# Patient Record
Sex: FEMALE | Race: WHITE | Employment: OTHER | URBAN - NONMETROPOLITAN AREA
[De-identification: names, ages, dates, MRNs, and addresses within clinical notes are randomized per-mention and may not be internally consistent; named-entity substitution may affect disease eponyms.]

---

## 2017-08-01 ENCOUNTER — APPOINTMENT (OUTPATIENT)
Dept: CT IMAGING | Age: 67
DRG: 293 | End: 2017-08-01

## 2017-08-01 ENCOUNTER — HOSPITAL ENCOUNTER (INPATIENT)
Age: 67
LOS: 2 days | Discharge: HOME | DRG: 293 | End: 2017-08-03
Attending: EMERGENCY MEDICINE | Admitting: INTERNAL MEDICINE

## 2017-08-01 ENCOUNTER — APPOINTMENT (OUTPATIENT)
Dept: GENERAL RADIOLOGY | Age: 67
DRG: 293 | End: 2017-08-01

## 2017-08-01 ENCOUNTER — HOSPITAL ENCOUNTER (INPATIENT)
Dept: NON INVASIVE DIAGNOSTICS | Age: 67
Discharge: HOME | DRG: 293 | End: 2017-08-01

## 2017-08-01 DIAGNOSIS — I10 ESSENTIAL HYPERTENSION: ICD-10-CM

## 2017-08-01 DIAGNOSIS — E78.5 HYPERLIPIDEMIA, UNSPECIFIED HYPERLIPIDEMIA TYPE: ICD-10-CM

## 2017-08-01 DIAGNOSIS — I16.1 HYPERTENSIVE EMERGENCY: Primary | ICD-10-CM

## 2017-08-01 DIAGNOSIS — I50.9 ACUTE ON CHRONIC CONGESTIVE HEART FAILURE, UNSPECIFIED CONGESTIVE HEART FAILURE TYPE: ICD-10-CM

## 2017-08-01 PROBLEM — I50.23 ACUTE ON CHRONIC SYSTOLIC CHF (CONGESTIVE HEART FAILURE) (HCC): Status: ACTIVE | Noted: 2017-08-01

## 2017-08-01 PROBLEM — I16.0 HYPERTENSIVE URGENCY: Status: ACTIVE | Noted: 2017-08-01

## 2017-08-01 LAB
ABSOLUTE EOS #: 0 K/UL (ref 0–0.4)
ABSOLUTE LYMPH #: 0.9 K/UL (ref 1–4.8)
ABSOLUTE MONO #: 0.6 K/UL (ref 0–1)
ALBUMIN SERPL-MCNC: 4.1 G/DL (ref 3.5–5.2)
ALBUMIN/GLOBULIN RATIO: 1.3 (ref 1–2.5)
ALP BLD-CCNC: 76 U/L (ref 35–104)
ALT SERPL-CCNC: 17 U/L (ref 5–33)
ANION GAP SERPL CALCULATED.3IONS-SCNC: 14 MMOL/L (ref 9–17)
AST SERPL-CCNC: 20 U/L
BASOPHILS # BLD: 0 %
BASOPHILS ABSOLUTE: 0 K/UL (ref 0–0.2)
BILIRUB SERPL-MCNC: 0.4 MG/DL (ref 0.3–1.2)
BILIRUBIN DIRECT: <0.08 MG/DL
BILIRUBIN, INDIRECT: NORMAL MG/DL (ref 0–1)
BNP INTERPRETATION: ABNORMAL
BNP INTERPRETATION: ABNORMAL
BUN BLDV-MCNC: 17 MG/DL (ref 8–23)
BUN/CREAT BLD: 25 (ref 9–20)
CALCIUM SERPL-MCNC: 9.9 MG/DL (ref 8.6–10.4)
CHLORIDE BLD-SCNC: 102 MMOL/L (ref 98–107)
CO2: 25 MMOL/L (ref 20–31)
CREAT SERPL-MCNC: 0.67 MG/DL (ref 0.5–0.9)
DIFFERENTIAL TYPE: ABNORMAL
EKG ATRIAL RATE: 105 BPM
EKG P AXIS: 56 DEGREES
EKG P-R INTERVAL: 190 MS
EKG Q-T INTERVAL: 366 MS
EKG QRS DURATION: 108 MS
EKG QTC CALCULATION (BAZETT): 483 MS
EKG R AXIS: 2 DEGREES
EKG T AXIS: 94 DEGREES
EKG VENTRICULAR RATE: 105 BPM
EOSINOPHILS RELATIVE PERCENT: 1 %
GFR AFRICAN AMERICAN: >60 ML/MIN
GFR NON-AFRICAN AMERICAN: >60 ML/MIN
GFR SERPL CREATININE-BSD FRML MDRD: ABNORMAL ML/MIN/{1.73_M2}
GFR SERPL CREATININE-BSD FRML MDRD: ABNORMAL ML/MIN/{1.73_M2}
GLOBULIN: NORMAL G/DL (ref 1.5–3.8)
GLUCOSE BLD-MCNC: 140 MG/DL (ref 70–99)
HCT VFR BLD CALC: 41.7 % (ref 36–46)
HEMOGLOBIN: 14 G/DL (ref 12–16)
LV EF: 43 %
LVEF MODALITY: NORMAL
LYMPHOCYTES # BLD: 12 %
MCH RBC QN AUTO: 30 PG (ref 26–34)
MCHC RBC AUTO-ENTMCNC: 33.6 G/DL (ref 31–37)
MCV RBC AUTO: 89.3 FL (ref 80–100)
MONOCYTES # BLD: 7 %
PDW BLD-RTO: 14.1 % (ref 12.1–15.2)
PLATELET # BLD: 144 K/UL (ref 140–450)
PLATELET ESTIMATE: ABNORMAL
PMV BLD AUTO: 10.1 FL (ref 6–12)
POTASSIUM SERPL-SCNC: 4.3 MMOL/L (ref 3.7–5.3)
PRO-BNP: 2478 PG/ML
PRO-BNP: 3556 PG/ML
RBC # BLD: 4.67 M/UL (ref 4–5.2)
RBC # BLD: ABNORMAL 10*6/UL
SEG NEUTROPHILS: 80 %
SEGMENTED NEUTROPHILS ABSOLUTE COUNT: 6.1 K/UL (ref 1.8–7.7)
SODIUM BLD-SCNC: 141 MMOL/L (ref 135–144)
THYROXINE, FREE: 1.15 NG/DL (ref 0.93–1.7)
TOTAL PROTEIN: 7.2 G/DL (ref 6.4–8.3)
TROPONIN INTERP: NORMAL
TROPONIN T: <0.03 NG/ML
TSH SERPL DL<=0.05 MIU/L-ACNC: 1.14 MIU/L (ref 0.3–5)
WBC # BLD: 7.7 K/UL (ref 3.5–11)
WBC # BLD: ABNORMAL 10*3/UL

## 2017-08-01 PROCEDURE — 94761 N-INVAS EAR/PLS OXIMETRY MLT: CPT

## 2017-08-01 PROCEDURE — 93306 TTE W/DOPPLER COMPLETE: CPT

## 2017-08-01 PROCEDURE — 80048 BASIC METABOLIC PNL TOTAL CA: CPT

## 2017-08-01 PROCEDURE — 85025 COMPLETE CBC W/AUTO DIFF WBC: CPT

## 2017-08-01 PROCEDURE — 2000000000 HC ICU R&B

## 2017-08-01 PROCEDURE — 6360000002 HC RX W HCPCS: Performed by: INTERNAL MEDICINE

## 2017-08-01 PROCEDURE — 71020 XR CHEST STANDARD TWO VW: CPT

## 2017-08-01 PROCEDURE — 84484 ASSAY OF TROPONIN QUANT: CPT

## 2017-08-01 PROCEDURE — 80076 HEPATIC FUNCTION PANEL: CPT

## 2017-08-01 PROCEDURE — 2580000003 HC RX 258: Performed by: INTERNAL MEDICINE

## 2017-08-01 PROCEDURE — 84439 ASSAY OF FREE THYROXINE: CPT

## 2017-08-01 PROCEDURE — 83880 ASSAY OF NATRIURETIC PEPTIDE: CPT

## 2017-08-01 PROCEDURE — 99253 IP/OBS CNSLTJ NEW/EST LOW 45: CPT | Performed by: FAMILY MEDICINE

## 2017-08-01 PROCEDURE — 6370000000 HC RX 637 (ALT 250 FOR IP): Performed by: EMERGENCY MEDICINE

## 2017-08-01 PROCEDURE — 6370000000 HC RX 637 (ALT 250 FOR IP): Performed by: INTERNAL MEDICINE

## 2017-08-01 PROCEDURE — 99285 EMERGENCY DEPT VISIT HI MDM: CPT

## 2017-08-01 PROCEDURE — 71250 CT THORAX DX C-: CPT

## 2017-08-01 PROCEDURE — 96374 THER/PROPH/DIAG INJ IV PUSH: CPT

## 2017-08-01 PROCEDURE — 96375 TX/PRO/DX INJ NEW DRUG ADDON: CPT

## 2017-08-01 PROCEDURE — 6360000002 HC RX W HCPCS: Performed by: EMERGENCY MEDICINE

## 2017-08-01 PROCEDURE — 84443 ASSAY THYROID STIM HORMONE: CPT

## 2017-08-01 PROCEDURE — 93005 ELECTROCARDIOGRAM TRACING: CPT

## 2017-08-01 PROCEDURE — 36415 COLL VENOUS BLD VENIPUNCTURE: CPT

## 2017-08-01 RX ORDER — SODIUM CHLORIDE 0.9 % (FLUSH) 0.9 %
10 SYRINGE (ML) INJECTION PRN
Status: DISCONTINUED | OUTPATIENT
Start: 2017-08-01 | End: 2017-08-03 | Stop reason: HOSPADM

## 2017-08-01 RX ORDER — CARVEDILOL 12.5 MG/1
12.5 TABLET ORAL 2 TIMES DAILY WITH MEALS
Status: DISCONTINUED | OUTPATIENT
Start: 2017-08-01 | End: 2017-08-03 | Stop reason: HOSPADM

## 2017-08-01 RX ORDER — POTASSIUM CHLORIDE 7.45 MG/ML
10 INJECTION INTRAVENOUS PRN
Status: DISCONTINUED | OUTPATIENT
Start: 2017-08-01 | End: 2017-08-03 | Stop reason: HOSPADM

## 2017-08-01 RX ORDER — POTASSIUM CHLORIDE 20 MEQ/1
40 TABLET, EXTENDED RELEASE ORAL PRN
Status: DISCONTINUED | OUTPATIENT
Start: 2017-08-01 | End: 2017-08-03 | Stop reason: HOSPADM

## 2017-08-01 RX ORDER — POTASSIUM CHLORIDE 20MEQ/15ML
40 LIQUID (ML) ORAL PRN
Status: DISCONTINUED | OUTPATIENT
Start: 2017-08-01 | End: 2017-08-03 | Stop reason: HOSPADM

## 2017-08-01 RX ORDER — FUROSEMIDE 10 MG/ML
40 INJECTION INTRAMUSCULAR; INTRAVENOUS 2 TIMES DAILY
Status: DISCONTINUED | OUTPATIENT
Start: 2017-08-01 | End: 2017-08-02

## 2017-08-01 RX ORDER — ACETAMINOPHEN 325 MG/1
650 TABLET ORAL EVERY 4 HOURS PRN
Status: DISCONTINUED | OUTPATIENT
Start: 2017-08-01 | End: 2017-08-02

## 2017-08-01 RX ORDER — FAMOTIDINE 20 MG/1
20 TABLET, FILM COATED ORAL 2 TIMES DAILY
Status: DISCONTINUED | OUTPATIENT
Start: 2017-08-01 | End: 2017-08-03 | Stop reason: HOSPADM

## 2017-08-01 RX ORDER — SODIUM CHLORIDE 0.9 % (FLUSH) 0.9 %
10 SYRINGE (ML) INJECTION EVERY 12 HOURS SCHEDULED
Status: DISCONTINUED | OUTPATIENT
Start: 2017-08-01 | End: 2017-08-03 | Stop reason: HOSPADM

## 2017-08-01 RX ORDER — HYDRALAZINE HYDROCHLORIDE 20 MG/ML
10 INJECTION INTRAMUSCULAR; INTRAVENOUS EVERY 6 HOURS PRN
Status: DISCONTINUED | OUTPATIENT
Start: 2017-08-01 | End: 2017-08-02

## 2017-08-01 RX ORDER — MAGNESIUM SULFATE 1 G/100ML
1 INJECTION INTRAVENOUS PRN
Status: DISCONTINUED | OUTPATIENT
Start: 2017-08-01 | End: 2017-08-03 | Stop reason: HOSPADM

## 2017-08-01 RX ORDER — LISINOPRIL 20 MG/1
40 TABLET ORAL DAILY
Status: DISCONTINUED | OUTPATIENT
Start: 2017-08-01 | End: 2017-08-03 | Stop reason: HOSPADM

## 2017-08-01 RX ORDER — ONDANSETRON 2 MG/ML
4 INJECTION INTRAMUSCULAR; INTRAVENOUS EVERY 6 HOURS PRN
Status: DISCONTINUED | OUTPATIENT
Start: 2017-08-01 | End: 2017-08-03 | Stop reason: HOSPADM

## 2017-08-01 RX ORDER — NITROGLYCERIN 0.4 MG/1
0.4 TABLET SUBLINGUAL EVERY 5 MIN PRN
Status: DISCONTINUED | OUTPATIENT
Start: 2017-08-01 | End: 2017-08-03 | Stop reason: HOSPADM

## 2017-08-01 RX ORDER — FUROSEMIDE 10 MG/ML
40 INJECTION INTRAMUSCULAR; INTRAVENOUS ONCE
Status: COMPLETED | OUTPATIENT
Start: 2017-08-01 | End: 2017-08-01

## 2017-08-01 RX ADMIN — FAMOTIDINE 20 MG: 20 TABLET, FILM COATED ORAL at 20:50

## 2017-08-01 RX ADMIN — FUROSEMIDE 40 MG: 10 INJECTION, SOLUTION INTRAMUSCULAR; INTRAVENOUS at 18:04

## 2017-08-01 RX ADMIN — CARVEDILOL 12.5 MG: 12.5 TABLET, FILM COATED ORAL at 17:15

## 2017-08-01 RX ADMIN — Medication 10 ML: at 18:04

## 2017-08-01 RX ADMIN — Medication 10 ML: at 20:50

## 2017-08-01 RX ADMIN — FUROSEMIDE 40 MG: 10 INJECTION, SOLUTION INTRAMUSCULAR; INTRAVENOUS at 09:41

## 2017-08-01 RX ADMIN — NITROGLYCERIN 0.4 MG: 0.4 TABLET SUBLINGUAL at 09:45

## 2017-08-01 RX ADMIN — LISINOPRIL 40 MG: 20 TABLET ORAL at 12:50

## 2017-08-01 RX ADMIN — HYDRALAZINE HYDROCHLORIDE 10 MG: 20 INJECTION INTRAMUSCULAR; INTRAVENOUS at 09:59

## 2017-08-01 RX ADMIN — NITROGLYCERIN 0.4 MG: 0.4 TABLET SUBLINGUAL at 09:58

## 2017-08-01 RX ADMIN — ENOXAPARIN SODIUM 40 MG: 40 INJECTION SUBCUTANEOUS at 12:51

## 2017-08-01 RX ADMIN — FAMOTIDINE 20 MG: 20 TABLET, FILM COATED ORAL at 12:51

## 2017-08-01 ASSESSMENT — ENCOUNTER SYMPTOMS
FACIAL SWELLING: 0
BACK PAIN: 0
VOICE CHANGE: 0
NAUSEA: 0
WHEEZING: 0
TROUBLE SWALLOWING: 0
SORE THROAT: 0
DIARRHEA: 0
VOMITING: 0
PHOTOPHOBIA: 0
COUGH: 0
CHEST TIGHTNESS: 1
SHORTNESS OF BREATH: 1
BLOOD IN STOOL: 0
ABDOMINAL PAIN: 0

## 2017-08-01 ASSESSMENT — PAIN DESCRIPTION - DESCRIPTORS: DESCRIPTORS: ACHING

## 2017-08-01 ASSESSMENT — PAIN SCALES - GENERAL
PAINLEVEL_OUTOF10: 1
PAINLEVEL_OUTOF10: 1

## 2017-08-01 ASSESSMENT — PAIN DESCRIPTION - LOCATION: LOCATION: ABDOMEN

## 2017-08-01 ASSESSMENT — PAIN DESCRIPTION - PAIN TYPE: TYPE: ACUTE PAIN

## 2017-08-02 LAB
ABSOLUTE EOS #: 0.1 K/UL (ref 0–0.4)
ABSOLUTE LYMPH #: 1.3 K/UL (ref 1–4.8)
ABSOLUTE MONO #: 0.6 K/UL (ref 0–1)
ANION GAP SERPL CALCULATED.3IONS-SCNC: 9 MMOL/L (ref 9–17)
BASOPHILS # BLD: 1 %
BASOPHILS ABSOLUTE: 0 K/UL (ref 0–0.2)
BUN BLDV-MCNC: 23 MG/DL (ref 8–23)
BUN/CREAT BLD: 26 (ref 9–20)
CALCIUM SERPL-MCNC: 9.8 MG/DL (ref 8.6–10.4)
CHLORIDE BLD-SCNC: 99 MMOL/L (ref 98–107)
CHOLESTEROL/HDL RATIO: 3.5
CHOLESTEROL: 202 MG/DL
CO2: 31 MMOL/L (ref 20–31)
CREAT SERPL-MCNC: 0.88 MG/DL (ref 0.5–0.9)
DIFFERENTIAL TYPE: NORMAL
EOSINOPHILS RELATIVE PERCENT: 1 %
GFR AFRICAN AMERICAN: >60 ML/MIN
GFR NON-AFRICAN AMERICAN: >60 ML/MIN
GFR SERPL CREATININE-BSD FRML MDRD: ABNORMAL ML/MIN/{1.73_M2}
GFR SERPL CREATININE-BSD FRML MDRD: ABNORMAL ML/MIN/{1.73_M2}
GLUCOSE BLD-MCNC: 118 MG/DL (ref 70–99)
HCT VFR BLD CALC: 39.7 % (ref 36–46)
HDLC SERPL-MCNC: 58 MG/DL
HEMOGLOBIN: 13.2 G/DL (ref 12–16)
LDL CHOLESTEROL: 127 MG/DL (ref 0–130)
LYMPHOCYTES # BLD: 24 %
MAGNESIUM: 1.9 MG/DL (ref 1.6–2.6)
MCH RBC QN AUTO: 30.1 PG (ref 26–34)
MCHC RBC AUTO-ENTMCNC: 33.3 G/DL (ref 31–37)
MCV RBC AUTO: 90.3 FL (ref 80–100)
MONOCYTES # BLD: 11 %
PDW BLD-RTO: 14.1 % (ref 12.1–15.2)
PLATELET # BLD: 176 K/UL (ref 140–450)
PLATELET ESTIMATE: NORMAL
PMV BLD AUTO: 9.7 FL (ref 6–12)
POTASSIUM SERPL-SCNC: 4.2 MMOL/L (ref 3.7–5.3)
RBC # BLD: 4.4 M/UL (ref 4–5.2)
RBC # BLD: NORMAL 10*6/UL
SEG NEUTROPHILS: 63 %
SEGMENTED NEUTROPHILS ABSOLUTE COUNT: 3.4 K/UL (ref 1.8–7.7)
SODIUM BLD-SCNC: 139 MMOL/L (ref 135–144)
TRIGL SERPL-MCNC: 85 MG/DL
VLDLC SERPL CALC-MCNC: ABNORMAL MG/DL (ref 1–30)
WBC # BLD: 5.4 K/UL (ref 3.5–11)
WBC # BLD: NORMAL 10*3/UL

## 2017-08-02 PROCEDURE — 2580000003 HC RX 258: Performed by: INTERNAL MEDICINE

## 2017-08-02 PROCEDURE — 6370000000 HC RX 637 (ALT 250 FOR IP): Performed by: PHYSICIAN ASSISTANT

## 2017-08-02 PROCEDURE — 6370000000 HC RX 637 (ALT 250 FOR IP): Performed by: INTERNAL MEDICINE

## 2017-08-02 PROCEDURE — 85025 COMPLETE CBC W/AUTO DIFF WBC: CPT

## 2017-08-02 PROCEDURE — 83735 ASSAY OF MAGNESIUM: CPT

## 2017-08-02 PROCEDURE — 80061 LIPID PANEL: CPT

## 2017-08-02 PROCEDURE — 36415 COLL VENOUS BLD VENIPUNCTURE: CPT

## 2017-08-02 PROCEDURE — 1200000000 HC SEMI PRIVATE

## 2017-08-02 PROCEDURE — 6360000002 HC RX W HCPCS: Performed by: INTERNAL MEDICINE

## 2017-08-02 PROCEDURE — 80048 BASIC METABOLIC PNL TOTAL CA: CPT

## 2017-08-02 RX ORDER — FUROSEMIDE 40 MG/1
40 TABLET ORAL DAILY
Status: DISCONTINUED | OUTPATIENT
Start: 2017-08-03 | End: 2017-08-03 | Stop reason: HOSPADM

## 2017-08-02 RX ORDER — ACETAMINOPHEN 325 MG/1
650 TABLET ORAL EVERY 4 HOURS PRN
Status: DISCONTINUED | OUTPATIENT
Start: 2017-08-02 | End: 2017-08-03 | Stop reason: HOSPADM

## 2017-08-02 RX ADMIN — FAMOTIDINE 20 MG: 20 TABLET, FILM COATED ORAL at 08:37

## 2017-08-02 RX ADMIN — Medication 10 ML: at 20:36

## 2017-08-02 RX ADMIN — LISINOPRIL 40 MG: 20 TABLET ORAL at 08:37

## 2017-08-02 RX ADMIN — CARVEDILOL 12.5 MG: 12.5 TABLET, FILM COATED ORAL at 16:32

## 2017-08-02 RX ADMIN — CARVEDILOL 12.5 MG: 12.5 TABLET, FILM COATED ORAL at 08:37

## 2017-08-02 RX ADMIN — FAMOTIDINE 20 MG: 20 TABLET, FILM COATED ORAL at 20:36

## 2017-08-02 RX ADMIN — FUROSEMIDE 40 MG: 10 INJECTION, SOLUTION INTRAMUSCULAR; INTRAVENOUS at 08:37

## 2017-08-02 RX ADMIN — Medication 10 ML: at 08:40

## 2017-08-02 RX ADMIN — ENOXAPARIN SODIUM 40 MG: 40 INJECTION SUBCUTANEOUS at 08:37

## 2017-08-02 ASSESSMENT — PAIN DESCRIPTION - PAIN TYPE: TYPE: ACUTE PAIN

## 2017-08-02 ASSESSMENT — PAIN SCALES - GENERAL
PAINLEVEL_OUTOF10: 0
PAINLEVEL_OUTOF10: 3
PAINLEVEL_OUTOF10: 0

## 2017-08-02 ASSESSMENT — PAIN DESCRIPTION - DESCRIPTORS: DESCRIPTORS: ACHING

## 2017-08-02 ASSESSMENT — PAIN DESCRIPTION - LOCATION: LOCATION: HEAD

## 2017-08-03 VITALS
HEART RATE: 60 BPM | DIASTOLIC BLOOD PRESSURE: 85 MMHG | BODY MASS INDEX: 31.67 KG/M2 | HEIGHT: 64 IN | OXYGEN SATURATION: 94 % | TEMPERATURE: 98.2 F | WEIGHT: 185.5 LBS | SYSTOLIC BLOOD PRESSURE: 138 MMHG | RESPIRATION RATE: 15 BRPM

## 2017-08-03 LAB
ANION GAP SERPL CALCULATED.3IONS-SCNC: 10 MMOL/L (ref 9–17)
BUN BLDV-MCNC: 24 MG/DL (ref 8–23)
BUN/CREAT BLD: 30 (ref 9–20)
CALCIUM SERPL-MCNC: 9.7 MG/DL (ref 8.6–10.4)
CHLORIDE BLD-SCNC: 103 MMOL/L (ref 98–107)
CO2: 27 MMOL/L (ref 20–31)
CREAT SERPL-MCNC: 0.79 MG/DL (ref 0.5–0.9)
GFR AFRICAN AMERICAN: >60 ML/MIN
GFR NON-AFRICAN AMERICAN: >60 ML/MIN
GFR SERPL CREATININE-BSD FRML MDRD: ABNORMAL ML/MIN/{1.73_M2}
GFR SERPL CREATININE-BSD FRML MDRD: ABNORMAL ML/MIN/{1.73_M2}
GLUCOSE BLD-MCNC: 108 MG/DL (ref 70–99)
POTASSIUM SERPL-SCNC: 4.1 MMOL/L (ref 3.7–5.3)
SODIUM BLD-SCNC: 140 MMOL/L (ref 135–144)

## 2017-08-03 PROCEDURE — 99233 SBSQ HOSP IP/OBS HIGH 50: CPT | Performed by: FAMILY MEDICINE

## 2017-08-03 PROCEDURE — 6360000002 HC RX W HCPCS: Performed by: INTERNAL MEDICINE

## 2017-08-03 PROCEDURE — 6370000000 HC RX 637 (ALT 250 FOR IP): Performed by: INTERNAL MEDICINE

## 2017-08-03 PROCEDURE — 36415 COLL VENOUS BLD VENIPUNCTURE: CPT

## 2017-08-03 PROCEDURE — 80048 BASIC METABOLIC PNL TOTAL CA: CPT

## 2017-08-03 PROCEDURE — 2580000003 HC RX 258: Performed by: INTERNAL MEDICINE

## 2017-08-03 RX ORDER — LISINOPRIL 40 MG/1
40 TABLET ORAL DAILY
Qty: 30 TABLET | Refills: 0 | Status: SHIPPED | OUTPATIENT
Start: 2017-08-03 | End: 2017-08-28

## 2017-08-03 RX ORDER — CARVEDILOL 12.5 MG/1
12.5 TABLET ORAL 2 TIMES DAILY WITH MEALS
Qty: 60 TABLET | Refills: 0 | Status: SHIPPED | OUTPATIENT
Start: 2017-08-03 | End: 2018-12-17

## 2017-08-03 RX ORDER — FUROSEMIDE 40 MG/1
40 TABLET ORAL DAILY
Qty: 30 TABLET | Refills: 0 | Status: SHIPPED | OUTPATIENT
Start: 2017-08-03 | End: 2017-08-28

## 2017-08-03 RX ADMIN — FUROSEMIDE 40 MG: 40 TABLET ORAL at 08:26

## 2017-08-03 RX ADMIN — LISINOPRIL 40 MG: 20 TABLET ORAL at 08:25

## 2017-08-03 RX ADMIN — FAMOTIDINE 20 MG: 20 TABLET, FILM COATED ORAL at 08:25

## 2017-08-03 RX ADMIN — ENOXAPARIN SODIUM 40 MG: 40 INJECTION SUBCUTANEOUS at 08:25

## 2017-08-03 RX ADMIN — Medication 10 ML: at 08:25

## 2017-08-03 RX ADMIN — CARVEDILOL 12.5 MG: 12.5 TABLET, FILM COATED ORAL at 08:25

## 2017-08-03 ASSESSMENT — PAIN SCALES - GENERAL: PAINLEVEL_OUTOF10: 0

## 2017-08-04 ENCOUNTER — HOSPITAL ENCOUNTER (OUTPATIENT)
Dept: CARDIAC CATH/INVASIVE PROCEDURES | Age: 67
Discharge: HOME | End: 2017-08-04

## 2017-08-04 VITALS
RESPIRATION RATE: 18 BRPM | WEIGHT: 185 LBS | TEMPERATURE: 97.4 F | HEART RATE: 62 BPM | OXYGEN SATURATION: 97 % | HEIGHT: 64 IN | DIASTOLIC BLOOD PRESSURE: 63 MMHG | SYSTOLIC BLOOD PRESSURE: 125 MMHG | BODY MASS INDEX: 31.58 KG/M2

## 2017-08-04 PROCEDURE — 2500000003 HC RX 250 WO HCPCS

## 2017-08-04 PROCEDURE — C1894 INTRO/SHEATH, NON-LASER: HCPCS

## 2017-08-04 PROCEDURE — 93458 L HRT ARTERY/VENTRICLE ANGIO: CPT | Performed by: FAMILY MEDICINE

## 2017-08-04 PROCEDURE — C1887 CATHETER, GUIDING: HCPCS

## 2017-08-04 PROCEDURE — C1725 CATH, TRANSLUMIN NON-LASER: HCPCS

## 2017-08-04 PROCEDURE — 2580000003 HC RX 258: Performed by: FAMILY MEDICINE

## 2017-08-04 PROCEDURE — 6360000002 HC RX W HCPCS

## 2017-08-04 PROCEDURE — C1769 GUIDE WIRE: HCPCS

## 2017-08-04 RX ORDER — ACETAMINOPHEN 325 MG/1
650 TABLET ORAL EVERY 4 HOURS PRN
Status: DISCONTINUED | OUTPATIENT
Start: 2017-08-04 | End: 2017-08-05 | Stop reason: HOSPADM

## 2017-08-04 RX ORDER — DIPHENHYDRAMINE HCL 25 MG
50 TABLET ORAL ONCE
Status: DISCONTINUED | OUTPATIENT
Start: 2017-08-04 | End: 2017-08-05 | Stop reason: HOSPADM

## 2017-08-04 RX ORDER — SODIUM CHLORIDE 0.9 % (FLUSH) 0.9 %
10 SYRINGE (ML) INJECTION EVERY 12 HOURS SCHEDULED
Status: DISCONTINUED | OUTPATIENT
Start: 2017-08-04 | End: 2017-08-05 | Stop reason: HOSPADM

## 2017-08-04 RX ORDER — SODIUM CHLORIDE 9 MG/ML
INJECTION, SOLUTION INTRAVENOUS CONTINUOUS
Status: DISCONTINUED | OUTPATIENT
Start: 2017-08-04 | End: 2017-08-05 | Stop reason: HOSPADM

## 2017-08-04 RX ORDER — SODIUM CHLORIDE 0.9 % (FLUSH) 0.9 %
10 SYRINGE (ML) INJECTION PRN
Status: DISCONTINUED | OUTPATIENT
Start: 2017-08-04 | End: 2017-08-05 | Stop reason: HOSPADM

## 2017-08-04 RX ORDER — NITROGLYCERIN 0.4 MG/1
0.4 TABLET SUBLINGUAL EVERY 5 MIN PRN
Status: DISCONTINUED | OUTPATIENT
Start: 2017-08-04 | End: 2017-08-05 | Stop reason: HOSPADM

## 2017-08-04 RX ADMIN — SODIUM CHLORIDE: 9 INJECTION, SOLUTION INTRAVENOUS at 10:33

## 2017-08-04 ASSESSMENT — PAIN SCALES - GENERAL
PAINLEVEL_OUTOF10: 0
PAINLEVEL_OUTOF10: 0

## 2017-08-28 ENCOUNTER — OFFICE VISIT (OUTPATIENT)
Dept: CARDIOLOGY | Age: 67
End: 2017-08-28

## 2017-08-28 VITALS
WEIGHT: 191 LBS | HEIGHT: 64 IN | SYSTOLIC BLOOD PRESSURE: 161 MMHG | DIASTOLIC BLOOD PRESSURE: 92 MMHG | BODY MASS INDEX: 32.61 KG/M2 | RESPIRATION RATE: 16 BRPM | OXYGEN SATURATION: 97 % | HEART RATE: 60 BPM

## 2017-08-28 DIAGNOSIS — I50.42 CHRONIC COMBINED SYSTOLIC AND DIASTOLIC HF (HEART FAILURE), NYHA CLASS 2 (HCC): ICD-10-CM

## 2017-08-28 DIAGNOSIS — I10 HYPERTENSION, UNCONTROLLED: ICD-10-CM

## 2017-08-28 DIAGNOSIS — I42.8 NON-ISCHEMIC CARDIOMYOPATHY (HCC): Primary | ICD-10-CM

## 2017-08-28 PROCEDURE — 99214 OFFICE O/P EST MOD 30 MIN: CPT | Performed by: FAMILY MEDICINE

## 2017-08-28 RX ORDER — CARVEDILOL 12.5 MG/1
12.5 TABLET ORAL 2 TIMES DAILY WITH MEALS
Qty: 180 TABLET | Refills: 3 | Status: SHIPPED | OUTPATIENT
Start: 2017-08-28 | End: 2019-02-13 | Stop reason: SDUPTHER

## 2017-08-28 RX ORDER — LOSARTAN POTASSIUM AND HYDROCHLOROTHIAZIDE 25; 100 MG/1; MG/1
1 TABLET ORAL DAILY
Qty: 90 TABLET | Refills: 3 | Status: ON HOLD | OUTPATIENT
Start: 2017-08-28 | End: 2018-12-20

## 2018-01-03 ENCOUNTER — HOSPITAL ENCOUNTER (OUTPATIENT)
Dept: NON INVASIVE DIAGNOSTICS | Age: 68
Discharge: HOME | End: 2018-01-03
Payer: MEDICARE

## 2018-01-03 DIAGNOSIS — I42.8 NON-ISCHEMIC CARDIOMYOPATHY (HCC): ICD-10-CM

## 2018-01-03 DIAGNOSIS — I10 HYPERTENSION, UNCONTROLLED: ICD-10-CM

## 2018-01-03 LAB
LV EF: 48 %
LVEF MODALITY: NORMAL

## 2018-01-03 PROCEDURE — 93306 TTE W/DOPPLER COMPLETE: CPT

## 2018-12-17 ENCOUNTER — HOSPITAL ENCOUNTER (INPATIENT)
Age: 68
LOS: 3 days | Discharge: HOME | DRG: 292 | End: 2018-12-20
Attending: EMERGENCY MEDICINE | Admitting: INTERNAL MEDICINE
Payer: MEDICARE

## 2018-12-17 ENCOUNTER — APPOINTMENT (OUTPATIENT)
Dept: GENERAL RADIOLOGY | Age: 68
DRG: 292 | End: 2018-12-17
Payer: MEDICARE

## 2018-12-17 DIAGNOSIS — I50.42 CHRONIC COMBINED SYSTOLIC AND DIASTOLIC HF (HEART FAILURE), NYHA CLASS 2 (HCC): ICD-10-CM

## 2018-12-17 DIAGNOSIS — I50.43 ACUTE ON CHRONIC COMBINED SYSTOLIC AND DIASTOLIC CHF, NYHA CLASS 4 (HCC): ICD-10-CM

## 2018-12-17 DIAGNOSIS — I50.9 ACUTE ON CHRONIC CONGESTIVE HEART FAILURE, UNSPECIFIED HEART FAILURE TYPE (HCC): Primary | ICD-10-CM

## 2018-12-17 DIAGNOSIS — R09.02 HYPOXIA: ICD-10-CM

## 2018-12-17 LAB
ALBUMIN SERPL-MCNC: 3.7 G/DL (ref 3.5–5.2)
ALBUMIN/GLOBULIN RATIO: 1.2 (ref 1–2.5)
ALLEN TEST: ABNORMAL
ALP BLD-CCNC: 105 U/L (ref 35–104)
ALT SERPL-CCNC: 29 U/L (ref 5–33)
ANION GAP SERPL CALCULATED.3IONS-SCNC: 10 MMOL/L (ref 9–17)
ANION GAP SERPL CALCULATED.3IONS-SCNC: 11 MMOL/L (ref 9–17)
AST SERPL-CCNC: 21 U/L
BILIRUB SERPL-MCNC: 0.57 MG/DL (ref 0.3–1.2)
BNP INTERPRETATION: ABNORMAL
BUN BLDV-MCNC: 19 MG/DL (ref 8–23)
BUN BLDV-MCNC: 20 MG/DL (ref 8–23)
BUN/CREAT BLD: 21 (ref 9–20)
BUN/CREAT BLD: 22 (ref 9–20)
CALCIUM SERPL-MCNC: 9.2 MG/DL (ref 8.6–10.4)
CALCIUM SERPL-MCNC: 9.4 MG/DL (ref 8.6–10.4)
CARBOXYHEMOGLOBIN: ABNORMAL % (ref 0–5)
CHLORIDE BLD-SCNC: 101 MMOL/L (ref 98–107)
CHLORIDE BLD-SCNC: 103 MMOL/L (ref 98–107)
CO2: 27 MMOL/L (ref 20–31)
CO2: 28 MMOL/L (ref 20–31)
CREAT SERPL-MCNC: 0.88 MG/DL (ref 0.5–0.9)
CREAT SERPL-MCNC: 0.96 MG/DL (ref 0.5–0.9)
EKG ATRIAL RATE: 102 BPM
EKG P AXIS: 63 DEGREES
EKG P-R INTERVAL: 172 MS
EKG Q-T INTERVAL: 396 MS
EKG QRS DURATION: 96 MS
EKG QTC CALCULATION (BAZETT): 516 MS
EKG R AXIS: 27 DEGREES
EKG T AXIS: 116 DEGREES
EKG VENTRICULAR RATE: 102 BPM
FIO2: ABNORMAL
GFR AFRICAN AMERICAN: >60 ML/MIN
GFR AFRICAN AMERICAN: >60 ML/MIN
GFR NON-AFRICAN AMERICAN: 58 ML/MIN
GFR NON-AFRICAN AMERICAN: >60 ML/MIN
GFR SERPL CREATININE-BSD FRML MDRD: ABNORMAL ML/MIN/{1.73_M2}
GLUCOSE BLD-MCNC: 147 MG/DL (ref 70–99)
GLUCOSE BLD-MCNC: 172 MG/DL (ref 70–99)
HCO3 VENOUS: 29.9 MMOL/L (ref 24–30)
HCT VFR BLD CALC: 37 % (ref 36.3–47.1)
HEMOGLOBIN: 11.8 G/DL (ref 11.9–15.1)
LACTIC ACID, WHOLE BLOOD: NORMAL MMOL/L (ref 0.7–2.1)
LACTIC ACID: 1.5 MMOL/L (ref 0.5–2.2)
MAGNESIUM: 1.6 MG/DL (ref 1.6–2.6)
MCH RBC QN AUTO: 29 PG (ref 25.2–33.5)
MCHC RBC AUTO-ENTMCNC: 31.9 G/DL (ref 28.4–34.8)
MCV RBC AUTO: 90.9 FL (ref 82.6–102.9)
METHEMOGLOBIN: ABNORMAL % (ref 0–1.9)
MODE: ABNORMAL
NEGATIVE BASE EXCESS, VEN: ABNORMAL MMOL/L (ref 0–2)
NOTIFICATION TIME: ABNORMAL
NOTIFICATION: ABNORMAL
NRBC AUTOMATED: 0 PER 100 WBC
O2 DEVICE/FLOW/%: ABNORMAL
O2 SAT, VEN: 35.7 % (ref 60–85)
OXYHEMOGLOBIN: ABNORMAL % (ref 95–98)
PATIENT TEMP: 37
PCO2, VEN, TEMP ADJ: ABNORMAL MMHG (ref 39–55)
PCO2, VEN: 53.2 (ref 39–55)
PDW BLD-RTO: 13.2 % (ref 11.8–14.4)
PEEP/CPAP: ABNORMAL
PH VENOUS: 7.37 (ref 7.32–7.42)
PH, VEN, TEMP ADJ: ABNORMAL (ref 7.32–7.42)
PLATELET # BLD: 155 K/UL (ref 138–453)
PMV BLD AUTO: 12.3 FL (ref 8.1–13.5)
PO2, VEN, TEMP ADJ: ABNORMAL MMHG (ref 30–50)
PO2, VEN: 22.3 (ref 30–50)
POSITIVE BASE EXCESS, VEN: 3.2 MMOL/L (ref 0–2)
POTASSIUM SERPL-SCNC: 3.7 MMOL/L (ref 3.7–5.3)
POTASSIUM SERPL-SCNC: 4 MMOL/L (ref 3.7–5.3)
PRO-BNP: 5373 PG/ML
PSV: ABNORMAL
PT. POSITION: ABNORMAL
RBC # BLD: 4.07 M/UL (ref 3.95–5.11)
RESPIRATORY RATE: ABNORMAL
SAMPLE SITE: ABNORMAL
SET RATE: ABNORMAL
SODIUM BLD-SCNC: 139 MMOL/L (ref 135–144)
SODIUM BLD-SCNC: 141 MMOL/L (ref 135–144)
TEXT FOR RESPIRATORY: ABNORMAL
TOTAL HB: ABNORMAL G/DL (ref 12–16)
TOTAL PROTEIN: 6.7 G/DL (ref 6.4–8.3)
TOTAL RATE: ABNORMAL
TROPONIN INTERP: NORMAL
TROPONIN T: <0.03 NG/ML
VT: ABNORMAL
WBC # BLD: 8.6 K/UL (ref 3.5–11.3)

## 2018-12-17 PROCEDURE — 6360000002 HC RX W HCPCS: Performed by: INTERNAL MEDICINE

## 2018-12-17 PROCEDURE — 93005 ELECTROCARDIOGRAM TRACING: CPT

## 2018-12-17 PROCEDURE — 80053 COMPREHEN METABOLIC PANEL: CPT

## 2018-12-17 PROCEDURE — 6370000000 HC RX 637 (ALT 250 FOR IP): Performed by: INTERNAL MEDICINE

## 2018-12-17 PROCEDURE — 80048 BASIC METABOLIC PNL TOTAL CA: CPT

## 2018-12-17 PROCEDURE — 1200000000 HC SEMI PRIVATE

## 2018-12-17 PROCEDURE — 36415 COLL VENOUS BLD VENIPUNCTURE: CPT

## 2018-12-17 PROCEDURE — 84484 ASSAY OF TROPONIN QUANT: CPT

## 2018-12-17 PROCEDURE — 96375 TX/PRO/DX INJ NEW DRUG ADDON: CPT

## 2018-12-17 PROCEDURE — 2580000003 HC RX 258: Performed by: PHYSICIAN ASSISTANT

## 2018-12-17 PROCEDURE — 82805 BLOOD GASES W/O2 SATURATION: CPT

## 2018-12-17 PROCEDURE — 83605 ASSAY OF LACTIC ACID: CPT

## 2018-12-17 PROCEDURE — 83880 ASSAY OF NATRIURETIC PEPTIDE: CPT

## 2018-12-17 PROCEDURE — 87040 BLOOD CULTURE FOR BACTERIA: CPT

## 2018-12-17 PROCEDURE — 6360000002 HC RX W HCPCS: Performed by: PHYSICIAN ASSISTANT

## 2018-12-17 PROCEDURE — 71045 X-RAY EXAM CHEST 1 VIEW: CPT

## 2018-12-17 PROCEDURE — 6370000000 HC RX 637 (ALT 250 FOR IP): Performed by: PHYSICIAN ASSISTANT

## 2018-12-17 PROCEDURE — 99285 EMERGENCY DEPT VISIT HI MDM: CPT

## 2018-12-17 PROCEDURE — 96365 THER/PROPH/DIAG IV INF INIT: CPT

## 2018-12-17 PROCEDURE — 85027 COMPLETE CBC AUTOMATED: CPT

## 2018-12-17 PROCEDURE — 83735 ASSAY OF MAGNESIUM: CPT

## 2018-12-17 RX ORDER — ACETAMINOPHEN 325 MG/1
650 TABLET ORAL EVERY 4 HOURS PRN
Status: DISCONTINUED | OUTPATIENT
Start: 2018-12-17 | End: 2018-12-20 | Stop reason: HOSPADM

## 2018-12-17 RX ORDER — LOSARTAN POTASSIUM AND HYDROCHLOROTHIAZIDE 12.5; 1 MG/1; MG/1
1 TABLET ORAL DAILY
Status: DISCONTINUED | OUTPATIENT
Start: 2018-12-17 | End: 2018-12-18

## 2018-12-17 RX ORDER — FUROSEMIDE 10 MG/ML
40 INJECTION INTRAMUSCULAR; INTRAVENOUS 2 TIMES DAILY
Status: DISCONTINUED | OUTPATIENT
Start: 2018-12-17 | End: 2018-12-20

## 2018-12-17 RX ORDER — HYDROCHLOROTHIAZIDE 25 MG/1
25 TABLET ORAL DAILY
Status: DISCONTINUED | OUTPATIENT
Start: 2018-12-17 | End: 2018-12-20 | Stop reason: HOSPADM

## 2018-12-17 RX ORDER — LOSARTAN POTASSIUM AND HYDROCHLOROTHIAZIDE 25; 100 MG/1; MG/1
1 TABLET ORAL DAILY
Status: DISCONTINUED | OUTPATIENT
Start: 2018-12-17 | End: 2018-12-17

## 2018-12-17 RX ORDER — LABETALOL HYDROCHLORIDE 5 MG/ML
10 INJECTION, SOLUTION INTRAVENOUS ONCE
Status: DISCONTINUED | OUTPATIENT
Start: 2018-12-17 | End: 2018-12-17

## 2018-12-17 RX ORDER — ONDANSETRON 2 MG/ML
4 INJECTION INTRAMUSCULAR; INTRAVENOUS EVERY 6 HOURS PRN
Status: DISCONTINUED | OUTPATIENT
Start: 2018-12-17 | End: 2018-12-20 | Stop reason: HOSPADM

## 2018-12-17 RX ORDER — LOSARTAN POTASSIUM 50 MG/1
100 TABLET ORAL DAILY
Status: DISCONTINUED | OUTPATIENT
Start: 2018-12-17 | End: 2018-12-20 | Stop reason: HOSPADM

## 2018-12-17 RX ORDER — ASPIRIN 81 MG/1
81 TABLET, CHEWABLE ORAL ONCE
Status: COMPLETED | OUTPATIENT
Start: 2018-12-17 | End: 2018-12-17

## 2018-12-17 RX ORDER — CARVEDILOL 12.5 MG/1
12.5 TABLET ORAL 2 TIMES DAILY WITH MEALS
Status: DISCONTINUED | OUTPATIENT
Start: 2018-12-17 | End: 2018-12-20 | Stop reason: HOSPADM

## 2018-12-17 RX ORDER — CARVEDILOL 12.5 MG/1
12.5 TABLET ORAL ONCE
Status: COMPLETED | OUTPATIENT
Start: 2018-12-17 | End: 2018-12-17

## 2018-12-17 RX ADMIN — AZITHROMYCIN MONOHYDRATE 500 MG: 500 INJECTION, POWDER, LYOPHILIZED, FOR SOLUTION INTRAVENOUS at 17:50

## 2018-12-17 RX ADMIN — CARVEDILOL 12.5 MG: 12.5 TABLET, FILM COATED ORAL at 20:39

## 2018-12-17 RX ADMIN — FUROSEMIDE 40 MG: 10 INJECTION, SOLUTION INTRAMUSCULAR; INTRAVENOUS at 20:41

## 2018-12-17 RX ADMIN — ENOXAPARIN SODIUM 40 MG: 40 INJECTION SUBCUTANEOUS at 20:41

## 2018-12-17 RX ADMIN — CARVEDILOL 12.5 MG: 12.5 TABLET, FILM COATED ORAL at 18:19

## 2018-12-17 RX ADMIN — LOSARTAN POTASSIUM AND HYDROCHLOROTHIAZIDE 1 TABLET: 12.5; 1 TABLET ORAL at 18:19

## 2018-12-17 RX ADMIN — CEFTRIAXONE 1 G: 1 INJECTION, POWDER, FOR SOLUTION INTRAMUSCULAR; INTRAVENOUS at 17:50

## 2018-12-17 RX ADMIN — HYDROCHLOROTHIAZIDE 25 MG: 25 TABLET ORAL at 20:41

## 2018-12-17 RX ADMIN — LOSARTAN POTASSIUM 100 MG: 50 TABLET ORAL at 20:41

## 2018-12-17 RX ADMIN — ASPIRIN 81 MG CHEWABLE TABLET 81 MG: 81 TABLET CHEWABLE at 20:41

## 2018-12-17 ASSESSMENT — ENCOUNTER SYMPTOMS
NAUSEA: 0
RHINORRHEA: 0
SORE THROAT: 0
ABDOMINAL PAIN: 0
DIARRHEA: 0
COUGH: 0
EYE DISCHARGE: 0
VOMITING: 0
BLOOD IN STOOL: 0
WHEEZING: 0
CHEST TIGHTNESS: 0
CONSTIPATION: 0
EYE REDNESS: 0
BACK PAIN: 0
SHORTNESS OF BREATH: 1

## 2018-12-17 NOTE — ED PROVIDER NOTES
[facility] ED  eMERGENCY dEPARTMENT eNCOUnter      Pt Name: Brooklyn Hubbard  MRN: [id number]  [place] 1950  Date of evaluation: 12/17/2018  Provider: [facility], [address] [place] [address]     Chief Complaint   Patient presents with    Shortness of Breath     started yesterday. has history of chf.  states she has had to prop herself up on pillows. HISTORY OF PRESENT ILLNESS   (Location/Symptom, Timing/Onset, Context/Setting,Quality, Duration, Modifying Factors, Severity)  Note limiting factors. Brooklyn Hubbard is a81 y.o. female who presents to the emergency department With complaints of shortness of breath which is been ongoing but worsening over the past 48 hours. Patient reports that they got to the point were she felt like she did use her friend's oxygen at home. She states she does have a history of CHF but does not have any home O2 ordered for her. She denies chest pain. She denies fever or chills. She is not taking anything for pain. Reports symptoms worsen with ambulation or lying flat. She otherwise denies any headache or dizziness denies any palpitations. She has no other complaints at this time. HPI    Nursing Notes werereviewed. REVIEW OF SYSTEMS    (2-9 systems for level 4, 10 or more for level 5)     Review of Systems   Constitutional: Negative for chills, diaphoresis and fever. HENT: Negative for congestion, ear pain, rhinorrhea and sore throat. Eyes: Negative for discharge, redness and visual disturbance. Respiratory: Positive for shortness of breath. Negative for cough, chest tightness and wheezing. Cardiovascular: Negative for chest pain and palpitations. Gastrointestinal: Negative for abdominal pain, blood in stool, constipation, diarrhea, nausea and vomiting. Endocrine: Negative for polydipsia, polyphagia and polyuria. Genitourinary: Negative for decreased urine volume, difficulty urinating, dysuria, frequency and hematuria. Follow-up:  No follow-up provider specified. Final Impression:   1. Acute on chronic congestive heart failure, unspecified heart failure type ([address]    2.  Hypoxia               (Please note that portions of this note were completed with a voice recognition program.  Efforts were made to edit the dictations but occasionally words are mis-transcribed.)            [facility], [facility]  12/17/18 1259

## 2018-12-17 NOTE — ED PROVIDER NOTES
Tympanic 105 26 (!) 86 % -- --     Vitals:    12/17/18 1750   BP: (!) 219/130   Pulse: 108   Resp: 27   Temp:    SpO2:      Physical Exam  General: Awake alert    Pulmonary: Decreased breath sounds    Psychiatric: Pleasant, exam    Diagnostics   Labs:  Results for orders placed or performed during the hospital encounter of 12/17/18   CBC   Result Value Ref Range    WBC 8.6 3.5 - 11.3 k/uL    RBC 4.07 3.95 - 5.11 m/uL    Hemoglobin 11.8 (L) 11.9 - 15.1 g/dL    Hematocrit 37.0 36.3 - 47.1 %    MCV 90.9 82.6 - 102.9 fL    MCH 29.0 25.2 - 33.5 pg    MCHC 31.9 28.4 - 34.8 g/dL    RDW 13.2 11.8 - 14.4 %    Platelets 243 774 - 154 k/uL    MPV 12.3 8.1 - 13.5 fL    NRBC Automated 0.0 0.0 per 100 WBC   Basic Metabolic Panel   Result Value Ref Range    Glucose 172 (H) 70 - 99 mg/dL    BUN 20 8 - 23 mg/dL    CREATININE 0.96 (H) 0.50 - 0.90 mg/dL    Bun/Cre Ratio 21 (H) 9 - 20    Calcium 9.4 8.6 - 10.4 mg/dL    Sodium 139 135 - 144 mmol/L    Potassium 3.7 3.7 - 5.3 mmol/L    Chloride 101 98 - 107 mmol/L    CO2 28 20 - 31 mmol/L    Anion Gap 10 9 - 17 mmol/L    GFR Non-African American 58 (L) >60 mL/min    GFR African American >60 >60 mL/min    GFR Comment          GFR Staging         Troponin   Result Value Ref Range    Troponin T <0.03 <0.03 ng/mL    Troponin Interp         Brain Natriuretic Peptide   Result Value Ref Range    Pro-BNP 5,373 (H) <300 pg/mL    BNP Interpretation         Magnesium   Result Value Ref Range    Magnesium 1.6 1.6 - 2.6 mg/dL   Blood gas, venous   Result Value Ref Range    pH, Julian 7.367 7.32 - 7.42    pCO2, Julian 53.2 39 - 55    pO2, Julian 22.3 (L) 30.0 - 50.0    HCO3, Venous 29.9 24.0 - 30.0 mmol/L    Positive Base Excess, Julian 3.2 (H) 0.0 - 2.0 mmol/L    Negative Base Excess, Julian NOT REPORTED 0.0 - 2.0 mmol/L    O2 Sat, Julian 35.7 (L) 60.0 - 85.0 %    Total Hb NOT REPORTED 12.0 - 16.0 g/dl    Oxyhemoglobin NOT REPORTED 95.0 - 98.0 %    Carboxyhemoglobin NOT REPORTED 0.0 - 5.0 %    Methemoglobin NOT

## 2018-12-17 NOTE — ED NOTES
[facility] made aware of elevated BP's.   Manual BP done to verify BP's     Snow Hendrickson, MELVIN  12/17/18 1760

## 2018-12-18 ENCOUNTER — APPOINTMENT (OUTPATIENT)
Dept: NON INVASIVE DIAGNOSTICS | Age: 68
DRG: 292 | End: 2018-12-18
Payer: MEDICARE

## 2018-12-18 PROBLEM — R09.02 HYPOXIA: Status: ACTIVE | Noted: 2018-12-18

## 2018-12-18 LAB
CHOLESTEROL, FASTING: 149 MG/DL
CHOLESTEROL/HDL RATIO: 3.9
EKG ATRIAL RATE: 73 BPM
EKG P AXIS: 58 DEGREES
EKG P-R INTERVAL: 172 MS
EKG Q-T INTERVAL: 494 MS
EKG QRS DURATION: 98 MS
EKG QTC CALCULATION (BAZETT): 544 MS
EKG R AXIS: 20 DEGREES
EKG T AXIS: 107 DEGREES
EKG VENTRICULAR RATE: 73 BPM
HDLC SERPL-MCNC: 38 MG/DL
LDL CHOLESTEROL: 94 MG/DL (ref 0–130)
LV EF: 55 %
LVEF MODALITY: NORMAL
TRIGLYCERIDE, FASTING: 86 MG/DL
VLDLC SERPL CALC-MCNC: ABNORMAL MG/DL (ref 1–30)

## 2018-12-18 PROCEDURE — 93306 TTE W/DOPPLER COMPLETE: CPT

## 2018-12-18 PROCEDURE — 6370000000 HC RX 637 (ALT 250 FOR IP): Performed by: INTERNAL MEDICINE

## 2018-12-18 PROCEDURE — G8978 MOBILITY CURRENT STATUS: HCPCS

## 2018-12-18 PROCEDURE — 36415 COLL VENOUS BLD VENIPUNCTURE: CPT

## 2018-12-18 PROCEDURE — 97161 PT EVAL LOW COMPLEX 20 MIN: CPT

## 2018-12-18 PROCEDURE — 99223 1ST HOSP IP/OBS HIGH 75: CPT | Performed by: FAMILY MEDICINE

## 2018-12-18 PROCEDURE — 97165 OT EVAL LOW COMPLEX 30 MIN: CPT

## 2018-12-18 PROCEDURE — 97116 GAIT TRAINING THERAPY: CPT

## 2018-12-18 PROCEDURE — 80061 LIPID PANEL: CPT

## 2018-12-18 PROCEDURE — 1200000000 HC SEMI PRIVATE

## 2018-12-18 PROCEDURE — G8979 MOBILITY GOAL STATUS: HCPCS

## 2018-12-18 PROCEDURE — 94760 N-INVAS EAR/PLS OXIMETRY 1: CPT

## 2018-12-18 PROCEDURE — 93005 ELECTROCARDIOGRAM TRACING: CPT

## 2018-12-18 PROCEDURE — 6360000002 HC RX W HCPCS: Performed by: INTERNAL MEDICINE

## 2018-12-18 RX ADMIN — CARVEDILOL 12.5 MG: 12.5 TABLET, FILM COATED ORAL at 17:42

## 2018-12-18 RX ADMIN — HYDROCHLOROTHIAZIDE 25 MG: 25 TABLET ORAL at 09:15

## 2018-12-18 RX ADMIN — CARVEDILOL 12.5 MG: 12.5 TABLET, FILM COATED ORAL at 08:45

## 2018-12-18 RX ADMIN — FUROSEMIDE 40 MG: 10 INJECTION, SOLUTION INTRAMUSCULAR; INTRAVENOUS at 17:42

## 2018-12-18 RX ADMIN — FUROSEMIDE 40 MG: 10 INJECTION, SOLUTION INTRAMUSCULAR; INTRAVENOUS at 09:15

## 2018-12-18 RX ADMIN — ENOXAPARIN SODIUM 40 MG: 40 INJECTION SUBCUTANEOUS at 09:15

## 2018-12-18 RX ADMIN — LOSARTAN POTASSIUM 100 MG: 50 TABLET ORAL at 09:15

## 2018-12-18 ASSESSMENT — PAIN DESCRIPTION - ORIENTATION: ORIENTATION: RIGHT;LEFT

## 2018-12-18 ASSESSMENT — PAIN SCALES - GENERAL
PAINLEVEL_OUTOF10: 0
PAINLEVEL_OUTOF10: 8
PAINLEVEL_OUTOF10: 0

## 2018-12-18 ASSESSMENT — PAIN DESCRIPTION - PAIN TYPE: TYPE: ACUTE PAIN

## 2018-12-18 ASSESSMENT — PAIN DESCRIPTION - FREQUENCY: FREQUENCY: CONTINUOUS

## 2018-12-18 ASSESSMENT — PAIN DESCRIPTION - DESCRIPTORS: DESCRIPTORS: ACHING;CRAMPING

## 2018-12-18 ASSESSMENT — PAIN DESCRIPTION - PROGRESSION: CLINICAL_PROGRESSION: GRADUALLY WORSENING

## 2018-12-18 ASSESSMENT — PAIN DESCRIPTION - ONSET: ONSET: GRADUAL

## 2018-12-18 ASSESSMENT — PAIN DESCRIPTION - LOCATION: LOCATION: LEG

## 2018-12-18 NOTE — PROGRESS NOTES
Occupational Therapy   Occupational Therapy Initial Assessment  Date: 2018   Patient Name: Kellee Padilla  MRN: [id number]     : 1950    Date of Service: 2018    Discharge Recommendations:  Continue to assess pending progress, Home with assist PRN, Home with Home health OT  OT Equipment Recommendations  Equipment Needed: No      Patient Diagnosis(es): The primary encounter diagnosis was Acute on chronic congestive heart failure, unspecified heart failure type ([address] A diagnosis of Hypoxia was also pertinent to this visit. has a past medical history of CHF (congestive heart failure) ([facility] H/O cardiac catheterization; Hyperlipidemia; and Hypertension. has a past surgical history that includes Cholecystectomy and Cardiac catheterization (Left, 2017).            Restrictions  Restrictions/Precautions  Restrictions/Precautions: General Precautions, Fall Risk    Subjective   General  Chart Reviewed: Yes  Patient assessed for rehabilitation services?: Yes  Pain Assessment  Patient Currently in Pain: Denies  Pain Assessment: 0-10  Pain Level: 8  Pain Type: Acute pain  Pain Location: Leg  Pain Orientation: Right, Left  Pain Descriptors: Aching, Cramping  Pain Frequency: Continuous  Clinical Progression: Gradually worsening  Pain Intervention(s): RN notified, Elevation  Response to Pain Intervention: Patient Satisfied     Social/Functional History  Social/Functional History  Lives With: Family  Type of Home: House  Home Layout: One level  Home Access: Stairs to enter without rails  Entrance Stairs - Number of Steps: 5  Bathroom Shower/Tub: Tub/Shower unit  Bathroom Equipment: Shower chair  Home Equipment: Rolling walker  Receives Help From: Family  ADL Assistance: Independent  Homemaking Assistance: Needs assistance  Homemaking Responsibilities: No  Ambulation Assistance: Independent  Transfer Assistance: Independent  Active : No  Additional Comments: Pt. reports she lives with her sister

## 2018-12-18 NOTE — PROGRESS NOTES
with her sister who does the cooking and cleaning and drives her places. Pt. is independent with ADL's and household ambulation with use of FWW. Cognition        Objective          AROM RLE (degrees)  RLE AROM: WFL  AROM LLE (degrees)  LLE AROM : WFL  Strength RLE  Comment: 3+/5 grossly at hip, knee and ankle  Strength LLE  Comment: 3+/5 grossly at hip, knee and ankle        Bed mobility  Supine to Sit: Stand by assistance  Sit to Supine: Stand by assistance  Transfers  Sit to Stand: Stand by assistance  Stand to sit: Stand by assistance  Comment: Pt. completes sit-stand transfer with no v/c's required for safe technique with use of FWW  Ambulation  Ambulation?: Yes  Ambulation 1  Surface: level tile  Device: Rolling Walker  Other Apparatus: O2  Assistance: Stand by assistance  Distance: Pt. amb 15ftx2 in room with FWW, room O2 and SBA     Balance  Sitting - Static: Good  Sitting - Dynamic: Good  Standing - Static: Fair;+  Standing - Dynamic: Fair        Assessment   Body structures, Functions, Activity limitations: Decreased functional mobility ; Decreased strength;Decreased endurance;Decreased balance  Assessment: Pt. is 79year old female with dx of CHF class I who presents with decreased functional mobility, decreased B LE strength, and decreased standing balance. Pt. to benefit from physical therapy to address these concerns and return to [facility] for safe return home.    Treatment Diagnosis: Difficulty walking  Prognosis: Good  Decision Making: Low Complexity  REQUIRES PT FOLLOW UP: Yes  Activity Tolerance  Activity Tolerance: Patient Tolerated treatment well         Plan   Plan  Times per week: 7  Times per day: Twice a day (daily on weekends)  Current Treatment Recommendations: Strengthening, Balance Training, Functional Mobility Training, Transfer Training, Gait Training, Stair training, Endurance Training, Neuromuscular Re-education, Safety Education & Training, Patient/Caregiver Education & Training  Safety

## 2018-12-18 NOTE — CONSULTS
occurs. ACE Inibitor/ARB: Continue losartan/HCTZ (Hyzaar)100 mg/25 mg. Diuretics: Continue hydrochlorothiazide 25 mg    Calcium Channel Blocker: Start amlodipine (Norvasc) 5 mg daily. I discussed the potential side effects of this medication including lightheadedness and dizziness and told her to call the office if this occurs. Nonpharmacologic management of Heart Failure: I told her to continue wearing lower extremity compression stockings and I advised her to try and keep her legs up whenever possible and to limit salt in her diet. Severe Mitral Regurgitation: Symptomatic  · Beta Blocker: Continue carvedilol (Coreg) 12.5 mg twice daily. · ACE Inibitor/ARB: Continue losartan/HCTZ (Hyzaar)     · Diuretics: Continue furosemide (lasix) 40 mg twice daily. I also discussed the potential side effects of this medication including lightheadedness and dizziness and told her to call the office if this occurs. Went over her echo results with her and explained in detail what her heart valve issues mean for her heart health. We did discuss possible needing a heart value placement however at this time we are just going get her better and get the extra fluid off her and then will likely pan to recheck his mitral regurgitation. Once again, thank you for allowing me to participate in this patients care. Please do not hesitate to contact me if I could be of any further assistance. Sincerely,  Reuben Joaquin MD, MS, F.A.C.C. [facility]  [address] [place] [address]  Phone: [phone], Fax: [phone]    I believe that the risk of significant morbidity and mortality related to the patient's current medical conditions are: intermediate-high. The documentation recorded by the scribe, accurately and completely reflects the services I personally performed and the decisions made by me. Fei Joaquin MD, MS, F.A.C.C.  December 18, 2018

## 2018-12-18 NOTE — PLAN OF CARE
Problem: Nutrition  Goal: Optimal nutrition therapy  Outcome: Ongoing  Nutrition Problem: Food and nutrition-related knowledge deficit  Intervention: Food and/or Nutrient Delivery: Continue current diet  Nutritional Goals: PO > 75% of meals

## 2018-12-18 NOTE — PROGRESS NOTES
Discussed discharge plans with the patient. Patient is a 79year old female here with Acute on chronic congestive heart failure, unspecified heart failure type . She is alert and oriented. Patient is single and lives with her sister. She uses a walker. Her sister does the cooking and the cleaning. She manages her medications and her sister does the driving. Her PCP is Beckie Douglass CNP. She has medical insurance that helps with medication costs. The discharge plan is home. She has a scale for discharge. Patient will be doing the med's to beds on discharge.     PAWAN Ludwig

## 2018-12-19 LAB
ALBUMIN SERPL-MCNC: 3.7 G/DL (ref 3.5–5.2)
ALBUMIN/GLOBULIN RATIO: 1.1 (ref 1–2.5)
ALP BLD-CCNC: 100 U/L (ref 35–104)
ALT SERPL-CCNC: 25 U/L (ref 5–33)
ANION GAP SERPL CALCULATED.3IONS-SCNC: 8 MMOL/L (ref 9–17)
AST SERPL-CCNC: 19 U/L
BILIRUB SERPL-MCNC: 0.38 MG/DL (ref 0.3–1.2)
BUN BLDV-MCNC: 20 MG/DL (ref 8–23)
BUN/CREAT BLD: 21 (ref 9–20)
CALCIUM SERPL-MCNC: 10.1 MG/DL (ref 8.6–10.4)
CHLORIDE BLD-SCNC: 96 MMOL/L (ref 98–107)
CO2: 34 MMOL/L (ref 20–31)
CREAT SERPL-MCNC: 0.97 MG/DL (ref 0.5–0.9)
GFR AFRICAN AMERICAN: >60 ML/MIN
GFR NON-AFRICAN AMERICAN: 57 ML/MIN
GFR SERPL CREATININE-BSD FRML MDRD: ABNORMAL ML/MIN/{1.73_M2}
GFR SERPL CREATININE-BSD FRML MDRD: ABNORMAL ML/MIN/{1.73_M2}
GLUCOSE BLD-MCNC: 110 MG/DL (ref 70–99)
POTASSIUM SERPL-SCNC: 3.9 MMOL/L (ref 3.7–5.3)
SODIUM BLD-SCNC: 138 MMOL/L (ref 135–144)
TOTAL PROTEIN: 7 G/DL (ref 6.4–8.3)

## 2018-12-19 PROCEDURE — 97110 THERAPEUTIC EXERCISES: CPT

## 2018-12-19 PROCEDURE — 1200000000 HC SEMI PRIVATE

## 2018-12-19 PROCEDURE — 97116 GAIT TRAINING THERAPY: CPT

## 2018-12-19 PROCEDURE — 6360000002 HC RX W HCPCS: Performed by: INTERNAL MEDICINE

## 2018-12-19 PROCEDURE — 6370000000 HC RX 637 (ALT 250 FOR IP): Performed by: INTERNAL MEDICINE

## 2018-12-19 PROCEDURE — 80053 COMPREHEN METABOLIC PANEL: CPT

## 2018-12-19 PROCEDURE — 36415 COLL VENOUS BLD VENIPUNCTURE: CPT

## 2018-12-19 PROCEDURE — 97535 SELF CARE MNGMENT TRAINING: CPT

## 2018-12-19 RX ADMIN — LOSARTAN POTASSIUM 100 MG: 50 TABLET ORAL at 09:06

## 2018-12-19 RX ADMIN — CARVEDILOL 12.5 MG: 12.5 TABLET, FILM COATED ORAL at 08:00

## 2018-12-19 RX ADMIN — FUROSEMIDE 40 MG: 10 INJECTION, SOLUTION INTRAMUSCULAR; INTRAVENOUS at 17:36

## 2018-12-19 RX ADMIN — ENOXAPARIN SODIUM 40 MG: 40 INJECTION SUBCUTANEOUS at 09:06

## 2018-12-19 RX ADMIN — FUROSEMIDE 40 MG: 10 INJECTION, SOLUTION INTRAMUSCULAR; INTRAVENOUS at 09:06

## 2018-12-19 RX ADMIN — HYDROCHLOROTHIAZIDE 25 MG: 25 TABLET ORAL at 09:06

## 2018-12-19 RX ADMIN — CARVEDILOL 12.5 MG: 12.5 TABLET, FILM COATED ORAL at 17:36

## 2018-12-19 ASSESSMENT — PAIN SCALES - GENERAL
PAINLEVEL_OUTOF10: 0
PAINLEVEL_OUTOF10: 0

## 2018-12-19 NOTE — PROGRESS NOTES
Hospitalist Progress Note    SUBJECTIVE/INTERVAL HISTORY:    Patient seen in follow up for hypoxia, CHF. Breathing easier. Remains on O2. Down 4# since admission. Improving. Denies chest pain. OBJECTIVE:    Vitals:   Temp: 98.3 °F (36.8 °C)  BP: (!) 157/74  Resp: 16  Pulse: 79  SpO2: 94 %  24HR INTAKE/OUTPUT:      Intake/Output Summary (Last 24 hours) at 12/19/18 1153  Last data filed at 12/19/18 1041   Gross per 24 hour   Intake              320 ml   Output             1950 ml   Net            -1630 ml       -----------------------------------------------------------------  Review of Systems:  Constitutional:negative  for fevers, and negative for chills. Eyes: negative for visual disturbance   ENT: negative for sore throat, negative nasal congestion, and negative for earache  Respiratory: positive for shortness of breath, negative for cough, and negative for wheezing  Cardiovascular: negative for chest pain, negative for palpitations, and negative for syncope  Gastrointestinal: negative for abdominal pain, negative for nausea,negative for vomiting, negative for diarrhea, negative for constipation, and negative for hematochezia or melena  Genitourinary: negative for dysuria, negative for urinary urgency, negative for urinary frequency, and negative for hematuria  Skin: negative for skin rash, and negative for skin lesions  Neurological: negative for unilateral weakness, numbness or tingling.     Exam:  GEN:  alert and oriented to person, place and time, well-developed and well-nourished, in no acute distress  EYES:  PERRL  NECK: normal, supple  PULM: diminished bilaterally- no wheezes, no rales, no rhonchi, no respiratory distress, on O2  COR:   regular rate & rhythm; systolic murmur  ABD:    Obese, soft, non-tender, non-distended, normal bowel sounds, no masses or organomegaly  EXT:    edema: 1+ affecting bilateral foot, bilateral ankle, bilateral calf, TEDs  NEURO: follows commands, ALVARES, no deficits  SKIN:   no rashes or significant lesions      -----------------------------------------------------------------  Diagnostic Data:  Lab Results   Component Value Date    WBC 8.6 12/17/2018    HGB 11.8 (L) 12/17/2018    HCT 37.0 12/17/2018     12/17/2018    CHOL 202 (H) 08/02/2017    TRIG 85 08/02/2017    HDL 38 (L) 12/18/2018    ALT 29 12/17/2018    AST 21 12/17/2018     12/17/2018    K 4.0 12/17/2018     12/17/2018    CREATININE 0.88 12/17/2018    BUN 19 12/17/2018    CO2 27 12/17/2018    TSH 1.14 08/01/2017       ASSESSMENT:    Principal Problem:    Acute on chronic combined systolic and diastolic CHF, NYHA class 4 (HCC)  Active Problems:    Severe mitral regurgitation by prior echocardiogram    Hypertension    Idiopathic cardiomyopathy ([address]    Hypoxia  Resolved Problems:    * No resolved hospital problems. *      Patient Active Problem List    Diagnosis Date Noted    Severe mitral regurgitation by prior echocardiogram      Priority: High    Hypoxia 12/18/2018    Acute on chronic combined systolic and diastolic CHF, NYHA class 4 ([address] 12/17/2018    Chronic combined systolic and diastolic HF (heart failure), NYHA class 2 ([address] 08/28/2017    Acute on chronic systolic CHF (congestive heart failure) ([address] 08/01/2017    Hypertensive urgency 08/01/2017    Hypertensive emergency 08/01/2017    Hypertension     Hyperlipidemia     Idiopathic cardiomyopathy (HCC)        PLAN:    Acute on chronic combined systolic and diastolic CHF, NYHA class 4/Hypoxia   Continue diuresis   PT/OT   TEDs   Appreciate cardiology   Wean O2    Idiopathic cardiomyopathy/Severe mitral regurgitation by prior echocardiogram     Hypertension     Resolved Problems:    * No resolved hospital problems.  *      · High risk medication: none    ELY MALDONADO PA-C  12/19/2018, 11:53 AM

## 2018-12-19 NOTE — PROGRESS NOTES
CHOLECYSTECTOMY      Social History:  Social History   Substance Use Topics    Smoking status: Never Smoker    Smokeless tobacco: Never Used    Alcohol use No        CURRENT MEDICATIONS:  Outpatient Prescriptions Marked as Taking for the 12/17/18 encounter [facility] Encounter)   Medication Sig Dispense Refill    losartan-hydrochlorothiazide (HYZAAR) 100-25 MG per tablet Take 1 tablet by mouth daily 90 tablet 3    carvedilol (COREG) 12.5 MG tablet Take 1 tablet by mouth 2 times daily (with meals) 180 tablet 3         carvedilol (COREG) tablet 12.5 mg BID WC   magnesium hydroxide (MILK OF MAGNESIA) 400 MG/5ML suspension 30 mL Daily PRN   ondansetron (ZOFRAN) injection 4 mg Q6H PRN   enoxaparin (LOVENOX) injection 40 mg Daily   acetaminophen (TYLENOL) tablet 650 mg Q4H PRN   furosemide (LASIX) injection 40 mg BID   losartan (COZAAR) tablet 100 mg Daily   And    hydrochlorothiazide (HYDRODIURIL) tablet 25 mg Daily        FAMILY HISTORY: family history includes COPD in her sister; Coronary Art Dis in her brother and father. PHYSICAL EXAM:   /72   Pulse 65   Temp 97.7 °F (36.5 °C) (Temporal)   Resp 16   Ht 5' 3\" (1.6 m)   Wt 235 lb 6.4 oz (106.8 kg)   SpO2 (!) 89%   BMI 41.70 kg/m²  Body mass index is 41.7 kg/m². Constitutional: She is oriented to person, place, and time. She appears well-developed and well-nourished. In no acute distress. HEENT: Normocephalic and atraumatic. No JVD present. Carotid bruit is not present. No mass and no thyromegaly present. No lymphadenopathy present. Cardiovascular: Normal rate, regular rhythm, normal heart sounds. Exam reveals no gallop and no friction rubs. A II/VI systolic murmur was heard maximally at the apex. Pulmonary/Chest: Effort normal and breath sounds normal. No respiratory distress. She has no wheezes, rhonchi or rales. Decreased lung sounds. Abdominal: Soft, non-tender.  Bowel sounds and aorta are normal. She exhibits no organomegaly, mass or this occurs. ACE Inibitor/ARB: Continue losartan/HCTZ (Hyzaar)100 mg/25 mg. Diuretics: Continue hydrochlorothiazide 25 mg    Calcium Channel Blocker: Not indicated at this time. Nonpharmacologic management of Heart Failure: I told her to continue wearing lower extremity compression stockings and I advised her to try and keep her legs up whenever possible and to limit salt in her diet. Severe Mitral Regurgitation: Symptomatic  · Beta Blocker: Continue carvedilol (Coreg) 12.5 mg twice daily. · ACE Inibitor/ARB: Continue losartan/HCTZ (Hyzaar)     · Diuretics: Continue furosemide (lasix) 40 mg twice daily. Counseling: I would like re-assess her mitral valve in a couple of weeks from now, therefore I would like her follow up with me in the office in 2-3 weeks. Once again, thank you for allowing me to participate in this patients care. Please do not hesitate to contact me if I could be of any further assistance. Sincerely,  Jimmy Asher. Jemal VILLALOBOS, MS, F.A.C.C. [facility]  [address] [place] [address]  Phone: [phone], Fax: [phone]    I believe that the risk of significant morbidity and mortality related to the patient's current medical conditions are: intermediate-high. The documentation recorded by the scribe, accurately and completely reflects the services I personally performed and the decisions made by me. Fei Joaquin MD, MS, F.A.C.C.  December 19, 2018

## 2018-12-20 VITALS
HEART RATE: 62 BPM | HEIGHT: 63 IN | TEMPERATURE: 97.9 F | OXYGEN SATURATION: 92 % | RESPIRATION RATE: 18 BRPM | WEIGHT: 233.7 LBS | BODY MASS INDEX: 41.41 KG/M2 | SYSTOLIC BLOOD PRESSURE: 115 MMHG | DIASTOLIC BLOOD PRESSURE: 69 MMHG

## 2018-12-20 PROBLEM — E66.01 MORBID OBESITY (HCC): Status: ACTIVE | Noted: 2018-12-20

## 2018-12-20 PROBLEM — N18.30 CKD (CHRONIC KIDNEY DISEASE) STAGE 3, GFR 30-59 ML/MIN (HCC): Status: ACTIVE | Noted: 2018-12-20

## 2018-12-20 LAB
ALBUMIN SERPL-MCNC: 3.6 G/DL (ref 3.5–5.2)
ALBUMIN/GLOBULIN RATIO: 1.2 (ref 1–2.5)
ALP BLD-CCNC: 92 U/L (ref 35–104)
ALT SERPL-CCNC: 19 U/L (ref 5–33)
ANION GAP SERPL CALCULATED.3IONS-SCNC: 7 MMOL/L (ref 9–17)
AST SERPL-CCNC: 16 U/L
BILIRUB SERPL-MCNC: 0.4 MG/DL (ref 0.3–1.2)
BUN BLDV-MCNC: 23 MG/DL (ref 8–23)
BUN/CREAT BLD: 21 (ref 9–20)
CALCIUM SERPL-MCNC: 9.5 MG/DL (ref 8.6–10.4)
CHLORIDE BLD-SCNC: 94 MMOL/L (ref 98–107)
CO2: 38 MMOL/L (ref 20–31)
CREAT SERPL-MCNC: 1.09 MG/DL (ref 0.5–0.9)
GFR AFRICAN AMERICAN: >60 ML/MIN
GFR NON-AFRICAN AMERICAN: 50 ML/MIN
GFR SERPL CREATININE-BSD FRML MDRD: ABNORMAL ML/MIN/{1.73_M2}
GFR SERPL CREATININE-BSD FRML MDRD: ABNORMAL ML/MIN/{1.73_M2}
GLUCOSE BLD-MCNC: 119 MG/DL (ref 70–99)
HCT VFR BLD CALC: 36.9 % (ref 36.3–47.1)
HEMOGLOBIN: 11.6 G/DL (ref 11.9–15.1)
MCH RBC QN AUTO: 28.6 PG (ref 25.2–33.5)
MCHC RBC AUTO-ENTMCNC: 31.4 G/DL (ref 28.4–34.8)
MCV RBC AUTO: 91.1 FL (ref 82.6–102.9)
NRBC AUTOMATED: 0 PER 100 WBC
PDW BLD-RTO: 13.2 % (ref 11.8–14.4)
PLATELET # BLD: 189 K/UL (ref 138–453)
PMV BLD AUTO: 11.8 FL (ref 8.1–13.5)
POTASSIUM SERPL-SCNC: 4.2 MMOL/L (ref 3.7–5.3)
RBC # BLD: 4.05 M/UL (ref 3.95–5.11)
SODIUM BLD-SCNC: 139 MMOL/L (ref 135–144)
TOTAL PROTEIN: 6.6 G/DL (ref 6.4–8.3)
WBC # BLD: 6.2 K/UL (ref 3.5–11.3)

## 2018-12-20 PROCEDURE — G0008 ADMIN INFLUENZA VIRUS VAC: HCPCS | Performed by: INTERNAL MEDICINE

## 2018-12-20 PROCEDURE — 6370000000 HC RX 637 (ALT 250 FOR IP): Performed by: INTERNAL MEDICINE

## 2018-12-20 PROCEDURE — 85027 COMPLETE CBC AUTOMATED: CPT

## 2018-12-20 PROCEDURE — 36415 COLL VENOUS BLD VENIPUNCTURE: CPT

## 2018-12-20 PROCEDURE — 6360000002 HC RX W HCPCS: Performed by: INTERNAL MEDICINE

## 2018-12-20 PROCEDURE — 99232 SBSQ HOSP IP/OBS MODERATE 35: CPT | Performed by: FAMILY MEDICINE

## 2018-12-20 PROCEDURE — 97530 THERAPEUTIC ACTIVITIES: CPT

## 2018-12-20 PROCEDURE — 6370000000 HC RX 637 (ALT 250 FOR IP): Performed by: PHYSICIAN ASSISTANT

## 2018-12-20 PROCEDURE — 97110 THERAPEUTIC EXERCISES: CPT

## 2018-12-20 PROCEDURE — 90686 IIV4 VACC NO PRSV 0.5 ML IM: CPT | Performed by: INTERNAL MEDICINE

## 2018-12-20 PROCEDURE — 80053 COMPREHEN METABOLIC PANEL: CPT

## 2018-12-20 RX ORDER — LOSARTAN POTASSIUM AND HYDROCHLOROTHIAZIDE 25; 100 MG/1; MG/1
1 TABLET ORAL DAILY
Qty: 30 TABLET | Refills: 0 | Status: SHIPPED | OUTPATIENT
Start: 2018-12-20 | End: 2019-01-02 | Stop reason: ALTCHOICE

## 2018-12-20 RX ORDER — FUROSEMIDE 40 MG/1
40 TABLET ORAL 2 TIMES DAILY
Status: DISCONTINUED | OUTPATIENT
Start: 2018-12-20 | End: 2018-12-20 | Stop reason: HOSPADM

## 2018-12-20 RX ORDER — FUROSEMIDE 40 MG/1
40 TABLET ORAL DAILY
Qty: 30 TABLET | Refills: 0 | Status: SHIPPED | OUTPATIENT
Start: 2018-12-20 | End: 2019-02-13 | Stop reason: SDUPTHER

## 2018-12-20 RX ADMIN — HYDROCHLOROTHIAZIDE 25 MG: 25 TABLET ORAL at 08:14

## 2018-12-20 RX ADMIN — INFLUENZA A VIRUS A/MICHIGAN/45/2015 X-275 (H1N1) ANTIGEN (FORMALDEHYDE INACTIVATED), INFLUENZA A VIRUS A/SINGAPORE/INFIMH-16-0019/2016 IVR-186 (H3N2) ANTIGEN (FORMALDEHYDE INACTIVATED), INFLUENZA B VIRUS B/PHUKET/3073/2013 ANTIGEN (FORMALDEHYDE INACTIVATED), AND INFLUENZA B VIRUS B/MARYLAND/15/2016 BX-69A ANTIGEN (FORMALDEHYDE INACTIVATED) 0.5 ML: 15; 15; 15; 15 INJECTION, SUSPENSION INTRAMUSCULAR at 08:20

## 2018-12-20 RX ADMIN — ENOXAPARIN SODIUM 40 MG: 40 INJECTION SUBCUTANEOUS at 08:14

## 2018-12-20 RX ADMIN — CARVEDILOL 12.5 MG: 12.5 TABLET, FILM COATED ORAL at 07:24

## 2018-12-20 RX ADMIN — FUROSEMIDE 40 MG: 40 TABLET ORAL at 08:20

## 2018-12-20 RX ADMIN — LOSARTAN POTASSIUM 100 MG: 50 TABLET ORAL at 08:14

## 2018-12-20 NOTE — DISCHARGE SUMMARY
Discharge Summary    Mahsa Kim  :  1950  MRN:  [id number]    Admit date:  2018      Discharge date: 2018     Admitting Physician:  Jessica Steele MD    Consultants:  Dr. Sloane Pineda, cardiology    Procedures: none    Complications: none    Discharge Condition: stable    Hospital Course:   Mahsa Kim is a 79 y.o. female admitted with 1 week of progressive SOB. States she has been unable to sleep flat for a year. States she was taken off of diuretics about a year ago 17 by Dr. Sloane Pineda. She failed to follow up with Dr. Sloane Pineda after this. Stated she does NOT watch her fluid or salt intake. She stopped weighing herself several months ago. She does not wear O2, but when she gets SOB she \"borrows\" her sister's O2. She was very concerned her breathing was getting worse so she came to the ER. Afebrile and WBC normal. Elevated BNP and had edema. Admitted for acute on chronic CHF. She was started on diuretics. She was seen by Dr. Sloane Pineda. Breathing much easier. She was weaned off O2. Down 6# since admission. Denied chest pain. Wants to go home. Discussed case with Dr. Sloane Pineda. Lasix 40mg daily. BMP 1 week. F/u with Dr. Sloane Pineda 10 days. [id number] Winnie Howell for discharge. Discharge to home with 2L fluid restrict and is to record daily weights to report to Dr. Sloane Pineda.      Exam:  GEN: Cinderella Ridgel and oriented to person, place and time, well-developed and well-nourished, in no acute distress  EYES:  PERRL  NECK: normal, supple  PULM: diminished bilaterally- no wheezes, no rales, no rhonchi, no respiratory distress, on RA  COR:   regular rate & rhythm; systolic murmur  ABD:    Obese, soft, non-tender, non-distended, normal bowel sounds, no masses or organomegaly  EXT:    edema: 1+ affecting bilateral foot, bilateral ankle  NEURO: follows commands, ALVARES, no deficits  SKIN:   no rashes or significant lesions    Significant Diagnostic Studies:   Lab Results   Component Value Date    WBC 6.2 2018    HGB 11.6 (L) 2018    PLT 189 12/20/2018       Lab Results   Component Value Date    BUN 23 12/20/2018    CREATININE 1.09 (H) 12/20/2018     12/20/2018    K 4.2 12/20/2018    CALCIUM 9.5 12/20/2018    CL 94 (L) 12/20/2018    CO2 38 (H) 12/20/2018    LABGLOM 50 (L) 12/20/2018       No results found for: Anita Dach, EPITHUA, LEUKOCYTESUR, SPECGRAV, GLUCOSEU, KETUA, PROTEINU, HGBUR, CASTUA, CRYSTUA, BACTERIA, YEAST    Xr Chest Portable    Result Date: 12/17/2018  EXAMINATION: SINGLE XRAY VIEW OF THE CHEST 12/17/2018 4:22 pm COMPARISON: Chest CT performed 08/01/2017. Chest radiograph performed 08/01/2017. HISTORY: ORDERING SYSTEM PROVIDED HISTORY: sob TECHNOLOGIST PROVIDED HISTORY: sob FINDINGS: Scattered airspace opacities are demonstrated throughout the lungs bilaterally that is worse on the right compared to left. There may be trace effusion. There is no pneumothorax. The heart is enlarged. The upper abdomen unremarkable. The extrathoracic soft tissues are unremarkable. Cardiomegaly with scattered airspace opacities bilaterally concerning for an infectious etiology. Trace effusions. Discharge Diagnoses:    Principal Problem:    Acute on chronic combined systolic and diastolic CHF, NYHA class 4 ([facility])  Active Problems:    Severe mitral regurgitation by prior echocardiogram    Hypertension    Idiopathic cardiomyopathy ([address]    Hypoxia    Morbid obesity ([address]  Resolved Problems:    * No resolved hospital problems.  *      Active Hospital Problems    Diagnosis Date Noted    Severe mitral regurgitation by prior echocardiogram [I34.0]      Priority: High    Morbid obesity ([address] [E66.01] 12/20/2018    Hypoxia [R09.02] 12/18/2018    Acute on chronic combined systolic and diastolic CHF, NYHA class 4 ([address] [I50.43] 12/17/2018    Hypertension [I10]     Idiopathic cardiomyopathy ([address] [I42.8]        Discharge Medications:       Ernestina Bumps   Home Medication Instructions [id number]    Printed on:12/20/18 0225 Medication Information                      carvedilol (COREG) 12.5 MG tablet  Take 1 tablet by mouth 2 times daily (with meals)             furosemide (LASIX) 40 MG tablet  Take 1 tablet by mouth daily             losartan-hydrochlorothiazide (HYZAAR) 100-25 MG per tablet  Take 1 tablet by mouth daily                 Patient Instructions:    Activity: activity as tolerated  Diet: cardiac diet 2L restrict, 2g restrict  Wound Care: none needed  Other: [facility] 12/24/18    Disposition:   Discharge to Home    Follow up:  Patient will be followed by SUE Vanessa CNP in 1 week  Dr. Ailin Genao 10 days    CORE MEASURES on Discharge (if applicable)  ACE/ARB in CHF: Yes  Statin in MI: NA  ASA in MI: NA  Statin in CVA: NA  Antiplatelet in CVA: NA    Total time spent on discharge services: 35 minutes    Including the following activities:  Evaluation and Management of patient  Discussion with patient and/or surrogate about current care plan  Coordination with Case Management and/or   Coordination of care with Consultants (if applicable)   Coordination of care with Receiving Facility Physician (if applicable)  Completion of DME forms (if applicable)  Preparation of Discharge Summary  Preparation of Medication Reconciliation  Preparation of Discharge Prescriptions    Signed:  Lakisha Smith PA-C  12/20/2018, 1:32 PM

## 2018-12-22 LAB
CULTURE: NORMAL
CULTURE: NORMAL
Lab: NORMAL
Lab: NORMAL
SPECIMEN DESCRIPTION: NORMAL
SPECIMEN DESCRIPTION: NORMAL
STATUS: NORMAL
STATUS: NORMAL

## 2018-12-24 ENCOUNTER — HOSPITAL ENCOUNTER (OUTPATIENT)
Age: 68
Discharge: HOME | End: 2018-12-24
Payer: MEDICARE

## 2018-12-24 DIAGNOSIS — I50.43 ACUTE ON CHRONIC COMBINED SYSTOLIC AND DIASTOLIC CHF, NYHA CLASS 4 (HCC): ICD-10-CM

## 2018-12-24 LAB
ANION GAP SERPL CALCULATED.3IONS-SCNC: 12 MMOL/L (ref 9–17)
BUN BLDV-MCNC: 28 MG/DL (ref 8–23)
BUN/CREAT BLD: 24 (ref 9–20)
CALCIUM SERPL-MCNC: 10.2 MG/DL (ref 8.6–10.4)
CHLORIDE BLD-SCNC: 99 MMOL/L (ref 98–107)
CO2: 31 MMOL/L (ref 20–31)
CREAT SERPL-MCNC: 1.17 MG/DL (ref 0.5–0.9)
GFR AFRICAN AMERICAN: 56 ML/MIN
GFR NON-AFRICAN AMERICAN: 46 ML/MIN
GFR SERPL CREATININE-BSD FRML MDRD: ABNORMAL ML/MIN/{1.73_M2}
GFR SERPL CREATININE-BSD FRML MDRD: ABNORMAL ML/MIN/{1.73_M2}
GLUCOSE BLD-MCNC: 115 MG/DL (ref 70–99)
POTASSIUM SERPL-SCNC: 4.1 MMOL/L (ref 3.7–5.3)
SODIUM BLD-SCNC: 142 MMOL/L (ref 135–144)

## 2018-12-24 PROCEDURE — 80048 BASIC METABOLIC PNL TOTAL CA: CPT

## 2018-12-24 PROCEDURE — 36415 COLL VENOUS BLD VENIPUNCTURE: CPT

## 2019-01-02 ENCOUNTER — OFFICE VISIT (OUTPATIENT)
Dept: CARDIOLOGY | Age: 69
End: 2019-01-02
Payer: MEDICARE

## 2019-01-02 VITALS
SYSTOLIC BLOOD PRESSURE: 102 MMHG | BODY MASS INDEX: 38.24 KG/M2 | OXYGEN SATURATION: 96 % | DIASTOLIC BLOOD PRESSURE: 69 MMHG | WEIGHT: 224 LBS | HEIGHT: 64 IN | HEART RATE: 74 BPM | RESPIRATION RATE: 18 BRPM

## 2019-01-02 DIAGNOSIS — I50.42 CHRONIC COMBINED SYSTOLIC AND DIASTOLIC HF (HEART FAILURE), NYHA CLASS 2 (HCC): ICD-10-CM

## 2019-01-02 DIAGNOSIS — I34.0 SEVERE MITRAL REGURGITATION BY PRIOR ECHOCARDIOGRAM: Primary | ICD-10-CM

## 2019-01-02 PROCEDURE — G8427 DOCREV CUR MEDS BY ELIG CLIN: HCPCS | Performed by: FAMILY MEDICINE

## 2019-01-02 PROCEDURE — G8417 CALC BMI ABV UP PARAM F/U: HCPCS | Performed by: FAMILY MEDICINE

## 2019-01-02 PROCEDURE — 4040F PNEUMOC VAC/ADMIN/RCVD: CPT | Performed by: FAMILY MEDICINE

## 2019-01-02 PROCEDURE — 1111F DSCHRG MED/CURRENT MED MERGE: CPT | Performed by: FAMILY MEDICINE

## 2019-01-02 PROCEDURE — 99213 OFFICE O/P EST LOW 20 MIN: CPT | Performed by: FAMILY MEDICINE

## 2019-01-02 PROCEDURE — G8400 PT W/DXA NO RESULTS DOC: HCPCS | Performed by: FAMILY MEDICINE

## 2019-01-02 PROCEDURE — 1036F TOBACCO NON-USER: CPT | Performed by: FAMILY MEDICINE

## 2019-01-02 PROCEDURE — 3017F COLORECTAL CA SCREEN DOC REV: CPT | Performed by: FAMILY MEDICINE

## 2019-01-02 PROCEDURE — G8482 FLU IMMUNIZE ORDER/ADMIN: HCPCS | Performed by: FAMILY MEDICINE

## 2019-01-02 PROCEDURE — 1123F ACP DISCUSS/DSCN MKR DOCD: CPT | Performed by: FAMILY MEDICINE

## 2019-01-02 PROCEDURE — 1101F PT FALLS ASSESS-DOCD LE1/YR: CPT | Performed by: FAMILY MEDICINE

## 2019-01-02 PROCEDURE — 1090F PRES/ABSN URINE INCON ASSESS: CPT | Performed by: FAMILY MEDICINE

## 2019-01-02 RX ORDER — LOSARTAN POTASSIUM 100 MG/1
100 TABLET ORAL DAILY
Qty: 90 TABLET | Refills: 1 | Status: SHIPPED | OUTPATIENT
Start: 2019-01-02 | End: 2019-08-28 | Stop reason: SDUPTHER

## 2019-01-17 ENCOUNTER — HOSPITAL ENCOUNTER (OUTPATIENT)
Age: 69
Discharge: HOME | End: 2019-01-17
Payer: MEDICARE

## 2019-01-17 ENCOUNTER — HOSPITAL ENCOUNTER (OUTPATIENT)
Dept: NON INVASIVE DIAGNOSTICS | Age: 69
Discharge: HOME | End: 2019-01-17
Payer: MEDICARE

## 2019-01-17 DIAGNOSIS — I34.0 SEVERE MITRAL REGURGITATION BY PRIOR ECHOCARDIOGRAM: ICD-10-CM

## 2019-01-17 DIAGNOSIS — I50.42 CHRONIC COMBINED SYSTOLIC AND DIASTOLIC HF (HEART FAILURE), NYHA CLASS 2 (HCC): ICD-10-CM

## 2019-01-17 LAB
ANION GAP SERPL CALCULATED.3IONS-SCNC: 11 MMOL/L (ref 9–17)
BUN BLDV-MCNC: 21 MG/DL (ref 8–23)
BUN/CREAT BLD: 19 (ref 9–20)
CALCIUM SERPL-MCNC: 10.1 MG/DL (ref 8.6–10.4)
CHLORIDE BLD-SCNC: 102 MMOL/L (ref 98–107)
CO2: 28 MMOL/L (ref 20–31)
CREAT SERPL-MCNC: 1.12 MG/DL (ref 0.5–0.9)
GFR AFRICAN AMERICAN: 59 ML/MIN
GFR NON-AFRICAN AMERICAN: 49 ML/MIN
GFR SERPL CREATININE-BSD FRML MDRD: ABNORMAL ML/MIN/{1.73_M2}
GFR SERPL CREATININE-BSD FRML MDRD: ABNORMAL ML/MIN/{1.73_M2}
GLUCOSE BLD-MCNC: 143 MG/DL (ref 70–99)
LV EF: 55 %
LVEF MODALITY: NORMAL
POTASSIUM SERPL-SCNC: 4.4 MMOL/L (ref 3.7–5.3)
SODIUM BLD-SCNC: 141 MMOL/L (ref 135–144)

## 2019-01-17 PROCEDURE — 93306 TTE W/DOPPLER COMPLETE: CPT

## 2019-01-17 PROCEDURE — 36415 COLL VENOUS BLD VENIPUNCTURE: CPT

## 2019-01-17 PROCEDURE — 80048 BASIC METABOLIC PNL TOTAL CA: CPT

## 2019-01-18 ENCOUNTER — TELEPHONE (OUTPATIENT)
Dept: CARDIOLOGY | Age: 69
End: 2019-01-18

## 2019-01-21 ENCOUNTER — TELEPHONE (OUTPATIENT)
Dept: CARDIOLOGY | Age: 69
End: 2019-01-21

## 2019-02-13 ENCOUNTER — OFFICE VISIT (OUTPATIENT)
Dept: CARDIOLOGY | Age: 69
End: 2019-02-13
Payer: MEDICARE

## 2019-02-13 VITALS
SYSTOLIC BLOOD PRESSURE: 127 MMHG | OXYGEN SATURATION: 95 % | HEIGHT: 64 IN | HEART RATE: 72 BPM | DIASTOLIC BLOOD PRESSURE: 81 MMHG | BODY MASS INDEX: 39.09 KG/M2 | RESPIRATION RATE: 14 BRPM | WEIGHT: 229 LBS

## 2019-02-13 DIAGNOSIS — I10 ESSENTIAL HYPERTENSION: ICD-10-CM

## 2019-02-13 DIAGNOSIS — G47.33 OSA (OBSTRUCTIVE SLEEP APNEA): ICD-10-CM

## 2019-02-13 DIAGNOSIS — I50.42 CHRONIC COMBINED SYSTOLIC AND DIASTOLIC HF (HEART FAILURE), NYHA CLASS 2 (HCC): Primary | ICD-10-CM

## 2019-02-13 PROCEDURE — 1123F ACP DISCUSS/DSCN MKR DOCD: CPT | Performed by: FAMILY MEDICINE

## 2019-02-13 PROCEDURE — G8427 DOCREV CUR MEDS BY ELIG CLIN: HCPCS | Performed by: FAMILY MEDICINE

## 2019-02-13 PROCEDURE — G8417 CALC BMI ABV UP PARAM F/U: HCPCS | Performed by: FAMILY MEDICINE

## 2019-02-13 PROCEDURE — 3017F COLORECTAL CA SCREEN DOC REV: CPT | Performed by: FAMILY MEDICINE

## 2019-02-13 PROCEDURE — 4040F PNEUMOC VAC/ADMIN/RCVD: CPT | Performed by: FAMILY MEDICINE

## 2019-02-13 PROCEDURE — 1036F TOBACCO NON-USER: CPT | Performed by: FAMILY MEDICINE

## 2019-02-13 PROCEDURE — 1090F PRES/ABSN URINE INCON ASSESS: CPT | Performed by: FAMILY MEDICINE

## 2019-02-13 PROCEDURE — G8482 FLU IMMUNIZE ORDER/ADMIN: HCPCS | Performed by: FAMILY MEDICINE

## 2019-02-13 PROCEDURE — G8400 PT W/DXA NO RESULTS DOC: HCPCS | Performed by: FAMILY MEDICINE

## 2019-02-13 PROCEDURE — 99214 OFFICE O/P EST MOD 30 MIN: CPT | Performed by: FAMILY MEDICINE

## 2019-02-13 PROCEDURE — 1101F PT FALLS ASSESS-DOCD LE1/YR: CPT | Performed by: FAMILY MEDICINE

## 2019-02-13 RX ORDER — FUROSEMIDE 40 MG/1
TABLET ORAL
Qty: 145 TABLET | Refills: 3 | Status: SHIPPED | OUTPATIENT
Start: 2019-02-13 | End: 2019-06-12

## 2019-02-13 RX ORDER — CARVEDILOL 12.5 MG/1
12.5 TABLET ORAL 2 TIMES DAILY WITH MEALS
Qty: 180 TABLET | Refills: 3 | Status: SHIPPED | OUTPATIENT
Start: 2019-02-13 | End: 2020-07-20 | Stop reason: SDUPTHER

## 2019-05-13 ENCOUNTER — OFFICE VISIT (OUTPATIENT)
Dept: CARDIOLOGY | Age: 69
End: 2019-05-13
Payer: MEDICARE

## 2019-05-13 VITALS
DIASTOLIC BLOOD PRESSURE: 100 MMHG | HEART RATE: 70 BPM | SYSTOLIC BLOOD PRESSURE: 194 MMHG | BODY MASS INDEX: 40.39 KG/M2 | HEIGHT: 64 IN | OXYGEN SATURATION: 95 % | WEIGHT: 236.6 LBS

## 2019-05-13 DIAGNOSIS — R06.02 SOB (SHORTNESS OF BREATH): Primary | ICD-10-CM

## 2019-05-13 DIAGNOSIS — I50.42 CHRONIC COMBINED SYSTOLIC AND DIASTOLIC CONGESTIVE HEART FAILURE (HCC): ICD-10-CM

## 2019-05-13 DIAGNOSIS — I10 ESSENTIAL HYPERTENSION: ICD-10-CM

## 2019-05-13 PROCEDURE — 3017F COLORECTAL CA SCREEN DOC REV: CPT | Performed by: FAMILY MEDICINE

## 2019-05-13 PROCEDURE — G8400 PT W/DXA NO RESULTS DOC: HCPCS | Performed by: FAMILY MEDICINE

## 2019-05-13 PROCEDURE — 4040F PNEUMOC VAC/ADMIN/RCVD: CPT | Performed by: FAMILY MEDICINE

## 2019-05-13 PROCEDURE — G8427 DOCREV CUR MEDS BY ELIG CLIN: HCPCS | Performed by: FAMILY MEDICINE

## 2019-05-13 PROCEDURE — 1123F ACP DISCUSS/DSCN MKR DOCD: CPT | Performed by: FAMILY MEDICINE

## 2019-05-13 PROCEDURE — 1090F PRES/ABSN URINE INCON ASSESS: CPT | Performed by: FAMILY MEDICINE

## 2019-05-13 PROCEDURE — 1036F TOBACCO NON-USER: CPT | Performed by: FAMILY MEDICINE

## 2019-05-13 PROCEDURE — G8417 CALC BMI ABV UP PARAM F/U: HCPCS | Performed by: FAMILY MEDICINE

## 2019-05-13 PROCEDURE — 99214 OFFICE O/P EST MOD 30 MIN: CPT | Performed by: FAMILY MEDICINE

## 2019-05-13 RX ORDER — AMLODIPINE BESYLATE 5 MG/1
5 TABLET ORAL DAILY
Qty: 90 TABLET | Refills: 3 | Status: SHIPPED | OUTPATIENT
Start: 2019-05-13 | End: 2019-07-17

## 2019-05-13 NOTE — PATIENT INSTRUCTIONS
Take your blood pressure twice daily and call in one week with the blood pressure log. SURVEY:    You may be receiving a survey from [employer] regarding your visit today. Please complete the survey to enable us to provide the highest quality of care to you and your family. If you cannot score us a very good on any question, please call the office to discuss how we could have made your experience a very good one. Thank you.

## 2019-05-13 NOTE — PROGRESS NOTES
I, Halle Morataya am scribing for and in the presence of Harlon Gitelman, MD.    Patient: Nicole Flowers  : 1950  Date of Visit: May 13, 2019    REASON FOR VISIT / CONSULTATION: 3 Month Follow-Up (HX: Severe mitral regurg, CHF, NICM. Pt states she has been doing well, other than having only left sided ear ache, sore throat, eye watering, and jaw pain lasting a week and half and she said that she thought maybe this was Bell's Palsy at the end of April. She is doing fine now. SOB on mild exertion, unchanged. Denies: CP, palpitations, lightheadedness/dizziness.)      Dear Natasha Guaman, APRN - CNP,    HPI: Ms. Rafael Ponce is a 79 y.o. female who was admitted to the hospital with worsening shortness of breath. This has gotten worse over the past three months. She has a cardiac history of abnormal echocardiogram which showed that her ejection fraction was reduced to about 40-45%. And a heart catheterization that was relatively unremarkable. Fortunately her repeat echocardiogram in 2018 and in 2019 showed her ejection fraction increased from 45-50% to 55% as well as showing only trivial mitral regurgitation      Exercise Tolerance: She reports having a poor exercise tolerance. Ms. Rafael Ponce says that she can walk less than 1 block without developing chest discomfort and/or significant shortness of breath. When she gets tired, she gets short of breath. Since the last time I saw Ms. Ragland, she has been doing well. She has shortness of breath on mild exertion. She would say this is the same, but can sometimes can be worse. She is up 12 pounds since her last visit 3 months ago. At the end of April for about a week and half, she has left sided symptoms to include ear ache, throat pain, jaw pain, and watering eye. She thought this was Bell's Palsy but was only told this was a minor infection.  She denied any chest pain, abdominal pain, bleeding problems, lightheaded/dizziness, or problems with her medications or any other concerns at this time. Past Medical History:   Diagnosis Date    [facility]estive [facility]) [facility]     H/O cardiac catheterization 08/04/2017    LMCA: Mild irregularites 10-20%. LAD: Mild irregularites 10-20%. LCx: Mild irregularites 10-20%. RCA: Mild irregularites 10-20%. LV function assessed as : Abnormal. EF of 40%.  H/O echocardiogram 12/18/2018    EF 55%. Mildly increased LV wall thickness. The left atrium appears moderately to severely dilated. Moderate to severe mitral regurg. Moderate pulmonary HTN with an estimated RV systolic pressure of 94LCGP. Moderate tricuspid regurg. Evidnece fo moderate diastolic dysfunction is seen.  History of echocardiogram 01/17/2019    EF 55%. LV wall thickness moderately increased. LA is mildly dilated w/ LA volume index of 30. trivial mitral regurg. Evidence of moderate (grade II) diastolic dysfunction seen.  Hyperlipidemia     Hypertension        CURRENT ALLERGIES: Patient has no known allergies. REVIEW OF SYSTEMS: 14 systems were reviewed. Pertinent positives and negatives as above, all else negative.      Past Surgical History:   Procedure Laterality Date    CARDIAC CATHETERIZATION Left 08/04/2017    right radial, [facility] Dr. Mary Lou Aguero      Social History:  Social History     Tobacco Use    Smoking status: Never Smoker    Smokeless tobacco: Never Used   Substance Use Topics    Alcohol use: No    Drug use: No        CURRENT MEDICATIONS:  Outpatient Medications Marked as Taking for the 5/13/19 encounter (Office Visit) with Winnie Holbrook MD   Medication Sig Dispense Refill    carvedilol (COREG) 12.5 MG tablet Take 1 tablet by mouth 2 times daily (with meals) 180 tablet 3    furosemide (LASIX) 40 MG tablet Take 40 mg once daily with an additional 40 mg on Monday Wednesday and Friday 145 tablet 3    losartan (COZAAR) 100 MG tablet Take 1 tablet by mouth daily 90 tablet 1       FAMILY HISTORY: family history includes COPD in her sister; Coronary Art Dis in her brother and father. PHYSICAL EXAM:   BP (!) 194/100 (Site: Left Upper Arm, Position: Sitting, Cuff Size: Large Adult)   Pulse 70   Ht 5' 4\" (1.626 m)   Wt 236 lb 9.6 oz (107.3 kg)   SpO2 95%   BMI 40.61 kg/m²  Body mass index is 40.61 kg/m². Constitutional: She is oriented to person, place, and time. She appears well-developed and well-nourished. In no acute distress. HEENT: Normocephalic and atraumatic. No JVD present. Carotid bruit is not present. No mass and no thyromegaly present. No lymphadenopathy present. Cardiovascular: Normal rate, regular rhythm, normal heart sounds and intact distal pulses. Exam reveals no gallop and no friction rub. A III/VI systolic murmur was heard at apex without significant radiation. Pulmonary/Chest: Effort normal and breath sounds normal. No respiratory distress. She has no wheezes, rhonchi or rales. Abdominal: Soft, non-tender. Bowel sounds and aorta are normal. She exhibits no organomegaly, mass or bruit. Extremities:Trace-1+ lower extremity pitting pedal edema. 1/2 way up to the knees bilaterally   no cyanosis, or clubbing. Pulses are 2+ radial/carotid/dorsalis pedis and posterior tibial bilaterally. Compression stockings in place. Neurological: She is alert and oriented to person, place, and time. No evidence of gross cranial nerve deficit. Coordination appeared normal.   Skin: Skin is warm and dry. There is no rash or diaphoresis. Psychiatric: She has a normal mood and affect.  Her speech is normal and behavior is normal.      MOST RECENT LABS ON RECORD:   Lab Results   Component Value Date    WBC 6.2 12/20/2018    HGB 11.6 (L) 12/20/2018    HCT 36.9 12/20/2018     12/20/2018    CHOL 202 (H) 08/02/2017    TRIG 85 08/02/2017    HDL 38 (L) 12/18/2018    ALT 19 12/20/2018    AST 16 12/20/2018     01/17/2019    K 4.4 01/17/2019     01/17/2019    CREATININE 1.12 (H) 01/17/2019    BUN 21 01/17/2019    CO2 28 01/17/2019 TSH 1.14 08/01/2017       ASSESSMENT:  1. SOB (shortness of breath)    2. Chronic combined systolic and diastolic congestive heart failure ([address]    3. Essential hypertension       PLAN:  · Shortness of breath with mild exertion: This has worsened and I worry may be due to worsening heart failure and/or mitral regurgitation   Additional Testing List: I ordered a echocardiogram to better assess for the etiology of this problem and to help guide future management    Chronic Systolic and Diastolic Heart Failure: EF improved from 45-50% to 55% on 1/2019 for echocardiogram   Beta Blocker: Continue carvedilol (Coreg)      ACE Inibitor/ARB: Continue losartan (Cozaar) 100 mg once daily. Diuretics: Continue furosemide (lasix)  40 mg once daily with an additional 40 mg on Monday, Wednesday, and Friday. I discussed the potential side effects of this medication including lightheadedness and dizziness and told her to call the office if this occurs. Nonpharmacologic management of Heart Failure: I told her to continue wearing lower extremity compression stockings and I advised her to try and keep her legs up whenever possible and to limit salt in her diet. Essential Hypertension: Uncontrolled  · ACE Inibitor/ARB: Continue losartan (Cozaar) 100 mg once daily. · Diuretics: Continue furosemide (lasix) 40 mg once daily with an additional 40 mg on Monday, Wednesday, and Friday. · Beta Blocker: Continue carvedilol (Coreg)       · Calcium Channel Blocker: START amlodipine (Norvasc) 5 mg once daily. I also discussed the potential side effects of this medication including lightheadedness and dizziness and instructed them to stop the medication of this occurs and call our office if this occurs. I will likely double this in a week if her blood pressure remains elevated above 532 systolic. · Counseling: I want her to take her blood pressure a couple times a day for 1 week and call the office with this blood pressure log. I will increase amlodipine if needed. · History of Severe Mitral Regurgitation: possibly symptomatic   · Beta Blocker: Continue Carvedilol (Coreg) 12.5 mg twice daily. · ACE Inibitor/ARB: Continue losartan (Cozaar) 100 mg daily. · Diuretics: Continue furosemide (Lasix) 40 mg once daily and additional 40 mg on Monday, Wednesday, and Friday. mg daily. · Additional testing: Additional Testing List: I ordered a echocardiogram to better assess for the etiology of this problem and to help guide future management    Finally, I recommended that she continue her other medications and follow up with you as previously scheduled. FOLLOW UP:   I told Ms. Ragland to call my office if she had any problems, but otherwise told her to Return for 3-4 weeks. However, I would be happy to see her sooner should the need arise. However, I would be happy to see her sooner should the need arise. Once again, thank you for allowing me to participate in this patients care. Please do not hesitate to contact me could I be of further assistance. Sincerely,  Kenya Joaquin MD, MS, F.A.C.C. [facility] Specialist  [address]  Phone: [phone], Fax: [phone]      I believe that the risk of significant morbidity and mortality related to the patient's current medical conditions are: Intermediate. The documentation recorded by the scribe, accurately and completely reflects the services I personally performed and the decisions made by me. Fei Joaquin MD, MS, F.A.C.C.  May 13, 2019

## 2019-06-03 ENCOUNTER — TELEPHONE (OUTPATIENT)
Dept: CARDIOLOGY | Age: 69
End: 2019-06-03

## 2019-06-03 ENCOUNTER — HOSPITAL ENCOUNTER (OUTPATIENT)
Dept: NON INVASIVE DIAGNOSTICS | Age: 69
Discharge: HOME | End: 2019-06-03
Payer: MEDICARE

## 2019-06-03 DIAGNOSIS — I10 ESSENTIAL HYPERTENSION: ICD-10-CM

## 2019-06-03 DIAGNOSIS — R06.02 SOB (SHORTNESS OF BREATH): ICD-10-CM

## 2019-06-03 DIAGNOSIS — I50.42 CHRONIC COMBINED SYSTOLIC AND DIASTOLIC CONGESTIVE HEART FAILURE (HCC): ICD-10-CM

## 2019-06-03 LAB
LV EF: 53 %
LVEF MODALITY: NORMAL

## 2019-06-03 PROCEDURE — 93306 TTE W/DOPPLER COMPLETE: CPT

## 2019-06-03 NOTE — TELEPHONE ENCOUNTER
Josesito Sweet brought in home bp record    5/14 153/81  152/83    5/15 190/104 135/85    5/16 183/98  199/78    5/17 145/76  147/82    5/18 172/76      5/19 134/71  152/76    5/20 173/76      5/21 159/68    5/23 179/87    5/30 169/85    Please advise if changes needed. Thank you!

## 2019-06-04 ENCOUNTER — TELEPHONE (OUTPATIENT)
Dept: CARDIOLOGY | Age: 69
End: 2019-06-04

## 2019-06-12 ENCOUNTER — OFFICE VISIT (OUTPATIENT)
Dept: CARDIOLOGY | Age: 69
End: 2019-06-12
Payer: MEDICARE

## 2019-06-12 VITALS
BODY MASS INDEX: 40.67 KG/M2 | WEIGHT: 238.2 LBS | DIASTOLIC BLOOD PRESSURE: 90 MMHG | HEIGHT: 64 IN | SYSTOLIC BLOOD PRESSURE: 168 MMHG | RESPIRATION RATE: 16 BRPM | HEART RATE: 64 BPM

## 2019-06-12 DIAGNOSIS — R06.02 SHORTNESS OF BREATH: Primary | ICD-10-CM

## 2019-06-12 DIAGNOSIS — R53.81 PHYSICAL DECONDITIONING: ICD-10-CM

## 2019-06-12 DIAGNOSIS — E66.01 MORBID OBESITY (HCC): ICD-10-CM

## 2019-06-12 DIAGNOSIS — I50.42 CHRONIC COMBINED SYSTOLIC AND DIASTOLIC CONGESTIVE HEART FAILURE (HCC): ICD-10-CM

## 2019-06-12 DIAGNOSIS — I10 ESSENTIAL HYPERTENSION: ICD-10-CM

## 2019-06-12 PROCEDURE — 1036F TOBACCO NON-USER: CPT | Performed by: FAMILY MEDICINE

## 2019-06-12 PROCEDURE — 4040F PNEUMOC VAC/ADMIN/RCVD: CPT | Performed by: FAMILY MEDICINE

## 2019-06-12 PROCEDURE — G8400 PT W/DXA NO RESULTS DOC: HCPCS | Performed by: FAMILY MEDICINE

## 2019-06-12 PROCEDURE — G8427 DOCREV CUR MEDS BY ELIG CLIN: HCPCS | Performed by: FAMILY MEDICINE

## 2019-06-12 PROCEDURE — 1123F ACP DISCUSS/DSCN MKR DOCD: CPT | Performed by: FAMILY MEDICINE

## 2019-06-12 PROCEDURE — 3017F COLORECTAL CA SCREEN DOC REV: CPT | Performed by: FAMILY MEDICINE

## 2019-06-12 PROCEDURE — G8417 CALC BMI ABV UP PARAM F/U: HCPCS | Performed by: FAMILY MEDICINE

## 2019-06-12 PROCEDURE — 1090F PRES/ABSN URINE INCON ASSESS: CPT | Performed by: FAMILY MEDICINE

## 2019-06-12 PROCEDURE — 99214 OFFICE O/P EST MOD 30 MIN: CPT | Performed by: FAMILY MEDICINE

## 2019-06-12 RX ORDER — FUROSEMIDE 80 MG
80 TABLET ORAL DAILY
Qty: 90 TABLET | Refills: 3 | Status: SHIPPED | OUTPATIENT
Start: 2019-06-12 | End: 2020-01-22 | Stop reason: ALTCHOICE

## 2019-06-12 NOTE — PROGRESS NOTES
Edilia Lott am scribing for and in the presence of Peri Greenfield MD.    Patient: Tad Heller  : 1950  Date of Visit: 2019    REASON FOR VISIT / CONSULTATION: Follow-up (3-4 wk. ECHO 6/3. HX: severe mitral regurg, CHF, NICM. Patient states shes not feeling any different, does not have energy. Back pain. Denies: CP, SOB, lightheaded/dizziness, palpitations)      Dear Liborio Andino, APRN - CNP,    HPI: Ms. Grecia Barton is a 79 y.o. female who was admitted to the hospital with worsening shortness of breath. This has gotten worse over the past three months. She has a cardiac history of abnormal echocardiogram which showed that her ejection fraction was reduced to about 40-45%. And a heart catheterization that was relatively unremarkable. Fortunately her repeat echocardiogram in 2018 and in 2019 showed her ejection fraction increased from 45-50% to 55% as well as showing only trivial mitral regurgitation. She had an ECHO on 6/3/2019 that showed EF of 50-55%. LV wall thickness is mildly increased. LA is mildly dilated (29-33) with a left atrial volume index of 31 m1/m2. mild diastolic dysfunction. Since the last time I saw Ms. Ragland she continues to have no energy and feels tired all the time. She continues to have shortness of breath on exertion. She is up another 2 lbs since her last visit on 2019 and up a total of 14 lbs since her hospital discharge. She denied any chest pain, abdominal pain, bleeding problems, lightheaded/dizziness, or problems with her medications or any other concerns at this time. Exercise Tolerance: Ms. Grecia Barton reports that she has a fairly poor exercise tolerance. Her says that she could walk >half a block but is limited due to shortness of breath and back pain. Past Medical History:   Diagnosis Date    CHF (congestive heart failure) ([facility] [address]     H/O cardiac catheterization 2017    LMCA: Mild irregularites 10-20%. LAD: Mild irregularites 10-20%. LCx: Mild irregularites 10-20%. RCA: Mild irregularites 10-20%. LV function assessed as : Abnormal. EF of 40%.  H/O echocardiogram 12/18/2018    EF 55%. Mildly increased LV wall thickness. The left atrium appears moderately to severely dilated. Moderate to severe mitral regurg. Moderate pulmonary HTN with an estimated RV systolic pressure of 32WUQA. Moderate tricuspid regurg. Evidnece fo moderate diastolic dysfunction is seen.  History of echocardiogram 01/17/2019    EF 55%. LV wall thickness moderately increased. LA is mildly dilated w/ LA volume index of 30. trivial mitral regurg. Evidence of moderate (grade II) diastolic dysfunction seen.  History of echocardiogram 06/03/2019    EF of 50-55%. LV wall thickness is mildly increased. LA is mildly dilated (29-33) with a left atrial volume index of 31 m1/m2. mild diastolic dysfunction.  Hyperlipidemia     Hypertension        CURRENT ALLERGIES: Patient has no known allergies. REVIEW OF SYSTEMS: 14 systems were reviewed. Pertinent positives and negatives as above, all else negative.      Past Surgical History:   Procedure Laterality Date    CARDIAC CATHETERIZATION Left 08/04/2017    right radial, [facility] Dr. Jolaine Meigs      Social History:  Social History     Tobacco Use    Smoking status: Never Smoker    Smokeless tobacco: Never Used   Substance Use Topics    Alcohol use: No    Drug use: No        CURRENT MEDICATIONS:  Outpatient Medications Marked as Taking for the 6/12/19 encounter (Office Visit) with Addis Bloom MD   Medication Sig Dispense Refill    amLODIPine (NORVASC) 5 MG tablet Take 1 tablet by mouth daily 90 tablet 3    carvedilol (COREG) 12.5 MG tablet Take 1 tablet by mouth 2 times daily (with meals) 180 tablet 3    furosemide (LASIX) 40 MG tablet Take 40 mg once daily with an additional 40 mg on Monday Wednesday and Friday 145 tablet 3    losartan (COZAAR) 100 MG tablet Take 1 tablet by mouth daily 90 tablet 1       FAMILY HISTORY: family history includes COPD in her sister; Coronary Art Dis in her brother and father. PHYSICAL EXAM:   BP (!) 168/90 (Site: Right Upper Arm, Position: Sitting, Cuff Size: Medium Adult)   Pulse 64   Resp 16   Ht 5' 4\" (1.626 m)   Wt 238 lb 3.2 oz (108 kg)   BMI 40.89 kg/m²  Body mass index is 40.89 kg/m². Constitutional: She is oriented to person, place, and time. She appears well-developed and well-nourished. In no acute distress. HEENT: Normocephalic and atraumatic. No JVD present. Carotid bruit is not present. No mass and no thyromegaly present. No lymphadenopathy present. Cardiovascular: Normal rate, regular rhythm, normal heart sounds and intact distal pulses. Exam reveals no gallop and no friction rub. A II/VI systolic murmur was heard maximally at the apex. Pulmonary/Chest: Effort normal and breath sounds normal. No respiratory distress. She has no wheezes, rhonchi or rales. Abdominal: Soft, non-tender. Bowel sounds and aorta are normal. She exhibits no organomegaly, mass or bruit. Extremities:Trace-1+ lower extremity pitting pedal edema. 1/2 way up to the knees bilaterally   Compression stockings in place. no cyanosis, or clubbing. Pulses are 2+ radial/carotid/dorsalis pedis and posterior tibial bilaterally. Neurological: She is alert and oriented to person, place, and time. No evidence of gross cranial nerve deficit. Coordination appeared normal.   Skin: Skin is warm and dry. There is no rash or diaphoresis. Psychiatric: She has a normal mood and affect.  Her speech is normal and behavior is normal.      MOST RECENT LABS ON RECORD:   Lab Results   Component Value Date    WBC 6.2 12/20/2018    HGB 11.6 (L) 12/20/2018    HCT 36.9 12/20/2018     12/20/2018    CHOL 202 (H) 08/02/2017    TRIG 85 08/02/2017    HDL 38 (L) 12/18/2018    ALT 19 12/20/2018    AST 16 12/20/2018     01/17/2019    K 4.4 01/17/2019     01/17/2019    CREATININE 1.12 (H) 01/17/2019    BUN 21 01/17/2019    CO2 28 01/17/2019    TSH 1.14 08/01/2017       ASSESSMENT:  1. Shortness of breath    2. Chronic combined systolic and diastolic congestive heart failure ([facility] [address]    3. Essential hypertension    4. Morbid obesity ([facility] [address]    5. Physical deconditioning       PLAN:  · Shortness of breath with mild exertion: Likely multi-factoral including obesity, deconditioning as well as perhaps some degree of heart failure and/or mitral regurgitation  · I recommended aggressive diet and exercise to help lose weight and increase conditioning  · We discussed the potential benefits of cardiac rehab to improve both her cardiac condition as well as improve her exercise tolerance and overall quality of life. I suggested that she consider starting Phase III cardiac rehab. However, she said that she has problems getting transportation and therefore would like to think about this which I understand.  Additional Testing List: None    Chronic Systolic and Diastolic Heart Failure: EF of 50-55% on 6/3  Beta Blocker: Continue carvedilol (Coreg)      ACE Inibitor/ARB: Continue losartan (Cozaar) 100 mg once daily. Diuretics: Increase furosemide (lasix) 80 mg daily. I discussed the potential side effects of this medication including lightheadedness and dizziness and told her to call the office if this occurs. Nonpharmacologic management of Heart Failure: I told her to continue wearing lower extremity compression stockings and I advised her to try and keep her legs up whenever possible and to limit salt in her diet. Additional Testing List: I took the liberty of ordering a BMP in 5-7 days to assess their potassium and renal function    Essential Hypertension: Uncontrolled  · ACE Inibitor/ARB: Continue losartan (Cozaar) 100 mg once daily. · Diuretics: INCREASE to furosemide (Lasix) 80 mg every morning.    · Beta Blocker: Continue carvedilol (Coreg)       · Calcium Channel Blocker: Continue amlodipine (Norvasc) 5 mg once daily.  · Counseling: I want her to take her blood pressure a couple times a day for 1 week and call the office with this blood pressure log. I will increase amlodipine if needed. · History of Severe Mitral Regurgitation: probably just functional as her echo on 6/19 showed only trivial MR. · Beta Blocker: Continue Carvedilol (Coreg) 12.5 mg twice daily. · ACE Inibitor/ARB: Continue losartan (Cozaar) 100 mg daily. · Diuretics: INCREASE to furosemide (Lasix) 80 mg every morning. · Additional testing: Additional Testing List: None    Finally, I recommended that she continue her other medications and follow up with you as previously scheduled. FOLLOW UP:   I told Ms. Ragland to call my office if she had any problems, but otherwise told her to Return in about 1 month (around 7/10/2019). However, I would be happy to see her sooner should the need arise. However, I would be happy to see her sooner should the need arise. Once again, thank you for allowing me to participate in this patients care. Please do not hesitate to contact me could I be of further assistance. Sincerely,  Zana Joaquin MD, MS, F.A.C.C. [facility] Cardiology Specialist  [address] [facility], [address] [place] [address]  Phone: [phone], Fax: [phone]      I believe that the risk of significant morbidity and mortality related to the patient's current medical conditions are: Intermediate. The documentation recorded by the scribe, accurately and completely reflects the services I personally performed and the decisions made by me. Bernard Garcia MD, MS, F.A.C.C.  June 12, 2019

## 2019-06-12 NOTE — PATIENT INSTRUCTIONS
SURVEY:    You may be receiving a survey from "[employer] regarding your visit today. Please complete the survey to enable us to provide the highest quality of care to you and your family. If you cannot score us a very good on any question, please call the office to discuss how we could have made your experience a very good one. Thank you.

## 2019-07-17 ENCOUNTER — HOSPITAL ENCOUNTER (OUTPATIENT)
Age: 69
Discharge: HOME | End: 2019-07-17
Payer: MEDICARE

## 2019-07-17 ENCOUNTER — OFFICE VISIT (OUTPATIENT)
Dept: CARDIOLOGY | Age: 69
End: 2019-07-17
Payer: MEDICARE

## 2019-07-17 VITALS
BODY MASS INDEX: 40.97 KG/M2 | DIASTOLIC BLOOD PRESSURE: 82 MMHG | WEIGHT: 240 LBS | HEART RATE: 69 BPM | SYSTOLIC BLOOD PRESSURE: 140 MMHG | HEIGHT: 64 IN | OXYGEN SATURATION: 97 % | RESPIRATION RATE: 20 BRPM

## 2019-07-17 DIAGNOSIS — I50.42 CHRONIC COMBINED SYSTOLIC AND DIASTOLIC HF (HEART FAILURE), NYHA CLASS 2 (HCC): ICD-10-CM

## 2019-07-17 DIAGNOSIS — R06.02 SHORTNESS OF BREATH: ICD-10-CM

## 2019-07-17 DIAGNOSIS — R06.02 SOB (SHORTNESS OF BREATH): Primary | ICD-10-CM

## 2019-07-17 DIAGNOSIS — I10 ESSENTIAL HYPERTENSION: ICD-10-CM

## 2019-07-17 DIAGNOSIS — I38 VALVULAR HEART DISEASE: ICD-10-CM

## 2019-07-17 LAB
ANION GAP SERPL CALCULATED.3IONS-SCNC: 10 MMOL/L (ref 9–17)
BUN BLDV-MCNC: 22 MG/DL (ref 8–23)
BUN/CREAT BLD: 18 (ref 9–20)
CALCIUM SERPL-MCNC: 9.4 MG/DL (ref 8.6–10.4)
CHLORIDE BLD-SCNC: 101 MMOL/L (ref 98–107)
CO2: 29 MMOL/L (ref 20–31)
CREAT SERPL-MCNC: 1.21 MG/DL (ref 0.5–0.9)
GFR AFRICAN AMERICAN: 54 ML/MIN
GFR NON-AFRICAN AMERICAN: 45 ML/MIN
GFR SERPL CREATININE-BSD FRML MDRD: ABNORMAL ML/MIN/{1.73_M2}
GFR SERPL CREATININE-BSD FRML MDRD: ABNORMAL ML/MIN/{1.73_M2}
GLUCOSE BLD-MCNC: 283 MG/DL (ref 70–99)
POTASSIUM SERPL-SCNC: 4.1 MMOL/L (ref 3.7–5.3)
SODIUM BLD-SCNC: 140 MMOL/L (ref 135–144)

## 2019-07-17 PROCEDURE — G8417 CALC BMI ABV UP PARAM F/U: HCPCS | Performed by: FAMILY MEDICINE

## 2019-07-17 PROCEDURE — 1036F TOBACCO NON-USER: CPT | Performed by: FAMILY MEDICINE

## 2019-07-17 PROCEDURE — 36415 COLL VENOUS BLD VENIPUNCTURE: CPT

## 2019-07-17 PROCEDURE — 1123F ACP DISCUSS/DSCN MKR DOCD: CPT | Performed by: FAMILY MEDICINE

## 2019-07-17 PROCEDURE — G8400 PT W/DXA NO RESULTS DOC: HCPCS | Performed by: FAMILY MEDICINE

## 2019-07-17 PROCEDURE — G8427 DOCREV CUR MEDS BY ELIG CLIN: HCPCS | Performed by: FAMILY MEDICINE

## 2019-07-17 PROCEDURE — 4040F PNEUMOC VAC/ADMIN/RCVD: CPT | Performed by: FAMILY MEDICINE

## 2019-07-17 PROCEDURE — 3017F COLORECTAL CA SCREEN DOC REV: CPT | Performed by: FAMILY MEDICINE

## 2019-07-17 PROCEDURE — 99214 OFFICE O/P EST MOD 30 MIN: CPT | Performed by: FAMILY MEDICINE

## 2019-07-17 PROCEDURE — 1090F PRES/ABSN URINE INCON ASSESS: CPT | Performed by: FAMILY MEDICINE

## 2019-07-17 PROCEDURE — 80048 BASIC METABOLIC PNL TOTAL CA: CPT

## 2019-07-17 RX ORDER — AMLODIPINE BESYLATE 10 MG/1
10 TABLET ORAL DAILY
Qty: 90 TABLET | Refills: 3 | Status: SHIPPED | OUTPATIENT
Start: 2019-07-17 | End: 2020-07-20 | Stop reason: SDUPTHER

## 2019-07-17 NOTE — PROGRESS NOTES
Coronary Art Dis in her brother and father. PHYSICAL EXAM:   BP (!) 147/84 (Site: Right Upper Arm, Position: Sitting, Cuff Size: Medium Adult)   Pulse 69   Resp 20   Ht 5' 4\" (1.626 m)   Wt 240 lb (108.9 kg)   SpO2 97%   BMI 41.20 kg/m²  Body mass index is 41.2 kg/m². Constitutional: She is oriented to person, place, and time. She appears well-developed and well-nourished. In no acute distress. HEENT: Normocephalic and atraumatic. No JVD present. Carotid bruit is not present. No mass and no thyromegaly present. No lymphadenopathy present. Cardiovascular: Normal rate, regular rhythm, normal heart sounds and intact distal pulses. Exam reveals no gallop and no friction rub. A III/VI systolic murmur was heard maximally at the apex. Pulmonary/Chest: Effort normal and breath sounds normal. No respiratory distress. She has no wheezes, rhonchi or rales. Abdominal: Soft, non-tender. Bowel sounds and aorta are normal. She exhibits no organomegaly, mass or bruit. Extremities:Trace-1+ lower extremity pitting pedal edema. 1/2 way up to the knees bilaterally   Compression stockings in place. no cyanosis, or clubbing. Pulses are 2+ radial/carotid/dorsalis pedis and posterior tibial bilaterally. Neurological: She is alert and oriented to person, place, and time. No evidence of gross cranial nerve deficit. Coordination appeared normal.   Skin: Skin is warm and dry. There is no rash or diaphoresis. Psychiatric: She has a normal mood and affect.  Her speech is normal and behavior is normal.      MOST RECENT LABS ON RECORD:   Lab Results   Component Value Date    WBC 6.2 12/20/2018    HGB 11.6 (L) 12/20/2018    HCT 36.9 12/20/2018     12/20/2018    CHOL 202 (H) 08/02/2017    TRIG 85 08/02/2017    HDL 38 (L) 12/18/2018    ALT 19 12/20/2018    AST 16 12/20/2018     01/17/2019    K 4.4 01/17/2019     01/17/2019    CREATININE 1.12 (H) 01/17/2019    BUN 21 01/17/2019    CO2 28 01/17/2019    TSH 1.14 08/01/2017       ASSESSMENT:  1. SOB (shortness of breath)    2. Chronic combined systolic and diastolic HF (heart failure), NYHA class 2 ([address] [address]    3. Essential hypertension    4. Valvular heart disease       PLAN:  · Shortness of breath with mild exertion: Likely multi-factoral including obesity, deconditioning as well as perhaps some degree of heart failure and/or mitral regurgitation   · I spent about 10 minutes talking with Ms. Kalpesh eDy  about what I expect is at least mild to moderate deconditioning. I explained that if she is only doing the minimum necessary to accomplish his daily activities of living, it will be normal to expect shortness of breath whenever she does more activity. I think that if she would just exercise 3 days a week for 30-40 minutes this would not only help maintain her current quality of life but would give her some reserve if and more likely when she develops some unexpected illness. Chronic Systolic and Diastolic Heart Failure: EF of 50-55% on 6/3  Beta Blocker: Continue carvedilol (Coreg) 12.5 mg twice daily. ACE Inibitor/ARB: Continue losartan (Cozaar) 100 mg once daily. Diuretics: Continue furosemide (lasix) 80 mg daily. Nonpharmacologic management of Heart Failure: I told her to continue wearing lower extremity compression stockings and I advised her to try and keep her legs up whenever possible and to limit salt in her diet. Laboratory testing: She reported that she will get her blood work from last visit done today. Essential Hypertension: Uncontrolled  · ACE Inibitor/ARB: Continue losartan (Cozaar) 100 mg once daily. · Diuretics: Continue furosemide (Lasix) 80 mg every morning. · Beta Blocker: Continue carvedilol (Coreg)       · Calcium Channel Blocker: INCREASE to amlodipine (Norvasc) 10 mg once daily.  I also discussed the potential side effects of this medication including lightheadedness and dizziness and instructed them to stop the medication of this

## 2019-07-18 ENCOUNTER — TELEPHONE (OUTPATIENT)
Dept: CARDIOLOGY | Age: 69
End: 2019-07-18

## 2019-08-28 ENCOUNTER — OFFICE VISIT (OUTPATIENT)
Dept: CARDIOLOGY | Age: 69
End: 2019-08-28
Payer: MEDICARE

## 2019-08-28 VITALS
SYSTOLIC BLOOD PRESSURE: 144 MMHG | DIASTOLIC BLOOD PRESSURE: 88 MMHG | HEIGHT: 64 IN | WEIGHT: 237.2 LBS | OXYGEN SATURATION: 96 % | BODY MASS INDEX: 40.49 KG/M2 | HEART RATE: 70 BPM | RESPIRATION RATE: 16 BRPM

## 2019-08-28 DIAGNOSIS — I50.42 CHRONIC COMBINED SYSTOLIC AND DIASTOLIC HF (HEART FAILURE), NYHA CLASS 2 (HCC): Primary | ICD-10-CM

## 2019-08-28 DIAGNOSIS — I10 ESSENTIAL HYPERTENSION: ICD-10-CM

## 2019-08-28 DIAGNOSIS — I34.0 SEVERE MITRAL REGURGITATION BY PRIOR ECHOCARDIOGRAM: ICD-10-CM

## 2019-08-28 DIAGNOSIS — R53.81 PHYSICAL DECONDITIONING: ICD-10-CM

## 2019-08-28 DIAGNOSIS — R06.02 SHORTNESS OF BREATH: ICD-10-CM

## 2019-08-28 PROCEDURE — G8417 CALC BMI ABV UP PARAM F/U: HCPCS | Performed by: FAMILY MEDICINE

## 2019-08-28 PROCEDURE — 1036F TOBACCO NON-USER: CPT | Performed by: FAMILY MEDICINE

## 2019-08-28 PROCEDURE — 3017F COLORECTAL CA SCREEN DOC REV: CPT | Performed by: FAMILY MEDICINE

## 2019-08-28 PROCEDURE — 4040F PNEUMOC VAC/ADMIN/RCVD: CPT | Performed by: FAMILY MEDICINE

## 2019-08-28 PROCEDURE — 1090F PRES/ABSN URINE INCON ASSESS: CPT | Performed by: FAMILY MEDICINE

## 2019-08-28 PROCEDURE — G8427 DOCREV CUR MEDS BY ELIG CLIN: HCPCS | Performed by: FAMILY MEDICINE

## 2019-08-28 PROCEDURE — 1123F ACP DISCUSS/DSCN MKR DOCD: CPT | Performed by: FAMILY MEDICINE

## 2019-08-28 PROCEDURE — G8400 PT W/DXA NO RESULTS DOC: HCPCS | Performed by: FAMILY MEDICINE

## 2019-08-28 PROCEDURE — 99214 OFFICE O/P EST MOD 30 MIN: CPT | Performed by: FAMILY MEDICINE

## 2019-08-28 RX ORDER — LOSARTAN POTASSIUM 100 MG/1
100 TABLET ORAL DAILY
Qty: 90 TABLET | Refills: 1 | Status: SHIPPED | OUTPATIENT
Start: 2019-08-28 | End: 2020-01-22

## 2019-08-28 NOTE — PROGRESS NOTES
Giacomo Hooker am scribing for and in the presence of Oxana Chappell. Jemal VILLALOBOS, MS, F.A.C.C..    Patient: Messi Amaya  : 1950  Date of Visit: 2019    REASON FOR VISIT / CONSULTATION: Follow-up (6 wk f/u. Increased Norvasc. Hx:SOB,CHF,HTN,Valve heart Disease. She states she has been having some leg swelling & crampy. lightheaded/dizziness. Denies:CP, SOB, palpitations. Has not taken losartan in a week, script ran out. )      Dear SUE Rice CNP,    HPI: Ms. Sofia Delgado is a 79 y.o. female who was admitted to the hospital with worsening shortness of breath. This has gotten worse over the past three months. She has a cardiac history of abnormal echocardiogram which showed that her ejection fraction was reduced to about 40-45%. And a heart catheterization that was relatively unremarkable. Fortunately her repeat echocardiogram in 2018 and in 2019 showed her ejection fraction increased from 45-50% to 55% as well as showing only trivial mitral regurgitation. She had an ECHO on 6/3/2019 that showed EF of 50-55%. LV wall thickness is mildly increased. LA is mildly dilated (29-33) with a left atrial volume index of 31 m1/m2. mild diastolic dysfunction. Since the last time I saw Ms. Ragland she continues to have no energy and feels tired all the time. She also complains that she has leg cramps and leg swelling too. She get pain in her arms and ribs that has not followed up with SUE Rice CNP for this. She states she cut out pop from her diet 2 years ago and didn't know if not drinking pop was causing this. She denied any chest pain, abdominal pain, bleeding problems, lightheaded/dizziness, or problems with her medications or any other concerns at this time. Exercise Tolerance: Ms. Sofia Delgado reports that she has a fairly poor exercise tolerance. Her says that she could not walk 100 yards without stopping to catch her breath.  She admits that she really does not do anything for

## 2019-11-13 ENCOUNTER — OFFICE VISIT (OUTPATIENT)
Dept: PRIMARY CARE CLINIC | Age: 69
End: 2019-11-13
Payer: MEDICARE

## 2019-11-13 VITALS
BODY MASS INDEX: 36.94 KG/M2 | OXYGEN SATURATION: 99 % | RESPIRATION RATE: 22 BRPM | TEMPERATURE: 97.8 F | WEIGHT: 215.2 LBS | SYSTOLIC BLOOD PRESSURE: 123 MMHG | HEART RATE: 77 BPM | DIASTOLIC BLOOD PRESSURE: 81 MMHG

## 2019-11-13 DIAGNOSIS — L03.90 CELLULITIS, UNSPECIFIED CELLULITIS SITE: ICD-10-CM

## 2019-11-13 DIAGNOSIS — L30.9 DERMATITIS: Primary | ICD-10-CM
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
## 2019-11-13 PROCEDURE — 1123F ACP DISCUSS/DSCN MKR DOCD: CPT | Performed by: NURSE PRACTITIONER

## 2019-11-13 PROCEDURE — G8400 PT W/DXA NO RESULTS DOC: HCPCS | Performed by: NURSE PRACTITIONER

## 2019-11-13 PROCEDURE — 4040F PNEUMOC VAC/ADMIN/RCVD: CPT | Performed by: NURSE PRACTITIONER

## 2019-11-13 PROCEDURE — G8484 FLU IMMUNIZE NO ADMIN: HCPCS | Performed by: NURSE PRACTITIONER

## 2019-11-13 PROCEDURE — 1090F PRES/ABSN URINE INCON ASSESS: CPT | Performed by: NURSE PRACTITIONER

## 2019-11-13 PROCEDURE — G8417 CALC BMI ABV UP PARAM F/U: HCPCS | Performed by: NURSE PRACTITIONER

## 2019-11-13 PROCEDURE — 1036F TOBACCO NON-USER: CPT | Performed by: NURSE PRACTITIONER

## 2019-11-13 PROCEDURE — 3017F COLORECTAL CA SCREEN DOC REV: CPT | Performed by: NURSE PRACTITIONER

## 2019-11-13 PROCEDURE — 99213 OFFICE O/P EST LOW 20 MIN: CPT | Performed by: NURSE PRACTITIONER

## 2019-11-13 PROCEDURE — G8427 DOCREV CUR MEDS BY ELIG CLIN: HCPCS | Performed by: NURSE PRACTITIONER

## 2019-11-13 RX ORDER — CEPHALEXIN 500 MG/1
500 CAPSULE ORAL 2 TIMES DAILY
Qty: 14 CAPSULE | Refills: 0 | Status: SHIPPED | OUTPATIENT
Start: 2019-11-13 | End: 2019-12-22 | Stop reason: ALTCHOICE

## 2019-11-13 RX ORDER — HYDROXYZINE HYDROCHLORIDE 25 MG/1
25 TABLET, FILM COATED ORAL NIGHTLY
Qty: 5 TABLET | Refills: 0 | Status: SHIPPED | OUTPATIENT
Start: 2019-11-13 | End: 2019-11-18

## 2019-11-13 ASSESSMENT — ENCOUNTER SYMPTOMS
SHORTNESS OF BREATH: 0
COUGH: 0

## 2019-12-22 ENCOUNTER — HOSPITAL ENCOUNTER (INPATIENT)
Age: 69
LOS: 1 days | Discharge: HOME | DRG: 069 | End: 2019-12-23
Attending: EMERGENCY MEDICINE | Admitting: PSYCHIATRY & NEUROLOGY
Payer: MEDICARE

## 2019-12-22 ENCOUNTER — HOSPITAL ENCOUNTER (EMERGENCY)
Age: 69
Discharge: HOME | End: 2019-12-22
Attending: EMERGENCY MEDICINE
Payer: MEDICARE

## 2019-12-22 ENCOUNTER — APPOINTMENT (OUTPATIENT)
Dept: CT IMAGING | Age: 69
DRG: 069 | End: 2019-12-22
Payer: MEDICARE

## 2019-12-22 ENCOUNTER — APPOINTMENT (OUTPATIENT)
Dept: CT IMAGING | Age: 69
End: 2019-12-22
Payer: MEDICARE

## 2019-12-22 VITALS
HEART RATE: 66 BPM | SYSTOLIC BLOOD PRESSURE: 128 MMHG | OXYGEN SATURATION: 96 % | TEMPERATURE: 97.9 F | RESPIRATION RATE: 18 BRPM | DIASTOLIC BLOOD PRESSURE: 77 MMHG

## 2019-12-22 DIAGNOSIS — G45.9 TIA (TRANSIENT ISCHEMIC ATTACK): Primary | ICD-10-CM

## 2019-12-22 LAB
% CKMB: 3.1 % (ref 0–3)
-: ABNORMAL
-: ABNORMAL
ABSOLUTE EOS #: 0.08 K/UL (ref 0–0.44)
ABSOLUTE EOS #: 0.11 K/UL (ref 0–0.44)
ABSOLUTE IMMATURE GRANULOCYTE: 0.03 K/UL (ref 0–0.3)
ABSOLUTE IMMATURE GRANULOCYTE: <0.03 K/UL (ref 0–0.3)
ABSOLUTE LYMPH #: 1.64 K/UL (ref 1.1–3.7)
ABSOLUTE LYMPH #: 1.67 K/UL (ref 1.1–3.7)
ABSOLUTE MONO #: 0.65 K/UL (ref 0.1–1.2)
ABSOLUTE MONO #: 0.85 K/UL (ref 0.1–1.2)
ALBUMIN SERPL-MCNC: 4 G/DL (ref 3.5–5.2)
ALBUMIN/GLOBULIN RATIO: 1.1 (ref 1–2.5)
ALP BLD-CCNC: 94 U/L (ref 35–104)
ALT SERPL-CCNC: 28 U/L (ref 5–33)
AMORPHOUS: ABNORMAL
AMORPHOUS: ABNORMAL
AMPHETAMINE SCREEN URINE: NEGATIVE
ANION GAP SERPL CALCULATED.3IONS-SCNC: 10 MMOL/L (ref 9–17)
ANION GAP SERPL CALCULATED.3IONS-SCNC: 11 MMOL/L (ref 9–17)
AST SERPL-CCNC: 25 U/L
BACTERIA: ABNORMAL
BACTERIA: ABNORMAL
BARBITURATE SCREEN URINE: NEGATIVE
BASOPHILS # BLD: 1 % (ref 0–2)
BASOPHILS # BLD: 1 % (ref 0–2)
BASOPHILS ABSOLUTE: 0.04 K/UL (ref 0–0.2)
BASOPHILS ABSOLUTE: 0.04 K/UL (ref 0–0.2)
BENZODIAZEPINE SCREEN, URINE: NEGATIVE
BILIRUB SERPL-MCNC: 0.32 MG/DL (ref 0.3–1.2)
BILIRUBIN URINE: ABNORMAL
BILIRUBIN URINE: NEGATIVE
BUN BLDV-MCNC: 18 MG/DL (ref 8–23)
BUN BLDV-MCNC: 19 MG/DL (ref 8–23)
BUN/CREAT BLD: 20 (ref 9–20)
BUN/CREAT BLD: ABNORMAL (ref 9–20)
BUPRENORPHINE URINE: NEGATIVE
CALCIUM SERPL-MCNC: 10.1 MG/DL (ref 8.6–10.4)
CALCIUM SERPL-MCNC: 9.3 MG/DL (ref 8.6–10.4)
CANNABINOID SCREEN URINE: NEGATIVE
CASTS UA: ABNORMAL /LPF
CASTS UA: ABNORMAL /LPF (ref 0–8)
CHLORIDE BLD-SCNC: 104 MMOL/L (ref 98–107)
CHLORIDE BLD-SCNC: 104 MMOL/L (ref 98–107)
CK MB: 1.6 NG/ML
CKMB INTERPRETATION: ABNORMAL
CO2: 22 MMOL/L (ref 20–31)
CO2: 22 MMOL/L (ref 20–31)
COCAINE METABOLITE, URINE: NEGATIVE
COLOR: YELLOW
COLOR: YELLOW
COMMENT UA: ABNORMAL
COMMENT UA: ABNORMAL
CREAT SERPL-MCNC: 0.79 MG/DL (ref 0.5–0.9)
CREAT SERPL-MCNC: 0.96 MG/DL (ref 0.5–0.9)
CRYSTALS, UA: ABNORMAL /HPF
CRYSTALS, UA: ABNORMAL /HPF
DIFFERENTIAL TYPE: ABNORMAL
DIFFERENTIAL TYPE: ABNORMAL
EOSINOPHILS RELATIVE PERCENT: 1 % (ref 1–4)
EOSINOPHILS RELATIVE PERCENT: 2 % (ref 1–4)
EPITHELIAL CELLS UA: ABNORMAL /HPF (ref 0–25)
EPITHELIAL CELLS UA: ABNORMAL /HPF (ref 0–5)
GFR AFRICAN AMERICAN: >60 ML/MIN
GFR AFRICAN AMERICAN: >60 ML/MIN
GFR NON-AFRICAN AMERICAN: 58 ML/MIN
GFR NON-AFRICAN AMERICAN: >60 ML/MIN
GFR SERPL CREATININE-BSD FRML MDRD: ABNORMAL ML/MIN/{1.73_M2}
GLUCOSE BLD-MCNC: 113 MG/DL (ref 65–105)
GLUCOSE BLD-MCNC: 127 MG/DL (ref 70–99)
GLUCOSE BLD-MCNC: 137 MG/DL
GLUCOSE BLD-MCNC: 137 MG/DL (ref 74–100)
GLUCOSE BLD-MCNC: 167 MG/DL (ref 70–99)
GLUCOSE URINE: NEGATIVE
GLUCOSE URINE: NEGATIVE
HCT VFR BLD CALC: 38.4 % (ref 36.3–47.1)
HCT VFR BLD CALC: 42 % (ref 36.3–47.1)
HEMOGLOBIN: 12.3 G/DL (ref 11.9–15.1)
HEMOGLOBIN: 13.5 G/DL (ref 11.9–15.1)
IMMATURE GRANULOCYTES: 0 %
IMMATURE GRANULOCYTES: 0 %
INR BLD: 0.9
INR BLD: 1 (ref 0.9–1.2)
KETONES, URINE: ABNORMAL
KETONES, URINE: NEGATIVE
LEUKOCYTE ESTERASE, URINE: ABNORMAL
LEUKOCYTE ESTERASE, URINE: ABNORMAL
LYMPHOCYTES # BLD: 22 % (ref 24–43)
LYMPHOCYTES # BLD: 26 % (ref 24–43)
MCH RBC QN AUTO: 30.2 PG (ref 25.2–33.5)
MCH RBC QN AUTO: 30.3 PG (ref 25.2–33.5)
MCHC RBC AUTO-ENTMCNC: 32 G/DL (ref 28.4–34.8)
MCHC RBC AUTO-ENTMCNC: 32.1 G/DL (ref 28.4–34.8)
MCV RBC AUTO: 94.2 FL (ref 82.6–102.9)
MCV RBC AUTO: 94.3 FL (ref 82.6–102.9)
MDMA URINE: NORMAL
METHADONE SCREEN, URINE: NEGATIVE
METHAMPHETAMINE, URINE: NEGATIVE
MONOCYTES # BLD: 10 % (ref 3–12)
MONOCYTES # BLD: 11 % (ref 3–12)
MUCUS: ABNORMAL
MUCUS: ABNORMAL
MYOGLOBIN: <21 NG/ML (ref 25–58)
NITRITE, URINE: NEGATIVE
NITRITE, URINE: NEGATIVE
NRBC AUTOMATED: 0 PER 100 WBC
NRBC AUTOMATED: 0 PER 100 WBC
OPIATES, URINE: NEGATIVE
OTHER OBSERVATIONS UA: ABNORMAL
OTHER OBSERVATIONS UA: ABNORMAL
OXYCODONE SCREEN URINE: NEGATIVE
PARTIAL THROMBOPLASTIN TIME: 21.6 SEC (ref 20.5–30.5)
PARTIAL THROMBOPLASTIN TIME: 23.7 SEC (ref 23.2–34.4)
PDW BLD-RTO: 12.8 % (ref 11.8–14.4)
PDW BLD-RTO: 12.9 % (ref 11.8–14.4)
PH UA: 5.5 (ref 5–8)
PH UA: 5.5 (ref 5–9)
PHENCYCLIDINE, URINE: NEGATIVE
PLATELET # BLD: 190 K/UL (ref 138–453)
PLATELET # BLD: 218 K/UL (ref 138–453)
PLATELET ESTIMATE: ABNORMAL
PLATELET ESTIMATE: ABNORMAL
PMV BLD AUTO: 11.4 FL (ref 8.1–13.5)
PMV BLD AUTO: 11.8 FL (ref 8.1–13.5)
POTASSIUM SERPL-SCNC: 4.2 MMOL/L (ref 3.7–5.3)
POTASSIUM SERPL-SCNC: 4.3 MMOL/L (ref 3.7–5.3)
PROPOXYPHENE, URINE: NEGATIVE
PROTEIN UA: ABNORMAL
PROTEIN UA: NEGATIVE
PROTHROMBIN TIME: 9.7 SEC (ref 9–12)
PROTHROMBIN TIME: 9.9 SEC (ref 9.7–12.2)
RBC # BLD: 4.07 M/UL (ref 3.95–5.11)
RBC # BLD: 4.46 M/UL (ref 3.95–5.11)
RBC # BLD: ABNORMAL 10*6/UL
RBC # BLD: ABNORMAL 10*6/UL
RBC UA: ABNORMAL /HPF (ref 0–2)
RBC UA: ABNORMAL /HPF (ref 0–4)
RENAL EPITHELIAL, UA: ABNORMAL /HPF
RENAL EPITHELIAL, UA: ABNORMAL /HPF
SEG NEUTROPHILS: 61 % (ref 36–65)
SEG NEUTROPHILS: 65 % (ref 36–65)
SEGMENTED NEUTROPHILS ABSOLUTE COUNT: 3.97 K/UL (ref 1.5–8.1)
SEGMENTED NEUTROPHILS ABSOLUTE COUNT: 4.99 K/UL (ref 1.5–8.1)
SODIUM BLD-SCNC: 136 MMOL/L (ref 135–144)
SODIUM BLD-SCNC: 137 MMOL/L (ref 135–144)
SPECIFIC GRAVITY UA: 1.05 (ref 1–1.03)
SPECIFIC GRAVITY UA: >1.03 (ref 1.01–1.02)
TEST INFORMATION: NORMAL
TOTAL CK: 51 U/L (ref 26–192)
TOTAL PROTEIN: 7.7 G/DL (ref 6.4–8.3)
TRICHOMONAS: ABNORMAL
TRICHOMONAS: ABNORMAL
TRICYCLIC ANTIDEPRESSANTS, UR: NEGATIVE
TROPONIN INTERP: ABNORMAL
TROPONIN T: ABNORMAL NG/ML
TROPONIN, HIGH SENSITIVITY: 22 NG/L (ref 0–14)
TURBIDITY: ABNORMAL
TURBIDITY: CLEAR
URINE HGB: NEGATIVE
URINE HGB: NEGATIVE
UROBILINOGEN, URINE: NORMAL
UROBILINOGEN, URINE: NORMAL
WBC # BLD: 6.4 K/UL (ref 3.5–11.3)
WBC # BLD: 7.7 K/UL (ref 3.5–11.3)
WBC # BLD: ABNORMAL 10*3/UL
WBC # BLD: ABNORMAL 10*3/UL
WBC UA: ABNORMAL /HPF (ref 0–5)
WBC UA: ABNORMAL /HPF (ref 0–5)
YEAST: ABNORMAL
YEAST: ABNORMAL

## 2019-12-22 PROCEDURE — 82947 ASSAY GLUCOSE BLOOD QUANT: CPT

## 2019-12-22 PROCEDURE — 99223 1ST HOSP IP/OBS HIGH 75: CPT | Performed by: PSYCHIATRY & NEUROLOGY

## 2019-12-22 PROCEDURE — 80306 DRUG TEST PRSMV INSTRMNT: CPT

## 2019-12-22 PROCEDURE — 81001 URINALYSIS AUTO W/SCOPE: CPT

## 2019-12-22 PROCEDURE — 83874 ASSAY OF MYOGLOBIN: CPT

## 2019-12-22 PROCEDURE — 99285 EMERGENCY DEPT VISIT HI MDM: CPT

## 2019-12-22 PROCEDURE — 85610 PROTHROMBIN TIME: CPT

## 2019-12-22 PROCEDURE — 36415 COLL VENOUS BLD VENIPUNCTURE: CPT

## 2019-12-22 PROCEDURE — 2060000000 HC ICU INTERMEDIATE R&B

## 2019-12-22 PROCEDURE — 85025 COMPLETE CBC W/AUTO DIFF WBC: CPT

## 2019-12-22 PROCEDURE — 2580000003 HC RX 258: Performed by: EMERGENCY MEDICINE

## 2019-12-22 PROCEDURE — 80053 COMPREHEN METABOLIC PANEL: CPT

## 2019-12-22 PROCEDURE — 80048 BASIC METABOLIC PNL TOTAL CA: CPT

## 2019-12-22 PROCEDURE — 82550 ASSAY OF CK (CPK): CPT

## 2019-12-22 PROCEDURE — 70498 CT ANGIOGRAPHY NECK: CPT

## 2019-12-22 PROCEDURE — 6360000004 HC RX CONTRAST MEDICATION: Performed by: STUDENT IN AN ORGANIZED HEALTH CARE EDUCATION/TRAINING PROGRAM

## 2019-12-22 PROCEDURE — 87086 URINE CULTURE/COLONY COUNT: CPT

## 2019-12-22 PROCEDURE — 84484 ASSAY OF TROPONIN QUANT: CPT

## 2019-12-22 PROCEDURE — 2580000003 HC RX 258: Performed by: STUDENT IN AN ORGANIZED HEALTH CARE EDUCATION/TRAINING PROGRAM

## 2019-12-22 PROCEDURE — 93005 ELECTROCARDIOGRAM TRACING: CPT | Performed by: PSYCHIATRY & NEUROLOGY

## 2019-12-22 PROCEDURE — 85730 THROMBOPLASTIN TIME PARTIAL: CPT

## 2019-12-22 PROCEDURE — 6370000000 HC RX 637 (ALT 250 FOR IP): Performed by: STUDENT IN AN ORGANIZED HEALTH CARE EDUCATION/TRAINING PROGRAM

## 2019-12-22 PROCEDURE — 82553 CREATINE MB FRACTION: CPT

## 2019-12-22 PROCEDURE — 6370000000 HC RX 637 (ALT 250 FOR IP): Performed by: EMERGENCY MEDICINE

## 2019-12-22 PROCEDURE — 93005 ELECTROCARDIOGRAM TRACING: CPT | Performed by: EMERGENCY MEDICINE

## 2019-12-22 PROCEDURE — 70450 CT HEAD/BRAIN W/O DYE: CPT

## 2019-12-22 RX ORDER — SODIUM CHLORIDE 0.9 % (FLUSH) 0.9 %
10 SYRINGE (ML) INJECTION PRN
Status: DISCONTINUED | OUTPATIENT
Start: 2019-12-22 | End: 2019-12-23 | Stop reason: HOSPADM

## 2019-12-22 RX ORDER — ASPIRIN 81 MG/1
324 TABLET, CHEWABLE ORAL ONCE
Status: COMPLETED | OUTPATIENT
Start: 2019-12-22 | End: 2019-12-22

## 2019-12-22 RX ORDER — DEXTROSE MONOHYDRATE 50 MG/ML
100 INJECTION, SOLUTION INTRAVENOUS PRN
Status: DISCONTINUED | OUTPATIENT
Start: 2019-12-22 | End: 2019-12-23 | Stop reason: HOSPADM

## 2019-12-22 RX ORDER — NICOTINE POLACRILEX 4 MG
15 LOZENGE BUCCAL PRN
Status: DISCONTINUED | OUTPATIENT
Start: 2019-12-22 | End: 2019-12-23 | Stop reason: HOSPADM

## 2019-12-22 RX ORDER — LOSARTAN POTASSIUM 50 MG/1
50 TABLET ORAL DAILY
Status: DISCONTINUED | OUTPATIENT
Start: 2019-12-23 | End: 2019-12-23 | Stop reason: HOSPADM

## 2019-12-22 RX ORDER — ONDANSETRON 2 MG/ML
4 INJECTION INTRAMUSCULAR; INTRAVENOUS EVERY 6 HOURS PRN
Status: DISCONTINUED | OUTPATIENT
Start: 2019-12-22 | End: 2019-12-23 | Stop reason: HOSPADM

## 2019-12-22 RX ORDER — DEXTROSE MONOHYDRATE 25 G/50ML
12.5 INJECTION, SOLUTION INTRAVENOUS PRN
Status: DISCONTINUED | OUTPATIENT
Start: 2019-12-22 | End: 2019-12-23 | Stop reason: HOSPADM

## 2019-12-22 RX ORDER — SODIUM CHLORIDE 0.9 % (FLUSH) 0.9 %
10 SYRINGE (ML) INJECTION EVERY 12 HOURS SCHEDULED
Status: DISCONTINUED | OUTPATIENT
Start: 2019-12-22 | End: 2019-12-23 | Stop reason: HOSPADM

## 2019-12-22 RX ORDER — SODIUM CHLORIDE 9 MG/ML
1000 INJECTION, SOLUTION INTRAVENOUS CONTINUOUS
Status: DISCONTINUED | OUTPATIENT
Start: 2019-12-22 | End: 2019-12-22 | Stop reason: HOSPADM

## 2019-12-22 RX ORDER — CLOPIDOGREL BISULFATE 75 MG/1
300 TABLET ORAL ONCE
Status: COMPLETED | OUTPATIENT
Start: 2019-12-22 | End: 2019-12-22

## 2019-12-22 RX ORDER — ASPIRIN 81 MG/1
81 TABLET ORAL DAILY
Status: DISCONTINUED | OUTPATIENT
Start: 2019-12-23 | End: 2019-12-23 | Stop reason: HOSPADM

## 2019-12-22 RX ORDER — CLOPIDOGREL BISULFATE 75 MG/1
75 TABLET ORAL DAILY
Status: DISCONTINUED | OUTPATIENT
Start: 2019-12-23 | End: 2019-12-23 | Stop reason: HOSPADM

## 2019-12-22 RX ORDER — ASPIRIN 300 MG/1
300 SUPPOSITORY RECTAL DAILY
Status: DISCONTINUED | OUTPATIENT
Start: 2019-12-23 | End: 2019-12-23 | Stop reason: HOSPADM

## 2019-12-22 RX ORDER — ATORVASTATIN CALCIUM 80 MG/1
80 TABLET, FILM COATED ORAL NIGHTLY
Status: DISCONTINUED | OUTPATIENT
Start: 2019-12-22 | End: 2019-12-23 | Stop reason: HOSPADM

## 2019-12-22 RX ORDER — CARVEDILOL 12.5 MG/1
12.5 TABLET ORAL 2 TIMES DAILY WITH MEALS
Status: DISCONTINUED | OUTPATIENT
Start: 2019-12-23 | End: 2019-12-23 | Stop reason: HOSPADM

## 2019-12-22 RX ADMIN — ATORVASTATIN CALCIUM 80 MG: 80 TABLET, FILM COATED ORAL at 21:44

## 2019-12-22 RX ADMIN — CLOPIDOGREL BISULFATE 300 MG: 75 TABLET ORAL at 17:34

## 2019-12-22 RX ADMIN — IOHEXOL 90 ML: 350 INJECTION, SOLUTION INTRAVENOUS at 19:32

## 2019-12-22 RX ADMIN — ASPIRIN 81 MG 324 MG: 81 TABLET ORAL at 17:34

## 2019-12-22 RX ADMIN — SODIUM CHLORIDE 1000 ML: 9 INJECTION, SOLUTION INTRAVENOUS at 17:34

## 2019-12-22 RX ADMIN — SODIUM CHLORIDE, PRESERVATIVE FREE 10 ML: 5 INJECTION INTRAVENOUS at 21:45

## 2019-12-22 ASSESSMENT — PAIN SCALES - GENERAL: PAINLEVEL_OUTOF10: 0

## 2019-12-23 ENCOUNTER — APPOINTMENT (OUTPATIENT)
Dept: MRI IMAGING | Age: 69
DRG: 069 | End: 2019-12-23
Payer: MEDICARE

## 2019-12-23 VITALS
SYSTOLIC BLOOD PRESSURE: 121 MMHG | TEMPERATURE: 97.4 F | HEIGHT: 64 IN | WEIGHT: 215 LBS | DIASTOLIC BLOOD PRESSURE: 61 MMHG | OXYGEN SATURATION: 96 % | BODY MASS INDEX: 36.7 KG/M2 | RESPIRATION RATE: 16 BRPM | HEART RATE: 67 BPM

## 2019-12-23 LAB
CHOLESTEROL/HDL RATIO: 5.2
CHOLESTEROL: 172 MG/DL
CULTURE: NORMAL
EKG ATRIAL RATE: 66 BPM
EKG ATRIAL RATE: 72 BPM
EKG P AXIS: 48 DEGREES
EKG P AXIS: 50 DEGREES
EKG P-R INTERVAL: 176 MS
EKG P-R INTERVAL: 184 MS
EKG Q-T INTERVAL: 434 MS
EKG Q-T INTERVAL: 466 MS
EKG QRS DURATION: 102 MS
EKG QRS DURATION: 94 MS
EKG QTC CALCULATION (BAZETT): 475 MS
EKG QTC CALCULATION (BAZETT): 488 MS
EKG R AXIS: -2 DEGREES
EKG R AXIS: 9 DEGREES
EKG T AXIS: 101 DEGREES
EKG T AXIS: 90 DEGREES
EKG VENTRICULAR RATE: 66 BPM
EKG VENTRICULAR RATE: 72 BPM
ESTIMATED AVERAGE GLUCOSE: 329 MG/DL
GLUCOSE BLD-MCNC: 156 MG/DL (ref 65–105)
GLUCOSE BLD-MCNC: 185 MG/DL (ref 65–105)
HBA1C MFR BLD: 13.1 % (ref 4–6)
HCT VFR BLD CALC: 36.1 % (ref 36.3–47.1)
HDLC SERPL-MCNC: 33 MG/DL
HEMOGLOBIN: 11.5 G/DL (ref 11.9–15.1)
LDL CHOLESTEROL: 104 MG/DL (ref 0–130)
Lab: NORMAL
MCH RBC QN AUTO: 30 PG (ref 25.2–33.5)
MCHC RBC AUTO-ENTMCNC: 31.9 G/DL (ref 28.4–34.8)
MCV RBC AUTO: 94.3 FL (ref 82.6–102.9)
NRBC AUTOMATED: 0 PER 100 WBC
PDW BLD-RTO: 13.1 % (ref 11.8–14.4)
PLATELET # BLD: 171 K/UL (ref 138–453)
PMV BLD AUTO: 12 FL (ref 8.1–13.5)
RBC # BLD: 3.83 M/UL (ref 3.95–5.11)
SPECIMEN DESCRIPTION: NORMAL
TRIGL SERPL-MCNC: 175 MG/DL
VLDLC SERPL CALC-MCNC: ABNORMAL MG/DL (ref 1–30)
WBC # BLD: 5.7 K/UL (ref 3.5–11.3)

## 2019-12-23 PROCEDURE — 93010 ELECTROCARDIOGRAM REPORT: CPT | Performed by: INTERNAL MEDICINE

## 2019-12-23 PROCEDURE — 82947 ASSAY GLUCOSE BLOOD QUANT: CPT

## 2019-12-23 PROCEDURE — 36415 COLL VENOUS BLD VENIPUNCTURE: CPT

## 2019-12-23 PROCEDURE — 6370000000 HC RX 637 (ALT 250 FOR IP): Performed by: STUDENT IN AN ORGANIZED HEALTH CARE EDUCATION/TRAINING PROGRAM

## 2019-12-23 PROCEDURE — 92523 SPEECH SOUND LANG COMPREHEN: CPT

## 2019-12-23 PROCEDURE — 85027 COMPLETE CBC AUTOMATED: CPT

## 2019-12-23 PROCEDURE — 97530 THERAPEUTIC ACTIVITIES: CPT

## 2019-12-23 PROCEDURE — 70551 MRI BRAIN STEM W/O DYE: CPT

## 2019-12-23 PROCEDURE — 83036 HEMOGLOBIN GLYCOSYLATED A1C: CPT

## 2019-12-23 PROCEDURE — 80061 LIPID PANEL: CPT

## 2019-12-23 PROCEDURE — 99238 HOSP IP/OBS DSCHRG MGMT 30/<: CPT | Performed by: PSYCHIATRY & NEUROLOGY

## 2019-12-23 PROCEDURE — 97161 PT EVAL LOW COMPLEX 20 MIN: CPT

## 2019-12-23 PROCEDURE — 2580000003 HC RX 258: Performed by: STUDENT IN AN ORGANIZED HEALTH CARE EDUCATION/TRAINING PROGRAM

## 2019-12-23 PROCEDURE — 6360000002 HC RX W HCPCS: Performed by: STUDENT IN AN ORGANIZED HEALTH CARE EDUCATION/TRAINING PROGRAM

## 2019-12-23 RX ORDER — CLOPIDOGREL BISULFATE 75 MG/1
75 TABLET ORAL DAILY
Qty: 20 TABLET | Refills: 0 | Status: SHIPPED | OUTPATIENT
Start: 2019-12-24 | End: 2020-07-20 | Stop reason: ALTCHOICE

## 2019-12-23 RX ORDER — ASPIRIN 81 MG/1
81 TABLET ORAL DAILY
Qty: 30 TABLET | Refills: 3 | Status: SHIPPED | OUTPATIENT
Start: 2019-12-24 | End: 2020-07-20 | Stop reason: SDUPTHER

## 2019-12-23 RX ORDER — ATORVASTATIN CALCIUM 80 MG/1
80 TABLET, FILM COATED ORAL NIGHTLY
Qty: 30 TABLET | Refills: 3 | Status: SHIPPED | OUTPATIENT
Start: 2019-12-23 | End: 2020-07-20 | Stop reason: SDUPTHER

## 2019-12-23 RX ORDER — CEPHALEXIN 500 MG/1
500 CAPSULE ORAL 2 TIMES DAILY
Qty: 10 CAPSULE | Refills: 0 | Status: CANCELLED | OUTPATIENT
Start: 2019-12-23

## 2019-12-23 RX ORDER — CEPHALEXIN 500 MG/1
500 CAPSULE ORAL EVERY 12 HOURS SCHEDULED
Status: DISCONTINUED | OUTPATIENT
Start: 2019-12-23 | End: 2019-12-23 | Stop reason: HOSPADM

## 2019-12-23 RX ADMIN — INSULIN LISPRO 2 UNITS: 100 INJECTION, SOLUTION INTRAVENOUS; SUBCUTANEOUS at 08:55

## 2019-12-23 RX ADMIN — Medication 81 MG: at 08:39

## 2019-12-23 RX ADMIN — CEPHALEXIN 500 MG: 500 CAPSULE ORAL at 12:30

## 2019-12-23 RX ADMIN — CARVEDILOL 12.5 MG: 12.5 TABLET, FILM COATED ORAL at 08:39

## 2019-12-23 RX ADMIN — CLOPIDOGREL 75 MG: 75 TABLET, FILM COATED ORAL at 08:39

## 2019-12-23 RX ADMIN — ENOXAPARIN SODIUM 40 MG: 40 INJECTION SUBCUTANEOUS at 08:39

## 2019-12-23 RX ADMIN — SODIUM CHLORIDE, PRESERVATIVE FREE 10 ML: 5 INJECTION INTRAVENOUS at 08:53

## 2019-12-23 RX ADMIN — INSULIN LISPRO 2 UNITS: 100 INJECTION, SOLUTION INTRAVENOUS; SUBCUTANEOUS at 12:29

## 2019-12-23 RX ADMIN — LOSARTAN POTASSIUM 50 MG: 50 TABLET, FILM COATED ORAL at 08:39

## 2019-12-23 ASSESSMENT — PAIN DESCRIPTION - PAIN TYPE: TYPE: CHRONIC PAIN

## 2019-12-23 ASSESSMENT — PAIN SCALES - GENERAL
PAINLEVEL_OUTOF10: 0
PAINLEVEL_OUTOF10: 4

## 2019-12-23 ASSESSMENT — PAIN DESCRIPTION - LOCATION: LOCATION: BACK

## 2019-12-23 NOTE — ED NOTES
MRI checklist filled out and faxed to MRI. Will continue to monitor.       Areli Newton RN  12/22/19 2350

## 2019-12-23 NOTE — PROGRESS NOTES
Physical Therapy    Facility/Department: [facility] NEURO  Initial Assessment    NAME: Garrett Harrell  : 1950  MRN: [id number]    Date of Service: 2019  CHIEF COMPLAINT:        Slurred speech  Facial droop     HISTORY OF PRESENT ILLNESS:        The patient is a 81 y.o. female who presents with a transient episode of slurred speech and left-sided facial droop that happened while the patient was at home working on her computer and lasted for 10 minutes. She described not being able to pronounce her sentences appropriately. He was then presented to [facility] from where she was transferred here. Patient is known to have type 2 diabetes, hypertension, congestive heart failure. She is not on antiplatelets or anticoagulants. When she presented to [facility] blood pressure reported 126/76, heart rate 73. In our ED, blood pressure 166/89, pulse 57.       Discharge Recommendations:    No further therapy required at discharge. PT Equipment Recommendations  Equipment Needed: Yes  Mobility Devices: Delphia Jimmy: Rolling    Assessment   Prognosis: Good  Decision Making: Low Complexity  PT Education: PT Role;Plan of Care;Home Exercise Program  Patient Education: Pt demonstrated good understanding of Home exercise program  No Skilled PT: Safe to return home  REQUIRES PT FOLLOW UP: No  Activity Tolerance  Activity Tolerance: Patient Tolerated treatment well       Patient Diagnosis(es): The encounter diagnosis was TIA (transient ischemic attack). has a past medical history of CHF (congestive heart failure) ([facility] [address] H/O cardiac catheterization, H/O echocardiogram, History of echocardiogram, History of echocardiogram, Hyperlipidemia, and Hypertension. has a past surgical history that includes Cholecystectomy and Cardiac catheterization (Left, 2017).     Restrictions  Restrictions/Precautions  Required Braces or Orthoses?: No  Vision/Hearing  Vision: Within Functional Limits  Hearing: Within functional limits     Subjective  General  Patient assessed for rehabilitation services?: Yes  Family / Caregiver Present: No  Follows Commands: Within Functional Limits  Pain Screening  Patient Currently in Pain: Yes  Pain Assessment  Pain Assessment: 0-10  Pain Level: 4  Pain Type: Chronic pain  Pain Location: Back  Vital Signs  Patient Currently in Pain: Yes  Pre Treatment Pain Screening  Intervention List: Patient able to continue with treatment    Orientation  Orientation  Overall Orientation Status: Within Functional Limits  Social/Functional History  Social/Functional History  Lives With: Other (comment)(Sister)  Type of Home: House  Home Layout: One level  Home Access: Stairs to enter with rails  Entrance Stairs - Number of Steps: 2  Home Equipment: Standard walker(But was her mother's)  ADL Assistance: Independent  Homemaking Assistance: Independent  Ambulation Assistance: Independent  Transfer Assistance: Independent  Active : No  Occupation: Retired  Type of occupation: Nurse aide  Cognition   Cognition  Overall Cognitive Status: WFL    Objective          AROM RLE (degrees)  RLE AROM: WFL  AROM LLE (degrees)  LLE AROM : WFL  AROM RUE (degrees)  RUE AROM : WFL  AROM LUE (degrees)  LUE AROM : WFL  Strength RLE  Strength RLE: WFL  Strength LLE  Strength LLE: WFL  Strength RUE  Strength RUE: WFL  Strength LUE  Strength LUE: WFL  Motor Control  Gross Motor?: WFL  Coordination  Rapid Alternating Movements: Normal  Finger to Nose: Normal  Sensation  Overall Sensation Status: WFL  Bed mobility  Supine to Sit: Modified independent  Sit to Supine: Modified independent  Scooting: Independent  Transfers  Sit to Stand: Supervision  Stand to sit: Supervision  Ambulation  Ambulation?: Yes  Ambulation 1  Surface: level tile  Device: Rolling Walker  Assistance: Modified Independent  Quality of Gait: Steady with rolling walker  Gait Deviations: None  Distance: 300 ft  Stairs/Curb  Stairs?: No  Pt with C/O of legs

## 2019-12-23 NOTE — FLOWSHEET NOTE
[facility]     Emergency/Trauma Note    PATIENT NAME: Etter Runner    Shift date: 12/22/19  Shift day: Sunday   Shift # 2    Room # 21/21   Name: Etter Runner            Age: 81 y.o. Gender: female          Islam: [address] [place] [address] of Mosque:     Trauma/Incident type: Stroke Alert  Admit Date & Time: 12/22/2019  7:00 PM  TRAUMA NAME:         PATIENT/EVENT DESCRIPTION:  Etter Runner is a 81 y.o. female who arrived via EMS as a Stroke Alert. Patient presents with Transient Ischemic Attack, Dysarthria for 10 minutes. Patient to be admitted to 21/21. SPIRITUAL ASSESSMENT/INTERVENTION:   responded to Stroke Alert.  went to ED #21. Pt. was calm and approachable. Patient stated she became concerned when her speech became slurred.  was a calming presence for patient. Patient stated her daughter Gilberto Mayes [phone] was on her way to the ED.  provided emotional and spiritual support. [employer] will continue to follow up as needed during hospital stay. PATIENT BELONGINGS:  With patient    ANY BELONGINGS OF SIGNIFICANT VALUE NOTED:  None Noted    REGISTRATION STAFF NOTIFIED? No      WHAT IS YOUR SPIRITUAL CARE PLAN FOR THIS PATIENT?:  [employer] will remain available for spiritual and emotional support as needed. Electronically signed by Edelmira Ford Resident      12/22/19 8904   Encounter Summary   Services provided to: Patient   Referral/Consult From: Multi-disciplinary team   Support System Children;Family members   Continue Visiting   (12/22/19)   Complexity of Encounter Low   Length of Encounter 15 minutes   Spiritual Assessment Completed Yes   Crisis   Type Stroke Alert   Assessment Calm; Approachable   Intervention Active listening;Sustaining presence/ Ministry of presence   Outcome Acceptance;Expressed gratitude   , on 12/22/2019 at 7:37 PM.  [address] [place]  [phone]

## 2019-12-23 NOTE — ED PROVIDER NOTES
[facility]  Emergency Department Encounter  EmergencyMedicine Resident     Pt Name:Michelle Rodriguez  MRN: [id number]  Birthdate 1950  Date of evaluation: 12/23/19  PCP:  SUE Erickson CNP    CHIEF COMPLAINT       Chief Complaint   Patient presents with    Transient Ischemic Attack     Pt is a transfer from [place] [address], aphasia, slurrled speech, and dizziness for about 10 min. HISTORY OF PRESENT ILLNESS  (Location/Symptom, Timing/Onset, Context/Setting, Quality, Duration, Modifying Factors, Severity.)      Minoo Brown is a 81 y.o. female who presents with TIA with last known well at 18. Patient not given TPA at outlying facility. CT of the head was unremarkable. Patient not complaining of any symptoms currently. Patient with no prior CVAs. Patient not taking any blood thinners. No falls or head trauma. No recent illnesses. Symptoms at outlying facility were described as a aphasia with slurred speech/dizziness. PAST MEDICAL / SURGICAL / SOCIAL / FAMILY HISTORY      has a past medical history of CHF (congestive heart failure) ([facility] [address] H/O cardiac catheterization, H/O echocardiogram, History of echocardiogram, History of echocardiogram, Hyperlipidemia, and Hypertension. has a past surgical history that includes Cholecystectomy and Cardiac catheterization (Left, 08/04/2017). Social History     Socioeconomic History    Marital status:       Spouse name: Not on file    Number of children: Not on file    Years of education: Not on file    Highest education level: Not on file   Occupational History    Not on file   Social Needs    Financial resource strain: Not on file    Food insecurity:     Worry: Not on file     Inability: Not on file    Transportation needs:     Medical: Not on file     Non-medical: Not on file   Tobacco Use    Smoking status: Never Smoker    Smokeless tobacco: Never Used   Substance and Sexual Activity    Alcohol use: No    Drug use: No    Sexual activity: Not Currently   Lifestyle    Physical activity:     Days per week: Not on file     Minutes per session: Not on file    Stress: Not on file   Relationships    Social connections:     Talks on phone: Not on file     Gets together: Not on file     Attends [employer] service: Not on file     Active member of club or organization: Not on file     Attends meetings of clubs or organizations: Not on file     Relationship status: Not on file    Intimate partner violence:     Fear of current or ex partner: Not on file     Emotionally abused: Not on file     Physically abused: Not on file     Forced sexual activity: Not on file   Other Topics Concern    Not on file   Social History Narrative    Not on file       Family History   Problem Relation Age of Onset    Coronary Art Dis Father          from [place]   [employer] COPD Sister         O2 dep    Coronary Art Dis Brother         2 brothers  from [place]       Allergies:  Patient has no known allergies. Home Medications:  Prior to Admission medications    Medication Sig Start Date End Date Taking?  Authorizing Provider   metFORMIN (GLUCOPHAGE) 500 MG tablet Take 500 mg by mouth 2 times daily (with meals)   Yes Historical Provider, MD   losartan (COZAAR) 100 MG tablet Take 1 tablet by mouth daily 19  Yes Shannan Taylor MD   amLODIPine (NORVASC) 10 MG tablet Take 1 tablet by mouth daily 19  Yes Shannan Taylor MD   furosemide (LASIX) 80 MG tablet Take 1 tablet by mouth daily 19  Yes Shannan Taylor MD   carvedilol (COREG) 12.5 MG tablet Take 1 tablet by mouth 2 times daily (with meals) 19  Yes Shannan Taylor MD       REVIEW OF SYSTEMS    (2-9 systems for level 4, 10 or more for level 5)      Review of Systems -   General ROS: negative  ENT ROS: negative  Hematological and Lymphatic ROS: negative   Respiratory ROS: no cough, shortness of breath, or wheezing  Cardiovascular ROS: no chest pain or dyspnea on REPORTED    Microscopic Urinalysis   Result Value Ref Range    -          WBC, UA 50  0 - 5 /HPF    RBC, UA None 0 - 4 /HPF    Casts UA  0 - 8 /LPF     5 TO 10 HYALINE Reference range defined for non-centrifuged specimen. Crystals UA NOT REPORTED None /HPF    Epithelial Cells UA 10 TO 20 0 - 5 /HPF    Renal Epithelial, Urine NOT REPORTED 0 /HPF    Bacteria, UA FEW (A) None    Mucus, UA NOT REPORTED None    Trichomonas, UA NOT REPORTED None    Amorphous, UA NOT REPORTED None    Other Observations UA NOT REPORTED NOT REQ. Yeast, UA NOT REPORTED None   POC Glucose Fingerstick   Result Value Ref Range    POC Glucose 113 (H) 65 - 105 mg/dL         RADIOLOGY:  CTA HEAD NECK W CONTRAST   Final Result   No acute arterial abnormality or hemodynamically significant arterial   stenosis in the head or neck. MRI BRAIN WO CONTRAST    (Results Pending)           EMERGENCY DEPARTMENT COURSE:   Patient no acute distress, alert and oriented x3. CTA of the head and neck as well as MRI ordered on arrival.  Stroke alert also placed. Patient admitted under neurology service and is awaiting MRI. Otherwise, patient stable from hemodynamic and neurological standpoint. No indications for TPA at this time. Patient awaiting bed. PROCEDURES:  None    CONSULTS:  IP CONSULT TO STROKE TEAM    CRITICAL CARE:  None    FINAL IMPRESSION      1.  TIA (transient ischemic attack)          DISPOSITION / PLAN     DISPOSITION Admitted 12/22/2019 07:32:21 PM      PATIENT REFERRED TO:  SUE Sahu - CNP  [facility] [phone]            DISCHARGE MEDICATIONS:  Current Discharge Medication List          Sepideh Fuchs MD  Emergency Medicine Resident    (Please note that portions of thisnote were completed with a voice recognition program.  Efforts were made to edit the dictations but occasionally words are mis-transcribed.)       Sepideh Fuchs MD  12/23/19 2179

## 2019-12-23 NOTE — CARE COORDINATION
Discharge [address] Case Management Department  Written by: Aleena Gomez RN    Patient Name: Moisés Bryant  Attending Provider: Anant Cardozo,*  Admit Date: 2019  7:00 PM  MRN: [id number]  Account: [de-identified]                     : 1950  Discharge Date:  19      Disposition: home    Aleena Gomez RN

## 2019-12-23 NOTE — PROGRESS NOTES
Speech Language Pathology  Facility/Department: [facility] NEURO  Initial Speech/Language/Cognitive Assessment    NAME: Etter Runner  : 1950   MRN: [id number]  ADMISSION DATE: 2019  ADMITTING DIAGNOSIS: has Hypertension; Hyperlipidemia; Acute on chronic systolic CHF (congestive heart failure) ([address] Hypertensive urgency; Idiopathic cardiomyopathy ([address] Hypertensive emergency; Chronic combined systolic and diastolic HF (heart failure), NYHA class 2 ([address] Acute on chronic combined systolic and diastolic CHF, NYHA class 4 ([address] Hypoxia; Severe mitral regurgitation by prior echocardiogram; Morbid obesity (HCC); CKD (chronic kidney disease) stage 3, GFR 30-59 ml/min ([address] Physical deconditioning; and Transient ischemic attack on their problem list.    Date of Eval: 2019   Evaluating Therapist: SHAILA Mcdonnell    Primary Complaint: The patient is a 81 y.o. female who presents with a transient episode of slurred speech and left-sided facial droop that happened while the patient was at home working on her computer and lasted for 10 minutes. She described not being able to pronounce her sentences appropriately. He was then presented to [facility] from where she was transferred here. Patient is known to have type 2 diabetes, hypertension, congestive heart failure. She is not on antiplatelets or anticoagulants. When she presented to [facility] blood pressure reported 126/76, heart rate 73. In our ED, blood pressure 166/89, pulse 57. Pain:  Pain Assessment  Pain Assessment: 0-10  Pain Level: 0    Assessment:  Pt. Presents with mild-moderate cognitive deficits characterized by difficulty with immediate recall of 5 units, abstract reasoning and deductive reasoning. No oral motor deficits, no dysarthria noted. ST to follow up to address noted deficits. Education provided.             Recommendations:  Requires SLP Intervention: Yes  Duration/Frequency of Treatment: 3-5 x

## 2019-12-23 NOTE — CARE COORDINATION
Case Management Initial Discharge Plan  Gloria Miramontes,             Met with:patient to discuss discharge plans. Information verified: address, contacts, phone number, , insurance Yes  PCP: Glynis Severin, APRN - CNP  Date of last visit:     Insurance Provider: medicare    Discharge Planning    Living Arrangements:  Family Members   Support Systems:  Family Members    Home has 1 stories  3 stairs to climb to get into front door, 0stairs to climb to reach second floor  Location of bedroom/bathroom in home main floor    Patient able to perform ADL's:Independent    Current Services (outpatient & in home) none  DME equipment: none  DME provider:       Potential Assistance Needed:  N/A    Patient agreeable to home care: No  [place] of choice provided:  n/a    Prior SNF/Rehab Placement and Facility: none  Agreeable to SNF/Rehab: No  [place] of choice provided: n/a   Evaluation: n/a    Expected Discharge date:  19  Patient expects to be discharged to:  home  Follow Up Appointment: Best Day/ Time:      Transportation provider: family  Transportation arrangements needed for discharge: No    Readmission Risk              Risk of Unplanned Readmission:        11             Does patient have a readmission risk score greater than 14?: No  If yes, follow-up appointment must be made within 7 days of discharge. Goals of Care: Get strength back to be able to walk more    Discharge Plan: return home independently. Requested rolling walker.  Will have delivered for d/c            Electronically signed by Tonia Walsh RN on 19 at 12:11 PM

## 2019-12-23 NOTE — DISCHARGE SUMMARY
[facility]    Discharge Summary     Patient ID: Gloria Miramontes  :  1950   MRN: [id number]     ACCOUNT:  [de-identified]   Patient's PCP: Glynis Severin, APRN - CNP  Admit Date: 2019   Discharge Date: 2019     Length of Stay: 1  Code Status:  Prior  Admitting Physician: Jimmie Sherman MD  Discharge Physician: Mireya Ward MD     Active Discharge Diagnoses:       Primary Problem  TIA    [place] Problems    Diagnosis Date Noted    Old lacunar stroke without late effect [Z86.73]     Dysarthria [R47.1]     Transient ischemic attack [G45.9] 2019       Admission Condition:  fair     Discharged Condition: good    Hospital Stay:       Hospital Course:  Gloria Miramontes is a 81 y.o. female who was admitted for the management of   <principal problem not specified> , presented to ER with Transient Ischemic Attack (Pt is a transfer from [place], [facility], aphasia, slurrled speech, and dizziness for about 10 min.)       The patient was transferred from [facility] due to TIA symptoms that lasted for 10 minutes of left facial droop and slurred speech. The patient has past medical history of diabetes mellitus, hypertension, congestive heart failure. She is not on antiplatelet or anticoagulant. Subsequent pressure was around 130/70. Admitted to the hospital for TIA/stroke work-up. LDL is 104, on Lipitor 80 mg,MRI/CTA is negative,UA showed moderate leukocyte esterase with white blood cell from , started on Keflex. hemoglobin A1c 13.1, to follow-up PCP for diabetes mellitus management. Follow-up with PCP in 1 week for diabetes mellitus management and hypertension, follow-up with neurology clinic in 2 weeks. Start on aspirin and Plavix for 21 days, then continue aspirin. Patient is stable from neurological standpoint to be discharged.   Significant therapeutic interventions: ASA  Plavix  Lipitor     Significant Diagnostic Studies:   Labs / Micro:  CBC:   Lab Results   Component Value Date    WBC 5.7 12/23/2019    RBC 3.83 12/23/2019    HGB 11.5 12/23/2019    HCT 36.1 12/23/2019    MCV 94.3 12/23/2019    MCH 30.0 12/23/2019    MCHC 31.9 12/23/2019    RDW 13.1 12/23/2019     12/23/2019     BMP:    Lab Results   Component Value Date    GLUCOSE 127 12/22/2019     12/22/2019    K 4.3 12/22/2019     12/22/2019    CO2 22 12/22/2019    ANIONGAP 10 12/22/2019    BUN 18 12/22/2019    CREATININE 0.79 12/22/2019    BUNCRER NOT REPORTED 12/22/2019    CALCIUM 9.3 12/22/2019    LABGLOM >60 12/22/2019    GFRAA >60 12/22/2019    GFR      12/22/2019    GFR NOT REPORTED 12/22/2019     HFP:    Lab Results   Component Value Date    PROT 7.7 12/22/2019     CMP:    Lab Results   Component Value Date    GLUCOSE 127 12/22/2019     12/22/2019    K 4.3 12/22/2019     12/22/2019    CO2 22 12/22/2019    BUN 18 12/22/2019    CREATININE 0.79 12/22/2019    ANIONGAP 10 12/22/2019    ALKPHOS 94 12/22/2019    ALT 28 12/22/2019    AST 25 12/22/2019    BILITOT 0.32 12/22/2019    LABALBU 4.0 12/22/2019    ALBUMIN 1.1 12/22/2019    LABGLOM >60 12/22/2019    GFRAA >60 12/22/2019    GFR      12/22/2019    GFR NOT REPORTED 12/22/2019    PROT 7.7 12/22/2019    CALCIUM 9.3 12/22/2019     PT/INR:    Lab Results   Component Value Date    PROTIME 9.7 12/22/2019    INR 0.9 12/22/2019     PTT:   Lab Results   Component Value Date    APTT 21.6 12/22/2019     FLP:    Lab Results   Component Value Date    CHOL 172 12/23/2019    TRIG 175 12/23/2019    HDL 33 12/23/2019     U/A:    Lab Results   Component Value Date    COLORU YELLOW 12/22/2019    TURBIDITY CLOUDY 12/22/2019    SPECGRAV 1.049 12/22/2019    HGBUR NEGATIVE 12/22/2019    PHUR 5.5 12/22/2019    PROTEINU NEGATIVE 12/22/2019    GLUCOSEU NEGATIVE 12/22/2019    KETUA NEGATIVE 12/22/2019    BILIRUBINUR NEGATIVE 12/22/2019    UROBILINOGEN Normal 12/22/2019    NITRU NEGATIVE 12/22/2019    LEUKOCYTESUR MODERATE 12/22/2019     TSH:    Lab Results   Component Value Date    TSH 1.14 08/01/2017       Radiology:    Ct Head Wo Contrast    Result Date: 12/22/2019  EXAMINATION: CT OF THE HEAD WITHOUT CONTRAST  12/22/2019 4:14 pm TECHNIQUE: CT of the head was performed without the administration of intravenous contrast. Dose modulation, iterative reconstruction, and/or weight based adjustment of the mA/kV was utilized to reduce the radiation dose to as low as reasonably achievable. COMPARISON: None. HISTORY: ORDERING SYSTEM PROVIDED HISTORY: slurred speech TECHNOLOGIST PROVIDED HISTORY: slurred speech FINDINGS: BRAIN/VENTRICLES: There is no acute intracranial hemorrhage, mass effect or midline shift. No abnormal extra-axial fluid collection. The gray-white differentiation is maintained without evidence of an acute infarct. There is no evidence of hydrocephalus. Mild chronic white matter microvascular ischemic changes are noted in the cerebral hemispheres. Foci of encephalomalacia are noted in the high posterior right frontal lobe and left and right cerebellum. . ORBITS: The visualized portion of the orbits demonstrate no acute abnormality. SINUSES: The visualized paranasal sinuses and mastoid air cells demonstrate no acute abnormality. SOFT TISSUES/SKULL:  No acute abnormality of the visualized skull or soft tissues. 1. No acute intracranial abnormality. 2. Bilateral cerebellar and high posterior right frontal encephalomalacia in keeping with sequela of prior infarcts. 3. Mild chronic white matter microvascular ischemic changes. Findings were discussed with LAZARO LUCIA at 4:22 pm on 12/22/2019.      Cta Head Neck W Contrast    Result Date: 12/22/2019  EXAMINATION: CTA OF THE HEAD AND NECK WITH CONTRAST 12/22/2019 7:30 pm: TECHNIQUE: CTA of the head and neck was performed with the administration of intravenous contrast. Multiplanar reformatted images are Get Your Medications      [facility] [place] [address] [place] [phone]  [place] [address]    Phone:  [phone]   · aspirin 81 MG EC tablet  · atorvastatin 80 MG tablet  · clopidogrel 75 MG tablet         Time Spent on discharge is  34 mins in patient examination, evaluation, counseling as well as medication reconciliation, prescriptions for required medications, discharge plan and follow up. Electronically signed by   Terra Arboleda MD  12/23/2019  3:34 PM      Thank you Dr. Joseph Jorgensen, APRN - CNP for the opportunity to be involved in this patient's care.

## 2019-12-23 NOTE — ED NOTES
Bed: 21  Expected date: 12/22/19  Expected time: 6:41 PM  Means of arrival: [facility]  Comments:  [place]   TIA now resolved   Plavix, MARIS Gonzalez RN  12/22/19 1900

## 2019-12-23 NOTE — ED NOTES
Pt reports to the ED via EMS with c/o TIA. Pt was with her sister at home when she noticed her having slurred speech, aphasia, and dizziness. Pts LKW was 3:30pm, pts symptoms only lasted 10min, pt received 4 Plavix and 4 of Asprin, pts only symptoms upon arrival was Lt sided facial droop, CT showed old infarcts but still wanted her admitted. Pt is A&Ox4, RR even and non labored.       Raegan Solis RN  12/22/19 [id number]

## 2019-12-23 NOTE — H&P
obesity (HCC)    CKD (chronic kidney disease) stage 3, GFR 30-59 ml/min ([facility])    Physical deconditioning    Transient ischemic attack       Assessment                 81 y.o. female who presents with transient dysarthria and left sided facial droop most likely due to a transient ischemic attack      ABCD2 Score  (Estimate Risk of Stroke after TIA)   POINTS   Age    < 60   ? 60     [] 0  [x] 1   BP:     SBP <140 or DBP < 90   SBP ? 140 or DBP ? 90       [] 0  [x] 1   Clinical Features of TIA     Other Symptoms                 Speech Disturbances W/O Weakness   Unilateral Weakness     [] 0  [x] 1  [] 2   Duration of symptoms     < 10 Minutes                                     10-59 Minutes   ?  61 Minutes     [] 0  [x] 1  [] 2   Diabetes     No                    Yes     [] 0  [x] 1   TOTAL 5   0-3 Points: Low Risk -> Work up could be done OPD   2-Day Stroke Risk: 1%   7- Day Stroke Risk: 1.2%   90 Days Stroke Risk: 3.1%    4-5 Points: Moderate Risk    2-Day Stroke Risk: 4.1%   7- Day Stroke Risk: 5.9%    90 Days Stroke Risk: 9.8%    6-7 Points: High Risk -> Warrant Admission for Workup   2-Day Stroke Risk: 8.1%   7- Day Stroke Risk: 11.7%   90 Days Stroke Risk: 17.8%      Candidate for IV tPA therapy     Yes []     No  [x] due to the following exclusion criteria: symptoms resolved    Candidate for Thrombectomy    Yes []      No [x] due to the following exclusion criteria: symptoms resolved                  -We will admit to the hospital under neurology service for close monitoring giving high risk of recurrence of TIA/stroke and for complete stroke work-up    -MRI brain without contrasy              - CTA H/N               - Continue aspirin 81 mg daily               - Continue Plavix 75 mg daily               - Neurochecks               - SBP goal 140-180              - Keep serum glucose < 180              - Atorvastatin 80 mg HS              - Lipid panel               - Hb A1C              - Echocardiogram - Hold metformin and start Insulin MDSS              - Resume Coreg, Lisinopril in AM              - DVT PPx: Lovenox 40 mg sc daily              -GI PPX: Not indicated              - Disposition: Discharge home tomorrow                       Jaswinder García MD  Neurology Resident PGY-2  2019 at 9:01 PM     I have discussed the case of Jerome Apgar, including pertinent history and exam findings with the resident. I have seen and examined the patient and the key elements of the encounter have been performed by me. I agree with the assessment, plan and orders as documented by the resident with changes made to the note as needed. The patient was at her baseline until yesterday when she developed sudden onset of speech changes. As per the patient, she had difficulty in articulation of the words and at the same time the family noticed patient having left facial droop. There was a concern of a stroke and she was [facility] to [facility]. No previous similar symptoms.     General examination:    Head: Normocephalic, atraumatic  Eyes: Extraocular movements intact  Lungs: Respirations unlabored, chest wall no deformity  ENT: Normal external ear canals, no sinus tenderness  Heart: Regular rate rhythm  Abdomen: No masses, tenderness  Extremities: No cyanosis or edema, 2+ pulses  Skin: Intact, normal skin color    Neurological examination:    Mental status   Alert and oriented; intact memory with no confusion, speech or language problems; no hallucinations or delusions     Cranial nerves   II - visual fields intact to confrontation                                                III, IV, VI - extra-ocular muscles full: no pupillary defect; no MARIO, no nystagmus, no ptosis   V - normal facial sensation                                                               VII - normal facial symmetry                                                             VIII - intact hearing

## 2019-12-23 NOTE — PLAN OF CARE
Siderails up x 2  Hourly rounding  Call light in reach  Instructed to call for assist before attempting out of bed. Remains free from falls and accidental injury at this time   Floor free from obstacles  Bed is locked and in lowest position  Adequate lighting provided  Bed alarm on, [place] star and Stay with Me signs posted    Neuro assessment completed, fall precautions in place, aspirations precautions in place, assess for barriers in communication and mobility, interventions to assist in communication and mobility in place, encouraged to call for assistance, adaptive devices used as needed, assess emotional state and support offered, encouraged patient to communicate by available means, and support systems included in patient care.

## 2019-12-23 NOTE — PROGRESS NOTES
[facility] Neurology   [address] [place] [address]    Progress note             Date:   12/23/2019  Patient name:  Moisés Bryant  Date of admission:  12/22/2019  7:00 PM  MRN:   [id number]  Account:  [de-identified]  YOB: 1950  PCP:    SUE Raymundo CNP  Room:   [address] [place] [address]  Code Status:    Prior    Chief Complaint:     Chief Complaint   Patient presents with    Transient Ischemic Attack     Pt is a transfer from [place] [id number], aphasia, slurrled speech, and dizziness for about 10 min. Interval hx: The Patient was seen and examined at bedside  Is vitally stable alert oriented x 3  No acute events overnight  The patient stated that she is improving  LDL is 104, on Lipitor 80 mg  MRI/CTA is negative  UA showed moderate leukocyte esterase with white blood cell from   Pending hemoglobin A1c    Brief History of Present Illness: The patient is a 81 y.o. Non-/non  female who presents with Transient Ischemic Attack (Pt is a transfer from [place] [id number], aphasia, slurrled speech, and dizziness for about 10 min.)   and she is admitted to the hospital for the management of TIA symptoms of slurred speech and left-sided facial droop. The patient was transferred from [facility] due to TIA symptoms that lasted for 10 minutes of left facial droop and slurred speech. The patient has past medical history of diabetes mellitus, hypertension, congestive heart failure. She is not on antiplatelet or anticoagulant. Subsequent pressure was around 130/70. Admitted to the hospital for TIA/stroke work-up      Past Medical History:     Past Medical History:   Diagnosis Date    CHF (congestive heart failure) ([facility] [address]     H/O cardiac catheterization 08/04/2017    LMCA: Mild irregularites 10-20%. LAD: Mild irregularites 10-20%. LCx: Mild irregularites 10-20%. RCA: Mild irregularites 10-20%. LV function assessed as : Abnormal. EF of 40%.     H/O echocardiogram 12/18/2018    EF 55%. Mildly increased LV wall thickness. The left atrium appears moderately to severely dilated. Moderate to severe mitral regurg. Moderate pulmonary HTN with an estimated RV systolic pressure of 67GNPC. Moderate tricuspid regurg. Evidnece fo moderate diastolic dysfunction is seen.  History of echocardiogram 01/17/2019    EF 55%. LV wall thickness moderately increased. LA is mildly dilated w/ LA volume index of 30. trivial mitral regurg. Evidence of moderate (grade II) diastolic dysfunction seen.  History of echocardiogram 06/03/2019    EF of 50-55%. LV wall thickness is mildly increased. LA is mildly dilated (29-33) with a left atrial volume index of 31 m1/m2. mild diastolic dysfunction.  Hyperlipidemia     Hypertension         Past Surgical History:     Past Surgical History:   Procedure Laterality Date    CARDIAC CATHETERIZATION Left 08/04/2017    right radial, MHT Dr. Anastacio Phoenix          Medications Prior to Admission:     Prior to Admission medications    Medication Sig Start Date End Date Taking? Authorizing Provider   metFORMIN (GLUCOPHAGE) 500 MG tablet Take 500 mg by mouth 2 times daily (with meals)   Yes Historical Provider, MD   losartan (COZAAR) 100 MG tablet Take 1 tablet by mouth daily 8/28/19  Yes Sho Pinto MD   amLODIPine (NORVASC) 10 MG tablet Take 1 tablet by mouth daily 7/17/19  Yes Sho Pinto MD   furosemide (LASIX) 80 MG tablet Take 1 tablet by mouth daily 6/12/19  Yes Sho Pinto MD   carvedilol (COREG) 12.5 MG tablet Take 1 tablet by mouth 2 times daily (with meals) 2/13/19  Yes Sho Pinto MD        Allergies:     Patient has no known allergies. Social History:     Tobacco:    reports that she has never smoked. She has never used smokeless tobacco.  Alcohol:      reports no history of alcohol use. Drug Use:  reports no history of drug use.     Family History:     Family History   Problem Relation Age of Onset    Glucose 167 (H) 70 - 99 mg/dL    BUN 19 8 - 23 mg/dL    CREATININE 0.96 (H) 0.50 - 0.90 mg/dL    Bun/Cre Ratio 20 9 - 20    Calcium 10.1 8.6 - 10.4 mg/dL    Sodium 137 135 - 144 mmol/L    Potassium 4.2 3.7 - 5.3 mmol/L    Chloride 104 98 - 107 mmol/L    CO2 22 20 - 31 mmol/L    Anion Gap 11 9 - 17 mmol/L    Alkaline Phosphatase 94 35 - 104 U/L    ALT 28 5 - 33 U/L    AST 25 <32 U/L    Total Bilirubin 0.32 0.3 - 1.2 mg/dL    Total Protein 7.7 6.4 - 8.3 g/dL    Alb 4.0 3.5 - 5.2 g/dL    Albumin/Globulin Ratio 1.1 1.0 - 2.5    GFR Non- 58 (L) >60 mL/min    GFR African American >60 >60 mL/min    GFR Comment          GFR Staging         Protime-INR    Collection Time: 12/22/19  3:55 PM   Result Value Ref Range    Protime 9.9 9.7 - 12.2 sec    INR 1.0 0.9 - 1.2   EKG 12 Lead    Collection Time: 12/22/19  4:02 PM   Result Value Ref Range    Ventricular Rate 72 BPM    Atrial Rate 72 BPM    P-R Interval 184 ms    QRS Duration 102 ms    Q-T Interval 434 ms    QTc Calculation (Bazett) 475 ms    P Axis 50 degrees    R Axis -2 degrees    T Axis 101 degrees   Urinalysis with Microscopic    Collection Time: 12/22/19  5:16 PM   Result Value Ref Range    Color, UA YELLOW YELLOW    Turbidity UA CLEAR CLEAR    Glucose, Ur NEGATIVE NEGATIVE    Bilirubin Urine SMALL (A) NEGATIVE    Ketones, Urine TRACE (A) NEGATIVE    Specific Gravity, UA >1.030 (H) 1.010 - 1.020    Urine Hgb NEGATIVE NEGATIVE    pH, UA 5.5 5.0 - 9.0    Protein, UA 1+ (A) NEGATIVE    Urobilinogen, Urine Normal Normal    Nitrite, Urine NEGATIVE NEGATIVE    Leukocyte Esterase, Urine SMALL (A) NEGATIVE    Urinalysis Comments NOT REPORTED     -          WBC, UA 5 TO 10 0 - 5 /HPF    RBC, UA 0 TO 2 0 - 2 /HPF    Casts UA NOT REPORTED /LPF    Crystals UA NOT REPORTED None /HPF    Epithelial Cells UA 5 TO 10 0 - 25 /HPF    Renal Epithelial, Urine NOT REPORTED 0 /HPF    Bacteria, UA NOT REPORTED None    Mucus, UA NOT REPORTED None    Trichomonas, UA NOT REPORTED None    Amorphous, UA NOT REPORTED None    Other Observations UA NOT REPORTED NOT REQ.     Yeast, UA NOT REPORTED None   Urine Drug Screen    Collection Time: 12/22/19  5:16 PM   Result Value Ref Range    Amphetamine Screen, Ur NEGATIVE NEGATIVE    Barbiturate Screen, Ur NEGATIVE NEGATIVE    Benzodiazepine Screen, Urine NEGATIVE NEGATIVE    Cocaine Metabolite, Urine NEGATIVE NEGATIVE    Methadone Screen, Urine NEGATIVE NEGATIVE    Opiates, Urine NEGATIVE NEGATIVE    Phencyclidine, Urine NEGATIVE NEGATIVE    Propoxyphene, Urine NEGATIVE NEGATIVE    Cannabinoid Scrn, Ur NEGATIVE NEGATIVE    Oxycodone Screen, Ur NEGATIVE NEGATIVE    Methamphetamine, Urine NEGATIVE NEGATIVE    Tricyclic Antidepressants, Urine NEGATIVE NEGATIVE    MDMA, Urine NOT REPORTED NEGATIVE    Buprenorphine Urine NEGATIVE NEGATIVE    Test Information NOT REPORTED    STROKE PANEL    Collection Time: 12/22/19  7:17 PM   Result Value Ref Range    WBC 6.4 3.5 - 11.3 k/uL    RBC 4.07 3.95 - 5.11 m/uL    Hemoglobin 12.3 11.9 - 15.1 g/dL    Hematocrit 38.4 36.3 - 47.1 %    MCV 94.3 82.6 - 102.9 fL    MCH 30.2 25.2 - 33.5 pg    MCHC 32.0 28.4 - 34.8 g/dL    RDW 12.8 11.8 - 14.4 %    Platelets 363 706 - 925 k/uL    MPV 11.8 8.1 - 13.5 fL    NRBC Automated 0.0 0.0 per 100 WBC    Differential Type NOT REPORTED     Seg Neutrophils 61 36 - 65 %    Lymphocytes 26 24 - 43 %    Monocytes 10 3 - 12 %    Eosinophils % 2 1 - 4 %    Basophils 1 0 - 2 %    Immature Granulocytes 0 0 %    Segs Absolute 3.97 1.50 - 8.10 k/uL    Absolute Lymph # 1.64 1.10 - 3.70 k/uL    Absolute Mono # 0.65 0.10 - 1.20 k/uL    Absolute Eos # 0.11 0.00 - 0.44 k/uL    Basophils Absolute 0.04 0.00 - 0.20 k/uL    Absolute Immature Granulocyte <0.03 0.00 - 0.30 k/uL    WBC Morphology NOT REPORTED     RBC Morphology NOT REPORTED     Platelet Estimate NOT REPORTED     Protime 9.7 9.0 - 12.0 sec    INR 0.9     PTT 21.6 20.5 - 30.5 sec    Myoglobin <21 (L) 25 - 58 ng/mL    Troponin, High Sensitivity 22 (H) 0 - 14 ng/L    Troponin T NOT REPORTED <0.03 ng/mL    Troponin Interp NOT REPORTED     Glucose 127 (H) 70 - 99 mg/dL    BUN 18 8 - 23 mg/dL    CREATININE 0.79 0.50 - 0.90 mg/dL    Bun/Cre Ratio NOT REPORTED 9 - 20    Calcium 9.3 8.6 - 10.4 mg/dL    Sodium 136 135 - 144 mmol/L    Potassium 4.3 3.7 - 5.3 mmol/L    Chloride 104 98 - 107 mmol/L    CO2 22 20 - 31 mmol/L    Anion Gap 10 9 - 17 mmol/L    GFR Non-African American >60 >60 mL/min    GFR African American >60 >60 mL/min    GFR Comment          GFR Staging NOT REPORTED     Total CK 51 26 - 192 U/L    CK-MB 1.6 <5.4 ng/mL    % CKMB 3.1 (H) 0.0 - 3.0 %    CKMB Interpretation NORMAL ISOENZYME PATTERN    Urinalysis Reflex to Culture    Collection Time: 12/22/19  9:28 PM   Result Value Ref Range    Color, UA YELLOW YELLOW    Turbidity UA CLOUDY (A) CLEAR    Glucose, Ur NEGATIVE NEGATIVE    Bilirubin Urine NEGATIVE NEGATIVE    Ketones, Urine NEGATIVE NEGATIVE    Specific Gravity, UA 1.049 (H) 1.005 - 1.030    Urine Hgb NEGATIVE NEGATIVE    pH, UA 5.5 5.0 - 8.0    Protein, UA NEGATIVE NEGATIVE    Urobilinogen, Urine Normal Normal    Nitrite, Urine NEGATIVE NEGATIVE    Leukocyte Esterase, Urine MODERATE (A) NEGATIVE    Urinalysis Comments NOT REPORTED    Microscopic Urinalysis    Collection Time: 12/22/19  9:28 PM   Result Value Ref Range    -          WBC, UA 50  0 - 5 /HPF    RBC, UA None 0 - 4 /HPF    Casts UA  0 - 8 /LPF     5 TO 10 HYALINE Reference range defined for non-centrifuged specimen. Crystals UA NOT REPORTED None /HPF    Epithelial Cells UA 10 TO 20 0 - 5 /HPF    Renal Epithelial, Urine NOT REPORTED 0 /HPF    Bacteria, UA FEW (A) None    Mucus, UA NOT REPORTED None    Trichomonas, UA NOT REPORTED None    Amorphous, UA NOT REPORTED None    Other Observations UA NOT REPORTED NOT REQ.     Yeast, UA NOT REPORTED None   POC Glucose Fingerstick    Collection Time: 12/22/19  9:38 PM   Result Value Ref Range    POC Glucose 113 (H) 65 - 105 mg/dL   Lipid panel - fasting    Collection Time: 12/23/19  4:26 AM   Result Value Ref Range    Cholesterol 172 <200 mg/dL    HDL 33 (L) >40 mg/dL    LDL Cholesterol 104 0 - 130 mg/dL    Chol/HDL Ratio 5.2 (H) <5    Triglycerides 175 (H) <150 mg/dL    VLDL NOT REPORTED (H) 1 - 30 mg/dL   CBC    Collection Time: 12/23/19  4:26 AM   Result Value Ref Range    WBC 5.7 3.5 - 11.3 k/uL    RBC 3.83 (L) 3.95 - 5.11 m/uL    Hemoglobin 11.5 (L) 11.9 - 15.1 g/dL    Hematocrit 36.1 (L) 36.3 - 47.1 %    MCV 94.3 82.6 - 102.9 fL    MCH 30.0 25.2 - 33.5 pg    MCHC 31.9 28.4 - 34.8 g/dL    RDW 13.1 11.8 - 14.4 %    Platelets 449 335 - 745 k/uL    MPV 12.0 8.1 - 13.5 fL    NRBC Automated 0.0 0.0 per 100 WBC         Assessment :      Primary Problem  TIA work-up    Active Hospital Problems    Diagnosis Date Noted    Transient ischemic attack [G45.9] 12/22/2019       Plan:       70-year-old female patient with past medical history of diabetes mellitus, hypertension, hyperlipidemia admitted to the hospital for TIA work-up    -CT head was negative  -CTA is unremarkable  -MRI of the brain was negative for acute changes  -On aspirin and Plavix  -LDL is 104, Lipitor 80 mg  -Pending hemoglobin A1c  -Echo was done in June this year, showed ejection fraction 50% with RVSP 31, mild left atrial dilatation  -PT/OT  -Lovenox for DVT prophylaxis    Consultations:   IP CONSULT TO STROKE TEAM    Patient is admitted as inpatient status because of co-morbidities listed above, severity of signs and symptoms as outlined, requirement for current medical therapies and most importantly because of direct risk to patient if care not provided in a hospital setting.     [address] MD  12/23/2019  8:32 AM    Copy sent to Dr. Kesha Villegas, APRN - CNP

## 2019-12-23 NOTE — CONSULTS
Department of Endovascular Neurosurgery                                         Resident Consult Note  Stroke Alert       Reason for Consult:  stroke  Endovascular Neurosurgeon:   [x]Dr. Marti Dose      History Obtained From:  patient, electronic medical record    CHIEF COMPLAINT:       Slurred speech  Facial droop    HISTORY OF PRESENT ILLNESS:       The patient is a 81 y.o. female who presents with a transient episode of slurred speech and left-sided facial droop that happened while the patient was at home working on her computer and lasted for 10 minutes. She described not being able to pronounce her sentences appropriately. He was then presented to [facility] from where she was transferred here. Patient is known to have type 2 diabetes, hypertension, congestive heart failure. She is not on antiplatelets or anticoagulants. When she presented to [facility] blood pressure reported 126/76, heart rate 73. In our ED, blood pressure 166/89, pulse 57. PAST MEDICAL HISTORY :       Past Medical History:        Diagnosis Date    CHF [facility]congestive heart failure) ([facility] [address]     H/O cardiac catheterization 08/04/2017    LMCA: Mild irregularites 10-20%. LAD: Mild irregularites 10-20%. LCx: Mild irregularites 10-20%. RCA: Mild irregularites 10-20%. LV function assessed as : Abnormal. EF of 40%.  H/O echocardiogram 12/18/2018    EF 55%. Mildly increased LV wall thickness. The left atrium appears moderately to severely dilated. Moderate to severe mitral regurg. Moderate pulmonary HTN with an estimated RV systolic pressure of 83WRWH. Moderate tricuspid regurg. Evidnece fo moderate diastolic dysfunction is seen.  History of echocardiogram 01/17/2019    EF 55%. LV wall thickness moderately increased. LA is mildly dilated w/ LA volume index of 30. trivial mitral regurg. Evidence of moderate (grade II) diastolic dysfunction seen.  History of echocardiogram 06/03/2019    EF of 50-55%.  LV wall thickness is mildly increased. LA is mildly dilated (29-33) with a left atrial volume index of 31 m1/m2. mild diastolic dysfunction.  Hyperlipidemia     Hypertension        Past Surgical History:        Procedure Laterality Date    CARDIAC CATHETERIZATION Left 2017    right radial, MHT Dr. Keely Acosta         Social History:   Social History     Socioeconomic History    Marital status:       Spouse name: Not on file    Number of children: Not on file    Years of education: Not on file    Highest education level: Not on file   Occupational History    Not on file   Social Needs    Financial resource strain: Not on file    Food insecurity:     Worry: Not on file     Inability: Not on file    Transportation needs:     Medical: Not on file     Non-medical: Not on file   Tobacco Use    Smoking status: Never Smoker    Smokeless tobacco: Never Used   Substance and Sexual Activity    Alcohol use: No    Drug use: No    Sexual activity: Not Currently   Lifestyle    Physical activity:     Days per week: Not on file     Minutes per session: Not on file    Stress: Not on file   Relationships    Social connections:     Talks on phone: Not on file     Gets together: Not on file     Attends Jainism service: Not on file     Active member of club or organization: Not on file     Attends meetings of clubs or organizations: Not on file     Relationship status: Not on file    Intimate partner violence:     Fear of current or ex partner: Not on file     Emotionally abused: Not on file     Physically abused: Not on file     Forced sexual activity: Not on file   Other Topics Concern    Not on file   Social History Narrative    Not on file       Family History:       Problem Relation Age of Onset    Coronary Art Dis Father          from [facility] COPD Sister         O2 dep    Coronary Art Dis Brother         2 brothers  from [place]       Allergies:  Patient has no known allergies. Home Medications:  Prior to Admission medications    Medication Sig Start Date End Date Taking? Authorizing Provider   metFORMIN (GLUCOPHAGE) 500 MG tablet Take 500 mg by mouth 2 times daily (with meals)   Yes Historical Provider, MD   losartan (COZAAR) 100 MG tablet Take 1 tablet by mouth daily 8/28/19  Yes Grazyna Zhang MD   amLODIPine (NORVASC) 10 MG tablet Take 1 tablet by mouth daily 7/17/19  Yes Grazyna Zhang MD   furosemide (LASIX) 80 MG tablet Take 1 tablet by mouth daily 6/12/19  Yes Grazyna Zhang MD   carvedilol (COREG) 12.5 MG tablet Take 1 tablet by mouth 2 times daily (with meals) 2/13/19  Yes Grazyna Zhang MD       Current Medications:   No current facility-administered medications for this encounter. REVIEW OF SYSTEMS:       CONSTITUTIONAL: negative for fatigue and malaise   EYES: negative for double vision and photophobia    HEENT: negative for tinnitus and sore throat   RESPIRATORY: negative for cough, shortness of breath   CARDIOVASCULAR: negative for chest pain, palpitations   GASTROINTESTINAL: negative for nausea, vomiting   GENITOURINARY: negative for incontinence   MUSCULOSKELETAL: negative for neck or back pain   NEUROLOGICAL: negative for seizures   PSYCHIATRIC: negative for fatigue     Review of systems otherwise negative. PHYSICAL EXAM:       BP (!) 166/89   Pulse 57   Temp 97.8 °F (36.6 °C) (Oral)   Resp 16   Ht 5' 4\" (1.626 m)   Wt 215 lb (97.5 kg)   SpO2 98%   BMI 36.90 kg/m²     CONSTITUTIONAL:  Well developed, well nourished, alert and oriented x 3, in no acute distress. GCS 15, nontoxic.  No dysarthria, no aphasia   HEAD:  normocephalic, atraumatic    EYES:  PERRLA, EOMI.   ENT:  moist mucous membranes   NECK:  supple, symmetric, no midline tenderness to palpation    BACK:  without midline tenderness, step-offs or deformities    LUNGS:  Equal air entry bilaterally   CARDIOVASCULAR:  normal s1 / s2   ABDOMEN:  Soft, no rigidity   NEUROLOGIC:  Mental Status: A & O x3,awake             Cranial Nerves:    cranial nerves II-XII are grossly intact    Motor Exam:    Drift:  absent  Tone:  normal    Motor exam is symmetrical 5 out of 5, except LUE 4+/5    Sensory:    Touch:    Right Upper Extremity:  normal  Left Upper Extremity:  normal  Right Lower Extremity:  normal  Left Lower Extremity:  normal    Deep Tendon Reflexes:    Right Bicep:  2+  Left Bicep:  2+  Right Knee:  2+  Left Knee:  2+      Clonus:  absent      Coordination/Dysmetria:  Finger to Nose:   Right:  normal  Left:  normal   Dysdiadochokinesia:  absent        INITIAL NIH STROKE SCALE:    Zero         SKIN:  no rash      Modified Falls Score Scale:     [] Zero: No symptoms at all   [x] 1: No significant disability despite symptoms; able to carry out all usual duties and activities   [] 2: Slight disability; unable to carry out all previous activities, but able to look after own affairs without assistance   [] 3:Moderate disability; requiring some help, but able to walk without assistance   [] 4: Moderately severe disability; unable to walk and attend to bodily needs without assistance   [] 5:Severe disability; bedridden, incontinent and requiring constant nursing care and attention    LABS AND IMAGING:     CBC with Differential:    Lab Results   Component Value Date    WBC 6.4 12/22/2019    RBC 4.07 12/22/2019    HGB 12.3 12/22/2019    HCT 38.4 12/22/2019     12/22/2019    MCV 94.3 12/22/2019    MCH 30.2 12/22/2019    MCHC 32.0 12/22/2019    RDW 12.8 12/22/2019    LYMPHOPCT 26 12/22/2019    MONOPCT 10 12/22/2019    BASOPCT 1 12/22/2019    MONOSABS 0.65 12/22/2019    LYMPHSABS 1.64 12/22/2019    EOSABS 0.11 12/22/2019    BASOSABS 0.04 12/22/2019    DIFFTYPE NOT REPORTED 12/22/2019     BMP:    Lab Results   Component Value Date    NA PENDING 12/22/2019    K PENDING 12/22/2019    CL PENDING 12/22/2019    CO2 PENDING 12/22/2019    BUN PENDING 12/22/2019    LABALBU 4.0 12/22/2019    CREATININE PENDING 12/22/2019    CALCIUM PENDING 12/22/2019    GFRAA PENDING 12/22/2019    LABGLOM PENDING 12/22/2019    GLUCOSE PENDING 12/22/2019       Radiology Review:    1.) CT brain without contrast: Encephalomalaciae involving cerebellum bilaterally, right frontal lobe. 2.) CTA Head/Neck: No acute arterial abnormality or hemodynamically significant arterial  stenosis in the head or neck. 3.) Brain MRI W/O: Ordered     ASSESSMENT AND PLAN:       Patient Active Problem List   Diagnosis    Hypertension    Hyperlipidemia    Acute on chronic systolic [facility]congestive [facility] failure) ([facility])    Hypertensive urgency    [facility] [address]    Hypertensive emergency    Chronic combined systolic and diastolic HF (heart failure), NYHA class 2 ([facility])    Acute on chronic combined systolic and diastolic CHF, NYHA class 4 ([facility])    Hypoxia    Severe mitral regurgitation by prior echocardiogram    Morbid obesity ([facility])    CKD (chronic kidney disease) stage 3, GFR 30-59 ml/min ([facility])    Physical deconditioning    Transient ischemic attack       Assessment                 81 y.o. female who presents with transient dysarthria and left sided facial droop most likely due to a transient ischemic attack      ABCD2 Score  (Estimate Risk of Stroke after TIA)   POINTS   Age    < 60   ? 60     [] 0  [x] 1   BP:     SBP <140 or DBP < 90   SBP ? 140 or DBP ? 90       [] 0  [x] 1   Clinical Features of TIA     Other Symptoms                 Speech Disturbances W/O Weakness   Unilateral Weakness     [] 0  [x] 1  [] 2   Duration of symptoms     < 10 Minutes                                     10-59 Minutes   ?  61 Minutes     [] 0  [x] 1  [] 2   Diabetes     No                    Yes     [] 0  [x] 1   TOTAL 5   0-3 Points: Low Risk -> Work up could be done OPD   2-Day Stroke Risk: 1%   7- Day Stroke Risk: 1.2%   90 Days Stroke Risk: 3.1%    4-5 Points: Moderate Risk    2-Day Stroke Risk: 4.1%   7- Day Stroke Risk: 5.9%    90 Days Stroke Risk: 9.8%    6-7 Points: High Risk -> Warrant Admission for Workup   2-Day Stroke Risk: 8.1%   7- Day Stroke Risk: 11.7%   90 Days Stroke Risk: 17.8%      Candidate for IV tPA therapy     Yes []     No  [x] due to the following exclusion criteria: symptoms resolved    Candidate for Thrombectomy    Yes []      No [x] due to the following exclusion criteria: symptoms resolved                  -We will admit to the hospital under neurology service for close monitoring giving high risk of recurrence of TIA/stroke and for complete stroke work-up    -MRI brain without contrasy              - CTA H/N               - Continue aspirin 81 mg daily               - Continue Plavix 75 mg daily               - Neurochecks               - SBP goal 140-180              - Keep serum glucose < 180              - Atorvastatin 80 mg HS              - Lipid panel               - Hb A1C              - Echocardiogram                Additional recommendations may follow    Please contact [facility] with any changes in patients neurologic status. Discussed with Dr. Colette Sutherland    Thank you for your consult.        John Velazquez MD  Neurology Resident PGY-2  12/22/2019 at 7:33 PM

## 2020-01-16 NOTE — ED PROVIDER NOTES
[address] [facility]  eMERGENCY dEPARTMENT eNCOUnter   Attending Attestation     Pt Name: Faiza Gaona  MRN: [id number]  Donnagfmarian 1950  Date of evaluation: 1/16/20       Faiza Gaona is a 81 y.o. female who presents with Transient Ischemic Attack (Pt is a transfer from [place] [address], aphasia, slurrled speech, and dizziness for about 10 min.)    Pt transferred here from outside facility for stroke workup. Pt currently has no symptoms. Stroke team made aware. Pt had speech symptoms for about ten minutes with dizziness. I performed a history and physical examination of the patient and discussed management with the resident. I reviewed the residents note and agree with the documented findings and plan of care. Any areas of disagreement are noted on the chart. I was personally present for the key portions of any procedures. I have documented in the chart those procedures where I was not present during the key portions. I have personally reviewed all images and agree with the resident's interpretation. I have reviewed the emergency nurses triage note. I agree with the chief complaint, past medical history, past surgical history, allergies, medications, social and family history as documented unless otherwise noted below. Documentation of the HPI, Physical Exam and Medical Decision Making performed by medical students or scribes is based on my personal performance of the HPI, PE and MDM. For Phys Assistant/ Nurse Practitioner cases/documentation I have had a face to face evaluation of this patient and have completed at least one if not all key elements of the E/M (history, physical exam, and MDM). Additional findings are as noted. For APC cases I have personally evaluated and examined the patient in conjunction with the APC and agree with the treatment plan and disposition of the patient as recorded by the APC.     Jocelyne Burrell MD  Attending Emergency  Physician        Aidee Miranda MD  01/16/20 0407

## 2020-01-22 ENCOUNTER — OFFICE VISIT (OUTPATIENT)
Dept: CARDIOLOGY | Age: 70
End: 2020-01-22
Payer: MEDICARE

## 2020-01-22 VITALS
OXYGEN SATURATION: 97 % | BODY MASS INDEX: 35.75 KG/M2 | RESPIRATION RATE: 18 BRPM | HEIGHT: 64 IN | WEIGHT: 209.4 LBS | SYSTOLIC BLOOD PRESSURE: 180 MMHG | DIASTOLIC BLOOD PRESSURE: 104 MMHG | HEART RATE: 68 BPM

## 2020-01-22 PROCEDURE — 1036F TOBACCO NON-USER: CPT | Performed by: FAMILY MEDICINE

## 2020-01-22 PROCEDURE — G8427 DOCREV CUR MEDS BY ELIG CLIN: HCPCS | Performed by: FAMILY MEDICINE

## 2020-01-22 PROCEDURE — G8400 PT W/DXA NO RESULTS DOC: HCPCS | Performed by: FAMILY MEDICINE

## 2020-01-22 PROCEDURE — 99214 OFFICE O/P EST MOD 30 MIN: CPT | Performed by: FAMILY MEDICINE

## 2020-01-22 PROCEDURE — G8484 FLU IMMUNIZE NO ADMIN: HCPCS | Performed by: FAMILY MEDICINE

## 2020-01-22 PROCEDURE — 1111F DSCHRG MED/CURRENT MED MERGE: CPT | Performed by: FAMILY MEDICINE

## 2020-01-22 PROCEDURE — 4040F PNEUMOC VAC/ADMIN/RCVD: CPT | Performed by: FAMILY MEDICINE

## 2020-01-22 PROCEDURE — 3017F COLORECTAL CA SCREEN DOC REV: CPT | Performed by: FAMILY MEDICINE

## 2020-01-22 PROCEDURE — G8417 CALC BMI ABV UP PARAM F/U: HCPCS | Performed by: FAMILY MEDICINE

## 2020-01-22 PROCEDURE — 1123F ACP DISCUSS/DSCN MKR DOCD: CPT | Performed by: FAMILY MEDICINE

## 2020-01-22 PROCEDURE — 1090F PRES/ABSN URINE INCON ASSESS: CPT | Performed by: FAMILY MEDICINE

## 2020-01-22 RX ORDER — LOSARTAN POTASSIUM AND HYDROCHLOROTHIAZIDE 25; 100 MG/1; MG/1
1 TABLET ORAL DAILY
Qty: 90 TABLET | Refills: 3 | Status: SHIPPED | OUTPATIENT
Start: 2020-01-22 | End: 2020-07-20 | Stop reason: SDUPTHER

## 2020-01-22 NOTE — PROGRESS NOTES
Jay Khan am scribing for and in the presence of Terell Benz. Jemal VILLALOBOS, MS, F.A.C.C. Patient: Gloria Miramontes  : 1950  Date of Visit: 2020    REASON FOR VISIT / CONSULTATION: Follow-up (HX:CHF,HTN,valve heart disease. Pt is here for follow up she states she is feeling well. She was in ER on  then transferred to [facility] for TIA she was diagnosed Diabetes. She lost some weight. Denies: CP,SOB,lightheaded/dizziness,palpitations)      Dear Glynis Severin, APRN - CNP,    HPI: Ms. Nick Garcia is a 79 y.o. female who was admitted to the hospital with worsening shortness of breath. This has gotten worse over the past three months. She has a cardiac history of abnormal echocardiogram which showed that her ejection fraction was reduced to about 40-45%. And a heart catheterization that was relatively unremarkable. Fortunately her repeat echocardiogram in 2018 and in 2019 showed her ejection fraction increased from 45-50% to 55% as well as showing only trivial mitral regurgitation. She had an ECHO on 6/3/2019 that showed EF of 50-55%. LV wall thickness is mildly increased. LA is mildly dilated (29-33) with a left atrial volume index of 31 m1/m2. mild diastolic dysfunction. Since the last time I saw Ms. Nick Garcia she reports coming to the ER on 2019 for slurred speech. She was than taken to [facility] due to a transient ischemic attack. She was recently diagnosed with diabetes. She states since than she has been feeling good. She denied any chest pain, abdominal pain, bleeding problems, lightheaded/dizziness, or problems with her medications or any other concerns at this time. Bleeding Risks: Ms. Nick Garcia denies any current or recent bleeding problems including a history of a GI bleed, ulcers, recent or upcoming surgeries, blood in her stool or black tarry stools or blood in her urine.      Past Medical History:   Diagnosis Date    CHF (congestive heart failure) ([address]     H/O cardiac catheterization 08/04/2017    LMCA: Mild irregularites 10-20%. LAD: Mild irregularites 10-20%. LCx: Mild irregularites 10-20%. RCA: Mild irregularites 10-20%. LV function assessed as : Abnormal. EF of 40%.  H/O echocardiogram 12/18/2018    EF 55%. Mildly increased LV wall thickness. The left atrium appears moderately to severely dilated. Moderate to severe mitral regurg. Moderate pulmonary HTN with an estimated RV systolic pressure of 91OBBM. Moderate tricuspid regurg. Evidnece fo moderate diastolic dysfunction is seen.  History of echocardiogram 01/17/2019    EF 55%. LV wall thickness moderately increased. LA is mildly dilated w/ LA volume index of 30. trivial mitral regurg. Evidence of moderate (grade II) diastolic dysfunction seen.  History of echocardiogram 06/03/2019    EF of 50-55%. LV wall thickness is mildly increased. LA is mildly dilated (29-33) with a left atrial volume index of 31 m1/m2. mild diastolic dysfunction.  Hyperlipidemia     Hypertension        CURRENT ALLERGIES: Patient has no known allergies. REVIEW OF SYSTEMS: 14 systems were reviewed. Pertinent positives and negatives as above, all else negative.      Past Surgical History:   Procedure Laterality Date    CARDIAC CATHETERIZATION Left 08/04/2017    right radial, MHT Dr. Wheat Section      Social History:  Social History     Tobacco Use    Smoking status: Never Smoker    Smokeless tobacco: Never Used   Substance Use Topics    Alcohol use: No    Drug use: No        CURRENT MEDICATIONS:  Outpatient Medications Marked as Taking for the 1/22/20 encounter (Office Visit) with Chloe Garcia MD   Medication Sig Dispense Refill    aspirin 81 MG EC tablet Take 1 tablet by mouth daily 30 tablet 3    atorvastatin (LIPITOR) 80 MG tablet Take 1 tablet by mouth nightly 30 tablet 3    clopidogrel (PLAVIX) 75 MG tablet Take 1 tablet by mouth daily 20 tablet 0    metFORMIN (GLUCOPHAGE) 500 MG tablet Take 500 mg by mouth 2 times daily (with meals)      losartan (COZAAR) 100 MG tablet Take 1 tablet by mouth daily 90 tablet 1    amLODIPine (NORVASC) 10 MG tablet Take 1 tablet by mouth daily 90 tablet 3    carvedilol (COREG) 12.5 MG tablet Take 1 tablet by mouth 2 times daily (with meals) 180 tablet 3       FAMILY HISTORY: family history includes COPD in her sister; Coronary Art Dis in her brother and father. PHYSICAL EXAM:   BP (!) 179/103 (Site: Right Upper Arm, Position: Sitting, Cuff Size: Medium Adult)   Pulse 69   Resp 18   Ht 5' 4\" (1.626 m)   Wt 209 lb 6.4 oz (95 kg)   SpO2 97%   BMI 35.94 kg/m²  Body mass index is 35.94 kg/m². Constitutional: She is oriented to person, place, and time. She appears well-developed and well-nourished. In no acute distress. HEENT: Normocephalic and atraumatic. No JVD present. Carotid bruit is not present. No mass and no thyromegaly present. No lymphadenopathy present. Cardiovascular: Normal rate, regular rhythm, normal heart sounds. Exam reveals no gallop and no friction rubs. 1/6 systolic murmur, 5th intercostal space on the LEFT in the mid-clavicular line (cardiac apex). Pulmonary/Chest: Effort normal and breath sounds normal. No respiratory distress. She has no wheezes, rhonchi or rales. Abdominal: Soft, non-tender. Bowel sounds and aorta are normal. She exhibits no organomegaly, mass or bruit. Extremities: Trace-1+ 1/2 up to the knees left worse than right. No cyanosis or clubbing. 2+ radial and carotid pulses. Distal extremity pulses: 2+ bilaterally. Neurological: She is alert and oriented to person, place, and time. No evidence of gross cranial nerve deficit. Coordination appeared normal.   Skin: Skin is warm and dry. There is no rash or diaphoresis. Psychiatric: She has a normal mood and affect.  Her speech is normal and behavior is normal.      MOST RECENT LABS ON RECORD:   Lab Results   Component Value Date    WBC 5.7 12/23/2019    HGB 11.5 (L) 12/23/2019    HCT 36.1 (L)

## 2020-02-13 ENCOUNTER — CARE COORDINATION (OUTPATIENT)
Dept: CARE COORDINATION | Age: 70
End: 2020-02-13

## 2020-07-20 ENCOUNTER — OFFICE VISIT (OUTPATIENT)
Dept: CARDIOLOGY | Age: 70
End: 2020-07-20
Payer: MEDICARE

## 2020-07-20 VITALS
SYSTOLIC BLOOD PRESSURE: 121 MMHG | WEIGHT: 192 LBS | DIASTOLIC BLOOD PRESSURE: 79 MMHG | RESPIRATION RATE: 18 BRPM | BODY MASS INDEX: 32.78 KG/M2 | HEIGHT: 64 IN | HEART RATE: 55 BPM | OXYGEN SATURATION: 96 %

## 2020-07-20 PROBLEM — I63.9 CRYPTOGENIC STROKE (HCC): Status: ACTIVE | Noted: 2020-07-20

## 2020-07-20 PROCEDURE — G8427 DOCREV CUR MEDS BY ELIG CLIN: HCPCS | Performed by: FAMILY MEDICINE

## 2020-07-20 PROCEDURE — 1123F ACP DISCUSS/DSCN MKR DOCD: CPT | Performed by: FAMILY MEDICINE

## 2020-07-20 PROCEDURE — 3017F COLORECTAL CA SCREEN DOC REV: CPT | Performed by: FAMILY MEDICINE

## 2020-07-20 PROCEDURE — 4040F PNEUMOC VAC/ADMIN/RCVD: CPT | Performed by: FAMILY MEDICINE

## 2020-07-20 PROCEDURE — 1090F PRES/ABSN URINE INCON ASSESS: CPT | Performed by: FAMILY MEDICINE

## 2020-07-20 PROCEDURE — G8417 CALC BMI ABV UP PARAM F/U: HCPCS | Performed by: FAMILY MEDICINE

## 2020-07-20 PROCEDURE — 99211 OFF/OP EST MAY X REQ PHY/QHP: CPT | Performed by: FAMILY MEDICINE

## 2020-07-20 PROCEDURE — 99214 OFFICE O/P EST MOD 30 MIN: CPT | Performed by: FAMILY MEDICINE

## 2020-07-20 PROCEDURE — 1036F TOBACCO NON-USER: CPT | Performed by: FAMILY MEDICINE

## 2020-07-20 PROCEDURE — G8400 PT W/DXA NO RESULTS DOC: HCPCS | Performed by: FAMILY MEDICINE

## 2020-07-20 RX ORDER — CARVEDILOL 12.5 MG/1
12.5 TABLET ORAL 2 TIMES DAILY WITH MEALS
Qty: 180 TABLET | Refills: 3 | Status: ON HOLD
Start: 2020-07-20 | End: 2020-09-18 | Stop reason: HOSPADM

## 2020-07-20 RX ORDER — LOSARTAN POTASSIUM AND HYDROCHLOROTHIAZIDE 25; 100 MG/1; MG/1
1 TABLET ORAL DAILY
Qty: 90 TABLET | Refills: 3 | Status: ON HOLD
Start: 2020-07-20 | End: 2020-09-18 | Stop reason: HOSPADM

## 2020-07-20 RX ORDER — ASPIRIN 81 MG/1
81 TABLET ORAL DAILY
Qty: 90 TABLET | Refills: 3 | Status: ON HOLD
Start: 2020-07-20 | End: 2020-09-18 | Stop reason: HOSPADM

## 2020-07-20 RX ORDER — ATORVASTATIN CALCIUM 80 MG/1
80 TABLET, FILM COATED ORAL NIGHTLY
Qty: 90 TABLET | Refills: 3 | Status: SHIPPED | OUTPATIENT
Start: 2020-07-20 | End: 2021-07-21

## 2020-07-20 RX ORDER — AMLODIPINE BESYLATE 10 MG/1
10 TABLET ORAL DAILY
Qty: 90 TABLET | Refills: 3 | Status: ON HOLD
Start: 2020-07-20 | End: 2020-09-18 | Stop reason: HOSPADM

## 2020-07-20 NOTE — PROGRESS NOTES
Cait Pulido am scribing for and in the presence of Seema Myrick. Jemal VILLALOBOS, MS, F.A.C.C. Patient: Theta Mcardle  : 1950  Date of Visit: 2020    REASON FOR VISIT / CONSULTATION: Follow-up (HX: CHF, TIA, Severe Mitral Reg, HTN. Pt states she is doing well. Denies: Lightaeded/dizziness, CP, Palpitaiotns, SOB. )      Dear Irene Ward, APRN - CNP,    HPI: Ms. Annelise Clark is a 79 y.o. female who was admitted to the hospital with worsening shortness of breath. This has gotten worse over the past three months. She has a cardiac history of abnormal echocardiogram which showed that her ejection fraction was reduced to about 40-45%. And a heart catheterization that was relatively unremarkable. Fortunately her repeat echocardiogram in 2018 and in 2019 showed her ejection fraction increased from 45-50% to 55% as well as showing only trivial mitral regurgitation. She had an ECHO on 6/3/2019 that showed EF of 50-55%. LV wall thickness is mildly increased. LA is mildly dilated (29-33) with a left atrial volume index of 31 m1/m2. mild diastolic dysfunction. She came to the ER on 2019 for slurred speech. She was than taken to [facility].  due to a transient ischemic attack. Since the last time I saw Ms. Ragland she reports doing well. She states she has been feeling good and has been able to do everything she wants to do with no limitations. She denied any chest pain, abdominal pain, bleeding problems, lightheaded/dizziness, or problems with her medications or any other concerns at this time. Bleeding Risks: Ms. Annelise Clark denies any current or recent bleeding problems including a history of a GI bleed, ulcers, recent or upcoming surgeries, blood in her stool or black tarry stools or blood in her urine. Past Medical History:   Diagnosis Date    CHF (congestive heart failure) [address] [address]     H/O cardiac catheterization 2017    LMCA: Mild irregularites 10-20%. LAD: Mild irregularites 10-20%.  LCx: Mild irregularites 10-20%. RCA: Mild irregularites 10-20%. LV function assessed as : Abnormal. EF of 40%.  H/O echocardiogram 12/18/2018    EF 55%. Mildly increased LV wall thickness. The left atrium appears moderately to severely dilated. Moderate to severe mitral regurg. Moderate pulmonary HTN with an estimated RV systolic pressure of 75EKBK. Moderate tricuspid regurg. Evidnece fo moderate diastolic dysfunction is seen.  History of echocardiogram 01/17/2019    EF 55%. LV wall thickness moderately increased. LA is mildly dilated w/ LA volume index of 30. trivial mitral regurg. Evidence of moderate (grade II) diastolic dysfunction seen.  History of echocardiogram 06/03/2019    EF of 50-55%. LV wall thickness is mildly increased. LA is mildly dilated (29-33) with a left atrial volume index of 31 m1/m2. mild diastolic dysfunction.  Hyperlipidemia     Hypertension        CURRENT ALLERGIES: Patient has no known allergies. REVIEW OF SYSTEMS: 14 systems were reviewed. Pertinent positives and negatives as above, all else negative.      Past Surgical History:   Procedure Laterality Date    CARDIAC CATHETERIZATION Left 08/04/2017    right radial, MHT Dr. Hilda Freed      Social History:  Social History     Tobacco Use    Smoking status: Never Smoker    Smokeless tobacco: Never Used   Substance Use Topics    Alcohol use: No    Drug use: No        CURRENT MEDICATIONS:  Outpatient Medications Marked as Taking for the 7/20/20 encounter (Office Visit) with James Campbell MD   Medication Sig Dispense Refill    losartan-hydrochlorothiazide (HYZAAR) 100-25 MG per tablet Take 1 tablet by mouth daily 90 tablet 3    aspirin 81 MG EC tablet Take 1 tablet by mouth daily 30 tablet 3    atorvastatin (LIPITOR) 80 MG tablet Take 1 tablet by mouth nightly 30 tablet 3    metFORMIN (GLUCOPHAGE) 500 MG tablet Take 500 mg by mouth 2 times daily (with meals)      amLODIPine (NORVASC) 10 MG tablet Take 1 tablet by mouth daily 90 tablet 3    carvedilol (COREG) 12.5 MG tablet Take 1 tablet by mouth 2 times daily (with meals) 180 tablet 3       FAMILY HISTORY: family history includes COPD in her sister; Coronary Art Dis in her brother and father. PHYSICAL EXAM:   /79 (Site: Left Upper Arm, Position: Sitting, Cuff Size: Large Adult)   Pulse 55   Resp 18   Ht 5' 4\" (1.626 m)   Wt 192 lb (87.1 kg)   SpO2 96%   BMI 32.96 kg/m²  Body mass index is 32.96 kg/m². Constitutional: She is oriented to person, place, and time. She appears well-developed and well-nourished. In no acute distress. HEENT: Normocephalic and atraumatic. No JVD present. Carotid bruit is not present. No mass and no thyromegaly present. No lymphadenopathy present. Cardiovascular: Normal rate, regular rhythm, normal heart sounds. Exam reveals no gallop and no friction rubs. 3/6 systolic murmur, 5th intercostal space on the LEFT in the mid-clavicular line (cardiac apex). Pulmonary/Chest: Effort normal and breath sounds normal. No respiratory distress. She has no wheezes, rhonchi or rales. Abdominal: Soft, non-tender. Bowel sounds and aorta are normal. She exhibits no organomegaly, mass or bruit. Extremities: Trace. No cyanosis or clubbing. 2+ radial and carotid pulses. Distal extremity pulses: 2+ bilaterally. Compression stockings in place. Neurological: She is alert and oriented to person, place, and time. No evidence of gross cranial nerve deficit. Coordination appeared normal.   Skin: Skin is warm and dry. There is no rash or diaphoresis. Psychiatric: She has a normal mood and affect.  Her speech is normal and behavior is normal.      MOST RECENT LABS ON RECORD:   Lab Results   Component Value Date    WBC 5.7 12/23/2019    HGB 11.5 (L) 12/23/2019    HCT 36.1 (L) 12/23/2019     12/23/2019    CHOL 172 12/23/2019    TRIG 175 (H) 12/23/2019    HDL 33 (L) 12/23/2019    ALT 28 12/22/2019    AST 25 12/22/2019     12/22/2019    K 4.3 12/22/2019     12/22/2019    CREATININE 0.79 12/22/2019    BUN 18 12/22/2019    CO2 22 12/22/2019    TSH 1.14 08/01/2017    INR 0.9 12/22/2019    LABA1C 13.1 (H) 12/23/2019       ASSESSMENT:  1. TIA (transient ischemic attack)    2. Chronic combined systolic and diastolic HF (heart failure), NYHA class 2 ([address]    3. Essential hypertension    4. Severe mitral regurgitation by prior echocardiogram    5. Mixed hyperlipidemia       PLAN:   History of TIA/Cryptogenic stroke: Largely Resolved   Antiplatelet Agent: Continue Aspirin 81 mg daily.  Beta Blocker: Continue Carvedilol (Coreg) 12.5 mg twice daily.  Anti-anginal medications: Continue amlodipine (Norvasc) 10 mg once daily.  Cholesterol Reduction Therapy: Continue Atorvastatin (Lipitor) 80 mg daily.  Additional Testing List: We discussed multiple testing and treatment options including a watch and wait approach, starting medication such as a beta blocker or calcium channel blocker in order to potentially decrease frequency and/or severity of symptoms, doing a 30 day event monitor, or the possibility of placing a iSSimple LINQ loop recorder. After discussing the risks and benefits of the different options, The patient said that they would prefer to think about this which I think is fine. She promised to call if she decided to proceed forward. Chronic Systolic and Diastolic Heart Failure: EF of 50-55% on 6/3/19  Beta Blocker: Continue carvedilol (Coreg) 12.5 mg twice daily. ACE Inibitor/ARB: Continue losartan/HCTZ (Hyzaar) 100/ 25 mg once daily. Nonpharmacologic management of Heart Failure: I told her to continue wearing lower extremity compression stockings and I advised her to try and keep her legs up whenever possible and to limit salt in her diet. Essential Hypertension: Controlled  · ACE Inibitor/ARB: Continue losartan/HCTZ (Hyzaar) 100/ 25 mg once daily.     · Beta Blocker: Continue carvedilol (Coreg)       · Calcium Channel Blocker: Continue amlodipine (Norvasc) 10 mg once daily. · History of Severe Mitral Regurgitation: probably just functional as her echo on 6/19 showed only trivial MR. · Beta Blocker: Continue Carvedilol (Coreg) 12.5 mg twice daily. · ACE Inibitor/ARB: Continue losartan/HCTZ (Hyzaar) 100/25 mg every morning. · Hyperlipidemia: Mixed, LDL done on 12/2019 was 104 mg/dL   · Cholesterol Reduction Therapy: Continue Atorvastatin (Lipitor) 80 mg daily. Finally, I recommended that she continue her other medications and follow up with you as previously scheduled. FOLLOW UP:   I told Ms. Ragland to call my office if she had any problems, but otherwise told her to Return in about 1 year (around 7/20/2021). However, I would be happy to see her sooner should the need arise. However, I would be happy to see her sooner should the need arise. Once again, thank you for allowing me to participate in this patients care. Please do not hesitate to contact me could I be of further assistance. Sincerely,  Sandy Jessica. Jemal VILLALOBOS, MS, F.A.C.C. [facility]  [address]  Phone: [phone], Fax: [phone]      I believe that the risk of significant morbidity and mortality related to the patient's current medical conditions are: Intermediate. The documentation recorded by the scribe, accurately and completely reflects the services I personally performed and the decisions made by me. Ramón Freed MD, MS, F.A.C.C.  July 20, 2020

## 2020-07-20 NOTE — PATIENT INSTRUCTIONS
SURVEY:    You may be receiving a survey from [employer] regarding your visit today. Please complete the survey to enable us to provide the highest quality of care to you and your family. If you cannot score us a very good on any question, please call the office to discuss how we could have made your experience a very good one. Thank you.

## 2020-08-23 ENCOUNTER — HOSPITAL ENCOUNTER (INPATIENT)
Age: 70
LOS: 3 days | Discharge: NURSING, REHAB OR LONG-TERM CARE | DRG: 041 | End: 2020-08-26
Attending: EMERGENCY MEDICINE | Admitting: PSYCHIATRY & NEUROLOGY
Payer: MEDICARE

## 2020-08-23 ENCOUNTER — APPOINTMENT (OUTPATIENT)
Dept: CT IMAGING | Age: 70
End: 2020-08-23
Payer: MEDICARE

## 2020-08-23 ENCOUNTER — HOSPITAL ENCOUNTER (EMERGENCY)
Age: 70
Discharge: ANOTHER HOSPITAL | End: 2020-08-23
Attending: EMERGENCY MEDICINE
Payer: MEDICARE

## 2020-08-23 VITALS
TEMPERATURE: 97.6 F | SYSTOLIC BLOOD PRESSURE: 187 MMHG | HEART RATE: 66 BPM | OXYGEN SATURATION: 98 % | BODY MASS INDEX: 32.78 KG/M2 | WEIGHT: 192 LBS | DIASTOLIC BLOOD PRESSURE: 82 MMHG | RESPIRATION RATE: 12 BRPM | HEIGHT: 64 IN

## 2020-08-23 LAB
-: ABNORMAL
ABSOLUTE EOS #: 0.09 K/UL (ref 0–0.44)
ABSOLUTE IMMATURE GRANULOCYTE: <0.03 K/UL (ref 0–0.3)
ABSOLUTE LYMPH #: 1.15 K/UL (ref 1.1–3.7)
ABSOLUTE MONO #: 0.49 K/UL (ref 0.1–1.2)
ALBUMIN SERPL-MCNC: 4.1 G/DL (ref 3.5–5.2)
ALBUMIN/GLOBULIN RATIO: 1.2 (ref 1–2.5)
ALP BLD-CCNC: 154 U/L (ref 35–104)
ALT SERPL-CCNC: 15 U/L (ref 5–33)
AMORPHOUS: ABNORMAL
AMPHETAMINE SCREEN URINE: NEGATIVE
ANION GAP SERPL CALCULATED.3IONS-SCNC: 10 MMOL/L (ref 9–17)
AST SERPL-CCNC: 17 U/L
BACTERIA: ABNORMAL
BARBITURATE SCREEN URINE: NEGATIVE
BASOPHILS # BLD: 1 % (ref 0–2)
BASOPHILS ABSOLUTE: 0.03 K/UL (ref 0–0.2)
BENZODIAZEPINE SCREEN, URINE: NEGATIVE
BILIRUB SERPL-MCNC: 0.45 MG/DL (ref 0.3–1.2)
BILIRUBIN URINE: NEGATIVE
BUN BLDV-MCNC: 19 MG/DL (ref 8–23)
BUN/CREAT BLD: 21 (ref 9–20)
BUPRENORPHINE URINE: NEGATIVE
CALCIUM SERPL-MCNC: 10.2 MG/DL (ref 8.6–10.4)
CANNABINOID SCREEN URINE: NEGATIVE
CASTS UA: ABNORMAL /LPF
CHLORIDE BLD-SCNC: 99 MMOL/L (ref 98–107)
CHP ED QC CHECK: YES
CO2: 25 MMOL/L (ref 20–31)
COCAINE METABOLITE, URINE: NEGATIVE
COLOR: YELLOW
COMMENT UA: ABNORMAL
CREAT SERPL-MCNC: 0.9 MG/DL (ref 0.5–0.9)
CRYSTALS, UA: ABNORMAL /HPF
DIFFERENTIAL TYPE: ABNORMAL
EKG ATRIAL RATE: 64 BPM
EKG P AXIS: 52 DEGREES
EKG P-R INTERVAL: 196 MS
EKG Q-T INTERVAL: 426 MS
EKG QRS DURATION: 102 MS
EKG QTC CALCULATION (BAZETT): 439 MS
EKG R AXIS: 9 DEGREES
EKG T AXIS: 98 DEGREES
EKG VENTRICULAR RATE: 64 BPM
EOSINOPHILS RELATIVE PERCENT: 1 % (ref 1–4)
EPITHELIAL CELLS UA: ABNORMAL /HPF (ref 0–25)
GFR AFRICAN AMERICAN: >60 ML/MIN
GFR NON-AFRICAN AMERICAN: >60 ML/MIN
GFR SERPL CREATININE-BSD FRML MDRD: ABNORMAL ML/MIN/{1.73_M2}
GFR SERPL CREATININE-BSD FRML MDRD: ABNORMAL ML/MIN/{1.73_M2}
GLUCOSE BLD-MCNC: 140 MG/DL
GLUCOSE BLD-MCNC: 140 MG/DL (ref 74–100)
GLUCOSE BLD-MCNC: 175 MG/DL (ref 70–99)
GLUCOSE URINE: NEGATIVE
HCT VFR BLD CALC: 44.5 % (ref 36.3–47.1)
HEMOGLOBIN: 14.6 G/DL (ref 11.9–15.1)
IMMATURE GRANULOCYTES: 0 %
INR BLD: 1
KETONES, URINE: NEGATIVE
LEUKOCYTE ESTERASE, URINE: ABNORMAL
LYMPHOCYTES # BLD: 18 % (ref 24–43)
MCH RBC QN AUTO: 29.4 PG (ref 25.2–33.5)
MCHC RBC AUTO-ENTMCNC: 32.8 G/DL (ref 28.4–34.8)
MCV RBC AUTO: 89.7 FL (ref 82.6–102.9)
MDMA URINE: NORMAL
METHADONE SCREEN, URINE: NEGATIVE
METHAMPHETAMINE, URINE: NEGATIVE
MONOCYTES # BLD: 8 % (ref 3–12)
MUCUS: ABNORMAL
NITRITE, URINE: NEGATIVE
NRBC AUTOMATED: 0 PER 100 WBC
OPIATES, URINE: NEGATIVE
OTHER OBSERVATIONS UA: ABNORMAL
OXYCODONE SCREEN URINE: NEGATIVE
PARTIAL THROMBOPLASTIN TIME: 25.2 SEC (ref 23.9–33.8)
PDW BLD-RTO: 12.1 % (ref 11.8–14.4)
PH UA: 7 (ref 5–9)
PHENCYCLIDINE, URINE: NEGATIVE
PLATELET # BLD: 200 K/UL (ref 138–453)
PLATELET ESTIMATE: ABNORMAL
PMV BLD AUTO: 10.6 FL (ref 8.1–13.5)
POTASSIUM SERPL-SCNC: 4.1 MMOL/L (ref 3.7–5.3)
PROPOXYPHENE, URINE: NEGATIVE
PROTEIN UA: NEGATIVE
PROTHROMBIN TIME: 13.4 SEC (ref 11.5–14.2)
RBC # BLD: 4.96 M/UL (ref 3.95–5.11)
RBC # BLD: ABNORMAL 10*6/UL
RBC UA: ABNORMAL /HPF (ref 0–2)
RENAL EPITHELIAL, UA: ABNORMAL /HPF
SEG NEUTROPHILS: 72 % (ref 36–65)
SEGMENTED NEUTROPHILS ABSOLUTE COUNT: 4.46 K/UL (ref 1.5–8.1)
SODIUM BLD-SCNC: 134 MMOL/L (ref 135–144)
SPECIFIC GRAVITY UA: 1.01 (ref 1.01–1.02)
TEST INFORMATION: NORMAL
TOTAL PROTEIN: 7.5 G/DL (ref 6.4–8.3)
TRICHOMONAS: ABNORMAL
TRICYCLIC ANTIDEPRESSANTS, UR: NEGATIVE
TURBIDITY: CLEAR
URINE HGB: NEGATIVE
UROBILINOGEN, URINE: NORMAL
WBC # BLD: 6.2 K/UL (ref 3.5–11.3)
WBC # BLD: ABNORMAL 10*3/UL
WBC UA: ABNORMAL /HPF (ref 0–5)
YEAST: ABNORMAL

## 2020-08-23 PROCEDURE — 93005 ELECTROCARDIOGRAM TRACING: CPT | Performed by: EMERGENCY MEDICINE

## 2020-08-23 PROCEDURE — 99285 EMERGENCY DEPT VISIT HI MDM: CPT

## 2020-08-23 PROCEDURE — 80053 COMPREHEN METABOLIC PANEL: CPT

## 2020-08-23 PROCEDURE — 82947 ASSAY GLUCOSE BLOOD QUANT: CPT

## 2020-08-23 PROCEDURE — 85730 THROMBOPLASTIN TIME PARTIAL: CPT

## 2020-08-23 PROCEDURE — 99223 1ST HOSP IP/OBS HIGH 75: CPT | Performed by: PSYCHIATRY & NEUROLOGY

## 2020-08-23 PROCEDURE — 36415 COLL VENOUS BLD VENIPUNCTURE: CPT

## 2020-08-23 PROCEDURE — 81001 URINALYSIS AUTO W/SCOPE: CPT

## 2020-08-23 PROCEDURE — 70450 CT HEAD/BRAIN W/O DYE: CPT

## 2020-08-23 PROCEDURE — 2060000000 HC ICU INTERMEDIATE R&B

## 2020-08-23 PROCEDURE — 6360000004 HC RX CONTRAST MEDICATION: Performed by: EMERGENCY MEDICINE

## 2020-08-23 PROCEDURE — 93010 ELECTROCARDIOGRAM REPORT: CPT | Performed by: FAMILY MEDICINE

## 2020-08-23 PROCEDURE — 85025 COMPLETE CBC W/AUTO DIFF WBC: CPT

## 2020-08-23 PROCEDURE — 80306 DRUG TEST PRSMV INSTRMNT: CPT

## 2020-08-23 PROCEDURE — 85610 PROTHROMBIN TIME: CPT

## 2020-08-23 PROCEDURE — 70498 CT ANGIOGRAPHY NECK: CPT

## 2020-08-23 RX ADMIN — IOPAMIDOL 75 ML: 755 INJECTION, SOLUTION INTRAVENOUS at 16:45

## 2020-08-23 NOTE — ED NOTES
[address] [place] [address] JONES for stroke alert     Giovanni Racer  08/23/20 0552

## 2020-08-23 NOTE — ED PROVIDER NOTES
a left atrial volume index of 31 m1/m2. mild diastolic dysfunction.  Hyperlipidemia     Hypertension      Past Surgical History:   Procedure Laterality Date    CARDIAC CATHETERIZATION Left 2017    right radial, MHT Dr. Allison Arellano   Current Outpatient Rx   Medication Sig Dispense Refill    carvedilol (COREG) 12.5 MG tablet Take 1 tablet by mouth 2 times daily (with meals) 180 tablet 3    losartan-hydrochlorothiazide (HYZAAR) 100-25 MG per tablet Take 1 tablet by mouth daily 90 tablet 3    amLODIPine (NORVASC) 10 MG tablet Take 1 tablet by mouth daily 90 tablet 3    atorvastatin (LIPITOR) 80 MG tablet Take 1 tablet by mouth nightly 90 tablet 3    aspirin 81 MG EC tablet Take 1 tablet by mouth daily 90 tablet 3    metFORMIN (GLUCOPHAGE) 500 MG tablet Take 500 mg by mouth 2 times daily (with meals)       ALLERGIES   No Known Allergies  FAMILY OR SOCIAL HISTORY   Family History   Problem Relation Age of Onset    Coronary Art Dis Father          from [place]   [employer] COPD Sister         O2 dep    Coronary Art Dis Brother         2 brothers  from [place]     Social History     Socioeconomic History    Marital status:       Spouse name: Not on file    Number of children: Not on file    Years of education: Not on file    Highest education level: Not on file   Occupational History    Not on file   Social Needs    Financial resource strain: Not on file    Food insecurity     Worry: Not on file     Inability: Not on file    Transportation needs     Medical: Not on file     Non-medical: Not on file   Tobacco Use    Smoking status: Never Smoker    Smokeless tobacco: Never Used   Substance and Sexual Activity    Alcohol use: No    Drug use: No    Sexual activity: Not Currently   Lifestyle    Physical activity     Days per week: Not on file     Minutes per session: Not on file    Stress: Not on file   Relationships    Social connections     Talks on phone: Not on file     Gets together: Not on file     Attends Confucianism service: Not on file     Active member of club or organization: Not on file     Attends meetings of clubs or organizations: Not on file     Relationship status: Not on file    Intimate partner violence     Fear of current or ex partner: Not on file     Emotionally abused: Not on file     Physically abused: Not on file     Forced sexual activity: Not on file   Other Topics Concern    Not on file   Social History Narrative    Not on file       PHYSICAL EXAM   VITAL SIGNS: BP (!) 163/84   Pulse 72   Temp 97.6 °F (36.4 °C) (Tympanic)   Ht 5' 4\" (1.626 m)   Wt 192 lb (87.1 kg)   SpO2 95%   BMI 32.96 kg/m²   Constitutional: Well developed, well nourished, no acute distress  Eyes: Pupils equally round and reactive to light, sclera nonicteric  HENT: Atraumatic, patent airway  NECK: Normal range of motion, no JVD  Respiratory: No respiratory distress, normal breath sounds, no wheezing   Cardiovascular: regular rate, normal rhythm, no murmurs   GI: Soft, nondistended, normal bowel sounds, nontender  Musculoskeletal: No edema, no acute deformities   Integument: Skin is warm and dry, no obvious rash   Vascular: Radial and DP pulses 2+ equal bilaterally  Neurologic: Expressive and receptive aphasia. She is getting some ideas across. She seems to have some decrease sensation on the right side of her body compared to the left side of her body as well as some ataxia of both sides of her body. EKG   nsr Rhythm at 64 bpm, no stemi or ectopy    RADIOLOGY/PROCEDURES   CT HEAD WO CONTRAST   Preliminary Result   No acute intracranial abnormality. Senescent changes including chronic microvascular change and encephalomalacia   compatible with remote infarcts in both cerebellar hemispheres and in the   high posterior right frontal lobe. Critical results were called by Ilsa Rothman MD to Alex Tomlin on 8/23/2020   at 15:56.          CTA HEAD NECK W CONTRAST    (Results Pending)       ED COURSE & MEDICAL DECISION MAKING   Pertinent Labs & Imaging studies reviewed and interpreted. (See chart for details)  See chart for details of medications given during the ED stay. NIH Stroke Scale  Time: 5:22 PM  Person Administering Scale: Alessandro Barragan  1a - Level of consciousness =   0  1b - LOC questions =   1  1c - LOC commands =   1  2 - Best gaze =   0  3 - Visual fields =   0  4 - Facial palsy =   1  5a - Motor R arm =   1  5b - Motor L arm =   0  6a - Motor R leg =   1  6b - Motor L leg =   0  7 - Limb Ataxia =   2  8 - Sensory =   1  9 - Best Language =   2  10 - Dysarthria =   0  11 - Extinction & inattention =   0      Vitals:    08/23/20 1543 08/23/20 1555   BP: (!) 163/84    Pulse: 72    Temp:  97.6 °F (36.4 °C)   TempSrc:  Tympanic   SpO2: 95%    Weight:  192 lb (87.1 kg)   Height:  5' 4\" (1.626 m)     Differential diagnosis: Thrombotic Stroke, Embolic Stroke, Hemorrhagic Stroke, TIA, Hypoglycemia, Mass Lesions, Metabolic cause, Head Injury, Encephalopathy, Multiple Sclerosis, Seizure, other    MDM: Patient presentation seems consistent with ischemic stroke. Will transfer to [facility] for further work up and rehab as needed    FINAL IMPRESSION   1.  Stroke-like symptoms        PLAN  xfer to st miranda\[facility]\""            Keila Hoskins MD  08/23/20 5122

## 2020-08-24 ENCOUNTER — APPOINTMENT (OUTPATIENT)
Dept: MRI IMAGING | Age: 70
DRG: 041 | End: 2020-08-24
Payer: MEDICARE

## 2020-08-24 LAB
CHOLESTEROL/HDL RATIO: 3.6
CHOLESTEROL: 160 MG/DL
ESTIMATED AVERAGE GLUCOSE: 171 MG/DL
GLUCOSE BLD-MCNC: 142 MG/DL (ref 65–105)
HBA1C MFR BLD: 7.6 % (ref 4–6)
HCT VFR BLD CALC: 44 % (ref 36.3–47.1)
HDLC SERPL-MCNC: 44 MG/DL
HEMOGLOBIN: 14.2 G/DL (ref 11.9–15.1)
LDL CHOLESTEROL: 100 MG/DL (ref 0–130)
MCH RBC QN AUTO: 29.6 PG (ref 25.2–33.5)
MCHC RBC AUTO-ENTMCNC: 32.3 G/DL (ref 28.4–34.8)
MCV RBC AUTO: 91.9 FL (ref 82.6–102.9)
NRBC AUTOMATED: 0 PER 100 WBC
PDW BLD-RTO: 12.1 % (ref 11.8–14.4)
PLATELET # BLD: 204 K/UL (ref 138–453)
PMV BLD AUTO: 10.4 FL (ref 8.1–13.5)
RBC # BLD: 4.79 M/UL (ref 3.95–5.11)
SARS-COV-2, PCR: NORMAL
SARS-COV-2, RAPID: NORMAL
SARS-COV-2: NOT DETECTED
SOURCE: NORMAL
TRIGL SERPL-MCNC: 79 MG/DL
TSH SERPL DL<=0.05 MIU/L-ACNC: 0.5 MIU/L (ref 0.3–5)
VLDLC SERPL CALC-MCNC: NORMAL MG/DL (ref 1–30)
WBC # BLD: 6.9 K/UL (ref 3.5–11.3)

## 2020-08-24 PROCEDURE — 97535 SELF CARE MNGMENT TRAINING: CPT

## 2020-08-24 PROCEDURE — 85027 COMPLETE CBC AUTOMATED: CPT

## 2020-08-24 PROCEDURE — U0003 INFECTIOUS AGENT DETECTION BY NUCLEIC ACID (DNA OR RNA); SEVERE ACUTE RESPIRATORY SYNDROME CORONAVIRUS 2 (SARS-COV-2) (CORONAVIRUS DISEASE [COVID-19]), AMPLIFIED PROBE TECHNIQUE, MAKING USE OF HIGH THROUGHPUT TECHNOLOGIES AS DESCRIBED BY CMS-2020-01-R: HCPCS

## 2020-08-24 PROCEDURE — 97166 OT EVAL MOD COMPLEX 45 MIN: CPT

## 2020-08-24 PROCEDURE — 6370000000 HC RX 637 (ALT 250 FOR IP): Performed by: STUDENT IN AN ORGANIZED HEALTH CARE EDUCATION/TRAINING PROGRAM

## 2020-08-24 PROCEDURE — 82947 ASSAY GLUCOSE BLOOD QUANT: CPT

## 2020-08-24 PROCEDURE — 6360000002 HC RX W HCPCS: Performed by: STUDENT IN AN ORGANIZED HEALTH CARE EDUCATION/TRAINING PROGRAM

## 2020-08-24 PROCEDURE — 97162 PT EVAL MOD COMPLEX 30 MIN: CPT

## 2020-08-24 PROCEDURE — 83036 HEMOGLOBIN GLYCOSYLATED A1C: CPT

## 2020-08-24 PROCEDURE — 84443 ASSAY THYROID STIM HORMONE: CPT

## 2020-08-24 PROCEDURE — 2060000000 HC ICU INTERMEDIATE R&B

## 2020-08-24 PROCEDURE — 99223 1ST HOSP IP/OBS HIGH 75: CPT | Performed by: PSYCHIATRY & NEUROLOGY

## 2020-08-24 PROCEDURE — 97530 THERAPEUTIC ACTIVITIES: CPT

## 2020-08-24 PROCEDURE — 80061 LIPID PANEL: CPT

## 2020-08-24 PROCEDURE — 70551 MRI BRAIN STEM W/O DYE: CPT

## 2020-08-24 PROCEDURE — 36415 COLL VENOUS BLD VENIPUNCTURE: CPT

## 2020-08-24 PROCEDURE — 2580000003 HC RX 258: Performed by: STUDENT IN AN ORGANIZED HEALTH CARE EDUCATION/TRAINING PROGRAM

## 2020-08-24 RX ORDER — POLYETHYLENE GLYCOL 3350 17 G/17G
17 POWDER, FOR SOLUTION ORAL DAILY PRN
Status: DISCONTINUED | OUTPATIENT
Start: 2020-08-24 | End: 2020-08-26 | Stop reason: HOSPADM

## 2020-08-24 RX ORDER — LABETALOL 20 MG/4 ML (5 MG/ML) INTRAVENOUS SYRINGE
10 EVERY 4 HOURS PRN
Status: DISCONTINUED | OUTPATIENT
Start: 2020-08-24 | End: 2020-08-26 | Stop reason: HOSPADM

## 2020-08-24 RX ORDER — SODIUM CHLORIDE 0.9 % (FLUSH) 0.9 %
10 SYRINGE (ML) INJECTION EVERY 12 HOURS SCHEDULED
Status: DISCONTINUED | OUTPATIENT
Start: 2020-08-24 | End: 2020-08-26 | Stop reason: HOSPADM

## 2020-08-24 RX ORDER — ATORVASTATIN CALCIUM 40 MG/1
40 TABLET, FILM COATED ORAL NIGHTLY
Status: DISCONTINUED | OUTPATIENT
Start: 2020-08-24 | End: 2020-08-25

## 2020-08-24 RX ORDER — SODIUM CHLORIDE 0.9 % (FLUSH) 0.9 %
10 SYRINGE (ML) INJECTION PRN
Status: DISCONTINUED | OUTPATIENT
Start: 2020-08-24 | End: 2020-08-26 | Stop reason: HOSPADM

## 2020-08-24 RX ORDER — CLOPIDOGREL BISULFATE 75 MG/1
75 TABLET ORAL DAILY
Status: DISCONTINUED | OUTPATIENT
Start: 2020-08-24 | End: 2020-08-26 | Stop reason: HOSPADM

## 2020-08-24 RX ORDER — HYDRALAZINE HYDROCHLORIDE 20 MG/ML
10 INJECTION INTRAMUSCULAR; INTRAVENOUS EVERY 6 HOURS PRN
Status: DISCONTINUED | OUTPATIENT
Start: 2020-08-24 | End: 2020-08-26 | Stop reason: HOSPADM

## 2020-08-24 RX ORDER — PROMETHAZINE HYDROCHLORIDE 25 MG/1
12.5 TABLET ORAL EVERY 6 HOURS PRN
Status: DISCONTINUED | OUTPATIENT
Start: 2020-08-24 | End: 2020-08-26 | Stop reason: HOSPADM

## 2020-08-24 RX ORDER — ONDANSETRON 2 MG/ML
4 INJECTION INTRAMUSCULAR; INTRAVENOUS EVERY 6 HOURS PRN
Status: DISCONTINUED | OUTPATIENT
Start: 2020-08-24 | End: 2020-08-26 | Stop reason: HOSPADM

## 2020-08-24 RX ORDER — ATORVASTATIN CALCIUM 80 MG/1
80 TABLET, FILM COATED ORAL NIGHTLY
Status: DISCONTINUED | OUTPATIENT
Start: 2020-08-24 | End: 2020-08-24

## 2020-08-24 RX ORDER — ASPIRIN 300 MG/1
300 SUPPOSITORY RECTAL ONCE
Status: COMPLETED | OUTPATIENT
Start: 2020-08-24 | End: 2020-08-24

## 2020-08-24 RX ADMIN — Medication 10 ML: at 08:49

## 2020-08-24 RX ADMIN — ENOXAPARIN SODIUM 40 MG: 40 INJECTION SUBCUTANEOUS at 08:42

## 2020-08-24 RX ADMIN — ASPIRIN 300 MG: 300 SUPPOSITORY RECTAL at 08:42

## 2020-08-24 RX ADMIN — Medication 10 ML: at 20:23

## 2020-08-24 ASSESSMENT — PAIN SCALES - GENERAL: PAINLEVEL_OUTOF10: 0

## 2020-08-24 NOTE — ED PROVIDER NOTES
[facility] ED  Emergency Department  Emergency Medicine Resident Sign-out     Care of India Marin was assumed from Dr. Sissy Song and is being seen for Altered Mental Status  . The patient's initial evaluation and plan have been discussed with the prior provider who initially evaluated the patient. EMERGENCY DEPARTMENT COURSE / MEDICAL DECISION MAKING:       MEDICATIONS GIVEN:  No orders of the defined types were placed in this encounter. LABS / RADIOLOGY:     Labs Reviewed - No data to display    Ct Head Wo Contrast    Result Date: 8/23/2020  EXAMINATION: CT OF THE HEAD WITHOUT CONTRAST  8/23/2020 3:40 pm TECHNIQUE: CT of the head was performed without the administration of intravenous contrast. Dose modulation, iterative reconstruction, and/or weight based adjustment of the mA/kV was utilized to reduce the radiation dose to as low as reasonably achievable. COMPARISON: 22 December 2019 HISTORY: ORDERING SYSTEM PROVIDED HISTORY: aphasia TECHNOLOGIST PROVIDED HISTORY: Aphasia FINDINGS: BRAIN/VENTRICLES: There is no acute intracranial hemorrhage, mass effect or midline shift. No abnormal extra-axial fluid collection. The gray-white differentiation is maintained without evidence of an acute infarct. There is no evidence of hydrocephalus. Remote cerebellar infarcts are noted as well as in high right frontal area. Atherosclerotic disease of the cavernous carotid and vertebral arteries are noted. Scattered hypodensity is present in the white matter consistent with chronic microvascular change. ORBITS: The visualized portion of the orbits demonstrate no acute abnormality. SINUSES: The visualized paranasal sinuses and mastoid air cells demonstrate no acute abnormality. SOFT TISSUES/SKULL:  No acute abnormality of the visualized skull or soft tissues. No acute intracranial abnormality.  Senescent changes including chronic microvascular change and encephalomalacia compatible with remote infarcts in both cerebellar hemispheres and in the high posterior right frontal lobe. Critical results were called by Coleman Mistry MD to Jammie Boland on 8/23/2020 at 15:56. Cta Head Neck W Contrast    Result Date: 8/23/2020  EXAMINATION: CTA OF THE HEAD AND NECK WITH CONTRAST 8/23/2020 4:43 pm: TECHNIQUE: CTA of the head and neck was performed with the administration of intravenous contrast. Multiplanar reformatted images are provided for review. MIP images are provided for review. Stenosis of the internal carotid arteries measured using NASCET criteria. Dose modulation, iterative reconstruction, and/or weight based adjustment of the mA/kV was utilized to reduce the radiation dose to as low as reasonably achievable. COMPARISON: CT angiogram 12/22/2019 HISTORY: ORDERING SYSTEM PROVIDED HISTORY: check for critical stenosis TECHNOLOGIST PROVIDED HISTORY: check for critical stenosis FINDINGS: CTA NECK: AORTIC ARCH/ARCH VESSELS: No dissection or arterial injury. No significant stenosis of the brachiocephalic or subclavian arteries. CAROTID ARTERIES: Tortuous appearance of right common carotid arteries identified with likely areas motion involving the midportion right common carotid artery which gives the appearance of moderate soft plaque or less likely dissection. Moderate plaque identified both proximal ICAs. This results in 30% narrowing per NASCET criteria. No dissection, arterial injury, or hemodynamically significant stenosis by NASCET criteria. VERTEBRAL ARTERIES: No dissection, arterial injury, or significant stenosis. SOFT TISSUES: The lung apices demonstrate mild scattered areas of air trapping similar prior exam.. No cervical or superior mediastinal lymphadenopathy. The larynx and pharynx are unremarkable. No acute abnormality of the salivary and thyroid glands. BONES: Moderate degenerate changes C5-6.  CTA HEAD: ANTERIOR CIRCULATION: No significant stenosis of the intracranial internal carotid, anterior cerebral, or middle cerebral arteries. No aneurysm. POSTERIOR CIRCULATION: No significant stenosis of the vertebral, basilar, or posterior cerebral arteries. No aneurysm. OTHER: No dural venous sinus thrombosis on this non-dedicated study. BRAIN: Generalized involutional change. Chronic small vessel ischemic disease     Motion through the right midportion common artery challenge evaluation may obscure underlying stenosis or intimal injury. Moderate plaque identified both proximal ICAs resulting in 30% narrowing per NASCET criteria. No large vessel occlusion or hemodynamic stenosis       RECENT VITALS:     Temp: 98.3 °F (36.8 °C),  Pulse: 68, Resp: 16, BP: (!) 176/95, SpO2: 94 %      This patient is a 81 y.o. Female with new onset aphasia concerning for stroke. Transferred from [facility] emergency department. CT is negative. Lab work is unremarkable. Admit to neurology. Awaiting transfer to floor. OUTSTANDING TASKS / RECOMMENDATIONS:    1. AWaiting tx to floor. FINAL IMPRESSION:     1.  Aphasia        DISPOSITION:         DISPOSITION:  []  Discharge   []  Transfer -    [x]  Admission -  Neurology   []  Against Medical Advice   []  Eloped   FOLLOW-UP: SUE Lowry - CNP  [address]  [phone]          SUE Alvarez - CNP  [address]  [phone]           DISCHARGE MEDICATIONS: New Prescriptions    No medications on file          Marcelle Watt DO  Emergency Medicine Resident  [address] [facility]       Marcelle Watt [place]  Resident  08/24/20 [id number]

## 2020-08-24 NOTE — ED PROVIDER NOTES
[facility]  ED  Emergency Department Encounter  EmergencyMedicine Resident     Pt Name:Michelle Ardon  MRN: [id number]  Birthdate 1950  Date of evaluation: 8/23/20  PCP:  SUE Christensen CNP    CHIEF COMPLAINT       Chief Complaint   Patient presents with    Altered Mental Status       HISTORY OF PRESENT ILLNESS  (Location/Symptom, Timing/Onset, Context/Setting, Quality, Duration, Modifying Factors, Severity.)      Shari Walker is a 81 y.o. female who presents with transfer from [facility] for concern for strokelike symptoms. On arrival patient has some degree of expressive aphasia and is not able to give dynamic history. Review of chart shows concern for    CT and CTA at outlying facility did not show large vessel obstruction did not show bleed, MRI in 2018 that showed scattered cerebellar strokes, history of TIA. History of last known well 1 AM from sister who lives with her. Patient presented outside window for TPA,    Blood sugar has been within normal limits as well as blood pressure outpatient. Hemodynamically stable with some hypertension no hypotension no tachycardia afebrile    Given Plavix and aspirin at outside facility no history of blood thinners no history of fall no history of atrial fibrillation    PAST MEDICAL / SURGICAL / SOCIAL / FAMILY HISTORY      has a past medical history of CHF (congestive heart failure) ([facility] [address] Diabetes mellitus ([facility] [address] H/O cardiac catheterization, H/O echocardiogram, History of echocardiogram, History of echocardiogram, Hyperlipidemia, and Hypertension. has a past surgical history that includes Cholecystectomy and Cardiac catheterization (Left, 08/04/2017). Social History     Socioeconomic History    Marital status:       Spouse name: Not on file    Number of children: Not on file    Years of education: Not on file    Highest education level: Not on file   Occupational History    Not on file   Social Needs  Financial resource strain: Not on file   Wilfredo-Eleazar insecurity     Worry: Not on file     Inability: Not on file   [employer] needs     Medical: Not on file     Non-medical: Not on file   Tobacco Use    Smoking status: Never Smoker    Smokeless tobacco: Never Used   Substance and Sexual Activity    Alcohol use: No    Drug use: No    Sexual activity: Not Currently   Lifestyle    Physical activity     Days per week: Not on file     Minutes per session: Not on file    Stress: Not on file   Relationships    Social connections     Talks on phone: Not on file     Gets together: Not on file     Attends Spiritism service: Not on file     Active member of club or organization: Not on file     Attends meetings of clubs or organizations: Not on file     Relationship status: Not on file    Intimate partner violence     Fear of current or ex partner: Not on file     Emotionally abused: Not on file     Physically abused: Not on file     Forced sexual activity: Not on file   Other Topics Concern    Not on file   Social History Narrative    Not on file       Family History   Problem Relation Age of Onset    Coronary Art Dis Father          from [place]   Alexander Ceja COPD Sister         O2 dep    Coronary Art Dis Brother         2 brothers  from [place]       Allergies:  Patient has no known allergies. Home Medications:  Prior to Admission medications    Medication Sig Start Date End Date Taking?  Authorizing Provider   carvedilol (COREG) 12.5 MG tablet Take 1 tablet by mouth 2 times daily (with meals) 20   Nathan Branch MD   losartan-hydrochlorothiazide [facility]) 100-25 MG per tablet Take 1 tablet by mouth daily 20   Nathan Branch MD   amLODIPine (NORVASC) 10 MG tablet Take 1 tablet by mouth daily 20   Nathan Branch MD   atorvastatin (LIPITOR) 80 MG tablet Take 1 tablet by mouth nightly 20   Nathan Branch MD   aspirin 81 MG EC tablet Take 1 tablet by mouth daily 20   Nathan Branch MD metFORMIN (GLUCOPHAGE) 500 MG tablet Take 500 mg by mouth 2 times daily (with meals)    Historical Provider, MD       REVIEW OF SYSTEMS    (2-9 systems for level 4, 10 or more for level 5)      expressive aphasia unable to give full ROS      PHYSICAL EXAM   (up to 7 for level 4, 8 or more for level 5)      INITIAL VITALS:   BP (!) 178/88   Pulse 57   Temp 98.3 °F (36.8 °C) (Oral)   Resp 15   Wt 190 lb (86.2 kg)   SpO2 94%   BMI 32.61 kg/m²     General Appearance: Well-appearing, in no acute distress  HEENT: Head: normocephalic/atraumatic eyes: PERRLA, EOMT, conjunctiva not injected, sclerae nonicteric ears: External canals patent nose: Nares patent, no rhinorrhea, throat:mucous membranes moist, oropharynx clear     Neck: Trachea midline, no JVD. Lungs: No evidence of increased work of breathing. CTA B/L, no wheezes/rhonchi     Cardiovascular: RRR, no murmur, 2+ peripheral pulses bilaterally. Cap refill less than 2 seconds. No lower extremity edema noted    Abdomen: Soft, nontender. No guarding or rebound tenderness. Neurologic: Does answer some questions but some inappropriate responses, possible expressive aphasia does follow commands,  No drift upper extremities lower extremities, does not appear able to squeeze right hand, baseline unknown,  No obvious facial droop  NIH 3 per neurology resident, see documentation    Extremities: Skin warm, dry and intact. DIFFERENTIAL  DIAGNOSIS     PLAN (LABS / IMAGING / EKG):  Orders Placed This Encounter   Procedures    Inpatient consult to Neurology    PATIENT STATUS (FROM ED OR OR/PROCEDURAL) Inpatient    PATIENT STATUS (FROM ED OR OR/PROCEDURAL) Inpatient       MEDICATIONS ORDERED:  No orders of the defined types were placed in this encounter. DIAGNOSTIC RESULTS / EMERGENCY DEPARTMENT COURSE / MDM     LABS:  No results found for this visit on 08/23/20.         RADIOLOGY:  Ct Head Wo Contrast    Result Date: 8/23/2020  EXAMINATION: CT OF THE HEAD WITHOUT CONTRAST  8/23/2020 3:40 pm TECHNIQUE: CT of the head was performed without the administration of intravenous contrast. Dose modulation, iterative reconstruction, and/or weight based adjustment of the mA/kV was utilized to reduce the radiation dose to as low as reasonably achievable. COMPARISON: 22 December 2019 HISTORY: ORDERING SYSTEM PROVIDED HISTORY: aphasia TECHNOLOGIST PROVIDED HISTORY: Aphasia FINDINGS: BRAIN/VENTRICLES: There is no acute intracranial hemorrhage, mass effect or midline shift. No abnormal extra-axial fluid collection. The gray-white differentiation is maintained without evidence of an acute infarct. There is no evidence of hydrocephalus. Remote cerebellar infarcts are noted as well as in high right frontal area. Atherosclerotic disease of the cavernous carotid and vertebral arteries are noted. Scattered hypodensity is present in the white matter consistent with chronic microvascular change. ORBITS: The visualized portion of the orbits demonstrate no acute abnormality. SINUSES: The visualized paranasal sinuses and mastoid air cells demonstrate no acute abnormality. SOFT TISSUES/SKULL:  No acute abnormality of the visualized skull or soft tissues. No acute intracranial abnormality. Senescent changes including chronic microvascular change and encephalomalacia compatible with remote infarcts in both cerebellar hemispheres and in the high posterior right frontal lobe. Critical results were called by Marty Ewing MD to Miriam Gonzales on 8/23/2020 at 15:56. Cta Head Neck W Contrast    Result Date: 8/23/2020  EXAMINATION: CTA OF THE HEAD AND NECK WITH CONTRAST 8/23/2020 4:43 pm: TECHNIQUE: CTA of the head and neck was performed with the administration of intravenous contrast. Multiplanar reformatted images are provided for review. MIP images are provided for review. Stenosis of the internal carotid arteries measured using NASCET criteria.  Dose modulation, iterative reconstruction, and/or weight based adjustment of the mA/kV was utilized to reduce the radiation dose to as low as reasonably achievable. COMPARISON: CT angiogram 12/22/2019 HISTORY: ORDERING SYSTEM PROVIDED HISTORY: check for critical stenosis TECHNOLOGIST PROVIDED HISTORY: check for critical stenosis FINDINGS: CTA NECK: AORTIC ARCH/ARCH VESSELS: No dissection or arterial injury. No significant stenosis of the brachiocephalic or subclavian arteries. CAROTID ARTERIES: Tortuous appearance of right common carotid arteries identified with likely areas motion involving the midportion right common carotid artery which gives the appearance of moderate soft plaque or less likely dissection. Moderate plaque identified both proximal ICAs. This results in 30% narrowing per NASCET criteria. No dissection, arterial injury, or hemodynamically significant stenosis by NASCET criteria. VERTEBRAL ARTERIES: No dissection, arterial injury, or significant stenosis. SOFT TISSUES: The lung apices demonstrate mild scattered areas of air trapping similar prior exam.. No cervical or superior mediastinal lymphadenopathy. The larynx and pharynx are unremarkable. No acute abnormality of the salivary and thyroid glands. BONES: Moderate degenerate changes C5-6. CTA HEAD: ANTERIOR CIRCULATION: No significant stenosis of the intracranial internal carotid, anterior cerebral, or middle cerebral arteries. No aneurysm. POSTERIOR CIRCULATION: No significant stenosis of the vertebral, basilar, or posterior cerebral arteries. No aneurysm. OTHER: No dural venous sinus thrombosis on this non-dedicated study. BRAIN: Generalized involutional change. Chronic small vessel ischemic disease     Motion through the right midportion common artery challenge evaluation may obscure underlying stenosis or intimal injury. Moderate plaque identified both proximal ICAs resulting in 30% narrowing per NASCET criteria.    No large vessel occlusion or hemodynamic stenosis       EMERGENCY DEPARTMENT COURSE/IMPRESSION:   Concern for stroke due to expressive aphasia,  history of remote cerebellar frontal infarcts. Hemodynamically stable, no complaint of pain, follows commands, does not appear to be bending right hand or wrist, seen by neurology and internal medicine, admitted for further work-up MRI, outside of TPA with no, no LVO on CTA. OCEDURES:  None    CONSULTS:  IP CONSULT TO NEUROLOGY    CRITICAL CARE:  None    FINAL IMPRESSION      1.  Aphasia          DISPOSITION / PLAN     DISPOSITION Admitted 08/23/2020 10:14:16 PM      PATIENT REFERRED TO:  SUE Cantor CNP  [address]  [id number]  SUE Hammer CNP  [address]  [phone]            DISCHARGE MEDICATIONS:  New Prescriptions    No medications on file       Asher Combs MD  Emergency Medicine Resident    (Please note that portions of this note were completed with a voice recognition program.  Efforts were made to edit the dictations but occasionally words aremis-transcribed.)       Asher Combs MD  Resident  08/24/20 [id number]

## 2020-08-24 NOTE — H&P
[facility] Neurology   [address] [place] [address]    HISTORY AND PHYSICAL EXAMINATION            Date:   8/23/2020  Patient name:  Bunny Jackson  Date of admission:  8/23/2020  8:35 PM  MRN:   [id number]  Account:  [de-identified]  YOB: 1950  PCP:    SUE Borja CNP  Room:   22/22  Code Status:    Prior    Chief Complaint:     Chief Complaint   Patient presents with    Altered Mental Status       History Obtained From:     patient, electronic medical record    History of Present Illness: The patient is a 81 y.o. female with past medical history significant for essential hypertension, hyperlipidemia, acute on chronic combined CHF, CKD stage III, morbid obesity, history of cryptogenic stroke(bilateral cerebellar hemispheres and bilateral frontal and anterior right parietal lobes, 6 lacunar infarct in bilateral thalami) [facility] concern for expressive and receptive aphasia. She also was apparently complaining of some weakness/altered sensorium. As per notes patient was last known 1 AM by her sister who she lives with her check on her. This morning it was noted that she was not normal and was acting sort of weird and appeared to be slightly weak.      On presentation lab work done showed sodium 134, potassium 4.1, creatinine 0.91, glucose 140, calcium 10.2, WBC 6.2, UA negative for UTI. On presentation systolic blood pressure 008.     CTA head and neck done which showed moderate plaque identified both proximal ICA resulting in 30% narrowing, no LVO, CTA head negative as well.     On my evaluation patient was mildly dysarthric with some ataxic head movements, patient oriented to place and person but not to time.       Past Medical History:     Past Medical History:   Diagnosis Date    [facility]estive [facility] failure) [address] [address]     Diabetes mellitus ([facility] [address]     H/O cardiac catheterization 08/04/2017    LMCA: Mild irregularites 10-20%. LAD: Mild irregularites 10-20%. LCx: Mild irregularites 10-20%. RCA: Mild irregularites 10-20%. LV function assessed as : Abnormal. EF of 40%.  H/O echocardiogram 12/18/2018    EF 55%. Mildly increased LV wall thickness. The left atrium appears moderately to severely dilated. Moderate to severe mitral regurg. Moderate pulmonary HTN with an estimated RV systolic pressure of [id number]. Moderate tricuspid regurg. Evidnece fo moderate diastolic dysfunction is seen.  History of echocardiogram 01/17/2019    EF 55%. LV wall thickness moderately increased. LA is mildly dilated w/ LA volume index of 30. trivial mitral regurg. Evidence of moderate (grade II) diastolic dysfunction seen.  History of echocardiogram 06/03/2019    EF of 50-55%. LV wall thickness is mildly increased. LA is mildly dilated (29-33) with a left atrial volume index of 31 m1/m2. mild diastolic dysfunction.  Hyperlipidemia     Hypertension         Past Surgical History:     Past Surgical History:   Procedure Laterality Date    CARDIAC CATHETERIZATION Left 08/04/2017    right radial, T Dr. Paula Kaiser          Medications Prior to Admission:     Prior to Admission medications    Medication Sig Start Date End Date Taking? Authorizing Provider   carvedilol (COREG) 12.5 MG tablet Take 1 tablet by mouth 2 times daily (with meals) 7/20/20   Jorge Sandoval MD   losartan-hydrochlorothiazide [facility]) 100-25 MG per tablet Take 1 tablet by mouth daily 7/20/20   Jorge Sandoval MD   amLODIPine (NORVASC) 10 MG tablet Take 1 tablet by mouth daily 7/20/20   Jorge Sandoval MD   atorvastatin (LIPITOR) 80 MG tablet Take 1 tablet by mouth nightly 7/20/20   Jorge Sandoval MD   aspirin 81 MG EC tablet Take 1 tablet by mouth daily 7/20/20   Jorge Sandoval MD   metFORMIN (GLUCOPHAGE) 500 MG tablet Take 500 mg by mouth 2 times daily (with meals)    Historical Provider, MD        Allergies:     Patient has no known allergies.     Social History:     Tobacco:    reports that she has never smoked. She has never used smokeless tobacco.  Alcohol:      reports no history of alcohol use. Drug Use:  reports no history of drug use. Family History:     Family History   Problem Relation Age of Onset    Coronary Art Dis Father          from [facility] COPD Sister         O2 dep    Coronary Art Dis Brother         2 brothers  from [place]       Review of Systems:     ROS:  Constitutional  Negative for fever and chills    HEENT  Negative for ear discharge, ear pain, nosebleed    Eyes  Negative for photophobia, pain and discharge    Respiratory  Negative for hemoptysis and sputum    Cardiovascular  Negative for orthopnea, claudication and PND    Gastrointestinal  Negative for abdominal pain, diarrhea, blood in stool    Musculoskeletal  Negative for joint pain, negative for myalgia    Skin  Negative for rash or itching    Neurological Negative for seizure   Endo/heme/allergies  Negative for polydipsia, environmental allergy    Psychiatric/behavioral  Negative for suicidal ideation. Patient is not anxious        Physical Exam:   Pulse 65   Temp 98.3 °F (36.8 °C) (Oral)   Resp 20   Wt 190 lb (86.2 kg)   SpO2 95%   BMI 32.61 kg/m²   Temp (24hrs), Av °F (36.7 °C), Min:97.6 °F (36.4 °C), Max:98.3 °F (36.8 °C)    Recent Labs     20  1556   POCGLU 140*     No intake or output data in the 24 hours ending 20 2151       General Examination    General Resting comfortably in bed   Head Normocephalic, without obvious abnormality   Neck Supple, symmetrical. Good ROM. No midline or paraspinal tenderness. Lungs Respirations unlabored, no wheezing   Chest Wall No deformity   Heart RRR, no murmur   Abdomen Soft. Non-tender, non-distended   Extremities No cyanosis or edema or warmth.    Pulses 2+ and symmetric   Skin: Skin  turgor normal, no rashes or lesions        Mental status    Alert and oriented to place and person but not to time  speech is mildly dysarthric with some aphasia.       Cranial nerves    II - visual fields intact to confrontation; pupils reactive  III, IV, VI - extraocular muscles intact; no MARIO; no nystagmus; no ptosis   V - normal facial sensation                                                               VII - normal facial symmetry                                                             VIII - intact hearing                                                                             IX, X - symmetrical palate elevation                                               XI - symmetrical shoulder shrug                                                       XII - midline tongue without atrophy or fasciculation      Motor function  Strength:   4/5 RUE, 45/5 RLE  4/5 LUE, 4/5  LLE  Normal bulk and tone.       Sensory function Intact to touch, pin, vibration, proprioception throughout      Cerebellar  abnormal finger-nose-finger testing. Intact heel-shin testing.                            Reflex function 2/4 symmetric throughout . Downgoing plantar response bilaterally.  (-)Nelson's sign bilaterally       Gait                  Normal station and gait            Investigations:      Laboratory Testing:  Recent Results (from the past 24 hour(s))   APTT    Collection Time: 08/23/20  3:38 PM   Result Value Ref Range    PTT 25.2 23.9 - 33.8 sec   Comprehensive Metabolic Panel    Collection Time: 08/23/20  3:38 PM   Result Value Ref Range    Glucose 175 (H) 70 - 99 mg/dL    BUN 19 8 - 23 mg/dL    CREATININE 0.90 0.50 - 0.90 mg/dL    Bun/Cre Ratio 21 (H) 9 - 20    Calcium 10.2 8.6 - 10.4 mg/dL    Sodium 134 (L) 135 - 144 mmol/L    Potassium 4.1 3.7 - 5.3 mmol/L    Chloride 99 98 - 107 mmol/L    CO2 25 20 - 31 mmol/L    Anion Gap 10 9 - 17 mmol/L    Alkaline Phosphatase 154 (H) 35 - 104 U/L    ALT 15 5 - 33 U/L    AST 17 <32 U/L    Total Bilirubin 0.45 0.3 - 1.2 mg/dL    Total Protein 7.5 6.4 - 8.3 g/dL    Alb 4.1 3.5 - 5.2 g/dL Albumin/Globulin Ratio 1.2 1.0 - 2.5    GFR Non-African American >60 >60 mL/min    GFR African American >60 >60 mL/min    GFR Comment          GFR Staging         CBC Auto Differential    Collection Time: 08/23/20  3:38 PM   Result Value Ref Range    WBC 6.2 3.5 - 11.3 k/uL    RBC 4.96 3.95 - 5.11 m/uL    Hemoglobin 14.6 11.9 - 15.1 g/dL    Hematocrit 44.5 36.3 - 47.1 %    MCV 89.7 82.6 - 102.9 fL    MCH 29.4 25.2 - 33.5 pg    MCHC 32.8 28.4 - 34.8 g/dL    RDW 12.1 11.8 - 14.4 %    Platelets 420 134 - 543 k/uL    MPV 10.6 8.1 - 13.5 fL    NRBC Automated 0.0 0.0 per 100 WBC    Differential Type NOT REPORTED     Seg Neutrophils 72 (H) 36 - 65 %    Lymphocytes 18 (L) 24 - 43 %    Monocytes 8 3 - 12 %    Eosinophils % 1 1 - 4 %    Basophils 1 0 - 2 %    Immature Granulocytes 0 0 %    Segs Absolute 4.46 1.50 - 8.10 k/uL    Absolute Lymph # 1.15 1.10 - 3.70 k/uL    Absolute Mono # 0.49 0.10 - 1.20 k/uL    Absolute Eos # 0.09 0.00 - 0.44 k/uL    Basophils Absolute 0.03 0.00 - 0.20 k/uL    Absolute Immature Granulocyte <0.03 0.00 - 0.30 k/uL    WBC Morphology NOT REPORTED     RBC Morphology NOT REPORTED     Platelet Estimate NOT REPORTED    Protime-INR    Collection Time: 08/23/20  3:38 PM   Result Value Ref Range    Protime 13.4 11.5 - 14.2 sec    INR 1.0    EKG 12 Lead    Collection Time: 08/23/20  3:41 PM   Result Value Ref Range    Ventricular Rate 64 BPM    Atrial Rate 64 BPM    P-R Interval 196 ms    QRS Duration 102 ms    Q-T Interval 426 ms    QTc Calculation (Bazett) 439 ms    P Axis 52 degrees    R Axis 9 degrees    T Axis 98 degrees   POCT Glucose    Collection Time: 08/23/20  3:56 PM   Result Value Ref Range    Glucose 140 mg/dL    QC OK?  yes    Glucose, Whole Blood    Collection Time: 08/23/20  3:56 PM   Result Value Ref Range    POC Glucose 140 (H) 74 - 100 mg/dL   Urinalysis with Microscopic    Collection Time: 08/23/20  5:34 PM   Result Value Ref Range    Color, UA YELLOW YELLOW    Turbidity UA CLEAR CLEAR Glucose, Ur NEGATIVE NEGATIVE    Bilirubin Urine NEGATIVE NEGATIVE    Ketones, Urine NEGATIVE NEGATIVE    Specific [place], UA 1.010 1.010 - 1.020    Urine Hgb NEGATIVE NEGATIVE    pH, UA 7.0 5.0 - 9.0    Protein, UA NEGATIVE NEGATIVE    Urobilinogen, Urine Normal Normal    Nitrite, Urine NEGATIVE NEGATIVE    Leukocyte Esterase, Urine TRACE (A) NEGATIVE    Urinalysis Comments NOT REPORTED     -          WBC, UA 2 TO 5 0 - 5 /HPF    RBC, UA None 0 - 2 /HPF    Casts UA NOT REPORTED /LPF    Crystals, UA NOT REPORTED None /HPF    Epithelial Cells UA 2 TO 5 0 - 25 /HPF    Renal Epithelial, UA NOT REPORTED 0 /HPF    Bacteria, UA TRACE (A) None    Mucus, UA TRACE (A) None    Trichomonas, UA NOT REPORTED None    Amorphous, UA NOT REPORTED None    Other Observations UA NOT REPORTED NOT REQ. Yeast, UA NOT REPORTED None   Urine Drug Screen    Collection Time: 08/23/20  5:34 PM   Result Value Ref Range    Amphetamine Screen, Ur NEGATIVE NEGATIVE    Barbiturate Screen, Ur NEGATIVE NEGATIVE    Benzodiazepine Screen, Urine NEGATIVE NEGATIVE    Cocaine Metabolite, Urine NEGATIVE NEGATIVE    Methadone Screen, Urine NEGATIVE NEGATIVE    Opiates, Urine NEGATIVE NEGATIVE    Phencyclidine, Urine NEGATIVE NEGATIVE    Propoxyphene, Urine NEGATIVE NEGATIVE    Cannabinoid Scrn, Ur NEGATIVE NEGATIVE    Oxycodone Screen, Ur NEGATIVE NEGATIVE    Methamphetamine, Urine NEGATIVE NEGATIVE    Tricyclic Antidepressants, Urine NEGATIVE NEGATIVE    MDMA, Urine NOT REPORTED NEGATIVE    Buprenorphine Urine NEGATIVE NEGATIVE    Test Information NOT REPORTED        Imaging/Diagnostics:  Ct Head Wo Contrast    Result Date: 8/23/2020  EXAMINATION: CT OF THE HEAD WITHOUT CONTRAST  8/23/2020 3:40 pm TECHNIQUE: CT of the head was performed without the administration of intravenous contrast. Dose modulation, iterative reconstruction, and/or weight based adjustment of the mA/kV was utilized to reduce the radiation dose to as low as reasonably achievable. COMPARISON: 22 December 2019 HISTORY: ORDERING SYSTEM PROVIDED HISTORY: aphasia TECHNOLOGIST PROVIDED HISTORY: Aphasia FINDINGS: BRAIN/VENTRICLES: There is no acute intracranial hemorrhage, mass effect or midline shift. No abnormal extra-axial fluid collection. The gray-white differentiation is maintained without evidence of an acute infarct. There is no evidence of hydrocephalus. Remote cerebellar infarcts are noted as well as in high right frontal area. Atherosclerotic disease of the cavernous carotid and vertebral arteries are noted. Scattered hypodensity is present in the white matter consistent with chronic microvascular change. ORBITS: The visualized portion of the orbits demonstrate no acute abnormality. SINUSES: The visualized paranasal sinuses and mastoid air cells demonstrate no acute abnormality. SOFT TISSUES/SKULL:  No acute abnormality of the visualized skull or soft tissues. No acute intracranial abnormality. Senescent changes including chronic microvascular change and encephalomalacia compatible with remote infarcts in both cerebellar hemispheres and in the high posterior right frontal lobe. Critical results were called by Nadeem Flannery MD to Mario Berrios on 8/23/2020 at 15:56. Cta Head Neck W Contrast    Result Date: 8/23/2020  EXAMINATION: CTA OF THE HEAD AND NECK WITH CONTRAST 8/23/2020 4:43 pm: TECHNIQUE: CTA of the head and neck was performed with the administration of intravenous contrast. Multiplanar reformatted images are provided for review. MIP images are provided for review. Stenosis of the internal carotid arteries measured using NASCET criteria. Dose modulation, iterative reconstruction, and/or weight based adjustment of the mA/kV was utilized to reduce the radiation dose to as low as reasonably achievable.  COMPARISON: CT angiogram 12/22/2019 HISTORY: ORDERING SYSTEM PROVIDED HISTORY: check for critical stenosis TECHNOLOGIST PROVIDED HISTORY: check for critical stenosis FINDINGS: CTA NECK: AORTIC ARCH/ARCH VESSELS: No dissection or arterial injury. No significant stenosis of the brachiocephalic or subclavian arteries. CAROTID ARTERIES: Tortuous appearance of right common carotid arteries identified with likely areas motion involving the midportion right common carotid artery which gives the appearance of moderate soft plaque or less likely dissection. Moderate plaque identified both proximal ICAs. This results in 30% narrowing per NASCET criteria. No dissection, arterial injury, or hemodynamically significant stenosis by NASCET criteria. VERTEBRAL ARTERIES: No dissection, arterial injury, or significant stenosis. SOFT TISSUES: The lung apices demonstrate mild scattered areas of air trapping similar prior exam.. No cervical or superior mediastinal lymphadenopathy. The larynx and pharynx are unremarkable. No acute abnormality of the salivary and thyroid glands. BONES: Moderate degenerate changes C5-6. CTA HEAD: ANTERIOR CIRCULATION: No significant stenosis of the intracranial internal carotid, anterior cerebral, or middle cerebral arteries. No aneurysm. POSTERIOR CIRCULATION: No significant stenosis of the vertebral, basilar, or posterior cerebral arteries. No aneurysm. OTHER: No dural venous sinus thrombosis on this non-dedicated study. BRAIN: Generalized involutional change. Chronic small vessel ischemic disease     Motion through the right midportion common artery challenge evaluation may obscure underlying stenosis or intimal injury. Moderate plaque identified both proximal ICAs resulting in 30% narrowing per NASCET criteria.    No large vessel occlusion or hemodynamic stenosis       Assessment & Differential Dx:      Primary Problem  <principal problem not specified>    Active Hospital Problems    Diagnosis Date Noted    TIA (transient ischemic attack) [G45.9] 12/22/2019       73-year-old female with past medical history of stroke and TIA admitted to hospital as a transfer from outlying facility for work-up of TIA/stroke as patient presented with aphasia and altered mental status. No known history of any seizure.     Likely patient had an episode of TIA secondary to hypertensive emergency. Plan:     CT and CTA head and neck negative except 30% bilateral proximal ICA stenosis without any LVO  Permissive hypertension  MRI brain with and without contrast  We will load with aspirin full dose rectally if fails swallow study. Continue Lipitor 80 mg  Will start the patient on 50 cc normal saline as patient will be kept n.p.o. until she passes swallow study. PT/OT/speech evaluation  Risk factor modification  Follow-up on HbA1c and lipid panel  Follow-up on TSH  Last echocardiogram done 6/2019 shows EF 50 to 55%.       Drew Cheng MD  PGY-2, Internal medicine resident/neurology service   [facility] [facility], [facility], [facility]

## 2020-08-24 NOTE — PROGRESS NOTES
Occupational Therapy   Occupational Therapy Initial Assessment  Date: 2020   Patient Name: Mai Islas  MRN: [id number]     : 1950    Date of Service: 2020    Discharge Recommendations:  Further therapy recommended at discharge. The patient should be able to tolerate at least three hours of therapy per day over 5 days or 15 hours over 7 days. OT Equipment Recommendations  Equipment Needed: Yes  Mobility Devices: ADL Assistive Devices; Deleta Fried: Rolling  ADL Assistive Devices: Shower Chair with back    Assessment   Performance deficits / Impairments: Decreased functional mobility ; Decreased ADL status; Decreased cognition;Decreased high-level IADLs;Decreased fine motor control;Decreased safe awareness;Decreased strength;Decreased ROM; Decreased balance  Assessment: pt is unsafe to return home to prior living enviornment due to increased assistance required to complete functional mobility and ADLs. Pt would benefit from further therapy and assistance to increase independence and quality of life. Treatment Diagnosis: TIA  Prognosis: Good  Decision Making: Medium Complexity  Patient Education: pt edu on purpose of eval, POC, and purpose of gait belt. fair return  REQUIRES OT FOLLOW UP: Yes  Activity Tolerance  Activity Tolerance: Patient Tolerated treatment well;Treatment limited secondary to decreased cognition  Safety Devices  Safety Devices in place: Yes  Type of devices: Call light within reach;Gait belt;Patient at risk for falls; Left in chair;Nurse notified         Patient Diagnosis(es): The encounter diagnosis was Aphasia. has a past medical history of CHF (congestive heart failure) ([facility] [address] Diabetes mellitus ([facility] [address] H/O cardiac catheterization, H/O echocardiogram, History of echocardiogram, History of echocardiogram, Hyperlipidemia, and Hypertension. has a past surgical history that includes Cholecystectomy and Cardiac catheterization (Left, 2017).     Treatment Diagnosis: TIA      Restrictions  Restrictions/Precautions  Restrictions/Precautions: General Precautions, Fall Risk, Up as Tolerated  Required Braces or Orthoses?: No  Position Activity Restriction  Other position/activity restrictions: up as tolerated    Subjective   General  Patient assessed for rehabilitation services?: Yes  Family / Caregiver Present: No  Diagnosis: TIA  General Comment  Comments: RN ok'd for therapy this afternoon. Pt was agreeable and cooperative throughout session. pt demonstrated global aphasia  Patient Currently in Pain: Denies  Vital Signs  Patient Currently in Pain: Denies  Oxygen Therapy  O2 Device: None (Room air)     Social/Functional History  Social/Functional History  Lives With: Family(sister)  Type of Home: House  Home Layout: One level, Able to Live on Main level with bedroom/bathroom  Home Access: Level entry  Bathroom Shower/Tub: Tub/Shower unit  Bathroom Toilet: Standard  Bathroom Equipment: (none)  Home Equipment: Cane  ADL Assistance: Needs assistance(socks, pants, shirt. sister assists)  Homemaking Assistance: Needs assistance  Homemaking Responsibilities: No(sister does cooking, cleaning, laundry. Pt tries to assist as able.)  Ambulation Assistance: Needs assistance  Transfer Assistance: Needs assistance  Active : No  Additional Comments: Pt initially stated amb with cane. then that pt stays in bed and uses brief, unable to get to bathroom to toilet. uncertain reliability d/t aphasia. Objective   Vision: Within Functional Limits  Hearing: Within functional limits    Orientation  Overall Orientation Status: (NEGAR)     Balance  Sitting Balance: Stand by assistance(~15 min while EOB and in chair.)  Standing Balance: Minimal assistance(with RW)  Standing Balance  Time: ~ 3 min  Activity: pt complete functional mobility from bed>chair  Comment: pt had no LOB, but required VC and [place] assistance to place RUE on walker.  Pt required assistance for sequencing, problem solving and moving walker with RUE. ADL  Feeding: Modified independent   Grooming: Stand by assistance(pt washed face while sitting in chair. pt favored LUE during task only using RUE 15% of the time.)  UE Bathing: Moderate assistance  LE Bathing: Moderate assistance  UE Dressing: Moderate assistance  LE Dressing: Moderate assistance(pt donned B socks while sitting EOB. pt required assistance to thread B socks and then was able to complete task.)  Toileting: Moderate assistance  Additional Comments: pt favored LUE during ADL task only using RUE for about 25% of time. Tone RUE  RUE Tone: Normotonic  Tone LUE  LUE Tone: Normotonic  Coordination  Movements Are Fluid And Coordinated: No  Coordination and Movement description: Right UE;Fine motor impairments;Decreased accuracy; Decreased speed     Bed mobility  Supine to Sit: Stand by assistance  Sit to Supine: (pt retired to chair)  Scooting: Contact guard assistance  Transfers  Sit to stand: Contact guard assistance  Stand to sit: Contact guard assistance  Transfer Comments: with RW. pt required assistance with RUE hand placement on RW     Cognition  Overall Cognitive Status: Exceptions  Following Commands: Follows one step commands with increased time; Follows one step commands with repetition  Safety Judgement: Decreased awareness of need for assistance;Decreased awareness of need for safety  Problem Solving: Assistance required to generate solutions;Assistance required to implement solutions;Decreased awareness of errors;Assistance required to correct errors made;Assistance required to identify errors made  Insights: Decreased awareness of deficits  Initiation: Requires cues for some  Sequencing: Requires cues for some  Cognition Comment: pt demonstrated global aphasia     Sensation  Overall Sensation Status: WFL      LUE AROM (degrees)  LUE AROM : WFL  Left Hand AROM (degrees)  Left Hand AROM: WFL  RUE PROM (degrees)  RUE PROM: WFL  RUE AROM (degrees)  RUE AROM :

## 2020-08-24 NOTE — ED PROVIDER NOTES
differentiation is maintained without evidence of an acute infarct. There is no evidence of hydrocephalus. Remote cerebellar infarcts are noted as well as in high right frontal area. Atherosclerotic disease of the cavernous carotid and vertebral arteries are noted. Scattered hypodensity is present in the white matter consistent with chronic microvascular change. ORBITS: The visualized portion of the orbits demonstrate no acute abnormality. SINUSES: The visualized paranasal sinuses and mastoid air cells demonstrate no acute abnormality. SOFT TISSUES/SKULL:  No acute abnormality of the visualized skull or soft tissues. No acute intracranial abnormality. Senescent changes including chronic microvascular change and encephalomalacia compatible with remote infarcts in both cerebellar hemispheres and in the high posterior right frontal lobe. Critical results were called by Adina Berry MD to Darrold Mohs on 8/23/2020 at 15:56. Cta Head Neck W Contrast    Result Date: 8/23/2020  EXAMINATION: CTA OF THE HEAD AND NECK WITH CONTRAST 8/23/2020 4:43 pm: TECHNIQUE: CTA of the head and neck was performed with the administration of intravenous contrast. Multiplanar reformatted images are provided for review. MIP images are provided for review. Stenosis of the internal carotid arteries measured using NASCET criteria. Dose modulation, iterative reconstruction, and/or weight based adjustment of the mA/kV was utilized to reduce the radiation dose to as low as reasonably achievable. COMPARISON: CT angiogram 12/22/2019 HISTORY: ORDERING SYSTEM PROVIDED HISTORY: check for critical stenosis TECHNOLOGIST PROVIDED HISTORY: check for critical stenosis FINDINGS: CTA NECK: AORTIC ARCH/ARCH VESSELS: No dissection or arterial injury. No significant stenosis of the brachiocephalic or subclavian arteries.  CAROTID ARTERIES: Tortuous appearance of right common carotid arteries identified with likely areas motion involving the midportion right common carotid artery which gives the appearance of moderate soft plaque or less likely dissection. Moderate plaque identified both proximal ICAs. This results in 30% narrowing per NASCET criteria. No dissection, arterial injury, or hemodynamically significant stenosis by NASCET criteria. VERTEBRAL ARTERIES: No dissection, arterial injury, or significant stenosis. SOFT TISSUES: The lung apices demonstrate mild scattered areas of air trapping similar prior exam.. No cervical or superior mediastinal lymphadenopathy. The larynx and pharynx are unremarkable. No acute abnormality of the salivary and thyroid glands. BONES: Moderate degenerate changes C5-6. CTA HEAD: ANTERIOR CIRCULATION: No significant stenosis of the intracranial internal carotid, anterior cerebral, or middle cerebral arteries. No aneurysm. POSTERIOR CIRCULATION: No significant stenosis of the vertebral, basilar, or posterior cerebral arteries. No aneurysm. OTHER: No dural venous sinus thrombosis on this non-dedicated study. BRAIN: Generalized involutional change. Chronic small vessel ischemic disease     Motion through the right midportion common artery challenge evaluation may obscure underlying stenosis or intimal injury. Moderate plaque identified both proximal ICAs resulting in 30% narrowing per NASCET criteria.    No large vessel occlusion or hemodynamic stenosis       OUTSTANDING TASKS / ADDITIONAL COMMENTS   1. Bed placement     Saintclair Foreman, MD  Emergency Medicine Attending  Sushma Soares MD  08/24/20 6639

## 2020-08-24 NOTE — FLOWSHEET NOTE
[facility]     Emergency/Trauma Note    PATIENT NAME: Leobardo Talbot    Shift date: 08-23-20  Shift day: Sunday   Shift # 3    Room # 22/22   Name: Leobardo Talbot            Age: 81 y.o. Gender: female          Jehovah's witness: [address] [place] [address] of [employer]: Uknown    Trauma/Incident type: Adult Trauma Consult  Admit Date & Time: 8/23/2020  8:35 PM          PATIENT/EVENT DESCRIPTION:  Leobardo Talbot is a 81 y.o. female  Pt to be admitted to 22/22. SPIRITUAL ASSESSMENT/INTERVENTION:   check in with the patient. Daughter was at bedside. The patient and daughter did not need any spiritual care services at this time. The  did get the daughter some water. PATIENT BELONGINGS:  With patient    ANY BELONGINGS OF SIGNIFICANT VALUE NOTED:  NONE     REGISTRATION STAFF NOTIFIED? Yes      WHAT IS YOUR SPIRITUAL CARE PLAN FOR THIS PATIENT?:   Spiritual care will remain available for spiritual or emotional needs. 08/23/20 2231   Encounter Summary   Services provided to: Patient and family together   Referral/Consult From: Multi-disciplinary team   Support System Children   Continue Visiting   (08/23/20)   Complexity of Encounter Moderate   Length of Encounter 15 minutes   Crisis   Type ED Alert   Assessment Calm; Approachable   Intervention Active listening;Explored feelings, thoughts, concerns;Explored coping resources;Nurtured hope   Outcome Comfort;Expressed gratitude       Electronically signed by Savita Anguiano on 8/23/2020 at 10:32 PM.  [facility]  [phone]

## 2020-08-24 NOTE — PLAN OF CARE
Problem: HEMODYNAMIC STATUS  Goal: Patient has stable vital signs and fluid balance  Outcome: Ongoing     Problem: COMMUNICATION IMPAIRMENT  Goal: Ability to express needs and understand communication  Outcome: Ongoing     Problem: Falls - Risk of:  Goal: Will remain free from falls  Description: Will remain free from falls  Outcome: Ongoing  Goal: Absence of physical injury  Description: Absence of physical injury  Outcome: Ongoing     Problem: Skin Integrity:  Goal: Will show no infection signs and symptoms  Description: Will show no infection signs and symptoms  8/24/2020 1802 by Lieutenant Power RN  Outcome: Ongoing  8/24/2020 0942 by Renzo lLanos RN  Outcome: Ongoing  Goal: Absence of new skin breakdown  Description: Absence of new skin breakdown  8/24/2020 1802 by Lieutenant Power RN  Outcome: Ongoing  8/24/2020 0942 by Renzo Llanos RN  Outcome: Ongoing

## 2020-08-24 NOTE — PROGRESS NOTES
Physical Therapy    Facility/Department: [facility]  Initial Assessment    NAME: Tyler Sanchez  : 1950  MRN: [id number]    Chief Complaint   Patient presents with    Altered Mental Status       Date of Service: 2020    Discharge Recommendations:    Further therapy recommended at discharge and the patient should be able to tolerate at least 3 hours per day over 5 days or 15 hours over 7 days. PT Equipment Recommendations  Other: CTA, requries RW at this time to safetly attempt amb with skilled assistance    Assessment   Body structures, Functions, Activity limitations: Decreased functional mobility ; Decreased safe awareness;Decreased strength;Decreased balance;Decreased endurance  Assessment: Pt grossly CGA to Malcolm for mobility, amb 4' RW Malcolm. Pt would benefit from continued acute PT to address deficits, R deficits  Prognosis: Good  Decision Making: Medium Complexity  PT Education: Plan of Care;General Safety;PT Role;Gait Training  REQUIRES PT FOLLOW UP: Yes  Activity Tolerance  Activity Tolerance: Patient Tolerated treatment well       Patient Diagnosis(es): The encounter diagnosis was Aphasia. has a past medical history of CHF (congestive heart failure) ([address] Diabetes mellitus ([address] H/O cardiac catheterization, H/O echocardiogram, History of echocardiogram, History of echocardiogram, Hyperlipidemia, and Hypertension. has a past surgical history that includes Cholecystectomy and Cardiac catheterization (Left, 2017).     Restrictions  Restrictions/Precautions  Restrictions/Precautions: General Precautions, Fall Risk, Up as Tolerated  Required Braces or Orthoses?: No  Position Activity Restriction  Other position/activity restrictions: up as tolerated  Vision/Hearing  Vision: Within Functional Limits  Hearing: Within functional limits     Subjective  General  Chart Reviewed: Yes  Patient assessed for rehabilitation services?: Yes  Response To Previous Treatment: Not applicable  Family / R neglect requiring assist for RUE to \"find\" RW handle. RW running into chair on R. assist to navigate, sat on far R of chair.   Distance: 4' to chair  Stairs/Curb  Stairs?: No     Balance  Posture: Fair  Sitting - Static: Good  Sitting - Dynamic: Good;-  Standing - Static: Fair;+  Standing - Dynamic: Fair  Comments: RW used while assessing standing balance        Plan   Plan  Times per week: 5-6x/wk  Current Treatment Recommendations: Strengthening, Balance Training, Functional Mobility Training, Transfer Training, Gait Training, Endurance Training, Stair training, Home Exercise Program, Safety Education & Training, Patient/Caregiver Education & Training, Equipment Evaluation, Education, & procurement  Safety Devices  Type of devices: Call light within reach, Nurse notified, Gait belt, Patient at risk for falls, Left in chair, All fall risk precautions in place, Chair alarm in place  Restraints  Initially in place: No    AM-PAC Score  AM-PAC Inpatient Mobility Raw Score : 17 (08/24/20 1443)  AM-PAC Inpatient T-Scale Score : 42.13 (08/24/20 1443)  Mobility Inpatient CMS 0-100% Score: 50.57 (08/24/20 1443)  Mobility Inpatient CMS G-Code Modifier : CK (08/24/20 1443)          Goals  Short term goals  Time Frame for Short term goals: 14 visits  Short term goal 1: Pt will be Sarah bed mobility  Short term goal 2: Pt will be Sarah transfers  Short term goal 3: Pt will be Sarah amb 150' RW  Short term goal 4: Pt will navigate 2 steps Sarah for safe curb navigation       Therapy Time   Individual Concurrent Group Co-treatment   Time In 1319         Time Out 1347         Minutes 28         Timed Code Treatment Minutes: 8 Minutes       Estefania Miramontes, PT

## 2020-08-24 NOTE — ED PROVIDER NOTES
[facility] [address] [facility]     Emergency Department     Faculty Attestation        I performed a history and physical examination of the patient and discussed management with the resident. I reviewed the residents note and agree with the documented findings and plan of care. Any areas of disagreement are noted on the chart. I was personally present for the key portions of any procedures. I have documented in the chart those procedures where I was not present during the key portions. I have reviewed the emergency nurses triage note. I agree with the chief complaint, past medical history, past surgical history, allergies, medications, social and family history as documented unless otherwise noted below. For mid-level providers such as nurse practitioners as well as physicians assistants:    I have personally seen and evaluated the patient. I find the patient's history and physical exam are consistent with NP/PA documentation. I agree with the care provided, treatment rendered, disposition, & follow-up plan. Additional findings are as noted. Vital Signs: Pulse 65   Temp 98.3 °F (36.8 °C) (Oral)   Resp 20   Wt 190 lb (86.2 kg)   SpO2 95%   BMI 32.61 kg/m²   PCP:  SUE Manuel - CNP    Pertinent Comments:     Patient with stroke symptoms seen at outlying facility had negative CT brain and no large vessel occlusion on CTA.   Will consult neuro, admission    Critical Care  None          Army Raul MD  Attending Emergency Medicine Physician              Odin Farr MD  08/23/20 5721

## 2020-08-24 NOTE — ED NOTES
Bed: 22  Expected date: 8/23/20  Expected time: 8:10 PM  Means of arrival: [facility]  Comments:  [facility] transfer  69F    AMS    Expressive aphasia, right sided numbness      CT head negative    CTA pending  Neuro aware  Dr. Nate Ngo accepted     Tam Patton, MELVIN  08/23/20 2035

## 2020-08-24 NOTE — CONSULTS
use.    Family History:     Family History   Problem Relation Age of Onset    Coronary Art Dis Father          from [place]   [employer] COPD Sister         O2 dep    Coronary Art Dis Brother         2 brothers  from [place]       Review of Systems:       Constitutional Negative for fever and chills   HEENT Negative for ear discharge, ear pain, nosebleed   Eyes Negative for photophobia, pain and discharge   Respiratory Negative for hemoptysis and sputum   Cardiovascular Negative for orthopnea, claudication and PND   Gastrointestinal Negative for abdominal pain, diarrhea, blood in stool   Musculoskeletal Negative for joint pain, negative for myalgia   Skin Negative for rash or itching   hematology Negative for ecchymosis, anemia   Psychiatric Negative for suicidal ideation, anxiety, depression, hallucinations       Physical Exam:   Pulse 65   Temp 98.3 °F (36.8 °C) (Oral)   Resp 20   Wt 190 lb (86.2 kg)   SpO2 95%   BMI 32.61 kg/m²   Temp (24hrs), Av °F (36.7 °C), Min:97.6 °F (36.4 °C), Max:98.3 °F (36.8 °C)        General examination:      General Appearance:  alert, well appearing, and in no acute distress  HEENT: Normocephalic, atraumatic, moist mucus membranes  Neck: supple, no carotid bruits, (-) nuchal rigidity  Lungs:  Respirations unlabored, chest wall no deformity, BS normal  Cardiovascular: normal rate, regular rhythm  Abdomen: Soft, nontender, nondistended, normal bowel sounds  Skin: No gross lesions, rashes, bruising or bleeding on exposed skin area  Extremities:  peripheral pulses palpable, no cyanosis, clubbing or edema  Psych: normal affect      Neurological examination:      Mental status   Alert and oriented to place and person but not to time  speech is mildly dysarthric with some aphasia.       Cranial nerves   II - visual fields intact to confrontation; pupils reactive  III, IV, VI - extraocular muscles intact; no MARIO; no nystagmus; no ptosis   V - normal facial sensation VII - normal facial symmetry                                                             VIII - intact hearing                                                                             IX, X - symmetrical palate elevation                                               XI - symmetrical shoulder shrug                                                       XII - midline tongue without atrophy or fasciculation     Motor function  Strength:   4/5 RUE, 45/5 RLE  4/5 LUE, 4/5  LLE  Normal bulk and tone. Sensory function Intact to touch, pin, vibration, proprioception throughout     Cerebellar  abnormal finger-nose-finger testing. Intact heel-shin testing. Reflex function 2/4 symmetric throughout . Downgoing plantar response bilaterally. (-)Nelson's sign bilaterally      Gait                  Normal station and gait           Diagnostics:      Laboratory Testing:  CBC:   Recent Labs     08/23/20  1538   WBC 6.2   HGB 14.6        BMP:    Recent Labs     08/23/20  1538 08/23/20  1556   *  --    K 4.1  --    CL 99  --    CO2 25  --    BUN 19  --    CREATININE 0.90  --    GLUCOSE 175* 140         Lab Results   Component Value Date    CHOL 172 12/23/2019    LDLCHOLESTEROL 104 12/23/2019    HDL 33 (L) 12/23/2019    TRIG 175 (H) 12/23/2019    ALT 15 08/23/2020    AST 17 08/23/2020    TSH 1.14 08/01/2017    INR 1.0 08/23/2020    LABA1C 13.1 (H) 12/23/2019       No results found for: PHENYTOIN, PHENYTOIN, VALPROATE, CBMZ      Imaging/Diagnostics:  Ct Head Wo Contrast    Result Date: 8/23/2020  EXAMINATION: CT OF THE HEAD WITHOUT CONTRAST  8/23/2020 3:40 pm TECHNIQUE: CT of the head was performed without the administration of intravenous contrast. Dose modulation, iterative reconstruction, and/or weight based adjustment of the mA/kV was utilized to reduce the radiation dose to as low as reasonably achievable.  COMPARISON: 22 December 2019 HISTORY: ORDERING SYSTEM PROVIDED HISTORY: aphasia TECHNOLOGIST PROVIDED HISTORY: Aphasia FINDINGS: BRAIN/VENTRICLES: There is no acute intracranial hemorrhage, mass effect or midline shift. No abnormal extra-axial fluid collection. The gray-white differentiation is maintained without evidence of an acute infarct. There is no evidence of hydrocephalus. Remote cerebellar infarcts are noted as well as in high right frontal area. Atherosclerotic disease of the cavernous carotid and vertebral arteries are noted. Scattered hypodensity is present in the white matter consistent with chronic microvascular change. ORBITS: The visualized portion of the orbits demonstrate no acute abnormality. SINUSES: The visualized paranasal sinuses and mastoid air cells demonstrate no acute abnormality. SOFT TISSUES/SKULL:  No acute abnormality of the visualized skull or soft tissues. No acute intracranial abnormality. Senescent changes including chronic microvascular change and encephalomalacia compatible with remote infarcts in both cerebellar hemispheres and in the high posterior right frontal lobe. Critical results were called by Leonela Marsh MD to Mirian Gamez on 8/23/2020 at 15:56. Cta Head Neck W Contrast    Result Date: 8/23/2020  EXAMINATION: CTA OF THE HEAD AND NECK WITH CONTRAST 8/23/2020 4:43 pm: TECHNIQUE: CTA of the head and neck was performed with the administration of intravenous contrast. Multiplanar reformatted images are provided for review. MIP images are provided for review. Stenosis of the internal carotid arteries measured using NASCET criteria. Dose modulation, iterative reconstruction, and/or weight based adjustment of the mA/kV was utilized to reduce the radiation dose to as low as reasonably achievable.  COMPARISON: CT angiogram 12/22/2019 HISTORY: ORDERING SYSTEM PROVIDED HISTORY: check for critical stenosis TECHNOLOGIST PROVIDED HISTORY: check for critical stenosis FINDINGS: CTA NECK: AORTIC ARCH/ARCH VESSELS: No dissection or arterial injury. No significant stenosis of the brachiocephalic or subclavian arteries. CAROTID ARTERIES: Tortuous appearance of right common carotid arteries identified with likely areas motion involving the midportion right common carotid artery which gives the appearance of moderate soft plaque or less likely dissection. Moderate plaque identified both proximal ICAs. This results in 30% narrowing per NASCET criteria. No dissection, arterial injury, or hemodynamically significant stenosis by NASCET criteria. VERTEBRAL ARTERIES: No dissection, arterial injury, or significant stenosis. SOFT TISSUES: The lung apices demonstrate mild scattered areas of air trapping similar prior exam.. No cervical or superior mediastinal lymphadenopathy. The larynx and pharynx are unremarkable. No acute abnormality of the salivary and thyroid glands. BONES: Moderate degenerate changes C5-6. CTA HEAD: ANTERIOR CIRCULATION: No significant stenosis of the intracranial internal carotid, anterior cerebral, or middle cerebral arteries. No aneurysm. POSTERIOR CIRCULATION: No significant stenosis of the vertebral, basilar, or posterior cerebral arteries. No aneurysm. OTHER: No dural venous sinus thrombosis on this non-dedicated study. BRAIN: Generalized involutional change. Chronic small vessel ischemic disease     Motion through the right midportion common artery challenge evaluation may obscure underlying stenosis or intimal injury. Moderate plaque identified both proximal ICAs resulting in 30% narrowing per NASCET criteria. No large vessel occlusion or hemodynamic stenosis           Impression:      27-year-old female with past medical history of stroke and TIA admitted to hospital as a transfer from outlying facility for work-up of TIA/stroke as patient presented with aphasia and altered mental status. No known history of any seizure.     Likely patient had an episode of TIA secondary to

## 2020-08-24 NOTE — CARE COORDINATION
Case Management Initial Discharge Plan  Gregoria Maya,             Met with:patients daughter Kim Johnson to discuss discharge plans. Information verified: address, contacts, phone number, , insurance Yes    Emergency Contact/Next of Kin name & number: Kim Johnson Daughter [phone], Sister Jules Men [phone]    PCP: Juma Sanders, APRN - CNP  Date of last visit: one month    Insurance Provider: medicare    Discharge Planning    Living Arrangements:    lives with sister Lucille Hutchison in [address]:       Home has 1 stories  3 stairs to climb to get into front door, no stairs to climb to reach second floor  Location of bedroom/bathroom in home main    Patient able to perform ADL's:Independent    Current Services (outpatient & in home) none  DME equipment: has a walker in her home but it is not prescribed to her, daughter could not find glucometer in the home  DME provider: none    Receiving oral anticoagulation therapy? No, except asa    If indicated:   Physician managing anticoagulation treatment: none  Where does patient obtain lab work for ATC treatment? none      Potential Assistance Needed:       Patient agreeable to home care: No  [place] of choice provided:  n/a    Prior SNF/Rehab Placement and Facility: none  Agreeable to SNF/Rehab: No  [place] of choice provided: agreeable to Rehab, choice is [facility]     Evaluation: n/a    Expected Discharge date:       Patient expects to be discharged to: Follow Up Appointment: Best Day/ Time:      Transportation provider: family   Transportation arrangements needed for discharge: No    Readmission Risk              Risk of Unplanned Readmission:        10             Does patient have a readmission risk score greater than 14?: No  If yes, follow-up appointment must be made within 7 days of discharge.      Goals of Care: self care, has some right deficits      Discharge Plan: agree able to Acute Rehab if needed, [facility]

## 2020-08-24 NOTE — PROGRESS NOTES
Department of Endovascular Neurosurgery                                         Resident Consult Note        Reason for Consult:   Aphasia  Endovascular Neurosurgeon: Dr Mini Loya       History Obtained From:  patient, electronic medical record    CHIEF COMPLAINT:       Altered Mental Status    HISTORY OF PRESENT ILLNESS:       The patient is a 81 y.o. female with past medical history significant for essential hypertension, hyperlipidemia, acute on chronic combined CHF, CKD stage III, morbid obesity, history of cryptogenic stroke(bilateral cerebellar hemispheres and bilateral frontal and anterior right parietal lobes, 6 lacunar infarct in bilateral thalami) came to ED as a transfer from outlying facility where there was a concern for expressive and receptive aphasia. She also was apparently complaining of some weakness/altered sensorium. As per notes patient was last known 1 AM by her sister who she lives with her check on her. This morning it was noted that she was not normal and was acting sort of weird and appeared to be slightly weak.      On presentation lab work done showed sodium 134, potassium 4.1, creatinine 0.91, glucose 140, calcium 10.2, WBC 6.2, UA negative for UTI. On presentation systolic blood pressure 285.     CTA head and neck done which showed moderate plaque identified both proximal ICA resulting in 30% narrowing, no LVO, CTA head negative as well.     On my evaluation patient was mildly dysarthric with some ataxic head movements, patient oriented to place and person but not to time.        LWK:  NIHSS:  PAST MEDICAL HISTORY :       Past Medical History:        Diagnosis Date    [facility]     Diabetes mellitus ([facility] [address]     H/O cardiac catheterization 08/04/2017    LMCA: Mild irregularites 10-20%. LAD: Mild irregularites 10-20%. LCx: Mild irregularites 10-20%. RCA: Mild irregularites 10-20%. LV function assessed as : Abnormal. EF of 40%.     H/O echocardiogram 12/18/2018    EF 55%. Mildly increased LV wall thickness. The left atrium appears moderately to severely dilated. Moderate to severe mitral regurg. Moderate pulmonary HTN with an estimated RV systolic pressure of 24RMWN. Moderate tricuspid regurg. Evidnece fo moderate diastolic dysfunction is seen.  History of echocardiogram 01/17/2019    EF 55%. LV wall thickness moderately increased. LA is mildly dilated w/ LA volume index of 30. trivial mitral regurg. Evidence of moderate (grade II) diastolic dysfunction seen.  History of echocardiogram 06/03/2019    EF of 50-55%. LV wall thickness is mildly increased. LA is mildly dilated (29-33) with a left atrial volume index of 31 m1/m2. mild diastolic dysfunction.  Hyperlipidemia     Hypertension        Past Surgical History:        Procedure Laterality Date    CARDIAC CATHETERIZATION Left 08/04/2017    right radial, [facility] Dr. Ruel Rocha         Social History:   Social History     Socioeconomic History    Marital status:       Spouse name: Not on file    Number of children: Not on file    Years of education: Not on file    Highest education level: Not on file   Occupational History    Not on file   Social Needs    Financial resource strain: Not on file    Food insecurity     Worry: Not on file     Inability: Not on file    Transportation needs     Medical: Not on file     Non-medical: Not on file   Tobacco Use    Smoking status: Never Smoker    Smokeless tobacco: Never Used   Substance and Sexual Activity    Alcohol use: No    Drug use: No    Sexual activity: Not Currently   Lifestyle    Physical activity     Days per week: Not on file     Minutes per session: Not on file    Stress: Not on file   Relationships    Social connections     Talks on phone: Not on file     Gets together: Not on file     Attends [employer] service: Not on file     Active member of club or organization: Not on file     Attends meetings of Resp 20   Wt 190 lb (86.2 kg)   SpO2 95%   BMI 32.61 kg/m²     CONSTITUTIONAL:  Well developed, well nourished,Alert and oriented to place and person but not to time  speech is mildly dysarthric with some aphasia. HEAD:  normocephalic, atraumatic    EYES:  PERRLA, EOMI.   ENT:  moist mucous membranes   NECK:  supple, symmetric, no midline tenderness to palpation    BACK:  without midline tenderness, step-offs or deformities    LUNGS:  Equal air entry bilaterally   CARDIOVASCULAR:  normal s1 / s2   ABDOMEN:  Soft, no rigidity   NEUROLOGIC:  Mental Status:  Not oriented to date,awake             Cranial Nerves:    cranial nerves II-XII are grossly intact    Motor Exam:    Drift:  absent  Tone:  normal    3/5    Sensory:    Touch:    Right Upper Extremity:  normal  Left Upper Extremity:  normal  Right Lower Extremity:  normal  Left Lower Extremity:  normal    Deep Tendon Reflexes:    Right Bicep:  2+  Left Bicep:  2+  Right Knee:  2+  Left Knee:  2+    Plantar Response:  Right:  downgoing  Left:  downgoing    Clonus:  absent  Nelson's:  absent    Coordination/Dysmetria:  Heel to Shin:  Right:  abnormal -   Left:  abnormal -   Finger to Nose:   Right:  abnormal -    Dysdiadochokinesia:  absent    Gait:  normal    INITIAL NIH STROKE SCALE:    Time Performed:  9.20 PM     1a. Level of consciousness:  0 - alert; keenly responsive  1b. Level of consciousness questions:  0 - answers both questions correctly  1c. Level of consciousness questions:  0 - performs both tasks correctly  2. Best Gaze:  0 - normal  3. Visual:  0 - no visual loss  4. Facial Palsy:  0 - normal symmetric movement  5a. Motor left arm:  0 - no drift, limb holds 90 (or 45) degrees for full 10 seconds  5b. Motor right arm:  0 - no drift, limb holds 90 (or 45) degrees for full 10 seconds  6a. Motor left le - no drift; leg holds 30 degree position for full 5 seconds  6b.   Motor right le - no drift; leg holds 30 degree position for full 5 seconds  7. Limb Ataxia:  1 - present in one limb  8. Sensory:  0 - normal; no sensory loss  9. Best Language:  1 - mild to moderate aphasia; some obvious loss of fluency or facility of comprehension without significant limitation on ideas expressed or form of expression. Reduction of speech and/or comprehension, however, makes conversation about provided materials difficult or impossible. For example, in conversation about provided materials, examiner can identify picture or naming card content from patient's response. 10.  Dysarthria:  1 - mild to moderate, patient slurs at least some words and at worst, can be understood with some difficulty  11.   Extinction and Inattention:  0 - no abnormality    TOTAL:  3     SKIN:  no rash      Modified [place] Score Scale:     [] Zero: No symptoms at all   [] 1: No significant disability despite symptoms; able to carry out all usual duties and activities   [] 2: Slight disability; unable to carry out all previous activities, but able to look after own affairs without assistance   [] 3:Moderate disability; requiring some help, but able to walk without assistance   [] 4: Moderately severe disability; unable to walk and attend to bodily needs without assistance   [] 5:Severe disability; bedridden, incontinent and requiring constant nursing care and attention    LABS AND IMAGING:     CBC with Differential:    Lab Results   Component Value Date    WBC 6.2 08/23/2020    RBC 4.96 08/23/2020    HGB 14.6 08/23/2020    HCT 44.5 08/23/2020     08/23/2020    MCV 89.7 08/23/2020    MCH 29.4 08/23/2020    MCHC 32.8 08/23/2020    RDW 12.1 08/23/2020    LYMPHOPCT 18 08/23/2020    MONOPCT 8 08/23/2020    BASOPCT 1 08/23/2020    MONOSABS 0.49 08/23/2020    LYMPHSABS 1.15 08/23/2020    EOSABS 0.09 08/23/2020    BASOSABS 0.03 08/23/2020    DIFFTYPE NOT REPORTED 08/23/2020     BMP:    Lab Results   Component Value Date     08/23/2020    K 4.1 08/23/2020    CL 99 08/23/2020    CO2 25 08/23/2020    BUN 19 08/23/2020    LABALBU 4.1 08/23/2020    CREATININE 0.90 08/23/2020    CALCIUM 10.2 08/23/2020    GFRAA >60 08/23/2020    LABGLOM >60 08/23/2020    GLUCOSE 140 08/23/2020         ASSESSMENT AND PLAN:       Patient Active Problem List   Diagnosis    Hypertension    Hyperlipidemia    [facility]    Hypertensive urgency    Idiopathic cardiomyopathy ([facility] [address]    Hypertensive emergency    Chronic combined systolic and diastolic HF (heart failure), NYHA class 2 (HCC)    Acute on chronic combined systolic and diastolic CHF, NYHA class 4 ([facility]    Hypoxia    Severe mitral regurgitation by prior echocardiogram    Morbid obesity ([facility]    CKD (chronic kidney disease) stage 3, GFR 30-59 ml/min ([facility])    Physical deconditioning    TIA (transient ischemic attack)    Old lacunar stroke without late effect    Dysarthria    Cryptogenic stroke ([facility] [address]       Assessment              60-year-old female with past medical history of stroke and TIA [facility] as patient presented with aphasia and altered mental status. No known history of any seizure.     Likely patient had an episode of TIA secondary to hypertensive emergency. 1. Last Known Well (date and time): 1 am     2. Candidate for IV tPA therapy     Yes []     No  [x]      Contraindications for IV tPA:   ? Symptom onset is unknown, > 4.5 hours, or if patient awoke with stroke   ? Acute or previous intracranial hemorrhage   ? Imaging showing extensive regions of irreversible injury (hypoattenuation)   ? Prior ischemic stroke, severe head trauma, or intracranial/intraspinal surgery within 3 months   ? Symptoms of subarachnoid hemorrhage   ? GI malignancy or GI bleed within 21 days   ? Coagulopathy: (Platelets < 326, 561/BF³, INR > 1.7, aPTT > 40 s, PT > 15 s)   ?  Treatment dose of low

## 2020-08-25 ENCOUNTER — APPOINTMENT (OUTPATIENT)
Dept: CARDIAC CATH/INVASIVE PROCEDURES | Age: 70
DRG: 041 | End: 2020-08-25
Payer: MEDICARE

## 2020-08-25 ENCOUNTER — APPOINTMENT (OUTPATIENT)
Dept: GENERAL RADIOLOGY | Age: 70
DRG: 041 | End: 2020-08-25
Payer: MEDICARE

## 2020-08-25 PROBLEM — E11.9 TYPE 2 DIABETES MELLITUS, WITHOUT LONG-TERM CURRENT USE OF INSULIN (HCC): Status: ACTIVE | Noted: 2020-08-25

## 2020-08-25 LAB
GLUCOSE BLD-MCNC: 127 MG/DL (ref 65–105)
GLUCOSE BLD-MCNC: 170 MG/DL (ref 65–105)
LV EF: 38 %
LVEF MODALITY: NORMAL

## 2020-08-25 PROCEDURE — 6370000000 HC RX 637 (ALT 250 FOR IP): Performed by: INTERNAL MEDICINE

## 2020-08-25 PROCEDURE — 2580000003 HC RX 258: Performed by: INTERNAL MEDICINE

## 2020-08-25 PROCEDURE — 2709999900 HC NON-CHARGEABLE SUPPLY

## 2020-08-25 PROCEDURE — 74230 X-RAY XM SWLNG FUNCJ C+: CPT

## 2020-08-25 PROCEDURE — 93312 ECHO TRANSESOPHAGEAL: CPT

## 2020-08-25 PROCEDURE — 2500000003 HC RX 250 WO HCPCS

## 2020-08-25 PROCEDURE — APPSS45 APP SPLIT SHARED TIME 31-45 MINUTES: Performed by: NURSE PRACTITIONER

## 2020-08-25 PROCEDURE — 0JH632Z INSERTION OF MONITORING DEVICE INTO CHEST SUBCUTANEOUS TISSUE AND FASCIA, PERCUTANEOUS APPROACH: ICD-10-PCS | Performed by: INTERNAL MEDICINE

## 2020-08-25 PROCEDURE — 2060000000 HC ICU INTERMEDIATE R&B

## 2020-08-25 PROCEDURE — 6360000002 HC RX W HCPCS

## 2020-08-25 PROCEDURE — C1764 EVENT RECORDER, CARDIAC: HCPCS

## 2020-08-25 PROCEDURE — 93325 DOPPLER ECHO COLOR FLOW MAPG: CPT

## 2020-08-25 PROCEDURE — 92611 MOTION FLUOROSCOPY/SWALLOW: CPT

## 2020-08-25 PROCEDURE — B24BZZ4 ULTRASONOGRAPHY OF HEART WITH AORTA, TRANSESOPHAGEAL: ICD-10-PCS | Performed by: INTERNAL MEDICINE

## 2020-08-25 PROCEDURE — 92523 SPEECH SOUND LANG COMPREHEN: CPT

## 2020-08-25 PROCEDURE — 82947 ASSAY GLUCOSE BLOOD QUANT: CPT

## 2020-08-25 PROCEDURE — 99232 SBSQ HOSP IP/OBS MODERATE 35: CPT | Performed by: PSYCHIATRY & NEUROLOGY

## 2020-08-25 PROCEDURE — 33285 INSJ SUBQ CAR RHYTHM MNTR: CPT | Performed by: INTERNAL MEDICINE

## 2020-08-25 RX ORDER — ASPIRIN 81 MG/1
81 TABLET, CHEWABLE ORAL DAILY
Status: DISCONTINUED | OUTPATIENT
Start: 2020-08-25 | End: 2020-08-26 | Stop reason: HOSPADM

## 2020-08-25 RX ORDER — ATORVASTATIN CALCIUM 80 MG/1
80 TABLET, FILM COATED ORAL NIGHTLY
Status: DISCONTINUED | OUTPATIENT
Start: 2020-08-25 | End: 2020-08-26 | Stop reason: HOSPADM

## 2020-08-25 RX ORDER — ASPIRIN 300 MG/1
600 SUPPOSITORY RECTAL DAILY
Status: DISCONTINUED | OUTPATIENT
Start: 2020-08-25 | End: 2020-08-26 | Stop reason: HOSPADM

## 2020-08-25 RX ORDER — SODIUM CHLORIDE 0.9 % (FLUSH) 0.9 %
10 SYRINGE (ML) INJECTION PRN
Status: DISCONTINUED | OUTPATIENT
Start: 2020-08-25 | End: 2020-08-26 | Stop reason: HOSPADM

## 2020-08-25 RX ORDER — SODIUM CHLORIDE 0.9 % (FLUSH) 0.9 %
10 SYRINGE (ML) INJECTION EVERY 12 HOURS SCHEDULED
Status: DISCONTINUED | OUTPATIENT
Start: 2020-08-25 | End: 2020-08-26 | Stop reason: HOSPADM

## 2020-08-25 RX ADMIN — ATORVASTATIN CALCIUM 80 MG: 80 TABLET, FILM COATED ORAL at 21:24

## 2020-08-25 RX ADMIN — Medication 10 ML: at 21:24

## 2020-08-25 RX ADMIN — CLOPIDOGREL 75 MG: 75 TABLET, FILM COATED ORAL at 16:20

## 2020-08-25 RX ADMIN — ASPIRIN 81 MG: 81 TABLET, CHEWABLE ORAL at 16:21

## 2020-08-25 RX ADMIN — Medication 10 ML: at 16:22

## 2020-08-25 NOTE — PROGRESS NOTES
Paged on call neuro resident to inform them that patient had 5 beat run of v-tach and is throwing occasional pvc's. Will await new orders/reccomendations from provider and continue to monitor at this time.

## 2020-08-25 NOTE — PROGRESS NOTES
Speech Language Pathology  Facility/Department: [facility]  Initial Speech/Language/Cognitive Assessment    NAME: Eduardo Casper  : 1950   MRN: [id number]  ADMISSION DATE: 2020  ADMITTING DIAGNOSIS: has Hypertension; Hyperlipidemia; Acute on chronic systolic CHF (congestive heart failure) ([address] Hypertensive urgency; Idiopathic cardiomyopathy ([address] Hypertensive emergency; Chronic combined systolic and diastolic HF (heart failure), NYHA class 2 ([address] Acute on chronic combined systolic and diastolic CHF, NYHA class 4 ([address] Hypoxia; Severe mitral regurgitation by prior echocardiogram; Morbid obesity (HCC); CKD (chronic kidney disease) stage 3, GFR 30-59 ml/min ([address] Physical deconditioning; TIA (transient ischemic attack); Old lacunar stroke without late effect; Dysarthria; Stroke determined by clinical assessment ([address] and Aphasia on their problem list.      Date of Eval: 2020   Evaluating Therapist: SHIALA Gamboa    RECENT RESULTS  CT OF HEAD/MRI:   Impression    Acute infarct in the left middle cerebral artery territory.           Primary Complaint: The patient is M 82 y.o. female with past medical history significant for essential hypertension, hyperlipidemia, acute on chronic combined CHF, CKD stage III, morbid obesity, history of cryptogenic stroke(bilateral cerebellar hemispheres and bilateral frontal and anterior right parietal lobes, 6 lacunar infarct in bilateral thalami) came to ED as a transfer from outlying facility where there was a concern for expressive and receptive aphasia. Carlita Brewer also was apparently complaining of some weakness/altered sensorium.  As per notes patient was last known 1 AM by her sister who she lives with her check on her.  This morning it was noted that she was not normal and was acting sort of weird and appeared to be slightly weak.      On presentation lab work done showed sodium 134, potassium 4.1, creatinine 0.91, glucose 140, calcium 10.2, WBC

## 2020-08-25 NOTE — CARE COORDINATION
Dr Rj sofia served for PMR eval order    Transitional planning[de-identified]    ANKUR done today, loop recorder inplant, swallow study today  Goal - PRM eval and if approp Syl DOMINIQUE

## 2020-08-25 NOTE — PROCEDURES
[facility]  Transesophageal Echocardiogram       Today's Date:  8/25/2020  Indication:   CVA    Operators:  Primary:   Ladan Mendoza MD (Attending Physician)  Assistant:   Prabha Mcclure MD (Cardiovascular Fellow)      Pre Procedure Conscious Sedation Data:    ASA Class:    [] I [x] II [] III [] IV    Mallampati Class:  [] I [x] II [] III [] IV    Procedure:    Patient seen and examined. History and Physical reviewed. Labs reviewed. After informed consent was obtained with explanation of the risks and benefits, the patient was brought to cardiac cath lab. All sedation was administered by the cardiologist. The oropharynx was pre-anesthesized with viscous lidocaine and cetacaine spray. The ultrasound probe was passed without any difficulty. ANKUR findings:    LA:  Dilated  HEMA:  No thrombus  RA:  Dilated  RV:   Normal  LV:  Normal  Estimated LVEF:  Mild to moderately reduced EF    Aorta:   Mild atheromatous disease arch  Pericardium: No pericardial effusion  Septum:  No intracardiac shunt via color Doppler. No intracardiac shunt via injection of agitated saline. Valves:    Mitral Valve: Structurally normal. Mild regurgitation is identified. Aortic Valve: The aortic valve is trileaflet and opens adequately. No regurgitiation is identified. Tricuspid valve: Structurally normal. MIld regurgitation is identified. Pulmonary valve: Normal. No significant regurgitation    No valvular vegetations or thrombus identified. Summary:     1. A ANKUR was performed without complications. 2. Mild to moderately reduced LVEF; 35-40%  3. No thrombus or valvular vegetation identified  4. No intracardiac shunt via color Doppler. No intracardiac shunt via injection of agitated saline. 5. There were no complications encountered.     Electronically signed by Prabha Mcclure MD on 8/25/2020 at 12:45 PM  Cardiovascular Diseases Fellow  [facility]    I was present during entire procedure and performed all critical elements of the procedure.     Khari Barraza MD

## 2020-08-25 NOTE — PLAN OF CARE
Problem: HEMODYNAMIC STATUS  Goal: Patient has stable vital signs and fluid balance  8/25/2020 1604 by Javad Murillo RN  Outcome: Ongoing  8/25/2020 0306 by Monica Diaz RN  Outcome: Met This Shift     Problem: COMMUNICATION IMPAIRMENT  Goal: Ability to express needs and understand communication  8/25/2020 1604 by Javad Murillo RN  Outcome: Ongoing  8/25/2020 0306 by Monica Diaz RN  Outcome: Met This Shift     Problem: Falls - Risk of:  Goal: Will remain free from falls  Description: Will remain free from falls  8/25/2020 1604 by Javad Murillo RN  Outcome: Ongoing  8/25/2020 0306 by Monica Diaz RN  Outcome: Met This Shift  Goal: Absence of physical injury  Description: Absence of physical injury  8/25/2020 1604 by Javad Murillo RN  Outcome: Ongoing  8/25/2020 0306 by Monica Diaz RN  Outcome: Met This Shift     Problem: Skin Integrity:  Goal: Will show no infection signs and symptoms  Description: Will show no infection signs and symptoms  8/25/2020 1604 by Javad Murillo RN  Outcome: Ongoing  8/25/2020 0306 by Monica Diaz RN  Outcome: Met This Shift  Goal: Absence of new skin breakdown  Description: Absence of new skin breakdown  8/25/2020 1604 by Javad Murillo RN  Outcome: Ongoing  8/25/2020 0306 by Monica Diaz RN  Outcome: Met This Shift

## 2020-08-25 NOTE — CARE COORDINATION
[place] Review summary and nH Predict tool have been uploaded into the patient's chart and can be viewed under the media tab. Accompanying e-mail is as follows:    Please see the attached completed nH Predict Outcome Report for patient Lizbeth HELM 1950 which recommends SNF with ELOS of 16 days on average, over home with Kristian [address] of 12 home visits on average. Patient will require 3 hours/day for basic mobility, 2.5 hours/day for daily activities, and frequent supervision. Per EMR review patient lives with her sister, and required some assistance with her ADLs/IADLs. Per EMR review, [facility] Per prior email, patient does not currently meet criteria for ARU (IRF) level of care at this time. Patient had a run of Tiffany Kapoor, and cardiology was consulted. Please see full IQ report in prior email for other reasons that patient meet ARU level of care. Sonam Colorado RN, BSN  Centralized Care Coordinator  C: [phone]  O: [phone] ext.  Lauren 39:  [phone]

## 2020-08-25 NOTE — CONSULTS
[address] / HISTORY AND PHYSICAL EXAMINATION            Date:   8/25/2020  Patient name:  Bunny Jackson  Date of admission:  8/23/2020  8:35 PM  MRN:   [id number]  Account:  [de-identified]  YOB: 1950  PCP:    SUE Borja CNP  Room:   2024/2024-01  Code Status:    Full Code    Physician Requesting Consult: Tanvi Salcedo MD    Reason for Consult:  DM and B/P management      Chief Complaint:     Chief Complaint   Patient presents with    Altered Mental Status       History Obtained From:     patient, electronic medical record    History of Present Illness:       Per primary provider note:    Briefly, this patient is a 81-year-old female patient with past medical history of hypertension, hyperlipidemia diabetes, CHF, CKD Stage 3, history of cryptogenic strokes who came transferred from [facility] for stroke evaluation due to wake-up symptoms of expressive more than receptive aphasia, right visual field cut. CT head was unremarkable for any new acute pathology and CTA head and neck showed moderate plaque in both proximal ICA resulting in 3% narrowing but no LVO. Admitted for stroke evaluation. 8/25/2020: ANKUR and loop recorder placed      Past Medical History:     Past Medical History:   Diagnosis Date    [facility]est[facility]     Diabetes mellitus ([facility]     H/O cardiac catheterization 08/04/2017    LMCA: Mild irregularites 10-20%. LAD: Mild irregularites 10-20%. LCx: Mild irregularites 10-20%. RCA: Mild irregularites 10-20%. LV function assessed as : Abnormal. EF of 40%.  H/O echocardiogram 12/18/2018    EF 55%. Mildly increased LV wall thickness. The left atrium appears moderately to severely dilated. Moderate to severe mitral regurg. Moderate pulmonary HTN with an estimated RV systolic pressure of 58GXDY. Moderate tricuspid regurg.  Evidnece fo moderate diastolic dysfunction is seen.     History of echocardiogram 2019    EF 55%. LV wall thickness moderately increased. LA is mildly dilated w/ LA volume index of 30. trivial mitral regurg. Evidence of moderate (grade II) diastolic dysfunction seen.  History of echocardiogram 2019    EF of 50-55%. LV wall thickness is mildly increased. LA is mildly dilated (29-33) with a left atrial volume index of 31 m1/m2. mild diastolic dysfunction.  Hyperlipidemia     Hypertension         Past Surgical History:     Past Surgical History:   Procedure Laterality Date    CARDIAC CATHETERIZATION Left 2017    right radial, MHT Dr. Zoie Rosado          Medications Prior to Admission:     Prior to Admission medications    Medication Sig Start Date End Date Taking? Authorizing Provider   carvedilol (COREG) 12.5 MG tablet Take 1 tablet by mouth 2 times daily (with meals) 20   Mike Izquierdo MD   losartan-hydrochlorothiazide [facility]) 100-25 MG per tablet Take 1 tablet by mouth daily 20   Mike Izquierdo MD   amLODIPine (NORVASC) 10 MG tablet Take 1 tablet by mouth daily 20   Mike Izquierdo MD   atorvastatin (LIPITOR) 80 MG tablet Take 1 tablet by mouth nightly 20   Mike Izquierdo MD   aspirin 81 MG EC tablet Take 1 tablet by mouth daily 20   Mike Izquierdo MD   metFORMIN (GLUCOPHAGE) 500 MG tablet Take 500 mg by mouth 2 times daily (with meals)    Historical Provider, MD        Allergies:     Patient has no known allergies. Social History:     Tobacco:    reports that she has never smoked. She has never used smokeless tobacco.  Alcohol:      reports no history of alcohol use. Drug Use:  reports no history of drug use.     Family History:     Family History   Problem Relation Age of Onset    Coronary Art Dis Father          from [place]    COPD Sister         O2 dep    Coronary Art Dis Brother         2 brothers  from [place]       Review of Systems:     Positive and Negative as described in HPI.    Review of Systems    Physical Exam:     /66   Pulse 87   Temp 98.6 °F (37 °C) (Oral)   Resp 17   Wt 191 lb 8 oz (86.9 kg)   SpO2 93%   BMI 32.87 kg/m²   Temp (24hrs), Av.2 °F (36.8 °C), Min:97.2 °F (36.2 °C), Max:98.6 °F (37 °C)    Recent Labs     20  1556 20  0325   POCGLU 140* 142*     No intake or output data in the 24 hours ending 20 1632    Physical Exam  Vitals signs and nursing note reviewed. Constitutional:       General: She is not in acute distress. Appearance: She is not ill-appearing. HENT:      Head: Normocephalic. Mouth/Throat:      Mouth: Mucous membranes are dry. Eyes:      General: Lids are normal.      Pupils: Pupils are equal, round, and reactive to light. Comments: Did not follow commands for visual field exam   Neck:      Musculoskeletal: Full passive range of motion without pain. Trachea: Trachea normal.   Cardiovascular:      Rate and Rhythm: Normal rate and regular rhythm. Frequent extrasystoles are present. Pulses: Normal pulses. Heart sounds: Normal heart sounds, S1 normal and S2 normal.   Pulmonary:      Breath sounds: Normal breath sounds. Abdominal:      General: Abdomen is protuberant. Bowel sounds are normal.      Palpations: Abdomen is soft. Comments: No grimace to mild palpation   Musculoskeletal:      Right lower leg: No edema. Left lower leg: No edema. Comments: Essentially flaccid right. Strong on left   Lymphadenopathy:      Cervical: No cervical adenopathy. Skin:     General: Skin is warm and dry. Capillary Refill: Capillary refill takes less than 2 seconds. Coloration: Skin is pale. Neurological:      Mental Status: She is alert. Motor: Weakness present. Comments: Right arm off bed with assist. Able to hold <10 seconds. Foot pushes with right foot strong. Unable to lift right leg off bed. Attempts to life right leg up with left foot.     Psychiatric: Attention and Perception: Attention normal.      Comments: Aphasic         Investigations:      Laboratory Testing:  No results found for this or any previous visit (from the past 24 hour(s)). Imaging/Diagonstics:  Ct Head Wo Contrast    Result Date: 8/23/2020  No acute intracranial abnormality. Senescent changes including chronic microvascular change and encephalomalacia compatible with remote infarcts in both cerebellar hemispheres and in the high posterior right frontal lobe. Critical results were called by Brennen Blanco MD to Aubrie Dang on 8/23/2020 at 15:56. Cta Head Neck W Contrast    Result Date: 8/23/2020  Motion through the right midportion common artery challenge evaluation may obscure underlying stenosis or intimal injury. Moderate plaque identified both proximal ICAs resulting in 30% narrowing per NASCET criteria. No large vessel occlusion or hemodynamic stenosis     Mri Brain Wo Contrast    Result Date: 8/24/2020  Acute infarct in the left middle cerebral artery territory. Assessment :      Hospital Problems           Last Modified POA    Stroke determined by clinical assessment ([address] 8/24/2020 Yes    Hypertension 8/25/2020 Yes    TIA (transient ischemic attack) 8/23/2020 Yes    Aphasia 8/24/2020 Yes    Type 2 diabetes mellitus, without long-term current use of insulin ([address] 8/25/2020 Yes          Plan:     1. Manage and treat hypertension. We will continue home medications with cleared swallow study. 2. Manage and treat type 2 diabetes. A1c is 7.6. Is on metformin 500 mg twice daily. Will do Accu-Cheks AC at bedtime x48 hours and evaluate need for increased medication. 3. PT/OT  4. Home medications per primary team    Thank you for consult will participate in care.     Consultations:   IP CONSULT TO NEUROLOGY  IP CONSULT TO CARDIOLOGY  IP CONSULT TO INTERNAL MEDICINE  IP CONSULT TO PHYSICAL MEDICINE REHAB  IP CONSULT TO PHYSICAL MEDICINE [address] Zi Paredes, APRN - NP  8/25/2020  4:32 PM    Copy sent to Dr. Karolynn Siemens, APRN - CNP

## 2020-08-25 NOTE — PLAN OF CARE
Problem: HEMODYNAMIC STATUS  Goal: Patient has stable vital signs and fluid balance  8/25/2020 0306 by Jewel Reid RN  Outcome: Met This Shift  8/24/2020 1802 by Adán Baer RN  Outcome: Ongoing     Problem: COMMUNICATION IMPAIRMENT  Goal: Ability to express needs and understand communication  8/25/2020 0306 by Jewel Reid RN  Outcome: Met This Shift  8/24/2020 1802 by Adán Baer RN  Outcome: Ongoing     Problem: Falls - Risk of:  Goal: Will remain free from falls  Description: Will remain free from falls  8/25/2020 0306 by Jewel Reid RN  Outcome: Met This Shift  8/24/2020 1802 by Adán Baer RN  Outcome: Ongoing  Goal: Absence of physical injury  Description: Absence of physical injury  8/25/2020 0306 by Jewel Reid RN  Outcome: Met This Shift  8/24/2020 1802 by Adán Baer RN  Outcome: Ongoing     Problem: Skin Integrity:  Goal: Will show no infection signs and symptoms  Description: Will show no infection signs and symptoms  8/25/2020 0306 by Jewel Reid RN  Outcome: Met This Shift  8/24/2020 1802 by Adán Baer RN  Outcome: Ongoing  Goal: Absence of new skin breakdown  Description: Absence of new skin breakdown  8/25/2020 0306 by Jewel Reid RN  Outcome: Met This Shift  8/24/2020 1802 by Adán Baer RN  Outcome: Ongoing

## 2020-08-25 NOTE — PROGRESS NOTES
Take 1 tablet by mouth daily 90 tablet 3    metFORMIN (GLUCOPHAGE) 500 MG tablet Take 500 mg by mouth 2 times daily (with meals)         Allergies: Britany Del Castillo has No Known Allergies. Past Medical History:   Diagnosis Date    [facility]     Diabetes mellitus ([facility]     H/O cardiac catheterization 08/04/2017    LMCA: Mild irregularites 10-20%. LAD: Mild irregularites 10-20%. LCx: Mild irregularites 10-20%. RCA: Mild irregularites 10-20%. LV function assessed as : Abnormal. EF of 40%.  H/O echocardiogram 12/18/2018    EF 55%. Mildly increased LV wall thickness. The left atrium appears moderately to severely dilated. Moderate to severe mitral regurg. Moderate pulmonary HTN with an estimated RV systolic pressure of 02DJFC. Moderate tricuspid regurg. Evidnece fo moderate diastolic dysfunction is seen.  History of echocardiogram 01/17/2019    EF 55%. LV wall thickness moderately increased. LA is mildly dilated w/ LA volume index of 30. trivial mitral regurg. Evidence of moderate (grade II) diastolic dysfunction seen.  History of echocardiogram 06/03/2019    EF of 50-55%. LV wall thickness is mildly increased. LA is mildly dilated (29-33) with a left atrial volume index of 31 m1/m2. mild diastolic dysfunction.     Hyperlipidemia     Hypertension        Past Surgical History:   Procedure Laterality Date    CARDIAC CATHETERIZATION Left 08/04/2017    right radial, MHT Dr. Dow Burkitt         Medications:     sodium chloride flush  10 mL Intravenous 2 times per day    enoxaparin  40 mg Subcutaneous Daily    clopidogrel  75 mg Oral Daily    atorvastatin  40 mg Oral Nightly     PRN Meds include: sodium chloride flush, polyethylene glycol, promethazine **OR** ondansetron, labetalol, hydrALAZINE    Objective:   BP (!) 167/50   Pulse 89   Temp 98.1 °F (36.7 °C) (Oral)   Resp 16   Wt 191 lb 8 oz (86.9 kg)   SpO2 95%   BMI 32.87 kg/m²     Blood pressure range: Systolic (42GES), FDE:746 , Min:128 , UDH:673   ; Diastolic (33XGY), EZI:97, Min:50, Max:85      ROS:   Unable to assess due to mental status    NEUROLOGIC EXAMINATION    GENERAL  Appears comfortable and in no distress   HEENT  NC/ AT   HEART  S1 and S2 heard; palpation of pulses: radial pulse    NECK  Supple and no bruits heard   MENTAL STATUS:  Alert, oriented to person. Able to imitate inconsistently. Not fluent, not able to comprehend, no able to repeat   CRANIAL NERVES: II     -      Visual fields intact to confrontation (all 4 quadrants blink to treat)  III,IV,VI -  PERR, EOMs full, no ptosis  V     -     Normal facial sensation to pain  VII    -     Mild right facial droop   MOTOR FUNCTION: RUE: Significant for good strength of grade 5/5 in proximal and distal muscle groups   LUE: Significant for good strength of grade 3/5 in proximal and distal muscle groups   RLE: Significant for good strength of grade 5/5 in proximal and distal muscle groups   LLE: Significant for good strength of grade 4/5 in proximal and distal muscle groups      Normal bulk, normal tone and no involuntary movements, no tremor   SENSORY FUNCTION:  withdraw to all 4 extremitties   CEREBELLAR FUNCTION:  Unable to assess    REFLEX FUNCTION:  Symmetric in upper and lower extremities, no Babinski sign   STATION and GAIT  Not done due to mental status         Fluent? Comprehends? Repetition?  Type of Aphasia   [] Yes  [x] No [] Yes  [x] No [] Yes  [x] No [] Global Aphasia  [] Broca's Aphasia  [] Wernicke's  [] Conduction  [] Mixed Transcortical Aphasia  [] Transcortical Motor Aphasia  [] Transcortical Sensory Aphasia  [] Anomic Aphasia     Data:    Lab Results:   CBC:   Recent Labs     08/23/20  1538 08/24/20  0840   WBC 6.2 6.9   HGB 14.6 14.2   HCT 44.5 44.0    204     BMP:    Recent Labs     08/23/20  1538 08/23/20  1556   *  --    K 4.1  --    CL 99  --    CO2 25  --    BUN 19  --    CREATININE 0.90  --    GLUCOSE 175* 140 Atorvastatin 80mg PO nightly  7. Keep NPO for ANKUR and dysphagia eval  8. IM evaluation for DM, BP  9.  Discharge [address] Reggie Davis MD [facility]  Adult General Neurology Resident PGY-4  8/25/2020 at 8:05 AM

## 2020-08-25 NOTE — PROCEDURES
INSTRUMENTAL SWALLOW REPORT  MODIFIED BARIUM SWALLOW    NAME: Garth Soto   : 1950  MRN: [id number]       Date of Eval: 2020        Radiologist: Charley Powers     Referring Diagnosis(es):      Past Medical History:  has a past medical history of CHF (congestive heart failure) ([facility] Diabetes mellitus ([facility] [address] H/O cardiac catheterization, H/O echocardiogram, History of echocardiogram, History of echocardiogram, Hyperlipidemia, and Hypertension. Past Surgical History:  has a past surgical history that includes Cholecystectomy and Cardiac catheterization (Left, 2017). Current Diet Solid Consistency: NPO  Current Diet Liquid Consistency: NPO       Type of Study: Initial MBS         Patient Complaints/Reason for Referral:  Garth Soto was referred for a MBS to assess the efficiency of his/her swallow function, assess for aspiration, and to make recommendations regarding safe dietary consistencies, effective compensatory strategies, and safe eating environment.        Onset of problem:     The patient is O 53 y.o. female with past medical history significant for essential hypertension, hyperlipidemia, acute on chronic combined CHF, CKD stage III, morbid obesity, history of cryptogenic stroke(bilateral cerebellar hemispheres and bilateral frontal and anterior right parietal lobes, 6 lacunar infarct in bilateral thalami) came to ED as a transfer from outlying facility where there was a concern for expressive and receptive aphasia. Sarthak Tapia also was apparently complaining of some weakness/altered sensorium.  As per notes patient was last known 1 AM by her sister who she lives with her check on her.  This morning it was noted that she was not normal and was acting sort of weird and appeared to be slightly weak.      On presentation lab work done showed sodium 134, potassium 4.1, creatinine 0.91, glucose 140, calcium 10.2, WBC 6.2, UA negative for UTI.  On presentation systolic blood pressure yes  Patient Education Response: Verbalizes understanding    Prognosis  Prognosis for safe diet advancement: good  Duration/Frequency of Treatment  Duration/Frequency of Treatment: 2-3X      Goals:    Long Term:        To Maximize safety with intake, optimize nutrition/hydration and minimize risk for aspiration. Short Term:    Dysphagia Goals:  The patient will tolerate recommended diet without observed clinical signs of aspiration      Oral Preparation / Oral Phase  Oral Phase: WFL    Oral Phase: Adequate A-P transit and oral manipulation for consistencies tested      Pharyngeal Phase  Pharyngeal Phase: WFL    Pharyngeal: Thin, Thick cup, Puree, Fruit and Cracker: No penetration and no aspiration with min stasis    Dysphagia Outcome Severity Scale: Level 7: Normal in all situations  Penetration-Aspiration Scale (PAS): 1 - Material does not enter the airway      Esophageal Phase  Esophageal Screen: WFL        Pain   Patient Currently in Pain: No  Pain Level: 0      Therapy Time:   Individual Concurrent Group Co-treatment   Time In 1345         Time Out 1355         Minutes 10                   Halle Tan, 8/25/2020, 3:07 PM

## 2020-08-25 NOTE — PROGRESS NOTES
Physical Therapy  DATE: 2020    NAME: Jasmina Savage  MRN: [id number]   : 1950    Patient not seen this date for Physical Therapy due to:  [] Blood transfusion in progress  [] Hemodialysis  []  Patient Declined  [] Spine Precautions   [] Strict Bedrest  [] Surgery/ Procedure  [x] Testing  -  Swallow Study. Will check on pt in the PM if time allows. [] Other        [] PT being discontinued at this time. Patient independent. No further needs. [] PT being discontinued at this time as the patient has been transferred to palliative care. No further needs.     Rafael Lopez, PTA

## 2020-08-25 NOTE — PROGRESS NOTES
Consent taken from patients daughter David Members for ANKUR/Loop as requested by primary team. Consent placed in chart. Keep npo.      Morenita Etienne  Cardiology fellow

## 2020-08-25 NOTE — OP NOTE
[facility]              Procedure Note  LOOP RECORDER IMPLANT      Today's Date:  8/25/2020  Patient name:  Theta Mcardle  MRN:   [id number]  YOB: 1950  PCP:    SUE Cantor CNP      Procedure: Loop Recorder Implant    Operators:  Primary: Dr. Desire Gomez   Assistant:  Maritza Khalil and Nancy Jerez (CV Fellow)         Model # Serial #   Recorder [id number]       · Sedation monitoring  · Loop recorder Implant      Indication: Stroke    Procedure  After the usual preparation of the left neck and chest, the patient was draped in the usual sterile manner. Local anesthesia was infused below the left 4th intercostal space from the midline laterally. An incision was made below the left breast, lateral to midline. The incision was dilated. The Loop recorder (Reveal) System was advanced using the standard techniques, and positioned successfully with no bleeding or complication. R wave amplitude 0.4. Estimated blood loss: 2 ml      Impression / Device:  Successful Implantation of: Reveal / Loop Recorder      Plan:  1. Telemetry monitoring  2. Interrogate Recorder before discharge  3. Wound check at [facility] in 7days  4. Discharge if patient remains stable      Maritza Khalil MD  Fellow, [id number] was present during entire procedure and performed all critical elements of the procedure.     Desire Gomez MD

## 2020-08-26 ENCOUNTER — HOSPITAL ENCOUNTER (INPATIENT)
Age: 70
LOS: 23 days | Discharge: HOME | DRG: 057 | End: 2020-09-18
Attending: PHYSICAL MEDICINE & REHABILITATION | Admitting: PHYSICAL MEDICINE & REHABILITATION
Payer: MEDICARE

## 2020-08-26 VITALS
SYSTOLIC BLOOD PRESSURE: 165 MMHG | TEMPERATURE: 98.2 F | OXYGEN SATURATION: 93 % | DIASTOLIC BLOOD PRESSURE: 58 MMHG | BODY MASS INDEX: 32.85 KG/M2 | WEIGHT: 191.36 LBS | HEART RATE: 90 BPM | RESPIRATION RATE: 16 BRPM

## 2020-08-26 PROBLEM — I63.512 ACUTE ISCHEMIC LEFT MIDDLE CEREBRAL ARTERY (MCA) STROKE (HCC): Status: ACTIVE | Noted: 2020-08-26

## 2020-08-26 PROBLEM — I63.9 CEREBRAL INFARCTION (HCC): Status: ACTIVE | Noted: 2020-08-26

## 2020-08-26 LAB
GLUCOSE BLD-MCNC: 134 MG/DL (ref 65–105)
GLUCOSE BLD-MCNC: 162 MG/DL (ref 65–105)
GLUCOSE BLD-MCNC: 200 MG/DL (ref 65–105)
GLUCOSE BLD-MCNC: 212 MG/DL (ref 65–105)
GLUCOSE BLD-MCNC: 96 MG/DL (ref 65–105)

## 2020-08-26 PROCEDURE — 6370000000 HC RX 637 (ALT 250 FOR IP): Performed by: INTERNAL MEDICINE

## 2020-08-26 PROCEDURE — 99238 HOSP IP/OBS DSCHRG MGMT 30/<: CPT | Performed by: PSYCHIATRY & NEUROLOGY

## 2020-08-26 PROCEDURE — 2580000003 HC RX 258: Performed by: INTERNAL MEDICINE

## 2020-08-26 PROCEDURE — 6360000002 HC RX W HCPCS: Performed by: INTERNAL MEDICINE

## 2020-08-26 PROCEDURE — 97535 SELF CARE MNGMENT TRAINING: CPT

## 2020-08-26 PROCEDURE — 97530 THERAPEUTIC ACTIVITIES: CPT

## 2020-08-26 PROCEDURE — 1180000000 HC REHAB R&B

## 2020-08-26 PROCEDURE — 82947 ASSAY GLUCOSE BLOOD QUANT: CPT

## 2020-08-26 PROCEDURE — 97110 THERAPEUTIC EXERCISES: CPT

## 2020-08-26 PROCEDURE — 99232 SBSQ HOSP IP/OBS MODERATE 35: CPT | Performed by: INTERNAL MEDICINE

## 2020-08-26 RX ORDER — HYDROCHLOROTHIAZIDE 25 MG/1
25 TABLET ORAL DAILY
Status: DISCONTINUED | OUTPATIENT
Start: 2020-08-27 | End: 2020-09-18 | Stop reason: HOSPADM

## 2020-08-26 RX ORDER — LOSARTAN POTASSIUM 50 MG/1
100 TABLET ORAL DAILY
Status: DISCONTINUED | OUTPATIENT
Start: 2020-08-26 | End: 2020-08-26 | Stop reason: HOSPADM

## 2020-08-26 RX ORDER — LOSARTAN POTASSIUM 50 MG/1
100 TABLET ORAL DAILY
Status: CANCELLED | OUTPATIENT
Start: 2020-08-27

## 2020-08-26 RX ORDER — HYDROCHLOROTHIAZIDE 25 MG/1
25 TABLET ORAL DAILY
Status: CANCELLED | OUTPATIENT
Start: 2020-08-27

## 2020-08-26 RX ORDER — CLOPIDOGREL BISULFATE 75 MG/1
75 TABLET ORAL DAILY
Status: CANCELLED | OUTPATIENT
Start: 2020-08-27

## 2020-08-26 RX ORDER — CLOPIDOGREL BISULFATE 75 MG/1
75 TABLET ORAL DAILY
Qty: 30 TABLET | Refills: 3 | Status: ON HOLD | OUTPATIENT
Start: 2020-08-27 | End: 2020-09-18 | Stop reason: HOSPADM

## 2020-08-26 RX ORDER — ATORVASTATIN CALCIUM 80 MG/1
80 TABLET, FILM COATED ORAL NIGHTLY
Status: DISCONTINUED | OUTPATIENT
Start: 2020-08-26 | End: 2020-09-18 | Stop reason: HOSPADM

## 2020-08-26 RX ORDER — CARVEDILOL 12.5 MG/1
12.5 TABLET ORAL 2 TIMES DAILY WITH MEALS
Status: DISCONTINUED | OUTPATIENT
Start: 2020-08-27 | End: 2020-09-01

## 2020-08-26 RX ORDER — POLYETHYLENE GLYCOL 3350 17 G/17G
17 POWDER, FOR SOLUTION ORAL DAILY
Status: DISCONTINUED | OUTPATIENT
Start: 2020-08-26 | End: 2020-09-18 | Stop reason: HOSPADM

## 2020-08-26 RX ORDER — ATORVASTATIN CALCIUM 80 MG/1
80 TABLET, FILM COATED ORAL NIGHTLY
Status: DISCONTINUED | OUTPATIENT
Start: 2020-08-26 | End: 2020-08-26

## 2020-08-26 RX ORDER — LOSARTAN POTASSIUM AND HYDROCHLOROTHIAZIDE 25; 100 MG/1; MG/1
1 TABLET ORAL DAILY
Status: DISCONTINUED | OUTPATIENT
Start: 2020-08-26 | End: 2020-08-26

## 2020-08-26 RX ORDER — DEXTROSE MONOHYDRATE 25 G/50ML
12.5 INJECTION, SOLUTION INTRAVENOUS PRN
Status: DISCONTINUED | OUTPATIENT
Start: 2020-08-26 | End: 2020-08-26 | Stop reason: HOSPADM

## 2020-08-26 RX ORDER — DEXTROSE MONOHYDRATE 50 MG/ML
100 INJECTION, SOLUTION INTRAVENOUS PRN
Status: DISCONTINUED | OUTPATIENT
Start: 2020-08-26 | End: 2020-08-26 | Stop reason: HOSPADM

## 2020-08-26 RX ORDER — AMLODIPINE BESYLATE 10 MG/1
10 TABLET ORAL EVERY EVENING
Status: CANCELLED | OUTPATIENT
Start: 2020-08-27

## 2020-08-26 RX ORDER — ASPIRIN 81 MG/1
81 TABLET, CHEWABLE ORAL DAILY
Status: CANCELLED | OUTPATIENT
Start: 2020-08-27

## 2020-08-26 RX ORDER — SENNA PLUS 8.6 MG/1
2 TABLET ORAL DAILY PRN
Status: DISCONTINUED | OUTPATIENT
Start: 2020-08-26 | End: 2020-09-18 | Stop reason: HOSPADM

## 2020-08-26 RX ORDER — BISACODYL 10 MG
10 SUPPOSITORY, RECTAL RECTAL DAILY PRN
Status: DISCONTINUED | OUTPATIENT
Start: 2020-08-26 | End: 2020-09-18 | Stop reason: HOSPADM

## 2020-08-26 RX ORDER — HYDROCHLOROTHIAZIDE 25 MG/1
25 TABLET ORAL DAILY
Status: DISCONTINUED | OUTPATIENT
Start: 2020-08-26 | End: 2020-08-26 | Stop reason: HOSPADM

## 2020-08-26 RX ORDER — ACETAMINOPHEN 325 MG/1
650 TABLET ORAL EVERY 4 HOURS PRN
Status: DISCONTINUED | OUTPATIENT
Start: 2020-08-26 | End: 2020-08-30

## 2020-08-26 RX ORDER — ASPIRIN 81 MG/1
81 TABLET, CHEWABLE ORAL DAILY
Status: DISCONTINUED | OUTPATIENT
Start: 2020-08-26 | End: 2020-09-18 | Stop reason: HOSPADM

## 2020-08-26 RX ORDER — CLOPIDOGREL BISULFATE 75 MG/1
75 TABLET ORAL DAILY
Status: DISCONTINUED | OUTPATIENT
Start: 2020-08-27 | End: 2020-09-18 | Stop reason: HOSPADM

## 2020-08-26 RX ORDER — CARVEDILOL 12.5 MG/1
12.5 TABLET ORAL 2 TIMES DAILY WITH MEALS
Status: CANCELLED | OUTPATIENT
Start: 2020-08-26

## 2020-08-26 RX ORDER — NICOTINE POLACRILEX 4 MG
15 LOZENGE BUCCAL PRN
Status: DISCONTINUED | OUTPATIENT
Start: 2020-08-26 | End: 2020-08-26 | Stop reason: HOSPADM

## 2020-08-26 RX ORDER — AMLODIPINE BESYLATE 10 MG/1
10 TABLET ORAL EVERY EVENING
Status: DISCONTINUED | OUTPATIENT
Start: 2020-08-27 | End: 2020-09-18 | Stop reason: HOSPADM

## 2020-08-26 RX ORDER — ASPIRIN 300 MG/1
600 SUPPOSITORY RECTAL DAILY
Status: CANCELLED | OUTPATIENT
Start: 2020-08-27

## 2020-08-26 RX ORDER — ATORVASTATIN CALCIUM 80 MG/1
80 TABLET, FILM COATED ORAL NIGHTLY
Status: CANCELLED | OUTPATIENT
Start: 2020-08-26

## 2020-08-26 RX ORDER — LOSARTAN POTASSIUM 50 MG/1
100 TABLET ORAL DAILY
Status: DISCONTINUED | OUTPATIENT
Start: 2020-08-27 | End: 2020-09-18 | Stop reason: HOSPADM

## 2020-08-26 RX ORDER — POLYETHYLENE GLYCOL 3350 17 G/17G
17 POWDER, FOR SOLUTION ORAL DAILY PRN
Status: DISCONTINUED | OUTPATIENT
Start: 2020-08-26 | End: 2020-09-04

## 2020-08-26 RX ORDER — CARVEDILOL 12.5 MG/1
12.5 TABLET ORAL 2 TIMES DAILY WITH MEALS
Status: DISCONTINUED | OUTPATIENT
Start: 2020-08-26 | End: 2020-08-26 | Stop reason: HOSPADM

## 2020-08-26 RX ORDER — POLYETHYLENE GLYCOL 3350 17 G/17G
17 POWDER, FOR SOLUTION ORAL DAILY PRN
Status: CANCELLED | OUTPATIENT
Start: 2020-08-26

## 2020-08-26 RX ORDER — AMLODIPINE BESYLATE 10 MG/1
10 TABLET ORAL DAILY
Status: DISCONTINUED | OUTPATIENT
Start: 2020-08-26 | End: 2020-08-26 | Stop reason: HOSPADM

## 2020-08-26 RX ADMIN — HYDROCHLOROTHIAZIDE 25 MG: 25 TABLET ORAL at 18:06

## 2020-08-26 RX ADMIN — Medication 10 ML: at 10:03

## 2020-08-26 RX ADMIN — LOSARTAN POTASSIUM 100 MG: 50 TABLET, FILM COATED ORAL at 18:06

## 2020-08-26 RX ADMIN — ATORVASTATIN CALCIUM 80 MG: 80 TABLET, FILM COATED ORAL at 22:26

## 2020-08-26 RX ADMIN — CLOPIDOGREL 75 MG: 75 TABLET, FILM COATED ORAL at 10:02

## 2020-08-26 RX ADMIN — ENOXAPARIN SODIUM 40 MG: 40 INJECTION SUBCUTANEOUS at 10:02

## 2020-08-26 RX ADMIN — AMLODIPINE BESYLATE 10 MG: 10 TABLET ORAL at 18:07

## 2020-08-26 RX ADMIN — ASPIRIN 81 MG: 81 TABLET, CHEWABLE ORAL at 10:02

## 2020-08-26 RX ADMIN — CARVEDILOL 12.5 MG: 12.5 TABLET, FILM COATED ORAL at 18:07

## 2020-08-26 ASSESSMENT — ENCOUNTER SYMPTOMS
SHORTNESS OF BREATH: 0
ABDOMINAL PAIN: 0
VOMITING: 0
COUGH: 0
NAUSEA: 0

## 2020-08-26 ASSESSMENT — PAIN SCALES - GENERAL
PAINLEVEL_OUTOF10: 0
PAINLEVEL_OUTOF10: 0

## 2020-08-26 NOTE — CONSULTS
[facility]    Consult / H&P               Today's Date: 8/26/2020  Patient Name: Jose Oviedo  Date of admission: 8/23/2020  8:35 PM  Patient's age: 81 y. o., 1950  Admission Dx: TIA (transient ischemic attack) [G45.9]  TIA (transient ischemic attack) [G45.9]    Reason for Consult:  Cardiac evaluation    Requesting Physician: Tanesha Valencia MD    CHIEF COMPLAINT:  Aphasia. History Obtained From:  patient, electronic medical record    HISTORY OF PRESENT ILLNESS:      The patient is a 81 y.o.  female who is admitted to the hospital for aphasia and was found to have an infarct in the territory of the left MCA. Cardiology was originally consulted for ANKUR which demonstrated EF 35 - 40%. Reconsulted for reduced ejection fraction. Difficult to obtain history from patient as she now has an expressive aphasia following her CVA. However, per chart review in 2017 she had a TTE which demonstrated EF 40-45% followed by Cardiac catheterization with mild CAD. Following echocardiogram in 2019 demonstrated EF 50-55%. EKG NSR with PVC's. Past Medical History:   has a past medical history of CHF (congestive heart failure) ([facility] Diabetes mellitus ([facility] H/O cardiac catheterization, H/O echocardiogram, History of echocardiogram, History of echocardiogram, Hyperlipidemia, and Hypertension. Past Surgical History:   has a past surgical history that includes Cholecystectomy and Cardiac catheterization (Left, 08/04/2017). Home Medications:    Prior to Admission medications    Medication Sig Start Date End Date Taking?  Authorizing Provider   carvedilol (COREG) 12.5 MG tablet Take 1 tablet by mouth 2 times daily (with meals) 7/20/20   Jessi Tellez MD   losartan-hydrochlorothiazide [facility]) 100-25 MG per tablet Take 1 tablet by mouth daily 7/20/20   Jessi Tellez MD   amLODIPine (NORVASC) 10 MG tablet Take 1 tablet by mouth daily 7/20/20   Jessi Tellez MD   atorvastatin (LIPITOR) 80 MG tablet Take 1 tablet by mouth nightly 7/20/20   Jorge Sandoval MD   aspirin 81 MG EC tablet Take 1 tablet by mouth daily 7/20/20   Jorge Sandoval MD   metFORMIN (GLUCOPHAGE) 500 MG tablet Take 500 mg by mouth 2 times daily (with meals)    Historical Provider, MD      Current Facility-Administered Medications: atorvastatin (LIPITOR) tablet 80 mg, 80 mg, Oral, Nightly  aspirin chewable tablet 81 mg, 81 mg, Oral, Daily **OR** aspirin suppository 600 mg, 600 mg, Rectal, Daily  enoxaparin (LOVENOX) injection 40 mg, 40 mg, Subcutaneous, Daily  sodium chloride flush 0.9 % injection 10 mL, 10 mL, Intravenous, 2 times per day  sodium chloride flush 0.9 % injection 10 mL, 10 mL, Intravenous, PRN  sodium chloride flush 0.9 % injection 10 mL, 10 mL, Intravenous, 2 times per day  sodium chloride flush 0.9 % injection 10 mL, 10 mL, Intravenous, PRN  polyethylene glycol (GLYCOLAX) packet 17 g, 17 g, Oral, Daily PRN  promethazine (PHENERGAN) tablet 12.5 mg, 12.5 mg, Oral, Q6H PRN **OR** ondansetron (ZOFRAN) injection 4 mg, 4 mg, Intravenous, Q6H PRN  clopidogrel (PLAVIX) tablet 75 mg, 75 mg, Oral, Daily  labetalol (NORMODYNE;TRANDATE) injection syringe 10 mg, 10 mg, Intravenous, Q4H PRN  hydrALAZINE (APRESOLINE) injection 10 mg, 10 mg, Intravenous, Q6H PRN    Allergies:  Patient has no known allergies. Social History:   reports that she has never smoked. She has never used smokeless tobacco. She reports that she does not drink alcohol or use drugs. Family History: family history includes COPD in her sister; Coronary Art Dis in her brother and father. No h/o sudden cardiac death. No for premature CAD    REVIEW OF SYSTEMS: UNABLE to assess as patient has expressive aphasia. · Constitutional: there has been no unanticipated weight loss. There's been No change in energy level, No change in activity level. · Eyes: No visual changes or diplopia. No scleral icterus.   · ENT: No Headaches  · Cardiovascular: HTN, CHF  · Respiratory: No previous pulmonary problems, No cough  · Gastrointestinal: No abdominal pain. No change in bowel or bladder habits. · Genitourinary: No dysuria, trouble voiding, or hematuria. · Musculoskeletal:  No gait disturbance, No weakness or joint complaints. · Integumentary: No rash or pruritis. · Neurological: No headache, diplopia, change in muscle strength, numbness or tingling. No change in gait, balance, coordination, mood, affect, memory, mentation, behavior. · Psychiatric: No anxiety, or depression. · Endocrine: No temperature intolerance. No excessive thirst, fluid intake, or urination. No tremor. · Hematologic/Lymphatic: No abnormal bruising or bleeding, blood clots or swollen lymph nodes. · Allergic/Immunologic: No nasal congestion or hives. PHYSICAL EXAM:      BP (!) 141/61   Pulse 96   Temp 98.4 °F (36.9 °C) (Oral)   Resp 15   Wt 191 lb 5.8 oz (86.8 kg)   SpO2 95%   BMI 32.85 kg/m²    Constitutional and General Appearance: alert, cooperative, no distress and appears stated age  HEENT: PERRL, no cervical lymphadenopathy. No masses palpable. Normal oral mucosa  Respiratory:  · Normal excursion and expansion without use of accessory muscles  · Resp Auscultation: Good respiratory effort. No for increased work of breathing. On auscultation: clear to auscultation bilaterally  Cardiovascular:  · The apical impulse is not displaced  · Heart tones are crisp and normal. regular S1 and S2.  · Peripheral pulses are symmetrical and full   Abdomen:   · No masses or tenderness  · Bowel sounds present  Extremities:  ·  No Cyanosis or Clubbing  ·  Lower extremity edema: No  ·  Skin: Warm and dry  Neurological:  · Alert and oriented. · Moves all extremities well  · Unable to assess due to expressive aphasia. DATA:    Diagnostics:      EKG: normal sinus rhythm, occasional PVC noted, unifocal, unchanged from previous tracings. ECHO: obtained and reviewed. EF 35-40%.      Cardiac Angiography: reviewed. 2019 with Mild CAD. Labs:     CBC:   Recent Labs     08/23/20  1538 08/24/20  0840   WBC 6.2 6.9   HGB 14.6 14.2   HCT 44.5 44.0    204     BMP:   Recent Labs     08/23/20  1538 08/23/20  1556   *  --    K 4.1  --    CO2 25  --    BUN 19  --    CREATININE 0.90  --    LABGLOM >60  --    GLUCOSE 175* 140     PT/INR:   Recent Labs     08/23/20  1538   PROTIME 13.4   INR 1.0     APTT:  Recent Labs     08/23/20  1538   APTT 25.2     FASTING LIPID PANEL:  Lab Results   Component Value Date    HDL 44 08/24/2020    TRIG 79 08/24/2020     LIVER PROFILE:  Recent Labs     08/23/20  1538   AST 17   ALT 15   LABALBU 4.1       IMPRESSION:    Patient Active Problem List   Diagnosis    Hypertension    Hyperlipidemia    Acute on chronic systolic CHF (congestive heart failure) ([address]    Hypertensive urgency    Idiopathic cardiomyopathy ([address]    Hypertensive emergency    Chronic combined systolic and diastolic HF (heart failure), NYHA class 2 ([address]    Acute on chronic combined systolic and diastolic CHF, NYHA class 4 ([address]    Hypoxia    Severe mitral regurgitation by prior echocardiogram    Morbid obesity ([facility])    CKD (chronic kidney disease) stage 3, GFR 30-59 ml/min ([facility])    Physical deconditioning    TIA (transient ischemic attack)    Old lacunar stroke without late effect    Dysarthria    Stroke determined by clinical assessment ([address]    Aphasia    Type 2 diabetes mellitus, without long-term current use of insulin ([address]       RECOMMENDATIONS:  1. CHFrEF. Echocardiogram 35-40% EF. EKG NSE with PVCs no sign of ischemia. 2. Secondary Non-Ischemic Cardiomyopathy   3. Continue Coreg  4. Continue Hyzaar   5. Continue Norvasc  6. No further cardiac work up. Discussed with patient and Nurse.     Electronically signed by Tyrel Marrero DO on 8/26/2020 at 9:17 AM    Attending Physician Statement  I have discussed the case of Narayan Caceres including pertinent history and exam findings with the resident. I have seen and examined the patient and the key elements of the encounter have been performed by me. I agree with the assessment, plan and orders as documented by the resident With changes made to the note. Known non-ischemic cardiomyopathy with minimal CAD on cardiac cath 2019 and EF of around 40%, currently on medical treatment with BB, ARB and diuretics. Currently compensated with no signs of volume overload. No need for further cardiac workup or intervention.     Electronically signed by Eli Peters MD on 8/26/2020 at 1:22 PM.    [facility]      [phone]

## 2020-08-26 NOTE — CARE COORDINATION
Admitted to room 2613 from [facility] per cart, VS taken, oriented to room, instructed on call light use, and orders reviewed.

## 2020-08-26 NOTE — PROGRESS NOTES
[facility]   Acute Inpatient Rehab Preadmission Assessment    Patient Name: Jose J Andrews        MRN: [id number]    : 1950  (81 y.o.)  Gender: female     Admitted from:   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 [x]No  []Yes:      Physicians: Deanna Rutledge, [address] [place] [address] for clinical complications:  High    Co-morbidities:  HTN, CHF, CKD, DM, HLD, morbid obesity,hx of cryptogenic stroke (bilateral cerebellar hemispheres and bilateral frontal and anterior right parietal lobes, 6 lacunar infarct in bilateral thalami), non-ischemic cardiomyopathy    Financial Information  Primary insurance:  [x]Medicare [] Medicare HMO  []Commercial insurance    []Medicaid   [] Medicaid HMO []Workers Compensation   []Personal Pay    Secondary Insurance:  []Medicare [] Medicare HMO  []Commercial insurance    []Medicaid  []Medicaid HMO  []Workers Compensation [x]None    Precautions:   []Cardiac Precautions    []Total hip precautions    []Weight Bearing status:  [x]Safety Precautions/Concerns  []Visually impaired:     []Hard of Hearing:     Isolation Precautions:         []Yes  [x]No  If Yes:  [] Droplet  []Contact []Airborne     []VRE     []MRSA     []C-diff         [] TB       [] Other:        Physiatrist:  []        [x]   Dr. Randall Vitale  []   Dr. Shera Aschoff    Patients Occupation: Unknown    Reviewed Lab and Diagnostic reports from Current Admission: Yes    Patients Prior Functional  Level: Prior Function  ADL Assistance: Needs assistance(socks, pants, shirt. sister assists)  Homemaking Assistance: Needs assistance  Ambulation Assistance: Needs assistance  Transfer Assistance: Needs assistance  Additional Comments: Pt initially stated amb with cane. then that she stays in bed and uses brief, unable to get to bathroom to toilet. uncertain reliability d/t aphasia. History of current illness:  Jose Oviedo is a 81 y.o. female admitted to [facility] on 8/23/2020.       She was originally evaluated at [facility] for aphasia. She was transferred to [address] [place] [address] for further care after diagnostic studies confirmed new L hemisphere CVA. She has known history of previous CVAs. MRI confirmed L MCA infarct on 8/24/2020.      Cardiology consulted and performed TTE/ANKUR. She is being managed for known compensated CHF with reduced EF of 35-40%, secondary non-ischemic cardiomyopathy. NSR on EKG. Treating with carvedilol, hyzaar, amlodipine. No further workup planned. Current functional status for upper extremity ADLs:  UE Bathing: Moderate assistance  UE Dressing: Moderate assistance    Current functional status for lower extremity ADLs:  LE Bathing: Moderate assistance  LE Dressing: Moderate assistance(pt donned B socks while sitting EOB. pt required assistance to thread B socks and then was able to complete task.)    Current functional status for bed, chair, wheelchair transfers:  Transfers  Sit to Stand: Minimal Assistance  Stand to sit: Minimal Assistance  Bed to Chair: Moderate assistance  Stand Pivot Transfers:  Moderate Assistance    Current functional status for toilet transfers:  Minimal Assistance       Current functional status for locomotion:  Ambulation  Ambulation?: Yes  Ambulation 1  Surface: level tile  Device: Rolling Walker  Assistance: Maximum assistance  Quality of Gait: RLE \"buckling\", assist to advance; max A to keep her R hand on the walker  Gait Deviations: Slow Jacinda, Increased COLT, Decreased step length, Staggers  Distance: ~8'x1    Current functional status for comprehension: Maximal Assistance    Current functional status for expression: Maximal Assistance    Current functional status for social interaction:  TBD    Current functional status for problem solving:  TBD    Current functional status for memory: TBD    Current Deficits R/T Impairment: Impaired Functional Mobility and Decreased ADLs    Required Therapy:   [x] Physical Therapy  [x] Occupational Therapy   [x] Speech Therapy    Additional Services:  [x]   [x] Recreational Therapy, as appropriate  [x] Nutrition  [] Dialysis  [] Other:     Rehab Justification:  Needs 3 hrs therapy per day or 15 hours per week:  Yes  Identified Rehab Nursing needs: Yes  Intense Interdisciplinary need:  Yes  Need for 24 hr physician supervision:  Yes  Measurable improved quality of life:  Yes  Willingness to participate:  Yes  Medical Necessity:  Yes  Patient able to tolerate care proposed:  Yes    Expected Discharge Destination/Functional Level:  Home with assist  Expected length of time to achieve that level of improvement: 1-2 weeks  Expected Post Discharge Treatments: Home with possible Home Care    Other information relevant to the care needs:  n/a    Acute Inpatient Rehabilitation Disclosure Statement provided to patient. Patient verbalized understanding. Copy placed on patient's light chart. I have reviewed and concur with the findings and results of the pre-admission screening assessment completed by the Inpatient Rehabilitation Admissions Coordinator.

## 2020-08-26 NOTE — PROGRESS NOTES
Physical Therapy  Facility/Department: [facility] 2  Daily Treatment Note  NAME: Delia Genao  : 1950  MRN: [id number]    Date of Service: 2020    Discharge Recommendations: In my professional opinion, this patient could tolerate a total of 3 hours of combined therapies post-acute care. PT Equipment Recommendations  Equipment Needed: (TBD)    Assessment    Pt alert, cooperative, motivated; she has difficulty understanding commands, even with tactile and visual cues on occasion. Her RLE tends to buckle with fatigue and she has trouble advancing her RLE. Body structures, Functions, Activity limitations: Decreased functional mobility ; Decreased safe awareness;Decreased strength;Decreased balance;Decreased endurance;Decreased cognition;Decreased fine motor control;Decreased coordination;Decreased posture  Prognosis: Good  Decision Making: Medium Complexity  PT Education: Plan of Care;General Safety;PT Role;Gait Training;Precautions;Transfer Training;Orientation; Family Education; Adaptive Device Training;Disease Specific Education; Functional Mobility Training  Barriers to Learning: aphasia; pt has difficulty both expressing and receiving info--does better with visual and tactile cues, but still unable to understand at times  REQUIRES PT FOLLOW UP: Yes  Activity Tolerance  Activity Tolerance: Patient Tolerated treatment well     Patient Diagnosis(es): The encounter diagnosis was Aphasia. has a past medical history of CHF (congestive heart failure) ([address] Diabetes mellitus ([address] H/O cardiac catheterization, H/O echocardiogram, History of echocardiogram, History of echocardiogram, Hyperlipidemia, and Hypertension. has a past surgical history that includes Cholecystectomy and Cardiac catheterization (Left, 2017).     Restrictions  Restrictions/Precautions  Restrictions/Precautions: General Precautions, Fall Risk, Up as Tolerated  Required Braces or Orthoses?: No  Position Activity Restriction  Other position/activity restrictions: up as tolerated  Subjective   General  Response To Previous Treatment: Patient with no complaints from previous session. Family / Caregiver Present: Yes(dtr)  Pain Screening  Patient Currently in Pain: Denies  Vital Signs  Patient Currently in Pain: Denies       Orientation  Orientation  Overall Orientation Status: Impaired  Orientation Level: (pt is aphasic, has great difficulty following any commands)     Objective   Bed mobility  Rolling to Right: Modified independent  Supine to Sit: Modified independent  Scooting: Stand by assistance  Transfers  Sit to Stand: Minimal Assistance  Stand to sit: Minimal Assistance  Bed to Chair: Moderate assistance  Stand Pivot Transfers: Moderate Assistance  Ambulation  Ambulation?: Yes  Ambulation 1  Surface: level tile  Device: Rolling Walker  Assistance: Maximum assistance  Quality of Gait: RLE \"buckling\", assist to advance; max A to keep her R hand on the walker  Gait Deviations: Slow Jacinda; Increased COLT; Decreased step length;Staggers  Distance: ~8'x1  Stairs/Curb  Stairs?: No     Balance  Posture: Poor  Sitting - Static: Good  Sitting - Dynamic: Good  Standing - Static: Fair  Standing - Dynamic: Poor  Other exercises  Other exercises 1: supine AAROM RLE; AROM LLE, but occasional tactile cues  Other exercises 2: AAROM RUE supine x 10 reps  Other exercises 3: B hands clasped shoulder flexion pt seated x 10 reps, mod A  Other exercises 4: LAQs, KTC, 2x10 reps, AROM LLE, AAROM RLE  Other exercises 5: wt shift L/R in standing with mod A+1 x 10 reps     Goals  Short term goals  Time Frame for Short term goals: 14 visits  Short term goal 1: Pt will be Sarah bed mobility  Short term goal 2: Pt will be Sarah transfers  Short term goal 3: Pt will be Sarah amb 150' RW  Short term goal 4: Pt will navigate 2 steps Sarah for safe curb navigation    Plan    Plan  Times per week: 6-12 visits weekly  Times per day: (1-2 visits daily)  Current Treatment Recommendations: Strengthening, Balance Training, Functional Mobility Training, Transfer Training, Gait Training, Endurance Training, Stair training, Home Exercise Program, Safety Education & Training, Patient/Caregiver Education & Training, Equipment Evaluation, Education, & procurement, Wheelchair Mobility Training, Neuromuscular Re-education, Cognitive Reorientation, Positioning  Safety Devices  Type of devices: Call light within reach, Nurse notified, Gait belt, Patient at risk for falls, Left in chair, All fall risk precautions in place(pt doesn't have a chair alarm in the room; RN aware and OK'd pt up to chair w/o alarm)  Restraints  Initially in place: No     Therapy Time   Individual Concurrent Group Co-treatment   Time In 1005         Time Out 1045         Minutes 40                 Marly Rodriguez, PT

## 2020-08-26 NOTE — PROGRESS NOTES
[facility] Neurology   [address] [facility]    NEUROLOGY INPATIENT PROGRESS NOTE    8/26/2020         Subjective: Abdi Rao is a  81 y.o. female admitted on 8/23/2020 with TIA (transient ischemic attack) [G45.9]  TIA (transient ischemic attack) [G45.9]    Briefly, this patient is a 77-year-old female patient with past medical history of hypertension, hyperlipidemia diabetes, CHF, CKD Stage 3, history of cryptogenic strokes who came transferred from [facility] for stroke evaluation due to wake-up symptoms of expressive more than receptive aphasia, right visual field cut. CT head was unremarkable for any new acute pathology and CTA head and neck showed moderate plaque in both proximal ICA resulting in 3% narrowing but no LVO. Admitted for stroke evaluation. Patient was seen on our service on December 2019 for TIA and was discharged with dual antiplatelet for 21 days and then aspirin monotherapy. Medications: Aspirin 81 mg p.o. daily, atorvastatin 80 mg p.o. nightly, metformin, amlodipine, losartan, hydrochlorothiazide, carvedilol    Smoking: None (as per EMR)  Alcohol: None (as per EMR)  Illicit Drugs: None (as per EMR)    Patient had acute infarct in the left middle cerebral artery seen on brain MRI on 8/24/2020. On 8/25/2020 patient had ANKUR done and loop recorder placed. No intracardiac pathology seen. PMNR evaluating patient for possible placement     Today patient was evaluated at bedside and found Alert, awake, following certain command, still aphasic. Daughter in room. Patient had TTE and ANKUR yesterday showing EF 35-40% with negative bubble compared to 1 year ago with EF 50-55%. Cardiolgoy will see patient. Will work with PT, OT, Speech for evaluation of placement. No current facility-administered medications on file prior to encounter.       Current Outpatient Medications on File Prior to Encounter   Medication Sig Dispense Refill    carvedilol (COREG) 12.5 MG tablet Take 1 tablet by mouth 2 times daily (with meals) 180 tablet 3    losartan-hydrochlorothiazide (HYZAAR) 100-25 MG per tablet Take 1 tablet by mouth daily 90 tablet 3    amLODIPine (NORVASC) 10 MG tablet Take 1 tablet by mouth daily 90 tablet 3    atorvastatin (LIPITOR) 80 MG tablet Take 1 tablet by mouth nightly 90 tablet 3    aspirin 81 MG EC tablet Take 1 tablet by mouth daily 90 tablet 3    metFORMIN (GLUCOPHAGE) 500 MG tablet Take 500 mg by mouth 2 times daily (with meals)         Allergies: Gurwinder Weaver has No Known Allergies. Past Medical History:   Diagnosis Date    [facility]est[facility]     Diabetes mellitus ([facility]     H/O cardiac catheterization 08/04/2017    LMCA: Mild irregularites 10-20%. LAD: Mild irregularites 10-20%. LCx: Mild irregularites 10-20%. RCA: Mild irregularites 10-20%. LV function assessed as : Abnormal. EF of 40%.  H/O echocardiogram 12/18/2018    EF 55%. Mildly increased LV wall thickness. The left atrium appears moderately to severely dilated. Moderate to severe mitral regurg. Moderate pulmonary HTN with an estimated RV systolic pressure of 24IMJT. Moderate tricuspid regurg. Evidnece fo moderate diastolic dysfunction is seen.  History of echocardiogram 01/17/2019    EF 55%. LV wall thickness moderately increased. LA is mildly dilated w/ LA volume index of 30. trivial mitral regurg. Evidence of moderate (grade II) diastolic dysfunction seen.  History of echocardiogram 06/03/2019    EF of 50-55%. LV wall thickness is mildly increased. LA is mildly dilated (29-33) with a left atrial volume index of 31 m1/m2. mild diastolic dysfunction.     Hyperlipidemia     Hypertension        Past Surgical History:   Procedure Laterality Date    CARDIAC CATHETERIZATION Left 08/04/2017    right radial, [facility] Dr. Wendy Herrera         Medications:     atorvastatin  80 mg Oral Nightly    aspirin  81 mg Oral Daily    Or    aspirin  600 mg Rectal Daily    enoxaparin  40 mg Subcutaneous Daily    sodium chloride flush  10 mL Intravenous 2 times per day    sodium chloride flush  10 mL Intravenous 2 times per day    clopidogrel  75 mg Oral Daily     PRN Meds include: sodium chloride flush, sodium chloride flush, polyethylene glycol, promethazine **OR** ondansetron, labetalol, hydrALAZINE    Objective:   BP (!) 154/81   Pulse 76   Temp 97.4 °F (36.3 °C) (Oral)   Resp 19   Wt 191 lb 5.8 oz (86.8 kg)   SpO2 93%   BMI 32.85 kg/m²     Blood pressure range: Systolic (19JEO), GHN:414 , Min:118 , DPT:940   ; Diastolic (85FAU), WJK:87, Min:66, Max:81      ROS:   Unable to assess due to mental status    NEUROLOGIC EXAMINATION    GENERAL  Appears comfortable and in no distress   HEENT  NC/ AT   HEART  S1 and S2 heard; palpation of pulses: radial pulse    NECK  Supple and no bruits heard   MENTAL STATUS:  Alert, oriented to person. Able to imitate inconsistently. Not fluent, not able to comprehend, no able to repeat   CRANIAL NERVES: II     -      Visual fields intact to confrontation (all 4 quadrants blink to treat)  III,IV,VI -  PERR, EOMs full, no ptosis  V     -     Normal facial sensation to pain  VII    -     Mild right facial droop   MOTOR FUNCTION: RUE: Significant for good strength of grade 3/5 in proximal and distal muscle groups   LUE: Significant for good strength of grade 5/5 in proximal and distal muscle groups   RLE: Significant for good strength of grade 4/5 in proximal and distal muscle groups   LLE: Significant for good strength of grade 5/5 in proximal and distal muscle groups      Normal bulk, normal tone and no involuntary movements, no tremor   SENSORY FUNCTION:  withdraw to all 4 extremitties   CEREBELLAR FUNCTION:  Unable to assess    REFLEX FUNCTION:  Symmetric in upper and lower extremities, no Babinski sign   STATION and GAIT  Not done due to mental status         Fluent? Comprehends? Repetition?  Type of Aphasia   [] Yes  [x] No [] Yes  [x] No [] Yes  [x] No [] Global Aphasia  [] Broca's Aphasia  [] Wernicke's  [] Conduction  [] Mixed Transcortical Aphasia  [] Transcortical Motor Aphasia  [] Transcortical Sensory Aphasia  [] Anomic Aphasia     Data:    Lab Results:   CBC:   Recent Labs     08/23/20  1538 08/24/20  0840   WBC 6.2 6.9   HGB 14.6 14.2   HCT 44.5 44.0    204     BMP:    Recent Labs     08/23/20  1538 08/23/20  1556   *  --    K 4.1  --    CL 99  --    CO2 25  --    BUN 19  --    CREATININE 0.90  --    GLUCOSE 175* 140         Lab Results   Component Value Date    CHOL 160 08/24/2020    LDLCHOLESTEROL 100 08/24/2020    HDL 44 08/24/2020    TRIG 79 08/24/2020    ALT 15 08/23/2020    AST 17 08/23/2020    TSH 0.50 08/24/2020    INR 1.0 08/23/2020    LABA1C 7.6 (H) 08/24/2020     IMAGING     Head CT WO (8/23/2020)  Impression    No acute intracranial abnormality.         Senescent changes including chronic microvascular change and encephalomalacia    compatible with remote infarcts in both cerebellar hemispheres and in the    high posterior right frontal lobe.         Critical results were called by Brennen Blanco MD to Aubrie Dang on 8/23/2020    at 15:56.            CTA H/N (8/23/2020)  Impression    Motion through the right midportion common artery challenge evaluation may    obscure underlying stenosis or intimal injury.         Moderate plaque identified both proximal ICAs resulting in 30% narrowing per    NASCET criteria.   No large vessel occlusion or hemodynamic stenosis            Brain MRI WO  Impression    Acute infarct in the left middle cerebral artery territory.             · ANKUR and LOOP placement (8/25/2020)  Summary:      1. A ANKUR was performed without complications. 2. Mild to moderately reduced LVEF  3. No thrombus or valvular vegetation identified  4. No intracardiac shunt via color Doppler. No intracardiac shunt via injection of agitated saline.   5. There were no complications encountered. · TTE (8/25/2020)  Summary  The study was performed by the Cardiologist, the Cardiology fellow, and the  Sonographer, in the Cath Lab. The study was performed under conscious sedation. Normal left ventricular chamber dimension. Left ventricle systolic function appears moderately reduced, estimated EF  35-40%. Left atrial dilatation. Left atrial appendage showed no evidence of clot. Inter-atrial septum is intact with no evidence for an atrial septal defect. Negative bubble study, no shunt noted. Mild mitral regurgitation. Mild tricuspid regurgitation    ASSESSMENT     Case of a 81 y.o. female patient admitted due to AMS, aphasia, right sided weakness.    //Left MCA acute infarction//   Patient with acute ischemic stroke on left MCA. Global aphasia. Patient with past strokes on aspirin and atorvastatin. LDL was 100, HgbA1c is 7.6. IM consulted for management of BP and DM for stroke prevention. BP fgoal < 140/90 and A1c goal < 7. Due to patient's right-sided weakness will need PT and OT evaluation to see where patient will most likely be placed. // Dysphagia //   Patient presenting with dysphagia. Patient had barium swallow evaluation yesterday and patient safe to swallow regular diet with thin liquids. // Right sided weakness //   Patient with right-sided weakness. PT and OT working and patient. PMR consulted for placement. // Low EF //   TTE and ANKUR were done that showed moderately reduced EF 35-40% with no evidence of clot and negative bubble study too. Loop recorder was placed. Will evaluate with cardiology if there is any need to pursue why patient had his EF reduced 35-40%. Last echo in 6/3/2019 showed EF 50-55%. PLAN / RECOMMENDATIONS  1. Needs to continue PT, OT, Speech  2. Continue aspirin 81mg PO daily  3. Continue Clopidogrel 75mg PO daily  4. Continue Atorvastatin 80mg PO nightly  5. IM evaluation for DM, BP  6. PMR consulted for evaluation  7.  Discharge [address] ZAYRA Morrison MD [facility]  Adult General Neurology Resident PGY-4  8/26/2020 at 8:10 AM

## 2020-08-26 NOTE — CARE COORDINATION
Transitional planning-call from Fabiana at [address] [place] [address] accepted and can come this evening. Set up transportation with [facility] for 6PM. left message with Fabiana at [facility]. Notified patient. Asked if I needed to call anybody-she said no.

## 2020-08-26 NOTE — PROGRESS NOTES
Notified Shelbie Bautista CM, that per Dr Nazario Cloud pt is approp for ARU. Writer requested d/c readmit be completed, DVT prophylaxis be continued, and [place] be called to [phone]. Admission Assessment completed and Dr Nazario Cloud notified.

## 2020-08-26 NOTE — PROGRESS NOTES
Pt medication reviewed with Dr Nazario Cloud. PMR order set released. BIMS completed. Will continue to observe for safety and changes.

## 2020-08-26 NOTE — DISCHARGE SUMMARY
Diagnostic Studies:     · Head CT WO (8/23/2020)  Impression    No acute intracranial abnormality.         Senescent changes including chronic microvascular change and encephalomalacia    compatible with remote infarcts in both cerebellar hemispheres and in the    high posterior right frontal lobe.         Critical results were called by Beckie Avila MD to Francisantonio Luba on 8/23/2020    at 15:56.            · CTA H/N (8/23/2020)  Impression    Motion through the right midportion common artery challenge evaluation may    obscure underlying stenosis or intimal injury.         Moderate plaque identified both proximal ICAs resulting in 30% narrowing per    NASCET criteria.   No large vessel occlusion or hemodynamic stenosis            · Brain MRI WO  Impression    Acute infarct in the left middle cerebral artery territory.              · ANKUR and LOOP placement (8/25/2020)  Summary:      1. A ANKUR was performed without complications. 2. Mild to moderately reduced LVEF  3. No thrombus or valvular vegetation identified  4. No intracardiac shunt via color Doppler.  No intracardiac shunt via injection of agitated saline. 5. There were no complications encountered.     · TTE (8/25/2020)  Summary  The study was performed by the Cardiologist, the Cardiology fellow, and the  Sonographer, in the Cath Lab. The study was performed under conscious sedation. Normal left ventricular chamber dimension. Left ventricle systolic function appears moderately reduced, estimated EF  35-40%. Left atrial dilatation. Left atrial appendage showed no evidence of clot. Inter-atrial septum is intact with no evidence for an atrial septal defect. Negative bubble study, no shunt noted. Mild mitral regurgitation.   Mild tricuspid regurgitation    Treatments: TTE, ANKUR, loop recorder, Aspirin 81mg PO D, Clopidogrel 75mg PO Daily, Atorvastatin 80mh PO daily    Discharge Exam:  GENERAL  Appears comfortable and in no distress   HEENT  NC/ AT   HEART S1 and S2 heard; palpation of pulses: radial pulse    NECK  Supple and no bruits heard   MENTAL STATUS:  Alert, oriented to person. Able to imitate inconsistently. Not fluent, not able to comprehend, no able to repeat   CRANIAL NERVES: II     -      Visual fields intact to confrontation (all 4 quadrants blink to treat)  III,IV,VI -  PERR, EOMs full, no ptosis  V     -     Normal facial sensation to pain  VII    -     Mild right facial droop   MOTOR FUNCTION: RUE: Significant for good strength of grade 3/5 in proximal and distal muscle groups   LUE: Significant for good strength of grade 5/5 in proximal and distal muscle groups   RLE: Significant for good strength of grade 4/5 in proximal and distal muscle groups   LLE: Significant for good strength of grade 5/5 in proximal and distal muscle groups       Normal bulk, normal tone and no involuntary movements, no tremor   SENSORY FUNCTION:  withdraw to all 4 extremitties   CEREBELLAR FUNCTION:  Unable to assess    REFLEX FUNCTION:  Symmetric in upper and lower extremities, no Babinski sign   STATION and GAIT  Not done due to mental status         Fluent? Comprehends? Repetition?  Type of Aphasia   [] Yes  [x] No [] Yes  [x] No [] Yes  [x] No [x] Global Aphasia  [] Broca's Aphasia  [] Wernicke's  [] Conduction  [] Mixed Transcortical Aphasia  [] Transcortical Motor Aphasia  [] Transcortical Sensory Aphasia  [] Anomic Aphasia     Disposition: Acute inpatient rehab    In process/preliminary results:  Outstanding Order Results     Date and Time Order Name Status Description    8/24/2020 3558 MRI BRAIN WO CONTRAST Preliminary           Patient Instructions:   Current Discharge Medication List      START taking these medications    Details   clopidogrel (PLAVIX) 75 MG tablet Take 1 tablet by mouth daily  Qty: 30 tablet, Refills: 3         CONTINUE these medications which have NOT CHANGED    Details   carvedilol (COREG) 12.5 MG tablet Take 1 tablet by mouth 2 times daily (with meals)  Qty: 180 tablet, Refills: 3    Associated Diagnoses: TIA (transient ischemic attack); Chronic combined systolic and diastolic HF (heart failure), NYHA class 2 ([address] Essential hypertension; Severe mitral regurgitation by prior echocardiogram; Mixed hyperlipidemia      losartan-hydrochlorothiazide (HYZAAR) 100-25 MG per tablet Take 1 tablet by mouth daily  Qty: 90 tablet, Refills: 3    Associated Diagnoses: TIA (transient ischemic attack); Chronic combined systolic and diastolic HF (heart failure), NYHA class 2 ([address] Essential hypertension; Severe mitral regurgitation by prior echocardiogram; Mixed hyperlipidemia      amLODIPine (NORVASC) 10 MG tablet Take 1 tablet by mouth daily  Qty: 90 tablet, Refills: 3    Associated Diagnoses: Chronic combined systolic and diastolic HF (heart failure), NYHA class 2 ([address] Essential hypertension; TIA (transient ischemic attack); Severe mitral regurgitation by prior echocardiogram; Mixed hyperlipidemia      atorvastatin (LIPITOR) 80 MG tablet Take 1 tablet by mouth nightly  Qty: 90 tablet, Refills: 3    Associated Diagnoses: TIA (transient ischemic attack); Chronic combined systolic and diastolic HF (heart failure), NYHA class 2 ([address] Essential hypertension; Severe mitral regurgitation by prior echocardiogram; Mixed hyperlipidemia      aspirin 81 MG EC tablet Take 1 tablet by mouth daily  Qty: 90 tablet, Refills: 3    Associated Diagnoses: TIA (transient ischemic attack); Chronic combined systolic and diastolic HF (heart failure), NYHA class 2 ([address]  Essential hypertension; Severe mitral regurgitation by prior echocardiogram; Mixed hyperlipidemia      metFORMIN (GLUCOPHAGE) 500 MG tablet Take 500 mg by mouth 2 times daily (with meals)           Activity: activity as tolerated  Diet: diabetic diet  Wound Care: none needed    Follow-up with PCP in 2-3 weeks, General neurology in 4-6 weeks, Follow up with cardiology in 4-6 weeks    Signed:  Elodie Landau, MD [facility]  Adult General Neurology Resident PGY-4  Administrative Chief Resident  8/26/2020  4:26 PM

## 2020-08-26 NOTE — CONSULTS
Physical Medicine & Rehabilitation  Consult Note - RECORD REVIEW ONLY      Admitting Physician:   Haleigh Olea MD    Primary Care Provider:   SUE Christensen CNP     Reason for Consult:  Acute Inpatient Rehabilitation    Chief Complaint: Aphasia    History of Present Illness:  Referring Provider is requesting an evaluation for appropriate placement upon discharge from acute care. History from chart review. Shari Walker is a 81 y.o. female admitted to [facility] on 8/23/2020. She was originally evaluated at [facility] for aphasia. She was transferred to [facility] for further care after diagnostic studies confirmed new L hemisphere CVA. She has known history of previous CVAs. MRI confirmed L MCA infarct on 8/24/2020. Cardiology consulted and performed TTE/ANKUR. She is being managed for known compensated CHF with reduced EF of 35-40%, secondary non-ischemic cardiomyopathy. NSR on EKG. Treating with carvedilol, hyzaar, amlodipine. No further workup planned. Review of Systems:  Record Review Only     Premorbid function:  Requiring assistance with ADLs, mobility, and homemaking from sister    Current function:    PT:  Restrictions/Precautions: General Precautions, Fall Risk, Up as Tolerated  Other position/activity restrictions: up as tolerated   Transfers  Sit to Stand: Minimal Assistance  Stand to sit: Minimal Assistance  Bed to Chair: Moderate assistance  Stand Pivot Transfers: Moderate Assistance  Ambulation 1  Surface: level tile  Device: Rolling Walker  Assistance: Maximum assistance  Quality of Gait: RLE \"buckling\", assist to advance; max A to keep her R hand on the walker  Gait Deviations: Slow Jacinda, Increased COLT, Decreased step length, Staggers  Distance: ~8'x1    Transfers  Sit to Stand: Minimal Assistance  Stand to sit: Minimal Assistance  Bed to Chair: Moderate assistance  Stand Pivot Transfers:  Moderate Assistance  Ambulation  Ambulation?: Yes  Ambulation 1  Surface: level tile  Device: Rolling Walker  Assistance: Maximum assistance  Quality of Gait: RLE \"buckling\", assist to advance; max A to keep her R hand on the walker  Gait Deviations: Slow Jacinda, Increased COLT, Decreased step length, Staggers  Distance: ~8'x1    Surface: level tile  Ambulation 1  Surface: level tile  Device: Rolling Walker  Assistance: Maximum assistance  Quality of Gait: RLE \"buckling\", assist to advance; max A to keep her R hand on the walker  Gait Deviations: Slow Jacinda, Increased COLT, Decreased step length, Staggers  Distance: ~8'x1      OT:   ADL  Feeding: Modified independent   Grooming: Stand by assistance(pt washed face while sitting in chair. pt favored LUE during task only using RUE 15% of the time.)  UE Bathing: Moderate assistance  LE Bathing: Moderate assistance  UE Dressing: Moderate assistance  LE Dressing: Moderate assistance(pt donned B socks while sitting EOB. pt required assistance to thread B socks and then was able to complete task.)  Toileting: Moderate assistance  Additional Comments: pt favored LUE during ADL task only using RUE for about 25% of time. Balance  Sitting Balance: Stand by assistance(~15 min while EOB and in chair.)  Standing Balance: Minimal assistance(with RW)   Standing Balance  Time: ~ 3 min  Activity: pt complete functional mobility from bed>chair  Comment: pt had no LOB, but required VC and [place] assistance to place RUE on walker. Pt required assistance for sequencing, problem solving and moving walker with RUE.         Bed mobility  Rolling to Right: Modified independent  Supine to Sit: Modified independent  Sit to Supine: (pt retired to chair)  Scooting: Stand by assistance  Transfers  Sit to stand: Contact guard assistance  Stand to sit: Contact guard assistance  Transfer Comments: with RW. pt required assistance with RUE hand placement on RW                 SLP:  Pt presents with severe expressive and receptive aphasia characterized by impaired ability to accurately follow single step commands and respond to yes no questions. Impaired ability to verbalize bio info and automatics. + perseverations and paraphasic errors noted throughout. Pt with no dysarthria. ST to follow up and provide treatment to address noted deficits. Education provided. Past Medical History:        Diagnosis Date    [facility]     [facility]     H/O cardiac catheterization 08/04/2017    LMCA: Mild irregularites 10-20%. LAD: Mild irregularites 10-20%. LCx: Mild irregularites 10-20%. RCA: Mild irregularites 10-20%. LV function assessed as : Abnormal. EF of 40%.  H/O echocardiogram 12/18/2018    EF 55%. Mildly increased LV wall thickness. The left atrium appears moderately to severely dilated. Moderate to severe mitral regurg. Moderate pulmonary HTN with an estimated RV systolic pressure of 97GFAI. Moderate tricuspid regurg. Evidnece fo moderate diastolic dysfunction is seen.  History of echocardiogram 01/17/2019    EF 55%. LV wall thickness moderately increased. LA is mildly dilated w/ LA volume index of 30. trivial mitral regurg. Evidence of moderate (grade II) diastolic dysfunction seen.  History of echocardiogram 06/03/2019    EF of 50-55%. LV wall thickness is mildly increased. LA is mildly dilated (29-33) with a left atrial volume index of 31 m1/m2. mild diastolic dysfunction.     Hyperlipidemia     Hypertension        Past Surgical History:        Procedure Laterality Date    CARDIAC CATHETERIZATION Left 08/04/2017    right radial, [facility] Dr. Sylvia Welsh         Allergies:    No Known Allergies     Current Medications:   Current Facility-Administered Medications: amLODIPine (NORVASC) tablet 10 mg, 10 mg, Oral, Daily  carvedilol (COREG) tablet 12.5 mg, 12.5 mg, Oral, BID WC  losartan (COZAAR) tablet 100 mg, 100 mg, Oral, Daily **AND** hydroCHLOROthiazide (HYDRODIURIL) tablet 25 mg, 25 mg, Oral, Daily  insulin lispro (HUMALOG) injection vial 0-6 Units, 0-6 Units, Subcutaneous, TID WC  insulin lispro (HUMALOG) injection vial 0-3 Units, 0-3 Units, Subcutaneous, Nightly  glucose (GLUTOSE) 40 % oral gel 15 g, 15 g, Oral, PRN  dextrose 50 % IV solution, 12.5 g, Intravenous, PRN  glucagon (rDNA) injection 1 mg, 1 mg, Intramuscular, PRN  dextrose 5 % solution, 100 mL/hr, Intravenous, PRN  atorvastatin (LIPITOR) tablet 80 mg, 80 mg, Oral, Nightly  aspirin chewable tablet 81 mg, 81 mg, Oral, Daily **OR** aspirin suppository 600 mg, 600 mg, Rectal, Daily  enoxaparin (LOVENOX) injection 40 mg, 40 mg, Subcutaneous, Daily  sodium chloride flush 0.9 % injection 10 mL, 10 mL, Intravenous, 2 times per day  sodium chloride flush 0.9 % injection 10 mL, 10 mL, Intravenous, PRN  sodium chloride flush 0.9 % injection 10 mL, 10 mL, Intravenous, 2 times per day  sodium chloride flush 0.9 % injection 10 mL, 10 mL, Intravenous, PRN  polyethylene glycol (GLYCOLAX) packet 17 g, 17 g, Oral, Daily PRN  promethazine (PHENERGAN) tablet 12.5 mg, 12.5 mg, Oral, Q6H PRN **OR** ondansetron (ZOFRAN) injection 4 mg, 4 mg, Intravenous, Q6H PRN  clopidogrel (PLAVIX) tablet 75 mg, 75 mg, Oral, Daily  labetalol (NORMODYNE;TRANDATE) injection syringe 10 mg, 10 mg, Intravenous, Q4H PRN  hydrALAZINE (APRESOLINE) injection 10 mg, 10 mg, Intravenous, Q6H PRN    Social History:  Lives With: Family(sister)  Type of Home: House  Home Layout: One level, Able to Live on Main level with bedroom/bathroom  Home Access: Level entry  Bathroom Shower/Tub: Tub/Shower unit  Bathroom Toilet: Standard  Bathroom Equipment: (none)  Home Equipment: Cane  ADL Assistance: Needs assistance(socks, pants, shirt. sister assists)  Homemaking Assistance: Needs assistance  Homemaking Responsibilities: No(sister does cooking, cleaning, laundry.  Pt tries to assist as able.)  Ambulation Assistance: Needs assistance  Transfer Assistance: Needs assistance  Active : No  Additional Comments: Pt (8/23/2020)  Impression    Motion through the right midportion common artery challenge evaluation may    obscure underlying stenosis or intimal injury.         Moderate plaque identified both proximal ICAs resulting in 30% narrowing per    NASCET criteria.   No large vessel occlusion or hemodynamic stenosis            · Brain MRI WO  Impression    Acute infarct in the left middle cerebral artery territory. Diagnostics:    Summary  The study was performed by the Cardiologist, the Cardiology fellow, and the  Sonographer, in the Cath Lab. The study was performed under conscious sedation. Normal left ventricular chamber dimension. Left ventricle systolic function appears moderately reduced, estimated EF  35-40%. Left atrial dilatation. Left atrial appendage showed no evidence of clot. Inter-atrial septum is intact with no evidence for an atrial septal defect. Negative bubble study, no shunt noted. Mild mitral regurgitation. Mild tricuspid regurgitation. CBC:   Recent Labs     08/23/20  1538 08/24/20  0840   WBC 6.2 6.9   RBC 4.96 4.79   HGB 14.6 14.2   HCT 44.5 44.0   MCV 89.7 91.9   RDW 12.1 12.1    204     BMP:   Recent Labs     08/23/20  1538 08/23/20  1556   *  --    K 4.1  --    CL 99  --    CO2 25  --    BUN 19  --    CREATININE 0.90  --    GLUCOSE 175* 140      HbA1c:   Lab Results   Component Value Date    LABA1C 7.6 (H) 08/24/2020     BNP: No results for input(s): BNP in the last 72 hours. PT/INR:   Recent Labs     08/23/20  1538   PROTIME 13.4   INR 1.0     APTT:   Recent Labs     08/23/20  1538   APTT 25.2     CARDIAC ENZYMES: No results for input(s): CKMB, CKMBINDEX, TROPONINT in the last 72 hours.     Invalid input(s): CKTOTAL;3  FASTING LIPID PANEL:  Lab Results   Component Value Date    CHOL 160 08/24/2020    HDL 44 08/24/2020    TRIG 79 08/24/2020     LIVER PROFILE:   Recent Labs     08/23/20  1538   AST 17   ALT 15   BILITOT 0.45   ALKPHOS 154*          Physical Exam:  BP (!) 165/58   Pulse 90   Temp 98.2 °F (36.8 °C) (Oral)   Resp 16   Wt 191 lb 5.8 oz (86.8 kg)   SpO2 93%   BMI 32.85 kg/m²     Deferred - Record review only    Impression:  1. Ischemic L MCA CVA with R hemiparesis and global aphasia  2. HTN/CHF/non-ischemic cardiomyopathy  3. DM     Recommendations:    1. Diagnosis:  L MCA ischemic CVA with R hemiparesis and global aphasia  2. Therapy: Has PT/OT/SLP needs  3. Medical Necessity: As above  4. Support: Lives with supportive family  5. Rehab Recommendation: The patient will benefit from acute inpatient rehabilitation once medically stable per primary service. Anticipate he/she will be able to tolerate 3 hours of therapy per day in rehabilitation. The patient requires multidisciplinary rehabilitation treatment including medical management by a PM&R physician, 24 hour rehabilitation nursing, Physical/Occupational therapy, + speech therapy,  rehabilitation social work, and nutrition services. Patient and family also require education in post-hospital precautions and home exercise routine, adaptive techniques and deficit compensation strategies, strengthening and conditioning, equipment prescription and instructions in use. 6. DVT Prophylaxis: on Lovenox    It was my pleasure to evaluate the chart of Delia Genao today. Please call with questions. Fabiano Rutherford MD        This note is created with the assistance of a speech recognition program.  While intending to generate a document that actually reflects the content of the visit, the document can still have some errors including those of syntax and sound a like substitutions which may escape proof reading. In such instances, actual meaning can be extrapolated by contextual diversion.

## 2020-08-26 NOTE — PROGRESS NOTES
Occupational Therapy  Facility/Department: [facility] 2  Daily Treatment Note  NAME: Chaitanya Kessler  : 1950  MRN: [id number]    Date of Service: 2020    Discharge Recommendations: Further therapy recommended at discharge. The patient should be able to tolerate at least three hours of therapy per day over 5 days or 15 hours over 7 days. Pt would strongly benefit from aggressive facility rehab upon d/c from hospital.     Assessment   Performance deficits / Impairments: Decreased functional mobility ; Decreased ADL status; Decreased cognition;Decreased high-level IADLs;Decreased fine motor control;Decreased safe awareness;Decreased strength;Decreased ROM; Decreased balance  Prognosis: Good  REQUIRES OT FOLLOW UP: Yes  Activity Tolerance  Activity Tolerance: Patient limited by fatigue  Activity Tolerance: RUE weakness, aphasia  Safety Devices  Safety Devices in place: Yes  Type of devices: Call light within reach;Gait belt;Patient at risk for falls; Left in chair;Nurse notified; All fall risk precautions in place  Restraints  Initially in place: No       Patient Diagnosis(es): The encounter diagnosis was Aphasia. has a past medical history of CHF (congestive heart failure) ([facility] Diabetes mellitus ([facility] [facility] H/O cardiac catheterization, H/O echocardiogram, History of echocardiogram, History of echocardiogram, Hyperlipidemia, and Hypertension. has a past surgical history that includes Cholecystectomy and Cardiac catheterization (Left, 2017). Restrictions  Restrictions/Precautions  Restrictions/Precautions: General Precautions, Fall Risk, Up as Tolerated  Required Braces or Orthoses?: No  Position Activity Restriction  Other position/activity restrictions: up as tolerated  Subjective   General  Patient assessed for rehabilitation services?: Yes  Family / Caregiver Present: No  Diagnosis: L MCA infarct  General Comment  Comments: RN ok'd for therapy this afternoon.  Pt was agreeable and cooperative throughout session. pt demonstrated global aphasia  Pain Assessment  Pain Assessment: 0-10  Pain Level: 0  Vital Signs  Patient Currently in Pain: No   Orientation  Orientation  Overall Orientation Status: Impaired  Orientation Level: Oriented to person;Disoriented to situation;Oriented to place;Oriented to time  Objective    ADL  Feeding: Setup; Increased time to complete;Minimal assistance; Adaptive utensils (comment)(Using LUE, built-up handle provided, pt will have difficulty cutting food)  Grooming: Moderate assistance; Increased time to complete;Setup  Toileting: Moderate assistance; Increased time to complete;Setup  Additional Comments: Pt sat on toilet using Evetta Hymen, pt successfully urinated and performed own luis-care using LUE while sitting, pt sat in recliner and used built-up handle for RUE oral care, writer and pt's LUE assisted RUE for hand-over-hand, pt leaned in SS sinkside to wash hands and had great difficulty reaching to R to use soap dispenser properly, hand-over-hand required for RUE to push soap dispenser and not let pt \"cheat\" with LUE     Balance  Sitting Balance: Stand by assistance  Standing Balance: Minimal assistance  Standing Balance  Time: 8 min  Activity: Pt stood x4 in SS this date  Functional Mobility  Functional Mobility Comments: NEGAR, pt had to use toilet quickly so SS was utilized by writer/RN  Bed mobility  Supine to Sit: Unable to assess  Sit to Supine: Unable to assess  Scooting: Stand by assistance;Contact guard assistance  Comment: Pt sitting in recliner upon arrival/exit this date, while sitting in recliner pt performed 12 min+ [facility] activity using RUE to promote awareness/strength  Transfers  Sit to stand: Minimal assistance  Stand to sit: Minimal assistance  Transfer Comments: Pt able to bring RUE all the way up to SS bar at times, then it would fall off. Pt relying on LUE often d/t new RUE weakness-pt is likely R-handed.             Cognition  Overall Cognitive Status:

## 2020-08-26 NOTE — PROGRESS NOTES
[address]    Progress Note    8/26/2020    2:56 PM    Name:   Edelmira Mcguire  MRN:     [id number]     Acct:      [de-identified]   Room:   2024/2024-01  IP Day:  3  Admit Date:  8/23/2020  8:35 PM    PCP:   SUE Manuel CNP  Code Status:  Full Code    Subjective:     C/C:   Chief Complaint   Patient presents with    Altered Mental Status     Interval History Status:   Comfortable  No distress  Expressive aphasia persists  Right hemiparesis present    Data Base Updates:  Glucose 212High     Blood [id number] / 60-80s    Brief History:     As documented in the medical record:  \"Briefly, this patient is a 53-year-old female patient with past medical history of hypertension, hyperlipidemia diabetes, CHF, CKD Stage 3, history of cryptogenic strokes who came transferred from [facility] ED for stroke evaluation due to wake-up symptoms of expressive more than receptive aphasia, right visual field cut.  CT head was unremarkable for any new acute pathology and CTA head and neck showed moderate plaque in both proximal ICA resulting in 3% narrowing but no LVO.  Admitted for stroke evaluation. \"     MRI revealed:  Impression:          Acute infarct in the left middle cerebral artery territory     Echo:  Normal left ventricular chamber dimension. Left ventricle systolic function appears moderately reduced, estimated EF   35-40%. Left atrial dilatation. Left atrial appendage showed no evidence of clot. Inter-atrial septum is intact with no evidence for an atrial septal defect. Negative bubble study, no shunt noted. Mild mitral regurgitation. Mild tricuspid regurgitation. Swallow study:  Impression:  Swallowing mechanism grossly within normal limits without evidence of   aspiration. Hemoglobin A1c 7.6      Medications:      Allergies:  No Known Allergies    Current Meds:   Scheduled Meds:    amLODIPine  10 mg Oral Daily    carvedilol  12.5 mg Oral BID WC    losartan  100 mg Oral Daily    And    hydroCHLOROthiazide  25 mg Oral Daily    atorvastatin  80 mg Oral Nightly    aspirin  81 mg Oral Daily    Or    aspirin  600 mg Rectal Daily    enoxaparin  40 mg Subcutaneous Daily    sodium chloride flush  10 mL Intravenous 2 times per day    sodium chloride flush  10 mL Intravenous 2 times per day    clopidogrel  75 mg Oral Daily     Continuous Infusions:   PRN Meds: sodium chloride flush, sodium chloride flush, polyethylene glycol, promethazine **OR** ondansetron, labetalol, hydrALAZINE    Data:     Past Medical History:   has a past medical history of CHF (congestive heart failure) ([facility] [address] Diabetes mellitus ([facility] [address] H/O cardiac catheterization, H/O echocardiogram, History of echocardiogram, History of echocardiogram, Hyperlipidemia, and Hypertension. Social History:   reports that she has never smoked. She has never used smokeless tobacco. She reports that she does not drink alcohol or use drugs. Family History:   Family History   Problem Relation Age of Onset    Coronary Art Dis Father          from [place]    COPD Sister         O2 dep    Coronary Art Dis Brother         2 brothers  from [place]       Vitals:  BP (!) 165/58   Pulse 90   Temp 98.2 °F (36.8 °C) (Oral)   Resp 16   Wt 191 lb 5.8 oz (86.8 kg)   SpO2 93%   BMI 32.85 kg/m²   Temp (24hrs), Av °F (36.7 °C), Min:97.4 °F (36.3 °C), Max:98.4 °F (36.9 °C)    Recent Labs     20  1642 20  1954 20  0902 20  1256   POCGLU 127* 170* 200* 212*       I/O (24Hr): Intake/Output Summary (Last 24 hours) at 2020 1456  Last data filed at 2020 0524  Gross per 24 hour   Intake --   Output 350 ml   Net -350 ml         Review of Systems:     Limited due to a aphasia  Review of Systems   Constitutional: Positive for activity change (Diminished). Respiratory: Negative for cough and shortness of breath.     Cardiovascular: Negative for Priority: High    Cerebral infarction [facility]) - left MCA distribution  [I63.9] 08/26/2020    Uncontrolled type 2 diabetes mellitus with hyperglycemia (HCC) [E11.65]     Elevated blood pressure reading [R03.0]     Type 2 diabetes mellitus, without long-term current [facility] [E11.9] 08/25/2020    Aphasia [R47.01]     TIA (transient ischemic attack) [G45.9] 12/22/2019    Hypertension [I10]     Idiopathic cardiomyopathy ([facility] [I42.8]        Plan:        Neurology evaluation in progress  Aspirin  Lipitor  Plavix  Urology evaluation  Physical therapy and rehabilitation   Speech therapy evaluation and treatment  Will monitor and control blood pressure, avoid hypotension  Glycemic contol - monitor and control blood sugars  Risk factor management / weight loss  See orders /  Will follow     IP CONSULT TO NEUROLOGY  IP CONSULT TO INTERNAL MEDICINE  IP CONSULT TO PHYSICAL MEDICINE REHAB  IP CONSULT TO DO Asaf  8/26/2020  2:56 PM

## 2020-08-26 NOTE — DISCHARGE INSTR - COC
emergency I16.1    Chronic combined systolic and diastolic HF (heart failure), NYHA class 2 ([facility]) I50.42    Acute on chronic combined systolic and diastolic CHF, NYHA class 4 ([facility]) I50.43    Hypoxia R09.02    Severe mitral regurgitation by prior echocardiogram I34.0    Morbid obesity ([facility]) E66.01    CKD (chronic kidney disease) stage 3, GFR 30-59 ml/min ([facility]) N18.3    Physical deconditioning R53.81    TIA (transient ischemic attack) G45.9    Old lacunar stroke without late effect Z86.73    Dysarthria R47.1    Stroke determined by clinical assessment ([facility] I63.9    Aphasia R47.01    Type 2 diabetes mellitus, without long-term current use of insulin ([facility]) E11.9    Cerebral infarction ([facility] [address] - left MCA distribution  I63.9    Uncontrolled type 2 diabetes mellitus with hyperglycemia ([facility]) E11.65    Elevated blood pressure reading R03.0       Isolation/Infection:   Isolation          No Isolation        Patient Infection Status     Infection Onset Added Last Indicated Last Indicated By Review Planned Expiration Resolved Resolved By    None active    Resolved    COVID-19 Rule Out 08/24/20 08/24/20 08/24/20 COVID-19 (Ordered)   08/24/20 Rule-Out Test Resulted          Nurse Assessment:  Last Vital Signs: BP (!) 165/58   Pulse 90   Temp 98.2 °F (36.8 °C) (Oral)   Resp 16   Wt 191 lb 5.8 oz (86.8 kg)   SpO2 93%   BMI 32.85 kg/m²     Last documented pain score (0-10 scale): Pain Level: 0  Last Weight:   Wt Readings from Last 1 Encounters:   08/26/20 191 lb 5.8 oz (86.8 kg)     Mental Status:  {IP PT MENTAL STATUS:20030}    IV Access:  - None    Nursing Mobility/ADLs:  Walking   Assisted  Transfer  Assisted  Bathing  Assisted  Dressing  Assisted  Toileting  Assisted  Feeding  Assisted  Med Admin  Assisted  Med Delivery   whole    Wound Care Documentation and Therapy:  Incision 08/04/17 Wrist Right (Active)   Number of days: 1118        Elimination:  Continence:   · Bowel:  Yes  · Bladder: Yes  Urinary Catheter: None   Colostomy/Ileostomy/Ileal Conduit: No       Date of Last BM: ***    Intake/Output Summary (Last 24 hours) at 8/26/2020 1730  Last data filed at 8/26/2020 1300  Gross per 24 hour   Intake 450 ml   Output 350 ml   Net 100 ml     I/O last 3 completed shifts: In: 12 [P.O.:450]  Out: 350 [Urine:350]    Safety Concerns: At Risk for Falls    Impairments/Disabilities:      None    Nutrition Therapy:  Current Nutrition Therapy:   - Oral Diet:  General    Routes of Feeding: Oral  Liquids: No Restrictions  Daily Fluid Restriction: no  Last Modified Barium Swallow with Video (Video Swallowing Test): done on 8/25/20/***    Treatments at the Time of Hospital Discharge:   Respiratory Treatments:   Oxygen Therapy:  is not on home oxygen therapy.   Ventilator:    - No ventilator support    Rehab Therapies: Physical Therapy, Occupational Therapy and Speech/Language Therapy  Weight Bearing Status/Restrictions: No weight bearing restirctions  Other Medical Equipment (for information only, NOT a DME order):  walker, bath bench, bedside commode and hospital bed  Other Treatments: ***    Patient's personal belongings (please select all that are sent with patient):  None    RN SIGNATURE:  Electronically signed by Gail Tovar RN on 8/26/20 at 5:38 PM EDT    CASE MANAGEMENT/SOCIAL WORK SECTION    Inpatient Status Date: ***    Readmission Risk Assessment Score:  Readmission Risk              Risk of Unplanned Readmission:        13           Discharging to Facility/ Agency   · Name:   · Address:  · Phone:  · Fax:    Dialysis Facility (if applicable)   · Name:  · Address:  · Dialysis Schedule:  · Phone:  · Fax:    / signature: {Esignature:[id number]}    PHYSICIAN SECTION    Prognosis: {Prognosis:5291251248}    Condition at Discharge: [facility] Patient Condition:[id number]}    Rehab Potential (if transferring to Rehab): {Prognosis:6702113481}    Recommended Labs or Other Treatments After Discharge: ***    Physician Certification: I certify the above information and transfer of Fredy Crouch  is necessary for the continuing treatment of the diagnosis listed and that she requires {Admit to Appropriate Level of Care:16883} for {GREATER/LESS:696321856} 30 days.      Update Admission H&P: {CHP DME Changes in VXJET:482901296}    PHYSICIAN SIGNATURE:  {Esignature:[id number]}

## 2020-08-27 LAB
ANION GAP SERPL CALCULATED.3IONS-SCNC: 9 MMOL/L (ref 9–17)
BUN BLDV-MCNC: 39 MG/DL (ref 8–23)
BUN/CREAT BLD: ABNORMAL (ref 9–20)
CALCIUM SERPL-MCNC: 10.1 MG/DL (ref 8.6–10.4)
CHLORIDE BLD-SCNC: 102 MMOL/L (ref 98–107)
CO2: 26 MMOL/L (ref 20–31)
CREAT SERPL-MCNC: 1.08 MG/DL (ref 0.5–0.9)
GFR AFRICAN AMERICAN: >60 ML/MIN
GFR NON-AFRICAN AMERICAN: 51 ML/MIN
GFR SERPL CREATININE-BSD FRML MDRD: ABNORMAL ML/MIN/{1.73_M2}
GFR SERPL CREATININE-BSD FRML MDRD: ABNORMAL ML/MIN/{1.73_M2}
GLUCOSE BLD-MCNC: 123 MG/DL (ref 65–105)
GLUCOSE BLD-MCNC: 138 MG/DL (ref 65–105)
GLUCOSE BLD-MCNC: 141 MG/DL (ref 65–105)
GLUCOSE BLD-MCNC: 156 MG/DL (ref 70–99)
GLUCOSE BLD-MCNC: 162 MG/DL (ref 65–105)
HCT VFR BLD CALC: 38.9 % (ref 36–46)
HEMOGLOBIN: 13.1 G/DL (ref 12–16)
MCH RBC QN AUTO: 30.1 PG (ref 26–34)
MCHC RBC AUTO-ENTMCNC: 33.7 G/DL (ref 31–37)
MCV RBC AUTO: 89.5 FL (ref 80–100)
NRBC AUTOMATED: NORMAL PER 100 WBC
PDW BLD-RTO: 13.3 % (ref 11.5–14.9)
PLATELET # BLD: 193 K/UL (ref 150–450)
PMV BLD AUTO: 8.9 FL (ref 6–12)
POTASSIUM SERPL-SCNC: 4.1 MMOL/L (ref 3.7–5.3)
RBC # BLD: 4.35 M/UL (ref 4–5.2)
SODIUM BLD-SCNC: 137 MMOL/L (ref 135–144)
WBC # BLD: 7.2 K/UL (ref 3.5–11)

## 2020-08-27 PROCEDURE — 97116 GAIT TRAINING THERAPY: CPT

## 2020-08-27 PROCEDURE — 92523 SPEECH SOUND LANG COMPREHEN: CPT

## 2020-08-27 PROCEDURE — 6370000000 HC RX 637 (ALT 250 FOR IP): Performed by: PHYSICAL MEDICINE & REHABILITATION

## 2020-08-27 PROCEDURE — 6370000000 HC RX 637 (ALT 250 FOR IP): Performed by: INTERNAL MEDICINE

## 2020-08-27 PROCEDURE — 97167 OT EVAL HIGH COMPLEX 60 MIN: CPT

## 2020-08-27 PROCEDURE — 97535 SELF CARE MNGMENT TRAINING: CPT

## 2020-08-27 PROCEDURE — 99222 1ST HOSP IP/OBS MODERATE 55: CPT | Performed by: PHYSICAL MEDICINE & REHABILITATION

## 2020-08-27 PROCEDURE — 6360000002 HC RX W HCPCS: Performed by: INTERNAL MEDICINE

## 2020-08-27 PROCEDURE — 97530 THERAPEUTIC ACTIVITIES: CPT

## 2020-08-27 PROCEDURE — 36415 COLL VENOUS BLD VENIPUNCTURE: CPT

## 2020-08-27 PROCEDURE — 1180000000 HC REHAB R&B

## 2020-08-27 PROCEDURE — 80048 BASIC METABOLIC PNL TOTAL CA: CPT

## 2020-08-27 PROCEDURE — 82947 ASSAY GLUCOSE BLOOD QUANT: CPT

## 2020-08-27 PROCEDURE — 92507 TX SP LANG VOICE COMM INDIV: CPT

## 2020-08-27 PROCEDURE — 85027 COMPLETE CBC AUTOMATED: CPT

## 2020-08-27 PROCEDURE — 97110 THERAPEUTIC EXERCISES: CPT

## 2020-08-27 RX ADMIN — ASPIRIN 81 MG CHEWABLE TABLET 81 MG: 81 TABLET CHEWABLE at 08:56

## 2020-08-27 RX ADMIN — HYDROCHLOROTHIAZIDE 25 MG: 25 TABLET ORAL at 08:56

## 2020-08-27 RX ADMIN — ENOXAPARIN SODIUM 40 MG: 40 INJECTION SUBCUTANEOUS at 08:56

## 2020-08-27 RX ADMIN — ATORVASTATIN CALCIUM 80 MG: 80 TABLET, FILM COATED ORAL at 22:28

## 2020-08-27 RX ADMIN — INSULIN LISPRO 2 UNITS: 100 INJECTION, SOLUTION INTRAVENOUS; SUBCUTANEOUS at 12:12

## 2020-08-27 RX ADMIN — AMLODIPINE BESYLATE 10 MG: 10 TABLET ORAL at 17:40

## 2020-08-27 RX ADMIN — CARVEDILOL 12.5 MG: 12.5 TABLET, FILM COATED ORAL at 17:40

## 2020-08-27 RX ADMIN — CARVEDILOL 12.5 MG: 12.5 TABLET, FILM COATED ORAL at 08:56

## 2020-08-27 RX ADMIN — CLOPIDOGREL BISULFATE 75 MG: 75 TABLET ORAL at 08:56

## 2020-08-27 RX ADMIN — INSULIN LISPRO 2 UNITS: 100 INJECTION, SOLUTION INTRAVENOUS; SUBCUTANEOUS at 08:59

## 2020-08-27 RX ADMIN — LOSARTAN POTASSIUM 100 MG: 50 TABLET, FILM COATED ORAL at 08:56

## 2020-08-27 ASSESSMENT — PAIN SCALES - GENERAL: PAINLEVEL_OUTOF10: 0

## 2020-08-27 NOTE — PROGRESS NOTES
Physical Therapy    Facility/Department: [facility]  Initial Assessment    NAME: Andrea Burton  : 1950  MRN: [id number]    Date of Service: 2020    Discharge Recommendations:  Patient would benefit from continued therapy after discharge, 24 hour supervision or assist   PT Equipment Recommendations  Other: to be determined at pt progreses    Assessment   Body structures, Functions, Activity limitations: Decreased functional mobility ; Decreased ADL status; Decreased safe awareness;Decreased strength;Decreased balance;Decreased endurance;Decreased cognition;Decreased fine motor control;Decreased coordination  Assessment: Pt required modAx1 on strong side for safety during transfers, pt able to ambulate 10ft at maxA with LUE support on hand rail, therapist supported RLE to prevent buckling. Treatment Diagnosis: Impaired function, weakness 2\" L MCA CVA. Specific instructions for Next Treatment: progress trasnfer training and ambulation as able, try balance activities. Prognosis: Good  Decision Making: Medium Complexity  History: PT with hx of HTN, DM, CHF, CVAs, pt with recent L MCA CVA. Exam: MMT, ROM, ambulation, transfer training, functional mobility. Clinical Presentation: Pt alert, able to answer \"yes\" and \"no\" when asked questions. PT Education: Goals; General Safety; Functional Mobility Training;Gait Training;PT Role  Patient Education: pt educated on transfers, gait training  Barriers to Learning: pt is aphasic, shows difficulty expressing and understanding information at times. REQUIRES PT FOLLOW UP: Yes  Activity Tolerance  Activity Tolerance: Patient Tolerated treatment well       Patient Diagnosis(es): There were no encounter diagnoses. has a past medical history of CHF (congestive heart failure) ([facility] [address] [address] Diabetes mellitus ([facility] [address] H/O cardiac catheterization, H/O echocardiogram, History of echocardiogram, History of echocardiogram, Hyperlipidemia, and Hypertension.    has a past surgical history that includes Cholecystectomy and Cardiac catheterization (Left, 08/04/2017). Restrictions  Restrictions/Precautions  Restrictions/Precautions: Fall Risk, Up as Tolerated, General Precautions  Required Braces or Orthoses?: No  Implants present? : (none)  Position Activity Restriction  Other position/activity restrictions: up as tolerated  Vision/Hearing  Vision: Impaired  Vision Exceptions: (pt states she has glasses but they are not here)  Hearing: Within functional limits     Subjective  General  Chart Reviewed: Yes  Patient assessed for rehabilitation services?: Yes  Additional Pertinent Hx: Pt has a history of HTN, HLD, DM, CHF. Pt recently evaluated at [facility] for  new onset of aphasia. Pt was transferred to [facility] on 8/23/20 after diagnostic studies confirmed new L hemisphere CVA. Pt with known history of previous CVAs. MRI confirmed L MCA infarct on 8/24/2020. Pt admitted to [address] [place] [address] 8/26/20. Response To Previous Treatment: Not applicable  Family / Caregiver Present: No  Referring Practitioner: Dr. Anil Charles  Referral Date : 08/26/20  Diagnosis: L MCA CVA  Follows Commands: Within Functional Limits  General Comment  Comments: Pt assisted off toilet at begining of  afternoon session. Karenann Karst steady used for safety for one person assist with transfer. Pt exhausted and asking to go back to bed  Subjective  Subjective: Pt alert, finishing using restroom with aide upon therapist arrival. Pt is pleasant, able to answer some questions, shows some frustration with pronunciation of words. Pain Screening  Patient Currently in Pain: Denies  Pain Assessment  Pain Assessment: 0-10  Pain Level: 0  Vital Signs  Patient Currently in Pain: Denies  Oxygen Therapy  O2 Device: None (Room air)  Patient Observation  Observations: Pt has bruise on R hand near first digit knuckle.         Orientation  Orientation  Overall Orientation Status: Impaired  Orientation Level: (pt aphasic,able to confirm name and birthday. )  Social/Functional History  Social/Functional History  Lives With: Family(Sister Raquel)  Type of Home: House  Home Layout: One level, Able to Live on Main level with bedroom/bathroom  Home Access: Stairs to enter without rails  Entrance Stairs - Number of Steps: 2 steps to porch + 1 step into house  Bathroom Shower/Tub: Tub/Shower unit, Curtain(tub transfer bench in basement)  Bathroom Toilet: Standard  Bathroom Equipment: Tub transfer bench  Bathroom Accessibility: (walker will fit if used sideways)  Home Equipment: Rolling walker(walker is Pt's sister's)  Receives Help From: Family  ADL Assistance: Independent  Homemaking Assistance: Needs assistance(Sister primary for cooking, niece primary laundry (pt and pt's sister were using laundromat prior to pandemic); pt primary for cleaning)  Homemaking Responsibilities: No  Ambulation Assistance: Independent  Transfer Assistance: Independent  Active : No  Patient's  Info: Pt's sister  IADL Comments: pt states she has a flat bed at home, needs one that raises [facility]  Additional Comments: Pt's sister Elizabeth Macedo (listed as Pt's emergency contact) contacted during OT session with Pt's OK. Pt's sister provided insight regarding Pt's prior level of function due to Pt's global aphasia which impairs Pt's ability to provide information at this time. Per Pt's sister, Pt was independent with all self-care and mobility at home prior to most recent stroke. Pt's sister is currently on medical leave from her position at a local university due to her COPD with oxygen dependence as well as her own septic infection which occurred in June 2020 and lead to Pt's sister requiring dialysis. Per Elizabeth Macedo, she is likely retiring permanently this year and will be able to provide 24/7 Supervision upon Pt's discharge. However, due to Pt's sister's personal medical journey, she is unable to provide significant assist to Pt upon Pt's discharge.  Elizabeth Macedo stated she could provide Minimal assist PRN upon Pt's discharge. Pt's sister's daughter (a nurse practitioner) has been doing Pt and Pt's sister's laundry due to the pandemic (they utilized a laundromat prior to pandemic). Pt's niece able to provide A PRN. Cognition        Objective          PROM RLE (degrees)  RLE PROM: WFL  AROM RLE (degrees)  RLE AROM: WFL  RLE General AROM: Pt lacks terminal knee extension, full hip flexion. AROM LLE (degrees)  LLE AROM : WFL  AROM RUE (degrees)  RUE General AROM: See OT evaluation. AROM LUE (degrees)  LUE General AROM: See OT evaluation. Strength RLE  Strength RLE: Exception  Comment: hip 2+/5 knee 3+/5, ankle 4-/5  Strength LLE  Strength LLE: WFL  Comment: hip, knee, ankle 4/5. Strength RUE  Comment: See OT evaluation. Strength LUE  Comment: See OT evaluation. Tone RLE  RLE Tone: Normotonic  Sensation  Overall Sensation Status: Impaired(pt denies, but cannot feel therapist touch RUE/RLE)  Bed mobility  Bridging: Stand by assistance  Rolling to Left: Stand by assistance  Rolling to Right: Stand by assistance  Supine to Sit: Contact guard assistance  Sit to Supine: Contact guard assistance  Scooting: Contact guard assistance  Comment: pt with slight LOB x1 during supine>sit. Transfers  Sit to Stand: Moderate Assistance  Stand to sit: Minimal Assistance  Bed to Chair: Moderate assistance  Stand Pivot Transfers: Moderate Assistance  Comment: modA on strong side, pt needs 2 person for safety for transfers to weaker side, pt ankle tends to invert upon standing. Ambulation  Ambulation?: Yes  Ambulation 1  Surface: level tile  Device: Hand-Held Assist(railing on L side, therapist supporting RUE)  Other Apparatus: Wheelchair follow  Assistance: Maximum assistance(2nd person for w/c follow)  Quality of Gait: pt RLE wero, ankle inverts with standing and taking steps, pt asissting RLE for positioning during ambulation.    Gait Deviations: Slow Jacinda;Staggers;Decreased step length  Distance: 10 ft (1 minute)  Comments: pt required therapist assist for RLE to advance leg, proper placement of leg, and block knee to prevent buckling, pt able to use LUE for support with HR, advance LLE. Stairs/Curb  Stairs?: No  Wheelchair Activities  Wheelchair Size: 18 in  Wheelchair Type: Standard  Wheelchair Cushion: Standard  Wheelchair Parts Management: No  Propulsion: No     Balance  Posture: Fair  Sitting - Static: Good;-  Sitting - Dynamic: Good;-  Standing - Static: Fair  Standing - Dynamic: Poor;+  Comments: standing balance with RW, and with no device, therapist assist needed to block RLE. Exercises  Knee Long Arc Quad: 1x10 mame  Ankle Pumps: 1x10 mame     Plan   Plan  Times per week: 1.5 hrs/day 5-7 days/week  Specific instructions for Next Treatment: progress trasnfer training and ambulation as able, try balance activities. Current Treatment Recommendations: Strengthening, ROM, Balance Training, Functional Mobility Training, Transfer Training, Endurance Training, Gait Training, Stair training, Neuromuscular Re-education, Safety Education & Training, Patient/Caregiver Education & Training, Equipment Evaluation, Education, & procurement  Safety Devices  Type of devices: All fall risk precautions in place, Call light within reach, Left in chair, Nurse notified, Gait belt  Restraints  Initially in place: No    G-Code       OutComes Score    Postural Assessment Scale for Stroke Patients   (PASS) Scoring Form     Give the subject instructions for each item as written below. When scoring the item, record the lowest response category that applies for each item. Maintaining a Posture  1. Sitting Without Support  Examiner: Have the subject sit on a bench/mat without support and with feet flat on the floor. 2 Can sit for more than 10 seconds without support  2. Standing with Support Examiner: Have the subject stand, providing support as needed.  Evaluate only the ability to stand with or without support. Do not consider the quality of the stance. 2     Can stand with moderate support of 1 person  3. Standing Without Support  Examiner:  Have the subject stand without support. Evaluate only the ability to stand with or without support. Do not consider the quality of the stance. 0  Cannot stand without support  4. Standing on Non-paretic Leg  Examiner: Have the subject stand on the non-paretic leg. Evaluate only the ability to bear weight entirely on the non-paretic leg. Do not consider how the subject accomplishes the task. 0 Cannot stand on non-paretic leg  5. Standing on Paretic Leg  Examiner: Have the subject stand on the paretic leg. Evaluate only the ability to bear weight entirely on the paretic leg. Do not consider how the subject accomplishes the task. 0  Cannot stand of paretic leg     Maintaining Posture SUBTOTAL     4/15    Changing a Posture  6. Supine to Paretic Side Lateral  Examiner:  Begin with the subject in supine on a treatment mat. Instruct the subject to roll to the paretic side (lateral movement). Assist as necessary. Evaluated the subject's performance on the amount of help required. Do not consider the quality of performance. 2  Can perform with little help  7. Supine to Non-paretic Side Lateral  Examiner:  Begin with the subject in supine on a treatment mat. Instruct the subject to roll to the non-paretic side (lateral movement). Assist as necessary. Evaluate the subject's Performance on the amount of help required. Do not consider the quality of performance. 2  Can perform with little help  8. Supine to Sitting up on the Edge of the Mat   Examiner:  Begin with the subject in supine on a treatment mat. Instruct the subject to come to sitting on the edge of the mat. Assist as necessary. Evaluate the subject's performance on the amount of help required. Do not considered the quality performance. 2  Can perform with little help   9.   Sitting on the Edge of the Mat to Supine  Examiner: Begin with the subject sitting on the edge of a treatment mat. Instruct the subject to return to supine. Assist as necessary. Evaluate the subjects performance on the amount of help required. Co not consider the quality of performance. 2  Can perform with little help  10. Sitting to Standing Up  Examiner:  Begin with the subject sitting on the edge of a treatment mat. Instruct the subject to stand up without support. Assist if necessary. Evaluated the subject's performance on the amount ot help required. Do not consider the quality of the performance. 1  Can perform with much help   11. Standing Up to Sitting Down  Examiner:  Begin with the subject standing by the edge of a treatment mat. Instruct the subject to sit on the edge of mat without support. Assist if necessary. Evaluated the subject's performance on the amount of help required. Do not consider the quality of the performance. 2  Can perform with little help  12 . Standing, Picking up a Pencil from the Floor  Examiner:  Begin with the subject standing. Instruct the subject to  a pencil from the floor without support. Assist if necessary. Evaluate the subject's performance on the amount of help required. Do Not consider the quality of the performance. 0  Cannot perform    Changing Posture SUBTOTAL   11/21    PASS TOTAL   15/36                                                AM-PAC Score             Goals  Short term goals  Time Frame for Short term goals: 10 days   Short term goal 1: Pt to perform bed mobility independently. Short term goal 2: Pt to perform transfers with minAx1. Short term goal 3: Pt to ambulate 150 ft, least restrictive device, minAx1. Short term goal 4: Pt to tolerate 30-60 mins physical therapy to increase endurance and strength. Short term goal 5: Pt to complete 5 steps, with LUE support, minAx1. Long term goals  Time Frame for Long term goals :  By LOS.  Long term goal 2: Pt to complete transfers with SBA to promote independence. Long term goal 3: Pt to ambulate 200 ft, least restrictive device, SBA. Long term goal 4: Pt to complete 5-12 steps, CGA to promote function. Long term goal 5: Pt to improve PASS score from 15/36 to 22/36. Patient Goals   Patient goals : To return back home.         Therapy Time   Individual Concurrent Group Co-treatment   Time In 1693         Time Out 1359         Minutes 44         Timed Code Treatment Minutes: 30 Minutes     PT evaluation/treatment is completed by JORGE Flores under the direct supervision of co-signing therapist, who agrees with all documentation and evaluation/treatment  Berna Burns, Student Physical Therapist

## 2020-08-27 NOTE — H&P
Physical Medicine & Rehabilitation History and Physical  [facility] Acute Rehabilitation Unit     Primary care provider: SUE Tavares CNP     Chief Complaint and Reason for Rehabilitation Admission:   ADL and Mobility deficits secondary to R hemiparesis and global aphasia secondary to ischemic CVA    History of Present Illness:  Eduardo Casper  is a 81 y.o. right-handed single    female admitted to the [address] [place] [address] unit on 8/26/2020. She was originally admitted to [facility] on 8/23/2020.       She was originally evaluated [facility] diagnostic studies confirmed new L hemisphere CVA. She has known history of previous CVAs. MRI confirmed L MCA infarct on 8/24/2020.      Cardiology consulted and performed TTE/ANKUR. She is being managed for known compensated CHF with reduced EF of 35-40%, secondary non-ischemic cardiomyopathy. NSR on EKG. Treating with carvedilol, hyzaar, amlodipine. No further workup planned. She is currently requiring assistance for self-care activities and mobility prompting this admission. Most of information obtained from records. Patient's expressive aphasia limits her communication. She is following commands and has some automatic responses which are intact. Premorbid function:  Was requiring assistance    Current Function:  PT:  Restrictions/Precautions: Fall Risk, Up as Tolerated, General Precautions  Implants present? : (none)  Other position/activity restrictions: up as tolerated   Transfers  Sit to Stand: Moderate Assistance  Stand to sit: Minimal Assistance  Bed to Chair: Moderate assistance  Stand Pivot Transfers: Dependent/Total(using gerald steady for safety 1 person assist)  Comment: modA on strong side, pt needs 2 person for safety for transfers to weaker side, pt ankle tends to invert upon standing.    Ambulation 1  Surface: level tile  Device: (walker lift)  Other Apparatus: Wheelchair follow  Assistance: Moderate assistance, Minimal assistance, 2 Person assistance  Quality of Gait: Pt able to initiate advancment of RLE but requires mod A to completely take step and support R knee with weightbearing. Pt at times would take 3 steps with the LLE before she would step with her R dispite verbal/tactile cues. Gait Deviations: Slow Jacinda, Staggers, Decreased step length, Decreased step height, Increased COLT  Distance: 12ft  Comments: attempt slider on R foot next session. Amb stopped due to pt fatigue    Transfers  Sit to Stand: Moderate Assistance  Stand to sit: Minimal Assistance  Bed to Chair: Moderate assistance  Stand Pivot Transfers: Dependent/Total(using gerald steady for safety 1 person assist)  Comment: modA on strong side, pt needs 2 person for safety for transfers to weaker side, pt ankle tends to invert upon standing. Ambulation  Ambulation?: Yes  Ambulation 1  Surface: level tile  Device: (walker lift)  Other Apparatus: Wheelchair follow  Assistance: Moderate assistance, Minimal assistance, 2 Person assistance  Quality of Gait: Pt able to initiate advancment of RLE but requires mod A to completely take step and support R knee with weightbearing. Pt at times would take 3 steps with the LLE before she would step with her R dispite verbal/tactile cues. Gait Deviations: Slow Jacinda, Staggers, Decreased step length, Decreased step height, Increased COLT  Distance: 12ft  Comments: attempt slider on R foot next session. Amb stopped due to pt fatigue    Surface: level tile  Ambulation 1  Surface: level tile  Device: (walker lift)  Other Apparatus: Wheelchair follow  Assistance: Moderate assistance, Minimal assistance, 2 Person assistance  Quality of Gait: Pt able to initiate advancment of RLE but requires mod A to completely take step and support R knee with weightbearing.  Pt at times would take 3 steps with the LLE before she would step with her R dispite verbal/tactile cues. Gait Deviations: Slow Jacinda, Staggers, Decreased step length, Decreased step height, Increased COLT  Distance: 12ft  Comments: attempt slider on R foot next session. Amb stopped due to pt fatigue      OT:   ADL  Feeding: Setup, Increased time to complete, Minimal assistance, Adaptive utensils (comment)(using LUE, assist to open packages & cut up food PRN)  Grooming: Moderate assistance, Increased time to complete(assist to brush hair; Stand by assist oral care & face washing)  UE Bathing: Maximum assistance, Setup, Increased time to complete(Assist to bathe R armpit (positioning of RUE), LUE, and under breasts)  LE Bathing: Dependent/Total(Assist to bathe B lower legs/feet, perineal area, buttocks; 2 Assist to stand)  UE Dressing: Maximum assistance, Setup, Increased time to complete(Assist to clasp bra, position breasts in bra; assist to thread RUE into overhead shirt, pull down in back)  LE Dressing: Dependent/Total(total assist with all tasks; Pt attempted to pull underwear/pants from calves to above knees while seated)  Toileting: Dependent/Total(per nursing report)  Additional Comments: Pt engaged in showering routine including bathing, dressing, and grooming tasks. Please see above for details. Pt seated at tray table with lunch at end of OT session. OT assessed Pt's ability to perform self-feeding. Please see above for details. Mild clearing of throat and coughing 1-2 times noted after Pt ate broccoli. RN Tunde Newby and SLP Tunde Newby notified.          Balance  Sitting Balance: Stand by assistance  Standing Balance: Minimal assistance   Standing Balance  Time: AM: 1-2 minutes x 4  Activity: AM: self-care & transfers  Comment: BUE support        Bed mobility  Bridging: Stand by assistance  Rolling to Left: Stand by assistance  Rolling to Right: Stand by assistance  Supine to Sit: Contact guard assistance  Sit to Supine: Contact guard assistance  Scooting: Contact guard assistance  Comment: Pt seated in chair at start and end of AM session. Pt supine in PM.  Transfers  Stand Pivot Transfers: 2 Person assistance, Moderate assistance(Mod A x 1 to Left; Mod A x 2 to Right)  Sit to stand: Moderate assistance  Stand to sit: Moderate assistance   Toilet Transfers  Toilet Transfers Comments: use of Gattis Lefort with nursing this AM     Shower Transfers  Shower - Transfer From: Wheelchair  Shower - Transfer Type: To and From  Shower - Transfer To: Transfer tub bench  Shower - Technique: Stand pivot, To right, To left  Shower Transfers: 2 Person assistance, Moderate assistance(Mod A x 1 to Left; Mod A x 2 to Right)  Shower Transfers Comments: BUE support via grab bar       SPEECH:  Subjective: []? Alert     [x]? Cooperative     []? Confused     []? Agitated    []? Lethargic        Objective/Assessment:  Attention: Distractions minimized.     Orientation: Unable to respond to orientation questions.       Recall: n/a     Organization:    Following simple 1-step commands: 0% humberto, increasing to 100% c hand-over-hand assistance. Naming from picture cards: 0% humberto, no increase with cues. Naming objects in room: 0% humberto, no increase with cues. Automatic naming, count 1-10: 0% humberto, increasing to 30% c max cues provided. Automatic naming, days of week: 0% humberto, increasing to 28% c max cues provided. Repetition: Pt. Able to repeat numbers 1-10 with 70% accuracy and days of the week with 42% accuracy (perseverating on \"Wednesday\"). Yes/no questions: 20% humberto, increasing to 40% c cues (Pt. Required prompt, \"Tell me yes or no\" with task).       Other: No family present. Pt. Abdi Narvaez frequently during session, however unintelligible. Phonemic and neologistic paraphasia noted. Pt. Able to state, \"That's good, thank you\" after writer reclined Pt's bed down.      Past Medical History:      Diagnosis Date    [facility]     Diabetes mellitus ([facility]     H/O cardiac catheterization 08/04/2017 LMCA: Mild irregularites 10-20%. LAD: Mild irregularites 10-20%. LCx: Mild irregularites 10-20%. RCA: Mild irregularites 10-20%. LV function assessed as : Abnormal. EF of 40%.  H/O echocardiogram 12/18/2018    EF 55%. Mildly increased LV wall thickness. The left atrium appears moderately to severely dilated. Moderate to severe mitral regurg. Moderate pulmonary HTN with an estimated RV systolic pressure of 00FRJM. Moderate tricuspid regurg. Evidnece fo moderate diastolic dysfunction is seen.  History of echocardiogram 01/17/2019    EF 55%. LV wall thickness moderately increased. LA is mildly dilated w/ LA volume index of 30. trivial mitral regurg. Evidence of moderate (grade II) diastolic dysfunction seen.  History of echocardiogram 06/03/2019    EF of 50-55%. LV wall thickness is mildly increased. LA is mildly dilated (29-33) with a left atrial volume index of 31 m1/m2. mild diastolic dysfunction.  Hyperlipidemia     Hypertension        Past Surgical History:      Procedure Laterality Date    CARDIAC CATHETERIZATION Left 08/04/2017    right radial, [facility] Dr. Jorden Frazier         Allergies:    Patient has no known allergies.     Medications   Scheduled Meds:   insulin lispro  0-12 Units Subcutaneous TID WC    insulin lispro  0-6 Units Subcutaneous Nightly    clopidogrel  75 mg Oral Daily    enoxaparin  40 mg Subcutaneous Daily    amLODIPine  10 mg Oral QPM    atorvastatin  80 mg Oral Nightly    carvedilol  12.5 mg Oral BID WC    losartan  100 mg Oral Daily    And    hydroCHLOROthiazide  25 mg Oral Daily    aspirin  81 mg Oral Daily    polyethylene glycol  17 g Oral Daily     Continuous Infusions:  PRN Meds:.polyethylene glycol, acetaminophen, senna, bisacodyl     Diagnostics:     · Head CT WO (8/23/2020)  Impression    No acute intracranial abnormality.         Senescent changes including chronic microvascular change and encephalomalacia    compatible with remote infarcts in both cerebellar hemispheres and in the    high posterior right frontal lobe.         Critical results were called by Ilsa Rothman MD to Sukumar Holman on 8/23/2020    at 15:56.            · CTA H/N (8/23/2020)  Impression    Motion through the right midportion common artery challenge evaluation may    obscure underlying stenosis or intimal injury.         Moderate plaque identified both proximal ICAs resulting in 30% narrowing per    NASCET criteria.   No large vessel occlusion or hemodynamic stenosis            · Brain MRI WO  Impression    Acute infarct in the left middle cerebral artery territory.           CBC:   Recent Labs     08/27/20  0600   WBC 7.2   RBC 4.35   HGB 13.1   HCT 38.9   MCV 89.5   RDW 13.3        BMP:   Recent Labs     08/27/20  0600      K 4.1      CO2 26   BUN 39*   CREATININE 1.08*   GLUCOSE 156*     HbA1c:   Lab Results   Component Value Date    LABA1C 7.6 (H) 08/24/2020     BNP: No results for input(s): BNP in the last 72 hours. PT/INR: No results for input(s): PROTIME, INR in the last 72 hours. APTT: No results for input(s): APTT in the last 72 hours. CARDIAC ENZYMES: No results for input(s): CKMB, CKMBINDEX, TROPONINT in the last 72 hours. Invalid input(s): CKTOTAL;3  FASTING LIPID PANEL:  Lab Results   Component Value Date    CHOL 160 08/24/2020    HDL 44 08/24/2020    TRIG 79 08/24/2020     LIVER PROFILE: No results for input(s): AST, ALT, ALB, BILIDIR, BILITOT, ALKPHOS in the last 72 hours. Social History:  Lives With: Family(sister)  Type of Home: House  Home Layout: One level, Able to Live on Main level with bedroom/bathroom  Home Access: Level entry  Bathroom Shower/Tub: Tub/Shower unit  Bathroom Toilet: Standard  Bathroom Equipment: (none)  Home Equipment: Cane  ADL Assistance: Needs assistance(socks, pants, shirt. sister assists)  Homemaking Assistance: Needs assistance  Homemaking Responsibilities: No(sister does cooking, cleaning, laundry.  Pt tries to assist as able.)  Ambulation Assistance: Needs assistance  Transfer Assistance: Needs assistance  Active : No  Additional Comments: Pt initially stated amb with cane. then that pt stays in bed and uses brief, unable to get to bathroom to toilet. uncertain reliability d/t aphasia. Social History     Socioeconomic History    Marital status:       Spouse name: Not on file    Number of children: Not on file    Years of education: Not on file    Highest education level: Not on file   Occupational History    Not on file   Social Needs    Financial resource strain: Not on file    Food insecurity     Worry: Not on file     Inability: Not on file    Transportation needs     Medical: Not on file     Non-medical: Not on file   Tobacco Use    Smoking status: Never Smoker    Smokeless tobacco: Never Used   Substance and Sexual Activity    Alcohol use: No    Drug use: No    Sexual activity: Not Currently   Lifestyle    Physical activity     Days per week: Not on file     Minutes per session: Not on file    Stress: Not on file   Relationships    Social connections     Talks on phone: Not on file     Gets together: Not on file     Attends [employer] service: Not on file     Active member of club or organization: Not on file     Attends meetings of clubs or organizations: Not on file     Relationship status: Not on file    Intimate partner violence     Fear of current or ex partner: Not on file     Emotionally abused: Not on file     Physically abused: Not on file     Forced sexual activity: Not on file   Other Topics Concern    Not on file   Social History Narrative    Not on file       Family History:       Problem Relation Age of Onset    Coronary Art Dis Father          from [place]   Aram Delacruz COPD Sister         O2 dep    Coronary Art Dis Brother         2 brothers  from [place]       Review of Systems:  ROS Limited secondary to aphasia      Physical Exam:  /60   Pulse 71   Temp 97.7 °F (36.5 °C) (Oral)   Resp 17   Ht 5' 4\" (1.626 m)   Wt 191 lb 12.8 oz (87 kg)   SpO2 98%   BMI 32.92 kg/m²     GEN: Well developed, well nourished, in NAD  HEENT:  NCAT. PERRL. EOMI. Mucous membranes pink and moist.   PULM:  Clear to ausculation. No rales or rhonchi. Respirations WNL and unlabored. CV:  Regular rate rhythm. No murmurs or gallops. GI:  Abdomen soft. Nontender. Non-distended. BS + and equal.    NEUROLOGICAL: A&O x3. Sensation intact to light touch. DTRs 2+. Follows commands. Occasional intact automatic verbal responses. Expressive aphasia   MSK:  Functional ROM LUE and LLE. Impaired ROM RUE and RLE. Motor testing 4+/5 key muscles LUE and LLE. R  3/5, R elbow flexion  . SKIN: Warm dry and intact. Good turgor. EXTREMITIES:  No calf tenderness to palpation. No edema BLEs. Dino Nugent PSYCH: Mood WNL. Appropriately interactive. Affect WNL. Principal Diagnosis/plan:  The patient is a 81y.o. year old with ADL and Mobility deficits secondary to R hemiparesis and global aphasia secondary to ischemic CVA    She will require close medical monitoring for the comorbidities listed below. She will benefit from intensive interdisciplinary therapies and rehab nursing care and is appropriate for inpatient rehabilitation. The post admission physician evaluation (NATALIE) is consistent with the pre-admission assessment. See above findings to reflect the elements required in the NATALIE. Patient's admitting condition is consistent with the findings of the preadmission assessment by the rehabilitation admissions coordinator. Diagnoses/plan:    1. R hemiparesis secondary to ischemic L MCA CVA:  PT/OT for gait, mobility, strengthening, endurance, ADLs, and self care. On ASA, atorvastatin, plavix  2. Global aphasia: speech therapy to eval and treat  3. HTN/CHF/non-ischemic cardiomyopathy: on amlodipine, carvedilol, HCTZ, losartan  4. DM: on insulin sliding scale  5.  Bowel Management: Miralax daily, senokot prn, dulcolax prn.  6. DVT Prophylaxis:  low molecular weight heparin, SCD's while in bed and MIRTA's   7. Internal medicine for medical management      Estimated Length of Stay:  2 weeks. Prognosis  good    Goals    Home at Supervision  Supervision at Discharge: Intermittent      Erlin Ortiz MD     This note is created with the assistance of a speech recognition program.  While intending to generate a document that actually reflects the content of the visit, the document can still have some errors including those of syntax and sound a like substitutions which may escape proof reading. In such instances, actual meaning can be extrapolated by contextual diversion.

## 2020-08-27 NOTE — CARE COORDINATION
Mikala Monsivais RN    Registered Nurse    Case Management    Progress Notes    Signed    Date of Service:  2020  4:07 PM          Related encounter: ED to Hosp-Admission (Discharged) from 2020 in [facility]  Acute Inpatient Rehab Preadmission Assessment     Patient Name: Edilia Scheuermann        MRN:   [id number]    : 1950  (81 y.o.)  Gender: female      Ad[place] from:   []?[facility]  [x]? [facility] [address] [address]   []? [facility] [address]   []? Outside Admission - Location:                                 [x]? Initial         []? Updated     Date of Onset / Admission to the acute hospital:  20     Admitting Diagnosis:  Ischemic L MCA CVA with R hemiparesis and global aphasia     Did patient have surgery? [x]? No  []? Yes:       Physicians: Tiffany Grijalva     Risk for clinical complications:  High     Co-morbidities:  HTN, CHF, CKD, DM, HLD, morbid obesity,hx of cryptogenic stroke (bilateral cerebellar hemispheres and bilateral frontal and anterior right parietal lobes, 6 lacunar infarct in bilateral thalami), non-ischemic cardiomyopathy     Financial Information  Primary insurance:  [x]? Medicare     []? Medicare HMO      []? [employer]    []? Medicaid      []? Medicaid HMO       []? Workers Compensation        []? Personal Pay     Secondary Insurance:  []? Medicare     []? Medicare HMO      []? [employer]    []? Medicaid      []? Medicaid HMO        []? Workers Compensation      [x]? None     Precautions:   []? Cardiac Precautions             []?Total hip precautions            []? Weight Bearing status:  [x]? Safety Precautions/Concerns  []? Visually impaired:                 []?Hard of Hearing:      Isolation Precautions:         []? Yes              [x]? No  If Yes:   []? Droplet  []? Contact           []? Airborne     []? VRE     []? MRSA        []? C-diff         []? TB                  []? Other:                     Physiatrist:  []?      [x]?     Rafael Pope  []? Dr. Joshi Began     Patients Occupation: Unknown     Reviewed Lab and Diagnostic reports from Current Admission: Yes     Patients Prior Functional  Level: Prior Function  ADL Assistance: Needs assistance(socks, pants, shirt. sister assists)  Homemaking Assistance: Needs assistance  Ambulation Assistance: Needs assistance  Transfer Assistance: Needs assistance  Additional Comments: Pt initially stated amb with cane. then that she stays in bed and uses brief, unable to get to bathroom to toilet. uncertain reliability d/t aphasia.     History of current illness:  Micki Amado a 81 y.o. female admitted to [facility] 8/23/2020.       She was originally evaluated at [facility] for aphasia. She was transferred to [facility] for further care after diagnostic studies confirmed new L hemisphere CVA. She has known history of previous CVAs. MRI confirmed L MCA infarct on 8/24/2020.      Cardiology consulted and performed TTE/ANKUR. She is being managed for known compensated CHF with reduced EF of 35-40%, secondary non-ischemic cardiomyopathy. NSR on EKG. Treating with carvedilol, hyzaar, amlodipine. No further workup planned.     Current functional status for upper extremity ADLs:  UE Bathing: Moderate assistance  UE Dressing: Moderate assistance     Current functional status for lower extremity ADLs:  LE Bathing: Moderate assistance  LE Dressing: Moderate assistance(pt donned B socks while sitting EOB. pt required assistance to thread B socks and then was able to complete task.)     Current functional status for bed, chair, wheelchair transfers:  Transfers  Sit to Stand: Minimal Assistance  Stand to sit: Minimal Assistance  Bed to Chair: Moderate assistance  Stand Pivot Transfers:  Moderate Assistance     Current functional status for toilet transfers:  Minimal Assistance        Current functional status for locomotion:  Ambulation  Ambulation?: Yes  Ambulation 1  Surface: level tile  Device: [address]: Maximum assistance  Quality of Gait: RLE \"buckling\", assist to advance; max A to keep her R hand on the walker  Gait Deviations: Slow Jacinda, Increased COLT, Decreased step length, Staggers  Distance: ~8'x1     Current functional status for comprehension: Maximal Assistance     Current functional status for expression: Maximal Assistance     Current functional status for social interaction:  TBD     Current functional status for problem solving:  TBD     Current functional status for memory: TBD     Current Deficits R/T Impairment: Impaired Functional Mobility and Decreased ADLs     Required Therapy:   [x]? Physical Therapy  [x]? Occupational Therapy   [x]? Speech Therapy     Additional Services:  [x]?   [x]? Recreational Therapy, as appropriate  [x]? Nutrition  []? Dialysis  []? Other:      Rehab Justification:  Needs 3 hrs therapy per day or 15 hours per week:  Yes  Identified Rehab Nursing needs: Yes  Intense Interdisciplinary need:  Yes  Need for 24 hr physician supervision:  Yes  Measurable improved quality of life:  Yes  Willingness to participate:  Yes  Medical Necessity:  Yes  Patient able to tolerate care proposed: Yes     Expected Discharge Destination/Functional Level:  Home with assist  Expected length of time to achieve that level of improvement: 1-2 weeks  Expected Post Discharge Treatments: Home with possible Home Care     Other information relevant to the care needs:  n/a     Acute Inpatient Rehabilitation Disclosure Statement provided to patient. Patient verbalized understanding.   Copy placed on patient's light chart.     I have reviewed and concur with the findings and results of the pre-admission screening assessment completed by the Inpatient Rehabilitation Admissions Coordinator.                  Cosigned by:  Natalie Weiss MD at 8/26/2020  4:37 PM

## 2020-08-27 NOTE — PROGRESS NOTES
Speech Language Pathology  Speech Language Pathology  [facility]    Speech/Cognitive Treatment Note    Date: 8/27/2020  Patients Name: Bunny Jackson  MRN: [id number]  Diagnosis: Global aphasia   Patient Active Problem List   Diagnosis Code    Hypertension I10    Hyperlipidemia E78.5    Acute on chronic systolic CHF (congestive heart failure) ([facility] I50.23    Hypertensive urgency I16.0    Idiopathic cardiomyopathy ([facility] I42.8    Hypertensive emergency I16.1    Chronic combined systolic and diastolic HF (heart failure), NYHA class 2 ([facility]) I50.42    Acute on chronic combined systolic and diastolic CHF, NYHA class 4 ([facility]) I50.43    Hypoxia R09.02    Severe mitral regurgitation by prior echocardiogram I34.0    Morbid obesity ([facility]) E66.01    CKD (chronic kidney disease) stage 3, GFR 30-59 ml/min ([facility]) N18.3    Physical deconditioning R53.81    TIA (transient ischemic attack) G45.9    Old lacunar stroke without late effect Z86.73    Dysarthria R47.1    Stroke determined by clinical assessment ([email or web address] I63.9    Aphasia R47.01    Type 2 diabetes mellitus, without long-term current use of insulin ([facility] E11.9    Cerebral infarction ([email or web address] - left MCA distribution  I63.9    Uncontrolled type 2 diabetes mellitus with hyperglycemia ([facility]) E11.65    Elevated blood pressure reading R03.0    Acute ischemic left middle cerebral artery (MCA) stroke ([facility]) I63.512       Pain: denies    Cognitive Treatment    Treatment time: 7106-2920      Subjective: [] Alert [x] Cooperative     [] Confused     [] Agitated    [] Lethargic      Objective/Assessment:  Attention: Distractions minimized. Orientation: Unable to respond to orientation questions. Recall: n/a    Organization:    Following simple 1-step commands: 0% humberto, increasing to 100% c hand-over-hand assistance. Naming from picture cards: 0% humberto, no increase with cues. Naming objects in room: 0% humberto, no increase with cues.   Automatic naming, count 1-10: 0% humberto, increasing to 30% c max cues provided. Automatic naming, days of week: 0% humberto, increasing to 28% c max cues provided. Repetition: Pt. Able to repeat numbers 1-10 with 70% accuracy and days of the week with 42% accuracy (perseverating on \"Wednesday\"). Yes/no questions: 20% humberto, increasing to 40% c cues (Pt. Required prompt, \"Tell me yes or no\" with task). Other: No family present. Pt. Elise Delgadillo frequently during session, however unintelligible. Phonemic and neologistic paraphasia noted. Pt. Able to state, \"That's good, thank you\" after writer reclined Pt's bed down. Plan:  [x] Continue ST services    [] Discharge from ST:      Discharge recommendations: [] Inpatient Rehab   [] [place]   [] Outpatient Therapy  [] Follow up at trauma clinic   [] Other:       Treatment completed by:  Audrey SPIVEY-SLP

## 2020-08-27 NOTE — CARE COORDINATION
[facility]  DVT Prophylaxis and Vaccine Status  Work List  Mandatory for all patients      Patient must be on both Chemical prophylaxis and Mechanical prophylaxis.  If chemical/mechanical prophylaxis is not ordered, the physician must document a reason for not using prophylaxis     Chemical Prophylaxis  Is patient on chemical prophylaxis: Yes  If no chemical prophylaxis Is a order in for No Chemical VTE prophylaxis NA  If no was the physician notified not applicable      Mechanical Prophylaxis  Is patient on mechanical prophylaxis, intermittent pneumatic compression device: Yes  If no was the physician notified not applicable        Pneumonia Vaccine  Vaccine indicated:  Not indicated  If indicated was the vaccine given: not applicable    Influenza Vaccine (applicable from October through March):  Vaccine indicated: Not indicated  If indicated was the vaccine given: not applicable    Patient Education  Education completed on DVT prophylaxis: yes

## 2020-08-27 NOTE — FLOWSHEET NOTE
Patient said it has been a busy day. 08/27/20 1948   Encounter Summary   Services provided to: Patient   Referral/Consult From: [address] Children;Family members   Continue Visiting   (8-27-20)   Complexity of Encounter Low   Length of Encounter 15 minutes   Routine   Type Initial   Assessment Calm; Approachable   Intervention Active listening;Explored feelings, thoughts, concerns;Sustaining presence/ Ministry of presence; Discussed illness/injury and it's impact   Outcome Expressed gratitude;Engaged in conversation;Expressed feelings/needs/concerns;Coping;Receptive

## 2020-08-27 NOTE — PROGRESS NOTES
[facility]: HELEN BUSH   Acute Rehabilitation OT Evaluation  Date: 20  Patient Name: Theta Mcardle       Room: 4582/5511-49  MRN: [id number]  Account: [de-identified]   : 1950  (81 y.o.) Gender: female     Referring Practitioner: Dr. Richardson Vitale  Diagnosis: Left ischemic middle cerebral artery stroke       Treatment Diagnosis: Impaired self-care status. Past Medical History:  has a past medical history of CHF (congestive heart failure) ([facility] Diabetes mellitus ([facility] H/O cardiac catheterization, H/O echocardiogram, History of echocardiogram, History of echocardiogram, Hyperlipidemia, and Hypertension. Past Surgical History:   has a past surgical history that includes Cholecystectomy and Cardiac catheterization (Left, 2017). Restrictions  Restrictions/Precautions: Fall Risk, Up as Tolerated, General Precautions  Implants present? : (none)  Other position/activity restrictions: up as tolerated  Required Braces or Orthoses?: No    Vitals  Temp: 98.2 °F (36.8 °C)  Pulse: 62  Resp: 16  BP: 121/69  Height: 5' 4\" (162.6 cm)  Weight: 191 lb 12.8 oz (87 kg)  BMI (Calculated): 33  Oxygen Therapy  SpO2: 96 %  O2 Device: None (Room air)  Level of Consciousness: Alert    Subjective  Subjective: \"My daughter, she has no, she can't help me in any. .. UGH\" Pt trying to express information regarding her daughter, however unable to fully articulate herself, stating \"I don't know how to explain it. \"     Vision  Vision: Impaired  Vision Exceptions: Wears glasses at all times(pt states she has glasses but they are not here)  Hearing  Hearing: Within functional limits  Vision - Basic Assessment  Prior Vision: Wears glasses all the time(reports glasses are at home)  Social/Functional History  Lives With: Family(Sister Raquel)  Type of Home: House  Home Layout: One level, Able to Live on Main level with bedroom/bathroom  Home Access: Stairs to enter without rails  Entrance Stairs - Number of Steps: 2 steps to porch + 1 step into house  Bathroom Shower/Tub: Tub/Shower unit, Curtain(tub transfer bench in basement)  Bathroom Toilet: Standard  Bathroom Equipment: Tub transfer bench  Bathroom Accessibility: (walker will fit if used sideways)  Home Equipment: Rolling walker(walker is Pt's sister's)  Receives Help From: Family  ADL Assistance: Independent  Homemaking Assistance: Needs assistance(Sister primary for cooking, niece primary laundry (pt and pt's sister were using laundromat prior to pandemic); pt primary for cleaning)  Homemaking Responsibilities: No  Ambulation Assistance: Independent  Transfer Assistance: Independent  Active : No  Patient's  Info: Pt's sister  IADL Comments: pt states she has a flat bed at home, needs one that raises [facility]  Additional Comments: Pt's sister Alcon Loco (listed as Pt's emergency contact) contacted during OT session with Pt's OK. Pt's sister provided insight regarding Pt's prior level of function due to Pt's global aphasia which impairs Pt's ability to provide information at this time. Per Pt's sister, Pt was independent with all self-care and mobility at home prior to most recent stroke. Pt's sister is currently on medical leave from her position at a local university due to her COPD with oxygen dependence as well as her own septic infection which occurred in June 2020 and lead to Pt's sister requiring dialysis. Per Alcon Loco, she is likely retiring permanently this year and will be able to provide 24/7 Supervision upon Pt's discharge. However, due to Pt's sister's personal medical journey, she is unable to provide significant assist to Pt upon Pt's discharge. Alcon Loco stated she could provide Minimal assist PRN upon Pt's discharge. Pt's sister's daughter (a nurse practitioner) has been doing Pt and Pt's sister's laundry due to the pandemic (they utilized a laundromat prior to pandemic). Pt's niece able to provide A PRN.        Objective  Vision - Basic Assessment  Prior grasp/release      Fine Motor Skills  Coordination  Movements Are Fluid And Coordinated: No  Coordination and Movement description: Right UE, Fine motor impairments, Gross motor impairments, Decreased speed, Decreased accuracy     Mobility  Balance  Sitting Balance: Stand by assistance  Standing Balance: Minimal assistance  Standing Balance  Time: AM: 1-2 minutes x 4  Activity: AM: self-care & transfers  Comment: BUE support     Bed mobility  Comment: Pt seated in chair at start and end of AM session. Pt supine in PM.     Transfers  Stand Pivot Transfers: 2 Person assistance, Moderate assistance(Mod A x 1 to Left; Mod A x 2 to Right)  Sit to stand: Moderate assistance  Stand to sit: Moderate assistance  Toilet Transfers  Toilet Transfers Comments: use of [place] with nursing this AM  Shower Transfers  Shower - Transfer From: Wheelchair  Shower - Transfer Type: To and From  Shower - Transfer To: Transfer tub bench  Shower - Technique: Stand pivot, To right, To left  Shower Transfers: 2 Person assistance, Moderate assistance(Mod A x 1 to Left; Mod A x 2 to Right)  Shower Transfers Comments: BUE support via grab bar  Functional Activity Tolerance  Functional Activity Tolerance: Tolerates 30 min exercise with multiple rests   Activity Tolerance: Patient Tolerated treatment well    ADL  ADL  Feeding: Setup; Increased time to complete;Minimal assistance; Adaptive utensils (comment)(using LUE, assist to open packages & cut up food PRN)  Grooming: Moderate assistance; Increased time to complete(assist to brush hair; Stand by assist oral care & face washing)  UE Bathing: Maximum assistance;Setup; Increased time to complete(Assist to bathe R armpit (positioning of RUE), LUE, and under breasts)  LE Bathing: Dependent/Total(Assist to bathe B lower legs/feet, perineal area, buttocks; 2 Assist to stand)  UE Dressing: Maximum assistance;Setup; Increased time to complete(Assist to clasp bra, position breasts in bra; assist to thread RUE into overhead shirt, pull down in back)  LE Dressing: Dependent/Total(total assist with all tasks; Pt attempted to pull underwear/pants from calves to above knees while seated)  Toileting: Dependent/Total(per nursing report)  Additional Comments: Pt engaged in showering routine including bathing, dressing, and grooming tasks. Please see above for details. Pt seated at tray table with lunch at end of OT session. OT assessed Pt's ability to perform self-feeding. Please see above for details. Mild clearing of throat and coughing 1-2 times noted after Pt ate broccoli. RN Jesús Kennedy and SLP Jesús Kennedy notified. OT scores   Eating  Assistance Needed: Setup or clean-up assistance  CARE Score: 5  Discharge Goal: Independent  Oral Hygiene  Assistance Needed: Supervision or touching assistance  CARE Score: 4  Discharge Goal: Independent  Toileting Hygiene  Assistance Needed: Dependent  CARE Score: 1  Discharge Goal: Supervision or touching assistance  Shower/Bathe Self  Assistance Needed: Dependent  CARE Score: 1  Discharge Goal: Supervision or touching assistance  Upper Body Dressing  Assistance Needed: Substantial/maximal assistance  CARE Score: 2  Discharge Goal: Supervision or touching assistance  Lower Body Dressing  Assistance Needed: Dependent  CARE Score: 1  Discharge Goal: Supervision or touching assistance  Putting On/Taking Off Footwear  Assistance Needed: Dependent  CARE Score: 1  Discharge Goal: Partial/moderate assistance  Toilet Transfer  Assistance Needed: Dependent  CARE Score: 1  Discharge Goal: Supervision or touching assistance      Goals  Patient Goals   Patient goals : To return home with sister  Short term goals  Time Frame for Short term goals: 10 days  Short term goal 1: Pt will perform grooming with stand by assist and use of assistive equipment/modified techniques PRN.   Short term goal 2: Pt will perform upper body bathing/dressing with Minimal assist and use of assistive equipment/modified techniques PRN.  Short term goal 3: Pt will perform toileting tasks and lower body bathing/dressing with Moderate assist and use of assitive equipment/durable medical equipment/modified techniques PRN. Short term goal 4: Pt will perform functional mobility and transfers during self-care with Minimal assist, least restrictive mobility device, and Fair safety. Short term goal 5: Pt will stand for 4+ minutes with 1-2 UE support, contact guard assist, and no loss of balance while engaging in functional activity of choice. Short term goal 6: Pt will actively participate in 30+ minutes of therapeutic exercise/functional activities to promote increased independence with self-care and mobility. Short term goal 7: Pt will verbalize/demonstrate Good understanding of assistive equipment/durable medical equipment/modified techniques for increased independence with self-care and mobility. Long term goals  Time Frame for Long term goals : By discharge  Long term goal 1: Pt will perform self-feeding and grooming with modified independence. Long term goal 2: Pt will perform bathing, dressing, and toileting tasks with stand by assist, Fair safety, and assist PRN for MIRTA hose. Long term goal 3: Pt will perform functional mobility and transfers during self-care with stand by assist, least restrictive mobility device, and Fair safety. Long term goal 4: Pt will stand for 8+ minutes with 1-2 UE support, stand by assist, and no loss of balance while engaging in functional activity of choice.   Long term goal 5: 9 hole peg, dynamometer, box and block to be assessed for RUE as appropriate    Assessment  Performance deficits / Impairments: Decreased functional mobility , Decreased ADL status, Decreased ROM, Decreased strength, Decreased safe awareness, Decreased cognition, Decreased endurance, Decreased sensation, Decreased balance, Decreased high-level IADLs, Decreased fine motor control, Decreased coordination, Decreased posture  Treatment Diagnosis: Impaired self-care status.   Prognosis: Good  Decision Making: High Complexity  REQUIRES OT FOLLOW UP: Yes  Discharge Recommendations: 24 hour supervision or assist, Patient would benefit from continued therapy after discharge  Plan  Times per week: 5-7x/week, 1.5 hours/day  Times per day: Twice a day  Current Treatment Recommendations: Self-Care / ADL, Home Management Training, Cognitive/Perceptual Training, Strengthening, ROM, Balance Training, Functional Mobility Training, Endurance Training, Neuromuscular Re-education, Cognitive Reorientation, Pain Management, Safety Education & Training, Patient/Caregiver Education & Training, Equipment Evaluation, Education, & procurement, Positioning          08/27/20 1008 08/27/20 1426   OT Individual Minutes   Time In [id number]   ADTX JXY [id number]   WIGNGRI 14 4   Time Code Minutes    Timed Code Treatment Minutes 69 Minutes 4 Minutes     Electronically signed by Jannie Moody OT on 8/27/20 at 4:45 PM EDT

## 2020-08-27 NOTE — PROGRESS NOTES
Writer was unable to obtain who chin's PCP is for consult this shift. Nothing was found in Notes. Day shift updated.

## 2020-08-27 NOTE — PROGRESS NOTES
Comprehensive Nutrition Assessment    Type and Reason for Visit:  Consult(Eval and treat)    Nutrition Recommendations/Plan: Continue Carb Control diet. Start Ensure High Protein 2x/day. Nutrition Assessment:  Patient admission is related to Acute Ischemic left middle cerebral artery stroke with aphasia and right visual field cut. Noted patient able to feed self but needs assistance cutting up food. Continue Carb control diet, start Ensure High Protein, monitor PO intakes and weight. Malnutrition Assessment:  Malnutrition Status:  Insufficient data    Context:  Acute Illness     Findings of the 6 clinical characteristics of malnutrition:  Energy Intake:  Unable to assess  Weight Loss:  No significant weight loss(8.2% loss in 7 months)     Body Fat Loss:  Unable to assess     Muscle Mass Loss:  Unable to assess    Fluid Accumulation:  No significant fluid accumulation     Strength:  Not Performed    Estimated Daily Nutrient Needs:  Energy (kcal):  1668 kcal absed on Rosalie- St. Jeor and 1.2 factor; Weight Used for Energy Requirements:  Current     Protein (g):  83-94 gm based on 1.5-1.7 gm/kg of ideal body weight; Weight Used for Protein Requirements:  Ideal          Nutrition Related Findings:  No edema. Bowel sounds active      Wounds:  None       Current Nutrition Therapies:    DIET CARB CONTROL; Anthropometric Measures:  · Height: 5' 4\" (162.6 cm)  · Current Body Weight: 191 lb 12.8 oz (87 kg)   · Admission Body Weight: 191 lb 12.8 oz (87 kg)    · Usual Body Weight: 209 lb (94.8 kg)(Per past record)     · Ideal Body Weight: 120 lbs; % Ideal Body Weight 159.8 %   · BMI: 32.9  · BMI Categories: Obese Class 1 (BMI 30.0-34. 9)       Nutrition Diagnosis:   · Inadequate oral intake related to cognitive or neurological impairment as evidenced by intake 51-75%, weight loss    Nutrition Interventions:   Food and/or Nutrient Delivery:  Continue Current Diet, Start Oral Nutrition Supplement  Nutrition Education/Counseling:  Education not indicated   Coordination of Nutrition Care:  Continued Inpatient Monitoring    Goals:  PO intakes to meet % of estiamted nutrition needs       Nutrition Monitoring and Evaluation:   Food/Nutrient Intake Outcomes:  Food and Nutrient Intake, Supplement Intake  Physical Signs/Symptoms Outcomes:  Biochemical Data, Weight     Discharge Planning:     Too soon to determine       Some areas of assessment may be incomplete due to COVID-19 precautions    Mahi Werner RD, LD  Office phone [phone]

## 2020-08-27 NOTE — PROGRESS NOTES
Physical Therapy  [address] [facility]   Acute Rehabilitation Physical Therapy Progress Note    Date: 20  Patient Name: Mai Islas       Room: 0590/2878-57  MRN: [id number]   Account: [de-identified]   : 1950  (80 y.o.) Gender: female     Referring Practitioner: Dr. Deniz Alexandra  Diagnosis: Left ischemic middle cerebral artery stroke  Past Medical History:  has a past medical history of CHF (congestive heart failure) ([facility] Diabetes mellitus ([facility] H/O cardiac catheterization, H/O echocardiogram, History of echocardiogram, History of echocardiogram, Hyperlipidemia, and Hypertension. Past Surgical History:   has a past surgical history that includes Cholecystectomy and Cardiac catheterization (Left, 2017). Additional Pertinent Hx: Pt has a history of HTN, HLD, DM, CHF. Pt recently evaluated at [facility] for  new onset of aphasia. Pt was transferred to [facility] on 20 after diagnostic studies confirmed new L hemisphere CVA. Pt with known history of previous CVAs. MRI confirmed L MCA infarct on 2020. Pt admitted to [address] [place] [address] 20. Restrictions/Precautions  Restrictions/Precautions: Fall Risk;Up as Tolerated;General Precautions  Required Braces or Orthoses?: No  Implants present? : (none)  Position Activity Restriction  Other position/activity restrictions: up as tolerated       Comments: Pt assisted off toilet at begining of  afternoon session. Shree Needle steady used for safety for one person assist with transfer.  Pt exhausted and asking to go back to bed    Vital Signs  Patient Currently in Pain: Denies    Bed Mobility:   Bed Mobility  Bridging: Stand by assistance  Rolling: Stand by assistance(using bed rails)  Sit to Supine: Minimal assistance(assist with RLE only)  Scooting: Contact guard assistance  Comment: completed in flat hospital bed in room  Bed mobility  Bridging: Stand by assistance  Scooting: Contact guard assistance    Transfers:  Sit to Stand: Moderate Assistance  Stand to sit: Minimal Assistance                 Ambulation 1  Surface: level tile  Device: (walker lift)  Assistance: Moderate assistance;Minimal assistance;2 Person assistance  Quality of Gait: Pt able to initiate advancment of RLE but requires mod A to completely take step and support R knee with weightbearing. Pt at times would take 3 steps with the LLE before she would step with her R dispite verbal/tactile cues. Gait Deviations: Slow Jacinda;Staggers;Decreased step length;Decreased step height; Increased COLT  Distance: 12ft  Comments: attempt slider on R foot next session. Amb stopped due to pt fatigue        Stairs/Curb  Stairs?: No          BALANCE Posture: Fair  Sitting - Static: Good;-  Sitting - Dynamic: Good;-  Standing - Static: Fair  Standing - Dynamic: Poor;+  Comments: standing balance with RW, and with no device, therapist assist needed to block RLE. EXERCISES    Other exercises?: Yes  Other exercises 1: supine AAROM RLE; AROM LLE, but occasional tactile cues  Other exercises 2: standing chioma in gerald steady while pt received luis care x 3 min CGA  Other exercises 3: sit<> gerald steady x 4           Activity Tolerance: Patient Tolerated treatment well, Patient limited by fatigue          Patient Education  New Education Provided:  transfer training, gait training, general strengthening ex  Learner:patient  Method: demonstration and explanation       Outcome: needs reinforcement     Current Treatment Recommendations: Strengthening, ROM, Balance Training, Functional Mobility Training, Transfer Training, Endurance Training, Gait Training, Stair training, Neuromuscular Re-education, Safety Education & Training, Patient/Caregiver Education & Training, Equipment Evaluation, Education, & procurement    Conditions Requiring Skilled Therapeutic Intervention  Body structures, Functions, Activity limitations: Decreased functional mobility ; Decreased ADL status; Decreased safe

## 2020-08-27 NOTE — PROGRESS NOTES
Speech Language Pathology  Facility/Department: [facility]  Initial Speech/Language/Cognitive Assessment    NAME: Garth Soto  : 1950   MRN: [id number]  ADMISSION DATE: 2020  ADMITTING DIAGNOSIS: has Hypertension; Hyperlipidemia; Acute on chronic systolic CHF (congestive heart failure) ([address] Hypertensive urgency; Idiopathic cardiomyopathy ([address] Hypertensive emergency; Chronic combined systolic and diastolic HF (heart failure), NYHA class 2 ([address] Acute on chronic combined systolic and diastolic CHF, NYHA class 4 ([address] Hypoxia; Severe mitral regurgitation by prior echocardiogram; Morbid obesity (HCC); CKD (chronic kidney disease) stage 3, GFR 30-59 ml/min ([address] Physical deconditioning; TIA (transient ischemic attack); Old lacunar stroke without late effect; Dysarthria; Stroke determined by clinical assessment ([address] Aphasia; Type 2 diabetes mellitus, without long-term current use of insulin ([address] Cerebral infarction ([address] - left MCA distribution ; Uncontrolled type 2 diabetes mellitus with hyperglycemia ([address] Elevated blood pressure reading; and Acute ischemic left middle cerebral artery (MCA) stroke ([address] on their problem list.    Date of Eval: 2020   Evaluating Therapist: SHAILA Richardson    RECENT RESULTS  CT OF HEAD/MRI:  MRI 20:  Impression    Acute infarct in the left middle cerebral artery territory.           Primary Complaint:  Per chart:  Hospital Course: 44-year-old female patient with past medical history of hypertension, hyperlipidemia diabetes, CHF, CKD Stage 3, history of cryptogenic strokes who came transferred from [facility] for stroke evaluation due to wake-up symptoms of expressive more than receptive aphasia, right visual field cut.  CT head was unremarkable for any new acute pathology and CTA head and neck showed moderate plaque in both proximal ICA resulting in 3% narrowing but no LVO.  Admitted for stroke evaluation.                 Patient was seen on our service on December 2019 for TIA and was discharged with dual antiplatelet for 21 days and then aspirin monotherapy. Pain:  Pain Assessment  Pain Assessment: 0-10  Pain Level: 0    Assessment:  Cognitive Diagnosis: Unable to assess due to severity of language impairment  Aphasia Diagnosis: Severe global aphasia  Speech Diagnosis: ?apraxia  Communication Diagnosis: Unable to communicate wants and needs verbally at this time, unable to state biographical information   Diagnosis: Global aphasia    Recommendations:  Requires SLP Intervention: Yes  Duration/Frequency of Treatment: 1-2x per day while on ARU  D/C Recommendations: To be determined       Plan:   Goals:  Short-term Goals  Goal 1: Follow single unit commands with 90% accuracy  Goal 2: Establish a reliable yes/no response with 90% accuracy  Goal 3: Pt will Verbalize automatics with 90% accuracy  Goal 4: Pt Verbalize bio info with 90% accuracy  Goal 5: Pt will complete functional naming tasks 90% accuracy   Patient/family involved in developing goals and treatment plan: yes, pt    Subjective:   Previous level of function and limitations: lives with sister  General  Chart Reviewed: No  Family / Caregiver Present: No  Social/Functional History  Lives With: Family  Vision  Vision: Impaired  Hearing  Hearing: Within functional limits           Objective:     Oral/Motor  Oral Motor: Exceptions to WFL(difficulty to assess due to receptive language impairment)  Labial Symmetry: Abnormal symmetry right  Labial Strength: Reduced  Lingual Symmetry: Abnormal symmetry right  Lingual Sensation: Reduced    Auditory Comprehension  Comprehension: Exceptions  Yes/No Questions: Moderate  Basic Questions: Severe  One Step Basic Commands:  Moderate  Two Step Basic Commands: Severe  Conversation: Severe         Expression  Primary Mode of Expression: Verbal    Verbal Expression  Verbal Expression: Exceptions to functional limits  Initiation: Severe  Repetition: Moderate(repeating single syllable words 75%, unable to repeat multisyllabic words)  Automatic Speech: Severe(unable to count to 10, Pt saying KD mond-weds, then perseverating on tues)  Confrontation: Severe  Conversation: Severe  Interfering Components: Perseverations;Paraphasia;Jargon         Motor Speech  Motor Speech: Exceptions to WFL  Apraxia: Severe         Cognition:      Orientation  Overall Orientation Status: Impaired(Pt answered orientation questions appropriately in yes/no form)    Additional Assessments:   See MBSS result in chart, pt appears to be tolerating current diet well at this time.            Prognosis:  Speech Therapy Prognosis  Prognosis: Guarded  Prognosis Considerations: Severity of Impairments  Individuals consulted  Consulted and agree with results and recommendations: Patient    Education:  Patient Education: yes  Patient Education Response: No evidence of learning          Therapy Time:   Individual Concurrent Group Co-treatment   Time In 0900         Time Out 0930         Minutes [facility].A, [address] [place] [address]  8/27/2020 9:21 AM

## 2020-08-28 LAB
GLUCOSE BLD-MCNC: 127 MG/DL (ref 65–105)
GLUCOSE BLD-MCNC: 132 MG/DL (ref 65–105)
GLUCOSE BLD-MCNC: 149 MG/DL (ref 65–105)
GLUCOSE BLD-MCNC: 261 MG/DL (ref 65–105)

## 2020-08-28 PROCEDURE — 97116 GAIT TRAINING THERAPY: CPT

## 2020-08-28 PROCEDURE — 82947 ASSAY GLUCOSE BLOOD QUANT: CPT

## 2020-08-28 PROCEDURE — 6370000000 HC RX 637 (ALT 250 FOR IP): Performed by: INTERNAL MEDICINE

## 2020-08-28 PROCEDURE — 97110 THERAPEUTIC EXERCISES: CPT

## 2020-08-28 PROCEDURE — 92507 TX SP LANG VOICE COMM INDIV: CPT

## 2020-08-28 PROCEDURE — 97535 SELF CARE MNGMENT TRAINING: CPT

## 2020-08-28 PROCEDURE — 6370000000 HC RX 637 (ALT 250 FOR IP): Performed by: PHYSICAL MEDICINE & REHABILITATION

## 2020-08-28 PROCEDURE — 97542 WHEELCHAIR MNGMENT TRAINING: CPT

## 2020-08-28 PROCEDURE — 97530 THERAPEUTIC ACTIVITIES: CPT

## 2020-08-28 PROCEDURE — 6360000002 HC RX W HCPCS: Performed by: INTERNAL MEDICINE

## 2020-08-28 PROCEDURE — 99223 1ST HOSP IP/OBS HIGH 75: CPT | Performed by: INTERNAL MEDICINE

## 2020-08-28 PROCEDURE — 1180000000 HC REHAB R&B

## 2020-08-28 PROCEDURE — 99232 SBSQ HOSP IP/OBS MODERATE 35: CPT | Performed by: PHYSICAL MEDICINE & REHABILITATION

## 2020-08-28 RX ORDER — HYDROCHLOROTHIAZIDE 25 MG/1
TABLET ORAL
Status: DISPENSED
Start: 2020-08-28 | End: 2020-08-28

## 2020-08-28 RX ADMIN — INSULIN LISPRO 6 UNITS: 100 INJECTION, SOLUTION INTRAVENOUS; SUBCUTANEOUS at 11:37

## 2020-08-28 RX ADMIN — CLOPIDOGREL BISULFATE 75 MG: 75 TABLET ORAL at 09:14

## 2020-08-28 RX ADMIN — CARVEDILOL 12.5 MG: 12.5 TABLET, FILM COATED ORAL at 09:13

## 2020-08-28 RX ADMIN — AMLODIPINE BESYLATE 10 MG: 10 TABLET ORAL at 17:02

## 2020-08-28 RX ADMIN — ENOXAPARIN SODIUM 40 MG: 40 INJECTION SUBCUTANEOUS at 09:14

## 2020-08-28 RX ADMIN — HYDROCHLOROTHIAZIDE 25 MG: 25 TABLET ORAL at 09:14

## 2020-08-28 RX ADMIN — CARVEDILOL 12.5 MG: 12.5 TABLET, FILM COATED ORAL at 17:02

## 2020-08-28 RX ADMIN — POLYETHYLENE GLYCOL 3350 17 G: 17 POWDER, FOR SOLUTION ORAL at 09:14

## 2020-08-28 RX ADMIN — METFORMIN HYDROCHLORIDE 500 MG: 500 TABLET ORAL at 17:02

## 2020-08-28 RX ADMIN — ACETAMINOPHEN 650 MG: 325 TABLET, FILM COATED ORAL at 00:23

## 2020-08-28 RX ADMIN — LOSARTAN POTASSIUM 100 MG: 50 TABLET, FILM COATED ORAL at 09:13

## 2020-08-28 RX ADMIN — ASPIRIN 81 MG CHEWABLE TABLET 81 MG: 81 TABLET CHEWABLE at 09:14

## 2020-08-28 RX ADMIN — INSULIN LISPRO 1 UNITS: 100 INJECTION, SOLUTION INTRAVENOUS; SUBCUTANEOUS at 21:03

## 2020-08-28 RX ADMIN — ATORVASTATIN CALCIUM 80 MG: 80 TABLET, FILM COATED ORAL at 21:03

## 2020-08-28 RX ADMIN — ACETAMINOPHEN 650 MG: 325 TABLET, FILM COATED ORAL at 21:03

## 2020-08-28 ASSESSMENT — PAIN DESCRIPTION - ORIENTATION
ORIENTATION: RIGHT

## 2020-08-28 ASSESSMENT — PAIN DESCRIPTION - ONSET: ONSET: ON-GOING

## 2020-08-28 ASSESSMENT — PAIN SCALES - WONG BAKER: WONGBAKER_NUMERICALRESPONSE: 4

## 2020-08-28 ASSESSMENT — PAIN DESCRIPTION - DESCRIPTORS
DESCRIPTORS: BURNING;ACHING
DESCRIPTORS: ACHING;DISCOMFORT

## 2020-08-28 ASSESSMENT — PAIN DESCRIPTION - LOCATION
LOCATION: ARM
LOCATION: FOOT;LEG
LOCATION: CHEST;ABDOMEN

## 2020-08-28 ASSESSMENT — PAIN DESCRIPTION - PAIN TYPE
TYPE: ACUTE PAIN

## 2020-08-28 ASSESSMENT — PAIN DESCRIPTION - FREQUENCY: FREQUENCY: INTERMITTENT

## 2020-08-28 ASSESSMENT — PAIN SCALES - GENERAL
PAINLEVEL_OUTOF10: 0
PAINLEVEL_OUTOF10: 3
PAINLEVEL_OUTOF10: 3

## 2020-08-28 ASSESSMENT — PAIN DESCRIPTION - PROGRESSION: CLINICAL_PROGRESSION: NOT CHANGED

## 2020-08-28 NOTE — PROGRESS NOTES
[address]   ACUTE REHABILITATION OCCUPATIONAL THERAPY  DAILY NOTE    Date: 20  Patient Name: Aguila Mata      Room: 6766/8982-81    MRN: [id number]   : 1950  (80 y.o.)  Gender: female   Referring Practitioner: Dr. Charles Yeung  Diagnosis: Left ischemic middle cerebral artery stroke       Restrictions  Restrictions/Precautions: Fall Risk, Up as Tolerated, General Precautions  Implants present? : (none)  Other position/activity restrictions: up as tolerated  Required Braces or Orthoses?: No    Subjective  Subjective: Pt becomes easily frustrated with expressive aphasia. Comments: Pt pleasant. Seated on toilet upon writers entry. While toileting pt began to place hand on her chest and demo labored breathing requiring Ax2 to return to bed. Vitals assessed, HR 40s-50s, O2  91-95%, /65. Pt notes anxiety as well as pain in middle of chest after returned to bed, MELVIN Acharya and Dr David Mitchell and completing daily rounding upon writers departure. Patient Currently in Pain: Denies  Restrictions/Precautions: Fall Risk;Up as Tolerated;General Precautions  Overall Orientation Status: Impaired  Orientation Level: Oriented to person;Disoriented to situation;Oriented to place;Oriented to time     Pain Assessment  Response to Pain Intervention: Patient Satisfied    Objective  Cognition  Overall Cognitive Status: Impaired(Unable to fully assess due to global aphasia)  Arousal/Alertness: Appropriate responses to stimuli  Following Directions: Follows one step commands  Memory: Unable to assess  Following Commands:  Follows one step commands with repetition  Safety Judgement: Decreased awareness of need for assistance  Awareness of Errors: Assistance required to identify errors made  Insights: Decreased awareness of deficits  Sequencing and Organization: Assistance required to identify errors made;Assistance required to generate solutions;Assistance required to implement solutions  Perception  Overall Perceptual Status: Impaired  Unilateral Attention: Cues to attend to right side of body  Initiation: Cues to initiate tasks  Motor Planning: Cues to use objects appropriately  Balance  Sitting Balance: Stand by assistance  Standing Balance: Dependent/Total([facility])  Bed mobility  Supine to Sit: 2 Person assistance;Maximum assistance  Comment: medical complications  Transfers  Sit to stand: Moderate assistance  Stand to sit: Moderate assistance  Transfer Comments: Pt able to bring RUE all the way up to SS bar at times, then it would fall off. Pt relying on LUE often d/t new RUE weakness-pt is likely R-handed.; Ax2 required this date for safety  Standing Balance  Time: ~20sec x4  Activity: Toileting  Comment: BUE support; RUE requiring assist for stabilization. while seated on toilet and in [facility]  Functional Mobility  Functional Mobility Comments: NEGAR, pt had to use toilet quickly so SS was utilized by writer/RN  Toilet Transfers  Toilet Transfers Comments: use of [place] with nursing this AM  Shower Transfers  Shower - Transfer From: Wheelchair  Shower - Transfer Type: To and From  Shower - Transfer To: Transfer tub bench  Shower - Technique: Stand pivot; To right; To left  Shower Transfers: 2 Person assistance; Moderate assistance(Mod A x 1 to Left; Mod A x 2 to Right)  Shower Transfers Comments: BUE support via grab bar     Type of ROM/Therapeutic Exercise  Type of ROM/Therapeutic Exercise: PROM  Comment: X10. VC for guided imagery, trace muscle movements noted with gravity eliminated for horizontal add/abd  shoulder  Exercises  Shoulder Flexion: x  Shoulder Extension: x  Shoulder ABduction: x  Shoulder ADduction: x  Horizontal ABduction: x  Horizontal ADduction: x  Elbow Flexion: x  Elbow Extension: x  Wrist Flexion: x  Wrist Extension: x     Additional Activities: trialled communcation board, pt able to point to toilet and pain 1/5trials, max difficulty ID words yes and no.  Pt speech appears more clear then communcation with board at times. ADL  Feeding: Setup; Increased time to complete;Minimal assistance; Adaptive utensils (comment)(using LUE, assist to open packages & cut up food PRN)  Grooming: Moderate assistance; Increased time to complete(assist to brush hair; Stand by assist oral care & face washing)  UE Bathing: Maximum assistance;Setup; Increased time to complete(Assist to bathe R armpit (positioning of RUE), LUE, and under breasts)  LE Bathing: Dependent/Total(Assist to bathe B lower legs/feet, perineal area, buttocks; 2 Assist to stand)  UE Dressing: Maximum assistance;Setup; Increased time to complete(Assist to clasp bra, position breasts in bra; assist to thread RUE into overhead shirt, pull down in back)  LE Dressing: Dependent/Total(total assist with all tasks; Pt attempted to pull underwear/pants from calves to above knees while seated)  Toileting: Dependent/Total(per nursing report)  Additional Comments: Limited ADL. Toileting with anxiety and onset of labored breathing; returned to bed. Assist with all tasks. [place] for mobility. Assessment  Performance deficits / Impairments: Decreased functional mobility ; Decreased ADL status; Decreased ROM; Decreased strength;Decreased safe awareness;Decreased cognition;Decreased endurance;Decreased sensation;Decreased balance;Decreased high-level IADLs;Decreased fine motor control;Decreased coordination;Decreased posture  Assessment: pt is unsafe to return home to prior living enviornment due to increased assistance required to complete functional mobility and ADLs. Pt would benefit from further therapy and assistance to increase independence and quality of life.   Prognosis: Good  Discharge Recommendations: 24 hour supervision or assist;Patient would benefit from continued therapy after discharge  Activity Tolerance: Patient Tolerated treatment well  Activity Tolerance: RUE weakness, aphasia  Safety Devices in increased independence with self-care and mobility. Short term goal 7: Pt will verbalize/demonstrate Good understanding of assistive equipment/durable medical equipment/modified techniques for increased independence with self-care and mobility. Long term goals  Time Frame for Long term goals : By discharge  Long term goal 1: Pt will perform self-feeding and grooming with modified independence. Long term goal 2: Pt will perform bathing, dressing, and toileting tasks with stand by assist, Fair safety, and assist PRN for MIRTA hose. Long term goal 3: Pt will perform functional mobility and transfers during self-care with stand by assist, least restrictive mobility device, and Fair safety. Long term goal 4: Pt will stand for 8+ minutes with 1-2 UE support, stand by assist, and no loss of balance while engaging in functional activity of choice.   Long term goal 5: 9 hole peg, dynamometer, box and block to be assessed for RUE as appropriate  Timed Code Treatment Minutes: 4 Minutes     08/28/20 1440 08/28/20 1556   OT Individual Minutes   Time In 1040 1435   Time Out 1130 1509   Minutes 50 34     Electronically signed by HERBERT Hernandez on 8/28/20 at 4:16 PM EDT

## 2020-08-28 NOTE — CONSULTS
[facility]    CONSULTATION / HISTORY AND PHYSICAL EXAMINATION            Date:   8/28/2020  Patient name:  Fredy Crouch  Date of admission:  8/26/2020  6:56 PM  MRN:   [id number]  Account:  [de-identified]  YOB: 1950  PCP:    SUE Presley CNP  Room:   5702/2925-76  Code Status:    Full Code    Physician Requesting Consult: Isa Moya MD    Reason for Consult: Medical management    Chief Complaint:     No chief complaint on file. Stroke    History Obtained From:     Patient medical record nursing staff    History of Present Illness:     72-year-old pleasant  lady was admitted to [facility] on August 23 with a aphasia diagnosed with the left middle cerebral artery ischemic infarct  Known history of systolic congestive heart failure ejection fraction 35 to 40% secondary to nonischemic cardiomyopathy  Diabetes   Duration more than 7 years  Modifying factors on Glucophage and other med  Severity uncontrolled sever  Associated signs and symtoms neuropathy/ckd/ CAD. aggravated with sugar diet and better with low sugar diet             Past Medical History:     Past Medical History:   Diagnosis Date    [facility] ([facility]estive [facility] failure) [facility]     Diabetes mellitus ([facility]     H/O cardiac catheterization 08/04/2017    LMCA: Mild irregularites 10-20%. LAD: Mild irregularites 10-20%. LCx: Mild irregularites 10-20%. RCA: Mild irregularites 10-20%. LV function assessed as : Abnormal. EF of 40%.  H/O echocardiogram 12/18/2018    EF 55%. Mildly increased LV wall thickness. The left atrium appears moderately to severely dilated. Moderate to severe mitral regurg. Moderate pulmonary HTN with an estimated RV systolic pressure of 81KSWJ. Moderate tricuspid regurg. Evidnece fo moderate diastolic dysfunction is seen.  History of echocardiogram 01/17/2019    EF 55%. LV wall thickness moderately increased.  LA is mildly dilated w/ LA volume index of 30. trivial mitral regurg. Evidence of moderate (grade II) diastolic dysfunction seen.  History of echocardiogram 2019    EF of 50-55%. LV wall thickness is mildly increased. LA is mildly dilated (29-33) with a left atrial volume index of 31 m1/m2. mild diastolic dysfunction.  Hyperlipidemia     Hypertension         Past Surgical History:     Past Surgical History:   Procedure Laterality Date    CARDIAC CATHETERIZATION Left 2017    right radial, T Dr. Jackeline Esparza          Medications Prior to Admission:     Prior to Admission medications    Medication Sig Start Date End Date Taking? Authorizing Provider   clopidogrel (PLAVIX) 75 MG tablet Take 1 tablet by mouth daily 20   Juanita Ling MD   carvedilol (COREG) 12.5 MG tablet Take 1 tablet by mouth 2 times daily (with meals) 20   Ramón Freed MD   losartan-hydrochlorothiazide [facility]) 100-25 MG per tablet Take 1 tablet by mouth daily 20   Ramón Freed MD   amLODIPine (NORVASC) 10 MG tablet Take 1 tablet by mouth daily 20   Ramón Freed MD   atorvastatin (LIPITOR) 80 MG tablet Take 1 tablet by mouth nightly 20   Ramón Fered MD   aspirin 81 MG EC tablet Take 1 tablet by mouth daily 20   aRmón Freed MD   metFORMIN (GLUCOPHAGE) 500 MG tablet Take 500 mg by mouth 2 times daily (with meals)    Historical Provider, MD        Allergies:     Patient has no known allergies. Social History:     Tobacco:    reports that she has never smoked. She has never used smokeless tobacco.  Alcohol:      reports no history of alcohol use. Drug Use:  reports no history of drug use. Family History:     Family History   Problem Relation Age of Onset    Coronary Art Dis Father          from [place]    COPD Sister         O2 dep    Coronary Art Dis Brother         2 brothers  from [place]       Review of Systems:     Positive and Negative as described in HPI.     CONSTITUTIONAL:  negative for fevers, chills, sweats, fatigue, weight loss  HEENT:  negative for vision, hearing changes, runny nose, throat pain  RESPIRATORY:  negative for shortness of breath, cough, congestion, wheezing. CARDIOVASCULAR:  negative for chest pain, palpitations. GASTROINTESTINAL:  negative for nausea, vomiting, diarrhea, constipation, change in bowel habits, abdominal pain   GENITOURINARY:  negative for difficulty of urination, burning with urination, frequency   INTEGUMENT:  negative for rash, skin lesions, easy bruising   HEMATOLOGIC/LYMPHATIC:  negative for swelling/edema   ALLERGIC/IMMUNOLOGIC:  negative for urticaria , itching  ENDOCRINE:  negative increase in drinking, increase in urination, hot or cold intolerance  MUSCULOSKELETAL:  negative joint pains, muscle aches, swelling of joints  NEUROLOGICAL: Right-sided weakness and speech deficit  BEHAVIOR/PSYCH:  negative for depression, anxiety    Physical Exam:     /65   Pulse 57   Temp 97.9 °F (36.6 °C) (Oral)   Resp 19   Ht 5' 4\" (1.626 m)   Wt 191 lb 12.8 oz (87 kg)   SpO2 95%   BMI 32.92 kg/m²   Temp (24hrs), Av.8 °F (36.6 °C), Min:97.7 °F (36.5 °C), Max:97.9 °F (36.6 °C)    Recent Labs     20  1613 20  2118 20  0628 20  1047   POCGLU 138* 123* 132* 261*     No intake or output data in the 24 hours ending 20 1632  Speech deficit  General Appearance:  alert, well appearing, and in no acute distress  Mental status: oriented to person, place, and time with normal affect  Head:  normocephalic, atraumatic.   Eye: no icterus, redness, pupils equal and reactive, extraocular eye movements intact, conjunctiva clear  Ear: normal external ear, no discharge, hearing intact  Nose:  no drainage noted  Mouth: mucous membranes moist  Neck: supple, no carotid bruits, thyroid not palpable  Lungs: Bilateral equal air entry, clear to ausculation, no wheezing, rales or rhonchi, normal effort  Cardiovascular: normal rate, regular rhythm, no murmur, gallop, rub. Abdomen: Soft, nontender, nondistended, normal bowel sounds, no hepatomegaly or splenomegaly  Neurologic: Right hemiparesis with Broca's aphasia skin: No gross lesions, rashes, bruising or bleeding on exposed skin area  Extremities:  peripheral pulses palpable, no pedal edema or calf pain with palpation  P    Investigations:      Laboratory Testing:  Recent Results (from the past 24 hour(s))   POC Glucose Fingerstick    Collection Time: 08/27/20  9:18 PM   Result Value Ref Range    POC Glucose 123 (H) 65 - 105 mg/dL   POC Glucose Fingerstick    Collection Time: 08/28/20  6:28 AM   Result Value Ref Range    POC Glucose 132 (H) 65 - 105 mg/dL   POC Glucose Fingerstick    Collection Time: 08/28/20 10:47 AM   Result Value Ref Range    POC Glucose 261 (H) 65 - 105 mg/dL       Imaging/Diagonstics:      Assessment :      Primary Problem  <principal problem not specified>    Active Hospital Problems    Diagnosis Date Noted    Acute ischemic left middle cerebral artery (MCA) stroke (HCC) [I63.512] 08/26/2020    Cerebral infarction ([address] - left MCA distribution  [I63.9] 08/26/2020    Type 2 diabetes mellitus, without long-term current use of insulin ([address] [E11.9] 08/25/2020    Aphasia [R47.01]     CKD (chronic kidney disease) stage 3, GFR 30-59 ml/min ([facility]) [N18.3] 12/20/2018    Morbid obesity ([address] [E66.01] 12/20/2018    Acute on chronic combined systolic and diastolic CHF, NYHA class 4 ([facility]) [I50.43] 12/17/2018    Chronic combined systolic and diastolic HF (heart failure), NYHA class 2 ([address] [I50.42] 08/28/2017    Hyperlipidemia [E78.5]     Hypertension [I10]     Idiopathic cardiomyopathy ([address] [I42.8]        Plan:     1. Uncontrolled diabetes mellitus with the peripheral organ damage with ischemic CVA  2. Ischemic CVA with right hemiparesis and Broca's aphasia  3. Metformin recent A1c is 7.3 reviewed and reconciled  4.  Ischemic CVA hyperlipidemia aspirin and Lipitor Plavix 75 all

## 2020-08-28 NOTE — PROGRESS NOTES
Physical Medicine & Rehabilitation  Progress Note      Subjective:      81year-old female with ischemic CVA with R dominant hemiparesis and aphasia. Patient is well, and has had no acute complaints or problems    ROS:  Denies fevers, chills, sweats. No chest pain, palpitations, lightheadedness. Denies coughing, wheezing or shortness of breath. Denies abdominal pain, nausea, diarrhea or constipation. No new areas of joint pain. Denies new areas of numbness or weakness. Denies new anxiety or depression issues. No new skin problems. Rehabilitation:   Progressing in therapies. PT:  Restrictions/Precautions: Fall Risk, Up as Tolerated, General Precautions  Implants present? : (none)  Other position/activity restrictions: up as tolerated   Transfers  Sit to Stand: Minimal Assistance  Stand to sit: Minimal Assistance  Bed to Chair: Moderate assistance  Stand Pivot Transfers: Moderate Assistance(with Rolling Walker with right hand grib size small)  Comment: Standing with Rolling Walker with Right Hand  small  Ambulation 1  Surface: level tile  Device: Rolling Walker  Other Apparatus: Right(Hand  on walker)  Assistance: Moderate assistance, Minimal assistance, 2 Person assistance  Quality of Gait: Pt able to initiate advancment of RLE but requires mod A to completely take step and support R knee with weightbearing. Pt at times would take 3 steps with the LLE before she would step with her R dispite verbal/tactile cues. Gait Deviations: Slow Jacinda, Staggers, Decreased step length, Decreased step height, Increased COLT  Distance: 12ft  Comments: attempt slider on R foot next session. Amb stopped due to pt fatigue    Transfers  Sit to Stand: Minimal Assistance  Stand to sit: Minimal Assistance  Bed to Chair: Moderate assistance  Stand Pivot Transfers:  Moderate Assistance(with Rolling Walker with right hand grib size small)  Comment: Standing with Rolling Walker with Right Hand  to thread RUE into overhead shirt, pull down in back)  LE Dressing: Dependent/Total(total assist with all tasks; Pt attempted to pull underwear/pants from calves to above knees while seated)  Toileting: Dependent/Total(per nursing report)  Additional Comments: Pt engaged in showering routine including bathing, dressing, and grooming tasks. Please see above for details. Pt seated at tray table with lunch at end of OT session. OT assessed Pt's ability to perform self-feeding. Please see above for details. Mild clearing of throat and coughing 1-2 times noted after Pt ate broccoli. RN Gavino De Jesus and SLP Gavino De Jesus notified. Balance  Sitting Balance: Stand by assistance  Standing Balance: Minimal assistance   Standing Balance  Time: AM: 1-2 minutes x 4  Activity: AM: self-care & transfers  Comment: BUE support        Bed mobility  Bridging: Stand by assistance  Rolling to Left: Stand by assistance  Rolling to Right: Stand by assistance  Supine to Sit: Contact guard assistance  Sit to Supine: Contact guard assistance  Scooting: Contact guard assistance  Comment: Pt seated in chair at start and end of AM session. Pt supine in PM.  Transfers  Stand Pivot Transfers: 2 Person assistance, Moderate assistance(Mod A x 1 to Left; Mod A x 2 to Right)  Sit to stand: Moderate assistance  Stand to sit: Moderate assistance   Toilet Transfers  Toilet Transfers Comments: use of Westley Grief with nursing this AM     Shower Transfers  Shower - Transfer From: Wheelchair  Shower - Transfer Type: To and From  Shower - Transfer To: Transfer tub bench  Shower - Technique: Stand pivot, To right, To left  Shower Transfers: 2 Person assistance, Moderate assistance(Mod A x 1 to Left; Mod A x 2 to Right)  Shower Transfers Comments: BUE support via grab bar       SPEECH:  Subjective: [x]? Alert     [x]? Cooperative     []? Confused     []? Agitated    []?  Lethargic     Objective/Assessment:  Auditory Comprehension: one step directions- 40%, 80% c cues (visual). ID object out of field of 2 via name c 80% accuracy. Pt. Appropriately responding to simple y/n questions re: self and immediate environment.      Verbal Expression: Pt. Able to initiate simple automatic phrases, \"not really\", \"a little better now. \"  Pt. Attempting to sing along to \"Happy birthday\" and \"Row Your Boat\" songs with ST, pt. Attempt resulting in  impaired kylah and frequent paraphasias. Neologisms, phonemic paraphasias frequently present with spontaneous speech and during pt. Attempts to complete sentence completion task/repetition of word after ST.    Pt. Unable to name pictures of objects with sentence completion cue, able to state name of object on picture with 20% when written words provided.      Reading Comprehension:  n/a     Other:         Objective:  /62   Pulse 70   Temp 97.9 °F (36.6 °C) (Oral)   Resp 19   Ht 5' 4\" (1.626 m)   Wt 191 lb 12.8 oz (87 kg)   SpO2 98%   BMI 32.92 kg/m²       GEN: Well developed, well nourished, in NAD  HEENT:  NCAT. PERRL. EOMI. Mucous membranes pink and moist.   PULM:  Clear to ausculation. No rales or rhonchi. Respirations WNL and unlabored. CV:  Regular rate rhythm. No murmurs or gallops. GI:  Abdomen soft. Nontender. Non-distended. BS + and equal.    NEUROLOGICAL: A&O x3. Sensation intact to light touch. Follows commands. Occasional intact automatic verbal responses. Expressive aphasia. Unable to repeat. MSK:  Functional ROM LUE and LLE. Impaired ROM RUE and RLE. Motor testing 4+/5 key muscles LUE and LLE. R  3/5, R elbow flexion  . SKIN: Warm dry and intact. Good turgor. EXTREMITIES:  No calf tenderness to palpation. No edema BLEs. Shante Olmitz PSYCH: Mood WNL. Appropriately interactive. Affect WNL.      Diagnostics:     CBC:   Recent Labs     08/27/20  0600   WBC 7.2   RBC 4.35   HGB 13.1   HCT 38.9   MCV 89.5   RDW 13.3        BMP:   Recent Labs     08/27/20  0600      K 4.1      CO2 26   BUN 39* CREATININE 1.08*   GLUCOSE 156*     BNP: No results for input(s): BNP in the last 72 hours. PT/INR: No results for input(s): PROTIME, INR in the last 72 hours. APTT: No results for input(s): APTT in the last 72 hours. CARDIAC ENZYMES: No results for input(s): CKMB, CKMBINDEX, TROPONINT in the last 72 hours. Invalid input(s): CKTOTAL;3  FASTING LIPID PANEL:  Lab Results   Component Value Date    CHOL 160 08/24/2020    HDL 44 08/24/2020    TRIG 79 08/24/2020     LIVER PROFILE: No results for input(s): AST, ALT, ALB, BILIDIR, BILITOT, ALKPHOS in the last 72 hours. Current Medications:   Current Facility-Administered Medications: hydroCHLOROthiazide (HYDRODIURIL) 25 MG tablet, , ,   insulin lispro (HUMALOG) injection vial 0-12 Units, 0-12 Units, Subcutaneous, TID WC  insulin lispro (HUMALOG) injection vial 0-6 Units, 0-6 Units, Subcutaneous, Nightly  clopidogrel (PLAVIX) tablet 75 mg, 75 mg, Oral, Daily  enoxaparin (LOVENOX) injection 40 mg, 40 mg, Subcutaneous, Daily  polyethylene glycol (GLYCOLAX) packet 17 g, 17 g, Oral, Daily PRN  amLODIPine (NORVASC) tablet 10 mg, 10 mg, Oral, QPM  atorvastatin (LIPITOR) tablet 80 mg, 80 mg, Oral, Nightly  carvedilol (COREG) tablet 12.5 mg, 12.5 mg, Oral, BID WC  losartan (COZAAR) tablet 100 mg, 100 mg, Oral, Daily **AND** hydroCHLOROthiazide (HYDRODIURIL) tablet 25 mg, 25 mg, Oral, Daily  aspirin chewable tablet 81 mg, 81 mg, Oral, Daily  acetaminophen (TYLENOL) tablet 650 mg, 650 mg, Oral, Q4H PRN  polyethylene glycol (GLYCOLAX) packet 17 g, 17 g, Oral, Daily  senna (SENOKOT) tablet 17.2 mg, 2 tablet, Oral, Daily PRN  bisacodyl (DULCOLAX) suppository 10 mg, 10 mg, Rectal, Daily PRN      Impression/Plan:   Impaired ADLs, gait, and mobility due to:      1. R hemiparesis secondary to ischemic L MCA CVA:  PT/OT for gait, mobility, strengthening, endurance, ADLs, and self care. On ASA, atorvastatin, plavix  2.  Broca's aphasia: speech therapy treating  3. HTN/CHF/non-ischemic cardiomyopathy: on amlodipine, carvedilol, HCTZ, losartan  4. DM: on insulin sliding scale  5. Bowel Management: Miralax daily, senokot prn, dulcolax prn.  6. DVT Prophylaxis:  low molecular weight heparin, SCD's while in bed and MIRTA's   7. Internal medicine for medical management    Electronically signed by Harshad Simmons MD on 8/28/2020 at 10:29 AM      This note is created with the assistance of a speech recognition program.  While intending to generate a document that actually reflects the content of the visit, the document can still have some errors including those of syntax and sound a like substitutions which may escape proof reading. In such instances, actual meaning can be extrapolated by contextual diversion.

## 2020-08-28 NOTE — PROGRESS NOTES
and \"Row Your Boat\" songs with ST, pt. Attempt resulting in  impaired kylah and frequent paraphasias. Neologisms, phonemic paraphasias frequently present with spontaneous speech and during pt. Attempts to complete sentence completion task/repetition of word after ST.    Pt. Unable to name pictures of objects with sentence completion cue, able to state name of object on picture with 20% when written words provided. Reading Comprehension:  n/a    Other:      Plan:  [x] Continue ST services    [] Discharge from ST:      Discharge recommendations: [] Inpatient Rehab   [] [place]   [] Outpatient Therapy  [] Follow up at trauma clinic   [] Other:       Treatment completed by: Lilly Rodrigues A.CCC/SLP

## 2020-08-28 NOTE — PLAN OF CARE
Problem: Skin Integrity:  Goal: Will show no infection signs and symptoms  Description: Will show no infection signs and symptoms  8/28/2020 1645 by Harry Butler RN  Outcome: Ongoing  8/28/2020 1641 by Harry Butler RN  Outcome: Ongoing  8/28/2020 0308 by Roberto Grey RN  Outcome: Ongoing  Goal: Absence of new skin breakdown  Description: Absence of new skin breakdown  8/28/2020 1645 by Harry Butler RN  Outcome: Ongoing  8/28/2020 1641 by Harry Butler RN  Outcome: Ongoing  8/28/2020 0308 by Roberto Gery RN  Outcome: Ongoing     Problem: Falls - Risk of:  Goal: Will remain free from falls  Description: Will remain free from falls  8/28/2020 1645 by Harry Butler RN  Outcome: Ongoing  8/28/2020 1641 by Harry Butler RN  Outcome: Ongoing  8/28/2020 0308 by Roberto Grey RN  Outcome: Met This Shift  Goal: Absence of physical injury  Description: Absence of physical injury  8/28/2020 1645 by Harry Butler RN  Outcome: Ongoing  8/28/2020 1641 by Harry Butler RN  Outcome: Ongoing  8/28/2020 0308 by Roberto Grey RN  Outcome: Met This Shift     Problem: Neurological  Goal: Maximum potential motor/sensory/cognitive function  8/28/2020 1645 by Harry Butler RN  Outcome: Ongoing  8/28/2020 1641 by Harry Butler RN  Outcome: Ongoing  8/28/2020 0308 by Roberto Grey RN  Outcome: Ongoing     Problem: Musculor/Skeletal Functional Status  Goal: Highest potential functional level  8/28/2020 1645 by Harry Butler RN  Outcome: Ongoing  8/28/2020 1641 by Harry Butler RN  Outcome: Ongoing  Goal: Absence of falls  8/28/2020 1645 by Harry Butler RN  Outcome: Ongoing  8/28/2020 1641 by Harry Butler RN  Outcome: Ongoing     Problem: Neurological  Goal: Maximum potential motor/sensory/cognitive function  8/28/2020 1645 by Harry Butler RN  Outcome: Ongoing  8/28/2020 1641 by Harry Butler RN  Outcome: Ongoing     Problem: Nutrition  Goal: Optimal nutrition therapy  8/28/2020 1645 by Harry Butler RN  Outcome: Ongoing  8/28/2020 1641 by Mike Todd Annabel Fraser RN  Outcome: Ongoing     Problem: Pain:  Goal: Pain level will decrease  Description: Pain level will decrease  8/28/2020 1645 by Judie Hunt RN  Outcome: Ongoing  8/28/2020 1641 by Judie Hunt RN  Outcome: Ongoing  Goal: Control of acute pain  Description: Control of acute pain  8/28/2020 1645 by Judie Hunt RN  Outcome: Ongoing  8/28/2020 1641 by Judie Hunt RN  Outcome: Ongoing  Goal: Control of chronic pain  Description: Control of chronic pain  8/28/2020 1645 by Judie Hunt RN  Outcome: Ongoing  8/28/2020 1641 by Judie Hunt RN  Outcome: Ongoing

## 2020-08-28 NOTE — PLAN OF CARE
Individualized Plan of [address] [facility] Inpatient Rehabilitation Unit    Rehabilitation physician: Dr Anayeli Jasso Date: 8/26/2020     Rehabilitation Diagnosis: Acute ischemic left middle cerebral artery (MCA) stroke [facility]) [I63.512]     Rehabilitation impairments: self care, mobility, motor dysfunction, safety and communication    Factors facilitating achievement of predicted outcomes: Family support, Motivated, Cooperative and Pleasant  Barriers to the achievement of predicted outcomes: Depression, Limited insight into deficits, Communication deficit, Decreased endurance, Upper extremity weakness, Lower extremity weakness and Medication managment    Patient Goals: Improve independence with mobility, Increase overall strength and endurance, Increase balance, Increase independence with activities of daily living, Improve cognition, Increase self-awareness, Increase safety awareness, Increase community integration, Increase socialization, Functional communication with caregivers, Assure adequate nutritional option for discharge and Provide appropriate patient and family education      NURSING:  Nursing goals for Yosvany Fuller while on the rehabilitation unit will include:  Adequate number of bowel movements, Urinate with no urinary retention >300ml in bladder, Maintain O2 SATs at an acceptable level during stay, Effective pain management while on the rehabilitation unit, Absence of skin breakdown while on the rehabilitation unit, Avoidance of any hospital acquired infections, Freedom from injury during hospitalization and Complete education with patient/family with understanding demonstrated regarding disease process and resultant impairment     In order to achieve these goals, nursing interventions may include bowel/bladder training, education for medical assistive devices, medication education, O2 saturation management, energy conservation, stress management techniques, fall prevention, alarms protocol, seating and positioning, skin/wound care, pressure relief instruction, dressing changes, infection protection, DVT prophylaxis, assistance with safe transfers , and/or assistance with bathroom activities and hygiene. PHYSICAL THERAPY:  Goals:        Short term goals  Time Frame for Short term goals: 10 days   Short term goal 1: Pt to perform bed mobility independently. Short term goal 2: Pt to perform transfers with minAx1. Short term goal 3: Pt to ambulate 150 ft, least restrictive device, minAx1. Short term goal 4: Pt to tolerate 30-60 mins physical therapy to increase endurance and strength. Short term goal 5: Pt to complete 5 steps, with LUE support, minAx1. Long term goals  Time Frame for Long term goals : By LOS. Long term goal 2: Pt to complete transfers with SBA to promote independence. Long term goal 3: Pt to ambulate 200 ft, least restrictive device, SBA. Long term goal 4: Pt to complete 5-12 steps, CGA to promote function. Long term goal 5: Pt to improve PASS score from 15/36 to 22/36. Plan of Care: Pt to be seen by physical therapy services 1 Hour 30 Minutes per day at least 5 out of 7 days per week     Anticipated interventions may include therapeutic exercises, gait training, neuromuscular re-ed, transfer training, community reintegration, bed mobility, w/c mobility and training. OCCUPATIONAL THERAPY:  Goals:             Short term goals  Time Frame for Short term goals: 10 days  Short term goal 1: Pt will perform grooming with stand by assist and use of assistive equipment/modified techniques PRN. Short term goal 2: Pt will perform upper body bathing/dressing with Minimal assist and use of assistive equipment/modified techniques PRN. Short term goal 3: Pt will perform toileting tasks and lower body bathing/dressing with Moderate assist and use of assitive equipment/durable medical equipment/modified techniques PRN.   Short term goal 4: Pt will perform functional mobility and transfers during self-care with Minimal assist, least restrictive mobility device, and Fair safety. Short term goal 5: Pt will stand for 4+ minutes with 1-2 UE support, contact guard assist, and no loss of balance while engaging in functional activity of choice. Short term goal 6: Pt will actively participate in 30+ minutes of therapeutic exercise/functional activities to promote increased independence with self-care and mobility. Short term goal 7: Pt will verbalize/demonstrate Good understanding of assistive equipment/durable medical equipment/modified techniques for increased independence with self-care and mobility. Long term goals  Time Frame for Long term goals : By discharge  Long term goal 1: Pt will perform self-feeding and grooming with modified independence. Long term goal 2: Pt will perform bathing, dressing, and toileting tasks with stand by assist, Fair safety, and assist PRN for MIRTA hose. Long term goal 3: Pt will perform functional mobility and transfers during self-care with stand by assist, least restrictive mobility device, and Fair safety. Long term goal 4: Pt will stand for 8+ minutes with 1-2 UE support, stand by assist, and no loss of balance while engaging in functional activity of choice. Long term goal 5: 9 hole peg, dynamometer, box and block to be assessed for RUE as appropriate    Plan of Care: Patient to be seen by occupational therapy services 1 Hour 30 Minutes per day at least 5 out of 7 days per week     Anticipated interventions may include ADL and IADL retraining, strengthening, safety education and training, patient/caregiver education and training, equipment evaluation/ training/procurement, neuromuscular reeducation, wheelchair mobility training.       SPEECH THERAPY:   Goals:             Short-term Goals  Goal 1: Follow single unit commands with 90% accuracy  Goal 2: Establish a reliable yes/no response with 90% accuracy  Goal 3: Pt will Verbalize automatics with 90% accuracy  Goal 4: Pt Verbalize bio info with 90% accuracy  Goal 5: Pt will complete functional naming tasks 90% accuracy        Plan of Care: Pt to be seen by speech therapy services 1 Hour per day at least 5 out of 7 days per week    Anticipated interventions may include speech/language/communication therapy, cognitive training, group therapy, education, and/or dysphagia therapy based on the above goals. CASE MANAGEMENT:  Goals:   Assist patient/family with discharge planning, patient/family counseling,  and coordination with insurance during the inpatient rehabilitation stay. Other members of the multidisciplinary rehabilitation team that will be involved in the patient's plan of care include recreational therapy, dietary, respiratory therapy, and neuropsychology. Medical issues being managed closely and that require 24 hour availability of a physician:  Swallowing precautions, Infection protection, DVT prophylaxis, Fall precautions, Fluid/Electrolyte balance, Nutritional status, Respiratory needs, Anemia and History of heart disease                                           Physician anticipated functional outcomes: Improved independence with functional measures   Estimated length of stay for this admission 2 weeks  Medical Prognosis: Fair  Anticipated disposition: Home. The potential to achieve the above medical and rehabilitative goals is fair. This plan of care has been developed with the assistance and input of the multidisciplinary rehabilitation team.  The plan was reviewed with the patient on 8/28/2020. The patient has had the opportunity to provide input to the therapy team.    I have reviewed this Individualized Plan of Care and agree with its contents. Above documentation has been expanded, modified, adjusted to reflect the findings of my evaluations and goals for the patient.     Physician:  Electronically signed by Puja Mccloud MD on 8/28/20 at 10:32 AM EDT

## 2020-08-28 NOTE — PROGRESS NOTES
Speech Language Pathology  Speech Language Pathology  [facility]    Speech Language Treatment Note    Date: 8/28/2020  Patients Name: Abdi Rao  MRN: [id number]  Diagnosis: CVA  Patient Active Problem List   Diagnosis Code    Hypertension I10    Hyperlipidemia E78.5    Acute on chronic systolic CHF (congestive heart failure) ([facility] I50.23    Hypertensive urgency I16.0    Idiopathic cardiomyopathy ([facility] [facility] I42.8    Hypertensive emergency I16.1    Chronic combined systolic and diastolic HF (heart failure), NYHA class 2 ([facility]) I50.42    Acute on chronic combined systolic and diastolic CHF, NYHA class 4 ([facility]) I50.43    Hypoxia R09.02    Severe mitral regurgitation by prior echocardiogram I34.0    Morbid obesity ([facility]) E66.01    CKD (chronic kidney disease) stage 3, GFR 30-59 ml/min ([facility]) N18.3    Physical deconditioning R53.81    TIA (transient ischemic attack) G45.9    Old lacunar stroke without late effect Z86.73    Dysarthria R47.1    Stroke determined by clinical assessment ([facility] [facility] I63.9    Aphasia R47.01    Type 2 diabetes mellitus, without long-term current use of insulin ([facility] [facility] E11.9    Cerebral infarction ([facility] [facility] - left MCA distribution  I63.9    Uncontrolled type 2 diabetes mellitus with hyperglycemia ([facility]) E11.65    Elevated blood pressure reading R03.0    Acute ischemic left middle cerebral artery (MCA) stroke ([facility]) I63.512       Pain: 0/10    Speech and Language Treatment  Treatment time:  1403-2288  (short session d/t toileting)    Subjective: [x] Alert [x] Cooperative     [] Confused     [] Agitated    [] Lethargic    Objective/Assessment:  Auditory Comprehension: one step directions- 20%, 60% c cues (when phrased \"where's your. .. Ricka Distad Ricka Distad \" rather than \"point to the. .... \"). Pt. Appropriately responding to simple y/n questions re: self and immediate environment. Verbal Expression: Pt. Able to initiate simple automatic phrases, \"thank you\", \"yes, that is fine\", \"see you later. \"

## 2020-08-28 NOTE — PLAN OF CARE
Problem: Skin Integrity:  Goal: Will show no infection signs and symptoms  Description: Will show no infection signs and symptoms  8/28/2020 1641 by Vandana Yanez RN  Outcome: Ongoing  8/28/2020 0308 by Prince Tapia RN  Outcome: Ongoing  Goal: Absence of new skin breakdown  Description: Absence of new skin breakdown  8/28/2020 1641 by Vandana Yanez RN  Outcome: Ongoing  8/28/2020 0308 by Prince Tapia RN  Outcome: Ongoing     Problem: Falls - Risk of:  Goal: Will remain free from falls  Description: Will remain free from falls  8/28/2020 1641 by Vandana Yanez RN  Outcome: Ongoing  8/28/2020 0308 by Prince Tapia RN  Outcome: Met This Shift  Goal: Absence of physical injury  Description: Absence of physical injury  8/28/2020 1641 by Vandana Yanez RN  Outcome: Ongoing  8/28/2020 0308 by Prince Tapia RN  Outcome: Met This Shift     Problem: Neurological  Goal: Maximum potential motor/sensory/cognitive function  8/28/2020 1641 by Vandana Yanez RN  Outcome: Ongoing  8/28/2020 0308 by Prince Tapia RN  Outcome: Ongoing     Problem: Musculor/Skeletal Functional Status  Goal: Highest potential functional level  Outcome: Ongoing  Goal: Absence of falls  Outcome: Ongoing     Problem: Neurological  Goal: Maximum potential motor/sensory/cognitive function  Outcome: Ongoing     Problem: Nutrition  Goal: Optimal nutrition therapy  Outcome: Ongoing     Problem: Pain:  Goal: Pain level will decrease  Description: Pain level will decrease  Outcome: Ongoing  Goal: Control of acute pain  Description: Control of acute pain  Outcome: Ongoing  Goal: Control of chronic pain  Description: Control of chronic pain  Outcome: Ongoing

## 2020-08-28 NOTE — PROGRESS NOTES
[address] [facility]   Acute Rehabilitation Physical Therapy Progress Note    Date: 20  Patient Name: Fouzia Ho       Room: 6538/6723-32  MRN: [id number]   Account: [de-identified]   : 1950  (80 y.o.) Gender: female     Referring Practitioner: Dr. Nazario Cloud  Diagnosis: Left ischemic middle cerebral artery stroke  Past Medical History:  has a past medical history of CHF (congestive heart failure) ([facility] [address] Diabetes mellitus ([facility] [address] H/O cardiac catheterization, H/O echocardiogram, History of echocardiogram, History of echocardiogram, Hyperlipidemia, and Hypertension. Past Surgical History:   has a past surgical history that includes Cholecystectomy and Cardiac catheterization (Left, 2017). Additional Pertinent Hx: Pt has a history of HTN, HLD, DM, CHF. Pt recently evaluated at [facility] for  new onset of aphasia. Pt was transferred to [facility] on 20 after diagnostic studies confirmed new L hemisphere CVA. Pt with known history of previous CVAs. MRI confirmed L MCA infarct on 2020. Pt admitted to [address] [place] [address] 20. Orientation Level: Unable to assess(pt is aphasic unable to assess at this time)  Restrictions/Precautions  Restrictions/Precautions: Fall Risk;Up as Tolerated;General Precautions  Required Braces or Orthoses?: No  Implants present? : (none)  Position Activity Restriction  Other position/activity restrictions: up as tolerated    Subjective: Pt struggling to describe pain/discomfort and where the pain/discomfort is located. ; pt is aphasic  Comments: Pt is pleasant however very flat affect during AM tx. Vital Signs  Patient Currently in Pain: Yes  Pain Assessment: Faces  Garza-Baker Pain Rating: Hurts little more  Pain Type: Acute pain  Pain Location: Chest;Abdomen  Pain Orientation: Right  Non-Pharmaceutical Pain Intervention(s): Ambulation/Increased Activity; Distraction;Repositioned  Response to Pain Intervention: Patient Satisfied                Bed Mobility:   Bed Mobility  Bridging: Stand by assistance  Rolling: Stand by assistance(using bed rails)  Supine to Sit: Minimal assistance; Moderate assistance(assisting with Right UE and Right LE)  Sit to Supine: Minimal assistance(assist with RLE only)  Scooting: Contact guard assistance  Bed mobility  Bridging: Stand by assistance  Scooting: Contact guard assistance    Transfers:  Sit to Stand: Minimal Assistance  Stand to sit: Minimal Assistance  Bed to Chair: Maximum assistance(assisting with Rolling Walker)              Ambulation 1  Surface: level tile  Device: Rolling Walker  Other Apparatus: Right(Hand  on walker)  Assistance: Moderate assistance;Minimal assistance;2 Person assistance  Quality of Gait: ataxic movement, crossing midline at times, improved movement and reciprocating steps however pt quickly gets out of sequencing  Gait Deviations: Slow Jacinda;Staggers;Decreased step length;Decreased step height; Increased COLT  Distance: 12ft, 25ft  Comments: pt able to  right LE with tactile and verbal cues  Ambulation 2  Surface - 2: level tile  Device 2: Rolling Walker  Other Apparatus 2: Right; Wheelchair follow(right small hand  on Rolling Walker)  Assistance 2: 2 Person assistance;Maximum assistance  Quality of Gait 2: pt more fatigued with difficulty advancing right LE, pt attempting to keep advancing Left LE and leaving right LE behind, w/c following close, increased difficulty following directions of right vs left  Gait Deviations: Slow Jacinda;Decreased step length;Decreased step height;Shuffles  Distance: 22ft  Comments: tactile and verbal cues for safety     Stairs/Curb  Stairs?: No              Wheelchair Activities  Propulsion: Yes  Propulsion 1  Propulsion: Manual  Level: Level Tile  Method: LUE;LLE  Level of Assistance: Minimal assistance  Description/ Details: 75ft  Distance: assist for avoiding objects in hallway               BALANCE Posture: Fair  Sitting - Static: Good;-  Sitting - Dynamic: Good;-  Standing - Static: Fair  Standing - Dynamic: Poor;+  Comments: standing balance with Rolling Walker    EXERCISES    Other exercises?: Yes  Other exercises 1: Seated AAROM RLE; AROM LLE, but occasional tactile cues x15 reps(AM & PM)  Other exercises 2: Transfer toilet >< w/c x2 with [place], brief change and pericare  Other exercises 3: Sit to Stand with Rolling Walker x5  Other exercises 4: Used Communication boards, pt unable to express needs with words but correctly point to toilet on the communication board, pt quickly assisted to toilet           Activity Tolerance: Patient Tolerated treatment well, Patient limited by fatigue          Patient Education  New Education Provided:  Plan of Care  Learner:patient  Method: explanation       Outcome: needs reinforcement     Current Treatment Recommendations: Strengthening, ROM, Balance Training, Functional Mobility Training, Transfer Training, Endurance Training, Gait Training, Stair training, Neuromuscular Re-education, Safety Education & Training, Patient/Caregiver Education & Training, Equipment Evaluation, Education, & procurement    Conditions Requiring Skilled Therapeutic Intervention  Body structures, Functions, Activity limitations: Decreased functional mobility ; Decreased ADL status; Decreased safe awareness;Decreased strength;Decreased balance;Decreased endurance;Decreased cognition;Decreased fine motor control;Decreased coordination  Treatment Diagnosis: Impaired function, weakness 2\" L MCA CVA. Prognosis: Good  Barriers to Learning: pt is aphasic, shows difficulty expressing and understanding information at times. REQUIRES PT FOLLOW UP: Yes  Discharge Recommendations: Patient would benefit from continued therapy after discharge;24 hour supervision or assist    Goals  Short term goals  Time Frame for Short term goals: 10 days   Short term goal 1: Pt to perform bed mobility independently.    Short term goal 2: Pt to perform transfers with minAx1. Short term goal 3: Pt to ambulate 150 ft, least restrictive device, minAx1. Short term goal 4: Pt to tolerate 30-60 mins physical therapy to increase endurance and strength. Short term goal 5: Pt to complete 5 steps, with LUE support, minAx1. Long term goals  Time Frame for Long term goals : By LOS. Long term goal 2: Pt to complete transfers with SBA to promote independence. Long term goal 3: Pt to ambulate 200 ft, least restrictive device, SBA. Long term goal 4: Pt to complete 5-12 steps, CGA to promote function.    Long term goal 5: Pt to improve PASS score from 15/36 to 22/36.       08/28/20 0740 08/28/20 1300   PT Individual Minutes   Time In 0740 1300   Time Out [id number]   Minutes 70 34       Electronically signed by Laurian Gottron, PTA on 8/28/20 at 4:28 PM EDT

## 2020-08-28 NOTE — PLAN OF CARE
Problem: Skin Integrity:  Goal: Will show no infection signs and symptoms  Description: Will show no infection signs and symptoms  8/28/2020 0308 by Piter Chou RN  Outcome: Ongoing     Problem: Skin Integrity:  Goal: Absence of new skin breakdown  Description: Absence of new skin breakdown  Outcome: Ongoing     Problem: Falls - Risk of:  Goal: Will remain free from falls  Description: Will remain free from falls  8/28/2020 0308 by Pietr Chou RN  Outcome: Met This Shift   Pt. Free of falls and injuries this shift. Problem: Falls - Risk of:  Goal: Absence of physical injury  Description: Absence of physical injury  Outcome: Met This Shift   No injuries this shift. Patient's safety maintained.     Problem: Neurological  Goal: Maximum potential motor/sensory/cognitive function  Outcome: Ongoing

## 2020-08-29 PROBLEM — I63.9 ACUTE ISCHEMIC STROKE (HCC): Status: ACTIVE | Noted: 2020-08-26

## 2020-08-29 LAB
GLUCOSE BLD-MCNC: 121 MG/DL (ref 65–105)
GLUCOSE BLD-MCNC: 127 MG/DL (ref 65–105)
GLUCOSE BLD-MCNC: 139 MG/DL (ref 65–105)
GLUCOSE BLD-MCNC: 141 MG/DL (ref 65–105)

## 2020-08-29 PROCEDURE — 6370000000 HC RX 637 (ALT 250 FOR IP): Performed by: INTERNAL MEDICINE

## 2020-08-29 PROCEDURE — 1180000000 HC REHAB R&B

## 2020-08-29 PROCEDURE — 97116 GAIT TRAINING THERAPY: CPT

## 2020-08-29 PROCEDURE — 6370000000 HC RX 637 (ALT 250 FOR IP): Performed by: PHYSICAL MEDICINE & REHABILITATION

## 2020-08-29 PROCEDURE — 99232 SBSQ HOSP IP/OBS MODERATE 35: CPT | Performed by: INTERNAL MEDICINE

## 2020-08-29 PROCEDURE — 6360000002 HC RX W HCPCS: Performed by: INTERNAL MEDICINE

## 2020-08-29 PROCEDURE — 97530 THERAPEUTIC ACTIVITIES: CPT

## 2020-08-29 PROCEDURE — 92507 TX SP LANG VOICE COMM INDIV: CPT

## 2020-08-29 PROCEDURE — 97112 NEUROMUSCULAR REEDUCATION: CPT

## 2020-08-29 PROCEDURE — 82947 ASSAY GLUCOSE BLOOD QUANT: CPT

## 2020-08-29 PROCEDURE — 99231 SBSQ HOSP IP/OBS SF/LOW 25: CPT | Performed by: PHYSICAL MEDICINE & REHABILITATION

## 2020-08-29 PROCEDURE — 97110 THERAPEUTIC EXERCISES: CPT

## 2020-08-29 RX ORDER — HYDROCODONE BITARTRATE AND ACETAMINOPHEN 5; 325 MG/1; MG/1
1 TABLET ORAL EVERY 6 HOURS PRN
Status: DISCONTINUED | OUTPATIENT
Start: 2020-08-29 | End: 2020-08-30

## 2020-08-29 RX ADMIN — INSULIN LISPRO 2 UNITS: 100 INJECTION, SOLUTION INTRAVENOUS; SUBCUTANEOUS at 07:42

## 2020-08-29 RX ADMIN — ENOXAPARIN SODIUM 40 MG: 40 INJECTION SUBCUTANEOUS at 07:41

## 2020-08-29 RX ADMIN — HYDROCHLOROTHIAZIDE 25 MG: 25 TABLET ORAL at 07:41

## 2020-08-29 RX ADMIN — CARVEDILOL 12.5 MG: 12.5 TABLET, FILM COATED ORAL at 07:41

## 2020-08-29 RX ADMIN — HYDROCODONE BITARTRATE AND ACETAMINOPHEN 1 TABLET: 5; 325 TABLET ORAL at 12:17

## 2020-08-29 RX ADMIN — ATORVASTATIN CALCIUM 80 MG: 80 TABLET, FILM COATED ORAL at 20:41

## 2020-08-29 RX ADMIN — CLOPIDOGREL BISULFATE 75 MG: 75 TABLET ORAL at 07:41

## 2020-08-29 RX ADMIN — ACETAMINOPHEN 650 MG: 325 TABLET, FILM COATED ORAL at 07:48

## 2020-08-29 RX ADMIN — HYDROCODONE BITARTRATE AND ACETAMINOPHEN 1 TABLET: 5; 325 TABLET ORAL at 20:41

## 2020-08-29 RX ADMIN — POLYETHYLENE GLYCOL 3350 17 G: 17 POWDER, FOR SOLUTION ORAL at 07:42

## 2020-08-29 RX ADMIN — METFORMIN HYDROCHLORIDE 500 MG: 500 TABLET ORAL at 17:16

## 2020-08-29 RX ADMIN — ASPIRIN 81 MG CHEWABLE TABLET 81 MG: 81 TABLET CHEWABLE at 07:41

## 2020-08-29 RX ADMIN — LOSARTAN POTASSIUM 100 MG: 50 TABLET, FILM COATED ORAL at 07:41

## 2020-08-29 RX ADMIN — METFORMIN HYDROCHLORIDE 500 MG: 500 TABLET ORAL at 07:41

## 2020-08-29 RX ADMIN — ACETAMINOPHEN 650 MG: 325 TABLET, FILM COATED ORAL at 22:39

## 2020-08-29 RX ADMIN — AMLODIPINE BESYLATE 10 MG: 10 TABLET ORAL at 17:16

## 2020-08-29 RX ADMIN — CARVEDILOL 12.5 MG: 12.5 TABLET, FILM COATED ORAL at 17:15

## 2020-08-29 ASSESSMENT — PAIN SCALES - GENERAL
PAINLEVEL_OUTOF10: 5
PAINLEVEL_OUTOF10: 5
PAINLEVEL_OUTOF10: 3
PAINLEVEL_OUTOF10: 4
PAINLEVEL_OUTOF10: 7
PAINLEVEL_OUTOF10: 3
PAINLEVEL_OUTOF10: 6
PAINLEVEL_OUTOF10: 4

## 2020-08-29 NOTE — PROGRESS NOTES
assistance(to left no AD)      Ambulation 1  Surface: level tile  Device: Rolling Walker  Other Apparatus: Right; Wheelchair follow(Hand  on walker)  Assistance: Moderate assistance;Minimal assistance;2 Person assistance  Quality of Gait: ataxic movement, crossing midline at times, improved movement and reciprocating steps however pt quickly gets out of sequencing  Gait Deviations: Slow Jacinda;Staggers;Decreased step length;Decreased step height  Distance: 10 and 20 ft  Comments: pt able to flex right hip LE with tactile and verbal cues, increased hip abduction heel strike with assist leg         Stairs/Curb  Stairs?: No      Wheelchair Activities  Propulsion: Yes  Propulsion 1  Propulsion: Manual  Level: Level Tile  Method: LUE;LLE;RLE  Level of Assistance: Moderate assistance  Description/ Details: straight path, constant tactile cue reciprical pattern LE follow thru 50% requiring right LE assist 50%  Distance: 40    FIMS:      BALANCE Posture: Fair  Sitting - Static: Good;-  Sitting - Dynamic: Good;-  Standing - Static: Fair  Standing - Dynamic: Poor;+  Comments: standing balance with RW, and with no device, therapist assist needed to block RLE.      EXERCISES    Other exercises?: Yes  Other exercises 1: compression hose donned   Other exercises 2: seated bilateral LE in reciprical pattern right AAROM left 2# red band x 15  Other exercises 3: UBE x 1.5 min fwd/retro assist right   Other exercises 4: static stand parallel bars tactile , visual, and assist to decrease left lateral lean(during stand pt becomes diaphoretic , upon sitting BP 95/63 )           Activity Tolerance: Patient Tolerated treatment well, Patient limited by fatigue     Other Comments  Comments: post static  parallel bars , pt diaphoretic BP 95/63 HR 68 SpO2 95% nursing notified    Patient Education  New Education Provided:    Learner:patient  Method: explanation       Outcome: needs reinforcement     Current Treatment Recommendations: Strengthening, ROM, Balance Training, Functional Mobility Training, Transfer Training, Endurance Training, Gait Training, Stair training, Neuromuscular Re-education, Safety Education & Training, Patient/Caregiver Education & Training, Equipment Evaluation, Education, & procurement    Conditions Requiring Skilled Therapeutic Intervention  Body structures, Functions, Activity limitations: Decreased functional mobility ; Decreased ADL status; Decreased safe awareness;Decreased strength;Decreased balance;Decreased endurance;Decreased cognition;Decreased fine motor control;Decreased coordination  Treatment Diagnosis: Impaired function, weakness 2\" L MCA CVA. Prognosis: Good  Barriers to Learning: pt is aphasic, shows difficulty expressing and understanding information at times. REQUIRES PT FOLLOW UP: Yes  Discharge Recommendations: Patient would benefit from continued therapy after discharge;24 hour supervision or assist    Goals  Short term goals  Time Frame for Short term goals: 10 days   Short term goal 1: Pt to perform bed mobility independently. Short term goal 2: Pt to perform transfers with minAx1. Short term goal 3: Pt to ambulate 150 ft, least restrictive device, minAx1. Short term goal 4: Pt to tolerate 30-60 mins physical therapy to increase endurance and strength. Short term goal 5: Pt to complete 5 steps, with LUE support, minAx1. Long term goals  Time Frame for Long term goals : By LOS. Long term goal 2: Pt to complete transfers with SBA to promote independence. Long term goal 3: Pt to ambulate 200 ft, least restrictive device, SBA. Long term goal 4: Pt to complete 5-12 steps, CGA to promote function. Long term goal 5: Pt to improve PASS score from 15/36 to 22/36.     Electronically signed by Angel Lopez PTA on 8/29/20 at 3:14 PM EDT     08/29/20 1145 08/29/20 1149 08/29/20 1513   PT Individual Minutes   Time In 3412 [id number]   Time Out 0679 [id number]   [facility] 06 1 39

## 2020-08-29 NOTE — PLAN OF CARE
Problem: Skin Integrity:  Goal: Will show no infection signs and symptoms  Description: Will show no infection signs and symptoms  Outcome: Ongoing  Goal: Absence of new skin breakdown  Description: Absence of new skin breakdown  Outcome: Ongoing     Problem: Falls - Risk of:  Goal: Will remain free from falls  Description: Will remain free from falls  Outcome: Ongoing  Goal: Absence of physical injury  Description: Absence of physical injury  Outcome: Ongoing     Problem: Neurological  Goal: Maximum potential motor/sensory/cognitive function  Outcome: Ongoing     Problem: Musculor/Skeletal Functional Status  Goal: Highest potential functional level  Outcome: Ongoing  Goal: Absence of falls  Outcome: Ongoing

## 2020-08-29 NOTE — CONSULTS
index of 30. trivial mitral regurg. Evidence of moderate (grade II) diastolic dysfunction seen.  History of echocardiogram 2019    EF of 50-55%. LV wall thickness is mildly increased. LA is mildly dilated (29-33) with a left atrial volume index of 31 m1/m2. mild diastolic dysfunction.  Hyperlipidemia     Hypertension         Past Surgical History:     Past Surgical History:   Procedure Laterality Date    CARDIAC CATHETERIZATION Left 2017    right radial, [facility] Dr. Markos Amaya          Medications Prior to Admission:     Prior to Admission medications    Medication Sig Start Date End Date Taking? Authorizing Provider   clopidogrel (PLAVIX) 75 MG tablet Take 1 tablet by mouth daily 20   Kali Ventura MD   carvedilol (COREG) 12.5 MG tablet Take 1 tablet by mouth 2 times daily (with meals) 20   Oleg Pablo MD   losartan-hydrochlorothiazide [facility]) 100-25 MG per tablet Take 1 tablet by mouth daily 20   Oleg Pablo MD   amLODIPine (NORVASC) 10 MG tablet Take 1 tablet by mouth daily 20   Oleg Pablo MD   atorvastatin (LIPITOR) 80 MG tablet Take 1 tablet by mouth nightly 20   Oleg Pablo MD   aspirin 81 MG EC tablet Take 1 tablet by mouth daily 20   Oleg Pablo MD   metFORMIN (GLUCOPHAGE) 500 MG tablet Take 500 mg by mouth 2 times daily (with meals)    Historical Provider, MD        Allergies:     Patient has no known allergies. Social History:     Tobacco:    reports that she has never smoked. She has never used smokeless tobacco.  Alcohol:      reports no history of alcohol use. Drug Use:  reports no history of drug use. Family History:     Family History   Problem Relation Age of Onset    Coronary Art Dis Father          from [place]    COPD Sister         O2 dep    Coronary Art Dis Brother         2 brothers  from [place]       Review of Systems:     Positive and Negative as described in HPI.     CONSTITUTIONAL:  negative for fevers, chills, sweats, fatigue, weight loss  HEENT:  negative for vision, hearing changes, runny nose, throat pain  RESPIRATORY:  negative for shortness of breath, cough, congestion, wheezing. CARDIOVASCULAR:  negative for chest pain, palpitations. GASTROINTESTINAL:  negative for nausea, vomiting, diarrhea, constipation, change in bowel habits, abdominal pain   GENITOURINARY:  negative for difficulty of urination, burning with urination, frequency   INTEGUMENT:  negative for rash, skin lesions, easy bruising   HEMATOLOGIC/LYMPHATIC:  negative for swelling/edema   ALLERGIC/IMMUNOLOGIC:  negative for urticaria , itching  ENDOCRINE:  negative increase in drinking, increase in urination, hot or cold intolerance  MUSCULOSKELETAL:  negative joint pains, muscle aches, swelling of joints  NEUROLOGICAL: Right-sided weakness and speech deficit  BEHAVIOR/PSYCH:  negative for depression, anxiety    Physical Exam:     /74   Pulse 72   Temp 97.7 °F (36.5 °C) (Oral)   Resp 16   Ht 5' 4\" (1.626 m)   Wt 191 lb 12.8 oz (87 kg)   SpO2 97%   BMI 32.92 kg/m²   Temp (24hrs), Av °F (36.7 °C), Min:97.7 °F (36.5 °C), Max:98.2 °F (36.8 °C)    Recent Labs     20  1629 20  0625 20  1105   POCGLU 127* 149* 141* 121*     No intake or output data in the 24 hours ending 20 1209  Speech deficit  General Appearance:  alert, well appearing, and in no acute distress  Mental status: oriented to person, place, and time with normal affect  Head:  normocephalic, atraumatic.   Eye: no icterus, redness, pupils equal and reactive, extraocular eye movements intact, conjunctiva clear  Ear: normal external ear, no discharge, hearing intact  Nose:  no drainage noted  Mouth: mucous membranes moist  Neck: supple, no carotid bruits, thyroid not palpable  Lungs: Bilateral equal air entry, clear to ausculation, no wheezing, rales or rhonchi, normal effort  Cardiovascular: normal rate, regular rhythm, no murmur, gallop, rub. Abdomen: Soft, nontender, nondistended, normal bowel sounds, no hepatomegaly or splenomegaly  Neurologic: Right hemiparesis with Broca's aphasia skin: No gross lesions, rashes, bruising or bleeding on exposed skin area  Extremities:  peripheral pulses palpable, no pedal edema or calf pain with palpation  P    Investigations:      Laboratory Testing:  Recent Results (from the past 24 hour(s))   POC Glucose Fingerstick    Collection Time: 08/28/20  4:29 PM   Result Value Ref Range    POC Glucose 127 (H) 65 - 105 mg/dL   POC Glucose Fingerstick    Collection Time: 08/28/20  8:17 PM   Result Value Ref Range    POC Glucose 149 (H) 65 - 105 mg/dL   POC Glucose Fingerstick    Collection Time: 08/29/20  6:25 AM   Result Value Ref Range    POC Glucose 141 (H) 65 - 105 mg/dL   POC Glucose Fingerstick    Collection Time: 08/29/20 11:05 AM   Result Value Ref Range    POC Glucose 121 (H) 65 - 105 mg/dL       Imaging/Diagonstics:      Assessment :      Primary Problem  <principal problem not specified>    Active Hospital Problems    Diagnosis Date Noted    Acute ischemic stroke ([facility] [address] [I63.9] 08/26/2020    Cerebral infarction ([facility] [address] - left MCA distribution  [I63.9] 08/26/2020    Type 2 diabetes mellitus, without long-term current use of insulin ([address] [E11.9] 08/25/2020    Aphasia [R47.01]     CKD (chronic kidney disease) stage 3, GFR 30-59 ml/min ([address] [N18.3] 12/20/2018    Morbid obesity ([address] [E66.01] 12/20/2018    Acute on chronic combined systolic and diastolic CHF, NYHA class 4 (HCC) [I50.43] 12/17/2018    Chronic combined systolic and diastolic HF (heart failure), NYHA class 2 ([address] [I50.42] 08/28/2017    Hyperlipidemia [E78.5]     Hypertension [I10]     Idiopathic cardiomyopathy ([address] [I42.8]        Plan:     1. Uncontrolled diabetes mellitus with the peripheral organ damage with ischemic CVA  2. Ischemic CVA with right hemiparesis and Broca's aphasia  3.  Metformin recent A1c is 7.3 reviewed and reconciled  4. Ischemic CVA hyperlipidemia aspirin and Lipitor Plavix 75 all started  5. Losartan 100 mg for hypertension and diabetic renal protection  6. norco for pain  7. q6  Hr prn  8.   9.     Consultations:   IP CONSULT TO INTERNAL MEDICINE  IP CONSULT TO DIETITIAN  IP CONSULT TO SOCIAL WORK  IP CONSULT TO INTERNAL MEDICINE      Veronica Monzon MD  8/29/2020  12:09 PM    Copy sent to Dr. Shelli Macias, APRN - CNP    Please note that this chart was generated using voice recognition Dragon dictation software. Although every effort was made to ensure the accuracy of this automated transcription, some errors in transcription may have occurred.

## 2020-08-29 NOTE — PROGRESS NOTES
Physical Medicine & Rehabilitation  Progress Note      Subjective:      81year-old female with ischemic CVA with R dominant hemiparesis and aphasia. She reports poor sleep last night. Automatic speech is intact. ROS:  Denies fevers, chills, sweats. No chest pain, palpitations, lightheadedness. Denies coughing, wheezing or shortness of breath. Denies abdominal pain, nausea, diarrhea or constipation. No new areas of joint pain. Denies new areas of numbness or weakness. Denies new anxiety or depression issues. No new skin problems. Rehabilitation:   Progressing in therapies. PT:  Restrictions/Precautions: Fall Risk, Up as Tolerated, General Precautions  Implants present? : (none)  Other position/activity restrictions: up as tolerated   Transfers  Sit to Stand: Minimal Assistance  Stand to sit: Minimal Assistance  Bed to Chair: Moderate assistance(to left no AD)  Stand Pivot Transfers: Maximum Assistance(with Rolling Walker with right hand  size small)  Comment: Standing with Rolling Walker with Right Hand  small  Ambulation 1  Surface: level tile  Device: Rolling Walker  Other Apparatus: Right, Wheelchair follow(Hand  on walker)  Assistance:  Moderate assistance, Minimal assistance, 2 Person assistance  Quality of Gait: ataxic movement, crossing midline at times, improved movement and reciprocating steps however pt quickly gets out of sequencing  Gait Deviations: Slow Jacinda, Staggers, Decreased step length, Decreased step height  Distance: 10 and 20 ft  Comments: pt able to flex right hip LE with tactile and verbal cues, increased hip abduction heel strike with assist leg     Transfers  Sit to Stand: Minimal Assistance  Stand to sit: Minimal Assistance  Bed to Chair: Moderate assistance(to left no AD)  Stand Pivot Transfers: Maximum Assistance(with Rolling Walker with right hand  size small)  Comment: Standing with Rolling Walker with Right Hand  small  Ambulation  Ambulation?: Yes  More Ambulation?: Yes  Ambulation 1  Surface: level tile  Device: Rolling Walker  Other Apparatus: Right, Wheelchair follow(Hand  on walker)  Assistance: Moderate assistance, Minimal assistance, 2 Person assistance  Quality of Gait: ataxic movement, crossing midline at times, improved movement and reciprocating steps however pt quickly gets out of sequencing  Gait Deviations: Slow Jacinda, Staggers, Decreased step length, Decreased step height  Distance: 10 and 20 ft  Comments: pt able to flex right hip LE with tactile and verbal cues, increased hip abduction heel strike with assist leg     Surface: level tile  Ambulation 1  Surface: level tile  Device: Rolling Walker  Other Apparatus: Right, Wheelchair follow(Hand  on walker)  Assistance: Moderate assistance, Minimal assistance, 2 Person assistance  Quality of Gait: ataxic movement, crossing midline at times, improved movement and reciprocating steps however pt quickly gets out of sequencing  Gait Deviations: Slow Jacinda, Staggers, Decreased step length, Decreased step height  Distance: 10 and 20 ft  Comments: pt able to flex right hip LE with tactile and verbal cues, increased hip abduction heel strike with assist leg     OT:  ADL  Feeding: Setup, Increased time to complete, Minimal assistance, Adaptive utensils (comment)(using LUE, assist to open packages & cut up food PRN)  Grooming:  Moderate assistance, Increased time to complete(assist to brush hair; Stand by assist oral care & face washing)  UE Bathing: Maximum assistance, Setup, Increased time to complete(Assist to bathe R armpit (positioning of RUE), LUE, and under breasts)  LE Bathing: Dependent/Total(Assist to bathe B lower legs/feet, perineal area, buttocks; 2 Assist to stand)  UE Dressing: Maximum assistance, Setup, Increased time to complete(Assist to clasp bra, position breasts in bra; assist to thread RUE into overhead shirt, pull down in back)  LE Dressing: Dependent/Total(total assist with all tasks; Pt attempted to pull underwear/pants from calves to above knees while seated)  Toileting: Dependent/Total(per nursing report)  Additional Comments: Limited ADL. Toileting with anxiety and onset of labored breathing; returned to bed. Assist with all tasks. Sarastedy for mobility. Balance  Sitting Balance: Stand by assistance  Standing Balance: Dependent/Total(sarasted)   Standing Balance  Time: ~20sec x4  Activity: Toileting  Comment: BUE support; RUE requiring assist for stabilization. while seated on toilet and in sarastedy  Functional Mobility  Functional Mobility Comments: NEGAR, pt had to use toilet quickly so SS was utilized by writer/RN     Bed mobility  Bridging: Stand by assistance  Rolling to Left: Stand by assistance  Rolling to Right: Stand by assistance  Supine to Sit: 2 Person assistance, Maximum assistance  Sit to Supine: Contact guard assistance  Scooting: Contact guard assistance  Comment: medical complications  Transfers  Stand Pivot Transfers: 2 Person assistance, Moderate assistance(Mod A x 1 to Left; Mod A x 2 to Right)  Sit to stand: Moderate assistance  Stand to sit: Moderate assistance  Transfer Comments: Pt able to bring RUE all the way up to SS bar at times, then it would fall off. Pt relying on LUE often d/t new RUE weakness-pt is likely R-handed.; Ax2 required this date for safety   Toilet Transfers  Toilet Transfers Comments: use of Lalitha Marquez with nursing this AM     Shower Transfers  Shower - Transfer From: Wheelchair  Shower - Transfer Type: To and From  Shower - Transfer To: Transfer tub bench  Shower - Technique: Stand pivot, To right, To left  Shower Transfers: 2 Person assistance, Moderate assistance(Mod A x 1 to Left; Mod A x 2 to Right)  Shower Transfers Comments: BUE support via grab bar       SPEECH:  Subjective: [x]? Alert     [x]? Cooperative     []? Confused     []? Agitated    []? Recent Labs     08/27/20  0600      K 4.1      CO2 26   BUN 39*   CREATININE 1.08*   GLUCOSE 156*     BNP: No results for input(s): BNP in the last 72 hours. PT/INR: No results for input(s): PROTIME, INR in the last 72 hours. APTT: No results for input(s): APTT in the last 72 hours. CARDIAC ENZYMES: No results for input(s): CKMB, CKMBINDEX, TROPONINT in the last 72 hours. Invalid input(s): CKTOTAL;3  FASTING LIPID PANEL:  Lab Results   Component Value Date    CHOL 160 08/24/2020    HDL 44 08/24/2020    TRIG 79 08/24/2020     LIVER PROFILE: No results for input(s): AST, ALT, ALB, BILIDIR, BILITOT, ALKPHOS in the last 72 hours.      Current Medications:   Current Facility-Administered Medications: HYDROcodone-acetaminophen (NORCO) 5-325 MG per tablet 1 tablet, 1 tablet, Oral, Q6H PRN  metFORMIN (GLUCOPHAGE) tablet 500 mg, 500 mg, Oral, BID WC  insulin lispro (HUMALOG) injection vial 0-12 Units, 0-12 Units, Subcutaneous, TID WC  insulin lispro (HUMALOG) injection vial 0-6 Units, 0-6 Units, Subcutaneous, Nightly  clopidogrel (PLAVIX) tablet 75 mg, 75 mg, Oral, Daily  enoxaparin (LOVENOX) injection 40 mg, 40 mg, Subcutaneous, Daily  polyethylene glycol (GLYCOLAX) packet 17 g, 17 g, Oral, Daily PRN  amLODIPine (NORVASC) tablet 10 mg, 10 mg, Oral, QPM  atorvastatin (LIPITOR) tablet 80 mg, 80 mg, Oral, Nightly  carvedilol (COREG) tablet 12.5 mg, 12.5 mg, Oral, BID WC  losartan (COZAAR) tablet 100 mg, 100 mg, Oral, Daily **AND** hydroCHLOROthiazide (HYDRODIURIL) tablet 25 mg, 25 mg, Oral, Daily  aspirin chewable tablet 81 mg, 81 mg, Oral, Daily  acetaminophen (TYLENOL) tablet 650 mg, 650 mg, Oral, Q4H PRN  polyethylene glycol (GLYCOLAX) packet 17 g, 17 g, Oral, Daily  senna (SENOKOT) tablet 17.2 mg, 2 tablet, Oral, Daily PRN  bisacodyl (DULCOLAX) suppository 10 mg, 10 mg, Rectal, Daily PRN      Impression/Plan:   Impaired ADLs, gait, and mobility due to:      1. R hemiparesis secondary to ischemic L MCA CVA:  PT/OT for gait, mobility, strengthening, endurance, ADLs, and self care. On ASA, atorvastatin, plavix  2. Broca's aphasia: speech therapy treating  3. HTN/CHF/non-ischemic cardiomyopathy: on amlodipine, carvedilol, HCTZ, losartan  4. DM: on insulin sliding scale  5. Pain: IM started Norco prn.   6. Bowel Management: Miralax daily, senokot prn, dulcolax prn.  7. DVT Prophylaxis:  low molecular weight heparin, SCD's while in bed and MIRTA's   8. Internal medicine for medical management    Electronically signed by Puja Mccloud MD on 8/29/2020 at 1:17 PM      This note is created with the assistance of a speech recognition program.  While intending to generate a document that actually reflects the content of the visit, the document can still have some errors including those of syntax and sound a like substitutions which may escape proof reading. In such instances, actual meaning can be extrapolated by contextual diversion.

## 2020-08-29 NOTE — PROGRESS NOTES
Speech Language Pathology  Speech Language Pathology  [facility]    Speech Language Treatment Note    Date: 8/29/2020  Patients Name: Vivienne Rondon  MRN: [id number]  Diagnosis: CVA  Patient Active Problem List   Diagnosis Code    Hypertension I10    Hyperlipidemia E78.5    Acute on chronic systolic CHF (congestive heart failure) ([facility] I50.23    Hypertensive urgency I16.0    Idiopathic cardiomyopathy ([facility] [facility] I42.8    Hypertensive emergency I16.1    Chronic combined systolic and diastolic HF (heart failure), NYHA class 2 ([facility]) I50.42    Acute on chronic combined systolic and diastolic CHF, NYHA class 4 ([facility]) I50.43    Hypoxia R09.02    Severe mitral regurgitation by prior echocardiogram I34.0    Morbid obesity ([facility]) E66.01    CKD (chronic kidney disease) stage 3, GFR 30-59 ml/min ([facility]) N18.3    Physical deconditioning R53.81    TIA (transient ischemic attack) G45.9    Old lacunar stroke without late effect Z86.73    Dysarthria R47.1    Stroke determined by clinical assessment ([facility] [facility] I63.9    Aphasia R47.01    Type 2 diabetes mellitus, without long-term current use of insulin ([facility] [address] [facility] E11.9    Cerebral infarction ([facility] [address] - left MCA distribution  I63.9    Uncontrolled type 2 diabetes mellitus with hyperglycemia ([facility]) E11.65    Elevated blood pressure reading R03.0    Acute ischemic left middle cerebral artery (MCA) stroke ([facility]) I63.512       Pain: 5/10 (arm)    Speech and Language Treatment  Treatment time:  6512-5589    Subjective: [x] Alert [x] Cooperative     [] Confused     [] Agitated    [] Lethargic    Objective/Assessment:  Auditory Comprehension: one step directions- 50%, 80% c cues (visual). Pt. responding to simple y/n questions re: self and immediate environment- 80%, 100% c cues. When asked if in pain by RN, Pt. Responding \"yes\"; when asked \"where\", raising left arm; able to rate pain level at \"5\".       Verbal Expression: Pt. Able to initiate simple automatic phrases, \"not bad\", \"thank you. \"  Automatic naming, counting 1-10- 100% humberto. Automatic naming- days of the week- 71% accuracy, 100% c cues. Naming objects in room- 50% accuracy humberto, increasing to 100% c semantic and written word cues. Neologisms, phonemic paraphasias frequently present with spontaneous speech and during pt. Attempts to complete sentence completion task/repetition of word after ST. Reading Comprehension:  n/a    Other:  No family present. Plan:  [x] Continue ST services    [] Discharge from ST:      Discharge recommendations: [] Inpatient Rehab   [] [place]   [] Outpatient Therapy  [] Follow up at trauma clinic   [] Other:       Treatment completed by:  Audrey SPIVEY-SLP

## 2020-08-29 NOTE — PROGRESS NOTES
Speech Language Pathology  Speech Language Pathology  [facility]    Speech Language Treatment Note    Date: 8/29/2020  Patients Name: Gurwinder Weaver  MRN: [id number]  Diagnosis: CVA  Patient Active Problem List   Diagnosis Code    Hypertension I10    Hyperlipidemia E78.5    Acute on chronic systolic CHF (congestive heart failure) ([address] I50.23    Hypertensive urgency I16.0    Idiopathic cardiomyopathy ([address] I42.8    Hypertensive emergency I16.1    Chronic combined systolic and diastolic HF (heart failure), NYHA class 2 ([address] I50.42    Acute on chronic combined systolic and diastolic CHF, NYHA class 4 ([facility]) I50.43    Hypoxia R09.02    Severe mitral regurgitation by prior echocardiogram I34.0    Morbid obesity ([facility]) E66.01    CKD (chronic kidney disease) stage 3, GFR 30-59 ml/min ([facility]) N18.3    Physical deconditioning R53.81    TIA (transient ischemic attack) G45.9    Old lacunar stroke without late effect Z86.73    Dysarthria R47.1    Stroke determined by clinical assessment ([facility] [address] I63.9    Aphasia R47.01    Type 2 diabetes mellitus, without long-term current use of insulin ([address] E11.9    Cerebral infarction ([facility] [address] - left MCA distribution  I63.9    Uncontrolled type 2 diabetes mellitus with hyperglycemia ([facility] [address] E11.65    Elevated blood pressure reading R03.0    Acute ischemic stroke ([facility]) I63.9       Pain: 5/10 (arm)    Speech and Language Treatment  Treatment time:  9769-4093    Subjective: [x] Alert [x] Cooperative     [] Confused     [] Agitated    [] Lethargic    Objective/Assessment:  Auditory Comprehension: one step directions- 30%, 80% c cues (visual). Pt. responding to simple y/n questions re: self and immediate environment- 90%, 100% c cues. ID object out of field of 2 via name c 100% accuracy. Verbal Expression: Pt. Able to initiate simple automatic phrases, \"it's better now\", \"a little bit\", and \"thank you. \"  Automatic naming, counting 1-10- 100% humberto.   Automatic

## 2020-08-30 LAB
GLUCOSE BLD-MCNC: 107 MG/DL (ref 65–105)
GLUCOSE BLD-MCNC: 121 MG/DL (ref 65–105)
GLUCOSE BLD-MCNC: 146 MG/DL (ref 65–105)
GLUCOSE BLD-MCNC: 183 MG/DL (ref 65–105)

## 2020-08-30 PROCEDURE — 97535 SELF CARE MNGMENT TRAINING: CPT

## 2020-08-30 PROCEDURE — 6370000000 HC RX 637 (ALT 250 FOR IP): Performed by: PHYSICAL MEDICINE & REHABILITATION

## 2020-08-30 PROCEDURE — 97110 THERAPEUTIC EXERCISES: CPT

## 2020-08-30 PROCEDURE — 97530 THERAPEUTIC ACTIVITIES: CPT

## 2020-08-30 PROCEDURE — 97116 GAIT TRAINING THERAPY: CPT

## 2020-08-30 PROCEDURE — 6360000002 HC RX W HCPCS: Performed by: INTERNAL MEDICINE

## 2020-08-30 PROCEDURE — 6370000000 HC RX 637 (ALT 250 FOR IP): Performed by: INTERNAL MEDICINE

## 2020-08-30 PROCEDURE — 82947 ASSAY GLUCOSE BLOOD QUANT: CPT

## 2020-08-30 PROCEDURE — 99231 SBSQ HOSP IP/OBS SF/LOW 25: CPT | Performed by: INTERNAL MEDICINE

## 2020-08-30 PROCEDURE — 1180000000 HC REHAB R&B

## 2020-08-30 RX ORDER — HYDROCODONE BITARTRATE AND ACETAMINOPHEN 5; 325 MG/1; MG/1
1 TABLET ORAL EVERY 4 HOURS PRN
Status: DISCONTINUED | OUTPATIENT
Start: 2020-08-30 | End: 2020-09-18 | Stop reason: HOSPADM

## 2020-08-30 RX ORDER — ONDANSETRON 4 MG/1
4 TABLET, ORALLY DISINTEGRATING ORAL EVERY 6 HOURS PRN
Status: DISCONTINUED | OUTPATIENT
Start: 2020-08-30 | End: 2020-09-18 | Stop reason: HOSPADM

## 2020-08-30 RX ORDER — GABAPENTIN 100 MG/1
100 CAPSULE ORAL 3 TIMES DAILY
Status: DISCONTINUED | OUTPATIENT
Start: 2020-08-30 | End: 2020-09-18 | Stop reason: HOSPADM

## 2020-08-30 RX ADMIN — LOSARTAN POTASSIUM 100 MG: 50 TABLET, FILM COATED ORAL at 07:44

## 2020-08-30 RX ADMIN — CARVEDILOL 12.5 MG: 12.5 TABLET, FILM COATED ORAL at 16:50

## 2020-08-30 RX ADMIN — HYDROCODONE BITARTRATE AND ACETAMINOPHEN 1 TABLET: 5; 325 TABLET ORAL at 12:22

## 2020-08-30 RX ADMIN — INSULIN LISPRO 2 UNITS: 100 INJECTION, SOLUTION INTRAVENOUS; SUBCUTANEOUS at 11:22

## 2020-08-30 RX ADMIN — CARVEDILOL 12.5 MG: 12.5 TABLET, FILM COATED ORAL at 07:45

## 2020-08-30 RX ADMIN — METRONIDAZOLE 100 MG: 500 TABLET ORAL at 21:08

## 2020-08-30 RX ADMIN — METFORMIN HYDROCHLORIDE 500 MG: 500 TABLET ORAL at 16:51

## 2020-08-30 RX ADMIN — POLYETHYLENE GLYCOL 3350 17 G: 17 POWDER, FOR SOLUTION ORAL at 07:44

## 2020-08-30 RX ADMIN — SENNOSIDES 17.2 MG: 8.6 TABLET, FILM COATED ORAL at 16:56

## 2020-08-30 RX ADMIN — ONDANSETRON 4 MG: 4 TABLET, ORALLY DISINTEGRATING ORAL at 19:04

## 2020-08-30 RX ADMIN — AMLODIPINE BESYLATE 10 MG: 10 TABLET ORAL at 16:50

## 2020-08-30 RX ADMIN — HYDROCODONE BITARTRATE AND ACETAMINOPHEN 1 TABLET: 5; 325 TABLET ORAL at 06:56

## 2020-08-30 RX ADMIN — HYDROCHLOROTHIAZIDE 25 MG: 25 TABLET ORAL at 07:45

## 2020-08-30 RX ADMIN — METFORMIN HYDROCHLORIDE 500 MG: 500 TABLET ORAL at 07:45

## 2020-08-30 RX ADMIN — ATORVASTATIN CALCIUM 80 MG: 80 TABLET, FILM COATED ORAL at 21:08

## 2020-08-30 RX ADMIN — CLOPIDOGREL BISULFATE 75 MG: 75 TABLET ORAL at 07:45

## 2020-08-30 RX ADMIN — METRONIDAZOLE 100 MG: 500 TABLET ORAL at 16:50

## 2020-08-30 RX ADMIN — ASPIRIN 81 MG CHEWABLE TABLET 81 MG: 81 TABLET CHEWABLE at 07:45

## 2020-08-30 RX ADMIN — HYDROCODONE BITARTRATE AND ACETAMINOPHEN 1 TABLET: 5; 325 TABLET ORAL at 16:54

## 2020-08-30 RX ADMIN — ENOXAPARIN SODIUM 40 MG: 40 INJECTION SUBCUTANEOUS at 07:44

## 2020-08-30 ASSESSMENT — PAIN DESCRIPTION - PROGRESSION: CLINICAL_PROGRESSION: NOT CHANGED

## 2020-08-30 ASSESSMENT — PAIN DESCRIPTION - DESCRIPTORS
DESCRIPTORS: SORE;ACHING
DESCRIPTORS: SORE

## 2020-08-30 ASSESSMENT — PAIN DESCRIPTION - PAIN TYPE
TYPE: ACUTE PAIN
TYPE: ACUTE PAIN

## 2020-08-30 ASSESSMENT — PAIN DESCRIPTION - FREQUENCY
FREQUENCY: CONTINUOUS
FREQUENCY: CONTINUOUS

## 2020-08-30 ASSESSMENT — PAIN SCALES - GENERAL
PAINLEVEL_OUTOF10: 10
PAINLEVEL_OUTOF10: 10
PAINLEVEL_OUTOF10: 4
PAINLEVEL_OUTOF10: 6
PAINLEVEL_OUTOF10: 5
PAINLEVEL_OUTOF10: 3
PAINLEVEL_OUTOF10: 9

## 2020-08-30 ASSESSMENT — PAIN DESCRIPTION - ORIENTATION
ORIENTATION: RIGHT
ORIENTATION: RIGHT

## 2020-08-30 ASSESSMENT — PAIN DESCRIPTION - LOCATION
LOCATION: ARM
LOCATION: LEG

## 2020-08-30 ASSESSMENT — PAIN SCALES - WONG BAKER: WONGBAKER_NUMERICALRESPONSE: 6

## 2020-08-30 NOTE — PROGRESS NOTES
Pt having nausea and vomiting. Belly is soft. Last bm 2 days ago, has had Miralax and Senna. Message sent to Dr. Bessy Harper per [employer]. Orders received for Zofran.

## 2020-08-30 NOTE — PLAN OF CARE
Problem: Skin Integrity:  Goal: Will show no infection signs and symptoms  Description: Will show no infection signs and symptoms  8/30/2020 1023 by Cortez Alamo RN  Outcome: Ongoing  8/30/2020 0435 by Raji Figueroa RN  Outcome: Ongoing  8/30/2020 0417 by Raji Figueroa RN  Outcome: Ongoing  Goal: Absence of new skin breakdown  Description: Absence of new skin breakdown  8/30/2020 1023 by Cortez Alamo RN  Outcome: Ongoing  8/30/2020 0435 by Raji Figueroa RN  Outcome: Ongoing  8/30/2020 0417 by Raji Figueroa RN  Outcome: Ongoing     Problem: Falls - Risk of:  Goal: Will remain free from falls  Description: Will remain free from falls  8/30/2020 1023 by Cortez Alamo RN  Outcome: Ongoing  8/30/2020 0435 by Raji Figueroa RN  Outcome: Ongoing  8/30/2020 0417 by Raji Figueroa RN  Outcome: Ongoing  Goal: Absence of physical injury  Description: Absence of physical injury  8/30/2020 1023 by Cortez Alamo RN  Outcome: Ongoing  8/30/2020 0435 by Raji Figueroa RN  Outcome: Ongoing  8/30/2020 0417 by Raji Figueroa RN  Outcome: Ongoing     Problem: Neurological  Goal: Maximum potential motor/sensory/cognitive function  8/30/2020 1023 by Cortez Alamo RN  Outcome: Ongoing  8/30/2020 0435 by Raji Figueroa RN  Outcome: Ongoing  8/30/2020 0417 by Raji Figueroa RN  Outcome: Ongoing     Problem: Musculor/Skeletal Functional Status  Goal: Highest potential functional level  8/30/2020 1023 by Cortez Alamo RN  Outcome: Ongoing  8/30/2020 0435 by Raji Figueroa RN  Outcome: Ongoing  8/30/2020 0417 by Raji Figueroa RN  Outcome: Ongoing  Goal: Absence of falls  8/30/2020 1023 by Cortez Alamo RN  Outcome: Ongoing  8/30/2020 0435 by Raji Figueroa RN  Outcome: Ongoing  8/30/2020 0417 by Raji Figueroa RN  Outcome: Ongoing

## 2020-08-30 NOTE — PLAN OF CARE
Problem: Skin Integrity:  Goal: Will show no infection signs and symptoms  Description: Will show no infection signs and symptoms  8/30/2020 0435 by Ranjan Figueroa RN  Outcome: Ongoing  8/30/2020 0417 by Ranjan Figueroa RN  Outcome: Ongoing

## 2020-08-30 NOTE — PLAN OF CARE
Problem: Skin Integrity:  Goal: Absence of new skin breakdown  Description: Absence of new skin breakdown  Outcome: Ongoing   No skin breakdown noted. Turned and repositioned. Problem: Falls - Risk of:  Goal: Will remain free from falls  Description: Will remain free from falls  Outcome: Ongoing   Safety maintained. Uses call light appropriately for assistance.

## 2020-08-30 NOTE — PROGRESS NOTES
Pt having some increased pain to left leg from her inner upper knee to her inner upper calf, when asked if deep pt states yes. Also answers yes to \"burning, sharp and achey,\" so difficult to assess desription. Message sent to Dr. Bessy Harper per [employer] with noted info. Dr. Bessy Harper enters orders.

## 2020-08-30 NOTE — CONSULTS
[facility] Internal Medicine    CONSULTATION / HISTORY AND PHYSICAL EXAMINATION            Date:   8/30/2020  Patient name:  Gurwinder Weaver  Date of admission:  8/26/2020  6:56 PM  MRN:   [id number]  Account:  [de-identified]  YOB: 1950  PCP:    SUE Powell CNP  Room:   7696/0289-46  Code Status:    Full Code    Physician Requesting Consult: Valeria Luna MD    Reason for Consult: Medical management    Chief Complaint:     No chief complaint on file. Stroke    History Obtained From:     Patient medical record nursing staff    History of Present Illness:     79-year-old pleasant  lady was admitted to [facility] on August 23 with a aphasia diagnosed with the left middle cerebral artery ischemic infarct  Known history of systolic congestive heart failure ejection fraction 35 to 40% secondary to nonischemic cardiomyopathy  Diabetes   Duration more than 7 years  Modifying factors on Glucophage and other med  Severity uncontrolled sever  Associated signs and symtoms neuropathy/ckd/ CAD. aggravated with sugar diet and better with low sugar diet             Past Medical History:     Past Medical History:   Diagnosis Date    CHF (congestive heart failure) ([address]     Diabetes mellitus ([address]     H/O cardiac catheterization 08/04/2017    LMCA: Mild irregularites 10-20%. LAD: Mild irregularites 10-20%. LCx: Mild irregularites 10-20%. RCA: Mild irregularites 10-20%. LV function assessed as : Abnormal. EF of 40%.  H/O echocardiogram 12/18/2018    EF 55%. Mildly increased LV wall thickness. The left atrium appears moderately to severely dilated. Moderate to severe mitral regurg. Moderate pulmonary HTN with an estimated RV systolic pressure of [id number]. Moderate tricuspid regurg. Evidnece fo moderate diastolic dysfunction is seen.  History of echocardiogram 01/17/2019    EF 55%. LV wall thickness moderately increased.  LA is mildly dilated w/ LA volume index of 30. trivial mitral regurg. Evidence of moderate (grade II) diastolic dysfunction seen.  History of echocardiogram 2019    EF of 50-55%. LV wall thickness is mildly increased. LA is mildly dilated (29-33) with a left atrial volume index of 31 m1/m2. mild diastolic dysfunction.  Hyperlipidemia     Hypertension         Past Surgical History:     Past Surgical History:   Procedure Laterality Date    CARDIAC CATHETERIZATION Left 2017    right radial, [facility] Dr. Clayton Deng          Medications Prior to Admission:     Prior to Admission medications    Medication Sig Start Date End Date Taking? Authorizing Provider   clopidogrel (PLAVIX) 75 MG tablet Take 1 tablet by mouth daily 20   Toni Villeda MD   carvedilol (COREG) 12.5 MG tablet Take 1 tablet by mouth 2 times daily (with meals) 20   Brayan Rich MD   losartan-hydrochlorothiazide [facility]) 100-25 MG per tablet Take 1 tablet by mouth daily 20   Brayan Rich MD   amLODIPine (NORVASC) 10 MG tablet Take 1 tablet by mouth daily 20   Brayan Rich MD   atorvastatin (LIPITOR) 80 MG tablet Take 1 tablet by mouth nightly 20   Brayan Rich MD   aspirin 81 MG EC tablet Take 1 tablet by mouth daily 20   Brayan Rich MD   metFORMIN (GLUCOPHAGE) 500 MG tablet Take 500 mg by mouth 2 times daily (with meals)    Historical Provider, MD        Allergies:     Patient has no known allergies. Social History:     Tobacco:    reports that she has never smoked. She has never used smokeless tobacco.  Alcohol:      reports no history of alcohol use. Drug Use:  reports no history of drug use. Family History:     Family History   Problem Relation Age of Onset    Coronary Art Dis Father          from [place]    COPD Sister         O2 dep    Coronary Art Dis Brother         2 brothers  from [place]       Review of Systems:     Positive and Negative as described in HPI.     CONSTITUTIONAL:  negative for fevers, chills, sweats, fatigue, weight loss  HEENT:  negative for vision, hearing changes, runny nose, throat pain  RESPIRATORY:  negative for shortness of breath, cough, congestion, wheezing. CARDIOVASCULAR:  negative for chest pain, palpitations. GASTROINTESTINAL:  negative for nausea, vomiting, diarrhea, constipation, change in bowel habits, abdominal pain   GENITOURINARY:  negative for difficulty of urination, burning with urination, frequency   INTEGUMENT:  negative for rash, skin lesions, easy bruising   HEMATOLOGIC/LYMPHATIC:  negative for swelling/edema   ALLERGIC/IMMUNOLOGIC:  negative for urticaria , itching  ENDOCRINE:  negative increase in drinking, increase in urination, hot or cold intolerance  MUSCULOSKELETAL:  negative joint pains, muscle aches, swelling of joints  NEUROLOGICAL: Right-sided weakness and speech deficit  BEHAVIOR/PSYCH:  negative for depression, anxiety    Physical Exam:     BP (!) 126/58   Pulse 60   Temp 97.4 °F (36.3 °C) (Oral)   Resp 16   Ht 5' 4\" (1.626 m)   Wt 191 lb 12.8 oz (87 kg)   SpO2 98%   BMI 32.92 kg/m²   Temp (24hrs), Av.9 °F (36.6 °C), Min:97.4 °F (36.3 °C), Max:98.4 °F (36.9 °C)    Recent Labs     20  1616 20  2026 20  0640 20  1040   POCGLU 139* 127* 121* 183*     No intake or output data in the 24 hours ending 20 1549  Speech deficit  General Appearance:  alert, well appearing, and in no acute distress  Mental status: oriented to person, place, and time with normal affect  Head:  normocephalic, atraumatic.   Eye: no icterus, redness, pupils equal and reactive, extraocular eye movements intact, conjunctiva clear  Ear: normal external ear, no discharge, hearing intact  Nose:  no drainage noted  Mouth: mucous membranes moist  Neck: supple, no carotid bruits, thyroid not palpable  Lungs: Bilateral equal air entry, clear to ausculation, no wheezing, rales or rhonchi, normal effort  Cardiovascular: normal rate, regular rhythm, no murmur, gallop, rub. Abdomen: Soft, nontender, nondistended, normal bowel sounds, no hepatomegaly or splenomegaly  Neurologic: Right hemiparesis with Broca's aphasia skin: No gross lesions, rashes, bruising or bleeding on exposed skin area  Extremities:  peripheral pulses palpable, no pedal edema or calf pain with palpation  P    Investigations:      Laboratory Testing:  Recent Results (from the past 24 hour(s))   POC Glucose Fingerstick    Collection Time: 08/29/20  4:16 PM   Result Value Ref Range    POC Glucose 139 (H) 65 - 105 mg/dL   POC Glucose Fingerstick    Collection Time: 08/29/20  8:26 PM   Result Value Ref Range    POC Glucose 127 (H) 65 - 105 mg/dL   POC Glucose Fingerstick    Collection Time: 08/30/20  6:40 AM   Result Value Ref Range    POC Glucose 121 (H) 65 - 105 mg/dL   POC Glucose Fingerstick    Collection Time: 08/30/20 10:40 AM   Result Value Ref Range    POC Glucose 183 (H) 65 - 105 mg/dL       Imaging/Diagonstics:      Assessment :      Primary Problem  <principal problem not specified>    Active Hospital Problems    Diagnosis Date Noted    Acute ischemic stroke ([facility] [I63.9] 08/26/2020    Cerebral infarction ([facility] - left MCA distribution  [I63.9] 08/26/2020    Type 2 diabetes mellitus, without long-term current use of insulin ([facility] [E11.9] 08/25/2020    Aphasia [R47.01]     CKD (chronic kidney disease) stage 3, GFR 30-59 ml/min ([address] [N18.3] 12/20/2018    Morbid obesity ([address] [E66.01] 12/20/2018    Acute on chronic combined systolic and diastolic CHF, NYHA class 4 (HCC) [I50.43] 12/17/2018    Chronic combined systolic and diastolic HF (heart failure), NYHA class 2 ([address] [I50.42] 08/28/2017    Hyperlipidemia [E78.5]     Hypertension [I10]     Idiopathic cardiomyopathy ([address] [I42.8]        Plan:     1. Uncontrolled diabetes mellitus with the peripheral organ damage with ischemic CVA  2. Ischemic CVA with right hemiparesis and Broca's aphasia  3.  Metformin recent A1c is 7.3 reviewed and reconciled  4. Ischemic CVA hyperlipidemia aspirin and Lipitor Plavix 75 all started  5. Losartan 100 mg for hypertension and diabetic renal protection  6. norco for pain  7. q6  Hr prn  8.   9.     Consultations:   IP CONSULT TO INTERNAL MEDICINE  IP CONSULT TO DIETITIAN  IP CONSULT TO SOCIAL WORK  IP CONSULT TO INTERNAL MEDICINE      Risa Schaeffer MD  8/30/2020  3:49 PM    Copy sent to Dr. Teofilo Alonso, APRN - CNP    Please note that this chart was generated using voice recognition Dragon dictation software. Although every effort was made to ensure the accuracy of this automated transcription, some errors in transcription may have occurred.

## 2020-08-30 NOTE — PROGRESS NOTES
[facility]: [facility]   ACUTE REHABILITATION OCCUPATIONAL THERAPY  DAILY NOTE    Date: 20  Patient Name: Edelmira Doctor      Room: 9729/7027-44    MRN: [id number]   : 1950  (81 y.o.)  Gender: female   Referring Practitioner: Dr. Donis Carlson  Diagnosis: Left ischemic middle cerebral artery stroke       Restrictions  Restrictions/Precautions: Fall Risk, Up as Tolerated, General Precautions  Implants present? : (none)  Other position/activity restrictions: up as tolerated  Required Braces or Orthoses?: No    Subjective  Subjective: \"OK\" Pt demonstrates global aphasia, however able to follow one-step commands ~75% of the time with tactile cues PRN. Patient Currently in Pain: Yes  Pain Location: Arm  Pain Orientation: Right  Restrictions/Precautions: Fall Risk;Up as Tolerated;General Precautions        Pain Assessment  Pain Assessment: Faces  Garza-Baker Pain Rating: Hurts even more  Pain Type: Acute pain  Pain Location: Arm  Pain Orientation: Right  Pain Descriptors: Sore  Pain Frequency: Continuous  Clinical Progression: Not changed    Objective  Cognition  Overall Cognitive Status: Impaired(Unable to fully assess due to global aphasia)  Arousal/Alertness: Appropriate responses to stimuli  Following Directions: Follows one step commands  Memory: Unable to assess  Following Commands:  Follows one step commands with repetition  Safety Judgement: Decreased awareness of need for assistance  Awareness of Errors: Assistance required to identify errors made  Insights: Decreased awareness of deficits  Sequencing and Organization: Assistance required to identify errors made;Assistance required to generate solutions;Assistance required to implement solutions  Perception  Overall Perceptual Status: Impaired  Unilateral Attention: Cues to attend to right side of body  Initiation: Cues to initiate tasks  Motor Planning: Cues to use objects appropriately  Balance  Sitting Balance: Stand by assistance  Standing Balance: Minimal assistance  Bed mobility  Supine to Sit: Stand by assistance  Scooting: Stand by assistance  Transfers  Stand Pivot Transfers: Moderate assistance  Sit to stand: Moderate assistance  Stand to sit: Moderate assistance  Standing Balance  Time: >1 minute x 8  Activity: self-care tasks and transfers  Comment: 1-2 UE support  Toilet Transfers  Toilet - Technique: Stand pivot; To right; To left  Equipment Used: Grab bars  Toilet Transfer: Moderate assistance  Toilet Transfers Comments: Moderate verbal/tactile cues for hand placement and safety. Facilitated engagement in PM and AM. In PM, Pt's R ankle became inverted due to Pt's RLE ataxia. OT provided phsyical assist to position ankle and discussed with PT Raquel the possibility of an AFO or positioning brace for R ankle. Shower Transfers  Shower - Transfer From: Wheelchair  Shower - Transfer Type: To and From  Shower - Transfer To: Transfer tub bench  Shower - Technique: Stand pivot; To right; To left  Shower Transfers: Moderate assistance  Shower Transfers Comments: Moderate verbal/tactile cues for hand placement and safety     Type of ROM/Therapeutic Exercise  Type of ROM/Therapeutic Exercise: AAROM  Comment: Pt demonstrates increased AROM of RUE this date. OT facilitated AAROM of the exercises below while Pt was seated in front of mirror to promote neuroplasticity of Pt's mirror neurons for increased RUE functional use. Continue OT POC. Exercises  Shoulder Flexion: x 20  Shoulder Extension: x 20  Shoulder ABduction: x 20  Shoulder ADduction: x 20  Elbow Flexion: x 20  Elbow Extension: x 20  Grasp/Release: x 20                       ADL  Feeding: Setup; Increased time to complete(using LUE)  Grooming: Stand by assistance;Setup; Increased time to complete(while seated at sink with use of LUE)  UE Bathing: Stand by assistance;Setup; Increased time to complete(use of emmanuel-technique to wash L armpit)  LE Bathing: Maximum assistance;Setup; Increased time to complete(Assist with B feet, buttocks & perineal area)  UE Dressing: Minimal assistance;Setup; Increased time to complete(assist to pull down over trunk)  LE Dressing: Dependent/Total(total assist with all tasks; Pt attempted to pull underwear/pants from calves to above knees while seated)  Toileting: Dependent/Total(per nursing report)  Additional Comments: Pt demonstrated improved functional use of RUE this date during showering routine. Ataxic movement and difficulty with motor planning noted, however Pt able to position RUE PRN for LUE to bathe R armpit. Continue OT POC to maximize independence with self-care. Assessment  Performance deficits / Impairments: Decreased functional mobility ; Decreased ADL status; Decreased ROM; Decreased strength;Decreased safe awareness;Decreased cognition;Decreased endurance;Decreased sensation;Decreased balance;Decreased high-level IADLs;Decreased fine motor control;Decreased coordination;Decreased posture  Prognosis: Good  Discharge Recommendations: 24 hour supervision or assist;Patient would benefit from continued therapy after discharge  Activity Tolerance: Patient Tolerated treatment well  Safety Devices in place: Yes  Type of devices: Call light within reach; Chair alarm in place; All fall risk precautions in place;Gait belt;Patient at risk for falls; Left in chair;Nurse notified          Patient Education: OT POC, safety with functional transfers  Patient Goals   Patient goals :  To return home with sister Margy Tavarez  Method: demonstration and explanation       Outcome: needs reinforcement        Plan  Plan  Times per week: 5-7x/week, 1.5 hours/day  Times per day: Twice a day  Current Treatment Recommendations: Self-Care / ADL, Home Management Training, Cognitive/Perceptual Training, Strengthening, ROM, Balance Training, Functional Mobility Training, Endurance Training, Neuromuscular Re-education, Cognitive Reorientation, Pain Management, Safety Education & Training, Patient/Caregiver Education & Training, Equipment Evaluation, Education, & procurement, Positioning  Patient Goals   Patient goals : To return home with sister  Short term goals  Time Frame for Short term goals: 10 days  Short term goal 1: Pt will perform grooming with stand by assist and use of assistive equipment/modified techniques PRN. Short term goal 2: Pt will perform upper body bathing/dressing with Minimal assist and use of assistive equipment/modified techniques PRN. Short term goal 3: Pt will perform toileting tasks and lower body bathing/dressing with Moderate assist and use of assitive equipment/durable medical equipment/modified techniques PRN. Short term goal 4: Pt will perform functional mobility and transfers during self-care with Minimal assist, least restrictive mobility device, and Fair safety. Short term goal 5: Pt will stand for 4+ minutes with 1-2 UE support, contact guard assist, and no loss of balance while engaging in functional activity of choice. Short term goal 6: Pt will actively participate in 30+ minutes of therapeutic exercise/functional activities to promote increased independence with self-care and mobility. Short term goal 7: Pt will verbalize/demonstrate Good understanding of assistive equipment/durable medical equipment/modified techniques for increased independence with self-care and mobility. Long term goals  Time Frame for Long term goals : By discharge  Long term goal 1: Pt will perform self-feeding and grooming with modified independence. Long term goal 2: Pt will perform bathing, dressing, and toileting tasks with stand by assist, Fair safety, and assist PRN for MIRTA hose. Long term goal 3: Pt will perform functional mobility and transfers during self-care with stand by assist, least restrictive mobility device, and Fair safety.   Long term goal 4: Pt will stand for 8+ minutes with 1-2 UE support, stand by assist, and no loss of balance while engaging in

## 2020-08-30 NOTE — PROGRESS NOTES
Physical Therapy    [address]  Acute Rehabilitation Physical Therapy Progress Note    Date: 20  Patient Name: Vivienne Rondon       Room: 0616/8661-22  MRN: [id number]   Account: [de-identified]   : 1950  (80 y.o.) Gender: female     Referring Practitioner: Dr. Anil Charles  Diagnosis: Left ischemic middle cerebral artery stroke  Past Medical History:  has a past medical history of CHF (congestive heart failure) ([facility] Diabetes mellitus ([facility] H/O cardiac catheterization, H/O echocardiogram, History of echocardiogram, History of echocardiogram, Hyperlipidemia, and Hypertension. Past Surgical History:   has a past surgical history that includes Cholecystectomy and Cardiac catheterization (Left, 2017). Additional Pertinent Hx: Pt has a history of HTN, HLD, DM, CHF. Pt recently evaluated at [facility] for  new onset of aphasia. Pt was transferred to [facility] on 20 after diagnostic studies confirmed new L hemisphere CVA. Pt with known history of previous CVAs. MRI confirmed L MCA infarct on 2020. Pt admitted to [address] [place] [address] 20. Restrictions/Precautions  Restrictions/Precautions: Fall Risk;Up as Tolerated;General Precautions  Required Braces or Orthoses?: No  Implants present? : (none)  Position Activity Restriction  Other position/activity restrictions: up as tolerated    Subjective: Patient is aphasic. Patient complains of intense pain of L LE \"the whole thing\". RN administered pain meds at beginning of PM session. PM session shortened due to increased pain. RN notified.      Vital Signs  Patient Currently in Pain: Yes  Pain Assessment: 0-10  Pain Level: 10  Pain Type: Acute pain  Pain Location: Leg  Pain Orientation: Right  Pain Descriptors: Sore;Aching  Pain Frequency: Continuous     Bed Mobility:   Bridging: Stand by assistance  Rolling: Stand by assistance  Supine to Sit: Minimal assistance(assisted R UE)  Scooting: Stand by assistance(to head of bed)  Comment: Patient supine in bed at end of PM session. Head of bed flat without use of handrails. Transfers:  Sit to Stand: Minimal Assistance  Stand to sit: Minimal Assistance   Comment: Transfers with Rolling Walker with Right Hand  small onto strong side     Ambulation 1  Surface: level tile  Device: Rolling Walker  Other Apparatus: Right; Wheelchair follow(Hand  on walker; close wheelchair follow)  Assistance: Moderate assistance;Minimal assistance;2 Person assistance  Quality of Gait: Demos ataxic movement, R ankle internal rotation. Verbal and tactile cues to flex right hip and R flat foot before taking L LE step. Increased difficulty following left vs right directions. Gait Deviations: Slow Jacinda;Staggers;Decreased step length;Decreased step height  Distance: 7 ft and 15 ft     Stairs/Curb  Stairs?: No       Propulsion 1  Propulsion: Manual  Level: Level Tile  Method: LUE;LLE;RLE  Level of Assistance: Moderate assistance  Description/ Details: Straight path for initial 40 ft, deviated path towards the right with increased distance. Constant verbal cues to kick R LE for reciprocal pattern.   Distance: 60 ft       EXERCISES    Other exercises 1: compression hose donned   Other exercises 2: seated bilateral LE in reciprical pattern right AAROM left 2# red band x 15  Other exercises 3: pre-gait training, lateral weight shifting in parallel bars  Other exercises 4: toilet transfer in AM using gerald steady        Activity Tolerance: Patient limited by pain, Patient limited by fatigue        Current Treatment Recommendations: Strengthening, ROM, Balance Training, Functional Mobility Training, Transfer Training, Endurance Training, Gait Training, Stair training, Neuromuscular Re-education, Safety Education & Training, Patient/Caregiver Education & Training, Equipment Evaluation, Education, & procurement    Conditions Requiring Skilled Therapeutic Intervention  Body structures, Functions, Activity limitations: Decreased functional mobility ; Decreased ADL status; Decreased safe awareness;Decreased strength;Decreased balance;Decreased endurance;Decreased cognition;Decreased fine motor control;Decreased coordination  Treatment Diagnosis: Impaired function, weakness 2\" L MCA CVA. Prognosis: Good  Decision Making: Medium Complexity  REQUIRES PT FOLLOW UP: Yes  Discharge Recommendations: Patient would benefit from continued therapy after discharge;24 hour supervision or assist    Goals  Short term goals  Time Frame for Short term goals: 10 days   Short term goal 1: Pt to perform bed mobility independently. Short term goal 2: Pt to perform transfers with minAx1. Short term goal 3: Pt to ambulate 150 ft, least restrictive device, minAx1. Short term goal 4: Pt to tolerate 30-60 mins physical therapy to increase endurance and strength. Short term goal 5: Pt to complete 5 steps, with LUE support, minAx1. Long term goals  Time Frame for Long term goals : By LOS. Long term goal 2: Pt to complete transfers with SBA to promote independence. Long term goal 3: Pt to ambulate 200 ft, least restrictive device, SBA. Long term goal 4: Pt to complete 5-12 steps, CGA to promote function.    Long term goal 5: Pt to improve PASS score from 15/36 to 22/36.       08/30/20 1508 08/30/20 1511   PT Individual Minutes   Time In 0835 1215   Time Out 0930 1240   Minutes 55 25       Electronically signed by Chris Milner PTA on 8/30/20 at 3:12 PM EDT

## 2020-08-31 ENCOUNTER — APPOINTMENT (OUTPATIENT)
Dept: CT IMAGING | Age: 70
DRG: 057 | End: 2020-08-31
Attending: PHYSICAL MEDICINE & REHABILITATION
Payer: MEDICARE

## 2020-08-31 LAB
GLUCOSE BLD-MCNC: 106 MG/DL (ref 65–105)
GLUCOSE BLD-MCNC: 118 MG/DL (ref 65–105)
GLUCOSE BLD-MCNC: 144 MG/DL (ref 65–105)
GLUCOSE BLD-MCNC: 152 MG/DL (ref 65–105)

## 2020-08-31 PROCEDURE — 97530 THERAPEUTIC ACTIVITIES: CPT

## 2020-08-31 PROCEDURE — 97112 NEUROMUSCULAR REEDUCATION: CPT

## 2020-08-31 PROCEDURE — 97110 THERAPEUTIC EXERCISES: CPT

## 2020-08-31 PROCEDURE — 99232 SBSQ HOSP IP/OBS MODERATE 35: CPT | Performed by: PHYSICAL MEDICINE & REHABILITATION

## 2020-08-31 PROCEDURE — 97116 GAIT TRAINING THERAPY: CPT

## 2020-08-31 PROCEDURE — 1180000000 HC REHAB R&B

## 2020-08-31 PROCEDURE — 6370000000 HC RX 637 (ALT 250 FOR IP): Performed by: INTERNAL MEDICINE

## 2020-08-31 PROCEDURE — 82947 ASSAY GLUCOSE BLOOD QUANT: CPT

## 2020-08-31 PROCEDURE — 92507 TX SP LANG VOICE COMM INDIV: CPT

## 2020-08-31 PROCEDURE — 6360000002 HC RX W HCPCS: Performed by: INTERNAL MEDICINE

## 2020-08-31 PROCEDURE — 72125 CT NECK SPINE W/O DYE: CPT

## 2020-08-31 PROCEDURE — 6370000000 HC RX 637 (ALT 250 FOR IP): Performed by: PHYSICAL MEDICINE & REHABILITATION

## 2020-08-31 PROCEDURE — 99232 SBSQ HOSP IP/OBS MODERATE 35: CPT | Performed by: INTERNAL MEDICINE

## 2020-08-31 PROCEDURE — 70450 CT HEAD/BRAIN W/O DYE: CPT

## 2020-08-31 PROCEDURE — 97535 SELF CARE MNGMENT TRAINING: CPT

## 2020-08-31 RX ADMIN — LOSARTAN POTASSIUM 100 MG: 50 TABLET, FILM COATED ORAL at 07:45

## 2020-08-31 RX ADMIN — METFORMIN HYDROCHLORIDE 500 MG: 500 TABLET ORAL at 07:45

## 2020-08-31 RX ADMIN — ATORVASTATIN CALCIUM 80 MG: 80 TABLET, FILM COATED ORAL at 19:45

## 2020-08-31 RX ADMIN — CARVEDILOL 12.5 MG: 12.5 TABLET, FILM COATED ORAL at 07:46

## 2020-08-31 RX ADMIN — METRONIDAZOLE 100 MG: 500 TABLET ORAL at 19:45

## 2020-08-31 RX ADMIN — METRONIDAZOLE 100 MG: 500 TABLET ORAL at 07:45

## 2020-08-31 RX ADMIN — METFORMIN HYDROCHLORIDE 500 MG: 500 TABLET ORAL at 16:56

## 2020-08-31 RX ADMIN — AMLODIPINE BESYLATE 10 MG: 10 TABLET ORAL at 18:09

## 2020-08-31 RX ADMIN — HYDROCHLOROTHIAZIDE 25 MG: 25 TABLET ORAL at 07:45

## 2020-08-31 RX ADMIN — METRONIDAZOLE 100 MG: 500 TABLET ORAL at 14:30

## 2020-08-31 RX ADMIN — CLOPIDOGREL BISULFATE 75 MG: 75 TABLET ORAL at 07:45

## 2020-08-31 RX ADMIN — ASPIRIN 81 MG CHEWABLE TABLET 81 MG: 81 TABLET CHEWABLE at 07:45

## 2020-08-31 RX ADMIN — POLYETHYLENE GLYCOL 3350 17 G: 17 POWDER, FOR SOLUTION ORAL at 07:46

## 2020-08-31 RX ADMIN — INSULIN LISPRO 2 UNITS: 100 INJECTION, SOLUTION INTRAVENOUS; SUBCUTANEOUS at 11:39

## 2020-08-31 RX ADMIN — CARVEDILOL 12.5 MG: 12.5 TABLET, FILM COATED ORAL at 16:56

## 2020-08-31 RX ADMIN — ENOXAPARIN SODIUM 40 MG: 40 INJECTION SUBCUTANEOUS at 07:46

## 2020-08-31 ASSESSMENT — PAIN DESCRIPTION - PROGRESSION
CLINICAL_PROGRESSION: NOT CHANGED
CLINICAL_PROGRESSION: NOT CHANGED

## 2020-08-31 ASSESSMENT — PAIN DESCRIPTION - PAIN TYPE
TYPE: ACUTE PAIN
TYPE: ACUTE PAIN

## 2020-08-31 ASSESSMENT — PAIN SCALES - GENERAL: PAINLEVEL_OUTOF10: 0

## 2020-08-31 ASSESSMENT — PAIN DESCRIPTION - LOCATION
LOCATION: LEG
LOCATION: LEG

## 2020-08-31 ASSESSMENT — PAIN DESCRIPTION - ONSET: ONSET: ON-GOING

## 2020-08-31 ASSESSMENT — PAIN SCALES - WONG BAKER
WONGBAKER_NUMERICALRESPONSE: 6
WONGBAKER_NUMERICALRESPONSE: 6

## 2020-08-31 ASSESSMENT — PAIN DESCRIPTION - ORIENTATION
ORIENTATION: RIGHT
ORIENTATION: RIGHT

## 2020-08-31 ASSESSMENT — PAIN DESCRIPTION - FREQUENCY: FREQUENCY: CONTINUOUS

## 2020-08-31 NOTE — PROGRESS NOTES
week- 88% accuracy, 100% c initiation cues only. Naming objects around the room- 12%, 88% c sentence completion, phonemic and visual cues. Neologisms, phonemic paraphasias frequently present with spontaneous speech and during pt. Attempts to complete sentence completion task/repetition of word after ST.  Pt. Attempted to sing along to familiar songs c ST, \"Row your boat\", \"Twinkle Twinkle\" with appropriate kylah, some sound subsitutions. Reading Comprehension:  n/a    Other:  No family present. Plan:  [x] Continue ST services    [] Discharge from ST:      Discharge recommendations: [] Inpatient Rehab   [] [place]   [] Outpatient Therapy  [] Follow up at trauma clinic   [] Other:       Treatment completed by: Hal Irvin A.CCC/SLP

## 2020-08-31 NOTE — PROGRESS NOTES
Writedeejay and Josh Vaughn responded to bath light going off in room. OT was present with patient, and said patient fell while in the shower. Patient stating that she was fine. OT stated that the patient hit head on shower wall. Writer did assess patient. No obvious signs of injuries were seen. No signs of redness or bruising is seen on patient's head. Pupils were 3mm equal, round, and reactive. Patient was assisted back into a wheelchair. Dr. David Kimball notified regarding fall via [address]. Orders received and placed for CT head and Cervical neck without contrast. Dr. Jolly Porter also notified via [facility].

## 2020-08-31 NOTE — PROGRESS NOTES
Physical Therapy  Facility/Department: [facility] ACUTE REHAB  Daily Treatment Note  NAME: Gurwinder Weaver  : 1950  MRN: [id number]    Date of Service: 2020    Discharge Recommendations:  Patient would benefit from continued therapy after discharge, 24 hour supervision or assist        Assessment      PT Education: Goals; General Safety; Functional Mobility Training;Gait Training;PT Role  Patient Education: pt educated on transfers, gait training  Activity Tolerance  Activity Tolerance: Patient limited by pain; Patient limited by fatigue     Patient Diagnosis(es): There were no encounter diagnoses. has a past medical history of CHF (congestive heart failure) ([address] Diabetes mellitus ([address] H/O cardiac catheterization, H/O echocardiogram, History of echocardiogram, History of echocardiogram, Hyperlipidemia, and Hypertension. has a past surgical history that includes Cholecystectomy and Cardiac catheterization (Left, 2017). Restrictions  Restrictions/Precautions  Restrictions/Precautions: Fall Risk, Up as Tolerated, General Precautions  Required Braces or Orthoses?: No  Implants present? : (none)  Position Activity Restriction  Other position/activity restrictions: up as tolerated  Subjective   General  Chart Reviewed: Yes  Additional Pertinent Hx: Pt has a history of HTN, HLD, DM, CHF. Pt recently evaluated at [facility] for  new onset of aphasia. Pt was transferred to [facility] on 20 after diagnostic studies confirmed new L hemisphere CVA. Pt with known history of previous CVAs. MRI confirmed L MCA infarct on 2020. Pt admitted to [address] [place] [address] 20. Response To Previous Treatment: Patient with no complaints from previous session. Family / Caregiver Present: No  Referring Practitioner: Dr. Nany Rodriguez  Comments: patient with LOW HR with MINIMAL activity. Standing dropped from sitting 62 HR to 37 HR standing and 31 HR 5 feet to Wheelchair; Pt aphasic.   Pain Screening  Patient Currently in Pain: Yes  Pain Assessment  Garza-Dale Pain Rating: Hurts even more  Pain Type: Acute pain  Pain Location: Leg  Pain Orientation: Right  Pain Descriptors: (aphasic)  Pain Frequency: Continuous  Pain Onset: On-going  Clinical Progression: Not changed  Non-Pharmaceutical Pain Intervention(s): Rest  Vital Signs  Patient Currently in Pain: Yes       Orientation     Cognition      Objective   Bed mobility  Bridging: Stand by assistance  Rolling to Left: Stand by assistance  Rolling to Right: Stand by assistance  Supine to Sit: Minimal assistance  Sit to Supine: Minimal assistance  Scooting: Stand by assistance(to head of bed)  Comment: patient preformed from flat mat no handrail  Transfers  Sit to Stand: Minimal Assistance; Moderate Assistance  Stand to sit: Minimal Assistance; Moderate Assistance  Bed to Chair: Moderate assistance  Stand Pivot Transfers: Moderate Assistance  Comment: patient required assistance with right hand  and placement on walker. Ambulation  Ambulation?: Yes  More Ambulation?: Yes  Ambulation 1  Surface: level tile  Device: Rolling Walker  Other Apparatus: Right; Wheelchair follow(Hand  on walker; close wheelchair follow)  Assistance: Moderate assistance;Minimal assistance;2 Person assistance  Quality of Gait: Demos ataxic movement, RLE adduction. Gait Deviations: Slow Jacinda;Staggers;Decreased step length;Decreased step height  Distance: 10 feet  Comments: pt able to flex right hip LE with tactile and verbal cues, increased hip abduction heel strike with assist leg   Stairs/Curb  Stairs?: No  Wheelchair Activities  Wheelchair Size: 18 in  Wheelchair Type: Standard  Wheelchair Cushion: Standard  Propulsion 1  Propulsion: Manual  Level: Level Tile  Method: LUE;LLE;RLE  Level of Assistance: Moderate assistance  Description/ Details: Straight path for initial 40 ft, deviated path towards the right with increased distance.  Constant verbal cues to kick R LE place  Restraints  Initially in place: No     Therapy Time     08/31/20 0808 08/31/20 1346   PT Individual Minutes   Time In 0808 1255   Time Out 0852 1335   Minutes [address], PTA

## 2020-08-31 NOTE — PATIENT CARE CONFERENCE
[address]   ACUTE REHABILITATION  TEAM CONFERENCE NOTE  Date: 20  Patient Name: Eduardo Casper       Room: 7807/2221-09  MRN: [id number]       : 1950  (80 y.o.)     Gender: female   Referring Practitioner: Dr. Enrrique Hubbard   Acute ischemic left middle cerebral artery (MCA) stroke [facility]) [I63.512]  Diagnosis: Left ischemic middle cerebral artery stroke     NURSING  Bladder  Continent  Bowel   Continent  Intervention    Both Bowel & Bladder Program     Wounds/Incisions/Ulcers: No skin issues identified  Medication Education Program: Patient currently unable to manage medications and family being educated  Pain: Patient's pain is currently controlled with -  Norco 1 every 4 hrs prn    Fall Risk:  Falling star program initiated    PHYSICAL THERAPY  Bed mobility  Bridging: Stand by assistance  Rolling to Left: Stand by assistance  Rolling to Right: Stand by assistance  Supine to Sit: Minimal assistance  Sit to Supine: Minimal assistance  Scooting: Stand by assistance(to head of bed)  Comment: patient preformed from flat mat no handrail    Transfers:  Sit to Stand: Minimal Assistance; Moderate Assistance  Stand to sit: Minimal Assistance; Moderate Assistance  Bed to Chair: Moderate assistance    Ambulation 1  Surface: level tile  Device: Rolling Walker  Other Apparatus: Right; Wheelchair follow(Hand  on walker; close wheelchair follow)  Assistance: Moderate assistance;Minimal assistance;2 Person assistance  Quality of Gait: Demos ataxic movement, RLE adduction.   Gait Deviations: Slow Jacinda;Staggers;Decreased step length;Decreased step height  Distance: 10 feet  Comments: pt able to flex right hip LE with tactile and verbal cues, increased hip abduction/ heel strike with assist of leg     Wheelchair Activities  Wheelchair Size: 18 in  Wheelchair Type: Standard  Wheelchair Cushion: Standard  Propulsion 1  Propulsion: Manual  Level: Level Tile  Method: LUE;LLE;RLE  Level of Assistance: Moderate assistance  Description/ Details: Straight path for initial 40 ft, deviated path towards the right with increased distance. Constant verbal cues to kick R LE for reciprocal pattern. Distance: 60 ft          Walk   Walk 10 Feet           Walk 50 feet with 2 Turns          Walk 150 Feet          Walking 10 feet Uneven Surface            Steps  1 Step (Curb)           4 Steps           12 Steps             Wheelchair Ability   Wheel 50 Feet - 2 turns           Wheel 150 Feet            Other: to be determined at pt progreses    Pt required modAx1 on strong side for safety during transfers, pt able to ambulate 10ft at maxA with LUE support on hand rail, therapist supported RLE to prevent buckling. Goals  Time Frame for Short term goals: 10 days   Short term goal 1: Pt to perform bed mobility independently. Short term goal 2: Pt to perform transfers with minAx1. Short term goal 3: Pt to ambulate 150 ft, least restrictive device, minAx1. Short term goal 4: Pt to tolerate 30-60 mins physical therapy to increase endurance and strength. Short term goal 5: Pt to complete 5 steps, with LUE support, minAx1. OCCUPATIONAL THERAPY  SELF CARE      Eating   5  Setup or clean-up assistance     Setup; Increased time to complete(using LUE)   Oral Hygiene   5  Assistance Needed: Setup or clean-up assistance     Stand by assistance;Setup; Increased time to complete(while seated at sink with use of LUE)   Shower/Bathe Self   2  Assistance Needed: Substantial/maximal assistance      UE Bathing: Stand by assistance;Setup; Increased time to complete(use of emmanuel-technique to wash L armpit)  LE Bathing: Maximum assistance;Setup; Increased time to complete(Assist with B feet, buttocks & perineal area)   Upper Body Dressing   2  Assistance Needed: Substantial/maximal assistance     Minimal assistance;Setup; Increased time to complete(assist to pull down over trunk)   Lower Body Dressing   2  Assistance Needed: Substantial/maximal assistance     Putting On/Taking Off Footwear   1  Assistance Needed: Dependent      Dependent/Total(total assist with all tasks; Pt attempted to pull underwear/pants from calves to above knees while seated)   Toilet Transfer   2  Assistance Needed: Substantial/maximal assistance      Toilet - Technique: Stand pivot; To right; To left  Equipment Used: Grab bars  Toilet Transfer: Moderate assistance  Toilet Transfers Comments: Moderate verbal/tactile cues for hand placement and safety. Facilitated engagement in PM and AM. In PM, Pt's R ankle became inverted due to Pt's RLE ataxia. OT provided phsyical assist to position ankle and discussed with PT Raquel the possibility of an AFO or positioning brace for R ankle. Toileting Hygiene   2 Assistance Needed: Substantial/maximal assistance      Dependent/Total(per nursing report)    Bed mobility  Supine to Sit: Stand by assistance  Scooting: Stand by assistance     Shower Transfers: Moderate assistance  Balance  Sitting Balance: Stand by assistance  Standing Balance: Minimal assistance  Standing Balance  Time: >1 minute x 8  Activity: self-care tasks and transfers  Comment: 1-2 UE support       Assessment: pt is unsafe to return home to prior living enviornment due to increased assistance required to complete functional mobility and ADLs. Pt would benefit from further therapy and assistance to increase independence and quality of life. Short term goals  Time Frame for Short term goals: 10 days  Short term goal 1: Pt will perform grooming with stand by assist and use of assistive equipment/modified techniques PRN. Short term goal 2: Pt will perform upper body bathing/dressing with Minimal assist and use of assistive equipment/modified techniques PRN. Short term goal 3: Pt will perform toileting tasks and lower body bathing/dressing with Moderate assist and use of assitive equipment/durable medical equipment/modified techniques PRN.   Short term goal 4: Pt will perform functional mobility and transfers during self-care with Minimal assist, least restrictive mobility device, and Fair safety. Short term goal 5: Pt will stand for 4+ minutes with 1-2 UE support, contact guard assist, and no loss of balance while engaging in functional activity of choice. Short term goal 6: Pt will actively participate in 30+ minutes of therapeutic exercise/functional activities to promote increased independence with self-care and mobility. Short term goal 7: Pt will verbalize/demonstrate Good understanding of assistive equipment/durable medical equipment/modified techniques for increased independence with self-care and mobility. SPEECH THERAPY  Max A comprehension and expression. Short Term Goal: mod A comprehension expression    NUTRITION  Weight: 191 lb 12.8 oz (87 kg) / Body mass index is 32.92 kg/m². Diet Rx: Carbohydrate Control. Ensure High Protein. Pt is eating well with inclusion of supplement. Ref. Range 8/31/2020 06:19 8/31/2020 10:50 8/31/2020 15:54 8/31/2020 19:54 9/1/2020 06:39   POC Glucose Latest Ref Range: 65 - 105 mg/dL 106 (H) 152 (H) 118 (H) 144 (H) 144 (H)   Please see nutrition note for details.     SOCIAL WORK ASSESSMENT  Assessment: family  Pre-Admission Status:  Lives With: Family(Sister Raquel)  Type of Home: House  Home Layout: One level, Able to Live on Main level with bedroom/bathroom  Home Access: Stairs to enter without rails  Entrance Stairs - Number of Steps: 2 steps to porch + 1 step into house  Bathroom Shower/Tub: Tub/Shower unit, Curtain(tub transfer bench in basement)  Bathroom Toilet: Standard  Bathroom Equipment: Tub transfer bench  Bathroom Accessibility: (walker will fit if used sideways)  Home Equipment: Rolling walker(walker is Pt's sister's)  Receives Help From: Family  ADL Assistance: Independent  Homemaking Assistance: Needs assistance(Sister primary for cooking, niece primary laundry (pt and pt's sister were using

## 2020-08-31 NOTE — PLAN OF CARE
Problem: Skin Integrity:  Goal: Will show no infection signs and symptoms  Description: Will show no infection signs and symptoms  8/31/2020 1556 by Joel Crane RN  Outcome: Ongoing  8/31/2020 0305 by Vashti Castillo RN  Outcome: Ongoing  Note: No new s/s of infection. Will continue to monitor     Goal: Absence of new skin breakdown  Description: Absence of new skin breakdown  8/31/2020 1556 by Joel Crane RN  Outcome: Ongoing  8/31/2020 0305 by Vashti Castillo RN  Outcome: Ongoing  Note: Pt skin integrity remained intact, no new alterations noted. Head to toe completed, see chart assessment. Problem: Falls - Risk of:  Goal: Will remain free from falls  Description: Will remain free from falls  8/31/2020 1556 by Joel Crane RN  Outcome: Ongoing  Note: Patient remains free of falls and injuries throughout shift. Bed remains in the lowest position, wheels locked, call light and bedside table are within reach. 8/31/2020 0305 by Vashti Castillo RN  Outcome: Ongoing  Note: Pt remained absent from falls. Call light within reach. Bed locked and in lowest position. Goal: Absence of physical injury  Description: Absence of physical injury  8/31/2020 1556 by Joel Crane RN  Outcome: Ongoing  8/31/2020 0305 by Vashti Castillo RN  Outcome: Ongoing  Note: Patient remains free of injury.  safe environment maintained       Problem: Neurological  Goal: Maximum potential motor/sensory/cognitive function  8/31/2020 1556 by Joel Crane RN  Outcome: Ongoing  8/31/2020 0305 by Vashti Castillo RN  Outcome: Ongoing  Note: No change     Problem: Musculor/Skeletal Functional Status  Goal: Highest potential functional level  8/31/2020 1556 by Joel Crane RN  Outcome: Ongoing  8/31/2020 0305 by Vashti Castillo RN  Outcome: Ongoing  Note: Functional level improving   Goal: Absence of falls  8/31/2020 1556 by Joel Crane RN  Outcome: Ongoing  8/31/2020 0305 by Vashti Castillo RN  Outcome: Ongoing  Note: Free of falls     Problem: Neurological  Goal: Maximum potential motor/sensory/cognitive function  Outcome: Ongoing  Note: Closely assess and monitor neurological status frequently and compare with baseline. Evaluate pupils, noting size, shape, equality, light reactivity. Assess speech and orientation throughout shift. Problem: Nutrition  Goal: Optimal nutrition therapy  8/31/2020 1556 by Joel Crane RN  Outcome: Ongoing  8/31/2020 0305 by Vashti Castillo RN  Outcome: Ongoing  Note: Adequate nutrition maintained     Problem: Pain:  Goal: Pain level will decrease  Description: Pain level will decrease  8/31/2020 1556 by Joel Crane RN  Outcome: Ongoing  8/31/2020 0305 by Vashti Castillo RN  Outcome: Ongoing  Note: Patient denies pain at this time. Will continue to monitor   Goal: Control of acute pain  Description: Control of acute pain  8/31/2020 1556 by Joel Crane RN  Outcome: Ongoing  8/31/2020 0305 by Vashti Castillo RN  Outcome: Ongoing  Note: Patient denies pain at this time. Will continue to monitor   Goal: Control of chronic pain  Description: Control of chronic pain  8/31/2020 1556 by Joel Crane RN  Outcome: Ongoing  8/31/2020 0305 by Vashti Castillo RN  Outcome: Ongoing  Note: Patient denies pain at this time.  Will continue to monitor

## 2020-08-31 NOTE — CONSULTS
[facility] Internal Medicine    Progress note            Date:   8/31/2020  Patient name:  Yesica Duff  Date of admission:  8/26/2020  6:56 PM  MRN:   [id number]  Account:  [de-identified]  YOB: 1950  PCP:    SUE Lopez CNP  Room:   2613/2613-01  Code Status:    Full Code    Physician Requesting Consult: Dayna Mathews MD    Reason for Consult: Medical management    Chief Complaint:     No chief complaint on file. She has severe apraxia and is unable to give any history. Did not appear to be any acute distress    History Obtained From:     Patient medical record nursing staff    History of Present Illness:     53-year-old pleasant  lady was admitted to [facility] on August 23 with a aphasia diagnosed with the left middle cerebral artery ischemic infarct  Known history of systolic congestive heart failure ejection fraction 35 to 40% secondary to nonischemic cardiomyopathy  Diabetes   Duration more than 7 years  Modifying factors on Glucophage and other med  Severity uncontrolled sever  Associated signs and symtoms neuropathy/ckd/ CAD. aggravated with sugar diet and better with low sugar diet             Past Medical History:     Past Medical History:   Diagnosis Date    [facility] ([facility]estive [facility] failure) [facility]     Diabetes mellitus ([facility]     H/O cardiac catheterization 08/04/2017    LMCA: Mild irregularites 10-20%. LAD: Mild irregularites 10-20%. LCx: Mild irregularites 10-20%. RCA: Mild irregularites 10-20%. LV function assessed as : Abnormal. EF of 40%.  H/O echocardiogram 12/18/2018    EF 55%. Mildly increased LV wall thickness. The left atrium appears moderately to severely dilated. Moderate to severe mitral regurg. Moderate pulmonary HTN with an estimated RV systolic pressure of [id number]. Moderate tricuspid regurg. Evidnece fo moderate diastolic dysfunction is seen.  History of echocardiogram 01/17/2019    EF 55%.  LV wall thickness Negative as described in HPI. CONSTITUTIONAL:  negative for fevers, chills, sweats, fatigue, weight loss  HEENT:  negative for vision, hearing changes, runny nose, throat pain  RESPIRATORY:  negative for shortness of breath, cough, congestion, wheezing. CARDIOVASCULAR:  negative for chest pain, palpitations. GASTROINTESTINAL:  negative for nausea, vomiting, diarrhea, constipation, change in bowel habits, abdominal pain   GENITOURINARY:  negative for difficulty of urination, burning with urination, frequency   INTEGUMENT:  negative for rash, skin lesions, easy bruising   HEMATOLOGIC/LYMPHATIC:  negative for swelling/edema   ALLERGIC/IMMUNOLOGIC:  negative for urticaria , itching  ENDOCRINE:  negative increase in drinking, increase in urination, hot or cold intolerance  MUSCULOSKELETAL:  negative joint pains, muscle aches, swelling of joints  NEUROLOGICAL: Right-sided weakness and speech deficit  BEHAVIOR/PSYCH:  negative for depression, anxiety    Physical Exam:     /71   Pulse 60   Temp 97.5 °F (36.4 °C) (Axillary)   Resp 18   Ht 5' 4\" (1.626 m)   Wt 191 lb 12.8 oz (87 kg)   SpO2 97%   BMI 32.92 kg/m²   Temp (24hrs), Av.6 °F (36.4 °C), Min:97.5 °F (36.4 °C), Max:97.7 °F (36.5 °C)    Recent Labs     20  1545 20  0619 20  1050   POCGLU 107* 146* 106* 152*     No intake or output data in the 24 hours ending 20 1209  Speech deficit  General Appearance:  alert, well appearing, and in no acute distress  Mental status: oriented to person, place, and time with normal affect  Head:  normocephalic, atraumatic.   Eye: no icterus, redness, pupils equal and reactive, extraocular eye movements intact, conjunctiva clear  Ear: normal external ear, no discharge, hearing intact  Nose:  no drainage noted  Mouth: mucous membranes moist  Neck: supple, no carotid bruits, thyroid not palpable  Lungs: Bilateral equal air entry, clear to ausculation, no wheezing, rales or rhonchi, normal effort  Cardiovascular: normal rate, regular rhythm, no murmur, gallop, rub. Abdomen: Soft, nontender, nondistended, normal bowel sounds, no hepatomegaly or splenomegaly  Neurologic: Right hemiparesis with Broca's aphasia skin: No gross lesions, rashes, bruising or bleeding on exposed skin area  Extremities:  peripheral pulses palpable, no pedal edema or calf pain with palpation  P    Investigations:      Laboratory Testing:  Recent Results (from the past 24 hour(s))   POC Glucose Fingerstick    Collection Time: 08/30/20  3:45 PM   Result Value Ref Range    POC Glucose 107 (H) 65 - 105 mg/dL   POC Glucose Fingerstick    Collection Time: 08/30/20  8:07 PM   Result Value Ref Range    POC Glucose 146 (H) 65 - 105 mg/dL   POC Glucose Fingerstick    Collection Time: 08/31/20  6:19 AM   Result Value Ref Range    POC Glucose 106 (H) 65 - 105 mg/dL   POC Glucose Fingerstick    Collection Time: 08/31/20 10:50 AM   Result Value Ref Range    POC Glucose 152 (H) 65 - 105 mg/dL       Imaging/Diagonstics:      Assessment :      Primary Problem  <principal problem not specified>    Active Hospital Problems    Diagnosis Date Noted    Acute ischemic stroke ([address] [I63.9] 08/26/2020    Cerebral infarction ([facility] - left MCA distribution  [I63.9] 08/26/2020    Type 2 diabetes mellitus, without long-term current use of insulin ([address] [E11.9] 08/25/2020    Aphasia [R47.01]     CKD (chronic kidney disease) stage 3, GFR 30-59 ml/min ([address] [N18.3] 12/20/2018    Morbid obesity ([address] [E66.01] 12/20/2018    Acute on chronic combined systolic and diastolic CHF, NYHA class 4 (HCC) [I50.43] 12/17/2018    Chronic combined systolic and diastolic HF (heart failure), NYHA class 2 ([address] [I50.42] 08/28/2017    Hyperlipidemia [E78.5]     Hypertension [I10]     Idiopathic cardiomyopathy ([address] [I42.8]        Plan:     1. Uncontrolled diabetes mellitus with the peripheral organ damage with ischemic CVA  2.  Ischemic CVA with right hemiparesis and Broca's aphasia  3. Metformin recent A1c is 7.3 reviewed and reconciled  4. Ischemic CVA hyperlipidemia aspirin and Lipitor Plavix 75 all started  5. Losartan 100 mg for hypertension and diabetic renal protection  6. norco for pain  7. q6  Hr prn     8. Her blood pressure is normal and sugar is well controlled, she is progressing slowly. Her medications were reviewed      Guru Hale MD  8/31/2020  12:09 PM    Copy sent to Dr. Dewayne Flaherty, APRN - CNP    Please note that this chart was generated using voice recognition Dragon dictation software. Although every effort was made to ensure the accuracy of this automated transcription, some errors in transcription may have occurred.

## 2020-08-31 NOTE — PLAN OF CARE
Problem: Skin Integrity:  Goal: Will show no infection signs and symptoms  Description: Will show no infection signs and symptoms  Outcome: Ongoing  Note: No new s/s of infection. Will continue to monitor        Problem: Skin Integrity:  Goal: Absence of new skin breakdown  Description: Absence of new skin breakdown  Outcome: Ongoing  Note: Pt skin integrity remained intact, no new alterations noted. Head to toe completed, see chart assessment. Problem: Falls - Risk of:  Goal: Will remain free from falls  Description: Will remain free from falls  Outcome: Ongoing  Note: Pt remained absent from falls. Call light within reach. Bed locked and in lowest position. Problem: Falls - Risk of:  Goal: Absence of physical injury  Description: Absence of physical injury  Outcome: Ongoing  Note: Patient remains free of injury. safe environment maintained       Problem: Neurological  Goal: Maximum potential motor/sensory/cognitive function  Outcome: Ongoing  Note: No change     Problem: Musculor/Skeletal Functional Status  Goal: Highest potential functional level  Outcome: Ongoing  Note: Functional level improving      Problem: Musculor/Skeletal Functional Status  Goal: Absence of falls  Outcome: Ongoing  Note: Free of falls     Problem: Nutrition  Goal: Optimal nutrition therapy  Outcome: Ongoing  Note: Adequate nutrition maintained     Problem: Pain:  Goal: Pain level will decrease  Description: Pain level will decrease  Outcome: Ongoing  Note: Patient denies pain at this time. Will continue to monitor      Problem: Pain:  Goal: Control of acute pain  Description: Control of acute pain  Outcome: Ongoing  Note: Patient denies pain at this time. Will continue to monitor      Problem: Pain:  Goal: Control of chronic pain  Description: Control of chronic pain  Outcome: Ongoing  Note: Patient denies pain at this time.  Will continue to monitor

## 2020-08-31 NOTE — PROGRESS NOTES
Physical Medicine & Rehabilitation  Progress Note      Subjective:      81year-old female with ischemic CVA with R dominant hemiparesis and aphasia. Patient denies any c/o pain after her fall this morning. No headache or neck pain. No new neurologic deficits. Automatic speech is still intact. She is able to repeat today. Observed standing today in therapy on a foam mat to work on balance. ROS:  Denies fevers, chills, sweats. No chest pain, palpitations, lightheadedness. Denies coughing, wheezing or shortness of breath. Denies abdominal pain, nausea, diarrhea or constipation. No new areas of joint pain. Denies new areas of numbness or weakness. Denies new anxiety or depression issues. No new skin problems. Rehabilitation:   Progressing in therapies. PT:  Restrictions/Precautions: Fall Risk, Up as Tolerated, General Precautions  Implants present? : (none)  Other position/activity restrictions: up as tolerated   Transfers  Sit to Stand: Minimal Assistance, Moderate Assistance  Stand to sit: Minimal Assistance, Moderate Assistance  Bed to Chair: Moderate assistance  Stand Pivot Transfers: Moderate Assistance  Comment: patient required assistance with right hand  and placement on walker. Ambulation 1  Surface: level tile  Device: Rolling Walker  Other Apparatus: Right, Wheelchair follow(Hand  on walker; close wheelchair follow)  Assistance: Moderate assistance, Minimal assistance, 2 Person assistance  Quality of Gait: Demos ataxic movement, R ankle internal rotation. Verbal and tactile cues to flex right hip and R flat foot before taking L LE step. Increased difficulty following left vs right directions.   Gait Deviations: Slow Jacinda, Staggers, Decreased step length, Decreased step height  Distance: 7 ft and 15 ft  Comments: pt able to flex right hip LE with tactile and verbal cues, increased hip abduction heel strike with assist leg     Transfers  Sit to Stand: Minimal Assistance, Moderate Assistance  Stand to sit: Minimal Assistance, Moderate Assistance  Bed to Chair: Moderate assistance  Stand Pivot Transfers: Moderate Assistance  Comment: patient required assistance with right hand  and placement on walker. Ambulation  Ambulation?: Yes  More Ambulation?: Yes  Ambulation 1  Surface: level tile  Device: Rolling Walker  Other Apparatus: Right, Wheelchair follow(Hand  on walker; close wheelchair follow)  Assistance: Moderate assistance, Minimal assistance, 2 Person assistance  Quality of Gait: Demos ataxic movement, R ankle internal rotation. Verbal and tactile cues to flex right hip and R flat foot before taking L LE step. Increased difficulty following left vs right directions. Gait Deviations: Slow Jacinda, Staggers, Decreased step length, Decreased step height  Distance: 7 ft and 15 ft  Comments: pt able to flex right hip LE with tactile and verbal cues, increased hip abduction heel strike with assist leg     Surface: level tile  Ambulation 1  Surface: level tile  Device: Rolling Walker  Other Apparatus: Right, Wheelchair follow(Hand  on walker; close wheelchair follow)  Assistance: Moderate assistance, Minimal assistance, 2 Person assistance  Quality of Gait: Demos ataxic movement, R ankle internal rotation. Verbal and tactile cues to flex right hip and R flat foot before taking L LE step. Increased difficulty following left vs right directions.   Gait Deviations: Slow Jacinda, Staggers, Decreased step length, Decreased step height  Distance: 7 ft and 15 ft  Comments: pt able to flex right hip LE with tactile and verbal cues, increased hip abduction heel strike with assist leg     OT:  ADL  Feeding: Setup, Increased time to complete(using LUE)  Grooming: Stand by assistance, Setup, Increased time to complete(while seated at sink with use of LUE)  UE Bathing: Stand by assistance, Setup, Increased time to complete(use of emmanuel-technique to wash L armpit)  LE Bathing: assist to position ankle and discussed with PT Raquel the possibility of an AFO or positioning brace for R ankle. Shower Transfers  Shower - Transfer From: Wheelchair  Shower - Transfer Type: To and From  Shower - Transfer To: Transfer tub bench  Shower - Technique: Stand pivot, To right, To left  Shower Transfers: Moderate assistance  Shower Transfers Comments: Moderate verbal/tactile cues for hand placement and safety       SPEECH:  Subjective: [x]? Alert     [x]? Cooperative     []? Confused     []? Agitated    []? Lethargic     Objective/Assessment:  Auditory Comprehension: one step directions- 33%, 50% c cues (visual). Frequent groping noted. Pt. responding to simple y/n questions re: self and immediate environment appropriately.      Verbal Expression: Pt. Able to initiate simple automatic phrases during session. Automatic naming, counting 1-10- 20%, 90% c visual, verbal and repetition cues. .  Automatic naming- days of the week- 88% accuracy, 100% c initiation cues only. Naming objects around the room- 12%, 88% c sentence completion, phonemic and visual cues. Neologisms, phonemic paraphasias frequently present with spontaneous speech and during pt. Attempts to complete sentence completion task/repetition of word after ST.  Pt. Attempted to sing along to familiar songs c ST, \"Row your boat\", \"Twinkle Twinkle\" with appropriate kylah, some sound subsitutions.       Reading Comprehension:  n/a     Other:  No family present. Objective:  /67   Pulse 53   Temp 97.7 °F (36.5 °C)   Resp 19   Ht 5' 4\" (1.626 m)   Wt 191 lb 12.8 oz (87 kg)   SpO2 97%   BMI 32.92 kg/m²       GEN: Well developed, well nourished, in NAD  HEENT:  NCAT. PERRL. EOMI. Mucous membranes pink and moist.   PULM:  Clear to ausculation. No rales or rhonchi. Respirations WNL and unlabored. CV:  Regular rate rhythm. No murmurs or gallops. GI:  Abdomen soft. Nontender. Non-distended.   BS + and equal. SINUSES: The visualized paranasal sinuses and mastoid air cells demonstrate   no acute abnormality. SOFT TISSUES/SKULL:  No acute abnormality of the visualized skull or soft   tissues. CT CERVICAL SPINE:     BONES/ALIGNMENT: There is no evidence of an acute cervical spine fracture. There is normal alignment of the cervical spine. DEGENERATIVE CHANGES: Moderate disc space narrowing with endplate sclerosis   and anterior and posterior osteophytes at C5-C6 unchanged. SOFT TISSUES: There is no prevertebral soft tissue swelling. Impression:      No acute intracranial abnormality.  Evolving left parietal infarct with   moderate-size cortical/subcortical white matter hypodense area. No acute fracture of the cervical spine. C5-C6 degenerative changes. CBC:   No results for input(s): WBC, RBC, HGB, HCT, MCV, RDW, PLT in the last 72 hours. BMP:   No results for input(s): NA, K, CL, CO2, PHOS, BUN, CREATININE, GLUCOSE in the last 72 hours. Invalid input(s): CA  BNP: No results for input(s): BNP in the last 72 hours. PT/INR: No results for input(s): PROTIME, INR in the last 72 hours. APTT: No results for input(s): APTT in the last 72 hours. CARDIAC ENZYMES: No results for input(s): CKMB, CKMBINDEX, TROPONINT in the last 72 hours. Invalid input(s): CKTOTAL;3  FASTING LIPID PANEL:  Lab Results   Component Value Date    CHOL 160 08/24/2020    HDL 44 08/24/2020    TRIG 79 08/24/2020     LIVER PROFILE: No results for input(s): AST, ALT, ALB, BILIDIR, BILITOT, ALKPHOS in the last 72 hours.      Current Medications:   Current Facility-Administered Medications: HYDROcodone-acetaminophen (NORCO) 5-325 MG per tablet 1 tablet, 1 tablet, Oral, Q4H PRN  gabapentin (NEURONTIN) capsule 100 mg, 100 mg, Oral, TID  ondansetron (ZOFRAN-ODT) disintegrating tablet 4 mg, 4 mg, Oral, Q6H PRN  metFORMIN (GLUCOPHAGE) tablet 500 mg, 500 mg, Oral, BID WC  insulin lispro (HUMALOG) injection vial 0-12 Units, 0-12 Units, Subcutaneous, TID WC  insulin lispro (HUMALOG) injection vial 0-6 Units, 0-6 Units, Subcutaneous, Nightly  clopidogrel (PLAVIX) tablet 75 mg, 75 mg, Oral, Daily  enoxaparin (LOVENOX) injection 40 mg, 40 mg, Subcutaneous, Daily  polyethylene glycol (GLYCOLAX) packet 17 g, 17 g, Oral, Daily PRN  amLODIPine (NORVASC) tablet 10 mg, 10 mg, Oral, QPM  atorvastatin (LIPITOR) tablet 80 mg, 80 mg, Oral, Nightly  carvedilol (COREG) tablet 12.5 mg, 12.5 mg, Oral, BID WC  losartan (COZAAR) tablet 100 mg, 100 mg, Oral, Daily **AND** hydroCHLOROthiazide (HYDRODIURIL) tablet 25 mg, 25 mg, Oral, Daily  aspirin chewable tablet 81 mg, 81 mg, Oral, Daily  polyethylene glycol (GLYCOLAX) packet 17 g, 17 g, Oral, Daily  senna (SENOKOT) tablet 17.2 mg, 2 tablet, Oral, Daily PRN  bisacodyl (DULCOLAX) suppository 10 mg, 10 mg, Rectal, Daily PRN      Impression/Plan:   Impaired ADLs, gait, and mobility due to:      1. R hemiparesis secondary to ischemic L MCA CVA:  PT/OT for gait, mobility, strengthening, endurance, ADLs, and self care. On ASA, atorvastatin, plavix. CTs ordered by IM today after patient's fall - WNL. No new acute findings. Neuro exam unchanged. 2. Transcortical motor aphasia: speech therapy treating  3. HTN/CHF/non-ischemic cardiomyopathy: on amlodipine, carvedilol, HCTZ, losartan  4. DM: on insulin sliding scale  5. Pain: IM started [place] prn. Was localized to R leg near knee. 6. Bowel Management: Miralax daily, senokot prn, dulcolax prn.  7. DVT Prophylaxis:  low molecular weight heparin, SCD's while in bed and MIRTA's   8.  Internal medicine for medical management    Electronically signed by Candy Pennington MD on 8/31/2020 at 10:04 AM      This note is created with the assistance of a speech recognition program.  While intending to generate a document that actually reflects the content of the visit, the document can still have some errors including those of syntax and sound a like substitutions which may escape proof reading. In such instances, actual meaning can be extrapolated by contextual diversion.

## 2020-08-31 NOTE — PROGRESS NOTES
verbal/tactile cues for hand placement and safety for entry; total assist for exit due to seated on floor     Type of ROM/Therapeutic Exercise  Type of ROM/Therapeutic Exercise: AAROM  Comment: Pt demonstrates increased AROM of RUE this date. OT facilitated AAROM of the exercises below while Pt was seated in front of mirror to promote neuroplasticity of mirror neurons for increased RUE functional use. Continue OT POC. Imporved ability to horizontally abd and ADD shoulder with gravity eliminated; max VC for full elbow extension with shoulder horiz abd, intermittmently demos high tone requiring VC for relaxation  Exercises  Shoulder Flexion: x10  Shoulder Extension: x10  Shoulder ABduction: x10  Shoulder ADduction: x10  Horizontal ABduction: x10  Horizontal ADduction: x10  Elbow Flexion: x10  Elbow Extension: x10     Additional Activities: trialled copying simple familar (2-6 character) words and sequencing tiles with oversize scrabble pieces, pt unable to copy 1-3 letters/words, max difficulty pointing to identified letters and shapes. ADL  Feeding: Setup; Increased time to complete(using LUE)  Grooming: Stand by assistance;Setup; Increased time to complete(while seated at sink with use of LUE)  UE Bathing: Stand by assistance;Setup; Increased time to complete(use of emmanuel-technique to wash L armpit)  LE Bathing: Maximum assistance;Setup; Increased time to complete(Assist with B feet, buttocks & perineal area)  UE Dressing: Minimal assistance;Setup; Increased time to complete(assist to pull down over trunk; Assist for bra hooking)  LE Dressing: Dependent/Total(total assist with all tasks; Pt attempted to pull underwear/pants from calves to above knees while seated)  Toileting: Dependent/Total (attempts luis/seated)  Additional Comments: Pt demonstrated improved functional use of RUE this date during showering routine.  Ataxic movement and difficulty with motor planning noted, however Pt able to position RUE PRN for LUE to bathe R armpit. Max VC for sequencing and attention to R side; poor return noted for jackson tech. Continue OT POC to maximize independence with self-care. Assessment  Performance deficits / Impairments: Decreased functional mobility ; Decreased ADL status; Decreased ROM; Decreased strength;Decreased safe awareness;Decreased cognition;Decreased endurance;Decreased sensation;Decreased balance;Decreased high-level IADLs;Decreased fine motor control;Decreased coordination;Decreased posture  Assessment: pt is unsafe to return home to prior living enviornment due to increased assistance required to complete functional mobility and ADLs. Pt would benefit from further therapy and assistance to increase independence and quality of life. Prognosis: Good  Discharge Recommendations: 24 hour supervision or assist;Patient would benefit from continued therapy after discharge  Activity Tolerance: Patient Tolerated treatment well  Activity Tolerance: RUE weakness, aphasia  Safety Devices in place: Yes  Type of devices: Call light within reach; Chair alarm in place; All fall risk precautions in place;Gait belt;Patient at risk for falls; Left in chair;Nurse notified  Restraints  Initially in place: No          Patient Education:  Jackson neglect, safety, AAROM, weightbearing  Patient Goals   Patient goals :  To return home with sister  Germain Neither  Method: demonstration and explanation       Outcome: needs reinforcement        Plan  Plan  Times per week: 5-7x/week, 1.5 hours/day  Times per day: Twice a day  Current Treatment Recommendations: Self-Care / ADL, Home Management Training, Cognitive/Perceptual Training, Strengthening, ROM, Balance Training, Functional Mobility Training, Endurance Training, Neuromuscular Re-education, Cognitive Reorientation, Pain Management, Safety Education & Training, Patient/Caregiver Education & Training, Equipment Evaluation, Education, & procurement, Positioning  Patient Goals Patient goals : To return home with sister  Short term goals  Time Frame for Short term goals: 10 days  Short term goal 1: Pt will perform grooming with stand by assist and use of assistive equipment/modified techniques PRN. Short term goal 2: Pt will perform upper body bathing/dressing with Minimal assist and use of assistive equipment/modified techniques PRN. Short term goal 3: Pt will perform toileting tasks and lower body bathing/dressing with Moderate assist and use of assitive equipment/durable medical equipment/modified techniques PRN. Short term goal 4: Pt will perform functional mobility and transfers during self-care with Minimal assist, least restrictive mobility device, and Fair safety. Short term goal 5: Pt will stand for 4+ minutes with 1-2 UE support, contact guard assist, and no loss of balance while engaging in functional activity of choice. Short term goal 6: Pt will actively participate in 30+ minutes of therapeutic exercise/functional activities to promote increased independence with self-care and mobility. Short term goal 7: Pt will verbalize/demonstrate Good understanding of assistive equipment/durable medical equipment/modified techniques for increased independence with self-care and mobility. Long term goals  Time Frame for Long term goals : By discharge  Long term goal 1: Pt will perform self-feeding and grooming with modified independence. Long term goal 2: Pt will perform bathing, dressing, and toileting tasks with stand by assist, Fair safety, and assist PRN for MIRTA hose. Long term goal 3: Pt will perform functional mobility and transfers during self-care with stand by assist, least restrictive mobility device, and Fair safety. Long term goal 4: Pt will stand for 8+ minutes with 1-2 UE support, stand by assist, and no loss of balance while engaging in functional activity of choice.   Long term goal 5: 9 hole peg, dynamometer, box and block to be assessed for RUE as appropriate        08/31/20 1600 08/31/20 1601   OT Individual Minutes   Time In 1038 1335   Time Out 1125 1405   Minutes 47 30     Electronically signed by HERBERT Merino on 8/31/20 at 6:46 PM EDT

## 2020-09-01 LAB
GLUCOSE BLD-MCNC: 137 MG/DL (ref 65–105)
GLUCOSE BLD-MCNC: 144 MG/DL (ref 65–105)
GLUCOSE BLD-MCNC: 186 MG/DL (ref 65–105)
GLUCOSE BLD-MCNC: 188 MG/DL (ref 65–105)

## 2020-09-01 PROCEDURE — 97530 THERAPEUTIC ACTIVITIES: CPT

## 2020-09-01 PROCEDURE — 1180000000 HC REHAB R&B

## 2020-09-01 PROCEDURE — 6370000000 HC RX 637 (ALT 250 FOR IP): Performed by: INTERNAL MEDICINE

## 2020-09-01 PROCEDURE — 97112 NEUROMUSCULAR REEDUCATION: CPT

## 2020-09-01 PROCEDURE — 93005 ELECTROCARDIOGRAM TRACING: CPT | Performed by: INTERNAL MEDICINE

## 2020-09-01 PROCEDURE — 97116 GAIT TRAINING THERAPY: CPT

## 2020-09-01 PROCEDURE — 99232 SBSQ HOSP IP/OBS MODERATE 35: CPT | Performed by: PHYSICAL MEDICINE & REHABILITATION

## 2020-09-01 PROCEDURE — 92507 TX SP LANG VOICE COMM INDIV: CPT

## 2020-09-01 PROCEDURE — 82947 ASSAY GLUCOSE BLOOD QUANT: CPT

## 2020-09-01 PROCEDURE — 6370000000 HC RX 637 (ALT 250 FOR IP): Performed by: PHYSICAL MEDICINE & REHABILITATION

## 2020-09-01 PROCEDURE — 97110 THERAPEUTIC EXERCISES: CPT

## 2020-09-01 PROCEDURE — 6360000002 HC RX W HCPCS: Performed by: INTERNAL MEDICINE

## 2020-09-01 PROCEDURE — 99232 SBSQ HOSP IP/OBS MODERATE 35: CPT | Performed by: INTERNAL MEDICINE

## 2020-09-01 PROCEDURE — 97535 SELF CARE MNGMENT TRAINING: CPT

## 2020-09-01 RX ORDER — CARVEDILOL 6.25 MG/1
6.25 TABLET ORAL 2 TIMES DAILY WITH MEALS
Status: DISCONTINUED | OUTPATIENT
Start: 2020-09-01 | End: 2020-09-18 | Stop reason: HOSPADM

## 2020-09-01 RX ADMIN — METFORMIN HYDROCHLORIDE 500 MG: 500 TABLET ORAL at 09:01

## 2020-09-01 RX ADMIN — METFORMIN HYDROCHLORIDE 500 MG: 500 TABLET ORAL at 17:23

## 2020-09-01 RX ADMIN — METRONIDAZOLE 100 MG: 500 TABLET ORAL at 08:58

## 2020-09-01 RX ADMIN — ASPIRIN 81 MG CHEWABLE TABLET 81 MG: 81 TABLET CHEWABLE at 08:58

## 2020-09-01 RX ADMIN — INSULIN LISPRO 2 UNITS: 100 INJECTION, SOLUTION INTRAVENOUS; SUBCUTANEOUS at 06:45

## 2020-09-01 RX ADMIN — AMLODIPINE BESYLATE 10 MG: 10 TABLET ORAL at 17:23

## 2020-09-01 RX ADMIN — LOSARTAN POTASSIUM 100 MG: 50 TABLET, FILM COATED ORAL at 08:59

## 2020-09-01 RX ADMIN — HYDROCODONE BITARTRATE AND ACETAMINOPHEN 1 TABLET: 5; 325 TABLET ORAL at 15:34

## 2020-09-01 RX ADMIN — HYDROCHLOROTHIAZIDE 25 MG: 25 TABLET ORAL at 08:59

## 2020-09-01 RX ADMIN — ATORVASTATIN CALCIUM 80 MG: 80 TABLET, FILM COATED ORAL at 20:37

## 2020-09-01 RX ADMIN — CARVEDILOL 6.25 MG: 6.25 TABLET, FILM COATED ORAL at 17:23

## 2020-09-01 RX ADMIN — METRONIDAZOLE 100 MG: 500 TABLET ORAL at 14:04

## 2020-09-01 RX ADMIN — METRONIDAZOLE 100 MG: 500 TABLET ORAL at 20:37

## 2020-09-01 RX ADMIN — INSULIN LISPRO 2 UNITS: 100 INJECTION, SOLUTION INTRAVENOUS; SUBCUTANEOUS at 11:36

## 2020-09-01 RX ADMIN — CLOPIDOGREL BISULFATE 75 MG: 75 TABLET ORAL at 08:59

## 2020-09-01 RX ADMIN — ENOXAPARIN SODIUM 40 MG: 40 INJECTION SUBCUTANEOUS at 09:00

## 2020-09-01 RX ADMIN — INSULIN LISPRO 1 UNITS: 100 INJECTION, SOLUTION INTRAVENOUS; SUBCUTANEOUS at 20:38

## 2020-09-01 ASSESSMENT — PAIN DESCRIPTION - DESCRIPTORS: DESCRIPTORS: SORE;SHARP

## 2020-09-01 ASSESSMENT — PAIN DESCRIPTION - FREQUENCY: FREQUENCY: INTERMITTENT

## 2020-09-01 ASSESSMENT — PAIN SCALES - GENERAL: PAINLEVEL_OUTOF10: 5

## 2020-09-01 ASSESSMENT — PAIN DESCRIPTION - ORIENTATION: ORIENTATION: RIGHT

## 2020-09-01 ASSESSMENT — PAIN SCALES - WONG BAKER: WONGBAKER_NUMERICALRESPONSE: 6

## 2020-09-01 ASSESSMENT — PAIN DESCRIPTION - LOCATION: LOCATION: ANKLE

## 2020-09-01 ASSESSMENT — PAIN DESCRIPTION - PAIN TYPE: TYPE: ACUTE PAIN

## 2020-09-01 ASSESSMENT — PAIN DESCRIPTION - PROGRESSION: CLINICAL_PROGRESSION: NOT CHANGED

## 2020-09-01 NOTE — PROGRESS NOTES
Speech Language Pathology  Speech Language Pathology  [facility]    Speech Language Treatment Note    Date: 9/1/2020  Patients Name: Christiano Bronson  MRN: [id number]  Diagnosis: CVA  Patient Active Problem List   Diagnosis Code    Hypertension I10    Hyperlipidemia E78.5    Acute on chronic systolic CHF (congestive heart failure) ([address] I50.23    Hypertensive urgency I16.0    Idiopathic cardiomyopathy ([address] I42.8    Hypertensive emergency I16.1    Chronic combined systolic and diastolic HF (heart failure), NYHA class 2 ([address] I50.42    Acute on chronic combined systolic and diastolic CHF, NYHA class 4 ([facility]) I50.43    Hypoxia R09.02    Severe mitral regurgitation by prior echocardiogram I34.0    Morbid obesity ([facility]) E66.01    CKD (chronic kidney disease) stage 3, GFR 30-59 ml/min ([facility]) N18.3    Physical deconditioning R53.81    TIA (transient ischemic attack) G45.9    Old lacunar stroke without late effect Z86.73    Dysarthria R47.1    Stroke determined by clinical assessment ([facility] I63.9    Aphasia R47.01    Type 2 diabetes mellitus, without long-term current use of insulin ([facility] E11.9    Cerebral infarction ([facility] - left MCA distribution  I63.9    Uncontrolled type 2 diabetes mellitus with hyperglycemia ([facility] E11.65    Elevated blood pressure reading R03.0    Acute ischemic stroke ([facility]) I63.9       Pain: pt. denies    Speech and Language Treatment  Treatment time:  9869-2290    Subjective: [] Alert [x] Cooperative     [] Confused     [] Agitated      [x] Lethargic    Objective/Assessment:  Auditory Comprehension: one step directions- 0%, 50% c cues (visual). Pt. Attempting to repeat direction rather than following. Receptive ID out of field of 2 objects via function- 100%. Pt. responding to simple y/n questions re: self and immediate environment appropriately. Verbal Expression: Pt. Able to initiate simple automatic phrases during session.   Automatic naming, counting 1-10- mod A with cues to repeat. State name of object pictured- 0%, 50% c phonemic and sentence completion cues. Name body parts- 12%, 50% c cues. Neologisms, phonemic paraphasias frequently present with spontaneous speech and during pt. Motor speech:  Repeat CV syllables beginning with   /b/- 80%  /m/- 60%  /w/- 100%      Reading Comprehension:  n/a    Other:  No family present. Plan:  [x] Continue ST services    [] Discharge from ST:      Discharge recommendations: [] Inpatient Rehab   [] [place]   [] Outpatient Therapy  [] Follow up at trauma clinic   [] Other:       Treatment completed by: Guillermina Anderson A.CCC/SLP

## 2020-09-01 NOTE — PROGRESS NOTES
[address]   ACUTE REHABILITATION OCCUPATIONAL THERAPY  DAILY NOTE    Date: 20  Patient Name: Edilia Scheuermann      Room: 7210/6820-68    MRN: [id number]   : 1950  (80 y.o.)  Gender: female   Referring Practitioner: Dr. Jolly Porter  Diagnosis: Left ischemic middle cerebral artery stroke       Restrictions  Restrictions/Precautions: Fall Risk, Up as Tolerated, General Precautions  Implants present? : (none)  Other position/activity restrictions: up as tolerated  Required Braces or Orthoses?: No    Subjective  Subjective: 'Good\" in reponse to thoughts on sock aid use this date  Comments: Pt pleasant. Continues to dmeo difficulty with global aphasia. Improved LB  ADL independence this date intro AE; continues to warrant additional training. Patient Currently in Pain: Yes  Restrictions/Precautions: Fall Risk;Up as Tolerated;General Precautions  Overall Orientation Status: Impaired  Orientation Level: Oriented to person;Disoriented to situation;Oriented to place;Oriented to time  Patient Observation  Observations: Pt has bruise on R hand near first digit knuckle and superior to wrist joints. .   Pain Assessment  Pain Assessment: 0-10  Garza-Baker Pain Rating: Hurts even more  Pain Type: Acute pain  Pain Location: Leg  Pain Orientation: Right  Clinical Progression: Not changed  Response to Pain Intervention: Patient Satisfied    Objective  Cognition  Overall Cognitive Status: Impaired(Unable to fully assess due to global aphasia)  Arousal/Alertness: Appropriate responses to stimuli  Following Directions: Follows one step commands  Memory: Unable to assess  Following Commands:  Follows one step commands with repetition  Safety Judgement: Decreased awareness of need for assistance  Awareness of Errors: Assistance required to identify errors made  Insights: Decreased awareness of deficits  Sequencing and Organization: Assistance required to identify errors made;Assistance required to generate solutions;Assistance required to implement solutions  Perception  Overall Perceptual Status: Impaired  Unilateral Attention: Cues to attend to right side of body  Initiation: Cues to initiate tasks  Motor Planning: Cues to use objects appropriately  Balance  Sitting Balance: Stand by assistance  Standing Balance: Minimal assistance  Bed mobility  Bridging: Stand by assistance  Rolling to Left: Stand by assistance  Rolling to Right: Stand by assistance  Supine to Sit: Minimal assistance  Sit to Supine: Minimal assistance  Scooting: Stand by assistance  Transfers  Stand Pivot Transfers: Moderate assistance  Sit to stand: Moderate assistance  Stand to sit: Moderate assistance  Transfer Comments: Kashia for RUE stabilzation PRN, VC and gestural cuing for sequence; R lean requiring physical assist to correct, poor ability to maintain  Standing Balance  Time: >1 minute x 5+  Activity: self-care tasks and transfers  Comment: 1-2 UE support, difficulty maintaining RUE WB, Kashia required, wrist pronating  Functional Mobility  Functional - Mobility Device: Wheelchair  Activity: To/from bathroom  Assist Level: Maximum assistance  Toilet Transfers  Toilet - Technique: Stand pivot; To right; To left  Equipment Used: Grab bars  Toilet Transfer: Moderate assistance  Toilet Transfers Comments: stabilization required for RLE ankle, VC     Type of ROM/Therapeutic Exercise  Type of ROM/Therapeutic Exercise: AAROM  Comment: Pt demonstrates increased AROM of RUE this date. HERBERT facilitated AAROM of the exercises below while Pt was seated in front of mirror to promote neuroplasticity of mirror neurons for increased RUE functional use. Continue OT POC. Imporved ability to horizontally abd and ADD shoulder with gravity eliminated; max VC for full elbow extension with shoulder horiz abd, intermittmently demos high tone requiring VC for relaxation.      Additional Activities: IADL: loading washer (setup to L side requiring crossing midline and addressing L neglect) VC for reaching, VC to not fold prior to placing into dryer,  uses reacher with Grindstone assist to manage buttons out of arms reach, sequencing deficits noted PM: pt retrieved clothing from front loading dryer using RUE and reacher to improve ease of use, increasing longterm use of ADL tasks. Initial Grindstone required for pinch mechanism, fair carryover, requiring moderate cuing for tech, last 2 garnments pt demo proper use. Pt folding using BUE for motor planning and biltaeral motor  function; poor ability to maintain grasp in LUE, VC for wrist positoning c fair response                 ADL  Feeding: Setup; Increased time to complete(using LUE)  Grooming: Stand by assistance;Setup; Increased time to complete(while seated at sink with use of LUE/RUE attempts with [facility], poor coordination)  UE Bathing: Stand by assistance;Setup; Increased time to complete(use of emmanuel-technique to wash L armpit)  LE Bathing: Maximum assistance;Setup; Increased time to complete(Assist with B feet, buttocks & perineal area)  UE Dressing: Minimal assistance;Setup; Increased time to complete(assist to pull down over trunk; Assist for bra hooking)  LE Dressing: Maximum assistance(total assist with all tasks; Pt attempted to pull underwear/pants from calves to above knees while seated)  Toileting: Maximum assistance(per nursing report)  Additional Comments: Pt demonstrated improved functional use of RUE this date during showering routine. Ataxic movement and difficulty with motor planning noted, however Pt able to position RUE PRN for LUE to bathe R armpit. Max VC for sequencing and attention to R side; poor return noted for emmanuel tech. Intro to reacher use with max difficulty manipulating for ADLs. Attempts to thread RLE with no AE with poor return. Will continue to encourage reacher use with ADLs. Dons footies using setup sock aid this date with one 'pull' VC to initiate; F tech; able to reach to do adjust at ankles.  Pt assisting with efren Molina, primarily with LUE, using proprioception, weight bearing into BLE, trialled use of RUE with poor ability to maintain grasp/pinch. Assessment  Performance deficits / Impairments: Decreased functional mobility ; Decreased ADL status; Decreased ROM; Decreased strength;Decreased safe awareness;Decreased cognition;Decreased endurance;Decreased sensation;Decreased balance;Decreased high-level IADLs;Decreased fine motor control;Decreased coordination;Decreased posture  Assessment: pt is unsafe to return home to prior living enviornment due to increased assistance required to complete functional mobility and ADLs. Pt would benefit from further therapy and assistance to increase independence and quality of life. Prognosis: Good  Discharge Recommendations: 24 hour supervision or assist;Patient would benefit from continued therapy after discharge  Activity Tolerance: Patient Tolerated treatment well  Activity Tolerance: RUE weakness, aphasia  Safety Devices in place: Yes  Type of devices: Call light within reach; Chair alarm in place; All fall risk precautions in place;Gait belt;Patient at risk for falls; Left in chair;Nurse notified  Restraints  Initially in place: No          Patient Education:  OT POC, AE, Weight bearing  Patient Goals   Patient goals : To return home with sister Alline Collet  Method: demonstration and explanation       Outcome: needs reinforcement        Plan  Plan  Times per week: 5-7x/week, 1.5 hours/day  Times per day: Twice a day  Current Treatment Recommendations: Self-Care / ADL, Home Management Training, Cognitive/Perceptual Training, Strengthening, ROM, Balance Training, Functional Mobility Training, Endurance Training, Neuromuscular Re-education, Cognitive Reorientation, Pain Management, Safety Education & Training, Patient/Caregiver Education & Training, Equipment Evaluation, Education, & procurement, Positioning  Patient Goals   Patient goals :  To return home with sister  Short term goals  Time Frame for Short term goals: 10 days  Short term goal 1: Pt will perform grooming with stand by assist and use of assistive equipment/modified techniques PRN. Short term goal 2: Pt will perform upper body bathing/dressing with Minimal assist and use of assistive equipment/modified techniques PRN. Short term goal 3: Pt will perform toileting tasks and lower body bathing/dressing with Moderate assist and use of assitive equipment/durable medical equipment/modified techniques PRN. Short term goal 4: Pt will perform functional mobility and transfers during self-care with Minimal assist, least restrictive mobility device, and Fair safety. Short term goal 5: Pt will stand for 4+ minutes with 1-2 UE support, contact guard assist, and no loss of balance while engaging in functional activity of choice. Short term goal 6: Pt will actively participate in 30+ minutes of therapeutic exercise/functional activities to promote increased independence with self-care and mobility. Short term goal 7: Pt will verbalize/demonstrate Good understanding of assistive equipment/durable medical equipment/modified techniques for increased independence with self-care and mobility. Long term goals  Time Frame for Long term goals : By discharge  Long term goal 1: Pt will perform self-feeding and grooming with modified independence. Long term goal 2: Pt will perform bathing, dressing, and toileting tasks with stand by assist, Fair safety, and assist PRN for MIRTA hose. Long term goal 3: Pt will perform functional mobility and transfers during self-care with stand by assist, least restrictive mobility device, and Fair safety. Long term goal 4: Pt will stand for 8+ minutes with 1-2 UE support, stand by assist, and no loss of balance while engaging in functional activity of choice.   Long term goal 5: 9 hole peg, dynamometer, box and block to be assessed for RUE as appropriate        08/31/20 1601 09/01/20 1626 OT Individual Minutes   Time In 1335 0930   Time Out 0092 2722   [id number]     Electronically signed by HERBERT Barros on 9/1/20 at 4:38 PM EDT

## 2020-09-01 NOTE — PROGRESS NOTES
Physical Therapy  Facility/Department: [facility] ACUTE REHAB  Daily Treatment Note  NAME: Eduardo Casper  : 1950  MRN: [id number]    Date of Service: 2020    Discharge Recommendations:  Patient would benefit from continued therapy after discharge, 24 hour supervision or assist        Assessment      PT Education: Gait Training;General Safety;Precautions;Transfer Training  Activity Tolerance  Activity Tolerance: Patient limited by pain; Patient limited by fatigue     Patient Diagnosis(es): There were no encounter diagnoses. has a past medical history of CHF (congestive heart failure) ([facility] [address] Diabetes mellitus ([facility] [address] H/O cardiac catheterization, H/O echocardiogram, History of echocardiogram, History of echocardiogram, Hyperlipidemia, and Hypertension. has a past surgical history that includes Cholecystectomy and Cardiac catheterization (Left, 2017). Restrictions  Restrictions/Precautions  Restrictions/Precautions: Fall Risk, Up as Tolerated, General Precautions  Required Braces or Orthoses?: No  Implants present? : (none)  Position Activity Restriction  Other position/activity restrictions: up as tolerated  Subjective   General  Chart Reviewed: Yes  Additional Pertinent Hx: Pt has a history of HTN, HLD, DM, CHF. Pt recently evaluated at [address] for  new onset of aphasia. [facility] after diagnostic studies confirmed new L hemisphere CVA. Pt with known history of previous CVAs. MRI confirmed L MCA infarct on 2020. Pt admitted to [address] [place] [address] 20. Family / Caregiver Present: No  Subjective  Subjective: Patient is aphasic. and observed pt being \"uncomfortable \" and pulling at her pants. assisted patient to toilet and patient able to communicate constipated and requesting to return to her room. patient not willing to continue therapy  General Comment  Comments: patient with LOW HR when presented to Therapy in am 39 HR. Pt reported dizziness and fatigue. Therapist assisted pt to supine /74 HR 55  Pain Screening  Patient Currently in Pain: Yes  Pain Assessment  Pain Type: Acute pain  Pain Location: Ankle  Pain Orientation: Right  Pain Descriptors: Sore;Sharp  Pain Frequency: Intermittent(in WB ankle Rolls external)  Vital Signs  Pulse: (!) 39(810 am presented to therapy in wc)  Heart Rate Source: Radial;Left  BP: 134/74(supine at rest)  Patient Currently in Pain: Yes       Orientation     Cognition      Objective   Bed mobility  Bridging: Stand by assistance  Rolling to Left: Stand by assistance  Rolling to Right: Stand by assistance  Supine to Sit: Minimal assistance  Sit to Supine: Minimal assistance  Scooting: Stand by assistance  Transfers  Sit to Stand: Minimal Assistance; Moderate Assistance  Stand to sit: Minimal Assistance; Moderate Assistance  Bed to Chair: Moderate assistance  Stand Pivot Transfers: Moderate Assistance  Comment: patient required assistance with right hand  and placement on walker. does not demonstrate reaching back and release walker from   Ambulation  Ambulation?: Yes  More Ambulation?: Yes  Ambulation 1  Surface: level tile  Device: Rolling Walker  Other Apparatus: Right; Wheelchair follow(Hand  on walker; ankle eversion brace; close wheelchair follow)  Assistance: Moderate assistance;Minimal assistance;2 Person assistance  Quality of Gait: Demos ataxic movement, RLE adduction. Gait Deviations: Slow Jacinda;Staggers;Decreased step length;Decreased step height  Distance: 10 feet x 2  Comments: pt able to flex right hip LE with tactile and verbal cues, increased hip abduction heel strike with therapist manual assisting placement  Stairs/Curb  Stairs?: No  Wheelchair Activities  Wheelchair Size: 18 in  Wheelchair Type: Standard  Wheelchair Cushion: Standard  Propulsion 1  Propulsion: Manual  Level: Level Tile  Method: LUE;LLE;RLE  Level of Assistance:  Moderate assistance  Description/ Details: poor demonstration of mobility difficult with followng I/S  Distance: 60 ft        Other exercises  Other exercises 1: sit <> stands 2 sets of 5 reps  Other exercises 2: seated bilateral LE in reciprical pattern right AAROM left 2# red band x 15  Other exercises 5: Supine BLE 15 reps/sidelying WB on elbow and extended arm                        G-Code     OutComes Score                                                     AM-PAC Score             Goals  Short term goals  Time Frame for Short term goals: 10 days   Short term goal 1: Pt to perform bed mobility independently. Short term goal 2: Pt to perform transfers with minAx1. Short term goal 3: Pt to ambulate 150 ft, least restrictive device, minAx1. Short term goal 4: Pt to tolerate 30-60 mins physical therapy to increase endurance and strength. Short term goal 5: Pt to complete 5 steps, with LUE support, minAx1. Long term goals  Time Frame for Long term goals : By LOS. Long term goal 2: Pt to complete transfers with SBA to promote independence. Long term goal 3: Pt to ambulate 200 ft, least restrictive device, SBA. Long term goal 4: Pt to complete 5-12 steps, CGA to promote function. Long term goal 5: Pt to improve PASS score from 15/36 to 22/36. Patient Goals   Patient goals : To return back home. Plan    Plan  Times per week: 1.5 hrs/day 5-7 days/week  Specific instructions for Next Treatment: progress trasnfer training and ambulation as able, try balance activities.    Current Treatment Recommendations: Strengthening, ROM, Balance Training, Functional Mobility Training, Transfer Training, Endurance Training, Gait Training, Stair training, Neuromuscular Re-education, Safety Education & Training, Patient/Caregiver Education & Training, Equipment Evaluation, Education, & procurement  Safety Devices  Type of devices: Left in bed, Call light within reach, Bed alarm in place, Gait belt, All fall risk precautions in place  Restraints  Initially in place: No     Therapy Time     09/01/20 0907 09/01/20 1445   PT Individual Minutes   Time In 0808 1250   Time Out 0837 1330   Minutes 29 40   Minute Variance   Reason Refusal  (patient reporting wanting to return to room d/t constipation and not feeling well.)  --      Reason: Refusal(patient reporting wanting to return to room d/t constipation and not feeling well.)    Satish Bread, PTA

## 2020-09-01 NOTE — CONSULTS
moderately increased. LA is mildly dilated w/ LA volume index of 30. trivial mitral regurg. Evidence of moderate (grade II) diastolic dysfunction seen.  History of echocardiogram 2019    EF of 50-55%. LV wall thickness is mildly increased. LA is mildly dilated (29-33) with a left atrial volume index of 31 m1/m2. mild diastolic dysfunction.  Hyperlipidemia     Hypertension         Past Surgical History:     Past Surgical History:   Procedure Laterality Date    CARDIAC CATHETERIZATION Left 2017    right radial, [facility] Dr. Wong Mis          Medications Prior to Admission:     Prior to Admission medications    Medication Sig Start Date End Date Taking? Authorizing Provider   clopidogrel (PLAVIX) 75 MG tablet Take 1 tablet by mouth daily 20   Berny Zarate MD   carvedilol (COREG) 12.5 MG tablet Take 1 tablet by mouth 2 times daily (with meals) 20   Demetris Urias MD   losartan-hydrochlorothiazide [facility]) 100-25 MG per tablet Take 1 tablet by mouth daily 20   Demetris Urias MD   amLODIPine (NORVASC) 10 MG tablet Take 1 tablet by mouth daily 20   Demetris Urias MD   atorvastatin (LIPITOR) 80 MG tablet Take 1 tablet by mouth nightly 20   Demetris Urias MD   aspirin 81 MG EC tablet Take 1 tablet by mouth daily 20   Demetris Urias MD   metFORMIN (GLUCOPHAGE) 500 MG tablet Take 500 mg by mouth 2 times daily (with meals)    Historical Provider, MD        Allergies:     Patient has no known allergies. Social History:     Tobacco:    reports that she has never smoked. She has never used smokeless tobacco.  Alcohol:      reports no history of alcohol use. Drug Use:  reports no history of drug use.     Family History:     Family History   Problem Relation Age of Onset    Coronary Art Dis Father          from [place]    COPD Sister         O2 dep    Coronary Art Dis Brother         2 brothers  from [place]       Review of Systems:     Positive and palpable  Lungs: Bilateral equal air entry, clear to ausculation, no wheezing, rales or rhonchi, normal effort  Cardiovascular: normal rate, regular rhythm, no murmur, gallop, rub. Abdomen: Soft, nontender, nondistended, normal bowel sounds, no hepatomegaly or splenomegaly  Neurologic: Right hemiparesis with Broca's aphasia skin: No gross lesions, rashes, bruising or bleeding on exposed skin area  Extremities:  peripheral pulses palpable, no pedal edema or calf pain with palpation  P    Investigations:      Laboratory Testing:  Recent Results (from the past 24 hour(s))   POC Glucose Fingerstick    Collection Time: 08/31/20  3:54 PM   Result Value Ref Range    POC Glucose 118 (H) 65 - 105 mg/dL   POC Glucose Fingerstick    Collection Time: 08/31/20  7:54 PM   Result Value Ref Range    POC Glucose 144 (H) 65 - 105 mg/dL   POC Glucose Fingerstick    Collection Time: 09/01/20  6:39 AM   Result Value Ref Range    POC Glucose 144 (H) 65 - 105 mg/dL   POC Glucose Fingerstick    Collection Time: 09/01/20 11:13 AM   Result Value Ref Range    POC Glucose 186 (H) 65 - 105 mg/dL       Imaging/Diagonstics:      Assessment :      Primary Problem  <principal problem not specified>    Active Hospital Problems    Diagnosis Date Noted    Acute ischemic stroke ([address] [I63.9] 08/26/2020    Cerebral infarction ([address] - left MCA distribution  [I63.9] 08/26/2020    Type 2 diabetes mellitus, without long-term current use of insulin ([address] [E11.9] 08/25/2020    Aphasia [R47.01]     CKD (chronic kidney disease) stage 3, GFR 30-59 ml/min ([address] [N18.3] 12/20/2018    Morbid obesity ([address] [E66.01] 12/20/2018    Acute on chronic combined systolic and diastolic CHF, NYHA class 4 (HCC) [I50.43] 12/17/2018    Chronic combined systolic and diastolic HF (heart failure), NYHA class 2 ([address] [I50.42] 08/28/2017    Hyperlipidemia [E78.5]     Hypertension [I10]     Idiopathic cardiomyopathy ([address] [I42.8]        Plan:     1.  Uncontrolled diabetes mellitus with the peripheral organ damage with ischemic CVA  2. Ischemic CVA with right hemiparesis and Broca's aphasia  3. Metformin recent A1c is 7.3 reviewed and reconciled  4. Ischemic CVA hyperlipidemia aspirin and Lipitor Plavix 75 all started  5. Losartan 100 mg for hypertension and diabetic renal protection  6. norco for pain  7. q6  Hr prn     She is having loose stools today her medications were adjusted. If she continues to have that I will stop her metformin  Jenn Garland MD  9/1/2020  11:48 AM    Copy sent to Dr. Shantel Joseph, APRN - CNP    Please note that this chart was generated using voice recognition Dragon dictation software. Although every effort was made to ensure the accuracy of this automated transcription, some errors in transcription may have occurred.

## 2020-09-01 NOTE — PLAN OF CARE
Problem: Skin Integrity:  Goal: Will show no infection signs and symptoms  Description: Will show no infection signs and symptoms  9/1/2020 1456 by Trenton Liang RN  Outcome: Ongoing  Note: Patient remains free from infection and no signs and symptoms of infection. Problem: Skin Integrity:  Goal: Absence of new skin breakdown  Description: Absence of new skin breakdown  9/1/2020 1456 by Trenton Liang RN  Outcome: Ongoing  Note: Skin assessment as charted. No new areas of breakdown. Problem: Falls - Risk of:  Goal: Will remain free from falls  Description: Will remain free from falls  9/1/2020 1456 by Trenton Liang RN  Outcome: Ongoing  Note: No falls this shift. Call light within reach and siderails x2. Bed in lowest position. Patient safety maintained. Problem: Falls - Risk of:  Goal: Absence of physical injury  Description: Absence of physical injury  9/1/2020 1456 by Trenton Liang RN  Outcome: Ongoing  Note: No falls this shift. Call light within reach and siderails x2. Bed in lowest position. Patient safety maintained. Problem: Neurological  Goal: Maximum potential motor/sensory/cognitive function  9/1/2020 1456 by Trenton Liang RN  Outcome: Ongoing     Problem: Musculor/Skeletal Functional Status  Goal: Highest potential functional level  9/1/2020 1456 by Trenton Liang RN  Outcome: Ongoing     Problem: Musculor/Skeletal Functional Status  Goal: Absence of falls  9/1/2020 1456 by Trenton Liang RN  Outcome: Ongoing  Note: No falls this shift. Call light within reach and siderails x2. Bed in lowest position. Patient safety maintained.        Problem: Neurological  Goal: Maximum potential motor/sensory/cognitive function  9/1/2020 1456 by Trenton Liang RN  Outcome: Ongoing     Problem: Nutrition  Goal: Optimal nutrition therapy  9/1/2020 1456 by Trenton Liang RN  Outcome: Ongoing     Problem: Pain:  Goal: Pain level will decrease  Description: Pain level will decrease  9/1/2020 1456 by Walker Kee RN  Outcome: Ongoing  Note: Pain decreased with prescribed medication.        Problem: Pain:  Goal: Control of acute pain  Description: Control of acute pain  9/1/2020 1456 by Walker Kee RN  Outcome: Ongoing     Problem: Pain:  Goal: Control of chronic pain  Description: Control of chronic pain  9/1/2020 1456 by Walker Kee RN  Outcome: Ongoing

## 2020-09-01 NOTE — PLAN OF CARE
Problem: Skin Integrity:  Goal: Will show no infection signs and symptoms  Description: Will show no infection signs and symptoms  9/1/2020 0414 by Vinicio Toussaint RN  Outcome: Ongoing     Problem: Skin Integrity:  Goal: Absence of new skin breakdown  Description: Absence of new skin breakdown  9/1/2020 0414 by Vinicio Toussaint RN  Outcome: Ongoing     Problem: Falls - Risk of:  Goal: Will remain free from falls  Description: Will remain free from falls  9/1/2020 0414 by Vinicio Toussaint RN  Outcome: Ongoing     Problem: Falls - Risk of:  Goal: Absence of physical injury  Description: Absence of physical injury  9/1/2020 0414 by Vinicio Toussaint RN  Outcome: Ongoing     Problem: Neurological  Goal: Maximum potential motor/sensory/cognitive function  9/1/2020 0414 by Vinicio Toussaint RN  Outcome: Ongoing     Problem: Musculor/Skeletal Functional Status  Goal: Highest potential functional level  9/1/2020 0414 by Vinicio Toussaint RN  Outcome: Ongoing     Problem: Musculor/Skeletal Functional Status  Goal: Absence of falls  9/1/2020 0414 by Vinicio Toussaint RN  Outcome: Ongoing     Problem: Pain:  Goal: Pain level will decrease  Description: Pain level will decrease  9/1/2020 0414 by Vinicio Toussaint RN  Outcome: Ongoing

## 2020-09-01 NOTE — PROGRESS NOTES
Writer talked to Dr. Chelsy Cooper regarding patient being symptomatic with dizziness and low heart rate. New order received for stat EKG and reduce coreg to 6.25 mg BID. See MAR.

## 2020-09-01 NOTE — PROGRESS NOTES
Perfect serve Dr. Chelsy Cooper regarding EKG result of sinus rhythm with 1st degree AV block. Dr. Chelsy Cooper stated to have the coreg decrease to 6.25 mg BID. No new orders received.

## 2020-09-01 NOTE — PROGRESS NOTES
Nutrition Note    Pt states she is eating well; requested Ensure High Protein be decreased to once daily. Aranza Calero R.D., L.GONSALO.   Phone: [phone]

## 2020-09-01 NOTE — PROGRESS NOTES
Physical Medicine & Rehabilitation  Progress Note      Subjective:      81year-old female with ischemic CVA with R dominant hemiparesis and aphasia. Patient denies any current issues with pain. She is having some diarrhea and this morning had episode of symptomatic bradycardia. ROS:  Denies fevers, chills, sweats. No chest pain, palpitations, lightheadedness. Denies coughing, wheezing or shortness of breath. Denies abdominal pain, nausea, diarrhea or constipation. No new areas of joint pain. Denies new areas of numbness or weakness. Denies new anxiety or depression issues. No new skin problems. Rehabilitation:   Progressing in therapies. PT:  Restrictions/Precautions: Fall Risk, Up as Tolerated, General Precautions  Implants present? : (none)  Other position/activity restrictions: up as tolerated   Transfers  Sit to Stand: Minimal Assistance, Moderate Assistance  Stand to sit: Minimal Assistance, Moderate Assistance  Bed to Chair: Moderate assistance  Stand Pivot Transfers: Moderate Assistance  Comment: patient required assistance with right hand  and placement on walker. Ambulation 1  Surface: level tile  Device: Rolling Walker  Other Apparatus: Right, Wheelchair follow(Hand  on walker; close wheelchair follow)  Assistance: Moderate assistance, Minimal assistance, 2 Person assistance  Quality of Gait: Demos ataxic movement, RLE adduction. Gait Deviations: Slow Jacinda, Staggers, Decreased step length, Decreased step height  Distance: 10 feet  Comments: pt able to flex right hip LE with tactile and verbal cues, increased hip abduction heel strike with assist leg     Transfers  Sit to Stand: Minimal Assistance, Moderate Assistance  Stand to sit: Minimal Assistance, Moderate Assistance  Bed to Chair: Moderate assistance  Stand Pivot Transfers: Moderate Assistance  Comment: patient required assistance with right hand  and placement on walker.   Ambulation  Ambulation?: Yes  More Ambulation?: Yes  Ambulation 1  Surface: level tile  Device: Rolling Walker  Other Apparatus: Right, Wheelchair follow(Hand  on walker; close wheelchair follow)  Assistance: Moderate assistance, Minimal assistance, 2 Person assistance  Quality of Gait: Demos ataxic movement, RLE adduction. Gait Deviations: Slow Jacinda, Staggers, Decreased step length, Decreased step height  Distance: 10 feet  Comments: pt able to flex right hip LE with tactile and verbal cues, increased hip abduction heel strike with assist leg     Surface: level tile  Ambulation 1  Surface: level tile  Device: Rolling Walker  Other Apparatus: Right, Wheelchair follow(Hand  on walker; close wheelchair follow)  Assistance: Moderate assistance, Minimal assistance, 2 Person assistance  Quality of Gait: Demos ataxic movement, RLE adduction. Gait Deviations: Slow Jacinda, Staggers, Decreased step length, Decreased step height  Distance: 10 feet  Comments: pt able to flex right hip LE with tactile and verbal cues, increased hip abduction heel strike with assist leg     OT:  ADL  Feeding: Setup, Increased time to complete(using LUE)  Grooming: Stand by assistance, Setup, Increased time to complete(while seated at sink with use of LUE)  UE Bathing: Stand by assistance, Setup, Increased time to complete(use of emmanuel-technique to wash L armpit)  LE Bathing: Maximum assistance, Setup, Increased time to complete(Assist with B feet, buttocks & perineal area)  UE Dressing: Minimal assistance, Setup, Increased time to complete(assist to pull down over trunk; Assist for bra hooking)  LE Dressing: Dependent/Total(total assist with all tasks; Pt attempted to pull underwear/pants from calves to above knees while seated)  Toileting: Dependent/Total(per nursing report)  Additional Comments: Pt demonstrated improved functional use of RUE this date during showering routine.  Ataxic movement and difficulty with motor planning noted, however Pt able to position RUE PRN for LUE to bathe R armpit. Max VC for sequencing and attention to R side; poor return noted for emmanuel tech. Continue OT POC to maximize independence with self-care. Balance  Sitting Balance: Stand by assistance  Standing Balance: Minimal assistance   Standing Balance  Time: >1 minute x 5+  Activity: self-care tasks and transfers  Comment: 1-2 UE support  Functional Mobility  Functional - Mobility Device: Wheelchair  Activity: To/from bathroom  Assist Level: Maximum assistance  Functional Mobility Comments: NEGAR, pt had to use toilet quickly so SS was utilized by writer/RN     Bed mobility  Bridging: Stand by assistance  Rolling to Left: Stand by assistance  Rolling to Right: Stand by assistance  Supine to Sit: Minimal assistance  Sit to Supine: Minimal assistance  Scooting: Stand by assistance(to head of bed)  Comment: patient preformed from flat mat no handrail  Transfers  Stand Pivot Transfers: Moderate assistance  Sit to stand: Moderate assistance  Stand to sit: Moderate assistance  Transfer Comments: [place] for RUE stabilzation PRN, VC and gestural cuing for sequence   Toilet Transfers  Toilet - Technique: Stand pivot, To right, To left  Equipment Used: Grab bars  Toilet Transfer: Moderate assistance  Toilet Transfers Comments: Moderate verbal/tactile cues for hand placement and safety. Facilitated engagement in PM and AM. In PM, Pt's R ankle became inverted due to Pt's RLE ataxia. OT provided phsyical assist to position ankle and discussed with PT Raquel the possibility of an AFO or positioning brace for R ankle. Shower Transfers  Shower - Transfer From: Wheelchair  Shower - Transfer Type: To and From  Shower - Transfer To: Transfer tub bench  Shower - Technique: Stand pivot, To right, To left  Shower Transfers:  Moderate assistance, Dependent  Shower Transfers Comments: Moderate verbal/tactile cues for hand placement and safety for entry; total assist for exit due to seated on floor SPEECH:  Subjective: []? Alert     [x]? Cooperative     []? Confused     []? Agitated      [x]? Lethargic     Objective/Assessment:  Auditory Comprehension: one step directions- 0%, 50% c cues (visual). Pt. Attempting to repeat direction rather than following. Receptive ID out of field of 2 objects via function- 100%. Pt. responding to simple y/n questions re: self and immediate environment appropriately.      Verbal Expression: Pt. Able to initiate simple automatic phrases during session. Automatic naming, counting 1-10- mod A with cues to repeat. State name of object pictured- 0%, 50% c phonemic and sentence completion cues. Name body parts- 12%, 50% c cues. Neologisms, phonemic paraphasias frequently present with spontaneous speech and during pt.     Motor speech:  Repeat CV syllables beginning with   /b/- 80%  /m/- 60%  /w/- 100%      Reading Comprehension:  n/a     Other:  No family present. Objective:  /74 Comment: supine at rest  Pulse (!) 39 Comment: 810 am presented to therapy in wc  Temp 98.8 °F (37.1 °C)   Resp 16   Ht 5' 4\" (1.626 m)   Wt 191 lb 12.8 oz (87 kg)   SpO2 98%   BMI 32.92 kg/m²       GEN: Well developed, well nourished, in NAD  HEENT:  NCAT. PERRL. EOMI. Mucous membranes pink and moist.   PULM:  Clear to ausculation. No rales or rhonchi. Respirations WNL and unlabored. CV:  Regular rate rhythm. No murmurs or gallops. GI:  Abdomen soft. Nontender. Non-distended. BS + and equal.    NEUROLOGICAL: A&O x3. Sensation intact to light touch. Follows commands. Intact automatic responses. Able to repeat. MSK:  Functional ROM LUE and LLE. Impaired ROM RUE and RLE. Motor testing 4+/5 key muscles LUE and LLE. R  3/5, R elbow flexion 3/5 . SKIN: Warm dry and intact. Good turgor. EXTREMITIES:  No calf tenderness to palpation. No edema BLEs. Charline Sorrow PSYCH: Mood WNL. Appropriately interactive. Affect WNL.      Diagnostics:     CBC:   No results for input(s): WBC, RBC, HGB, HCT, MCV, RDW, PLT in the last 72 hours. BMP:   No results for input(s): NA, K, CL, CO2, PHOS, BUN, CREATININE, GLUCOSE in the last 72 hours. Invalid input(s): CA  BNP: No results for input(s): BNP in the last 72 hours. PT/INR: No results for input(s): PROTIME, INR in the last 72 hours. APTT: No results for input(s): APTT in the last 72 hours. CARDIAC ENZYMES: No results for input(s): CKMB, CKMBINDEX, TROPONINT in the last 72 hours. Invalid input(s): CKTOTAL;3  FASTING LIPID PANEL:  Lab Results   Component Value Date    CHOL 160 08/24/2020    HDL 44 08/24/2020    TRIG 79 08/24/2020     LIVER PROFILE: No results for input(s): AST, ALT, ALB, BILIDIR, BILITOT, ALKPHOS in the last 72 hours.      Current Medications:   Current Facility-Administered Medications: HYDROcodone-acetaminophen (NORCO) 5-325 MG per tablet 1 tablet, 1 tablet, Oral, Q4H PRN  gabapentin (NEURONTIN) capsule 100 mg, 100 mg, Oral, TID  ondansetron (ZOFRAN-ODT) disintegrating tablet 4 mg, 4 mg, Oral, Q6H PRN  metFORMIN (GLUCOPHAGE) tablet 500 mg, 500 mg, Oral, BID WC  insulin lispro (HUMALOG) injection vial 0-12 Units, 0-12 Units, Subcutaneous, TID WC  insulin lispro (HUMALOG) injection vial 0-6 Units, 0-6 Units, Subcutaneous, Nightly  clopidogrel (PLAVIX) tablet 75 mg, 75 mg, Oral, Daily  enoxaparin (LOVENOX) injection 40 mg, 40 mg, Subcutaneous, Daily  polyethylene glycol (GLYCOLAX) packet 17 g, 17 g, Oral, Daily PRN  amLODIPine (NORVASC) tablet 10 mg, 10 mg, Oral, QPM  atorvastatin (LIPITOR) tablet 80 mg, 80 mg, Oral, Nightly  carvedilol (COREG) tablet 12.5 mg, 12.5 mg, Oral, BID WC  losartan (COZAAR) tablet 100 mg, 100 mg, Oral, Daily **AND** hydroCHLOROthiazide (HYDRODIURIL) tablet 25 mg, 25 mg, Oral, Daily  aspirin chewable tablet 81 mg, 81 mg, Oral, Daily  polyethylene glycol (GLYCOLAX) packet 17 g, 17 g, Oral, Daily  senna (SENOKOT) tablet 17.2 mg, 2 tablet, Oral, Daily PRN  bisacodyl (DULCOLAX) suppository 10 mg, 10 mg, Rectal, Daily PRN      Impression/Plan:   Impaired ADLs, gait, and mobility due to:      1. R hemiparesis secondary to ischemic L MCA CVA:  PT/OT for gait, mobility, strengthening, endurance, ADLs, and self care. On ASA, atorvastatin, plavix. 2. Transcortical motor aphasia: speech therapy treating  3. HTN/CHF/non-ischemic cardiomyopathy: on amlodipine, carvedilol, HCTZ, losartan - nursing to ask IM to evaluate recurrent am bradycardia. 4. DM: on insulin sliding scale and Metformin. IM evaluating as potential cause of diarrhea. 5. Pain: IM started [place] prn. Was localized to R leg near knee. 6. Bowel Management: Miralax daily, senokot prn, dulcolax prn.  7. DVT Prophylaxis:  low molecular weight heparin, SCD's while in bed and MIRTA's   8. Internal medicine for medical management    Electronically signed by Jad Lion MD on 9/1/2020 at 9:39 AM      This note is created with the assistance of a speech recognition program.  While intending to generate a document that actually reflects the content of the visit, the document can still have some errors including those of syntax and sound a like substitutions which may escape proof reading. In such instances, actual meaning can be extrapolated by contextual diversion.

## 2020-09-02 LAB
GLUCOSE BLD-MCNC: 108 MG/DL (ref 65–105)
GLUCOSE BLD-MCNC: 128 MG/DL (ref 65–105)
GLUCOSE BLD-MCNC: 142 MG/DL (ref 65–105)
GLUCOSE BLD-MCNC: 203 MG/DL (ref 65–105)

## 2020-09-02 PROCEDURE — 6370000000 HC RX 637 (ALT 250 FOR IP): Performed by: INTERNAL MEDICINE

## 2020-09-02 PROCEDURE — 92507 TX SP LANG VOICE COMM INDIV: CPT

## 2020-09-02 PROCEDURE — 6370000000 HC RX 637 (ALT 250 FOR IP): Performed by: PHYSICAL MEDICINE & REHABILITATION

## 2020-09-02 PROCEDURE — 1180000000 HC REHAB R&B

## 2020-09-02 PROCEDURE — 97530 THERAPEUTIC ACTIVITIES: CPT

## 2020-09-02 PROCEDURE — 97116 GAIT TRAINING THERAPY: CPT

## 2020-09-02 PROCEDURE — 82947 ASSAY GLUCOSE BLOOD QUANT: CPT

## 2020-09-02 PROCEDURE — 97535 SELF CARE MNGMENT TRAINING: CPT

## 2020-09-02 PROCEDURE — 99232 SBSQ HOSP IP/OBS MODERATE 35: CPT | Performed by: INTERNAL MEDICINE

## 2020-09-02 PROCEDURE — 6360000002 HC RX W HCPCS: Performed by: INTERNAL MEDICINE

## 2020-09-02 PROCEDURE — 97112 NEUROMUSCULAR REEDUCATION: CPT

## 2020-09-02 PROCEDURE — 97542 WHEELCHAIR MNGMENT TRAINING: CPT

## 2020-09-02 PROCEDURE — 99231 SBSQ HOSP IP/OBS SF/LOW 25: CPT | Performed by: PHYSICAL MEDICINE & REHABILITATION

## 2020-09-02 RX ADMIN — METFORMIN HYDROCHLORIDE 500 MG: 500 TABLET ORAL at 08:06

## 2020-09-02 RX ADMIN — METRONIDAZOLE 100 MG: 500 TABLET ORAL at 12:11

## 2020-09-02 RX ADMIN — CARVEDILOL 6.25 MG: 6.25 TABLET, FILM COATED ORAL at 08:06

## 2020-09-02 RX ADMIN — ENOXAPARIN SODIUM 40 MG: 40 INJECTION SUBCUTANEOUS at 08:06

## 2020-09-02 RX ADMIN — INSULIN LISPRO 2 UNITS: 100 INJECTION, SOLUTION INTRAVENOUS; SUBCUTANEOUS at 12:11

## 2020-09-02 RX ADMIN — METRONIDAZOLE 100 MG: 500 TABLET ORAL at 08:07

## 2020-09-02 RX ADMIN — ASPIRIN 81 MG CHEWABLE TABLET 81 MG: 81 TABLET CHEWABLE at 08:06

## 2020-09-02 RX ADMIN — HYDROCHLOROTHIAZIDE 25 MG: 25 TABLET ORAL at 08:06

## 2020-09-02 RX ADMIN — METRONIDAZOLE 100 MG: 500 TABLET ORAL at 21:07

## 2020-09-02 RX ADMIN — POLYETHYLENE GLYCOL 3350 17 G: 17 POWDER, FOR SOLUTION ORAL at 08:09

## 2020-09-02 RX ADMIN — AMLODIPINE BESYLATE 10 MG: 10 TABLET ORAL at 16:43

## 2020-09-02 RX ADMIN — CARVEDILOL 6.25 MG: 6.25 TABLET, FILM COATED ORAL at 16:43

## 2020-09-02 RX ADMIN — CLOPIDOGREL BISULFATE 75 MG: 75 TABLET ORAL at 08:07

## 2020-09-02 RX ADMIN — INSULIN LISPRO 2 UNITS: 100 INJECTION, SOLUTION INTRAVENOUS; SUBCUTANEOUS at 21:07

## 2020-09-02 RX ADMIN — LOSARTAN POTASSIUM 100 MG: 50 TABLET, FILM COATED ORAL at 08:06

## 2020-09-02 RX ADMIN — HYDROCODONE BITARTRATE AND ACETAMINOPHEN 1 TABLET: 5; 325 TABLET ORAL at 16:43

## 2020-09-02 RX ADMIN — METFORMIN HYDROCHLORIDE 500 MG: 500 TABLET ORAL at 16:43

## 2020-09-02 RX ADMIN — ATORVASTATIN CALCIUM 80 MG: 80 TABLET, FILM COATED ORAL at 21:07

## 2020-09-02 ASSESSMENT — PAIN SCALES - GENERAL
PAINLEVEL_OUTOF10: 6
PAINLEVEL_OUTOF10: 7
PAINLEVEL_OUTOF10: 3

## 2020-09-02 NOTE — PLAN OF CARE
Problem: Skin Integrity:  Goal: Will show no infection signs and symptoms  Description: Will show no infection signs and symptoms  9/2/2020 1111 by Celia Braxton RN  Outcome: Ongoing  9/2/2020 0336 by Ac Bright RN  Outcome: Ongoing  Note: Skin assessment as charted. Goal: Absence of new skin breakdown  Description: Absence of new skin breakdown  9/2/2020 1111 by Celia Braxton RN  Outcome: Ongoing  9/2/2020 0336 by Ac Bright RN  Outcome: Ongoing     Problem: Falls - Risk of:  Goal: Will remain free from falls  Description: Will remain free from falls  9/2/2020 1111 by Celia Braxton RN  Outcome: Ongoing  9/2/2020 0336 by Ac Bright RN  Outcome: Ongoing  Note: Pt has remained free from falls this shift. Call light within reach. Side rails up X2. Bed in lowest position. Pt safety maintained. Goal: Absence of physical injury  Description: Absence of physical injury  9/2/2020 1111 by Celia Braxton RN  Outcome: Ongoing  9/2/2020 0336 by Ac rBight RN  Outcome: Ongoing  Note: No injury this shift. Pt safety maintained. Problem: Neurological  Goal: Maximum potential motor/sensory/cognitive function  9/2/2020 1111 by Celia Braxton RN  Outcome: Ongoing  9/2/2020 0336 by Ac Bright RN  Outcome: Ongoing     Problem: Neurological  Goal: Maximum potential motor/sensory/cognitive function  9/2/2020 1111 by Celia Braxton RN  Outcome: Ongoing  9/2/2020 0336 by Ac Bright RN  Outcome: Ongoing     Problem: Nutrition  Goal: Optimal nutrition therapy  9/2/2020 1111 by Celia Braxton RN  Outcome: Ongoing  9/2/2020 0336 by Ac Bright RN  Outcome: Ongoing     Problem: Pain:  Goal: Pain level will decrease  Description: Pain level will decrease  9/2/2020 1111 by Celia Braxton RN  Outcome: Ongoing  9/2/2020 0336 by Ac Bright RN  Outcome: Ongoing  Note: No reports of pain per pt this shift.    Goal: Control of acute pain  Description: Control of acute pain  9/2/2020 1111 by Stephani Grijalva RN  Outcome: Ongoing  9/2/2020 0336 by Gerardo Abdul RN  Outcome: Ongoing  Goal: Control of chronic pain  Description: Control of chronic pain  9/2/2020 1111 by Stephani Grijalva RN  Outcome: Ongoing  9/2/2020 0336 by Gerardo Abdul RN  Outcome: Ongoing

## 2020-09-02 NOTE — PROGRESS NOTES
Physical Therapy  Facility/Department: [facility] ACUTE REH[facility]  Daily Treatment Note  NAME: Tyler Sanchez  : 1950  MRN: [id number]    Date of Service: 2020    Discharge Recommendations:  Patient would benefit from continued therapy after discharge, 24 hour supervision or assist        Assessment            Patient Diagnosis(es): There were no encounter diagnoses. has a past medical history of CHF (congestive heart failure) ([facility] [address] Diabetes mellitus ([facility] [address] H/O cardiac catheterization, H/O echocardiogram, History of echocardiogram, History of echocardiogram, Hyperlipidemia, and Hypertension. has a past surgical history that includes Cholecystectomy and Cardiac catheterization (Left, 2017). Restrictions  Restrictions/Precautions  Restrictions/Precautions: Fall Risk, Up as Tolerated, General Precautions  Required Braces or Orthoses?: No  Implants present? : (none)  Position Activity Restriction  Other position/activity restrictions: up as tolerated  Subjective             Orientation     Cognition      Objective   Bed mobility  Bridging: Stand by assistance  Rolling to Left: Stand by assistance  Rolling to Right: Stand by assistance  Supine to Sit: Minimal assistance  Sit to Supine: Minimal assistance  Scooting: Stand by assistance  Transfers  Sit to Stand: Minimal Assistance; Moderate Assistance  Stand to sit: Minimal Assistance; Moderate Assistance  Bed to Chair: Moderate assistance  Stand Pivot Transfers: Moderate Assistance  Comment: patient required assistance with right hand  and placement on walker. does not demonstrate reaching back and release walker from   Ambulation  Ambulation?: Yes  More Ambulation?: Yes  Ambulation 1  Surface: level tile  Device: Rolling Walker  Other Apparatus: Right; Wheelchair follow(Hand  on walker; ankle eversion brace; close wheelchair follow)  Assistance:  Moderate assistance;Minimal assistance;2 Person assistance  Quality of Gait: Demos ataxic movement, RLE adduction. Gait Deviations: Slow Jacinda;Staggers;Decreased step length;Decreased step height  Distance: 20 feet  Comments: pt able to flex right hip LE with tactile and verbal cues, increased hip abduction heel strike with therapist manual assisting placement  Ambulation 2  Surface - 2: level tile  Device 2: Parallel Bars  Assistance 2: 2 Person assistance;Maximum assistance  Quality of Gait 2: pt more fatigued with difficulty advancing right LE, pt attempting to keep advancing Left LE and leaving right LE behind, w/c following close, increased difficulty following directions of right vs left  Distance: 20 feet  Comments: tactile and verbal cues for safety  Stairs/Curb  Stairs?: No  Wheelchair Activities  Wheelchair Size: 18 in  Wheelchair Type: Standard  Wheelchair Cushion: Standard  Propulsion 1  Propulsion: Manual  Level: Level Tile  Method: LUE;LLE;RLE  Level of Assistance: Moderate assistance  Description/ Details: poor demonstration of mobility difficult with followng I/S  Distance: 60 ft        Other exercises  Other exercises 1: sit <> stands 2 sets of 5 reps  Other exercises 2: seated bilateral LE in reciprical pattern right AAROM left 2# red band x 15  Other exercises 3: pre-gait training, lateral weight shifting in parallel bars  Other exercises 5: Supine BLE 15 reps/sidelying WB on elbow and extended arm                        G-Code     OutComes Score                                                     AM-PAC Score             Goals  Short term goals  Time Frame for Short term goals: 10 days   Short term goal 1: Pt to perform bed mobility independently. Short term goal 2: Pt to perform transfers with minAx1. Short term goal 3: Pt to ambulate 150 ft, least restrictive device, minAx1. Short term goal 4: Pt to tolerate 30-60 mins physical therapy to increase endurance and strength. Short term goal 5: Pt to complete 5 steps, with LUE support, minAx1.    Long term goals  Time Frame for Long term goals : By LOS. Long term goal 2: Pt to complete transfers with SBA to promote independence. Long term goal 3: Pt to ambulate 200 ft, least restrictive device, SBA. Long term goal 4: Pt to complete 5-12 steps, CGA to promote function. Long term goal 5: Pt to improve PASS score from 15/36 to 22/36. Patient Goals   Patient goals : To return back home. Plan    Plan  Times per week: 1.5 hrs/day 5-7 days/week  Specific instructions for Next Treatment: progress trasnfer training and ambulation as able, try balance activities.    Current Treatment Recommendations: Strengthening, ROM, Balance Training, Functional Mobility Training, Transfer Training, Endurance Training, Gait Training, Stair training, Neuromuscular Re-education, Safety Education & Training, Patient/Caregiver Education & Training, Equipment Evaluation, Education, & procurement  Safety Devices  Type of devices: Left in bed, Call light within reach, Bed alarm in place, Gait belt, All fall risk precautions in place  Restraints  Initially in place: No     Therapy Time     09/02/20 1120 09/02/20 1543   PT Individual Minutes   Time In 1030 1250   Time Out [id number]   Minutes 50 25     Rosibel Mancilla, PTA

## 2020-09-02 NOTE — PROGRESS NOTES
[address] [facility]    ACUTE REHABILITATION OCCUPATIONAL THERAPY  DAILY NOTE    Date: 20  Patient Name: Narayan Caceres      Room: 7075/7931-58    MRN: [id number]   : 1950  (80 y.o.)  Gender: female   Referring Practitioner: Dr. Fanta Matute  Diagnosis: Left ischemic middle cerebral artery stroke       Restrictions  Restrictions/Precautions: Fall Risk, Up as Tolerated, General Precautions  Implants present? : (none)  Other position/activity restrictions: up as tolerated  Required Braces or Orthoses?: No    Subjective  Subjective: Pt with increased expressie clarity when speaking this date. \"good.' 'doing okay' 'better' 'i dont know'  Comments: Pt pleasant. Continues to dmeo difficulty with global aphasia. Improved LB  ADL independence this date intro AE; continues to warrant additional training. Patient Currently in Pain: Yes  Restrictions/Precautions: Fall Risk;Up as Tolerated;General Precautions  Overall Orientation Status: Impaired  Orientation Level: Oriented to person;Disoriented to situation;Oriented to place;Oriented to time  Patient Observation  Observations: Pt has bruise on R hand near first digit knuckle and superior to wrist joints. .   Pain Assessment  Pain Assessment: 0-10  Garza-Baker Pain Rating: Hurts even more  Pain Type: Acute pain  Pain Location: Leg  Pain Orientation: Right  Clinical Progression: Not changed  Response to Pain Intervention: Patient Satisfied    Objective  Cognition  Overall Cognitive Status: Impaired(Unable to fully assess due to global aphasia)  Arousal/Alertness: Appropriate responses to stimuli  Following Directions: Follows one step commands  Memory: Unable to assess  Following Commands:  Follows one step commands with repetition  Safety Judgement: Decreased awareness of need for assistance  Awareness of Errors: Assistance required to identify errors made  Insights: Decreased awareness of deficits  Sequencing and Organization: Assistance required to identify errors made;Assistance required to generate solutions;Assistance required to implement solutions  Perception  Overall Perceptual Status: Impaired  Unilateral Attention: Cues to attend to right side of body  Initiation: Cues to initiate tasks  Motor Planning: Cues to use objects appropriately  Balance  Sitting Balance: Stand by assistance  Standing Balance: Minimal assistance  Bed mobility  Bridging: Stand by assistance  Rolling to Left: Stand by assistance  Rolling to Right: Stand by assistance  Supine to Sit: Minimal assistance  Sit to Supine: Minimal assistance  Scooting: Stand by assistance  Transfers  Stand Pivot Transfers: Moderate assistance  Sit to stand: Moderate assistance  Stand to sit: Moderate assistance  Transfer Comments: [place] for RUE stabilzation PRN, VC and gestural cuing for sequence; R lean requiring physical assist to correct, poor ability to maintain  Standing Balance  Time: >1 minute x 5+  Activity: self-care tasks and transfers  Comment: 1-2 UE support, mod difficulty maintaining RUE WB with dycem, SBA required, wrist pronating  Functional Mobility  Functional - Mobility Device: Wheelchair  Activity: To/from bathroom  Assist Level: Maximum assistance  Toilet Transfers  Toilet - Technique: Stand pivot; To right; To left  Equipment Used: Grab bars  Toilet Transfer: Moderate assistance  Toilet Transfers Comments: stabilization required for RLE ankle, VC           Additional Activities: reacher focus this date, min to mod difficulty with manipation/FM (LUE use, is RUE domin/affected). May benefit from dressing stick. Vision  Vision Comment: improved tracking and scanning this date to Right visual field  ADL  Feeding: Setup; Increased time to complete(using LUE)  Grooming: Stand by assistance;Setup; Increased time to complete(while seated at sink with use of LUE)  UE Bathing: Setup; Increased time to complete;Minimal assistance(use of emmanuel-technique to wash L armpit)  LE Bathing: Maximum 5-7x/week, 1.5 hours/day  Times per day: Twice a day  Current Treatment Recommendations: Self-Care / ADL, Home Management Training, Cognitive/Perceptual Training, Strengthening, ROM, Balance Training, Functional Mobility Training, Endurance Training, Neuromuscular Re-education, Cognitive Reorientation, Pain Management, Safety Education & Training, Patient/Caregiver Education & Training, Equipment Evaluation, Education, & procurement, Positioning  Patient Goals   Patient goals : To return home with sister  Short term goals  Time Frame for Short term goals: 10 days  Short term goal 1: Pt will perform grooming with stand by assist and use of assistive equipment/modified techniques PRN. Short term goal 2: Pt will perform upper body bathing/dressing with Minimal assist and use of assistive equipment/modified techniques PRN. Short term goal 3: Pt will perform toileting tasks and lower body bathing/dressing with Moderate assist and use of assitive equipment/durable medical equipment/modified techniques PRN. Short term goal 4: Pt will perform functional mobility and transfers during self-care with Minimal assist, least restrictive mobility device, and Fair safety. Short term goal 5: Pt will stand for 4+ minutes with 1-2 UE support, contact guard assist, and no loss of balance while engaging in functional activity of choice. Short term goal 6: Pt will actively participate in 30+ minutes of therapeutic exercise/functional activities to promote increased independence with self-care and mobility. Short term goal 7: Pt will verbalize/demonstrate Good understanding of assistive equipment/durable medical equipment/modified techniques for increased independence with self-care and mobility. Long term goals  Time Frame for Long term goals : By discharge  Long term goal 1: Pt will perform self-feeding and grooming with modified independence.   Long term goal 2: Pt will perform bathing, dressing, and toileting tasks with stand by assist, Fair safety, and assist PRN for MIRTA hose. Long term goal 3: Pt will perform functional mobility and transfers during self-care with stand by assist, least restrictive mobility device, and Fair safety. Long term goal 4: Pt will stand for 8+ minutes with 1-2 UE support, stand by assist, and no loss of balance while engaging in functional activity of choice.   Long term goal 5: 9 hole peg, dynamometer, box and block to be assessed for RUE as appropriate        09/02/20 1556 09/02/20 1557   OT Individual Minutes   Time In 0843 1451   Time Out 0930 1529   Minutes 47 38     Electronically signed by HERBERT Flood on 9/2/20 at 4:08 PM EDT

## 2020-09-02 NOTE — PROGRESS NOTES
Speech Language Pathology  Speech Language Pathology  [facility]    Speech Language Treatment Note    Date: 9/2/2020  Patients Name: Mai Islas  MRN: [id number]  Diagnosis: CVA  Patient Active Problem List   Diagnosis Code    Hypertension I10    Hyperlipidemia E78.5    Acute on chronic systolic CHF (congestive heart failure) ([facility] I50.23    Hypertensive urgency I16.0    Idiopathic cardiomyopathy ([facility] I42.8    Hypertensive emergency I16.1    Chronic combined systolic and diastolic HF (heart failure), NYHA class 2 ([facility]) I50.42    Acute on chronic combined systolic and diastolic CHF, NYHA class 4 ([facility]) I50.43    Hypoxia R09.02    Severe mitral regurgitation by prior echocardiogram I34.0    Morbid obesity ([facility]) E66.01    CKD (chronic kidney disease) stage 3, GFR 30-59 ml/min ([facility]) N18.3    Physical deconditioning R53.81    TIA (transient ischemic attack) G45.9    Old lacunar stroke without late effect Z86.73    Dysarthria R47.1    Stroke determined by clinical assessment ([facility] I63.9    Aphasia R47.01    Type 2 diabetes mellitus, without long-term current use of insulin ([facility] E11.9    Cerebral infarction ([facility] - left MCA distribution  I63.9    Uncontrolled type 2 diabetes mellitus with hyperglycemia ([facility] [facility] E11.65    Elevated blood pressure reading R03.0    Acute ischemic stroke ([facility]) I63.9       Pain: \"not really\"    Speech and Language Treatment  Treatment time:  7811-6527    Subjective: [] Alert [x] Cooperative     [] Confused     [] Agitated      [x] Lethargic    Objective/Assessment:  Auditory Comprehension:  Pt. Following task directions with some difficulty. Pt. Needed verbal cues, \"Ashley, say . ...... Delwyn Scarce \".   Pt. Responding appropriately to simple questions asked. Pt. ID object in picture via name- 80%, via function- 50%. Verbal Expression: name body parts- 0%, 65% c cues. Pt. Frequently needing cues to look at ST for visual cues when repeating words.     Neologisms, phonemic paraphasias frequently present with spontaneous speech and during pt. Attempts at naming objects. Motor speech:  Repeat CVC syllables beginning with   /g/- 25%, 75% c sentence completion cues. Pt. Seeming to circumlocute to find correct word. Reading Comprehension:  n/a    Other:  No family present. Plan:  [x] Continue ST services    [] Discharge from ST:      Discharge recommendations: [] Inpatient Rehab   [] [place]   [] Outpatient Therapy  [] Follow up at trauma clinic   [] Other:       Treatment completed by: Ene Fleming A.CCC/SLP

## 2020-09-02 NOTE — CONSULTS
[facility]    Progress note            Date:   9/2/2020  Patient name:  Jesenia Brown  Date of admission:  8/26/2020  6:56 PM  MRN:   [id number]  Account:  [de-identified]  YOB: 1950  PCP:    SUE Erazo CNP  Room:   2613/2613-01  Code Status:    Full Code    Physician Requesting Consult: Carlos Chery MD    Reason for Consult: Medical management    Chief Complaint:     She continues to have apraxia but has no cardiorespiratory symptoms    History Obtained From:     Patient medical record nursing staff    History of Present Illness:     44-year-old pleasant  lady was admitted to [address] [place] [address] on August 23 with a aphasia diagnosed with the left middle cerebral artery ischemic infarct  Known history of systolic congestive heart failure ejection fraction 35 to 40% secondary to nonischemic cardiomyopathy  Diabetes   Duration more than 7 years  Modifying factors on Glucophage and other med  Severity uncontrolled sever  Associated signs and symtoms neuropathy/ckd/ CAD. aggravated with sugar diet and better with low sugar diet             Past Medical History:     Past Medical History:   Diagnosis Date    [facility]estive heart failure) ([facility]     Diabetes mellitus ([facility]     H/O cardiac catheterization 08/04/2017    LMCA: Mild irregularites 10-20%. LAD: Mild irregularites 10-20%. LCx: Mild irregularites 10-20%. RCA: Mild irregularites 10-20%. LV function assessed as : Abnormal. EF of 40%.  H/O echocardiogram 12/18/2018    EF 55%. Mildly increased LV wall thickness. The left atrium appears moderately to severely dilated. Moderate to severe mitral regurg. Moderate pulmonary HTN with an estimated RV systolic pressure of [id number]. Moderate tricuspid regurg. Evidnece fo moderate diastolic dysfunction is seen.  History of echocardiogram 01/17/2019    EF 55%. LV wall thickness moderately increased.  LA is mildly dilated w/ LA volume index of 30. trivial mitral regurg. Evidence of moderate (grade II) diastolic dysfunction seen.  History of echocardiogram 2019    EF of 50-55%. LV wall thickness is mildly increased. LA is mildly dilated (29-33) with a left atrial volume index of 31 m1/m2. mild diastolic dysfunction.  Hyperlipidemia     Hypertension         Past Surgical History:     Past Surgical History:   Procedure Laterality Date    CARDIAC CATHETERIZATION Left 2017    right radial, [facility] Dr. Lilian Milner          Medications Prior to Admission:     Prior to Admission medications    Medication Sig Start Date End Date Taking? Authorizing Provider   clopidogrel (PLAVIX) 75 MG tablet Take 1 tablet by mouth daily 20   Raysa Craig MD   carvedilol (COREG) 12.5 MG tablet Take 1 tablet by mouth 2 times daily (with meals) 20   Umesh Bach MD   losartan-hydrochlorothiazide [facility]) 100-25 MG per tablet Take 1 tablet by mouth daily 20   Umesh Bach MD   amLODIPine (NORVASC) 10 MG tablet Take 1 tablet by mouth daily 20   Umesh Bach MD   atorvastatin (LIPITOR) 80 MG tablet Take 1 tablet by mouth nightly 20   Umesh Bach MD   aspirin 81 MG EC tablet Take 1 tablet by mouth daily 20   Umesh Bach MD   metFORMIN (GLUCOPHAGE) 500 MG tablet Take 500 mg by mouth 2 times daily (with meals)    Historical Provider, MD        Allergies:     Patient has no known allergies. Social History:     Tobacco:    reports that she has never smoked. She has never used smokeless tobacco.  Alcohol:      reports no history of alcohol use. Drug Use:  reports no history of drug use. Family History:     Family History   Problem Relation Age of Onset    Coronary Art Dis Father          from [place]    COPD Sister         O2 dep    Coronary Art Dis Brother         2 brothers  from [place]       Review of Systems:     Positive and Negative as described in HPI.     CONSTITUTIONAL:  negative for fevers, chills, sweats, fatigue, weight loss  HEENT:  negative for vision, hearing changes, runny nose, throat pain  RESPIRATORY:  negative for shortness of breath, cough, congestion, wheezing. CARDIOVASCULAR:  negative for chest pain, palpitations. GASTROINTESTINAL:  negative for nausea, vomiting, diarrhea, constipation, change in bowel habits, abdominal pain   GENITOURINARY:  negative for difficulty of urination, burning with urination, frequency   INTEGUMENT:  negative for rash, skin lesions, easy bruising   HEMATOLOGIC/LYMPHATIC:  negative for swelling/edema   ALLERGIC/IMMUNOLOGIC:  negative for urticaria , itching  ENDOCRINE:  negative increase in drinking, increase in urination, hot or cold intolerance  MUSCULOSKELETAL:  negative joint pains, muscle aches, swelling of joints  NEUROLOGICAL: Right-sided weakness and speech deficit  BEHAVIOR/PSYCH:  negative for depression, anxiety    Physical Exam:     BP (!) 130/56   Pulse 67   Temp 97.6 °F (36.4 °C) (Oral)   Resp 16   Ht 5' 4\" (1.626 m)   Wt 191 lb 12.8 oz (87 kg)   SpO2 96%   BMI 32.92 kg/m²   Temp (24hrs), Av °F (36.7 °C), Min:97.6 °F (36.4 °C), Max:98.4 °F (36.9 °C)    Recent Labs     20  1113 20  1600 20  1924 20  0650   POCGLU 186* 137* 188* 128*       Intake/Output Summary (Last 24 hours) at 2020 1130  Last data filed at 2020 1909  Gross per 24 hour   Intake 720 ml   Output --   Net 720 ml     Speech deficit  General Appearance:  alert, well appearing, and in no acute distress  Mental status: oriented to person, place, and time with normal affect  Head:  normocephalic, atraumatic.   Eye: no icterus, redness, pupils equal and reactive, extraocular eye movements intact, conjunctiva clear  Ear: normal external ear, no discharge, hearing intact  Nose:  no drainage noted  Mouth: mucous membranes moist  Neck: supple, no carotid bruits, thyroid not palpable  Lungs: Bilateral equal air entry, clear to ausculation, no wheezing, rales or rhonchi, normal effort  Cardiovascular: normal rate, regular rhythm, no murmur, gallop, rub. Abdomen: Soft, nontender, nondistended, normal bowel sounds, no hepatomegaly or splenomegaly  Neurologic: Right hemiparesis with Broca's aphasia skin: No gross lesions, rashes, bruising or bleeding on exposed skin area  Extremities:  peripheral pulses palpable, no pedal edema or calf pain with palpation  P    Investigations:      Laboratory Testing:  Recent Results (from the past 24 hour(s))   POC Glucose Fingerstick    Collection Time: 09/01/20  4:00 PM   Result Value Ref Range    POC Glucose 137 (H) 65 - 105 mg/dL   POC Glucose Fingerstick    Collection Time: 09/01/20  7:24 PM   Result Value Ref Range    POC Glucose 188 (H) 65 - 105 mg/dL   POC Glucose Fingerstick    Collection Time: 09/02/20  6:50 AM   Result Value Ref Range    POC Glucose 128 (H) 65 - 105 mg/dL       Imaging/Diagonstics:      Assessment :      Primary Problem  <principal problem not specified>    Active Hospital Problems    Diagnosis Date Noted    Acute ischemic stroke ([address] [I63.9] 08/26/2020    Cerebral infarction ([facility] - left MCA distribution  [I63.9] 08/26/2020    Type 2 diabetes mellitus, without long-term current use of insulin ([address] [E11.9] 08/25/2020    Aphasia [R47.01]     CKD (chronic kidney disease) stage 3, GFR 30-59 ml/min ([address] [N18.3] 12/20/2018    Morbid obesity ([address] [E66.01] 12/20/2018    Acute on chronic combined systolic and diastolic CHF, NYHA class 4 ([facility]) [I50.43] 12/17/2018    Chronic combined systolic and diastolic HF (heart failure), NYHA class 2 ([address] [I50.42] 08/28/2017    Hyperlipidemia [E78.5]     Hypertension [I10]     Idiopathic cardiomyopathy ([address] [I42.8]        Plan:     1. Uncontrolled diabetes mellitus with the peripheral organ damage with ischemic CVA  2. Ischemic CVA with right hemiparesis and Broca's aphasia  3. Metformin recent A1c is 7.3 reviewed and reconciled  4.  Ischemic CVA hyperlipidemia aspirin and Lipitor Plavix 75 all started  5. Losartan 100 mg for hypertension and diabetic renal protection  6. norco for pain  7. She is asymptomatic and has apraxia and right-sided weakness. Blood pressure is well controlled. Is not having loose stools at present  . Marissa Mendes MD  9/2/2020  11:30 AM    Copy sent to Dr. Juma Sanders, APRN - CNP    Please note that this chart was generated using voice recognition Dragon dictation software. Although every effort was made to ensure the accuracy of this automated transcription, some errors in transcription may have occurred.

## 2020-09-02 NOTE — PLAN OF CARE
Problem: Skin Integrity:  Goal: Will show no infection signs and symptoms  Description: Will show no infection signs and symptoms  9/2/2020 0336 by Nasim Griffin RN  Outcome: Ongoing  Note: Skin assessment as charted. 9/1/2020 1456 by Moe Gross RN  Outcome: Ongoing  Note: Patient remains free from infection and no signs and symptoms of infection. Goal: Absence of new skin breakdown  Description: Absence of new skin breakdown  9/2/2020 0336 by Nasim Griffin RN  Outcome: Ongoing  9/1/2020 1456 by Moe Gross RN  Outcome: Ongoing  Note: Skin assessment as charted. No new areas of breakdown. Problem: Falls - Risk of:  Goal: Will remain free from falls  Description: Will remain free from falls  9/2/2020 0336 by Nasim Griffin RN  Outcome: Ongoing  Note: Pt has remained free from falls this shift. Call light within reach. Side rails up X2. Bed in lowest position. Pt safety maintained. 9/1/2020 1456 by Moe Gross RN  Outcome: Ongoing  Note: No falls this shift. Call light within reach and siderails x2. Bed in lowest position. Patient safety maintained. Goal: Absence of physical injury  Description: Absence of physical injury  9/2/2020 0336 by Nasim Griffin RN  Outcome: Ongoing  Note: No injury this shift. Pt safety maintained. 9/1/2020 1456 by Moe Gross RN  Outcome: Ongoing  Note: No falls this shift. Call light within reach and siderails x2. Bed in lowest position. Patient safety maintained.        Problem: Neurological  Goal: Maximum potential motor/sensory/cognitive function  9/2/2020 0336 by Nasim Griffin RN  Outcome: Ongoing  9/1/2020 1456 by Moe Gross RN  Outcome: Ongoing     Problem: Musculor/Skeletal Functional Status  Goal: Highest potential functional level  9/2/2020 0336 by Nasim Griffin RN  Outcome: Ongoing  9/1/2020 1456 by Moe Gross RN  Outcome: Ongoing  Goal: Absence of falls  9/2/2020 0336 by Nasim Griffin RN  Outcome:

## 2020-09-02 NOTE — PROGRESS NOTES
Speech Language Pathology  Speech Language Pathology  [facility]    Speech Language Treatment Note    Date: 9/2/2020  Patients Name: Jeannette Sutherland  MRN: [id number]  Diagnosis: CVA  Patient Active Problem List   Diagnosis Code    Hypertension I10    Hyperlipidemia E78.5    Acute on chronic systolic CHF (congestive heart failure) ([facility] I50.23    Hypertensive urgency I16.0    Idiopathic cardiomyopathy ([facility] [facility] I42.8    Hypertensive emergency I16.1    Chronic combined systolic and diastolic HF (heart failure), NYHA class 2 ([facility]) I50.42    Acute on chronic combined systolic and diastolic CHF, NYHA class 4 ([facility]) I50.43    Hypoxia R09.02    Severe mitral regurgitation by prior echocardiogram I34.0    Morbid obesity ([facility]) E66.01    CKD (chronic kidney disease) stage 3, GFR 30-59 ml/min ([facility]) N18.3    Physical deconditioning R53.81    TIA (transient ischemic attack) G45.9    Old lacunar stroke without late effect Z86.73    Dysarthria R47.1    Stroke determined by clinical assessment ([facility] I63.9    Aphasia R47.01    Type 2 diabetes mellitus, without long-term current use of insulin ([facility] E11.9    Cerebral infarction ([facility] - left MCA distribution  I63.9    Uncontrolled type 2 diabetes mellitus with hyperglycemia ([facility] [facility] E11.65    Elevated blood pressure reading R03.0    Acute ischemic stroke ([facility]) I63.9       Pain: \"a little bit\" points to stomach    Speech and Language Treatment  Treatment time:  4288-3194    Subjective: [x] Alert [x] Cooperative     [] Confused     [] Agitated      [] Lethargic    Objective/Assessment:  Auditory Comprehension:  Pt. Following task directions without difficulty. Pt. Responding appropriately to simple questions asked. Verbal Expression: Automatic naming, counting 1-10- 50%, 90% c cues. Pt. Stated days of the week c 57% accuracy, 85% c cues. Name objects in room- 0%, 40% c sentence completion, phonemic and visual cues.    Pt. Attempted singing You are my [place] c ST, with cues to decrease rate, demonstrated frequent sound substitutions. Neologisms, phonemic paraphasias frequently present with spontaneous speech and during pt. Attempts at naming objects. Motor speech:  Repeat CVC syllables beginning with   /b/- 60%, 100% c sentence completion cues  /m/- 90%, 100% c cues. /w/- 90%, 100% c cues. Reading Comprehension:  n/a    Other:  No family present. Plan:  [x] Continue ST services    [] Discharge from ST:      Discharge recommendations: [] Inpatient Rehab   [] [place]   [] Outpatient Therapy  [] Follow up at trauma clinic   [] Other:       Treatment completed by: [place] A.CCC/SLP

## 2020-09-02 NOTE — PROGRESS NOTES
not demonstrate reaching back and release walker from   Ambulation  Ambulation?: Yes  More Ambulation?: Yes  Ambulation 1  Surface: level tile  Device: Rolling Walker  Other Apparatus: Right, Wheelchair follow(Hand  on walker; ankle eversion brace; close wheelchair follow)  Assistance: Moderate assistance, Minimal assistance, 2 Person assistance  Quality of Gait: Demos ataxic movement, RLE adduction. Gait Deviations: Slow Jacinda, Staggers, Decreased step length, Decreased step height  Distance: 10 feet x 2  Comments: pt able to flex right hip LE with tactile and verbal cues, increased hip abduction heel strike with therapist manual assisting placement    Surface: level tile  Ambulation 1  Surface: level tile  Device: Rolling Walker  Other Apparatus: Right, Wheelchair follow(Hand  on walker; ankle eversion brace; close wheelchair follow)  Assistance: Moderate assistance, Minimal assistance, 2 Person assistance  Quality of Gait: Demos ataxic movement, RLE adduction. Gait Deviations: Slow Jacinda, Staggers, Decreased step length, Decreased step height  Distance: 10 feet x 2  Comments: pt able to flex right hip LE with tactile and verbal cues, increased hip abduction heel strike with therapist manual assisting placement    OT:  ADL  Feeding: Setup, Increased time to complete(using LUE)  Grooming: Stand by assistance, Setup, Increased time to complete(while seated at sink with use of LUE)  UE Bathing: Stand by assistance, Setup, Increased time to complete(use of emmanuel-technique to wash L armpit)  LE Bathing: Maximum assistance, Setup, Increased time to complete(Assist with B feet, buttocks & perineal area)  UE Dressing: Minimal assistance, Setup, Increased time to complete(assist to pull down over trunk; Assist for bra hooking)  LE Dressing: Maximum assistance(total assist with all tasks;  Pt attempted to pull underwear/pants from calves to above knees while seated)  Toileting: Maximum assistance(per nursing report)  Additional Comments: Pt demonstrated improved functional use of RUE this date during showering routine. Ataxic movement and difficulty with motor planning noted, however Pt able to position RUE PRN for LUE to bathe R armpit. Max VC for sequencing and attention to R side; poor return noted for emmanuel tech. Intro to reacher use with max difficulty manipulating for ADLs. Attempts to thread RLE with no AE with poor return. Will continue to encourage reacher use with ADLs. Dons footies using setup sock aid this date with one 'pull' VC to initiate; F tech; able to reach to do adjust at ankles. Pt assisting with donning TEDs, primarily with LUE, using proprioception, weight bearing into BLE, trialled use of RUE with poor ability to maintain grasp/pinch. Balance  Sitting Balance: Stand by assistance  Standing Balance: Minimal assistance   Standing Balance  Time: >1 minute x 5+  Activity: self-care tasks and transfers  Comment: 1-2 UE support, difficulty maintaining RUE WB, [place] required, wrist pronating  Functional Mobility  Functional - Mobility Device: Wheelchair  Activity: To/from bathroom  Assist Level: Maximum assistance  Functional Mobility Comments: NEGAR, pt had to use toilet quickly so SS was utilized by writer/RN     Bed mobility  Bridging: Stand by assistance  Rolling to Left: Stand by assistance  Rolling to Right: Stand by assistance  Supine to Sit: Minimal assistance  Sit to Supine: Minimal assistance  Scooting: Stand by assistance  Comment: patient preformed from flat mat no handrail  Transfers  Stand Pivot Transfers: Moderate assistance  Sit to stand: Moderate assistance  Stand to sit: Moderate assistance  Transfer Comments: [place] for RUE stabilzation PRN, VC and gestural cuing for sequence; R lean requiring physical assist to correct, poor ability to maintain   Toilet Transfers  Toilet - Technique: Stand pivot, To right, To left  Equipment Used: Grab bars  Toilet Transfer:  Moderate assistance  Toilet Transfers Comments: stabilization required for RLE ankle, VC     Shower Transfers  Shower - Transfer From: Wheelchair  Shower - Transfer Type: To and From  Shower - Transfer To: Transfer tub bench  Shower - Technique: Stand pivot, To right, To left  Shower Transfers: Moderate assistance, Dependent  Shower Transfers Comments: Moderate verbal/tactile cues for hand placement and safety for entry; total assist for exit due to seated on floor       SPEECH:  Subjective: []? Alert     [x]? Cooperative     []? Confused     []? Agitated      [x]? Lethargic     Objective/Assessment:  Auditory Comprehension:  Pt. Following task directions with some difficulty. Pt. Needed verbal cues, \"Ashley, say . ...... Renan Bonilla \".   Pt. Responding appropriately to simple questions asked. Pt. ID object in picture via name- 80%, via function- 50%.    Verbal Expression: name body parts- 0%, 65% c cues. Pt. Frequently needing cues to look at ST for visual cues when repeating words. Neologisms, phonemic paraphasias frequently present with spontaneous speech and during pt. Attempts at naming objects.      Motor speech:  Repeat CVC syllables beginning with   /g/- 25%, 75% c sentence completion cues. Pt. Seeming to circumlocute to find correct word. Reading Comprehension:  n/a     Other:  No family present. Objective:  BP (!) 130/56   Pulse 67   Temp 97.6 °F (36.4 °C) (Oral)   Resp 16   Ht 5' 4\" (1.626 m)   Wt 191 lb 12.8 oz (87 kg)   SpO2 96%   BMI 32.92 kg/m²       GEN: Well developed, well nourished, in NAD  HEENT:  NCAT. PERRL. EOMI. Mucous membranes pink and moist.   PULM:  Clear to ausculation. No rales or rhonchi. Respirations WNL and unlabored. CV:  Bradycardic rate regular rhythm. No murmurs or gallops. GI:  Abdomen soft. Nontender. Non-distended. BS + and equal.    NEUROLOGICAL: A&O x3. Sensation intact to light touch. Follows commands. Intact automatic responses. Able to repeat.    MSK: Functional ROM LUE and LLE. Impaired ROM RUE and RLE. Motor testing 4+/5 key muscles LUE and LLE. R  3/5, R elbow flexion 3/5 . SKIN: Warm dry and intact. Good turgor. EXTREMITIES:  No calf tenderness to palpation. No edema BLEs. Donis Padgett PSYCH: Mood WNL. Appropriately interactive. Affect WNL. Diagnostics:     CBC:   No results for input(s): WBC, RBC, HGB, HCT, MCV, RDW, PLT in the last 72 hours. BMP:   No results for input(s): NA, K, CL, CO2, PHOS, BUN, CREATININE, GLUCOSE in the last 72 hours. Invalid input(s): CA  BNP: No results for input(s): BNP in the last 72 hours. PT/INR: No results for input(s): PROTIME, INR in the last 72 hours. APTT: No results for input(s): APTT in the last 72 hours. CARDIAC ENZYMES: No results for input(s): CKMB, CKMBINDEX, TROPONINT in the last 72 hours. Invalid input(s): CKTOTAL;3  FASTING LIPID PANEL:  Lab Results   Component Value Date    CHOL 160 08/24/2020    HDL 44 08/24/2020    TRIG 79 08/24/2020     LIVER PROFILE: No results for input(s): AST, ALT, ALB, BILIDIR, BILITOT, ALKPHOS in the last 72 hours.      Current Medications:   Current Facility-Administered Medications: carvedilol (COREG) tablet 6.25 mg, 6.25 mg, Oral, BID   HYDROcodone-acetaminophen (NORCO) 5-325 MG per tablet 1 tablet, 1 tablet, Oral, Q4H PRN  gabapentin (NEURONTIN) capsule 100 mg, 100 mg, Oral, TID  ondansetron (ZOFRAN-ODT) disintegrating tablet 4 mg, 4 mg, Oral, Q6H PRN  metFORMIN (GLUCOPHAGE) tablet 500 mg, 500 mg, Oral, BID WC  insulin lispro (HUMALOG) injection vial 0-12 Units, 0-12 Units, Subcutaneous, TID WC  insulin lispro (HUMALOG) injection vial 0-6 Units, 0-6 Units, Subcutaneous, Nightly  clopidogrel (PLAVIX) tablet 75 mg, 75 mg, Oral, Daily  enoxaparin (LOVENOX) injection 40 mg, 40 mg, Subcutaneous, Daily  polyethylene glycol (GLYCOLAX) packet 17 g, 17 g, Oral, Daily PRN  amLODIPine (NORVASC) tablet 10 mg, 10 mg, Oral, QPM  atorvastatin (LIPITOR) tablet 80 mg, 80 mg, Oral, Nightly  losartan (COZAAR) tablet 100 mg, 100 mg, Oral, Daily **AND** hydroCHLOROthiazide (HYDRODIURIL) tablet 25 mg, 25 mg, Oral, Daily  aspirin chewable tablet 81 mg, 81 mg, Oral, Daily  polyethylene glycol (GLYCOLAX) packet 17 g, 17 g, Oral, Daily  senna (SENOKOT) tablet 17.2 mg, 2 tablet, Oral, Daily PRN  bisacodyl (DULCOLAX) suppository 10 mg, 10 mg, Rectal, Daily PRN      Impression/Plan:   Impaired ADLs, gait, and mobility due to:      1. R hemiparesis secondary to ischemic L MCA CVA:  PT/OT for gait, mobility, strengthening, endurance, ADLs, and self care. On ASA, atorvastatin, plavix. 2. Transcortical motor aphasia: speech therapy treating  3. HTN/CHF/non-ischemic cardiomyopathy: on amlodipine, carvedilol, HCTZ, losartan - IM following  4. DM: on insulin sliding scale and Metformin. Diarrhea is improving  5. Pain: IM started [place] prn. Was localized to R leg near knee. 6. Bowel Management: Miralax daily, senokot prn, dulcolax prn.  7. DVT Prophylaxis:  low molecular weight heparin, SCD's while in bed and MIRTA's   8. Internal medicine for medical management    Electronically signed by Puja Mccloud MD on 9/2/2020 at 9:50 AM      This note is created with the assistance of a speech recognition program.  While intending to generate a document that actually reflects the content of the visit, the document can still have some errors including those of syntax and sound a like substitutions which may escape proof reading. In such instances, actual meaning can be extrapolated by contextual diversion.

## 2020-09-03 LAB
ANION GAP SERPL CALCULATED.3IONS-SCNC: 12 MMOL/L (ref 9–17)
BUN BLDV-MCNC: 55 MG/DL (ref 8–23)
BUN/CREAT BLD: ABNORMAL (ref 9–20)
CALCIUM SERPL-MCNC: 10.3 MG/DL (ref 8.6–10.4)
CHLORIDE BLD-SCNC: 102 MMOL/L (ref 98–107)
CO2: 22 MMOL/L (ref 20–31)
CREAT SERPL-MCNC: 0.97 MG/DL (ref 0.5–0.9)
GFR AFRICAN AMERICAN: >60 ML/MIN
GFR NON-AFRICAN AMERICAN: 57 ML/MIN
GFR SERPL CREATININE-BSD FRML MDRD: ABNORMAL ML/MIN/{1.73_M2}
GFR SERPL CREATININE-BSD FRML MDRD: ABNORMAL ML/MIN/{1.73_M2}
GLUCOSE BLD-MCNC: 125 MG/DL (ref 70–99)
GLUCOSE BLD-MCNC: 126 MG/DL (ref 65–105)
GLUCOSE BLD-MCNC: 144 MG/DL (ref 65–105)
GLUCOSE BLD-MCNC: 145 MG/DL (ref 65–105)
GLUCOSE BLD-MCNC: 150 MG/DL (ref 65–105)
HCT VFR BLD CALC: 35.3 % (ref 36–46)
HEMOGLOBIN: 12.3 G/DL (ref 12–16)
MCH RBC QN AUTO: 30.7 PG (ref 26–34)
MCHC RBC AUTO-ENTMCNC: 34.8 G/DL (ref 31–37)
MCV RBC AUTO: 88.4 FL (ref 80–100)
NRBC AUTOMATED: ABNORMAL PER 100 WBC
PDW BLD-RTO: 13.5 % (ref 11.5–14.9)
PLATELET # BLD: 201 K/UL (ref 150–450)
PMV BLD AUTO: 10.2 FL (ref 6–12)
POTASSIUM SERPL-SCNC: 4.5 MMOL/L (ref 3.7–5.3)
RBC # BLD: 3.99 M/UL (ref 4–5.2)
SODIUM BLD-SCNC: 136 MMOL/L (ref 135–144)
WBC # BLD: 6.3 K/UL (ref 3.5–11)

## 2020-09-03 PROCEDURE — 6370000000 HC RX 637 (ALT 250 FOR IP): Performed by: INTERNAL MEDICINE

## 2020-09-03 PROCEDURE — 85027 COMPLETE CBC AUTOMATED: CPT

## 2020-09-03 PROCEDURE — 97530 THERAPEUTIC ACTIVITIES: CPT

## 2020-09-03 PROCEDURE — 97112 NEUROMUSCULAR REEDUCATION: CPT

## 2020-09-03 PROCEDURE — 6370000000 HC RX 637 (ALT 250 FOR IP): Performed by: PHYSICAL MEDICINE & REHABILITATION

## 2020-09-03 PROCEDURE — 6360000002 HC RX W HCPCS: Performed by: INTERNAL MEDICINE

## 2020-09-03 PROCEDURE — 92507 TX SP LANG VOICE COMM INDIV: CPT

## 2020-09-03 PROCEDURE — 82947 ASSAY GLUCOSE BLOOD QUANT: CPT

## 2020-09-03 PROCEDURE — 36415 COLL VENOUS BLD VENIPUNCTURE: CPT

## 2020-09-03 PROCEDURE — 99231 SBSQ HOSP IP/OBS SF/LOW 25: CPT | Performed by: STUDENT IN AN ORGANIZED HEALTH CARE EDUCATION/TRAINING PROGRAM

## 2020-09-03 PROCEDURE — 97116 GAIT TRAINING THERAPY: CPT

## 2020-09-03 PROCEDURE — 80048 BASIC METABOLIC PNL TOTAL CA: CPT

## 2020-09-03 PROCEDURE — 97110 THERAPEUTIC EXERCISES: CPT

## 2020-09-03 PROCEDURE — 99232 SBSQ HOSP IP/OBS MODERATE 35: CPT | Performed by: INTERNAL MEDICINE

## 2020-09-03 PROCEDURE — 97535 SELF CARE MNGMENT TRAINING: CPT

## 2020-09-03 PROCEDURE — 1180000000 HC REHAB R&B

## 2020-09-03 RX ADMIN — POLYETHYLENE GLYCOL 3350 17 G: 17 POWDER, FOR SOLUTION ORAL at 09:12

## 2020-09-03 RX ADMIN — ATORVASTATIN CALCIUM 80 MG: 80 TABLET, FILM COATED ORAL at 21:19

## 2020-09-03 RX ADMIN — METRONIDAZOLE 100 MG: 500 TABLET ORAL at 09:12

## 2020-09-03 RX ADMIN — METRONIDAZOLE 100 MG: 500 TABLET ORAL at 21:19

## 2020-09-03 RX ADMIN — CARVEDILOL 6.25 MG: 6.25 TABLET, FILM COATED ORAL at 09:12

## 2020-09-03 RX ADMIN — METFORMIN HYDROCHLORIDE 500 MG: 500 TABLET ORAL at 09:11

## 2020-09-03 RX ADMIN — CLOPIDOGREL BISULFATE 75 MG: 75 TABLET ORAL at 09:11

## 2020-09-03 RX ADMIN — METFORMIN HYDROCHLORIDE 500 MG: 500 TABLET ORAL at 17:08

## 2020-09-03 RX ADMIN — HYDROCHLOROTHIAZIDE 25 MG: 25 TABLET ORAL at 09:11

## 2020-09-03 RX ADMIN — METRONIDAZOLE 100 MG: 500 TABLET ORAL at 15:19

## 2020-09-03 RX ADMIN — ASPIRIN 81 MG CHEWABLE TABLET 81 MG: 81 TABLET CHEWABLE at 09:11

## 2020-09-03 RX ADMIN — ENOXAPARIN SODIUM 40 MG: 40 INJECTION SUBCUTANEOUS at 09:14

## 2020-09-03 RX ADMIN — CARVEDILOL 6.25 MG: 6.25 TABLET, FILM COATED ORAL at 17:08

## 2020-09-03 RX ADMIN — HYDROCODONE BITARTRATE AND ACETAMINOPHEN 1 TABLET: 5; 325 TABLET ORAL at 21:19

## 2020-09-03 RX ADMIN — LOSARTAN POTASSIUM 100 MG: 50 TABLET, FILM COATED ORAL at 09:11

## 2020-09-03 ASSESSMENT — PAIN SCALES - GENERAL
PAINLEVEL_OUTOF10: 6
PAINLEVEL_OUTOF10: 0
PAINLEVEL_OUTOF10: 5

## 2020-09-03 ASSESSMENT — PAIN DESCRIPTION - DESCRIPTORS: DESCRIPTORS: SORE;ACHING

## 2020-09-03 ASSESSMENT — PAIN SCALES - WONG BAKER: WONGBAKER_NUMERICALRESPONSE: 6

## 2020-09-03 ASSESSMENT — PAIN DESCRIPTION - PAIN TYPE: TYPE: ACUTE PAIN

## 2020-09-03 ASSESSMENT — PAIN - FUNCTIONAL ASSESSMENT: PAIN_FUNCTIONAL_ASSESSMENT: PREVENTS OR INTERFERES SOME ACTIVE ACTIVITIES AND ADLS

## 2020-09-03 ASSESSMENT — PAIN DESCRIPTION - PROGRESSION
CLINICAL_PROGRESSION: GRADUALLY IMPROVING
CLINICAL_PROGRESSION: GRADUALLY IMPROVING

## 2020-09-03 ASSESSMENT — PAIN DESCRIPTION - ONSET: ONSET: ON-GOING

## 2020-09-03 ASSESSMENT — PAIN DESCRIPTION - FREQUENCY: FREQUENCY: CONTINUOUS

## 2020-09-03 ASSESSMENT — PAIN DESCRIPTION - ORIENTATION: ORIENTATION: LEFT

## 2020-09-03 ASSESSMENT — PAIN DESCRIPTION - LOCATION: LOCATION: FLANK

## 2020-09-03 NOTE — PROGRESS NOTES
[address]   ACUTE REHABILITATION OCCUPATIONAL THERAPY  DAILY NOTE    Date: 9/3/20  Patient Name: Tyler Sanchez      Room: 3518/4688-27    MRN: [id number]   : 1950  (80 y.o.)  Gender: female   Referring Practitioner: Dr. Najma Cordoba  Diagnosis: Left ischemic middle cerebral artery stroke        Assessment  Performance deficits / Impairments: Decreased functional mobility ; Decreased ADL status; Decreased ROM; Decreased strength;Decreased safe awareness;Decreased cognition;Decreased endurance;Decreased sensation;Decreased balance;Decreased high-level IADLs;Decreased fine motor control;Decreased coordination;Decreased posture  Assessment: Pt progressing well towards OT goals. Writer emphasized use of RUE for gross support during ADLs and completed home exercise program to improve functional utilization during ADLs    Prognosis: Good  Discharge Recommendations: 24 hour supervision or assist;Patient would benefit from continued therapy after discharge  Activity Tolerance: Patient limited by fatigue;Patient Tolerated treatment well  Safety Devices in place: Yes  Type of devices: Left in chair;Call light within reach; Chair alarm in place;Nurse notified  Restraints  Initially in place: Yes  Restraints: chair alarm            Restrictions  Restrictions/Precautions: Fall Risk, Up as Tolerated, General Precautions  Implants present? : (none)  Other position/activity restrictions: up as tolerated  Required Braces or Orthoses?: No    Subjective  Subjective: AM/PM: Pt with no new complaints to report  Comments: AM: Pt supine in bed upon arrival, agreeable to participate.  Pt able to tell writer in one word answers what she had for breakfast. PM: Pt seated in wheelchair upon arrival, agreeable to participate  Patient Currently in Pain: Yes  Pain Level: 6(5/10 in PM session in same distribution)  Pain Location: Flank  Pain Orientation: Left  Restrictions/Precautions: Fall Risk;Up as Tolerated;General Precautions  Overall Orientation Status: Impaired  Orientation Level: Oriented to place;Oriented to time;Oriented to person;Disoriented to situation(May be related to difficulty verbalizing )     Pain Assessment  Pain Assessment: 0-10  Pain Level: 6(5/10 in PM session in same distribution)  Pain Type: Acute pain  Pain Location: Flank  Pain Orientation: Left  Pain Descriptors: Sore, Aching  Pain Frequency: Continuous  Pain Onset: On-going  Clinical Progression: Gradually improving  Functional Pain Assessment: Prevents or interferes some active activities and ADLs    Objective  Cognition  Overall Cognitive Status: Impaired  Arousal/Alertness: Appropriate responses to stimuli  Following Directions: Follows one step commands  Memory: Unable to assess  Following Commands: Follows one step commands with repetition  Safety Judgement: Decreased awareness of need for safety  Awareness of Errors: Assistance required to identify errors made  Insights: Decreased awareness of deficits  Sequencing and Organization: Assistance required to identify errors made;Assistance required to generate solutions;Assistance required to implement solutions  Perception  Overall Perceptual Status: Impaired     Bed mobility  Rolling to Left: Stand by assistance  Supine to Sit: Contact guard assistance  Sit to Supine: Unable to assess  Scooting: Stand by assistance  Comment: AM: Pt required contact guard for RLE progression towards edge of bed. Transfers  Stand Pivot Transfers: Moderate assistance  Sit to stand: Moderate assistance  Stand to sit: Moderate assistance  Transfer Comments: AM: Writer provided cueing for appropriate hand/foot vinny cement and RUE management. No loss of balance. Pt educated on appropriate stand pivot technique.  good return     Functional Mobility  Functional - Mobility Device: Wheelchair  Activity: To/from bathroom  Assist Level: Maximum assistance  Functional Mobility Comments: AM: Pt required assist to propel wheelchair and complete turns within bathroom  Toilet Transfers  Toilet - Technique: Stand pivot; To right; To left  Equipment Used: Grab bars  Toilet Transfer: Moderate assistance  Toilet Transfers Comments: AM: Writer provided support to manage RUE, pt with increased difficulty pivoting this date. Pt fearful of falling     Type of ROM/Therapeutic Exercise  Type of ROM/Therapeutic Exercise: AAROM  Comment: PM: Writer provided education on RUE home exercise program to increase strength and coordination to facilitate increased utilization during ADLs and ADL transfers. Writer provided visual demonstration and prn verbal cueing for technique. Pt educated in appropriate sets/reps/progression and needs reinforcement    Exercises  Shoulder Flexion: 3 sets x 15 reps while grasping cone, writer provided ~ moderate assist to initiate motion  Elbow Flexion: 3 sets x 15 reps while grasping cone, writer provided ~ minimal assist to initiate motion  Elbow Extension: 3 sets x 15 reps while grasping cone, writer provided ~ contact guard assist to initiate motion  Grasp/Release: 3 sets x 15 reps grasping red sponge, writer provided ~ minimal assist to initiate motion     Additional Activities: AM: Pt engaged in lower body dressing simulated tasks with theraband to facilitate increased performance threading BLE while donning pants/underwear. writer provided visual demonstration utilizing theraband loop/ Writer provided education on threading RLE first before attempting LLE. Pt completed 3 sets x 2 reps threading and unthreading theraband. Only verbal cueing required for efficacy. ADL  Feeding: Setup; Increased time to complete(Writer provided built-up utensils for RUE. Pt able to compensate with LUE to bring food to mouth. Pt requires assist to open packages and cut food)  Grooming: Stand by assistance;Setup; Increased time to complete(pt completed oral care and face hygiene seated sink-side.  Writer provided set-up to facilitate safety and independence)  UE Bathing: Setup; Increased time to complete;Minimal assistance(Pt required minimal assist for LUE. Pt able to demo 1 UE technique to complete L axillary region.)  LE Bathing: Maximum assistance;Setup; Increased time to complete(Pt requires assist for B lower legs, feet, posterior thighs, and posterior perineal hygiene. Writer provided education on long-handled sponge, pt verbalized understanding)  UE Dressing: Stand by assistance;Setup(Writer provided education on emmanuel-dressing technique with good return. Writer provided additional verbal cueing for technique and efficacy. good return)  LE Dressing: Dependent/Total(Pt requires assist to thread BLE, minimal assist to stand at sink. Writer provided assist to pull pants around waist. Pt requires assist for socks as well)  Toileting: None  Additional Comments: ADLs complete AM session          Patient Education:  Patient Goals   Patient goals : To return home with sister  Learner:patient  Method: demonstration and explanation       Outcome: demonstrated understanding   OT Education  OT Education: OT Role;Plan of Care;Precautions; ADL Adaptive Strategies;Transfer Training;Energy Conservation;Equipment;Home Exercise Program  Patient Education: see relevant note sections to details  Barriers to Learning: cognitive deficits and aphasia    Plan  Plan  Times per week: 5-7x/week, 1.5 hours/day  Times per day: Twice a day  Current Treatment Recommendations: Self-Care / ADL, Home Management Training, Cognitive/Perceptual Training, Strengthening, ROM, Balance Training, Functional Mobility Training, Endurance Training, Neuromuscular Re-education, Cognitive Reorientation, Pain Management, Safety Education & Training, Patient/Caregiver Education & Training, Equipment Evaluation, Education, & procurement, Positioning  Patient Goals   Patient goals :  To return home with sister  Short term goals  Time Frame for Short term goals: 10 days  Short term goal 1: Pt will perform grooming with stand by assist and use of assistive equipment/modified techniques PRN. Short term goal 2: Pt will perform upper body bathing/dressing with Minimal assist and use of assistive equipment/modified techniques PRN. Short term goal 3: Pt will perform toileting tasks and lower body bathing/dressing with Moderate assist and use of assitive equipment/durable medical equipment/modified techniques PRN. Short term goal 4: Pt will perform functional mobility and transfers during self-care with Minimal assist, least restrictive mobility device, and Fair safety. Short term goal 5: Pt will stand for 4+ minutes with 1-2 UE support, contact guard assist, and no loss of balance while engaging in functional activity of choice. Short term goal 6: Pt will actively participate in 30+ minutes of therapeutic exercise/functional activities to promote increased independence with self-care and mobility. Short term goal 7: Pt will verbalize/demonstrate Good understanding of assistive equipment/durable medical equipment/modified techniques for increased independence with self-care and mobility. Long term goals  Time Frame for Long term goals : By discharge  Long term goal 1: Pt will perform self-feeding and grooming with modified independence. Long term goal 2: Pt will perform bathing, dressing, and toileting tasks with stand by assist, Fair safety, and assist PRN for MIRTA hose. Long term goal 3: Pt will perform functional mobility and transfers during self-care with stand by assist, least restrictive mobility device, and Fair safety. Long term goal 4: Pt will stand for 8+ minutes with 1-2 UE support, stand by assist, and no loss of balance while engaging in functional activity of choice.   Long term goal 5: 9 hole peg, dynamometer, box and block to be assessed for RUE as appropriate       09/03/20 0730 09/03/20 1454   OT Individual Minutes   Time In 0732 1401   Time Out 0833 1432   Minutes 61 31   Time Code Minutes    Timed Code Treatment Minutes 61 Minutes 31 Minutes     Electronically signed by Javier No OT on 9/3/20 at 2:55 PM EDT

## 2020-09-03 NOTE — PROGRESS NOTES
pt. Attempts at expression. Pt. Stating appropriate, clear sentences in conversation, \"I need more water\", \"Don't have any left. \"  \"not too bad today\". Motor speech:  N/a     Reading Comprehension:  match word to picture out of field of 2- 50%, 100% c cues to point, pt. Attempting to read/name all pictures rather than pointing as instructed. Pt. Able to read names of objects aloud 50% of the time. Other:  No family present. Plan:  [x] Continue ST services    [] Discharge from ST:      Discharge recommendations: [] Inpatient Rehab   [] [place]   [] Outpatient Therapy  [] Follow up at trauma clinic   [] Other:       Treatment completed by: Pavan Lui A.CCC/SLP

## 2020-09-03 NOTE — PROGRESS NOTES
Speech Language Pathology  Speech Language Pathology  [facility]    Speech Language Treatment Note    Date: 9/3/2020  Patients Name: Jasson Johnston  MRN: [id number]  Diagnosis: CVA  Patient Active Problem List   Diagnosis Code    Hypertension I10    Hyperlipidemia E78.5    Acute on chronic systolic CHF (congestive heart failure) ([facility] I50.23    Hypertensive urgency I16.0    Idiopathic cardiomyopathy ([facility] I42.8    Hypertensive emergency I16.1    Chronic combined systolic and diastolic HF (heart failure), NYHA class 2 ([facility]) I50.42    Acute on chronic combined systolic and diastolic CHF, NYHA class 4 ([facility]) I50.43    Hypoxia R09.02    Severe mitral regurgitation by prior echocardiogram I34.0    Morbid obesity ([facility]) E66.01    CKD (chronic kidney disease) stage 3, GFR 30-59 ml/min ([facility]) N18.3    Physical deconditioning R53.81    TIA (transient ischemic attack) G45.9    Old lacunar stroke without late effect Z86.73    Dysarthria R47.1    Stroke determined by clinical assessment ([facility] I63.9    Aphasia R47.01    Type 2 diabetes mellitus, without long-term current use of insulin ([facility] E11.9    Cerebral infarction ([facility] - left MCA distribution  I63.9    Uncontrolled type 2 diabetes mellitus with hyperglycemia ([facility] E11.65    Elevated blood pressure reading R03.0    Acute ischemic stroke ([facility]) I63.9       Pain: \"a little bit\" points to leg    Speech and Language Treatment  Treatment time:  5473-7993    Subjective: [x] Alert [x] Cooperative     [] Confused     [] Agitated      [] Lethargic    Objective/Assessment:  Auditory Comprehension:  Point to object in picture via name and function- 100%. Pt. Repeating some phrases p ST during this task. Pt. Responding appropriately to simple questions asked. Verbal Expression:  Neologisms, phonemic paraphasias frequently present with spontaneous speech and during pt. Attempts at expression.      Motor speech:  Repeat CVC syllables beginning with   /h/- 70%, 100% c sentence completion cues and cues to look at ST when model given. /l/- 70%, 100% c cues. Pt. Repeating phrases easier than single words when sentence completion cues given. Reading Comprehension:  Complete phrase c word out of field of 3- 0%, 20% c cues, match word to picture out of field of 3- 50%, 100% c cues to point, pt. Attempting to read/name all pictures. Pt. Able to read names of objects aloud 50% of the time. Other:  No family present. Plan:  [x] Continue ST services    [] Discharge from ST:      Discharge recommendations: [] Inpatient Rehab   [] [place]   [] Outpatient Therapy  [] Follow up at trauma clinic   [] Other:       Treatment completed by: Kuldip Gutierrez A.CCC/SLP

## 2020-09-03 NOTE — CONSULTS
[facility] Internal Medicine    Progress note            Date:   9/3/2020  Patient name:  Lilo Key  Date of admission:  8/26/2020  6:56 PM  MRN:   [id number]  Account:  [de-identified]  YOB: 1950  PCP:    SUE Elizabeth CNP  Room:   2613/2613-01  Code Status:    Full Code    Physician Requesting Consult: Nanci Bruno MD    Reason for Consult: Medical management    Chief Complaint:     She continues to have apraxia but has no cardiorespiratory symptoms    History Obtained From:     Patient medical record nursing staff    History of Present Illness:     17-year-old pleasant  lady was admitted to [facility] on August 23 with a aphasia diagnosed with the left middle cerebral artery ischemic infarct  Known history of systolic congestive heart failure ejection fraction 35 to 40% secondary to nonischemic cardiomyopathy  Diabetes   Duration more than 7 years  Modifying factors on Glucophage and other med  Severity uncontrolled sever  Associated signs and symtoms neuropathy/ckd/ CAD. aggravated with sugar diet and better with low sugar diet             Past Medical History:     Past Medical History:   Diagnosis Date    CHF (congestive heart failure) ([address]     Diabetes mellitus [facility]     H/O cardiac catheterization 08/04/2017    LMCA: Mild irregularites 10-20%. LAD: Mild irregularites 10-20%. LCx: Mild irregularites 10-20%. RCA: Mild irregularites 10-20%. LV function assessed as : Abnormal. EF of 40%.  H/O echocardiogram 12/18/2018    EF 55%. Mildly increased LV wall thickness. The left atrium appears moderately to severely dilated. Moderate to severe mitral regurg. Moderate pulmonary HTN with an estimated RV systolic pressure of 46GNCG. Moderate tricuspid regurg. Evidnece fo moderate diastolic dysfunction is seen.  History of echocardiogram 01/17/2019    EF 55%. LV wall thickness moderately increased.  LA is mildly dilated w/ LA volume for fevers, chills, sweats, fatigue, weight loss  HEENT:  negative for vision, hearing changes, runny nose, throat pain  RESPIRATORY:  negative for shortness of breath, cough, congestion, wheezing. CARDIOVASCULAR:  negative for chest pain, palpitations. GASTROINTESTINAL:  negative for nausea, vomiting, diarrhea, constipation, change in bowel habits, abdominal pain   GENITOURINARY:  negative for difficulty of urination, burning with urination, frequency   INTEGUMENT:  negative for rash, skin lesions, easy bruising   HEMATOLOGIC/LYMPHATIC:  negative for swelling/edema   ALLERGIC/IMMUNOLOGIC:  negative for urticaria , itching  ENDOCRINE:  negative increase in drinking, increase in urination, hot or cold intolerance  MUSCULOSKELETAL:  negative joint pains, muscle aches, swelling of joints  NEUROLOGICAL: Right-sided weakness and speech deficit  BEHAVIOR/PSYCH:  negative for depression, anxiety    Physical Exam:     BP (!) 106/58   Pulse 67   Temp 97.9 °F (36.6 °C) (Oral)   Resp 19   Ht 5' 4\" (1.626 m)   Wt 191 lb 12.8 oz (87 kg)   SpO2 98%   BMI 32.92 kg/m²   Temp (24hrs), Av °F (36.7 °C), Min:97.9 °F (36.6 °C), Max:98 °F (36.7 °C)    Recent Labs     20  1633 20  1928 20  0651 20  1112   POCGLU 108* 203* 126* 144*     No intake or output data in the 24 hours ending 20 1335  Speech deficit  General Appearance:  alert, well appearing, and in no acute distress  Mental status: oriented to person, place, and time with normal affect  Head:  normocephalic, atraumatic.   Eye: no icterus, redness, pupils equal and reactive, extraocular eye movements intact, conjunctiva clear  Ear: normal external ear, no discharge, hearing intact  Nose:  no drainage noted  Mouth: mucous membranes moist  Neck: supple, no carotid bruits, thyroid not palpable  Lungs: Bilateral equal air entry, clear to ausculation, no wheezing, rales or rhonchi, normal effort  Cardiovascular: normal rate, regular rhythm, no murmur, gallop, rub.   Abdomen: Soft, nontender, nondistended, normal bowel sounds, no hepatomegaly or splenomegaly  Neurologic: Right hemiparesis with Broca's aphasia skin: No gross lesions, rashes, bruising or bleeding on exposed skin area  Extremities:  peripheral pulses palpable, no pedal edema or calf pain with palpation  P    Investigations:      Laboratory Testing:  Recent Results (from the past 24 hour(s))   POC Glucose Fingerstick    Collection Time: 09/02/20  4:33 PM   Result Value Ref Range    POC Glucose 108 (H) 65 - 105 mg/dL   POC Glucose Fingerstick    Collection Time: 09/02/20  7:28 PM   Result Value Ref Range    POC Glucose 203 (H) 65 - 105 mg/dL   Basic Metabolic Panel w/ Reflex to MG    Collection Time: 09/03/20  6:19 AM   Result Value Ref Range    Glucose 125 (H) 70 - 99 mg/dL    BUN 55 (H) 8 - 23 mg/dL    CREATININE 0.97 (H) 0.50 - 0.90 mg/dL    Bun/Cre Ratio NOT REPORTED 9 - 20    Calcium 10.3 8.6 - 10.4 mg/dL    Sodium 136 135 - 144 mmol/L    Potassium 4.5 3.7 - 5.3 mmol/L    Chloride 102 98 - 107 mmol/L    CO2 22 20 - 31 mmol/L    Anion Gap 12 9 - 17 mmol/L    GFR Non-African American 57 (L) >60 mL/min    GFR African American >60 >60 mL/min    GFR Comment          GFR Staging NOT REPORTED    CBC    Collection Time: 09/03/20  6:19 AM   Result Value Ref Range    WBC 6.3 3.5 - 11.0 k/uL    RBC 3.99 (L) 4.0 - 5.2 m/uL    Hemoglobin 12.3 12.0 - 16.0 g/dL    Hematocrit 35.3 (L) 36 - 46 %    MCV 88.4 80 - 100 fL    MCH 30.7 26 - 34 pg    MCHC 34.8 31 - 37 g/dL    RDW 13.5 11.5 - 14.9 %    Platelets 980 510 - 996 k/uL    MPV 10.2 6.0 - 12.0 fL    NRBC Automated NOT REPORTED per 100 WBC   POC Glucose Fingerstick    Collection Time: 09/03/20  6:51 AM   Result Value Ref Range    POC Glucose 126 (H) 65 - 105 mg/dL   POC Glucose Fingerstick    Collection Time: 09/03/20 11:12 AM   Result Value Ref Range    POC Glucose 144 (H) 65 - 105 mg/dL       Imaging/Diagonstics:      Assessment :      Primary Problem  <principal problem not specified>    Active Hospital Problems    Diagnosis Date Noted    Acute ischemic stroke ([facility] [I63.9] 08/26/2020    Cerebral infarction [facility]) - left MCA distribution  [I63.9] 08/26/2020    Type 2 diabetes mellitus, without long-term current use of insulin ([facility] [E11.9] 08/25/2020    Aphasia [R47.01]     CKD (chronic kidney disease) stage 3, GFR 30-59 ml/min ([facility]) [N18.3] 12/20/2018    Morbid obesity ([facility] [E66.01] 12/20/2018    Acute on chronic combined systolic and diastolic CHF, NYHA class 4 ([facility]) [I50.43] 12/17/2018    Chronic combined systolic and diastolic HF (heart failure), NYHA class 2 ([facility] [I50.42] 08/28/2017    Hyperlipidemia [E78.5]     Hypertension [I10]     Idiopathic cardiomyopathy ([facility] [I42.8]        Plan:     1. Uncontrolled diabetes mellitus with the peripheral organ damage with ischemic CVA  2. Ischemic CVA with right hemiparesis and Broca's aphasia  3. Metformin recent A1c is 7.3 reviewed and reconciled  4. Ischemic CVA hyperlipidemia aspirin and Lipitor Plavix 75 all started  5. Losartan 100 mg for hypertension and diabetic renal protection  6. norco for pain  7. She is asymptomatic and her pulse is 72 and regular. she does have high BUN which is unexplained. Angie Orellana MD  9/3/2020  1:35 PM    Copy sent to Dr. Charles Montiel APRN - CNP    Please note that this chart was generated using voice recognition Dragon dictation software. Although every effort was made to ensure the accuracy of this automated transcription, some errors in transcription may have occurred.

## 2020-09-03 NOTE — PLAN OF CARE
Problem: Skin Integrity:  Goal: Will show no infection signs and symptoms  Description: Will show no infection signs and symptoms  Outcome: Ongoing  Goal: Absence of new skin breakdown  Description: Absence of new skin breakdown  Outcome: Ongoing     Problem: Falls - Risk of:  Goal: Will remain free from falls  Description: Will remain free from falls  Outcome: Ongoing  Note: Pt has remained free from falls this shift. Call light within reach. Side rails up X2. Bed in lowest position. Pt safety maintained. Goal: Absence of physical injury  Description: Absence of physical injury  Outcome: Ongoing     Problem: Neurological  Goal: Maximum potential motor/sensory/cognitive function  Outcome: Ongoing     Problem: Musculor/Skeletal Functional Status  Goal: Highest potential functional level  Outcome: Ongoing  Goal: Absence of falls  Outcome: Ongoing     Problem: Neurological  Goal: Maximum potential motor/sensory/cognitive function  Outcome: Ongoing     Problem: Nutrition  Goal: Optimal nutrition therapy  Outcome: Ongoing     Problem: Pain:  Goal: Pain level will decrease  Description: Pain level will decrease  Outcome: Ongoing  Note: No reports of pain per pt this shift.    Goal: Control of acute pain  Description: Control of acute pain  Outcome: Ongoing  Goal: Control of chronic pain  Description: Control of chronic pain  Outcome: Ongoing

## 2020-09-03 NOTE — PROGRESS NOTES
Moderate Assistance  Comment: patient required assistance with right hand  and placement on walker. does not demonstrate reaching back and release walker from   Ambulation  Ambulation?: Yes  More Ambulation?: Yes  Ambulation 1  Surface: level tile  Device: Rolling Walker  Other Apparatus: Right, Wheelchair follow(right hand  support; right ankle brace in eversion support strappin; 3rd person WC follow)  Assistance: 2 Person assistance  Quality of Gait: poor coordination proprioception of RLE placement; increased tone of RUE with facilitation  Gait Deviations: Slow Jacinda, Staggers, Decreased step length, Decreased step height  Distance: 20 feet  Comments: pt able to flex right hip LE with tactile and verbal cues, increased hip abduction heel strike with therapist manual assisting placement    Surface: level tile  Ambulation 1  Surface: level tile  Device: Rolling Walker  Other Apparatus: Right, Wheelchair follow(right hand  support; right ankle brace in eversion support strappin; 3rd person WC follow)  Assistance: 2 Person assistance  Quality of Gait: poor coordination proprioception of RLE placement; increased tone of RUE with facilitation  Gait Deviations: Slow Jacinda, Staggers, Decreased step length, Decreased step height  Distance: 20 feet  Comments: pt able to flex right hip LE with tactile and verbal cues, increased hip abduction heel strike with therapist manual assisting placement    OT:  ADL  Feeding: Setup, Increased time to complete(Writer provided built-up utensils for RUE. Pt able to compensate with LUE to bring food to mouth. Pt requires assist to open packages and cut food)  Grooming: Stand by assistance, Setup, Increased time to complete(pt completed oral care and face hygiene seated sink-side. Writer provided set-up to facilitate safety and independence)  UE Bathing: Setup, Increased time to complete, Minimal assistance(Pt required minimal assist for LUE.  Pt able to demo 1 UE technique to complete L axillary region.)  LE Bathing: Maximum assistance, Setup, Increased time to complete(Pt requires assist for B lower legs, feet, posterior thighs, and posterior perineal hygiene. Writer provided education on long-handled sponge, pt verbalized understanding)  UE Dressing: Stand by assistance, Setup(Writer provided education on emmanuel-dressing technique with good return. Writer provided additional verbal cueing for technique and efficacy. good return)  LE Dressing: Dependent/Total(Pt requires assist to thread BLE, minimal assist to stand at sink. Writer provided assist to pull pants around wasit. Pt requires assist for socks as well)  Toileting: None  Additional Comments: ADLs complete AM session         Balance  Sitting Balance: Stand by assistance  Standing Balance: Minimal assistance   Standing Balance  Time: >1 minute x 5+  Activity: self-care tasks and transfers  Comment: 1-2 UE support, mod difficulty maintaining RUE WB with dycem, SBA required, wrist pronating  Functional Mobility  Functional - Mobility Device: Wheelchair  Activity: To/from bathroom  Assist Level: Maximum assistance  Functional Mobility Comments: AM: Pt required assist to propell wheelchair and complete turns within bathroom     Bed mobility  Bridging: Stand by assistance  Rolling to Left: Stand by assistance  Rolling to Right: Stand by assistance  Supine to Sit: Contact guard assistance  Sit to Supine: Unable to assess  Scooting: Stand by assistance  Comment: AM: Pt required contact gaurd for RLE progression towards edge of bed. Transfers  Stand Pivot Transfers: Moderate assistance  Sit to stand: Moderate assistance  Stand to sit: Moderate assistance  Transfer Comments: AM: Writer provided cueing for apporpriate hand/foot vinny cement and RUE management. No loss of balance. Pt educated on appropriate stand pivot technique.  good retirm   Toilet Transfers  Toilet - Technique: Stand pivot, To right, To left  Equipment Used: Grab bars  Toilet Transfer: Moderate assistance  Toilet Transfers Comments: AM: Writer provided support to manage RUE, pt with increased difficulty pivoting this date. Pt fearful of falling     Shower Transfers  Shower - Transfer From: Wheelchair  Shower - Transfer Type: To and From  Shower - Transfer To: Transfer tub bench  Shower - Technique: Stand pivot, To right, To left  Shower Transfers: Moderate assistance, Dependent  Shower Transfers Comments: Moderate verbal/tactile cues for hand placement and safety for entry; total assist for exit due to seated on floor       SPEECH:  Subjective: [x]? Alert     [x]? Cooperative     []? Confused     []? Agitated      []? Lethargic     Objective/Assessment:  Auditory Comprehension:    Pt. Responding appropriately to simple conversational questions asked. Follow one step directions- 33%, 50% c cues. Frequent groping noted.      Verbal Expression:   Name body parts- 12%, 38% c cues. Pt. circumlocuting to find correct word. Count 1-10- 30%, 40% c cues, days of week- 100% c ST naming in unison. Name objects around room- 0%, 20% c sentence completion cues. Neologisms, phonemic paraphasias frequently present with spontaneous speech and during pt. Attempts at expression. Pt. Repeating c 30% accuracy c max cues \"Ashley, can you say. ....... Nati Boop \"     Motor speech:  n/a     Reading Comprehension:  n/a       Other:  No family present. Objective:  /71   Pulse 54   Temp 97.6 °F (36.4 °C) (Oral)   Resp 16   Ht 5' 4\" (1.626 m)   Wt 191 lb 12.8 oz (87 kg)   SpO2 98%   BMI 32.92 kg/m²       GEN: Well developed, well nourished, in NAD  HEENT:  NCAT. PERRL. EOMI. Mucous membranes pink and moist.   PULM:  Clear to ausculation. No rales or rhonchi. Respirations WNL and unlabored. CV:  Bradycardic rate regular rhythm. No murmurs or gallops. GI:  Abdomen soft. Nontender. Non-distended. BS + and equal.    NEUROLOGICAL: A&O x3. Sensation intact to light touch.  Short sentences improving. MSK:  Functional ROM LUE and LLE. Impaired ROM RUE and RLE. Motor testing 4+/5 key muscles LUE and LLE. R  3/5, R wrist extension 2/5, R finger extension 2/5, R elbow flexion 3/5 . SKIN: Warm dry and intact. Good turgor. EXTREMITIES:  No calf tenderness to palpation. No edema BLEs. Radu Funes PSYCH: Mood WNL. Appropriately interactive. Affect WNL. Diagnostics:     CBC:   Recent Labs     09/03/20  0619   WBC 6.3   RBC 3.99*   HGB 12.3   HCT 35.3*   MCV 88.4   RDW 13.5        BMP:   Recent Labs     09/03/20 0619 09/04/20  0621    135   K 4.5 4.3    101   CO2 22 24   BUN 55* 47*   CREATININE 0.97* 0.88   GLUCOSE 125* 123*     BNP: No results for input(s): BNP in the last 72 hours. PT/INR: No results for input(s): PROTIME, INR in the last 72 hours. APTT: No results for input(s): APTT in the last 72 hours. CARDIAC ENZYMES: No results for input(s): CKMB, CKMBINDEX, TROPONINT in the last 72 hours. Invalid input(s): CKTOTAL;3  FASTING LIPID PANEL:  Lab Results   Component Value Date    CHOL 160 08/24/2020    HDL 44 08/24/2020    TRIG 79 08/24/2020     LIVER PROFILE: No results for input(s): AST, ALT, ALB, BILIDIR, BILITOT, ALKPHOS in the last 72 hours.      Current Medications:   Current Facility-Administered Medications: carvedilol (COREG) tablet 6.25 mg, 6.25 mg, Oral, BID WC  HYDROcodone-acetaminophen (NORCO) 5-325 MG per tablet 1 tablet, 1 tablet, Oral, Q4H PRN  gabapentin (NEURONTIN) capsule 100 mg, 100 mg, Oral, TID  ondansetron (ZOFRAN-ODT) disintegrating tablet 4 mg, 4 mg, Oral, Q6H PRN  metFORMIN (GLUCOPHAGE) tablet 500 mg, 500 mg, Oral, BID WC  insulin lispro (HUMALOG) injection vial 0-12 Units, 0-12 Units, Subcutaneous, TID WC  insulin lispro (HUMALOG) injection vial 0-6 Units, 0-6 Units, Subcutaneous, Nightly  clopidogrel (PLAVIX) tablet 75 mg, 75 mg, Oral, Daily  enoxaparin (LOVENOX) injection 40 mg, 40 mg, Subcutaneous, Daily  polyethylene glycol (GLYCOLAX) packet 17 g, 17 g, Oral, Daily PRN  amLODIPine (NORVASC) tablet 10 mg, 10 mg, Oral, QPM  atorvastatin (LIPITOR) tablet 80 mg, 80 mg, Oral, Nightly  losartan (COZAAR) tablet 100 mg, 100 mg, Oral, Daily **AND** hydroCHLOROthiazide (HYDRODIURIL) tablet 25 mg, 25 mg, Oral, Daily  aspirin chewable tablet 81 mg, 81 mg, Oral, Daily  polyethylene glycol (GLYCOLAX) packet 17 g, 17 g, Oral, Daily  senna (SENOKOT) tablet 17.2 mg, 2 tablet, Oral, Daily PRN  bisacodyl (DULCOLAX) suppository 10 mg, 10 mg, Rectal, Daily PRN      Impression/Plan:   Impaired ADLs, gait, and mobility due to:      1. R hemiparesis secondary to ischemic L MCA CVA:  PT/OT for gait, mobility, strengthening, endurance, ADLs, and self care. On ASA, atorvastatin, plavix. 2. Transcortical motor aphasia: speech therapy treating  3. HTN/CHF/non-ischemic cardiomyopathy: on amlodipine, carvedilol, HCTZ, losartan - IM following  4. DM: on insulin sliding scale and Metformin. Diarrhea is improving  5. Pain: IM started [place] prn. Was localized to R leg near knee. 6. Bowel Management: Miralax daily, senokot prn, dulcolax prn.  7. DVT Prophylaxis:  low molecular weight heparin, SCD's while in bed and MIRTA's   8. Internal medicine for medical management    Electronically signed by Harshad Simmons MD on 9/4/2020 at 10:53 AM      This note is created with the assistance of a speech recognition program.  While intending to generate a document that actually reflects the content of the visit, the document can still have some errors including those of syntax and sound a like substitutions which may escape proof reading. In such instances, actual meaning can be extrapolated by contextual diversion.

## 2020-09-04 LAB
ANION GAP SERPL CALCULATED.3IONS-SCNC: 10 MMOL/L (ref 9–17)
BUN BLDV-MCNC: 47 MG/DL (ref 8–23)
BUN/CREAT BLD: ABNORMAL (ref 9–20)
CALCIUM SERPL-MCNC: 10.1 MG/DL (ref 8.6–10.4)
CHLORIDE BLD-SCNC: 101 MMOL/L (ref 98–107)
CO2: 24 MMOL/L (ref 20–31)
CREAT SERPL-MCNC: 0.88 MG/DL (ref 0.5–0.9)
EKG ATRIAL RATE: 68 BPM
EKG P AXIS: 63 DEGREES
EKG P-R INTERVAL: 212 MS
EKG Q-T INTERVAL: 432 MS
EKG QRS DURATION: 100 MS
EKG QTC CALCULATION (BAZETT): 459 MS
EKG R AXIS: -12 DEGREES
EKG T AXIS: 108 DEGREES
EKG VENTRICULAR RATE: 68 BPM
GFR AFRICAN AMERICAN: >60 ML/MIN
GFR NON-AFRICAN AMERICAN: >60 ML/MIN
GFR SERPL CREATININE-BSD FRML MDRD: ABNORMAL ML/MIN/{1.73_M2}
GFR SERPL CREATININE-BSD FRML MDRD: ABNORMAL ML/MIN/{1.73_M2}
GLUCOSE BLD-MCNC: 106 MG/DL (ref 65–105)
GLUCOSE BLD-MCNC: 122 MG/DL (ref 65–105)
GLUCOSE BLD-MCNC: 123 MG/DL (ref 70–99)
GLUCOSE BLD-MCNC: 171 MG/DL (ref 65–105)
POTASSIUM SERPL-SCNC: 4.3 MMOL/L (ref 3.7–5.3)
SODIUM BLD-SCNC: 135 MMOL/L (ref 135–144)

## 2020-09-04 PROCEDURE — 97530 THERAPEUTIC ACTIVITIES: CPT

## 2020-09-04 PROCEDURE — 6370000000 HC RX 637 (ALT 250 FOR IP): Performed by: INTERNAL MEDICINE

## 2020-09-04 PROCEDURE — 36415 COLL VENOUS BLD VENIPUNCTURE: CPT

## 2020-09-04 PROCEDURE — 6360000002 HC RX W HCPCS: Performed by: INTERNAL MEDICINE

## 2020-09-04 PROCEDURE — 93010 ELECTROCARDIOGRAM REPORT: CPT | Performed by: INTERNAL MEDICINE

## 2020-09-04 PROCEDURE — 80048 BASIC METABOLIC PNL TOTAL CA: CPT

## 2020-09-04 PROCEDURE — 6370000000 HC RX 637 (ALT 250 FOR IP): Performed by: PHYSICAL MEDICINE & REHABILITATION

## 2020-09-04 PROCEDURE — 97535 SELF CARE MNGMENT TRAINING: CPT

## 2020-09-04 PROCEDURE — 92507 TX SP LANG VOICE COMM INDIV: CPT

## 2020-09-04 PROCEDURE — 97110 THERAPEUTIC EXERCISES: CPT

## 2020-09-04 PROCEDURE — 82947 ASSAY GLUCOSE BLOOD QUANT: CPT

## 2020-09-04 PROCEDURE — 97116 GAIT TRAINING THERAPY: CPT

## 2020-09-04 PROCEDURE — 99232 SBSQ HOSP IP/OBS MODERATE 35: CPT | Performed by: INTERNAL MEDICINE

## 2020-09-04 PROCEDURE — 99231 SBSQ HOSP IP/OBS SF/LOW 25: CPT | Performed by: PHYSICAL MEDICINE & REHABILITATION

## 2020-09-04 PROCEDURE — 97112 NEUROMUSCULAR REEDUCATION: CPT

## 2020-09-04 PROCEDURE — 1180000000 HC REHAB R&B

## 2020-09-04 RX ADMIN — CLOPIDOGREL BISULFATE 75 MG: 75 TABLET ORAL at 07:28

## 2020-09-04 RX ADMIN — HYDROCODONE BITARTRATE AND ACETAMINOPHEN 1 TABLET: 5; 325 TABLET ORAL at 15:33

## 2020-09-04 RX ADMIN — METRONIDAZOLE 100 MG: 500 TABLET ORAL at 15:33

## 2020-09-04 RX ADMIN — ATORVASTATIN CALCIUM 80 MG: 80 TABLET, FILM COATED ORAL at 22:00

## 2020-09-04 RX ADMIN — ENOXAPARIN SODIUM 40 MG: 40 INJECTION SUBCUTANEOUS at 07:28

## 2020-09-04 RX ADMIN — METRONIDAZOLE 100 MG: 500 TABLET ORAL at 22:00

## 2020-09-04 RX ADMIN — CARVEDILOL 6.25 MG: 6.25 TABLET, FILM COATED ORAL at 17:23

## 2020-09-04 RX ADMIN — METRONIDAZOLE 100 MG: 500 TABLET ORAL at 07:28

## 2020-09-04 RX ADMIN — POLYETHYLENE GLYCOL 3350 17 G: 17 POWDER, FOR SOLUTION ORAL at 07:29

## 2020-09-04 RX ADMIN — AMLODIPINE BESYLATE 10 MG: 10 TABLET ORAL at 17:23

## 2020-09-04 RX ADMIN — HYDROCHLOROTHIAZIDE 25 MG: 25 TABLET ORAL at 07:28

## 2020-09-04 RX ADMIN — CARVEDILOL 6.25 MG: 6.25 TABLET, FILM COATED ORAL at 07:28

## 2020-09-04 RX ADMIN — LOSARTAN POTASSIUM 100 MG: 50 TABLET, FILM COATED ORAL at 07:28

## 2020-09-04 RX ADMIN — ASPIRIN 81 MG CHEWABLE TABLET 81 MG: 81 TABLET CHEWABLE at 07:28

## 2020-09-04 RX ADMIN — HYDROCODONE BITARTRATE AND ACETAMINOPHEN 1 TABLET: 5; 325 TABLET ORAL at 22:00

## 2020-09-04 RX ADMIN — METFORMIN HYDROCHLORIDE 500 MG: 500 TABLET ORAL at 07:28

## 2020-09-04 RX ADMIN — INSULIN LISPRO 2 UNITS: 100 INJECTION, SOLUTION INTRAVENOUS; SUBCUTANEOUS at 12:34

## 2020-09-04 RX ADMIN — METFORMIN HYDROCHLORIDE 500 MG: 500 TABLET ORAL at 17:23

## 2020-09-04 ASSESSMENT — PAIN DESCRIPTION - PROGRESSION: CLINICAL_PROGRESSION: GRADUALLY IMPROVING

## 2020-09-04 ASSESSMENT — PAIN SCALES - GENERAL
PAINLEVEL_OUTOF10: 5
PAINLEVEL_OUTOF10: 1
PAINLEVEL_OUTOF10: 0
PAINLEVEL_OUTOF10: 5

## 2020-09-04 ASSESSMENT — PAIN SCALES - WONG BAKER: WONGBAKER_NUMERICALRESPONSE: 6

## 2020-09-04 NOTE — PROGRESS NOTES
Physical Therapy  Facility/Department: [facility] ACUTE REHAB  Daily Treatment Note  NAME: Shaheed Eng  : 1950  MRN: [id number]    Date of Service: 2020    Discharge Recommendations:  Patient would benefit from continued therapy after discharge, 24 hour supervision or assist   PT Equipment Recommendations  Equipment Needed: (TBD)  Other: to be determined at pt progreses    Assessment   Body structures, Functions, Activity limitations: Decreased functional mobility ; Decreased ADL status; Decreased safe awareness;Decreased strength;Decreased balance;Decreased endurance;Decreased cognition;Decreased fine motor control;Decreased coordination  Assessment: Pt required modAx1 on strong side for safety during transfers, pt able to ambulate 10ft at maxA with LUE support on hand rail, therapist supported RLE to prevent buckling. Treatment Diagnosis: Impaired function, weakness 2\" L MCA CVA. Specific instructions for Next Treatment: progress trasnfer training and ambulation as able, try balance activities. Prognosis: Good  Decision Making: Medium Complexity  History: PT with hx of HTN, DM, CHF, CVAs, pt with recent L MCA CVA. Exam: MMT, ROM, ambulation, transfer training, functional mobility. Clinical Presentation: Pt alert, able to answer \"yes\" and \"no\" when asked questions. Barriers to Learning: pt is aphasic, shows difficulty expressing and understanding information at times. REQUIRES PT FOLLOW UP: Yes  Activity Tolerance  Activity Tolerance: Patient limited by pain; Patient limited by fatigue  Other Comments  Comments: post static  parallel bars , pt diaphoretic BP 95/63 HR 68 SpO2 95% nursing notified     Patient Diagnosis(es): There were no encounter diagnoses. has a past medical history of CHF (congestive heart failure) ([facility] [address] Diabetes mellitus ([facility] [address] H/O cardiac catheterization, H/O echocardiogram, History of echocardiogram, History of echocardiogram, Hyperlipidemia, and Hypertension. Orientation  Orientation  Overall Orientation Status: Impaired  Orientation Level: Unable to assess(pt is aphasic unable to assess at this time)  Cognition      Objective   Bed mobility  Bridging: Stand by assistance  Scooting: Stand by assistance  Comment: patient needs extra time to complete tasks with least amount of assistance. patient able to initiate mobility from both left and right side of bed. therapist protection of RUE as needed d/t poor awareness/right side neglect  Transfers  Sit to Stand: Minimal Assistance; Moderate Assistance  Stand to sit: Minimal Assistance; Moderate Assistance  Bed to Chair: Moderate assistance  Stand Pivot Transfers: Moderate Assistance  Comment: patient required assistance with hand placement on walker. does not demonstrate reaching back. Ambulation  Ambulation?: Yes  More Ambulation?: Yes  Ambulation 1  Surface: level tile  Device: Hemiwalker  Other Apparatus: Wheelchair follow(hand held assist on RUE wide guard position)  Assistance: Minimal assistance;Contact guard assistance(patient needs only support for LOB)  Quality of Gait: poor coordination proprioception of RLE placement; increased tone of RUE with facilitation; caution to not over correct stability guarding with gait belt . Gait Deviations: Slow Jacinda;Staggers;Decreased step length;Decreased step height  Distance: 20 feet x 10 repetition for learning  Comments: poor follow with Verbal cues- MIRROR feedback worked well with minimal cues to look in mirror. Ambulation 2  Surface - 2: level tile  Device 2: Parallel Bars  Other Apparatus 2: Right; Wheelchair follow(right small hand  on Rolling Walker)  Assistance 2: Minimal assistance  Quality of Gait 2: pt more fatigued with difficulty advancing right LE, pt attempting to keep advancing Left LE and leaving right LE behind, w/c following close, increased difficulty following directions of right vs left  Gait Deviations: Slow Jacinda;Decreased step length;Decreased step height;Shuffles  Distance: 20 feet  Comments: tactile and verbal cues for safety  Stairs/Curb  Stairs?: No  Wheelchair Activities  Wheelchair Size: 18 in  Wheelchair Type: Standard  Wheelchair Cushion: Standard  Wheelchair Parts Management: No  Propulsion: Yes  Propulsion 1  Propulsion: Manual  Level: Level Tile  Method: LUE;LLE;RLE  Level of Assistance: Moderate assistance  Description/ Details: poor demonstration of mobility difficult with followng I/S  Distance: 60 ft     Balance  Posture: Fair  Sitting - Static: Good;-  Sitting - Dynamic: Good;-  Standing - Static: Fair  Standing - Dynamic: Poor;+  Comments: standing balance with RW, and with no device, therapist assist needed to block RLE. Exercises  Knee Long Arc Quad: 1x10 mame  Ankle Pumps: 1x10 mame  Other exercises  Other exercises?: Yes  Other exercises 1: sit <> stands 2 sets of 5 reps  Other exercises 2: seated bilateral LE in reciprical pattern right AAROM left 2# red band x 15  Other exercises 3: pre-gait training, lateral weight shifting in parallel bars  Other exercises 4: standing BLe in//bars therex  Other exercises 5: Supine/sidelying BLE 15 reps/sidelying WB on elbow and extended arm   PROM RLE (degrees)  RLE PROM: WFL  AROM RLE (degrees)  RLE AROM: WFL  RLE General AROM: Pt lacks terminal knee extension, full hip flexion. AROM LLE (degrees)  LLE AROM : WFL  AROM RUE (degrees)  RUE General AROM: See OT evaluation. AROM LUE (degrees)  LUE General AROM: See OT evaluation. Strength RLE  Strength RLE: Exception  Comment: hip 2+/5 knee 3+/5, ankle 4-/5  Strength LLE  Strength LLE: WFL  Comment: hip, knee, ankle 4/5. Strength RUE  Comment: See OT evaluation. Strength LUE  Comment: See OT evaluation.                   G-Code     OutComes Score                                                     AM-PAC Score             Goals  Short term goals  Time Frame for Short term goals: 10 days   Short term goal 1: Pt to perform bed mobility independently. Short term goal 2: Pt to perform transfers with minAx1. Short term goal 3: Pt to ambulate 150 ft, least restrictive device, minAx1. Short term goal 4: Pt to tolerate 30-60 mins physical therapy to increase endurance and strength. Short term goal 5: Pt to complete 5 steps, with LUE support, minAx1. Long term goals  Time Frame for Long term goals : By LOS. Long term goal 2: Pt to complete transfers with SBA to promote independence. Long term goal 3: Pt to ambulate 200 ft, least restrictive device, SBA. Long term goal 4: Pt to complete 5-12 steps, CGA to promote function. Long term goal 5: Pt to improve PASS score from 15/36 to 22/36. Patient Goals   Patient goals : To return back home. Plan    Plan  Times per week: 1.5 hrs/day 5-7 days/week  Times per day: (1-2 visits daily)  Specific instructions for Next Treatment: progress trasnfer training and ambulation as able, try balance activities.    Current Treatment Recommendations: Strengthening, ROM, Balance Training, Functional Mobility Training, Transfer Training, Endurance Training, Gait Training, Stair training, Neuromuscular Re-education, Safety Education & Training, Patient/Caregiver Education & Training, Equipment Evaluation, Education, & procurement  Safety Devices  Type of devices: Left in bed, Call light within reach, Bed alarm in place, Gait belt, All fall risk precautions in place  Restraints  Initially in place: No     Therapy Time     09/04/20 1124 09/04/20 1250   PT Individual Minutes   Time In 0910 1249   Time Out 1000 1324   Minutes 48 35     Rosibel Mancilla, PTA

## 2020-09-04 NOTE — PROGRESS NOTES
Physical Medicine & Rehabilitation  Progress Note      Subjective:      81year-old female with ischemic CVA with R dominant hemiparesis and aphasia. Patient reports some soreness in her lower limbs after therapy. She states that therapies are going well overall. ROS:  Denies fevers, chills, sweats. No chest pain, palpitations, lightheadedness. Denies coughing, wheezing or shortness of breath. Denies abdominal pain, nausea, diarrhea or constipation. No new areas of joint pain. Denies new areas of numbness or weakness. Denies new anxiety or depression issues. No new skin problems. Rehabilitation:   Progressing in therapies. PT:  Restrictions/Precautions: Fall Risk, Up as Tolerated, General Precautions  Implants present? : (none)  Other position/activity restrictions: up as tolerated   Transfers  Sit to Stand: Minimal Assistance, Moderate Assistance  Stand to sit: Minimal Assistance, Moderate Assistance  Bed to Chair: Moderate assistance  Stand Pivot Transfers: Moderate Assistance  Comment: patient required assistance with right hand  and placement on walker. does not demonstrate reaching back and release walker from   Ambulation 1  Surface: level tile  Device: Rolling Walker  Other Apparatus: Right, Wheelchair follow(right hand  support; right ankle brace in eversion support strappin; 3rd person WC follow)  Assistance: 2 Person assistance  Quality of Gait: poor coordination proprioception of RLE placement; increased tone of RUE with facilitation  Gait Deviations: Slow Jacinda, Staggers, Decreased step length, Decreased step height  Distance: 20 feet  Comments: pt able to flex right hip LE with tactile and verbal cues, increased hip abduction heel strike with therapist manual assisting placement    Transfers  Sit to Stand: Minimal Assistance, Moderate Assistance  Stand to sit: Minimal Assistance, Moderate Assistance  Bed to Chair: Moderate assistance  Stand Pivot Transfers:  Moderate complete L axillary region.)  LE Bathing: Maximum assistance, Setup, Increased time to complete(Pt requires assist for B lower legs, feet, posterior thighs, and posterior perineal hygiene. Writer provided education on long-handled sponge, pt verbalized understanding)  UE Dressing: Stand by assistance, Setup(Writer provided education on emmanuel-dressing technique with good return. Writer provided additional verbal cueing for technique and efficacy. good return)  LE Dressing: Dependent/Total(Pt requires assist to thread BLE, minimal assist to stand at sink. Writer provided assist to pull pants around wasit. Pt requires assist for socks as well)  Toileting: None  Additional Comments: ADLs complete AM session         Balance  Sitting Balance: Stand by assistance  Standing Balance: Minimal assistance   Standing Balance  Time: >1 minute x 5+  Activity: self-care tasks and transfers  Comment: 1-2 UE support, mod difficulty maintaining RUE WB with dycem, SBA required, wrist pronating  Functional Mobility  Functional - Mobility Device: Wheelchair  Activity: To/from bathroom  Assist Level: Maximum assistance  Functional Mobility Comments: AM: Pt required assist to propell wheelchair and complete turns within bathroom     Bed mobility  Bridging: Stand by assistance  Rolling to Left: Stand by assistance  Rolling to Right: Stand by assistance  Supine to Sit: Contact guard assistance  Sit to Supine: Unable to assess  Scooting: Stand by assistance  Comment: AM: Pt required contact gaurd for RLE progression towards edge of bed. Transfers  Stand Pivot Transfers: Moderate assistance  Sit to stand: Moderate assistance  Stand to sit: Moderate assistance  Transfer Comments: AM: Writer provided cueing for apporpriate hand/foot vinny cement and RUE management. No loss of balance. Pt educated on appropriate stand pivot technique.  good retirm   Toilet Transfers  Toilet - Technique: Stand pivot, To right, To left  Equipment Used: Cam[place]ab bars  Toilet Transfer: Moderate assistance  Toilet Transfers Comments: AM: Writer provided support to manage RUE, pt with increased difficulty pivoting this date. Pt fearful of falling     Shower Transfers  Shower - Transfer From: Wheelchair  Shower - Transfer Type: To and From  Shower - Transfer To: Transfer tub bench  Shower - Technique: Stand pivot, To right, To left  Shower Transfers: Moderate assistance, Dependent  Shower Transfers Comments: Moderate verbal/tactile cues for hand placement and safety for entry; total assist for exit due to seated on floor       SPEECH:  Subjective: [x]? Alert     [x]? Cooperative     []? Confused     []? Agitated      []? Lethargic     Objective/Assessment:  Auditory Comprehension:  Point to object in picture via name and function- 100%. Pt. Repeating some phrases p ST during this task. Pt. Responding appropriately to simple questions asked.       Verbal Expression:  Neologisms, phonemic paraphasias frequently present with spontaneous speech and during pt. Attempts at expression.      Motor speech:  Repeat CVC syllables beginning with   /h/- 70%, 100% c sentence completion cues and cues to look at ST when model given. /l/- 70%, 100% c cues. Pt. Repeating phrases easier than single words when sentence completion cues given. Reading Comprehension:  Complete phrase c word out of field of 3- 0%, 20% c cues, match word to picture out of field of 3- 50%, 100% c cues to point, pt. Attempting to read/name all pictures. Pt. Able to read names of objects aloud 50% of the time.       Other:  No family present. Subjective: [x]? Alert     [x]? Cooperative     []? Confused     []? Agitated      []? Lethargic     Objective/Assessment:  Auditory Comprehension:    Pt. Responding appropriately to simple conversational questions asked.       Verbal Expression:  Pt. Able to count 1-10 c 60% accuracy, name days of week c 29%, 43% c cues.   Confrontation naming- 30%, 70% with sentence completion cues, 90% with additional phonemic cues. Neologisms, phonemic paraphasias frequently present with spontaneous speech and during pt. Attempts at expression. Pt. Stating appropriate, clear sentences in conversation, \"I need more water\", \"Don't have any left. \"  \"not too bad today\".    Motor speech:  N/a     Reading Comprehension:  match word to picture out of field of 2- 50%, 100% c cues to point, pt. Attempting to read/name all pictures rather than pointing as instructed. Pt. Able to read names of objects aloud 50% of the time.       Other:  No family present. Objective:  /73   Pulse 67   Temp 97.5 °F (36.4 °C) (Oral)   Resp 17   Ht 5' 4\" (1.626 m)   Wt 191 lb 12.8 oz (87 kg)   SpO2 99%   BMI 32.92 kg/m²       GEN: Well developed, well nourished, in NAD  HEENT:  NCAT. EOM grossly intact. Mucous membranes pink and moist.   PULM:  Clear to ausculation. No rales or rhonchi. Respirations WNL and unlabored. CV:  Regular rate and rhythm. No murmurs or gallops. GI:  Abdomen soft. Non-distended. BS + and equal.    NEUROLOGICAL: Alert. Sensation intact to light touch. Follows commands. Intact automatic responses. Able to repeat. MSK:  Functional ROM LUE and LLE. Impaired ROM RUE and RLE. R  3/5, R wrist extension 3/5, R elbow flexion 3/5 . SKIN: Warm dry and intact. Good turgor. EXTREMITIES:  No edema BLEs. Ricka Distad PSYCH: Mood WNL. Appropriately interactive. Affect WNL. Diagnostics:     CBC:   Recent Labs     09/03/20  0619   WBC 6.3   RBC 3.99*   HGB 12.3   HCT 35.3*   MCV 88.4   RDW 13.5        BMP:   Recent Labs     09/03/20  0619      K 4.5      CO2 22   BUN 55*   CREATININE 0.97*   GLUCOSE 125*     BNP: No results for input(s): BNP in the last 72 hours. PT/INR: No results for input(s): PROTIME, INR in the last 72 hours. APTT: No results for input(s): APTT in the last 72 hours.   CARDIAC ENZYMES: No results for input(s): CKMB, CKMBINDEX, TROPONINT in the last 72 hours. Invalid input(s): CKTOTAL;3  FASTING LIPID PANEL:  Lab Results   Component Value Date    CHOL 160 08/24/2020    HDL 44 08/24/2020    TRIG 79 08/24/2020     LIVER PROFILE: No results for input(s): AST, ALT, ALB, BILIDIR, BILITOT, ALKPHOS in the last 72 hours. Current Medications:   Current Facility-Administered Medications: carvedilol (COREG) tablet 6.25 mg, 6.25 mg, Oral, BID WC  HYDROcodone-acetaminophen (NORCO) 5-325 MG per tablet 1 tablet, 1 tablet, Oral, Q4H PRN  gabapentin (NEURONTIN) capsule 100 mg, 100 mg, Oral, TID  ondansetron (ZOFRAN-ODT) disintegrating tablet 4 mg, 4 mg, Oral, Q6H PRN  metFORMIN (GLUCOPHAGE) tablet 500 mg, 500 mg, Oral, BID WC  insulin lispro (HUMALOG) injection vial 0-12 Units, 0-12 Units, Subcutaneous, TID WC  insulin lispro (HUMALOG) injection vial 0-6 Units, 0-6 Units, Subcutaneous, Nightly  clopidogrel (PLAVIX) tablet 75 mg, 75 mg, Oral, Daily  enoxaparin (LOVENOX) injection 40 mg, 40 mg, Subcutaneous, Daily  polyethylene glycol (GLYCOLAX) packet 17 g, 17 g, Oral, Daily PRN  amLODIPine (NORVASC) tablet 10 mg, 10 mg, Oral, QPM  atorvastatin (LIPITOR) tablet 80 mg, 80 mg, Oral, Nightly  losartan (COZAAR) tablet 100 mg, 100 mg, Oral, Daily **AND** hydroCHLOROthiazide (HYDRODIURIL) tablet 25 mg, 25 mg, Oral, Daily  aspirin chewable tablet 81 mg, 81 mg, Oral, Daily  polyethylene glycol (GLYCOLAX) packet 17 g, 17 g, Oral, Daily  senna (SENOKOT) tablet 17.2 mg, 2 tablet, Oral, Daily PRN  bisacodyl (DULCOLAX) suppository 10 mg, 10 mg, Rectal, Daily PRN      Impression/Plan:   Impaired ADLs, gait, and mobility due to:      1. R hemiparesis secondary to ischemic L MCA CVA:  PT/OT for gait, mobility, strengthening, endurance, ADLs, and self care. On ASA, atorvastatin, plavix. 2. Transcortical motor aphasia: speech therapy treating  3. HTN/CHF/non-ischemic cardiomyopathy: on amlodipine, carvedilol, HCTZ, losartan - IM following  4.  DM: on insulin sliding

## 2020-09-04 NOTE — PROGRESS NOTES
Speech Language Pathology  Speech Language Pathology  [facility]    Speech Language Treatment Note    Date: 9/4/2020  Patients Name: Shari Walker  MRN: [id number]  Diagnosis: CVA  Patient Active Problem List   Diagnosis Code    Hypertension I10    Hyperlipidemia E78.5    Acute on chronic systolic CHF (congestive heart failure) ([facility] [address] I50.23    Hypertensive urgency I16.0    Idiopathic cardiomyopathy ([address] I42.8    Hypertensive emergency I16.1    Chronic combined systolic and diastolic HF (heart failure), NYHA class 2 ([facility]) I50.42    Acute on chronic combined systolic and diastolic CHF, NYHA class 4 ([facility]) I50.43    Hypoxia R09.02    Severe mitral regurgitation by prior echocardiogram I34.0    Morbid obesity ([facility]) E66.01    CKD (chronic kidney disease) stage 3, GFR 30-59 ml/min ([facility]) N18.3    Physical deconditioning R53.81    TIA (transient ischemic attack) G45.9    Old lacunar stroke without late effect Z86.73    Dysarthria R47.1    Stroke determined by clinical assessment ([facility] [address] I63.9    Aphasia R47.01    Type 2 diabetes mellitus, without long-term current use of insulin ([facility] [address] E11.9    Cerebral infarction ([facility] [address] - left MCA distribution  I63.9    Uncontrolled type 2 diabetes mellitus with hyperglycemia ([facility] [address] E11.65    Elevated blood pressure reading R03.0    Acute ischemic stroke ([facility]) I63.9       Pain: \"a little bit\"    Speech and Language Treatment  Treatment time:  1585-5420    Subjective: [x] Alert [x] Cooperative     [] Confused     [] Agitated      [] Lethargic    Objective/Assessment:  Auditory Comprehension:    Pt. Responding appropriately to simple conversational questions asked. Verbal Expression:   Confrontation naming- 20%, 50% with sentence completion cues, pt. Making closer approximations. Pt. Repeating with max cues c 70% with some close approximations. Neologisms, phonemic paraphasias frequently present with spontaneous speech and during pt.  Attempts at expression. Pt. Stating appropriate, clear sentences in conversation. Motor speech:  Repeated monosyllabic words, initial position of:  /g/: 50%, 100% c sentence completion cues and cues to look at ST face for visual cues. /k/:  60%, 80% c cues. Reading Comprehension:  match word to picture out of field of 2- 50%, 100% c cues to point, pt. Attempting to read/name all pictures rather than pointing as instructed. Pt. Able to read names of objects aloud 50% of the time. Other:  No family present. Plan:  [x] Continue  services    [] Discharge from :      Discharge recommendations: [] Inpatient Rehab   [] [place]   [] Outpatient Therapy  [] Follow up at trauma clinic   [] Other:       Treatment completed by: Nasra Trammell A.CCC/SLP

## 2020-09-04 NOTE — PLAN OF CARE
Problem: Skin Integrity:  Goal: Will show no infection signs and symptoms  Description: Will show no infection signs and symptoms  Outcome: Ongoing  Note: Pt educated on risks for impaired skin integrity. Pt assisted in keeping skin clean and dry, assisted and prompted to reposition frequently. No s/s of infection or new breakdown noted this shift. Will continue to monitor and intervene as needed. Problem: Falls - Risk of:  Goal: Will remain free from falls  Description: Will remain free from falls  Outcome: Ongoing  Note: Pt educated on and verbalizes understanding of fall risks. Pt wearing nonskid stockings, uses assistive devices appropriately, call light within reach, encouraged to ask for assistance. Personal alarm on when out of bed, bed alarm on when in bed, pt gerald steady for transfers. Will continue to monitor and intervene as needed. Problem: Pain:  Goal: Control of acute pain  Description: Control of acute pain  Outcome: Ongoing  Note: Pt reports pain of 5/10 this shift. Reports that PRN medications help bring the pain down to 3/10. Non-pharmacological interventions discussed, pt accepting. Will continue to monitor and assist as needed.

## 2020-09-04 NOTE — PLAN OF CARE
Nutrition Problem #1: Inadequate oral intake  Intervention: Food and/or Nutrient Delivery: Continue Current Diet, Continue Oral Nutrition Supplement  Nutritional Goals: PO intakes to meet % of estiamted nutrition needs

## 2020-09-04 NOTE — PROGRESS NOTES
[address]   ACUTE REHABILITATION OCCUPATIONAL THERAPY  DAILY NOTE    Date: 20  Patient Name: Edilia Scheuermann      Room: 5771/0109-67    MRN: [id number]   : 1950  (80 y.o.)  Gender: female   Referring Practitioner: Dr. Jolly Porter  Diagnosis: Left ischemic middle cerebral artery stroke       Restrictions  Restrictions/Precautions: Fall Risk, Up as Tolerated, General Precautions  Implants present? : (none)  Other position/activity restrictions: up as tolerated  Required Braces or Orthoses?: No    Subjective  Subjective: Pt with improved expressive clarity. Comments: Pt is pleasant. She is demoing improved attention to R side. She is demoing improved use of emmanuel techniques. She is demoing increased task tolerance and ability to participate in therapy sessions. Patient Currently in Pain: Denies  Restrictions/Precautions: Fall Risk;Up as Tolerated;General Precautions  Overall Orientation Status: Impaired  Orientation Level: Oriented to place;Oriented to time;Oriented to person;Disoriented to situation(May be related to difficulty verbalizing )  Patient Observation  Observations: Pt has bruise on R hand near first digit knuckle and superior to wrist joints. .   Pain Assessment  Pain Assessment: 0-10  Garza-Baker Pain Rating: Hurts even more  Pain Type: Acute pain  Pain Location: Leg  Pain Orientation: Right  Clinical Progression: Not changed  Response to Pain Intervention: Patient Satisfied    Objective  Cognition  Overall Cognitive Status: Impaired  Arousal/Alertness: Appropriate responses to stimuli  Following Directions: Follows one step commands  Memory: Unable to assess  Following Commands:  Follows one step commands with repetition  Safety Judgement: Decreased awareness of need for safety  Awareness of Errors: Assistance required to identify errors made  Insights: Decreased awareness of deficits  Sequencing and Organization: Assistance required to identify errors made;Assistance required to generate solutions;Assistance required to implement solutions  Perception  Overall Perceptual Status: Impaired  Unilateral Attention: Cues to attend to right side of body  Initiation: Cues to initiate tasks  Motor Planning: Cues to use objects appropriately  Balance  Sitting Balance: Stand by assistance  Standing Balance: Minimal assistance  Bed mobility  Bridging: Stand by assistance  Rolling to Left: Stand by assistance  Rolling to Right: Stand by assistance  Supine to Sit: Contact guard assistance  Sit to Supine: Unable to assess  Scooting: Stand by assistance  Transfers  Sit to stand: Moderate assistance  Stand to sit: Moderate assistance  Transfer Comments: use of sarstedy this date for improved stand tolerance and safety with ability completing dynamic stand tasks; occassion VC for RUE visual assessment and corrections for joint protection  Standing Balance  Time: >1 minute x 5+  Activity: self-care tasks and transfers  Comment: 1-2 UE support, min difficulty maintaining RUE WB with dycem, SBA required, wrist extending  Functional Mobility  Functional - Mobility Device: Wheelchair  Activity: To/from bathroom  Assist Level: Maximum assistance  Functional Mobility Comments: AM: Pt required assist to propell wheelchair and complete turns within bathroom  Toilet Transfers  Toilet - Technique: Stand pivot; To right; To left  Equipment Used: Grab bars  Toilet Transfer: Moderate assistance  Toilet Transfers Comments: [place] Hopper utilized to encourage improved safety and ability to manage clothing. Weight Bearing  Weight Bearing Technique: Yes  Response To Weight Bearing Technique: Fair stabilization, VC and [place] assist at times for functional /thumb placement, does not initiate without cuing. Vision  Vision Comment: improved tracking and scanning this date to Right visual field  ADL  Feeding: Setup; Increased time to complete(Writer provided built-up utensils for RUE.  Pt able to compensate with LUE to bring food to mouth. Pt requires assist to open packages and cut food)  Grooming: Stand by assistance;Setup; Increased time to complete(pt completed oral care and face hygiene seated sink-side. Writer provided set-up to facilitate safety and independence)  UE Bathing: Setup; Increased time to complete;Minimal assistance(increased)  LE Bathing: Maximum assistance;Setup; Increased time to complete(Pt requires assist for B lower legs, feet, posterior thighs, and posterior perineal hygiene. Writer provided education on long-handled sponge, pt verbalized understanding)  UE Dressing: Stand by assistance;Setup(VC for orientation of tshirt, G problem solving)  LE Dressing: Maximum assistance(improved threading tech, sock aid F use c VC, Reacher difficulty noted for threading d/t pinch difficulty/nondominant hand)  Toileting: Maximum assistance(manages pants down, Mod A over R hip to don; percare seated; one bowel movement, 2 voids)  Additional Comments: ADLs complete AM session. Uses adaptive equipment: sock aid with F technique post setup, verbal/review demo on setup, pt to trial yet. TEDs adaptations d/t poor sizing/pitting edema: LLE ace wrapped, RLE rm donned, R AFO applied, stocking pulled down over brace, ace wrapped from top of brace above  to calf. Improved sequencing with emmanuel techniques this date. With heavy cuing pt demo improved fxl use of LUE during ADL tasks: hooking pants, managing sink, retrieving linen from counter top. Positioning  Adaptations: TEDs/transfers/dycem for WB/stability        Pt completing toileting in AM and PM. In PM demo improved initiative to manage clothing over R hip although required assist for thoroughness, complete luis seated. Stood ~45sec with dynamic tasks, VC required for encouragement to stand as able. ~1min x3 static stands in [facility] for WB/wrist placement, max VC required for corrections and joint protection.     Assessment  Performance deficits / PRN.  Short term goal 3: Pt will perform toileting tasks and lower body bathing/dressing with Moderate assist and use of assitive equipment/durable medical equipment/modified techniques PRN. Short term goal 4: Pt will perform functional mobility and transfers during self-care with Minimal assist, least restrictive mobility device, and Fair safety. Short term goal 5: Pt will stand for 4+ minutes with 1-2 UE support, contact guard assist, and no loss of balance while engaging in functional activity of choice. Short term goal 6: Pt will actively participate in 30+ minutes of therapeutic exercise/functional activities to promote increased independence with self-care and mobility. Short term goal 7: Pt will verbalize/demonstrate Good understanding of assistive equipment/durable medical equipment/modified techniques for increased independence with self-care and mobility. Long term goals  Time Frame for Long term goals : By discharge  Long term goal 1: Pt will perform self-feeding and grooming with modified independence. Long term goal 2: Pt will perform bathing, dressing, and toileting tasks with stand by assist, Fair safety, and assist PRN for MIRTA hose. Long term goal 3: Pt will perform functional mobility and transfers during self-care with stand by assist, least restrictive mobility device, and Fair safety. Long term goal 4: Pt will stand for 8+ minutes with 1-2 UE support, stand by assist, and no loss of balance while engaging in functional activity of choice.   Long term goal 5: 9 hole peg, dynamometer, box and block to be assessed for RUE as appropriate        09/04/20 1447 09/04/20 1448   OT Individual Minutes   Time In 5759 0032   Time Out 0908 1445   Minutes 78 25     Electronically signed by HERBERT Whittington on 9/4/20 at 3:35 PM EDT

## 2020-09-04 NOTE — CONSULTS
[facility]    Progress note            Date:   9/4/2020  Patient name:  Teresa Gonzalez  Date of admission:  8/26/2020  6:56 PM  MRN:   [id number]  Account:  [de-identified]  YOB: 1950  PCP:    SUE Montilla CNP  Room:   2613/2613-01  Code Status:    Full Code    Physician Requesting Consult: Martin Moreno MD    Reason for Consult: Medical management    Chief Complaint:     She continues to have apraxia but has no cardiorespiratory symptoms    History Obtained From:     Patient medical record nursing staff    History of Present Illness:     24-year-old pleasant  lady was admitted to [place] [address] on August 23 with a aphasia diagnosed with the left middle cerebral artery ischemic infarct  Known history of systolic congestive heart failure ejection fraction 35 to 40% secondary to nonischemic cardiomyopathy  Diabetes   Duration more than 7 years  Modifying factors on Glucophage and other med  Severity uncontrolled sever  Associated signs and symtoms neuropathy/ckd/ CAD. aggravated with sugar diet and better with low sugar diet             Past Medical History:     Past Medical History:   Diagnosis Date    [facility]     Diabetes mellitus ([facility]     H/O cardiac catheterization 08/04/2017    LMCA: Mild irregularites 10-20%. LAD: Mild irregularites 10-20%. LCx: Mild irregularites 10-20%. RCA: Mild irregularites 10-20%. LV function assessed as : Abnormal. EF of 40%.  H/O echocardiogram 12/18/2018    EF 55%. Mildly increased LV wall thickness. The left atrium appears moderately to severely dilated. Moderate to severe mitral regurg. Moderate pulmonary HTN with an estimated RV systolic pressure of [id number]. Moderate tricuspid regurg. Evidnece fo moderate diastolic dysfunction is seen.  History of echocardiogram 01/17/2019    EF 55%. LV wall thickness moderately increased.  LA is mildly dilated w/ LA volume index of 30. trivial mitral regurg. Evidence of moderate (grade II) diastolic dysfunction seen.  History of echocardiogram 2019    EF of 50-55%. LV wall thickness is mildly increased. LA is mildly dilated (29-33) with a left atrial volume index of 31 m1/m2. mild diastolic dysfunction.  Hyperlipidemia     Hypertension         Past Surgical History:     Past Surgical History:   Procedure Laterality Date    CARDIAC CATHETERIZATION Left 2017    right radial, T Dr. Cookie Case          Medications Prior to Admission:     Prior to Admission medications    Medication Sig Start Date End Date Taking? Authorizing Provider   clopidogrel (PLAVIX) 75 MG tablet Take 1 tablet by mouth daily 20   Rosalia Gonzalez MD   carvedilol (COREG) 12.5 MG tablet Take 1 tablet by mouth 2 times daily (with meals) 20   Monty Jennings MD   losartan-hydrochlorothiazide [facility]) 100-25 MG per tablet Take 1 tablet by mouth daily 20   Monty Jennings MD   amLODIPine (NORVASC) 10 MG tablet Take 1 tablet by mouth daily 20   Monty Jennings MD   atorvastatin (LIPITOR) 80 MG tablet Take 1 tablet by mouth nightly 20   Monty Jennings MD   aspirin 81 MG EC tablet Take 1 tablet by mouth daily 20   Monty Jennings MD   metFORMIN (GLUCOPHAGE) 500 MG tablet Take 500 mg by mouth 2 times daily (with meals)    Historical Provider, MD        Allergies:     Patient has no known allergies. Social History:     Tobacco:    reports that she has never smoked. She has never used smokeless tobacco.  Alcohol:      reports no history of alcohol use. Drug Use:  reports no history of drug use. Family History:     Family History   Problem Relation Age of Onset    Coronary Art Dis Father          from [place]    COPD Sister         O2 dep    Coronary Art Dis Brother         2 brothers  from [place]       Review of Systems:     Positive and Negative as described in HPI.     CONSTITUTIONAL:  negative for fevers, chills, sweats, fatigue, weight loss  HEENT:  negative for vision, hearing changes, runny nose, throat pain  RESPIRATORY:  negative for shortness of breath, cough, congestion, wheezing. CARDIOVASCULAR:  negative for chest pain, palpitations. GASTROINTESTINAL:  negative for nausea, vomiting, diarrhea, constipation, change in bowel habits, abdominal pain   GENITOURINARY:  negative for difficulty of urination, burning with urination, frequency   INTEGUMENT:  negative for rash, skin lesions, easy bruising   HEMATOLOGIC/LYMPHATIC:  negative for swelling/edema   ALLERGIC/IMMUNOLOGIC:  negative for urticaria , itching  ENDOCRINE:  negative increase in drinking, increase in urination, hot or cold intolerance  MUSCULOSKELETAL:  negative joint pains, muscle aches, swelling of joints  NEUROLOGICAL: Right-sided weakness and speech deficit  BEHAVIOR/PSYCH:  negative for depression, anxiety    Physical Exam:     /71   Pulse 54   Temp 97.6 °F (36.4 °C) (Oral)   Resp 16   Ht 5' 4\" (1.626 m)   Wt 191 lb 12.8 oz (87 kg)   SpO2 98%   BMI 32.92 kg/m²   Temp (24hrs), Av.5 °F (36.4 °C), Min:97.3 °F (36.3 °C), Max:97.6 °F (36.4 °C)    Recent Labs     20  1112 20  1533 20  1924 20  0631   POCGLU 144* 145* 150* 122*     No intake or output data in the 24 hours ending 20 1113  Speech deficit  General Appearance:  alert, well appearing, and in no acute distress  Mental status: oriented to person, place, and time with normal affect  Head:  normocephalic, atraumatic.   Eye: no icterus, redness, pupils equal and reactive, extraocular eye movements intact, conjunctiva clear  Ear: normal external ear, no discharge, hearing intact  Nose:  no drainage noted  Mouth: mucous membranes moist  Neck: supple, no carotid bruits, thyroid not palpable  Lungs: Bilateral equal air entry, clear to ausculation, no wheezing, rales or rhonchi, normal effort  Cardiovascular: normal rate, regular rhythm, no murmur, gallop, rub.   Abdomen: Soft, nontender, nondistended, normal bowel sounds, no hepatomegaly or splenomegaly  Neurologic: Right hemiparesis with Broca's aphasia skin: No gross lesions, rashes, bruising or bleeding on exposed skin area  Extremities:  peripheral pulses palpable, no pedal edema or calf pain with palpation  P    Investigations:      Laboratory Testing:  Recent Results (from the past 24 hour(s))   POC Glucose Fingerstick    Collection Time: 09/03/20  3:33 PM   Result Value Ref Range    POC Glucose 145 (H) 65 - 105 mg/dL   POC Glucose Fingerstick    Collection Time: 09/03/20  7:24 PM   Result Value Ref Range    POC Glucose 150 (H) 65 - 105 mg/dL   Basic Metabolic Panel    Collection Time: 09/04/20  6:21 AM   Result Value Ref Range    Glucose 123 (H) 70 - 99 mg/dL    BUN 47 (H) 8 - 23 mg/dL    CREATININE 0.88 0.50 - 0.90 mg/dL    Bun/Cre Ratio NOT REPORTED 9 - 20    Calcium 10.1 8.6 - 10.4 mg/dL    Sodium 135 135 - 144 mmol/L    Potassium 4.3 3.7 - 5.3 mmol/L    Chloride 101 98 - 107 mmol/L    CO2 24 20 - 31 mmol/L    Anion Gap 10 9 - 17 mmol/L    GFR Non-African American >60 >60 mL/min    GFR African American >60 >60 mL/min    GFR Comment          GFR Staging NOT REPORTED    POC Glucose Fingerstick    Collection Time: 09/04/20  6:31 AM   Result Value Ref Range    POC Glucose 122 (H) 65 - 105 mg/dL       Imaging/Diagonstics:      Assessment :      Primary Problem  <principal problem not specified>    Active Hospital Problems    Diagnosis Date Noted    Acute ischemic stroke ([facility] [facility] [I63.9] 08/26/2020    Cerebral infarction ([facility] - left MCA distribution  [I63.9] 08/26/2020    Type 2 diabetes mellitus, without long-term current use of insulin (HCC) [E11.9] 08/25/2020    Aphasia [R47.01]     CKD (chronic kidney disease) stage 3, GFR 30-59 ml/min ([facility]) [N18.3] 12/20/2018    Morbid obesity ([facility]) [E66.01] 12/20/2018    Acute on chronic combined systolic and diastolic CHF, NYHA class 4 ([facility]) [I50.43] 12/17/2018    Chronic combined systolic and diastolic HF (heart failure), NYHA class 2 ([facility] [I50.42] 08/28/2017    Hyperlipidemia [E78.5]     Hypertension [I10]     Idiopathic cardiomyopathy ([facility] [I42.8]        Plan:     1. Uncontrolled diabetes mellitus with the peripheral organ damage with ischemic CVA  2. Ischemic CVA with right hemiparesis and Broca's aphasia  3. Metformin recent A1c is 7.3 reviewed and reconciled  4. Ischemic CVA hyperlipidemia aspirin and Lipitor Plavix 75 all started  5. Losartan 100 mg for hypertension and diabetic renal protection  6. norco for pain  Her BUN is 47 and creatinine is 0.88 mg and her Hb is 12.3 , her BUN elevation is unexplained and her pulse is 64. Clarissa Jurado MD  9/4/2020  11:13 AM    Copy sent to Dr. Teofilo Alonso, APRN - CNP    Please note that this chart was generated using voice recognition Dragon dictation software. Although every effort was made to ensure the accuracy of this automated transcription, some errors in transcription may have occurred.

## 2020-09-04 NOTE — PROGRESS NOTES
Speech Language Pathology  Speech Language Pathology  [facility]    Speech Language Treatment Note    Date: 9/4/2020  Patients Name: Bunny Jackson  MRN: [id number]  Diagnosis: CVA  Patient Active Problem List   Diagnosis Code    Hypertension I10    Hyperlipidemia E78.5    Acute on chronic systolic CHF (congestive heart failure) ([facility] I50.23    Hypertensive urgency I16.0    Idiopathic cardiomyopathy ([facility] [facility] I42.8    Hypertensive emergency I16.1    Chronic combined systolic and diastolic HF (heart failure), NYHA class 2 ([facility]) I50.42    Acute on chronic combined systolic and diastolic CHF, NYHA class 4 ([facility]) I50.43    Hypoxia R09.02    Severe mitral regurgitation by prior echocardiogram I34.0    Morbid obesity ([facility]) E66.01    CKD (chronic kidney disease) stage 3, GFR 30-59 ml/min ([facility]) N18.3    Physical deconditioning R53.81    TIA (transient ischemic attack) G45.9    Old lacunar stroke without late effect Z86.73    Dysarthria R47.1    Stroke determined by clinical assessment ([facility] I63.9    Aphasia R47.01    Type 2 diabetes mellitus, without long-term current use of insulin ([facility] E11.9    C[facility] ([facility] - left MCA distribution  I63.9    Uncontrolled type 2 diabetes mellitus with hyperglycemia ([facility] E11.65    Elevated blood pressure reading R03.0    Acute ischemic stroke ([facility]) I63.9       Pain: 0/10    Speech and Language Treatment  Treatment time:  3609-9586    Subjective: [x] Alert [x] Cooperative     [] Confused     [] Agitated      [] Lethargic    Objective/Assessment:  Auditory Comprehension:    Pt. Responding appropriately to simple conversational questions asked. Follow one step directions- 33%, 50% c cues. Frequent groping noted. Verbal Expression:   Name body parts- 12%, 38% c cues. Pt. circumlocuting to find correct word. Count 1-10- 30%, 40% c cues, days of week- 100% c ST naming in unison. Name objects around room- 0%, 20% c sentence completion cues.

## 2020-09-04 NOTE — PROGRESS NOTES
Comprehensive Nutrition Assessment    Type and Reason for Visit:  Reassess    Nutrition Recommendations/Plan: Continue Carb Control diet and Ensure High protein. Nutrition Assessment:  Patient states she is eating well, still needs help with cutting up foods and can feed self but it takes her time. Continue current diet and supplements. Malnutrition Assessment:  Malnutrition Status:  Insufficient data    Context:  Acute Illness     Findings of the 6 clinical characteristics of malnutrition:  Energy Intake:  Unable to assess  Weight Loss:  No significant weight loss(8.2% loss in 7 months)     Body Fat Loss:  Unable to assess     Muscle Mass Loss:  Unable to assess    Fluid Accumulation:  No significant fluid accumulation     Strength:  Not Performed    Estimated Daily Nutrient Needs:  Energy (kcal):  1668 kcal absed on [place]- [place] and 1.2 factor; Weight Used for Energy Requirements:  Current     Protein (g):  83-94 gm based on 1.5-1.7 gm/kg of ideal body weight; Weight Used for Protein Requirements:  Ideal          Nutrition Related Findings:  No edema. Bowel sounds active      Wounds:  None       Current Nutrition Therapies:    DIET CARB CONTROL; Dietary Nutrition Supplements: Low Calorie High Protein Supplement    Anthropometric Measures:  · Height: 5' 4\" (162.6 cm)  · Current Body Weight: 191 lb 12.8 oz (87 kg)   · Admission Body Weight: 191 lb 12.8 oz (87 kg)    · Usual Body Weight: 209 lb (94.8 kg)(Per past record)     · Ideal Body Weight: 120 lbs; % Ideal Body Weight 159.8 %   · BMI: 32.9  · BMI Categories: Obese Class 1 (BMI 30.0-34. 9)       Nutrition Diagnosis:   · Inadequate oral intake related to cognitive or neurological impairment as evidenced by intake 51-75%, weight loss    Nutrition Interventions:   Food and/or Nutrient Delivery:  Continue Current Diet, Continue Oral Nutrition Supplement  Nutrition Education/Counseling:  Education not indicated   Coordination of Nutrition Care:

## 2020-09-04 NOTE — PLAN OF CARE
Problem: Skin Integrity:  Goal: Will show no infection signs and symptoms  Description: Will show no infection signs and symptoms  Outcome: Ongoing  Goal: Absence of new skin breakdown  Description: Absence of new skin breakdown  Outcome: Ongoing     Problem: Falls - Risk of:  Goal: Will remain free from falls  Description: Will remain free from falls  Outcome: Ongoing  Goal: Absence of physical injury  Description: Absence of physical injury  Outcome: Ongoing     Problem: Neurological  Goal: Maximum potential motor/sensory/cognitive function  Outcome: Ongoing     Problem: Musculor/Skeletal Functional Status  Goal: Highest potential functional level  Outcome: Ongoing  Goal: Absence of falls  Outcome: Ongoing     Problem: Neurological  Goal: Maximum potential motor/sensory/cognitive function  Outcome: Ongoing     Problem: Nutrition  Goal: Optimal nutrition therapy  Outcome: Ongoing     Problem: Pain:  Goal: Pain level will decrease  Description: Pain level will decrease  Outcome: Ongoing  Goal: Control of acute pain  Description: Control of acute pain  Outcome: Ongoing  Goal: Control of chronic pain  Description: Control of chronic pain  Outcome: Ongoing

## 2020-09-05 LAB
ANION GAP SERPL CALCULATED.3IONS-SCNC: 10 MMOL/L (ref 9–17)
BUN BLDV-MCNC: 41 MG/DL (ref 8–23)
BUN/CREAT BLD: ABNORMAL (ref 9–20)
CALCIUM SERPL-MCNC: 10.3 MG/DL (ref 8.6–10.4)
CHLORIDE BLD-SCNC: 103 MMOL/L (ref 98–107)
CO2: 23 MMOL/L (ref 20–31)
CREAT SERPL-MCNC: 0.84 MG/DL (ref 0.5–0.9)
GFR AFRICAN AMERICAN: >60 ML/MIN
GFR NON-AFRICAN AMERICAN: >60 ML/MIN
GFR SERPL CREATININE-BSD FRML MDRD: ABNORMAL ML/MIN/{1.73_M2}
GFR SERPL CREATININE-BSD FRML MDRD: ABNORMAL ML/MIN/{1.73_M2}
GLUCOSE BLD-MCNC: 106 MG/DL (ref 65–105)
GLUCOSE BLD-MCNC: 110 MG/DL (ref 65–105)
GLUCOSE BLD-MCNC: 123 MG/DL (ref 70–99)
GLUCOSE BLD-MCNC: 128 MG/DL (ref 65–105)
GLUCOSE BLD-MCNC: 141 MG/DL (ref 65–105)
POTASSIUM SERPL-SCNC: 4.5 MMOL/L (ref 3.7–5.3)
SODIUM BLD-SCNC: 136 MMOL/L (ref 135–144)

## 2020-09-05 PROCEDURE — 82947 ASSAY GLUCOSE BLOOD QUANT: CPT

## 2020-09-05 PROCEDURE — 97110 THERAPEUTIC EXERCISES: CPT

## 2020-09-05 PROCEDURE — 97530 THERAPEUTIC ACTIVITIES: CPT

## 2020-09-05 PROCEDURE — 6360000002 HC RX W HCPCS: Performed by: INTERNAL MEDICINE

## 2020-09-05 PROCEDURE — 36415 COLL VENOUS BLD VENIPUNCTURE: CPT

## 2020-09-05 PROCEDURE — 80048 BASIC METABOLIC PNL TOTAL CA: CPT

## 2020-09-05 PROCEDURE — 99232 SBSQ HOSP IP/OBS MODERATE 35: CPT | Performed by: INTERNAL MEDICINE

## 2020-09-05 PROCEDURE — 6370000000 HC RX 637 (ALT 250 FOR IP): Performed by: PHYSICAL MEDICINE & REHABILITATION

## 2020-09-05 PROCEDURE — 1180000000 HC REHAB R&B

## 2020-09-05 PROCEDURE — 99231 SBSQ HOSP IP/OBS SF/LOW 25: CPT | Performed by: PHYSICAL MEDICINE & REHABILITATION

## 2020-09-05 PROCEDURE — 97116 GAIT TRAINING THERAPY: CPT

## 2020-09-05 PROCEDURE — 97112 NEUROMUSCULAR REEDUCATION: CPT

## 2020-09-05 PROCEDURE — 97535 SELF CARE MNGMENT TRAINING: CPT

## 2020-09-05 PROCEDURE — 6370000000 HC RX 637 (ALT 250 FOR IP): Performed by: INTERNAL MEDICINE

## 2020-09-05 PROCEDURE — 92507 TX SP LANG VOICE COMM INDIV: CPT

## 2020-09-05 RX ADMIN — ENOXAPARIN SODIUM 40 MG: 40 INJECTION SUBCUTANEOUS at 09:14

## 2020-09-05 RX ADMIN — HYDROCHLOROTHIAZIDE 25 MG: 25 TABLET ORAL at 09:14

## 2020-09-05 RX ADMIN — METFORMIN HYDROCHLORIDE 500 MG: 500 TABLET ORAL at 16:55

## 2020-09-05 RX ADMIN — METRONIDAZOLE 100 MG: 500 TABLET ORAL at 15:10

## 2020-09-05 RX ADMIN — METFORMIN HYDROCHLORIDE 500 MG: 500 TABLET ORAL at 09:20

## 2020-09-05 RX ADMIN — METRONIDAZOLE 100 MG: 500 TABLET ORAL at 09:13

## 2020-09-05 RX ADMIN — METRONIDAZOLE 100 MG: 500 TABLET ORAL at 20:42

## 2020-09-05 RX ADMIN — AMLODIPINE BESYLATE 10 MG: 10 TABLET ORAL at 16:55

## 2020-09-05 RX ADMIN — CLOPIDOGREL BISULFATE 75 MG: 75 TABLET ORAL at 09:13

## 2020-09-05 RX ADMIN — LOSARTAN POTASSIUM 100 MG: 50 TABLET, FILM COATED ORAL at 09:14

## 2020-09-05 RX ADMIN — ATORVASTATIN CALCIUM 80 MG: 80 TABLET, FILM COATED ORAL at 20:42

## 2020-09-05 RX ADMIN — CARVEDILOL 6.25 MG: 6.25 TABLET, FILM COATED ORAL at 16:55

## 2020-09-05 RX ADMIN — ASPIRIN 81 MG CHEWABLE TABLET 81 MG: 81 TABLET CHEWABLE at 09:13

## 2020-09-05 RX ADMIN — CARVEDILOL 6.25 MG: 6.25 TABLET, FILM COATED ORAL at 09:19

## 2020-09-05 RX ADMIN — HYDROCODONE BITARTRATE AND ACETAMINOPHEN 1 TABLET: 5; 325 TABLET ORAL at 09:14

## 2020-09-05 ASSESSMENT — PAIN SCALES - GENERAL
PAINLEVEL_OUTOF10: 7
PAINLEVEL_OUTOF10: 4
PAINLEVEL_OUTOF10: 4

## 2020-09-05 ASSESSMENT — PAIN DESCRIPTION - PAIN TYPE: TYPE: ACUTE PAIN

## 2020-09-05 ASSESSMENT — PAIN DESCRIPTION - LOCATION: LOCATION: ARM

## 2020-09-05 ASSESSMENT — PAIN DESCRIPTION - FREQUENCY: FREQUENCY: CONTINUOUS

## 2020-09-05 ASSESSMENT — PAIN DESCRIPTION - ORIENTATION: ORIENTATION: RIGHT

## 2020-09-05 ASSESSMENT — PAIN DESCRIPTION - DESCRIPTORS: DESCRIPTORS: ACHING;SORE

## 2020-09-05 NOTE — CONSULTS
[facility] Internal Medicine    Progress note            Date:   9/5/2020  Patient name:  Yosvany Fuller  Date of admission:  8/26/2020  6:56 PM  MRN:   [id number]  Account:  [de-identified]  YOB: 1950  PCP:    SUE Feliz CNP  Room:   2613/2613-01  Code Status:    Full Code    Physician Requesting Consult: Luda Brady MD    Reason for Consult: Medical management    Chief Complaint:     She continues to have apraxia but has no cardiorespiratory symptoms    History Obtained From:     Patient medical record nursing staff    History of Present Illness:     82-year-old pleasant  lady was admitted to [facility] on August 23 with a aphasia diagnosed with the left middle cerebral artery ischemic infarct  Known history of systolic congestive heart failure ejection fraction 35 to 40% secondary to nonischemic cardiomyopathy  Diabetes   Duration more than 7 years  Modifying factors on Glucophage and other med  Severity uncontrolled sever  Associated signs and symtoms neuropathy/ckd/ CAD. aggravated with sugar diet and better with low sugar diet        9/5/2020    NO NEW SYMPTOMS . APPETITE FAIR   [x] YES        ---   PAIN CONTROL FAIR   [x] YES        ---   SLEEP  FAIR   [x] YES        ---   BOWELS OK   [x] YES        ---                                                                         ·  1. Past Medical History:     Past Medical History:   Diagnosis Date    [facility]     Diabetes mellitus ([facility]     H/O cardiac catheterization 08/04/2017    LMCA: Mild irregularites 10-20%. LAD: Mild irregularites 10-20%. LCx: Mild irregularites 10-20%. RCA: Mild irregularites 10-20%. LV function assessed as : Abnormal. EF of 40%.  H/O echocardiogram 12/18/2018    EF 55%. Mildly increased LV wall thickness. The left atrium appears moderately to severely dilated. Moderate to severe mitral regurg.  Moderate pulmonary HTN with an estimated RV systolic pressure of 72PZBF. Moderate tricuspid regurg. Evidnece fo moderate diastolic dysfunction is seen.  History of echocardiogram 01/17/2019    EF 55%. LV wall thickness moderately increased. LA is mildly dilated w/ LA volume index of 30. trivial mitral regurg. Evidence of moderate (grade II) diastolic dysfunction seen.  History of echocardiogram 06/03/2019    EF of 50-55%. LV wall thickness is mildly increased. LA is mildly dilated (29-33) with a left atrial volume index of 31 m1/m2. mild diastolic dysfunction.  Hyperlipidemia     Hypertension         Past Surgical History:     Past Surgical History:   Procedure Laterality Date    CARDIAC CATHETERIZATION Left 08/04/2017    right radial, [facility] Dr. Stephany Rodriguez          Medications Prior to Admission:     Prior to Admission medications    Medication Sig Start Date End Date Taking? Authorizing Provider   clopidogrel (PLAVIX) 75 MG tablet Take 1 tablet by mouth daily 8/27/20   Greg Sheldon MD   carvedilol (COREG) 12.5 MG tablet Take 1 tablet by mouth 2 times daily (with meals) 7/20/20   Bruce Calvo MD   losartan-hydrochlorothiazide [facility]) 100-25 MG per tablet Take 1 tablet by mouth daily 7/20/20   Bruce Calvo MD   amLODIPine (NORVASC) 10 MG tablet Take 1 tablet by mouth daily 7/20/20   Bruce Calvo MD   atorvastatin (LIPITOR) 80 MG tablet Take 1 tablet by mouth nightly 7/20/20   Bruce Calvo MD   aspirin 81 MG EC tablet Take 1 tablet by mouth daily 7/20/20   Bruce Calvo MD   metFORMIN (GLUCOPHAGE) 500 MG tablet Take 500 mg by mouth 2 times daily (with meals)    Historical Provider, MD        Allergies:     Patient has no known allergies. Social History:     Tobacco:    reports that she has never smoked. She has never used smokeless tobacco.  Alcohol:      reports no history of alcohol use. Drug Use:  reports no history of drug use.     Family History:     Family History   Problem Relation Age of Onset    Coronary Art Dis Father          from [facility] Olp COPD Sister         O2 dep    Coronary Art Dis Brother         2 brothers  from [place]       Review of Systems:     Positive and Negative as described in HPI. CONSTITUTIONAL:  negative for fevers, chills, sweats, fatigue, weight loss  HEENT:  negative for vision, hearing changes, runny nose, throat pain  RESPIRATORY:  negative for shortness of breath, cough, congestion, wheezing. CARDIOVASCULAR:  negative for chest pain, palpitations. GASTROINTESTINAL:  negative for nausea, vomiting, diarrhea, constipation, change in bowel habits, abdominal pain   GENITOURINARY:  negative for difficulty of urination, burning with urination, frequency   INTEGUMENT:  negative for rash, skin lesions, easy bruising   HEMATOLOGIC/LYMPHATIC:  negative for swelling/edema   ALLERGIC/IMMUNOLOGIC:  negative for urticaria , itching  ENDOCRINE:  negative increase in drinking, increase in urination, hot or cold intolerance  MUSCULOSKELETAL:  negative joint pains, muscle aches, swelling of joints  NEUROLOGICAL: Right-sided weakness and speech deficit  BEHAVIOR/PSYCH:  negative for depression, anxiety    Physical Exam:     /66   Pulse 64   Temp 97.9 °F (36.6 °C)   Resp 19   Ht 5' 4\" (1.626 m)   Wt 191 lb 12.8 oz (87 kg)   SpO2 98%   BMI 32.92 kg/m²   Temp (24hrs), Av.9 °F (36.6 °C), Min:97.7 °F (36.5 °C), Max:98.1 °F (36.7 °C)    Recent Labs     20  1544 20  0637 20  1110 20  1544   POCGLU 106* 106* 141* 128*     No intake or output data in the 24 hours ending 20 1621  Speech deficit  General Appearance:  alert, well appearing, and in no acute distress  Mental status: oriented to person, place, and time with normal affect  Head:  normocephalic, atraumatic.   Eye: no icterus, redness, pupils equal and reactive, extraocular eye movements intact, conjunctiva clear  Ear: normal external ear, no discharge, hearing insulin ([facility] [E11.9] 08/25/2020    Aphasia [R47.01]     CKD (chronic kidney disease) stage 3, GFR 30-59 ml/min ([facility] [N18.3] 12/20/2018    Morbid obesity ([facility] [E66.01] 12/20/2018    Acute on chronic combined systolic and diastolic CHF, NYHA class 4 ([facility]) [I50.43] 12/17/2018    Chronic combined systolic and diastolic HF (heart failure), NYHA class 2 ([facility] [I50.42] 08/28/2017    Hyperlipidemia [E78.5]     Hypertension [I10]     Idiopathic cardiomyopathy ([facility] [I42.8]      Recent Labs     09/04/20  1046 09/04/20  1544 09/05/20  0637 09/05/20  1110 09/05/20  1544   POCGLU 171* 106* 106* 141* 128*     Vitals:    09/04/20 0633 09/04/20 1655 09/04/20 1851 09/05/20 0730   BP: 114/71 128/71 (!) 129/52 128/66   Pulse: 54 59 59 64   Resp: 16 18 18 19   Temp: 97.6 °F (36.4 °C) 98.1 °F (36.7 °C) 97.7 °F (36.5 °C) 97.9 °F (36.6 °C)   TempSrc: Oral Oral Oral    SpO2: 98% 100% 98% 98%   Weight:       Height:           Plan:     Patient tolerating rehabilitative therapies . Progressing fairly well . Continue present therapy . Blood sugar blood pressure control satisfactory     2. Uncontrolled diabetes mellitus with the peripheral organ damage with ischemic CVA  3. Ischemic CVA with right hemiparesis and Broca's aphasia  4. Metformin recent A1c is 7.3 reviewed and reconciled  5. Ischemic CVA hyperlipidemia aspirin and Lipitor Plavix 75 all started  6. Losartan 100 mg for hypertension and diabetic renal protection  7. norco for pain  Her BUN is 47 and creatinine is 0.88 mg and her Hb is 12.3 , her BUN elevation is unexplained and her pulse is 64. Anat Marshall MD  9/5/2020  4:21 PM    Copy sent to Dr. Edita Pina, APRN - CNP    Please note that this chart was generated using voice recognition Dragon dictation software. Although every effort was made to ensure the accuracy of this automated transcription, some errors in transcription may have occurred.

## 2020-09-05 NOTE — PROGRESS NOTES
Physical Therapy  [address]  Acute Rehabilitation Physical Therapy Progress Note    Date: 20  Patient Name: Fouzia Ho       Room: [facility]3808-92  MRN: [id number]   Account: [de-identified]   : 1950  (80 y.o.) Gender: female     Referring Practitioner: Dr. Nazario Cloud  Diagnosis: Left ischemic middle cerebral artery stroke  Past Medical History:  has a past medical history of CHF (congestive heart failure) ([facility] [address] [facility] Diabetes mellitus ([facility] [address] [facility] H/O cardiac catheterization, H/O echocardiogram, History of echocardiogram, History of echocardiogram, Hyperlipidemia, and Hypertension. Past Surgical History:   has a past surgical history that includes Cholecystectomy and Cardiac catheterization (Left, 2017). Additional Pertinent Hx: Pt has a history of HTN, HLD, DM, CHF. Pt recently evaluated at [address] for  new onset of aphasia. Pt was transferred to [facility] on 20 after diagnostic studies confirmed new L hemisphere CVA. Pt with known history of previous CVAs. MRI confirmed L MCA infarct on 2020. Pt admitted to [address] [place] [address] 20.       Overall Orientation Status: Impaired  Orientation Level: Unable to assess(pt is aphasic unable to assess at this time)  Restrictions/Precautions  Restrictions/Precautions: Fall Risk;Up as Tolerated;General Precautions  Required Braces or Orthoses?: No  Implants present? : (none)  Position Activity Restriction  Other position/activity restrictions: up as tolerated    Subjective: Patient is aphasic  Comments: resting HR 61, no complaints dizziness this date, bilateral LE wraped to knee AM    Vital Signs  Patient Currently in Pain: Denies           Patient Observation  Observations: Pt has bruises right UE, dorsal wrist , forearm , and lateral humerus, Dr Nazario Cloud aware       Bed Mobility:   Bed Mobility  Bridging: Stand by assistance  Rolling: Stand by assistance  Supine to Sit: Contact guard assistance;Minimal assistance(right LE clearance )  Sit to Supine: Contact guard assistance;Minimal assistance(to upright trunk)  Scooting: Stand by assistance  Comment: left side mat table  Bed mobility  Bridging: Stand by assistance  Scooting: Stand by assistance    Transfers:  Sit to Stand: Minimal Assistance; Moderate Assistance  Stand to sit: Minimal Assistance; Moderate Assistance  Bed to Chair: Minimal assistance(RW with right hand )              Ambulation 1  Surface: level tile  Device: Hemiwalker  Other Apparatus: Wheelchair follow(hand held assist on RUE wide guard position)  Assistance: Minimal assistance;Contact guard assistance(patient needs only support for LOB)  Quality of Gait: poor coordination proprioception of RLE placement; increased tone of RUE with facilitation; caution to not over correct stability guarding with gait belt . Gait Deviations: Slow Jacinda;Staggers;Decreased step length;Decreased step height  Distance: 15 ft x 2 (visual input with mirror)  Comments: poor follow with Verbal cues- MIRROR feedback worked well with minimal cues to look in mirror. Ambulation 2  Surface - 2: level tile  Other Apparatus 2: Right; Wheelchair follow(right small hand  on Rolling Walker)  Assistance 2: Minimal assistance  Quality of Gait 2: able to correct step width right heel strike 25% with instruction , decreased right foot clearance   Gait Deviations: Slow Jacinda;Decreased step length;Decreased step height  Distance: 20 feet  Comments: tactileand instruction for right foot placemant , central position within RW , and visual input     Stairs/Curb  Stairs?: No   Wheelchair Activities  Wheelchair Size: 18 in  Wheelchair Type: Standard  Wheelchair Cushion: Standard  Wheelchair Parts Management: No  Propulsion: Yes  Propulsion 1  Propulsion: Manual  Level: Level Tile  Method: LUE;LLE;RLE  Level of Assistance:  Moderate assistance  Description/ Details: pt instructed in reciprical LE advancement , continous left UE assist , and forward

## 2020-09-05 NOTE — PROGRESS NOTES
Speech Language Pathology  Speech Language Pathology  [facility]    Speech Language Treatment Note    Date: 9/5/2020  Patients Name: Narayan Caceres  MRN: [id number]  Diagnosis: CVA  Patient Active Problem List   Diagnosis Code    Hypertension I10    Hyperlipidemia E78.5    Acute on chronic systolic CHF (congestive heart failure) ([facility]) I50.23    Hypertensive urgency I16.0    Idiopathic cardiomyopathy ([facility] I42.8    Hypertensive emergency I16.1    Chronic combined systolic and diastolic HF (heart failure), NYHA class 2 ([facility]) I50.42    Acute on chronic combined systolic and diastolic CHF, NYHA class 4 ([facility]) I50.43    Hypoxia R09.02    Severe mitral regurgitation by prior echocardiogram I34.0    Morbid obesity ([facility]) E66.01    CKD (chronic kidney disease) stage 3, GFR 30-59 ml/min ([facility]) N18.3    Physical deconditioning R53.81    TIA (transient ischemic attack) G45.9    Old lacunar stroke without late effect Z86.73    Dysarthria R47.1    Stroke determined by clinical assessment ([facility] I63.9    Aphasia R47.01    Type 2 diabetes mellitus, without long-term current use of insulin ([facility] E11.9    Cerebral infarction ([facility] - left MCA distribution  I63.9    Uncontrolled type 2 diabetes mellitus with hyperglycemia ([facility] E11.65    Elevated blood pressure reading R03.0    Acute ischemic stroke ([facility]) I63.9       Pain: 0/10    Speech and Language Treatment  Treatment time:  4569-3849    Subjective: [x] Alert [x] Cooperative     [] Confused     [] Agitated      [] Lethargic    Objective/Assessment:  Auditory Comprehension:    Pt. Responding appropriately to simple conversational questions asked. Verbal Expression:   Name body parts- 20%, 60% c cues. Pt. circumlocuting to find correct word. Count 1-10- 40%, 60% c cues, days of week- 100% c ST naming in unison. Name objects around room- 50%, 80% c sentence completion cues.   Name objects from picture cards- 20% humberto, 60^ c sentence completion task and written word cue. Neologisms, phonemic paraphasias frequently present with spontaneous speech and during pt. Attempts at expression. Pt. Repeating c 50% accuracy c mod cues \"Ashley, can you say. ....... Radu Funes \"    Motor speech:  n/a     Reading Comprehension:  n/a      Other:  No family present. Plan:  [x] Continue ST services    [] Discharge from ST:      Discharge recommendations: [] Inpatient Rehab   [] [place]   [] Outpatient Therapy  [] Follow up at trauma clinic   [] Other:       Treatment completed by:  Audrey Agee M.A.CFY-SLP

## 2020-09-05 NOTE — PROGRESS NOTES
[address] [facility]    ACUTE REHABILITATION OCCUPATIONAL THERAPY  DAILY NOTE    Date: 20  Patient Name: Yosvany Fuller      Room: 0912/7642-53    MRN: [id number]   : 1950  (81 y.o.)  Gender: female   Referring Practitioner: Dr. John Garcia  Diagnosis: Left ischemic middle cerebral artery stroke       Restrictions  Restrictions/Precautions: Fall Risk, Up as Tolerated, General Precautions  Implants present? : (none)  Other position/activity restrictions: up as tolerated  Required Braces or Orthoses?: No    Subjective  Subjective: \"yes! \" patient states when asked if she thinks her speech is getting better  Comments: Patient pleasant and agreeable to OT sessions. Slightly frustrated with aphasia during session, encouraged to take time/think it through. PM session tolerated well, no pain in RUE PM session, patients daughter arrived during last part of session  Patient Currently in Pain: Yes  Pain Level: 7  Pain Location: Arm  Pain Orientation: Right  Restrictions/Precautions: Fall Risk;Up as Tolerated;General Precautions  Orientation Level: Oriented to place;Oriented to time;Oriented to person;Oriented to situation(unable to verbalize for situation however able to answer yes/no)     Pain Assessment  Pain Assessment: 0-10  Pain Level: 7  Pain Type: Acute pain  Pain Location: Arm  Pain Orientation: Right  Pain Descriptors: Aching, Sore  Pain Frequency: Continuous    Objective  Cognition  Overall Cognitive Status: Impaired  Arousal/Alertness: Appropriate responses to stimuli  Following Directions: Follows one step commands  Following Commands:  Follows one step commands with repetition  Safety Judgement: Decreased awareness of need for safety  Awareness of Errors: Assistance required to identify errors made  Insights: Decreased awareness of deficits  Sequencing and Organization: Assistance required to identify errors made;Assistance required to generate solutions;Assistance required to implement ABduction: x  Shoulder ADduction: x  Elbow Flexion: x  Elbow Extension: x  Supination: x  Pronation: x  Wrist Flexion: x  Wrist Extension: x  Grasp/Release: x ; pink sponge 2x15 each hand                       ADL  Feeding: Setup; Increased time to complete(finishing breakfast-pancakes upon OT arrival this AM. Patient seated upright in wheelchair, self feeding with LUE. able to bring cup to mouth with LUE)  Grooming: Stand by assistance;Setup; Increased time to complete(oral care, hair care, washing face; assist with setting up of grooming items)  UE Bathing: Setup; Increased time to complete;Minimal assistance(seated at sink, assist for washing shoulders)  LE Bathing: Setup; Increased time to complete; Moderate assistance(able to wash tops of thighs/shins from wheelchair position, assist for backs of legs. declines luis area/buttocks due to AM toileting and clean brief)  UE Dressing: Stand by assistance;Setup;Minimal assistance(assist with hooking bra in back and adjusting, assist adjusting shirt in back; verbal cues- initially did not have RUE threaded all the way through shirt)  LE Dressing: Moderate assistance; Increased time to complete;Verbal cueing(initially threading LLE through pants, difficulty threading RLE. instruction to dress weak side first then able to thread RLE, slight difficulty with threading LLE (attempted reacher however able to get without) assistance with socks and shoes)  Toileting: None(reports toileting this AM with nursing assistance and reports clean brief)  Additional Comments: ADLs sinkside this date. Assessment  Performance deficits / Impairments: Decreased functional mobility ; Decreased ADL status; Decreased ROM; Decreased strength;Decreased safe awareness;Decreased cognition;Decreased endurance;Decreased sensation;Decreased balance;Decreased high-level IADLs;Decreased fine motor control;Decreased coordination;Decreased posture  Prognosis: Good  Discharge Recommendations: 24 hour supervision or assist;Patient would benefit from continued therapy after discharge  Activity Tolerance: Patient limited by fatigue;Patient Tolerated treatment well  Activity Tolerance: RUE weakness, aphasia  Safety Devices in place: Yes  Type of devices: Left in chair;Call light within reach; Chair alarm in place;Gait belt;Patient at risk for falls          Patient Education:  Patient Goals   Patient goals : To return home with sister  Learner:patient  Method: demonstration and explanation       Outcome: needs reinforcement   OT Education  OT Education: OT Role;Plan of Care;Precautions; ADL Adaptive Strategies;Transfer Training;Energy Conservation;Equipment;Home Exercise Program  Patient Education: OT Role, POC, RUE exercise, ADL adaptive strategies, transfer training, equipment, safety, activity promotion    Plan  Plan  Times per week: 5-7x/week, 1.5 hours/day  Times per day: Twice a day  Current Treatment Recommendations: Self-Care / ADL, Home Management Training, Cognitive/Perceptual Training, Strengthening, ROM, Balance Training, Functional Mobility Training, Endurance Training, Neuromuscular Re-education, Cognitive Reorientation, Pain Management, Safety Education & Training, Patient/Caregiver Education & Training, Equipment Evaluation, Education, & procurement, Positioning  Patient Goals   Patient goals : To return home with sister  Short term goals  Time Frame for Short term goals: 10 days  Short term goal 1: Pt will perform grooming with stand by assist and use of assistive equipment/modified techniques PRN. Short term goal 2: Pt will perform upper body bathing/dressing with Minimal assist and use of assistive equipment/modified techniques PRN. Short term goal 3: Pt will perform toileting tasks and lower body bathing/dressing with Moderate assist and use of assitive equipment/durable medical equipment/modified techniques PRN.   Short term goal 4: Pt will perform functional mobility and transfers during self-care with Minimal assist, least restrictive mobility device, and Fair safety. Short term goal 5: Pt will stand for 4+ minutes with 1-2 UE support, contact guard assist, and no loss of balance while engaging in functional activity of choice. Short term goal 6: Pt will actively participate in 30+ minutes of therapeutic exercise/functional activities to promote increased independence with self-care and mobility. Short term goal 7: Pt will verbalize/demonstrate Good understanding of assistive equipment/durable medical equipment/modified techniques for increased independence with self-care and mobility. Long term goals  Time Frame for Long term goals : By discharge  Long term goal 1: Pt will perform self-feeding and grooming with modified independence. Long term goal 2: Pt will perform bathing, dressing, and toileting tasks with stand by assist, Fair safety, and assist PRN for MIRTA hose. Long term goal 3: Pt will perform functional mobility and transfers during self-care with stand by assist, least restrictive mobility device, and Fair safety. Long term goal 4: Pt will stand for 8+ minutes with 1-2 UE support, stand by assist, and no loss of balance while engaging in functional activity of choice.   Long term goal 5: 9 hole peg, dynamometer, box and block to be assessed for RUE as appropriate  Timed Code Treatment Minutes: [address]  Time in 0730  Time out 0830  Minutes 60    OT Individual Minutes  Time in 1406  Time out [address]    Electronically signed by Radha Hill OT on 9/5/20 at 3:21 PM EDT

## 2020-09-05 NOTE — PLAN OF CARE
Ongoing  Note: Pt reports pain of 5/10 this shift. Reports that PRN medications help bring the pain down to 3/10. Non-pharmacological interventions discussed, pt accepting. Will continue to monitor and assist as needed.   Goal: Control of chronic pain  Description: Control of chronic pain  Outcome: Ongoing

## 2020-09-05 NOTE — PROGRESS NOTES
Physical Medicine & Rehabilitation  Progress Note      Subjective:      81year-old female with ischemic CVA with R dominant hemiparesis and aphasia. She reports good sleep last night. Denies issues with appetite, bowel, or bladder. Speech continues to slowly improve. Denies pain. RUE motor return continues to slowly improve. ROS:  Denies fevers, chills, sweats. No chest pain, palpitations, lightheadedness. Denies coughing, wheezing or shortness of breath. Denies abdominal pain, nausea, diarrhea or constipation. No new areas of joint pain. Denies new areas of numbness or weakness. Denies new anxiety or depression issues. No new skin problems. Rehabilitation:   Progressing in therapies. PT:  Restrictions/Precautions: Fall Risk, Up as Tolerated, General Precautions  Implants present? : (none)  Other position/activity restrictions: up as tolerated   Transfers  Sit to Stand: Minimal Assistance, Moderate Assistance  Stand to sit: Minimal Assistance, Moderate Assistance  Bed to Chair: Moderate assistance  Stand Pivot Transfers: Moderate Assistance  Comment: instruction left  hand placement   Ambulation 1  Surface: level tile  Device: Zoneswalker  Other Apparatus: Wheelchair follow(hand held assist on RUE wide guard position)  Assistance: Minimal assistance, Contact guard assistance(patient needs only support for LOB)  Quality of Gait: poor coordination proprioception of RLE placement; increased tone of RUE with facilitation; caution to not over correct stability guarding with gait belt . Gait Deviations: Slow Jacinda, Staggers, Decreased step length, Decreased step height  Distance: 15 ft x 2 (visual input with mirror)  Comments: poor follow with Verbal cues- MIRROR feedback worked well with minimal cues to look in mirror.      Transfers  Sit to Stand: Minimal Assistance, Moderate Assistance  Stand to sit: Minimal Assistance, Moderate Assistance  Bed to Chair: Moderate assistance  Stand Pivot Transfers: thighs/shins from wheelchair position, assist for backs of legs. declines luis area/buttocks due to AM toileting and clean brief)  UE Dressing: Stand by assistance, Setup, Minimal assistance(assist with hooking bra in back and adjusting, assist adjusting shirt in back; verbal cues- initially did not have RUE threaded all the way)  LE Dressing: Moderate assistance, Increased time to complete, Verbal cueing(initially threading LLE through pants, difficulty threading RLE. instruction to dress weak side first then able to thread RLE, slight difficulty with threading LLE (attempted reacher however able to get without) assistance with socks and shoes)  Toileting: None(reports toileting this AM with nursing assistance and reports clean brief)  Additional Comments: ADLs complete AM session. Uses adaptive equipment: sock aid with F technique post setup, verbal/review demo on setup, pt to trial yet. TEDs adaptations d/t poor sizing/pitting edema: LLE ace wrapped, RLE rm donned, R AFO applied, stocking pulled down over brace, ace wrapped from top of brace above  to calf. Improved sequencing with emmanuel techniques this date.  With heavy cuing pt demo improved fxl use of LUE during ADL tasks: hooking pants, managing sink, retrieving linen from counter top         Balance  Sitting Balance: Stand by assistance  Standing Balance: Minimal assistance   Standing Balance  Time: >1 minute x 5+  Activity: self-care tasks and transfers  Comment: 1-2 UE support, min difficulty maintaining RUE WB with dycem, SBA required, wrist extending  Functional Mobility  Functional - Mobility Device: Wheelchair  Activity: To/from bathroom  Assist Level: Maximum assistance  Functional Mobility Comments: AM: Pt required assist to propell wheelchair and complete turns within bathroom     Bed mobility  Bridging: Stand by assistance  Rolling to Left: Stand by assistance  Rolling to Right: Stand by assistance  Supine to Sit: Contact guard assistance  Sit to Supine: Unable to assess  Scooting: Stand by assistance  Comment: patient needs extra time to complete tasks with least amount of assistance. patient able to initiate mobility from both left and right side of bed. therapist protection of RUE as needed d/t poor awareness/right side neglect  Transfers  Stand Pivot Transfers: Moderate assistance  Sit to stand: Moderate assistance  Stand to sit: Moderate assistance  Transfer Comments: use of sarstedy this date for improved stand tolerance and safety with ability completing dynamic stand tasks; occassion VC for RUE visual assessment and corrections for joint protection   Toilet Transfers  Toilet - Technique: Stand pivot, To right, To left  Equipment Used: Grab bars  Toilet Transfer: Moderate assistance  Toilet Transfers Comments: Vinita Hopper utilized to encourage improved safety and ability to manage clothing. Shower Transfers  Shower - Transfer From: Wheelchair  Shower - Transfer Type: To and From  Shower - Transfer To: Transfer tub bench  Shower - Technique: Stand pivot, To right, To left  Shower Transfers: Moderate assistance, Dependent  Shower Transfers Comments: Moderate verbal/tactile cues for hand placement and safety for entry; total assist for exit due to seated on floor       SPEECH:  Subjective: [x]? Alert     [x]? Cooperative     []? Confused     []? Agitated      []? Lethargic     Objective/Assessment:  Auditory Comprehension:    Pt. Responding appropriately to simple conversational questions asked.        Verbal Expression:   Name body parts- 20%, 60% c cues. Pt. circumlocuting to find correct word. Count 1-10- 40%, 60% c cues, days of week- 100% c ST naming in unison. Name objects around room- 50%, 80% c sentence completion cues. Name objects from picture cards- 20% humberto, 60^ c sentence completion task and written word cue. Neologisms, phonemic paraphasias frequently present with spontaneous speech and during pt. Attempts at expression.    Pt. Repeating c 50% accuracy c mod cues \"Ashley, can you say. ....... Bally Plana \"     Motor speech:  n/a     Reading Comprehension:  n/a       Other:  No family present. Objective:  /66   Pulse 64   Temp 97.9 °F (36.6 °C)   Resp 19   Ht 5' 4\" (1.626 m)   Wt 191 lb 12.8 oz (87 kg)   SpO2 98%   BMI 32.92 kg/m²       GEN: Well developed, well nourished, in NAD  HEENT:  NCAT. PERRL. EOMI. Mucous membranes pink and moist.   PULM:  Clear to ausculation. No rales or rhonchi. Respirations WNL and unlabored. CV:  Bradycardic rate regular rhythm. No murmurs or gallops. GI:  Abdomen soft. Nontender. Non-distended. BS + and equal.    NEUROLOGICAL: A&O x3. Sensation intact to light touch. Short sentences improving. MSK:  Functional ROM LUE and LLE. Impaired ROM RUE and RLE. Motor testing 4+/5 key muscles LUE and LLE. R  3/5, R wrist extension 3-/5, R finger extension 3-/5, R elbow flexion 3/5 . SKIN: Warm dry and intact. Good turgor. EXTREMITIES:  No calf tenderness to palpation. No edema BLEs. Bally Plana PSYCH: Mood WNL. Appropriately interactive. Affect WNL. Diagnostics:     CBC:   Recent Labs     09/03/20  0619   WBC 6.3   RBC 3.99*   HGB 12.3   HCT 35.3*   MCV 88.4   RDW 13.5        BMP:   Recent Labs     09/03/20  0619 09/04/20  0621 09/05/20  0549    135 136   K 4.5 4.3 4.5    101 103   CO2 22 24 23   BUN 55* 47* 41*   CREATININE 0.97* 0.88 0.84   GLUCOSE 125* 123* 123*     BNP: No results for input(s): BNP in the last 72 hours. PT/INR: No results for input(s): PROTIME, INR in the last 72 hours. APTT: No results for input(s): APTT in the last 72 hours. CARDIAC ENZYMES: No results for input(s): CKMB, CKMBINDEX, TROPONINT in the last 72 hours.     Invalid input(s): CKTOTAL;3  FASTING LIPID PANEL:  Lab Results   Component Value Date    CHOL 160 08/24/2020    HDL 44 08/24/2020    TRIG 79 08/24/2020     LIVER PROFILE: No results for input(s): AST, ALT, ALB, BILIDIR, BILITOT, ALKPHOS in the last 72 hours. Current Medications:   Current Facility-Administered Medications: carvedilol (COREG) tablet 6.25 mg, 6.25 mg, Oral, BID WC  HYDROcodone-acetaminophen (NORCO) 5-325 MG per tablet 1 tablet, 1 tablet, Oral, Q4H PRN  gabapentin (NEURONTIN) capsule 100 mg, 100 mg, Oral, TID  ondansetron (ZOFRAN-ODT) disintegrating tablet 4 mg, 4 mg, Oral, Q6H PRN  metFORMIN (GLUCOPHAGE) tablet 500 mg, 500 mg, Oral, BID WC  insulin lispro (HUMALOG) injection vial 0-12 Units, 0-12 Units, Subcutaneous, TID WC  insulin lispro (HUMALOG) injection vial 0-6 Units, 0-6 Units, Subcutaneous, Nightly  clopidogrel (PLAVIX) tablet 75 mg, 75 mg, Oral, Daily  enoxaparin (LOVENOX) injection 40 mg, 40 mg, Subcutaneous, Daily  amLODIPine (NORVASC) tablet 10 mg, 10 mg, Oral, QPM  atorvastatin (LIPITOR) tablet 80 mg, 80 mg, Oral, Nightly  losartan (COZAAR) tablet 100 mg, 100 mg, Oral, Daily **AND** hydroCHLOROthiazide (HYDRODIURIL) tablet 25 mg, 25 mg, Oral, Daily  aspirin chewable tablet 81 mg, 81 mg, Oral, Daily  polyethylene glycol (GLYCOLAX) packet 17 g, 17 g, Oral, Daily  senna (SENOKOT) tablet 17.2 mg, 2 tablet, Oral, Daily PRN  bisacodyl (DULCOLAX) suppository 10 mg, 10 mg, Rectal, Daily PRN      Impression/Plan:   Impaired ADLs, gait, and mobility due to:      1. R hemiparesis secondary to ischemic L MCA CVA:  PT/OT for gait, mobility, strengthening, endurance, ADLs, and self care. On ASA, atorvastatin, plavix. 2. Transcortical motor aphasia: speech therapy treating. Repetition intact, automatic responses intact, short sentences intact. Some word finding difficulty. 3. HTN/CHF/non-ischemic cardiomyopathy: on amlodipine, carvedilol, HCTZ, losartan - IM following  4. DM: on insulin sliding scale and Metformin. Diarrhea is improving  5. Pain: IM started [place] prn. Was localized to R leg near knee.   6. Bowel Management: Miralax daily, senokot prn, dulcolax prn.  7. DVT Prophylaxis:  low molecular weight heparin, SCD's while in bed

## 2020-09-06 LAB
ANION GAP SERPL CALCULATED.3IONS-SCNC: 10 MMOL/L (ref 9–17)
BUN BLDV-MCNC: 41 MG/DL (ref 8–23)
BUN/CREAT BLD: ABNORMAL (ref 9–20)
CALCIUM SERPL-MCNC: 10.6 MG/DL (ref 8.6–10.4)
CHLORIDE BLD-SCNC: 101 MMOL/L (ref 98–107)
CO2: 24 MMOL/L (ref 20–31)
CREAT SERPL-MCNC: 1.02 MG/DL (ref 0.5–0.9)
GFR AFRICAN AMERICAN: >60 ML/MIN
GFR NON-AFRICAN AMERICAN: 54 ML/MIN
GFR SERPL CREATININE-BSD FRML MDRD: ABNORMAL ML/MIN/{1.73_M2}
GFR SERPL CREATININE-BSD FRML MDRD: ABNORMAL ML/MIN/{1.73_M2}
GLUCOSE BLD-MCNC: 107 MG/DL (ref 65–105)
GLUCOSE BLD-MCNC: 121 MG/DL (ref 70–99)
GLUCOSE BLD-MCNC: 130 MG/DL (ref 65–105)
GLUCOSE BLD-MCNC: 135 MG/DL (ref 65–105)
GLUCOSE BLD-MCNC: 146 MG/DL (ref 65–105)
POTASSIUM SERPL-SCNC: 4.7 MMOL/L (ref 3.7–5.3)
SODIUM BLD-SCNC: 135 MMOL/L (ref 135–144)

## 2020-09-06 PROCEDURE — 6370000000 HC RX 637 (ALT 250 FOR IP): Performed by: INTERNAL MEDICINE

## 2020-09-06 PROCEDURE — 6360000002 HC RX W HCPCS: Performed by: INTERNAL MEDICINE

## 2020-09-06 PROCEDURE — 97535 SELF CARE MNGMENT TRAINING: CPT

## 2020-09-06 PROCEDURE — 97530 THERAPEUTIC ACTIVITIES: CPT

## 2020-09-06 PROCEDURE — 97110 THERAPEUTIC EXERCISES: CPT

## 2020-09-06 PROCEDURE — 97112 NEUROMUSCULAR REEDUCATION: CPT

## 2020-09-06 PROCEDURE — 82947 ASSAY GLUCOSE BLOOD QUANT: CPT

## 2020-09-06 PROCEDURE — 97116 GAIT TRAINING THERAPY: CPT

## 2020-09-06 PROCEDURE — 1180000000 HC REHAB R&B

## 2020-09-06 PROCEDURE — 36415 COLL VENOUS BLD VENIPUNCTURE: CPT

## 2020-09-06 PROCEDURE — 99231 SBSQ HOSP IP/OBS SF/LOW 25: CPT | Performed by: INTERNAL MEDICINE

## 2020-09-06 PROCEDURE — 6370000000 HC RX 637 (ALT 250 FOR IP): Performed by: PHYSICAL MEDICINE & REHABILITATION

## 2020-09-06 PROCEDURE — 80048 BASIC METABOLIC PNL TOTAL CA: CPT

## 2020-09-06 RX ADMIN — ATORVASTATIN CALCIUM 80 MG: 80 TABLET, FILM COATED ORAL at 21:20

## 2020-09-06 RX ADMIN — ENOXAPARIN SODIUM 40 MG: 40 INJECTION SUBCUTANEOUS at 08:44

## 2020-09-06 RX ADMIN — AMLODIPINE BESYLATE 10 MG: 10 TABLET ORAL at 16:45

## 2020-09-06 RX ADMIN — METFORMIN HYDROCHLORIDE 500 MG: 500 TABLET ORAL at 16:45

## 2020-09-06 RX ADMIN — METRONIDAZOLE 100 MG: 500 TABLET ORAL at 21:20

## 2020-09-06 RX ADMIN — LOSARTAN POTASSIUM 100 MG: 50 TABLET, FILM COATED ORAL at 08:45

## 2020-09-06 RX ADMIN — CARVEDILOL 6.25 MG: 6.25 TABLET, FILM COATED ORAL at 16:45

## 2020-09-06 RX ADMIN — METRONIDAZOLE 100 MG: 500 TABLET ORAL at 14:20

## 2020-09-06 RX ADMIN — ASPIRIN 81 MG CHEWABLE TABLET 81 MG: 81 TABLET CHEWABLE at 08:44

## 2020-09-06 RX ADMIN — CARVEDILOL 6.25 MG: 6.25 TABLET, FILM COATED ORAL at 08:45

## 2020-09-06 RX ADMIN — METFORMIN HYDROCHLORIDE 500 MG: 500 TABLET ORAL at 08:44

## 2020-09-06 RX ADMIN — METRONIDAZOLE 100 MG: 500 TABLET ORAL at 08:45

## 2020-09-06 RX ADMIN — INSULIN LISPRO 1 UNITS: 100 INJECTION, SOLUTION INTRAVENOUS; SUBCUTANEOUS at 21:20

## 2020-09-06 RX ADMIN — HYDROCODONE BITARTRATE AND ACETAMINOPHEN 1 TABLET: 5; 325 TABLET ORAL at 08:45

## 2020-09-06 RX ADMIN — HYDROCHLOROTHIAZIDE 25 MG: 25 TABLET ORAL at 08:44

## 2020-09-06 RX ADMIN — CLOPIDOGREL BISULFATE 75 MG: 75 TABLET ORAL at 08:44

## 2020-09-06 ASSESSMENT — PAIN DESCRIPTION - LOCATION
LOCATION: GENERALIZED
LOCATION: SHOULDER

## 2020-09-06 ASSESSMENT — PAIN SCALES - GENERAL
PAINLEVEL_OUTOF10: 4
PAINLEVEL_OUTOF10: 5
PAINLEVEL_OUTOF10: 4
PAINLEVEL_OUTOF10: 8

## 2020-09-06 ASSESSMENT — PAIN DESCRIPTION - ORIENTATION: ORIENTATION: RIGHT

## 2020-09-06 ASSESSMENT — PAIN DESCRIPTION - DESCRIPTORS
DESCRIPTORS: ACHING;SORE
DESCRIPTORS: ACHING;SORE

## 2020-09-06 ASSESSMENT — PAIN DESCRIPTION - PAIN TYPE
TYPE: ACUTE PAIN
TYPE: ACUTE PAIN

## 2020-09-06 ASSESSMENT — PAIN - FUNCTIONAL ASSESSMENT: PAIN_FUNCTIONAL_ASSESSMENT: PREVENTS OR INTERFERES SOME ACTIVE ACTIVITIES AND ADLS

## 2020-09-06 ASSESSMENT — PAIN DESCRIPTION - FREQUENCY: FREQUENCY: INTERMITTENT

## 2020-09-06 NOTE — PROGRESS NOTES
Physical Therapy  [address]   Acute Rehabilitation Physical Therapy Progress Note    Date: 20  Patient Name: Fouzia Ho       Room: 8306/1785-76  MRN: [id number]   Account: [de-identified]   : 1950  (80 y.o.) Gender: female     Referring Practitioner: Dr. Nazario Cloud  Diagnosis: Left ischemic middle cerebral artery stroke  Past Medical History:  has a past medical history of CHF (congestive heart failure) ([facility] [address] Diabetes mellitus ([facility] [address] H/O cardiac catheterization, H/O echocardiogram, History of echocardiogram, History of echocardiogram, Hyperlipidemia, and Hypertension. Past Surgical History:   has a past surgical history that includes Cholecystectomy and Cardiac catheterization (Left, 2017). Additional Pertinent Hx: Pt has a history of HTN, HLD, DM, CHF. Pt recently evaluated at [facility] for  new onset of aphasia. Pt was transferred to [facility] on 20 after diagnostic studies confirmed new L hemisphere CVA. Pt with known history of previous CVAs. MRI confirmed L MCA infarct on 2020. Pt admitted to [address] [place] [address] 20. Overall Orientation Status: Impaired  Orientation Level: Unable to assess(pt is aphasic unable to assess at this time)  Restrictions/Precautions  Restrictions/Precautions: Fall Risk;Up as Tolerated;General Precautions  Required Braces or Orthoses?: No  Implants present? : (none)  Position Activity Restriction  Other position/activity restrictions: up as tolerated    Subjective: Patient is aphasic  Comments: resting HR 61, no complaints dizziness this date, request pain medication before AM treatment due to right shoulder pain 8/10    Vital Signs  Patient Currently in Pain: Yes  Pain Assessment: Faces  Pain Level: 8  Pain Type: Acute pain  Pain Location: Shoulder  Pain Orientation: Right  Pain Descriptors: Aching; Sore  Pain Frequency: Intermittent  Functional Pain Assessment: Prevents or interferes some active activities and ADLs(pt reports 4/10 post pain medication)  Non-Pharmaceutical Pain Intervention(s): Ambulation/Increased Activity(post pain medication)        Patient Observation  Observations: Pt has bruises right UE, dorsal wrist , forearm , and lateral humerus, Dr Jose Martin Hirsch aware       Bed Mobility:        Transfers:  Sit to Stand: Minimal Assistance;Contact guard assistance(instruction left UE placement , right dependant)  Stand to sit: Minimal Assistance(instruction left UE placement , right dependant )  Bed to Chair: Minimal assistance(RW with right hand )      Ambulation 1  Surface: level tile  Device: Parallel Bars  Assistance: Minimal assistance; Moderate assistance  Quality of Gait: decreased right UE/LE coordination , weight shift , foot clearance , hip stabilization extensors  Gait Deviations: Slow Jacinda;Decreased step length;Decreased step height  Distance: 10 ft x 2 fwd /retro  Comments: tactile, instruction , and assist to correct gait deviations right UE and LE  coordination   Ambulation 2  Surface - 2: level tile  Other Apparatus 2: Right; Wheelchair follow(right small hand  on Rolling Walker)  Assistance 2: Minimal assistance;Contact guard assistance  Quality of Gait 2: able to correct step width right heel strike 75% with instruction , decreased right foot clearance   Gait Deviations: Slow Jacinda;Decreased step length;Decreased step height  Distance: 25 feet  Comments: tactile and instruction for right foot placemant , central position within RW , hip extension right stance phase minimal assist   Ambulation 3  Surface - 3: level tile  Other Apparatus 3: Wheelchair follow(right hand  RW)  Assistance 3: Moderate assistance  Quality of Gait 3: decreased right weightshift , right foot clearance , step width heel strike , lacks hip extension stance phase   Gait Deviations: Decreased step length;Decreased step height;Deviated path  Distance: 15 ft x 2  Comments: instruction , tactile, assist , and visual cues Re-education, Safety Education & Training, Patient/Caregiver Education & Training, Equipment Evaluation, Education, & procurement    Conditions Requiring Skilled Therapeutic Intervention  Body structures, Functions, Activity limitations: Decreased functional mobility ; Decreased ADL status; Decreased safe awareness;Decreased strength;Decreased balance;Decreased endurance;Decreased cognition;Decreased fine motor control;Decreased coordination  Treatment Diagnosis: Impaired function, weakness 2\" L MCA CVA. Prognosis: Good  Decision Making: Medium Complexity  History: PT with hx of HTN, DM, CHF, CVAs, pt with recent L MCA CVA. Clinical Presentation: Pt alert, able to answer \"yes\" and \"no\" when asked questions. Barriers to Learning: pt is aphasic, shows difficulty expressing and understanding information at times. REQUIRES PT FOLLOW UP: Yes  Treatment Initiated : gait training, device use, transfer training  Discharge Recommendations: Patient would benefit from continued therapy after discharge;24 hour supervision or assist    Goals  Short term goals  Time Frame for Short term goals: 10 days   Short term goal 1: Pt to perform bed mobility independently. Short term goal 2: Pt to perform transfers with minAx1. Short term goal 3: Pt to ambulate 150 ft, least restrictive device, minAx1. Short term goal 4: Pt to tolerate 30-60 mins physical therapy to increase endurance and strength. Short term goal 5: Pt to complete 5 steps, with LUE support, minAx1. Long term goals  Time Frame for Long term goals : By LOS. Long term goal 2: Pt to complete transfers with SBA to promote independence. Long term goal 3: Pt to ambulate 200 ft, least restrictive device, SBA. Long term goal 4: Pt to complete 5-12 steps, CGA to promote function. Long term goal 5: Pt to improve PASS score from 15/36 to 22/36.     Electronically signed by Araceli Kim PTA on 9/6/20 at 1:59 PM EDT     09/06/20 1121 09/06/20 1358   PT Individual Minutes   Time In 3958 1300   Time Out 1841 1970   Minutes 79 24   PT Concurrent Minutes   Time In [address] [place] [address]  --    Time Out 5136  --    Minutes 2  --

## 2020-09-06 NOTE — PATIENT CARE CONFERENCE
[address] [facility]    ACUTE REHABILITATION  TEAM CONFERENCE NOTE  Date: 20  Patient Name: Lilo Police       Room: 8783/7484-48  MRN: [id number]       : 1950  (80 y.o.)     Gender: female   Referring Practitioner: Dr. Colten King   Acute ischemic left middle cerebral artery (MCA) stroke ([facility]) [I63.512]  Diagnosis: Left ischemic middle cerebral artery stroke     NURSING  Bladder  Continent  Bowel   Continent  Intervention    Both Bowel & Bladder Program     Wounds/Incisions/Ulcers: Redness under bilateral breasts  Medication Education Program: Patient able to manage medications and being educated by nursing  Pain: Patient's pain is currently controlled with -  norco as needed    Fall Risk:  Falling star program initiated    PHYSICAL THERAPY  Bed mobility  Bridging: Stand by assistance  Rolling to Left: Contact guard assistance  Rolling to Right: Contact guard assistance  Supine to Sit: Minimal assistance(right side of bed)  Sit to Supine: Minimal assistance(right side of bed)  Scooting: Stand by assistance      Transfers:  Sit to Stand: Minimal Assistance;Contact guard assistance(instruction left UE placement , right dependant)  Stand to sit: Minimal Assistance(instruction left UE placement , right dependant )  Bed to Chair: Minimal assistance(RW with right hand )  Stand Pivot Transfers: Moderate Assistance    Ambulation 1  Surface: level tile  Device: Hemiwalker  Other Apparatus: Wheelchair follow  Assistance: Minimal assistance; Moderate assistance  Quality of Gait: decreased right UE/LE coordination , weight shift , foot clearance , hip stabilization extensors  Gait Deviations: Slow Jacinda;Decreased step length;Decreased step height  Distance: 15 feet  Comments: tactile cueing vs verbal I/S and therapist guarding patient for fall risk safety with decreased gait belt use secondary to poor feedback provided.  Patient in wide arm gaurd on RUE with noted WB through therapist support hand in hand technique. Patient unable to utilize [place] on walker with  support secondary to increased tone and aproximation of RUE to advance walker effectively. Ambulation 2  Surface - 2: level tile  Device 2: Parallel Bars  Other Apparatus 2: Right; Wheelchair follow(right small hand  on Rolling Walker)  Assistance 2: Minimal assistance;Contact guard assistance  Quality of Gait 2: mirror feedback with improved step sequencing and placement. Gait Deviations: Slow Jacinda;Decreased step length;Decreased step height  Distance: 25 feet  Comments: tactile and instruction for right foot placemant ,  Comments: instruction , tactile, assist , and visual cues to correct gait deviations , 1 step instruction, pt demonstrates impulsive movements     # Steps : 5(3 + 2 steps)  Stairs Height: 4\"(+ 6\")  Rails: Left ascending(hand in hand support on right UE)  Assistance: Moderate assistance(supervision of 2nd person)  Comment: instruction, tactile and hand over hand step up and retro descending     Propulsion 1  Propulsion: Manual  Level: Level Tile  Method: LUE;LLE;RLE  Level of Assistance: Minimal assistance  Description/ Details: pt instructed in reciprical LE advancement , forward gaze with left <> right tracking, straight path    Distance: 55 feet with BLE and LUE +  30 feet with LUE/LLE     Walk   Walk 10 Feet  Dependent    1   Walk 50 feet with 2 Turns      88   Walk 150 Feet      88   Walking 10 feet Uneven Surface      88     Steps  1 Step (Curb)  Substantial/maximal assistance    2   4 Steps  Substantial/maximal assistance    2   12 Steps       88     Wheelchair Ability   Wheel 50 Feet - 2 turns  Partial/moderate assistance     3  Wheel 150 Feet       88    Equipment Needed: (TBD)  Other: to be determined at pt progreses    Goals  Time Frame for Short term goals: 10 days   Short term goal 1: Pt to perform bed mobility independently. Short term goal 2: Pt to perform transfers with minAx1.    Short term goal 3: Pt to ambulate 150 ft, least restrictive device, minAx1. Short term goal 4: Pt to tolerate 30-60 mins physical therapy to increase endurance and strength. Short term goal 5: Pt to complete 5 steps, with LUE support, minAx1. OCCUPATIONAL THERAPY  SELF CARE      Eating   5  Setup or clean-up assistance     Oral Hygiene   4  Assistance Needed: Supervision or touching assistance     Shower/Bathe Self   4  Assistance Needed: Supervision or touching assistance      Upper Body Dressing   5  Assistance Needed: Setup or clean-up assistance     Lower Body Dressing   3  Assistance Needed: Partial/moderate assistance     Putting On/Taking Off Footwear           3  Assistance Needed: Partial/moderate assistance     Toilet Transfer           3  Assistance Needed: Partial/moderate assistance     Toileting Hygiene   2 Assistance Needed: Substantial/maximal assistance         Assessment: Pt progressing well towareds OT goals. Writer emphasized use of RUE for gross support during ADLs to improve functional utilization during ADLs      Short term goals  Time Frame for Short term goals: 10 days  Short term goal 1: Pt will perform grooming with stand by assist and use of assistive equipment/modified techniques PRN. Short term goal 2: Pt will perform upper body bathing/dressing with Minimal assist and use of assistive equipment/modified techniques PRN. Short term goal 3: Pt will perform toileting tasks and lower body bathing/dressing with Moderate assist and use of assitive equipment/durable medical equipment/modified techniques PRN. Short term goal 4: Pt will perform functional mobility and transfers during self-care with Minimal assist, least restrictive mobility device, and Fair safety. Short term goal 5: Pt will stand for 4+ minutes with 1-2 UE support, contact guard assist, and no loss of balance while engaging in functional activity of choice.   Short term goal 6: Pt will actively participate in 30+ minutes of therapeutic exercise/functional activities to promote increased independence with self-care and mobility. Short term goal 7: Pt will verbalize/demonstrate Good understanding of assistive equipment/durable medical equipment/modified techniques for increased independence with self-care and mobility. SPEECH THERAPY  Min assist comprehension and max assist expression  Short Term Goal: Supervision comprehension and mod assist expression    NUTRITION  Weight: 191 lb 12.8 oz (87 kg) / Body mass index is 32.92 kg/m². Diet Rx: Carbohydrate Control. Ensure High Protein x   daily. PO intake had been decreased somewhat but now appears adequate. Ref. Range 9/6/2020 06:05 9/6/2020 10:25 9/6/2020 16:04   POC Glucose Latest Ref Range: 65 - 105 mg/dL 107 (H) 135 (H) 130 (H)   Please see nutrition note for details.     SOCIAL WORK ASSESSMENT  Assessment: sister   Pre-Admission Status:  Lives With: Family(Sister Raquel)  Type of Home: House  Home Layout: One level, Able to Live on Main level with bedroom/bathroom  Home Access: Stairs to enter without rails  Entrance Stairs - Number of Steps: 2 steps to porch + 1 step into house  Bathroom Shower/Tub: Tub/Shower unit, Curtain(tub transfer bench in basement)  Bathroom Toilet: Standard  Bathroom Equipment: Tub transfer bench  Bathroom Accessibility: (walker will fit if used sideways)  Home Equipment: Rolling walker(walker is Pt's sister's)  Receives Help From: Family  ADL Assistance: Independent  Homemaking Assistance: Needs assistance(Sister primary for cooking, niece primary laundry (pt and pt's sister were using laundromat prior to pandemic); pt primary for cleaning)  Homemaking Responsibilities: No  Ambulation Assistance: Independent  Transfer Assistance: Independent  Active : No  Patient's  Info: Pt's sister  IADL Comments: pt states she has a flat bed at home, needs one that raises [facility]  Additional Comments: Pt's sister Joanne Marks (listed as Pt's emergency contact) contacted during OT session with Pt's OK. Pt's sister provided insight regarding Pt's prior level of function due to Pt's global aphasia which impairs Pt's ability to provide information at this time. Per Pt's sister, Pt was independent with all self-care and mobility at home prior to most recent stroke. Pt's sister is currently on medical leave from her position at a local university due to her COPD with oxygen dependence as well as her own septic infection which occurred in June 2020 and lead to Pt's sister requiring dialysis. Per Linda West, she is likely retiring permanently this year and will be able to provide 24/7 Supervision upon Pt's discharge. However, due to Pt's sister's personal medical journey, she is unable to provide significant assist to Pt upon Pt's discharge. Linda West stated she could provide Minimal assist PRN upon Pt's discharge. Pt's sister's daughter (a nurse practitioner) has been doing Pt and Pt's sister's laundry due to the pandemic (they utilized a laundromat prior to pandemic). Pt's niece able to provide A PRN.      Family Education: Need to make contact with family to initiate education    Risk for Readmission: Low 0 - 18%   Risk of Unplanned Readmission:      18         Critical Items: None        Problem / Barrier Intervention / Plan  Results   Impaired function related to R side weakness, decreased coordination/ataxia from stroke  Strengthening ex's/activiites, functional mobility training with appropriate device to facilitate increased independence for safe return to home.      Impaired Ability to Care for Self  Training in Modified care techniques and use of devices to support safe care performance     Impaired comprehension and expression Language retraining exercises, motor planning                                             Total Self Care Score    Total Mobility Score  Admission Score:  15      Admission Score:  25  Progress:  26       Progress:  30  Goal:  31/42         Goal: Members Present at Conference:  :  [address] [facility], [address] [place] [address]  Occupational Therapist: Chantal Holley OT   [address] PT  Speech Therapist: Bobbi SPIVEY-SLP  Nurse: George Winslow RN    Dietary/Nutrition: Adria High RD LD  Pastoral Care: Rica Purvis  CMG:  Kleber Butler RN    I approve the established interdisciplinary plan of care as documented within the medical record of Leobardo Talbot.     Ana Paula Delgado MD

## 2020-09-06 NOTE — PLAN OF CARE
Problem: Skin Integrity:  Goal: Will show no infection signs and symptoms  Description: Will show no infection signs and symptoms  9/6/2020 1610 by Renate Garcia RN  Outcome: Ongoing  9/6/2020 0333 by Ngoc Nava RN  Outcome: Ongoing  Note: No new s/s of infection. Will continue to monitor        Problem: Falls - Risk of:  Goal: Will remain free from falls  Description: Will remain free from falls  9/6/2020 1610 by Renate Garcia RN  Outcome: Ongoing  9/6/2020 0333 by Ngoc Nava RN  Outcome: Ongoing  Note: Pt remained absent from falls. Call light within reach. Bed locked and in lowest position.        Problem: Neurological  Goal: Maximum potential motor/sensory/cognitive function  9/6/2020 1610 by Renate Garcia RN  Outcome: Ongoing  9/6/2020 0333 by Ngoc Nava RN  Outcome: Ongoing  Note: Neurological level unchanged ongoing

## 2020-09-06 NOTE — PROGRESS NOTES
fatigue)  Bed mobility  Supine to Sit: Contact guard assistance  Sit to Supine: Unable to assess  Scooting: Stand by assistance  Comment: assisted out of bed this AM into wheelchair  Transfers  Stand Pivot Transfers: Minimal assistance; Moderate assistance(no device used for SPT from edge of bed > wheelchair, going toward weak side)  Sit to stand: Minimal assistance  Stand to sit: Minimal assistance  Transfer Comments: sit<>stand performed at sink for lower body clothing management  Standing Balance  Time: 1-2 min  Activity: standing at sink for lower body dressing tasks  Comment: 2 UE support, patient unable to free a hand to assist with pulling up pants- due mainly to fatigue, however also due to size of pants (tight), increased assist today. Patient yelling out 1x during, thinking she was going to lose her balance. Required reassurance and cueing for placement of UEs and to stand tall  Functional Mobility  Functional Mobility Comments: use of wheelchair for ADL routine  Toilet Transfers  Toilet - Technique: Stand pivot  Equipment Used: Grab bars(verbal cues for hand placement on grab bars)  Toilet Transfer: Moderate assistance  Toilet Transfers Comments: unsteady with transfer, use of grab bars, verbal cues required for hand placement, \"plopped\" back in wheelchair after toileting - due to fatigue/generalized achy/sore  Fine Motor: From seated position, patient participated in pegboard activity . Patient instructed to reach for peg in board, grasp and lift out of board, place on right side of board in a container; using RUE only (frequently attempts to assist using LUE); patient able to  9 pegs from board. Patient then instructed to  peg from therapist hand and place into pegboard. Patient unable to place peg into board  Type of ROM/Therapeutic Exercise  Comment: PM: patient participated in the following RUE gross exercises (2x10) to promote increased strength/coordination.  Patient instructed in proper technique of each exercise, frequently performing with LUE when instructed to perform with RUE. Required verbal and tactile cues for correct technique. Therapist facilitated movement by eliminating gravity as patient fatigued. Therapist provided resistance to gravity assisted movements. Patient tolerated well and no c/o pain or discomfort. Moderate difficulty following directions for exercises as compared to yesterday  Exercises  Shoulder Flexion: x  Shoulder Extension: x  Shoulder ABduction: x  Shoulder ADduction: x  Elbow Flexion: x  Elbow Extension: x  Supination: x  Pronation: x  Wrist Flexion: x  Wrist Extension: x         Left Hand Strength -  (lbs)  Handle Setting 1: 30 (norms 35-57#)        Right Hand Strength -  (lbs)  Handle Setting 2: 10 (norms 35-57#)               Box and Blocks          Box&Blocks test performed:    RUE: 2 blocks   LUE: 24 blocks   Norm for 80 y/o F: RUE: (60-82) blocks   Norm for 80 y/o F: LUE: (61-89) blocks    Assessment: Pt demo's significant impairment in RUE and moderate impairment in LUE. Patient with Max difficulty counting blocks. Patient unable to state her age- states she is 15. Weight Bearing  Weight Bearing Technique: Yes  Response To Weight Bearing Technique: Attempted seated weight bearing with RUE extended, patient with Max difficulty following directions for this activity today           ADL  Feeding: Setup; Increased time to complete(finishing breakfast upon OT arrival, self feeding with LUE)  Grooming: Stand by assistance;Setup; Increased time to complete(oral care, washing face, hair care; seated at sink)  UE Bathing: Setup; Increased time to complete;Minimal assistance;Verbal cueing(seated at sink, assist for washing shoulders, slight assist/cues for washing LUE)  LE Bathing: Setup; Increased time to complete; Moderate assistance(able to wash tops of thighs/shins from wheelchair position, assist for backs of legs; assist for buttocks/luis area)  UE Dressing: Increased time to complete;Minimal assistance;Setup;Verbal cueing(able to doff night gown with SBA (slight cues); donning bra and overhead shirt with assistance to hook bra in back)  LE Dressing: Moderate assistance;Setup; Increased time to complete;Verbal cueing(able to thread BLEs, cues to thread RLE first, cues for threading LUE. Assist to pull up over hips. set up with wide sock aid for socks, assist for slip on shoes)  Toileting: Moderate assistance(assist for transfer, assist for hygiene and clothing management)  Additional Comments: ADLs sinkside this date. Trialed reacher to doff socks and wide sock aid to don socks, able to don once footie placed on sock aid. Patient with increased frustration with tasks this date, moans frequently during ADLs, when asked patient states she is tired and sore. Increased time required for all tasks. Assessment  Performance deficits / Impairments: Decreased functional mobility ; Decreased ADL status; Decreased ROM; Decreased strength;Decreased safe awareness;Decreased cognition;Decreased endurance;Decreased sensation;Decreased balance;Decreased high-level IADLs;Decreased fine motor control;Decreased coordination;Decreased posture  Prognosis: Good  Discharge Recommendations: 24 hour supervision or assist;Patient would benefit from continued therapy after discharge  Activity Tolerance: Patient limited by fatigue;Patient limited by pain  Activity Tolerance: fatigue/generalized pain AM session  Safety Devices in place: Yes  Type of devices: Left in chair;Call light within reach; Chair alarm in place;Gait belt;Patient at risk for falls          Patient Education:  Patient Goals   Patient goals : To return home with sister  Learner:patient  Method: demonstration and explanation       Outcome: needs reinforcement   OT Education  OT Education: OT Role;Plan of Care;Precautions; ADL Adaptive Strategies;Transfer Training;Energy Conservation;Equipment;Home Exercise Program  Patient Education: OT Role, POC, RUE exercise, ADL adaptive strategies, transfer training, equipment, safety, activity promotion  Barriers to Learning: aphasia    Plan  Plan  Times per week: 5-7x/week, 1.5 hours/day  Times per day: Twice a day  Current Treatment Recommendations: Self-Care / ADL, Home Management Training, Cognitive/Perceptual Training, Strengthening, ROM, Balance Training, Functional Mobility Training, Endurance Training, Neuromuscular Re-education, Cognitive Reorientation, Pain Management, Safety Education & Training, Patient/Caregiver Education & Training, Equipment Evaluation, Education, & procurement, Positioning  Patient Goals   Patient goals : To return home with sister  Short term goals  Time Frame for Short term goals: 10 days  Short term goal 1: Pt will perform grooming with stand by assist and use of assistive equipment/modified techniques PRN. Short term goal 2: Pt will perform upper body bathing/dressing with Minimal assist and use of assistive equipment/modified techniques PRN. Short term goal 3: Pt will perform toileting tasks and lower body bathing/dressing with Moderate assist and use of assitive equipment/durable medical equipment/modified techniques PRN. Short term goal 4: Pt will perform functional mobility and transfers during self-care with Minimal assist, least restrictive mobility device, and Fair safety. Short term goal 5: Pt will stand for 4+ minutes with 1-2 UE support, contact guard assist, and no loss of balance while engaging in functional activity of choice. Short term goal 6: Pt will actively participate in 30+ minutes of therapeutic exercise/functional activities to promote increased independence with self-care and mobility. Short term goal 7: Pt will verbalize/demonstrate Good understanding of assistive equipment/durable medical equipment/modified techniques for increased independence with self-care and mobility.   Long term goals  Time Frame for Long

## 2020-09-06 NOTE — PLAN OF CARE
Problem: Skin Integrity:  Goal: Will show no infection signs and symptoms  Description: Will show no infection signs and symptoms  9/6/2020 0333 by Corina Blandon RN  Outcome: Ongoing  Note: No new s/s of infection. Will continue to monitor        Problem: Skin Integrity:  Goal: Absence of new skin breakdown  Description: Absence of new skin breakdown  9/6/2020 0333 by Corina Blandon RN  Outcome: Ongoing  Note: Pt skin integrity remained intact, no new alterations noted. Head to toe completed, see chart assessment. Problem: Falls - Risk of:  Goal: Will remain free from falls  Description: Will remain free from falls  9/6/2020 0333 by Corina Blandon RN  Outcome: Ongoing  Note: Pt remained absent from falls. Call light within reach. Bed locked and in lowest position. Problem: Falls - Risk of:  Goal: Absence of physical injury  Description: Absence of physical injury  9/6/2020 0333 by Corina Blandon RN  Outcome: Ongoing  Note: Patient remains free of injury.  safe environment maintained       Problem: Neurological  Goal: Maximum potential motor/sensory/cognitive function  9/6/2020 0333 by Corina Blandon RN  Outcome: Ongoing  Note: Neurological level unchanged ongoing      Problem: Musculor/Skeletal Functional Status  Goal: Highest potential functional level  9/6/2020 0333 by Corina Blandon RN  Outcome: Ongoing  Note: Functional level improving      Problem: Musculor/Skeletal Functional Status  Goal: Absence of falls  9/6/2020 0333 by Corina Blandon RN  Outcome: Ongoing  Note: Free of falls      Problem: Neurological  Goal: Maximum potential motor/sensory/cognitive function  9/6/2020 0333 by Corina Blandon RN  Outcome: Ongoing  Note: Neurological level unchanged ongoing      Problem: Nutrition  Goal: Optimal nutrition therapy  9/6/2020 0333 by Corina Blandon RN  Outcome: Ongoing  Note: Adequate nutrition maintained      Problem: Pain:  Goal: Pain level will decrease  Description: Pain level will decrease  9/6/2020 0333 by Vashti Castillo RN  Outcome: Ongoing  Note: Pain in right arm. Level decreasing. Medicated as needed     Problem: Pain:  Goal: Control of acute pain  Description: Control of acute pain  9/6/2020 0333 by Vashti Castillo RN  Outcome: Ongoing  Note: Pain in right arm. Level decreasing. Medicated as needed     Problem: Pain:  Goal: Control of chronic pain  Description: Control of chronic pain  9/6/2020 0333 by Vashti Castillo RN  Outcome: Ongoing  Note: Pain in right arm. Level decreasing.  Medicated as needed

## 2020-09-06 NOTE — CONSULTS
[facility] Internal Medicine    Progress note            Date:   9/6/2020  Patient name:  Leobardo Talbot  Date of admission:  8/26/2020  6:56 PM  MRN:   [id number]  Account:  [de-identified]  YOB: 1950  PCP:    SUE Khan CNP  Room:   2613/2613-01  Code Status:    Full Code    Physician Requesting Consult: Dennis Velazquez MD    Reason for Consult: Medical management    Chief Complaint:     She continues to have apraxia but has no cardiorespiratory symptoms    History Obtained From:     Patient medical record nursing staff    History of Present Illness:     51-year-old pleasant  lady was admitted to [facility] on August 23 with a aphasia diagnosed with the left middle cerebral artery ischemic infarct  Known history of systolic congestive heart failure ejection fraction 35 to 40% secondary to nonischemic cardiomyopathy  Diabetes   Duration more than 7 years  Modifying factors on Glucophage and other med  Severity uncontrolled sever  Associated signs and symtoms neuropathy/ckd/ CAD. aggravated with sugar diet and better with low sugar diet        9/6/2020    NO NEW SYMPTOMS . APPETITE FAIR   [x] YES        ---   PAIN CONTROL FAIR   [x] YES        ---   SLEEP  FAIR   [x] YES        ---   BOWELS OK   [x] YES        ---                                                                         ·  1. Past Medical History:     Past Medical History:   Diagnosis Date    [facility]estive [facility] failure) [facility] [facility]     Diabetes mellitus ([facility] [address]     H/O cardiac catheterization 08/04/2017    LMCA: Mild irregularites 10-20%. LAD: Mild irregularites 10-20%. LCx: Mild irregularites 10-20%. RCA: Mild irregularites 10-20%. LV function assessed as : Abnormal. EF of 40%.  H/O echocardiogram 12/18/2018    EF 55%. Mildly increased LV wall thickness. The left atrium appears moderately to severely dilated. Moderate to severe mitral regurg.  Moderate pulmonary HTN with an estimated RV systolic pressure of [id number]. Moderate tricuspid regurg. Evidnece fo moderate diastolic dysfunction is seen.  History of echocardiogram 01/17/2019    EF 55%. LV wall thickness moderately increased. LA is mildly dilated w/ LA volume index of 30. trivial mitral regurg. Evidence of moderate (grade II) diastolic dysfunction seen.  History of echocardiogram 06/03/2019    EF of 50-55%. LV wall thickness is mildly increased. LA is mildly dilated (29-33) with a left atrial volume index of 31 m1/m2. mild diastolic dysfunction.  Hyperlipidemia     Hypertension         Past Surgical History:     Past Surgical History:   Procedure Laterality Date    CARDIAC CATHETERIZATION Left 08/04/2017    right radial, MHT Dr. Alcala Alert          Medications Prior to Admission:     Prior to Admission medications    Medication Sig Start Date End Date Taking? Authorizing Provider   clopidogrel (PLAVIX) 75 MG tablet Take 1 tablet by mouth daily 8/27/20   Diandra Mckeon MD   carvedilol (COREG) 12.5 MG tablet Take 1 tablet by mouth 2 times daily (with meals) 7/20/20   Shashank Arevalo MD   losartan-hydrochlorothiazide [facility]) 100-25 MG per tablet Take 1 tablet by mouth daily 7/20/20   Shashank Arevalo MD   amLODIPine (NORVASC) 10 MG tablet Take 1 tablet by mouth daily 7/20/20   Shashank Arevalo MD   atorvastatin (LIPITOR) 80 MG tablet Take 1 tablet by mouth nightly 7/20/20   Shashank Arevalo MD   aspirin 81 MG EC tablet Take 1 tablet by mouth daily 7/20/20   Shashank Arevalo MD   metFORMIN (GLUCOPHAGE) 500 MG tablet Take 500 mg by mouth 2 times daily (with meals)    Historical Provider, MD        Allergies:     Patient has no known allergies. Social History:     Tobacco:    reports that she has never smoked. She has never used smokeless tobacco.  Alcohol:      reports no history of alcohol use. Drug Use:  reports no history of drug use.     Family History:     Family History   Problem Relation Age of Onset    Coronary Art Dis Father          from [place]   Isabel Aleman COPD Sister         O2 dep    Coronary Art Dis Brother         2 brothers  from [place]       Review of Systems:     Positive and Negative as described in HPI. CONSTITUTIONAL:  negative for fevers, chills, sweats, fatigue, weight loss  HEENT:  negative for vision, hearing changes, runny nose, throat pain  RESPIRATORY:  negative for shortness of breath, cough, congestion, wheezing. CARDIOVASCULAR:  negative for chest pain, palpitations. GASTROINTESTINAL:  negative for nausea, vomiting, diarrhea, constipation, change in bowel habits, abdominal pain   GENITOURINARY:  negative for difficulty of urination, burning with urination, frequency   INTEGUMENT:  negative for rash, skin lesions, easy bruising   HEMATOLOGIC/LYMPHATIC:  negative for swelling/edema   ALLERGIC/IMMUNOLOGIC:  negative for urticaria , itching  ENDOCRINE:  negative increase in drinking, increase in urination, hot or cold intolerance  MUSCULOSKELETAL:  negative joint pains, muscle aches, swelling of joints  NEUROLOGICAL: Right-sided weakness and speech deficit  BEHAVIOR/PSYCH:  negative for depression, anxiety    Physical Exam:     BP (!) 113/44   Pulse 64   Temp 98.1 °F (36.7 °C) (Oral)   Resp 18   Ht 5' 4\" (1.626 m)   Wt 191 lb 12.8 oz (87 kg)   SpO2 96%   BMI 32.92 kg/m²   Temp (24hrs), Av.1 °F (36.7 °C), Min:98.1 °F (36.7 °C), Max:98.1 °F (36.7 °C)    Recent Labs     20  1544 20  2037 20  0605 20  1025   POCGLU 128* 110* 107* 135*     No intake or output data in the 24 hours ending 20 1448  Speech deficit  General Appearance:  alert, well appearing, and in no acute distress  Mental status: oriented to person, place, and time with normal affect  Head:  normocephalic, atraumatic.   Eye: no icterus, redness, pupils equal and reactive, extraocular eye movements intact, conjunctiva clear  Ear: normal external ear, no discharge, 08/26/2020    Cerebral infarction ([facility] - left MCA distribution  [I63.9] 08/26/2020    Type 2 diabetes mellitus, without long-term current use of insulin ([facility] [E11.9] 08/25/2020    Aphasia [R47.01]     CKD (chronic kidney disease) stage 3, GFR 30-59 ml/min ([facility]) [N18.3] 12/20/2018    Morbid obesity ([facility] [E66.01] 12/20/2018    Acute on chronic combined systolic and diastolic CHF, NYHA class 4 ([facility]) [I50.43] 12/17/2018    Chronic combined systolic and diastolic HF (heart failure), NYHA class 2 ([facility] [I50.42] 08/28/2017    Hyperlipidemia [E78.5]     Hypertension [I10]     Idiopathic cardiomyopathy ([facility] [I42.8]      Recent Labs     09/05/20  1110 09/05/20  1544 09/05/20  2037 09/06/20  0605 09/06/20  1025   POCGLU 141* 128* 110* 107* 135*     Vitals:    09/04/20 1655 09/04/20 1851 09/05/20 0730 09/05/20 1802   BP: 128/71 (!) 129/52 128/66 (!) 113/44   Pulse: 59 59 64 64   Resp: 18 18 19 18   Temp: 98.1 °F (36.7 °C) 97.7 °F (36.5 °C) 97.9 °F (36.6 °C) 98.1 °F (36.7 °C)   TempSrc: Oral Oral  Oral   SpO2: 100% 98% 98% 96%   Weight:       Height:           Plan:     Patient tolerating rehabilitative therapies . Progressing fairly well . Continue present therapy . Blood sugar blood pressure control satisfactory     2. Uncontrolled diabetes mellitus with the peripheral organ damage with ischemic CVA  3. Ischemic CVA with right hemiparesis and Broca's aphasia  4. Metformin recent A1c is 7.3 reviewed and reconciled  5. Ischemic CVA hyperlipidemia aspirin and Lipitor Plavix 75 all started  6. Losartan 100 mg for hypertension and diabetic renal protection  7. norco for pain  Her BUN is 47 and creatinine is 0.88 mg and her Hb is 12.3 , her BUN elevation is unexplained and her pulse is 64. Stephen Duenas MD  9/6/2020  2:48 PM    Copy sent to Dr. Kiel Alvarado, APRN - CNP    Please note that this chart was generated using voice recognition Dragon dictation software.   Although every effort was made to ensure the accuracy of this automated transcription, some errors in transcription may have occurred.

## 2020-09-07 LAB
ANION GAP SERPL CALCULATED.3IONS-SCNC: 9 MMOL/L (ref 9–17)
BUN BLDV-MCNC: 49 MG/DL (ref 8–23)
BUN/CREAT BLD: ABNORMAL (ref 9–20)
CALCIUM SERPL-MCNC: 10.1 MG/DL (ref 8.6–10.4)
CHLORIDE BLD-SCNC: 105 MMOL/L (ref 98–107)
CO2: 24 MMOL/L (ref 20–31)
CREAT SERPL-MCNC: 0.85 MG/DL (ref 0.5–0.9)
GFR AFRICAN AMERICAN: >60 ML/MIN
GFR NON-AFRICAN AMERICAN: >60 ML/MIN
GFR SERPL CREATININE-BSD FRML MDRD: ABNORMAL ML/MIN/{1.73_M2}
GFR SERPL CREATININE-BSD FRML MDRD: ABNORMAL ML/MIN/{1.73_M2}
GLUCOSE BLD-MCNC: 117 MG/DL (ref 65–105)
GLUCOSE BLD-MCNC: 120 MG/DL (ref 65–105)
GLUCOSE BLD-MCNC: 122 MG/DL (ref 70–99)
GLUCOSE BLD-MCNC: 151 MG/DL (ref 65–105)
GLUCOSE BLD-MCNC: 179 MG/DL (ref 65–105)
POTASSIUM SERPL-SCNC: 4.6 MMOL/L (ref 3.7–5.3)
SODIUM BLD-SCNC: 138 MMOL/L (ref 135–144)

## 2020-09-07 PROCEDURE — 97112 NEUROMUSCULAR REEDUCATION: CPT

## 2020-09-07 PROCEDURE — 97530 THERAPEUTIC ACTIVITIES: CPT

## 2020-09-07 PROCEDURE — 6370000000 HC RX 637 (ALT 250 FOR IP): Performed by: INTERNAL MEDICINE

## 2020-09-07 PROCEDURE — 6370000000 HC RX 637 (ALT 250 FOR IP): Performed by: PHYSICAL MEDICINE & REHABILITATION

## 2020-09-07 PROCEDURE — 97116 GAIT TRAINING THERAPY: CPT

## 2020-09-07 PROCEDURE — 80048 BASIC METABOLIC PNL TOTAL CA: CPT

## 2020-09-07 PROCEDURE — 82947 ASSAY GLUCOSE BLOOD QUANT: CPT

## 2020-09-07 PROCEDURE — 6360000002 HC RX W HCPCS: Performed by: INTERNAL MEDICINE

## 2020-09-07 PROCEDURE — 36415 COLL VENOUS BLD VENIPUNCTURE: CPT

## 2020-09-07 PROCEDURE — 97110 THERAPEUTIC EXERCISES: CPT

## 2020-09-07 PROCEDURE — 99231 SBSQ HOSP IP/OBS SF/LOW 25: CPT | Performed by: INTERNAL MEDICINE

## 2020-09-07 PROCEDURE — 1180000000 HC REHAB R&B

## 2020-09-07 PROCEDURE — 99231 SBSQ HOSP IP/OBS SF/LOW 25: CPT | Performed by: PHYSICAL MEDICINE & REHABILITATION

## 2020-09-07 PROCEDURE — 92507 TX SP LANG VOICE COMM INDIV: CPT

## 2020-09-07 RX ADMIN — POLYETHYLENE GLYCOL 3350 17 G: 17 POWDER, FOR SOLUTION ORAL at 08:04

## 2020-09-07 RX ADMIN — INSULIN LISPRO 1 UNITS: 100 INJECTION, SOLUTION INTRAVENOUS; SUBCUTANEOUS at 20:18

## 2020-09-07 RX ADMIN — CLOPIDOGREL BISULFATE 75 MG: 75 TABLET ORAL at 08:04

## 2020-09-07 RX ADMIN — METFORMIN HYDROCHLORIDE 500 MG: 500 TABLET ORAL at 18:23

## 2020-09-07 RX ADMIN — METFORMIN HYDROCHLORIDE 500 MG: 500 TABLET ORAL at 08:04

## 2020-09-07 RX ADMIN — METRONIDAZOLE 100 MG: 500 TABLET ORAL at 19:58

## 2020-09-07 RX ADMIN — ENOXAPARIN SODIUM 40 MG: 40 INJECTION SUBCUTANEOUS at 08:04

## 2020-09-07 RX ADMIN — ASPIRIN 81 MG CHEWABLE TABLET 81 MG: 81 TABLET CHEWABLE at 08:04

## 2020-09-07 RX ADMIN — CARVEDILOL 6.25 MG: 6.25 TABLET, FILM COATED ORAL at 08:09

## 2020-09-07 RX ADMIN — CARVEDILOL 6.25 MG: 6.25 TABLET, FILM COATED ORAL at 18:23

## 2020-09-07 RX ADMIN — LOSARTAN POTASSIUM 100 MG: 50 TABLET, FILM COATED ORAL at 08:04

## 2020-09-07 RX ADMIN — AMLODIPINE BESYLATE 10 MG: 10 TABLET ORAL at 18:23

## 2020-09-07 RX ADMIN — METRONIDAZOLE 100 MG: 500 TABLET ORAL at 14:11

## 2020-09-07 RX ADMIN — METRONIDAZOLE 100 MG: 500 TABLET ORAL at 08:04

## 2020-09-07 RX ADMIN — HYDROCHLOROTHIAZIDE 25 MG: 25 TABLET ORAL at 08:04

## 2020-09-07 RX ADMIN — ATORVASTATIN CALCIUM 80 MG: 80 TABLET, FILM COATED ORAL at 19:58

## 2020-09-07 RX ADMIN — HYDROCODONE BITARTRATE AND ACETAMINOPHEN 1 TABLET: 5; 325 TABLET ORAL at 01:30

## 2020-09-07 RX ADMIN — INSULIN LISPRO 2 UNITS: 100 INJECTION, SOLUTION INTRAVENOUS; SUBCUTANEOUS at 11:29

## 2020-09-07 ASSESSMENT — PAIN SCALES - WONG BAKER: WONGBAKER_NUMERICALRESPONSE: 4

## 2020-09-07 ASSESSMENT — PAIN DESCRIPTION - LOCATION
LOCATION: ARM
LOCATION: ANKLE;LEG

## 2020-09-07 ASSESSMENT — PAIN DESCRIPTION - ORIENTATION
ORIENTATION: RIGHT
ORIENTATION: RIGHT

## 2020-09-07 ASSESSMENT — PAIN - FUNCTIONAL ASSESSMENT: PAIN_FUNCTIONAL_ASSESSMENT: PREVENTS OR INTERFERES SOME ACTIVE ACTIVITIES AND ADLS

## 2020-09-07 ASSESSMENT — PAIN DESCRIPTION - FREQUENCY: FREQUENCY: INTERMITTENT

## 2020-09-07 ASSESSMENT — PAIN DESCRIPTION - PAIN TYPE
TYPE: ACUTE PAIN
TYPE: ACUTE PAIN

## 2020-09-07 ASSESSMENT — PAIN SCALES - GENERAL: PAINLEVEL_OUTOF10: 7

## 2020-09-07 NOTE — PLAN OF CARE
Problem: Skin Integrity:  Goal: Will show no infection signs and symptoms  Description: Will show no infection signs and symptoms  9/7/2020 1123 by Manuel Dodd RN  Outcome: Ongoing  9/7/2020 0339 by Radha Castillo RN  Outcome: Ongoing  Note: No new s/s of infection. Will continue to monitor     Goal: Absence of new skin breakdown  Description: Absence of new skin breakdown  9/7/2020 1123 by Manuel Dodd RN  Outcome: Ongoing  9/7/2020 1123 by Manuel Dodd RN  Outcome: Ongoing  9/7/2020 0339 by Radha Castillo RN  Outcome: Ongoing  Note: Pt skin integrity remained intact, no new alterations noted. Head to toe completed, see chart assessment. Problem: Falls - Risk of:  Goal: Will remain free from falls  Description: Will remain free from falls  9/7/2020 1123 by Manuel Dodd RN  Outcome: Ongoing  9/7/2020 1123 by Manuel Dodd RN  Outcome: Ongoing  9/7/2020 0339 by Radha Castillo RN  Outcome: Ongoing  Note: Pt remained absent from falls. Call light within reach. Bed locked and in lowest position. Goal: Absence of physical injury  Description: Absence of physical injury  9/7/2020 1123 by Manuel Dodd RN  Outcome: Ongoing  9/7/2020 1123 by Manuel Dodd RN  Outcome: Ongoing  9/7/2020 0339 by Radha Castillo RN  Outcome: Ongoing  Note: Patient remains free of injury.  safe environment maintained       Problem: Neurological  Goal: Maximum potential motor/sensory/cognitive function  9/7/2020 1123 by Manuel Dodd RN  Outcome: Ongoing  9/7/2020 1123 by Manuel Dodd RN  Outcome: Ongoing  9/7/2020 0339 by Radha Castillo RN  Outcome: Ongoing  Note: Neurological functioning ongoing improvement      Problem: Musculor/Skeletal Functional Status  Goal: Highest potential functional level  9/7/2020 1123 by Maunel Dodd RN  Outcome: Ongoing  9/7/2020 1123 by Manuel Dodd RN  Outcome: Ongoing  9/7/2020 0339 by Radha Castillo RN  Outcome: Ongoing  Note: Functional level improving   Goal: Absence of

## 2020-09-07 NOTE — PROGRESS NOTES
Speech Language Pathology  Speech Language Pathology  [facility]    Speech Language Treatment Note    Date: 9/7/2020  Patients Name: Edilia Scheuermann  MRN: [id number]  Diagnosis: CVA  Patient Active Problem List   Diagnosis Code    Hypertension I10    Hyperlipidemia E78.5    Acute on chronic systolic CHF (congestive heart failure) ([address] I50.23    Hypertensive urgency I16.0    Idiopathic cardiomyopathy ([address] I42.8    Hypertensive emergency I16.1    Chronic combined systolic and diastolic HF (heart failure), NYHA class 2 ([address] I50.42    Acute on chronic combined systolic and diastolic CHF, NYHA class 4 ([facility]) I50.43    Hypoxia R09.02    Severe mitral regurgitation by prior echocardiogram I34.0    Morbid obesity ([facility]) E66.01    CKD (chronic kidney disease) stage 3, GFR 30-59 ml/min ([facility]) N18.3    Physical deconditioning R53.81    TIA (transient ischemic attack) G45.9    Old lacunar stroke without late effect Z86.73    Dysarthria R47.1    Stroke determined by clinical assessment ([facility] [address] I63.9    Aphasia R47.01    Type 2 diabetes mellitus, without long-term current use of insulin ([address] E11.9    Cerebral infarction ([facility] [address] - left MCA distribution  I63.9    Uncontrolled type 2 diabetes mellitus with hyperglycemia ([facility] [address] E11.65    Elevated blood pressure reading R03.0    Acute ischemic stroke ([facility]) I63.9       Pain: 0/10    Speech and Language Treatment  Treatment time:  2469-6002    Subjective: [x] Alert [x] Cooperative     [] Confused     [] Agitated      [] Lethargic    Objective/Assessment:  Auditory Comprehension:    Pt. Responding appropriately to simple yes/no questions     Verbal Expression:     Confrontation naming: pt producing phonemic paraphasia approximation of target word 70% accuracy, pt benefiting from sentence completiton to recall word throughout.  MAX cues provided    Placing written word on top of picture: 100% accuracy humberto     Sentence completion (with choice of three) 50% accuracy humberto, identifying. Motor speech:  n/a     Reading Comprehension:  n/a        Other:  No family present. Plan:  [x] Continue ST services    [] Discharge from ST:      Discharge recommendations: [] Inpatient Rehab   [] [place]   [] Outpatient Therapy  [] Follow up at trauma clinic   [] Other:       Treatment completed by:  Megan Batista M.A., Vivek Lorenzo

## 2020-09-07 NOTE — PROGRESS NOTES
Physical Medicine & Rehabilitation  Progress Note      Subjective:      81year-old female with ischemic CVA with R dominant hemiparesis and aphasia. She continues to improve with speech and motor return in R arm and leg. She denies any issues with pain, sleep, appetite, bowel, or bladder. ROS:  Denies fevers, chills, sweats. No chest pain, palpitations, lightheadedness. Denies coughing, wheezing or shortness of breath. Denies abdominal pain, nausea, diarrhea or constipation. No new areas of joint pain. Denies new areas of numbness or weakness. Denies new anxiety or depression issues. No new skin problems. Rehabilitation:   Progressing in therapies. PT:  Restrictions/Precautions: Fall Risk, Up as Tolerated, General Precautions  Implants present? : (none)  Other position/activity restrictions: up as tolerated   Transfers  Sit to Stand: Minimal Assistance, Contact guard assistance(instruction left UE placement , right dependant)  Stand to sit: Minimal Assistance(instruction left UE placement , right dependant )  Bed to Chair: Minimal assistance(RW with right hand )  Stand Pivot Transfers: Moderate Assistance  Comment: 1 step instruction sequencing RW , and bilateral LEs  Ambulation 1  Surface: level tile  Device: [employer]  Other Apparatus: Wheelchair follow  Assistance: Minimal assistance, Moderate assistance  Quality of Gait: decreased right UE/LE coordination , weight shift , foot clearance , hip stabilization extensors  Gait Deviations: Slow Jacinda, Decreased step length, Decreased step height  Distance: 15 feet  Comments: tactile cueing vs verbal I/S and therapist guarding patient for fall risk safety with decreased gait belt use secondary to poor feedback provided. Patient in wide arm gaurd on RUE with noted WB through therapist support hand in hand technique.  Patient unable to utilize [place] on walker with  support secondary to increased tone and aproximation of RUE to advance walker effectively. Transfers  Sit to Stand: Minimal Assistance, Contact guard assistance(instruction left UE placement , right dependant)  Stand to sit: Minimal Assistance(instruction left UE placement , right dependant )  Bed to Chair: Minimal assistance(RW with right hand )  Stand Pivot Transfers: Moderate Assistance  Comment: 1 step instruction sequencing RW , and bilateral LEs  Ambulation  Ambulation?: Yes  More Ambulation?: Yes  Ambulation 1  Surface: level tile  Device: Hemiwalker  Other Apparatus: Wheelchair follow  Assistance: Minimal assistance, Moderate assistance  Quality of Gait: decreased right UE/LE coordination , weight shift , foot clearance , hip stabilization extensors  Gait Deviations: Slow Jacinda, Decreased step length, Decreased step height  Distance: 15 feet  Comments: tactile cueing vs verbal I/S and therapist guarding patient for fall risk safety with decreased gait belt use secondary to poor feedback provided. Patient in wide arm gaurd on RUE with noted WB through therapist support hand in hand technique. Patient unable to utilize [place] on walker with  support secondary to increased tone and aproximation of RUE to advance walker effectively. Surface: level tile  Ambulation 1  Surface: level tile  Device: Hemiwalker  Other Apparatus: Wheelchair follow  Assistance: Minimal assistance, Moderate assistance  Quality of Gait: decreased right UE/LE coordination , weight shift , foot clearance , hip stabilization extensors  Gait Deviations: Slow Jacinda, Decreased step length, Decreased step height  Distance: 15 feet  Comments: tactile cueing vs verbal I/S and therapist guarding patient for fall risk safety with decreased gait belt use secondary to poor feedback provided. Patient in wide arm gaurd on RUE with noted WB through therapist support hand in hand technique.  Patient unable to utilize [place] on walker with  support secondary to increased tone and aproximation of RUE to advance walker effectively. OT:  ADL  Feeding: Setup, Increased time to complete(finishing breakfast upon OT arrival, self feeding with LUE)  Grooming: Stand by assistance, Setup, Increased time to complete(oral care, washing face, hair care; seated at sink)  UE Bathing: Setup, Increased time to complete, Minimal assistance, Verbal cueing(seated at sink, assist for washing shoulders, slight assist/cues for washing LUE)  LE Bathing: Setup, Increased time to complete, Moderate assistance(able to wash tops of thighs/shins from wheelchair position, assist for backs of legs; assist for buttocks/luis area)  UE Dressing: Increased time to complete, Minimal assistance, Setup, Verbal cueing(able to doff night gown with SBA (slight cues); donning bra and overhead shirt with assistance to hook bra in back)  LE Dressing: Moderate assistance, Setup, Increased time to complete, Verbal cueing(able to thread BLEs, cues to thread RLE first, cues for threading LUE. Assist to pull up over hips. set up with wide sock aid for socks, assist for slip on shoes)  Toileting: Moderate assistance(assist for transfer, assist for hygiene and clothing management)  Additional Comments: ADLs sinkside this date. Trialed reacher to doff socks and wide sock aid to don socks, able to don once footie placed on sock aid. Patient with increased frustration with tasks this date, moans frequently during ADLs, when asked patient states she is tired and sore. Increased time required for all tasks. Balance  Sitting Balance: Contact guard assistance(close SBA-CGA while seated unsupported on toilet this date)  Standing Balance: Moderate assistance(Min-Mod A this date, due to patient overall fatigue)   Standing Balance  Time: 1-2 min  Activity: standing at sink for lower body dressing tasks  Comment: 2 UE support, patient unable to free a hand to assist with pulling up pants- due mainly to fatigue, however also due to size of pants (tight), increased assist today. Patient yelling out 1x during, thinking she was going to lose her balance. Required reassurance and cueing for placement of UEs and to stand tall  Functional Mobility  Functional - Mobility Device: Wheelchair  Activity: To/from bathroom  Assist Level: Maximum assistance  Functional Mobility Comments: use of wheelchair for ADL routine     Bed mobility  Bridging: Stand by assistance  Rolling to Left: Contact guard assistance  Rolling to Right: Contact guard assistance  Supine to Sit: Minimal assistance(right side of bed)  Sit to Supine: Minimal assistance(right side of bed)  Scooting: Stand by assistance  Comment: assisted out of bed this AM into wheelchair  Transfers  Stand Pivot Transfers: Minimal assistance, Moderate assistance(no device used for SPT from edge of bed > wheelchair, going toward weak side)  Sit to stand: Minimal assistance  Stand to sit: Minimal assistance  Transfer Comments: sit<>stand performed at sink for lower body clothing management   Toilet Transfers  Toilet - Technique: Stand pivot  Equipment Used: Grab bars(verbal cues for hand placement on grab bars)  Toilet Transfer: Moderate assistance  Toilet Transfers Comments: unsteady with transfer, use of grab bars, verbal cues required for hand placement, \"plopped\" back in wheelchair after toileting - due to fatigue/generalized achy/sore     Shower Transfers  Shower - Transfer From: Wheelchair  Shower - Transfer Type: To and From  Shower - Transfer To: Transfer tub bench  Shower - Technique: Stand pivot, To right, To left  Shower Transfers: Moderate assistance, Dependent  Shower Transfers Comments: Moderate verbal/tactile cues for hand placement and safety for entry; total assist for exit due to seated on floor       SPEECH:  Subjective: [x]? Alert     [x]? Cooperative     []? Confused     []? Agitated      []? Lethargic     Objective/Assessment:  Auditory Comprehension:    Pt.  Responding appropriately to simple yes/no questions      Verbal Expression:     Confrontation naming: pt producing phonemic paraphasia approximation of target word 70% accuracy, pt benefiting from sentence completiton to recall word throughout. MAX cues provided     Placing written word on top of picture: 100% accuracy humberto      Sentence completion (with choice of three) 50% accuracy humberto, identifying.       Motor speech:  n/a     Reading Comprehension:  n/a         Other:  No family present.         Objective:  BP (!) 97/49   Pulse 73   Temp 98.2 °F (36.8 °C) (Oral)   Resp 18   Ht 5' 4\" (1.626 m)   Wt 191 lb 12.8 oz (87 kg)   SpO2 94%   BMI 32.92 kg/m²       GEN: Well developed, well nourished, in NAD  HEENT:  NCAT. PERRL. EOMI. Mucous membranes pink and moist.   PULM:  Clear to ausculation. No rales or rhonchi. Respirations WNL and unlabored. CV:  RRR. No murmurs or gallops. GI:  Abdomen soft. Nontender. Non-distended. BS + and equal.    NEUROLOGICAL: A&O x3. Sensation intact to light touch. Short sentences improving. MSK:  Functional ROM LUE and LLE. Impaired ROM RUE and RLE. Motor testing 4+/5 key muscles LUE and LLE. R  3/5, R wrist extension 3/5, R finger extension 3/5, R elbow flexion and extension 3/5 . SKIN: Warm dry and intact. Good turgor. EXTREMITIES:  No calf tenderness to palpation. No edema BLEs. Jhon Mejía PSYCH: Mood WNL. Appropriately interactive. Affect WNL. Diagnostics:     CBC:   No results for input(s): WBC, RBC, HGB, HCT, MCV, RDW, PLT in the last 72 hours. BMP:   Recent Labs     09/05/20  0549 09/06/20  0545 09/07/20  0609    135 138   K 4.5 4.7 4.6    101 105   CO2 23 24 24   BUN 41* 41* 49*   CREATININE 0.84 1.02* 0.85   GLUCOSE 123* 121* 122*     BNP: No results for input(s): BNP in the last 72 hours. PT/INR: No results for input(s): PROTIME, INR in the last 72 hours. APTT: No results for input(s): APTT in the last 72 hours.   CARDIAC ENZYMES: No results for input(s): CKMB, CKMBINDEX, TROPONINT in the last 72 hours.    Invalid input(s): CKTOTAL;3  FASTING LIPID PANEL:  Lab Results   Component Value Date    CHOL 160 08/24/2020    HDL 44 08/24/2020    TRIG 79 08/24/2020     LIVER PROFILE: No results for input(s): AST, ALT, ALB, BILIDIR, BILITOT, ALKPHOS in the last 72 hours. Current Medications:   Current Facility-Administered Medications: carvedilol (COREG) tablet 6.25 mg, 6.25 mg, Oral, BID WC  HYDROcodone-acetaminophen (NORCO) 5-325 MG per tablet 1 tablet, 1 tablet, Oral, Q4H PRN  gabapentin (NEURONTIN) capsule 100 mg, 100 mg, Oral, TID  ondansetron (ZOFRAN-ODT) disintegrating tablet 4 mg, 4 mg, Oral, Q6H PRN  metFORMIN (GLUCOPHAGE) tablet 500 mg, 500 mg, Oral, BID WC  insulin lispro (HUMALOG) injection vial 0-12 Units, 0-12 Units, Subcutaneous, TID WC  insulin lispro (HUMALOG) injection vial 0-6 Units, 0-6 Units, Subcutaneous, Nightly  clopidogrel (PLAVIX) tablet 75 mg, 75 mg, Oral, Daily  enoxaparin (LOVENOX) injection 40 mg, 40 mg, Subcutaneous, Daily  amLODIPine (NORVASC) tablet 10 mg, 10 mg, Oral, QPM  atorvastatin (LIPITOR) tablet 80 mg, 80 mg, Oral, Nightly  losartan (COZAAR) tablet 100 mg, 100 mg, Oral, Daily **AND** hydroCHLOROthiazide (HYDRODIURIL) tablet 25 mg, 25 mg, Oral, Daily  aspirin chewable tablet 81 mg, 81 mg, Oral, Daily  polyethylene glycol (GLYCOLAX) packet 17 g, 17 g, Oral, Daily  senna (SENOKOT) tablet 17.2 mg, 2 tablet, Oral, Daily PRN  bisacodyl (DULCOLAX) suppository 10 mg, 10 mg, Rectal, Daily PRN      Impression/Plan:   Impaired ADLs, gait, and mobility due to:      1. R hemiparesis secondary to ischemic L MCA CVA:  PT/OT for gait, mobility, strengthening, endurance, ADLs, and self care. On ASA, atorvastatin, plavix. 2. Transcortical motor aphasia: speech therapy treating. Repetition intact, automatic responses intact, short sentences intact and improving. Some word finding difficulty.    3. HTN/CHF/non-ischemic cardiomyopathy: on amlodipine, carvedilol, HCTZ, losartan - IM following  4. DM: on insulin sliding scale and Metformin. Diarrhea is improving  5. Pain: Has Norco prn. Was localized to R leg near knee. 6. Bowel Management: Miralax daily, senokot prn, dulcolax prn.  7. DVT Prophylaxis:  low molecular weight heparin, SCD's while in bed and MIRTA's   8. Internal medicine for medical management    Electronically signed by Jda Lion MD on 9/7/2020 at 1:03 PM      This note is created with the assistance of a speech recognition program.  While intending to generate a document that actually reflects the content of the visit, the document can still have some errors including those of syntax and sound a like substitutions which may escape proof reading. In such instances, actual meaning can be extrapolated by contextual diversion.

## 2020-09-07 NOTE — CONSULTS
[facility] Internal Medicine    Progress note            Date:   9/7/2020  Patient name:  Aguila Mata  Date of admission:  8/26/2020  6:56 PM  MRN:   [id number]  Account:  [de-identified]  YOB: 1950  PCP:    SUE Tovar CNP  Room:   2613/2613-01  Code Status:    Full Code    Physician Requesting Consult: Christiano Alaniz MD    Reason for Consult: Medical management    Chief Complaint:     She continues to have apraxia but has no cardiorespiratory symptoms    History Obtained From:     Patient medical record nursing staff    History of Present Illness:     79-year-old pleasant  lady was admitted to [facility] on August 23 with a aphasia diagnosed with the left middle cerebral artery ischemic infarct  Known history of systolic congestive heart failure ejection fraction 35 to 40% secondary to nonischemic cardiomyopathy  Diabetes   Duration more than 7 years  Modifying factors on Glucophage and other med  Severity uncontrolled sever  Associated signs and symtoms neuropathy/ckd/ CAD. aggravated with sugar diet and better with low sugar diet        9/7/2020    NO NEW SYMPTOMS . APPETITE FAIR   [x] YES        ---   PAIN CONTROL FAIR   [x] YES        ---   SLEEP  FAIR   [x] YES        ---   BOWELS OK   [x] YES        ---                                                                         ·  1. Past Medical History:     Past Medical History:   Diagnosis Date    [facility]     Diabetes mellitus ([facility]     H/O cardiac catheterization 08/04/2017    LMCA: Mild irregularites 10-20%. LAD: Mild irregularites 10-20%. LCx: Mild irregularites 10-20%. RCA: Mild irregularites 10-20%. LV function assessed as : Abnormal. EF of 40%.  H/O echocardiogram 12/18/2018    EF 55%. Mildly increased LV wall thickness. The left atrium appears moderately to severely dilated. Moderate to severe mitral regurg.  Moderate pulmonary HTN with an estimated RV systolic pressure of [id number]. Moderate tricuspid regurg. Evidnece fo moderate diastolic dysfunction is seen.  History of echocardiogram 01/17/2019    EF 55%. LV wall thickness moderately increased. LA is mildly dilated w/ LA volume index of 30. trivial mitral regurg. Evidence of moderate (grade II) diastolic dysfunction seen.  History of echocardiogram 06/03/2019    EF of 50-55%. LV wall thickness is mildly increased. LA is mildly dilated (29-33) with a left atrial volume index of 31 m1/m2. mild diastolic dysfunction.  Hyperlipidemia     Hypertension         Past Surgical History:     Past Surgical History:   Procedure Laterality Date    CARDIAC CATHETERIZATION Left 08/04/2017    right radial, MHT Dr. Dayron Breen          Medications Prior to Admission:     Prior to Admission medications    Medication Sig Start Date End Date Taking? Authorizing Provider   clopidogrel (PLAVIX) 75 MG tablet Take 1 tablet by mouth daily 8/27/20   Gabriela Batista MD   carvedilol (COREG) 12.5 MG tablet Take 1 tablet by mouth 2 times daily (with meals) 7/20/20   Hugo Moss MD   losartan-hydrochlorothiazide [facility]) 100-25 MG per tablet Take 1 tablet by mouth daily 7/20/20   Hugo Moss MD   amLODIPine (NORVASC) 10 MG tablet Take 1 tablet by mouth daily 7/20/20   Hugo Moss MD   atorvastatin (LIPITOR) 80 MG tablet Take 1 tablet by mouth nightly 7/20/20   Hugo Moss MD   aspirin 81 MG EC tablet Take 1 tablet by mouth daily 7/20/20   Hugo Moss MD   metFORMIN (GLUCOPHAGE) 500 MG tablet Take 500 mg by mouth 2 times daily (with meals)    Historical Provider, MD        Allergies:     Patient has no known allergies. Social History:     Tobacco:    reports that she has never smoked. She has never used smokeless tobacco.  Alcohol:      reports no history of alcohol use. Drug Use:  reports no history of drug use.     Family History:     Family History   Problem Relation Age of Onset    Coronary Art Dis Father          from [place]   Karen Latin COPD Sister         O2 dep    Coronary Art Dis Brother         2 brothers  from [place]       Review of Systems:     Positive and Negative as described in HPI. CONSTITUTIONAL:  negative for fevers, chills, sweats, fatigue, weight loss  HEENT:  negative for vision, hearing changes, runny nose, throat pain  RESPIRATORY:  negative for shortness of breath, cough, congestion, wheezing. CARDIOVASCULAR:  negative for chest pain, palpitations. GASTROINTESTINAL:  negative for nausea, vomiting, diarrhea, constipation, change in bowel habits, abdominal pain   GENITOURINARY:  negative for difficulty of urination, burning with urination, frequency   INTEGUMENT:  negative for rash, skin lesions, easy bruising   HEMATOLOGIC/LYMPHATIC:  negative for swelling/edema   ALLERGIC/IMMUNOLOGIC:  negative for urticaria , itching  ENDOCRINE:  negative increase in drinking, increase in urination, hot or cold intolerance  MUSCULOSKELETAL:  negative joint pains, muscle aches, swelling of joints  NEUROLOGICAL: Right-sided weakness and speech deficit  BEHAVIOR/PSYCH:  negative for depression, anxiety    Physical Exam:     BP (!) 97/49   Pulse 73   Temp 98.2 °F (36.8 °C) (Oral)   Resp 18   Ht 5' 4\" (1.626 m)   Wt 191 lb 12.8 oz (87 kg)   SpO2 94%   BMI 32.92 kg/m²   Temp (24hrs), Av °F (36.7 °C), Min:97.7 °F (36.5 °C), Max:98.2 °F (36.8 °C)    Recent Labs     20  1604 20  0629 20  1042   POCGLU 130* 146* 120* 151*     No intake or output data in the 24 hours ending 20 1351  Speech deficit  General Appearance:  alert, well appearing, and in no acute distress  Mental status: oriented to person, place, and time with normal affect  Head:  normocephalic, atraumatic.   Eye: no icterus, redness, pupils equal and reactive, extraocular eye movements intact, conjunctiva clear  Ear: normal external ear, no discharge, hearing intact  Nose:  no drainage noted  Mouth: mucous membranes moist  Neck: supple, no carotid bruits, thyroid not palpable  Lungs: Bilateral equal air entry, clear to ausculation, no wheezing, rales or rhonchi, normal effort  Cardiovascular: normal rate, regular rhythm, no murmur, gallop, rub.   Abdomen: Soft, nontender, nondistended, normal bowel sounds, no hepatomegaly or splenomegaly  Neurologic: Right hemiparesis with Broca's aphasia skin: No gross lesions, rashes, bruising or bleeding on exposed skin area  Extremities:  peripheral pulses palpable, no pedal edema or calf pain with palpation  P    Investigations:      Laboratory Testing:  Recent Results (from the past 24 hour(s))   POC Glucose Fingerstick    Collection Time: 09/06/20  4:04 PM   Result Value Ref Range    POC Glucose 130 (H) 65 - 105 mg/dL   POC Glucose Fingerstick    Collection Time: 09/06/20  8:18 PM   Result Value Ref Range    POC Glucose 146 (H) 65 - 105 mg/dL   Basic Metabolic Panel    Collection Time: 09/07/20  6:09 AM   Result Value Ref Range    Glucose 122 (H) 70 - 99 mg/dL    BUN 49 (H) 8 - 23 mg/dL    CREATININE 0.85 0.50 - 0.90 mg/dL    Bun/Cre Ratio NOT REPORTED 9 - 20    Calcium 10.1 8.6 - 10.4 mg/dL    Sodium 138 135 - 144 mmol/L    Potassium 4.6 3.7 - 5.3 mmol/L    Chloride 105 98 - 107 mmol/L    CO2 24 20 - 31 mmol/L    Anion Gap 9 9 - 17 mmol/L    GFR Non-African American >60 >60 mL/min    GFR African American >60 >60 mL/min    GFR Comment          GFR Staging NOT REPORTED    POC Glucose Fingerstick    Collection Time: 09/07/20  6:29 AM   Result Value Ref Range    POC Glucose 120 (H) 65 - 105 mg/dL   POC Glucose Fingerstick    Collection Time: 09/07/20 10:42 AM   Result Value Ref Range    POC Glucose 151 (H) 65 - 105 mg/dL       Imaging/Diagonstics:      Assessment :      Primary Problem  <principal problem not specified>    Active Hospital Problems    Diagnosis Date Noted    [facility] [I63.9] 08/26/2020    Cerebral infarction [facility]) - left MCA distribution  [I63.9] 08/26/2020    Type 2 diabetes mellitus, without long-term current use [facility] insulin [facility] [E11.9] 08/25/2020    Aphasia [R47.01]     CKD (chronic kidney disease) stage 3, GFR 30-59 ml/min ([facility]) [N18.3] 12/20/2018    Morbid obesity ([facility] [E66.01] 12/20/2018    Acute on chronic combined systolic and diastolic CHF, NYHA class 4 ([facility]) [I50.43] 12/17/2018    Chronic combined systolic and diastolic HF (heart failure), NYHA class 2 ([facility] [I50.42] 08/28/2017    Hyperlipidemia [E78.5]     Hypertension [I10]     Idiopathic cardiomyopathy ([facility] [I42.8]      Recent Labs     09/06/20  1025 09/06/20  1604 09/06/20  2018 09/07/20  0629 09/07/20  1042   POCGLU 135* 130* 146* 120* 151*     Vitals:    09/06/20 0810 09/06/20 1955 09/07/20 0632 09/07/20 0808   BP: (!) 122/55 (!) 116/56 (!) 119/50 (!) 97/49   Pulse: 72 63 54 73   Resp: 14 18 18    Temp: 98 °F (36.7 °C) 97.7 °F (36.5 °C) 98.2 °F (36.8 °C)    TempSrc: Oral Oral Oral    SpO2:  97% 94%    Weight:       Height:           Plan:     Patient tolerating rehabilitative therapies . Progressing fairly well . Continue present therapy . Blood sugar blood pressure control satisfactory     2. Uncontrolled diabetes mellitus with the peripheral organ damage with ischemic CVA  3. Ischemic CVA with right hemiparesis and Broca's aphasia  4. Metformin recent A1c is 7.3 reviewed and reconciled  5. Ischemic CVA hyperlipidemia aspirin and Lipitor Plavix 75 all started  6. Losartan 100 mg for hypertension and diabetic renal protection  7. norco for pain  Her BUN is 47 and creatinine is 0.88 mg and her Hb is 12.3 , her BUN elevation is unexplained and her pulse is 64. Douglas Vega MD  9/7/2020  1:51 PM    Copy sent to Dr. Helga Mathews APRN - CNP    Please note that this chart was generated using voice recognition Dragon dictation software.   Although every effort was made to ensure the accuracy of this automated

## 2020-09-07 NOTE — PLAN OF CARE
Problem: Skin Integrity:  Goal: Will show no infection signs and symptoms  Description: Will show no infection signs and symptoms  9/7/2020 0339 by Sushma Garcia RN  Outcome: Ongoing  Note: No new s/s of infection. Will continue to monitor        Problem: Skin Integrity:  Goal: Absence of new skin breakdown  Description: Absence of new skin breakdown  9/7/2020 0339 by Sushma Garcia RN  Outcome: Ongoing  Note: Pt skin integrity remained intact, no new alterations noted. Head to toe completed, see chart assessment. Problem: Falls - Risk of:  Goal: Will remain free from falls  Description: Will remain free from falls  9/7/2020 0339 by Sushma Garcia RN  Outcome: Ongoing  Note: Pt remained absent from falls. Call light within reach. Bed locked and in lowest position. Problem: Falls - Risk of:  Goal: Absence of physical injury  Description: Absence of physical injury  9/7/2020 0339 by Sushma Garcia RN  Outcome: Ongoing  Note: Patient remains free of injury.  safe environment maintained       Problem: Neurological  Goal: Maximum potential motor/sensory/cognitive function  9/7/2020 0339 by Sushma Garcia RN  Outcome: Ongoing  Note: Neurological functioning ongoing improvement      Problem: Musculor/Skeletal Functional Status  Goal: Highest potential functional level  9/7/2020 0339 by Sushma Garcia RN  Outcome: Ongoing  Note: Functional level improving      Problem: Musculor/Skeletal Functional Status  Goal: Absence of falls  9/7/2020 0339 by Sushma Garcia RN  Outcome: Ongoing  Note: Free of falls     Problem: Neurological  Goal: Maximum potential motor/sensory/cognitive function  9/7/2020 0339 by Sushma Garica RN  Outcome: Ongoing     Problem: Nutrition  Goal: Optimal nutrition therapy  9/7/2020 0339 by Sushma Garcia RN  Outcome: Ongoing  Note: Adequate nutrition maintained      Problem: Pain:  Goal: Pain level will decrease  Description: Pain level will decrease  9/7/2020 0339 by Sushma Garcia RN  Outcome: Ongoing  Note: Pain in right arm. Medicated as needed. Patient tolerating      Problem: Pain:  Goal: Control of acute pain  Description: Control of acute pain  9/7/2020 0339 by Radha Castillo RN  Outcome: Ongoing  Note: Pain in right arm. Medicated as needed. Patient tolerating      Problem: Pain:  Goal: Control of chronic pain  Description: Control of chronic pain  9/7/2020 0339 by Radha Castillo RN  Outcome: Ongoing  Note: Pain in right arm. Medicated as needed.  Patient tolerating

## 2020-09-07 NOTE — PROGRESS NOTES
Physical Therapy  Facility/Department: [facility] ACUTE REHAB  Daily Treatment Note  NAME: Mai Islas  : 1950  MRN: [id number]    Date of Service: 2020    Discharge Recommendations:  Patient would benefit from continued therapy after discharge, 24 hour supervision or assist        Assessment      Patient Education: pt educated on transfers, gait training  Activity Tolerance  Activity Tolerance: Patient limited by fatigue;Patient limited by endurance; Patient limited by cognitive status     Patient Diagnosis(es): There were no encounter diagnoses. has a past medical history of CHF (congestive heart failure) ([facility] [address] Diabetes mellitus ([facility] [address] H/O cardiac catheterization, H/O echocardiogram, History of echocardiogram, History of echocardiogram, Hyperlipidemia, and Hypertension. has a past surgical history that includes Cholecystectomy and Cardiac catheterization (Left, 2017). Restrictions  Restrictions/Precautions  Restrictions/Precautions: Fall Risk, Up as Tolerated, General Precautions  Required Braces or Orthoses?: No  Implants present? : (none)  Position Activity Restriction  Other position/activity restrictions: up as tolerated  Subjective   General  Chart Reviewed: Yes  Additional Pertinent Hx: Pt has a history of HTN, HLD, DM, CHF. Pt recently evaluated at [facility] for  new onset of aphasia. Pt was transferred to [facility] on 20 after diagnostic studies confirmed new L hemisphere CVA. Pt with known history of previous CVAs. MRI confirmed L MCA infarct on 2020. Pt admitted to [address] [place] [address] 20. Response To Previous Treatment: Patient with no complaints from previous session. Family / Caregiver Present: No  Subjective  Subjective: patient able to communicate needs with partial expression of words and gestures.  patient indicated needed to use toilet prior to going to gym with therapist  General Comment  Comments: Patient is aphasic  Pain Assessment  Pain Assessment: assistance  Quality of Gait 2: mirror feedback with improved step sequencing and placement. Gait Deviations: Slow Jacinda;Decreased step length;Decreased step height  Distance: 25 feet  Comments: tactile and instruction for right foot placemant ,  Ambulation 3  Other Apparatus 3: (right hand  RW)  Gait Deviations: Staggers  Stairs/Curb  Stairs?: Yes  Stairs  # Steps : 5(3 + 2 steps)  Stairs Height: 4\"(+ 6\")  Rails: Left ascending(hand in hand support on right UE)  Assistance: Moderate assistance(supervision of 2nd person)  Comment: instruction, tactile and hand over hand step up and retro descending   Propulsion 1  Propulsion: Manual  Level: Level Tile  Method: LUE;LLE;RLE  Level of Assistance: Minimal assistance  Description/ Details: pt instructed in reciprical LE advancement , forward gaze with left <> right tracking, straight path    Distance: 55 feet with BLE and LUE +  30 feet with LUE/LLE        Other exercises  Other exercises 1: nustep L3  10 minutes  Other exercises 2: seated bilateral LE in reciprical pattern right AROM left 2# orange band x 15                        G-Code     OutComes Score                                                     AM-PAC Score             Goals  Short term goals  Time Frame for Short term goals: 10 days   Short term goal 1: Pt to perform bed mobility independently. Short term goal 2: Pt to perform transfers with minAx1. Short term goal 3: Pt to ambulate 150 ft, least restrictive device, minAx1. Short term goal 4: Pt to tolerate 30-60 mins physical therapy to increase endurance and strength. Short term goal 5: Pt to complete 5 steps, with LUE support, minAx1. Long term goals  Time Frame for Long term goals : By LOS. Long term goal 2: Pt to complete transfers with SBA to promote independence. Long term goal 3: Pt to ambulate 200 ft, least restrictive device, SBA. Long term goal 4: Pt to complete 5-12 steps, CGA to promote function.    Long term goal 5: Pt to improve PASS score from 15/36 to 22/36. Patient Goals   Patient goals : To return back home. Plan    Plan  Times per week: 1.5 hrs/day 5-7 days/week  Times per day: (1-2 visits daily)  Specific instructions for Next Treatment: progress trasnfer training and ambulation as able, try balance activities. Current Treatment Recommendations: Strengthening, ROM, Balance Training, Functional Mobility Training, Transfer Training, Endurance Training, Gait Training, Stair training, Neuromuscular Re-education, Safety Education & Training, Patient/Caregiver Education & Training, Equipment Evaluation, Education, & procurement  Safety Devices  Type of devices:  All fall risk precautions in place, Gait belt, Chair alarm in place  Restraints  Initially in place: No     Therapy Time     09/07/20 1011 09/07/20 1345   PT Individual Minutes   Time In 0936 1300   Time Out 1015 1322   Minutes 39 22     Rosibel Mancilla, PTA

## 2020-09-08 LAB
ANION GAP SERPL CALCULATED.3IONS-SCNC: 8 MMOL/L (ref 9–17)
BUN BLDV-MCNC: 47 MG/DL (ref 8–23)
BUN/CREAT BLD: ABNORMAL (ref 9–20)
CALCIUM SERPL-MCNC: 10.4 MG/DL (ref 8.6–10.4)
CHLORIDE BLD-SCNC: 104 MMOL/L (ref 98–107)
CO2: 25 MMOL/L (ref 20–31)
CREAT SERPL-MCNC: 0.91 MG/DL (ref 0.5–0.9)
GFR AFRICAN AMERICAN: >60 ML/MIN
GFR NON-AFRICAN AMERICAN: >60 ML/MIN
GFR SERPL CREATININE-BSD FRML MDRD: ABNORMAL ML/MIN/{1.73_M2}
GFR SERPL CREATININE-BSD FRML MDRD: ABNORMAL ML/MIN/{1.73_M2}
GLUCOSE BLD-MCNC: 110 MG/DL (ref 65–105)
GLUCOSE BLD-MCNC: 114 MG/DL (ref 65–105)
GLUCOSE BLD-MCNC: 121 MG/DL (ref 65–105)
GLUCOSE BLD-MCNC: 126 MG/DL (ref 70–99)
GLUCOSE BLD-MCNC: 160 MG/DL (ref 65–105)
POTASSIUM SERPL-SCNC: 4.4 MMOL/L (ref 3.7–5.3)
SODIUM BLD-SCNC: 137 MMOL/L (ref 135–144)

## 2020-09-08 PROCEDURE — 97112 NEUROMUSCULAR REEDUCATION: CPT

## 2020-09-08 PROCEDURE — 6370000000 HC RX 637 (ALT 250 FOR IP): Performed by: INTERNAL MEDICINE

## 2020-09-08 PROCEDURE — 6370000000 HC RX 637 (ALT 250 FOR IP): Performed by: PHYSICAL MEDICINE & REHABILITATION

## 2020-09-08 PROCEDURE — 2500000003 HC RX 250 WO HCPCS: Performed by: PHYSICAL MEDICINE & REHABILITATION

## 2020-09-08 PROCEDURE — 97530 THERAPEUTIC ACTIVITIES: CPT

## 2020-09-08 PROCEDURE — 1180000000 HC REHAB R&B

## 2020-09-08 PROCEDURE — 97535 SELF CARE MNGMENT TRAINING: CPT

## 2020-09-08 PROCEDURE — 97116 GAIT TRAINING THERAPY: CPT

## 2020-09-08 PROCEDURE — 99232 SBSQ HOSP IP/OBS MODERATE 35: CPT | Performed by: PHYSICAL MEDICINE & REHABILITATION

## 2020-09-08 PROCEDURE — 6360000002 HC RX W HCPCS: Performed by: INTERNAL MEDICINE

## 2020-09-08 PROCEDURE — 80048 BASIC METABOLIC PNL TOTAL CA: CPT

## 2020-09-08 PROCEDURE — 99231 SBSQ HOSP IP/OBS SF/LOW 25: CPT | Performed by: INTERNAL MEDICINE

## 2020-09-08 PROCEDURE — 97110 THERAPEUTIC EXERCISES: CPT

## 2020-09-08 PROCEDURE — 92507 TX SP LANG VOICE COMM INDIV: CPT

## 2020-09-08 PROCEDURE — 82947 ASSAY GLUCOSE BLOOD QUANT: CPT

## 2020-09-08 PROCEDURE — 36415 COLL VENOUS BLD VENIPUNCTURE: CPT

## 2020-09-08 PROCEDURE — 97129 THER IVNTJ 1ST 15 MIN: CPT

## 2020-09-08 RX ADMIN — ANTI-FUNGAL POWDER MICONAZOLE NITRATE TALC FREE: 1.42 POWDER TOPICAL at 21:43

## 2020-09-08 RX ADMIN — HYDROCODONE BITARTRATE AND ACETAMINOPHEN 1 TABLET: 5; 325 TABLET ORAL at 07:35

## 2020-09-08 RX ADMIN — METRONIDAZOLE 100 MG: 500 TABLET ORAL at 14:55

## 2020-09-08 RX ADMIN — LOSARTAN POTASSIUM 100 MG: 50 TABLET, FILM COATED ORAL at 07:34

## 2020-09-08 RX ADMIN — CLOPIDOGREL BISULFATE 75 MG: 75 TABLET ORAL at 07:34

## 2020-09-08 RX ADMIN — AMLODIPINE BESYLATE 10 MG: 10 TABLET ORAL at 17:27

## 2020-09-08 RX ADMIN — METRONIDAZOLE 100 MG: 500 TABLET ORAL at 07:35

## 2020-09-08 RX ADMIN — HYDROCODONE BITARTRATE AND ACETAMINOPHEN 1 TABLET: 5; 325 TABLET ORAL at 21:41

## 2020-09-08 RX ADMIN — METFORMIN HYDROCHLORIDE 500 MG: 500 TABLET ORAL at 17:26

## 2020-09-08 RX ADMIN — METFORMIN HYDROCHLORIDE 500 MG: 500 TABLET ORAL at 07:34

## 2020-09-08 RX ADMIN — ENOXAPARIN SODIUM 40 MG: 40 INJECTION SUBCUTANEOUS at 07:35

## 2020-09-08 RX ADMIN — INSULIN LISPRO 1 UNITS: 100 INJECTION, SOLUTION INTRAVENOUS; SUBCUTANEOUS at 21:41

## 2020-09-08 RX ADMIN — CARVEDILOL 6.25 MG: 6.25 TABLET, FILM COATED ORAL at 17:26

## 2020-09-08 RX ADMIN — METRONIDAZOLE 100 MG: 500 TABLET ORAL at 21:40

## 2020-09-08 RX ADMIN — CARVEDILOL 6.25 MG: 6.25 TABLET, FILM COATED ORAL at 07:34

## 2020-09-08 RX ADMIN — ASPIRIN 81 MG CHEWABLE TABLET 81 MG: 81 TABLET CHEWABLE at 07:35

## 2020-09-08 RX ADMIN — HYDROCHLOROTHIAZIDE 25 MG: 25 TABLET ORAL at 07:34

## 2020-09-08 RX ADMIN — ATORVASTATIN CALCIUM 80 MG: 80 TABLET, FILM COATED ORAL at 21:40

## 2020-09-08 ASSESSMENT — PAIN SCALES - GENERAL
PAINLEVEL_OUTOF10: 7
PAINLEVEL_OUTOF10: 5
PAINLEVEL_OUTOF10: 3
PAINLEVEL_OUTOF10: 9
PAINLEVEL_OUTOF10: 0

## 2020-09-08 NOTE — PROGRESS NOTES
[facility]   ACUTE REHABILITATION OCCUPATIONAL THERAPY  DAILY NOTE    Date: 20  Patient Name: Beni Pan      Room: 5049/4145-50    MRN: [id number]   : 1950  (80 y.o.)  Gender: female   Referring Practitioner: Dr. Wendy Ramírez  Diagnosis: Left ischemic middle cerebral artery stroke     Assessment  Performance deficits / Impairments: Decreased functional mobility ; Decreased ADL status; Decreased ROM; Decreased strength;Decreased safe awareness;Decreased cognition;Decreased endurance;Decreased sensation;Decreased balance;Decreased high-level IADLs;Decreased fine motor control;Decreased coordination;Decreased posture  Assessment: Pt progressing well towards OT goals. Pt continues to be limited by aphasia, decreased RUE function/FM skills, and decreased standing balance. Pt sitting balance improved from prior visit, so pt completed full shower this morning. Prognosis: Good  Discharge Recommendations: 24 hour supervision or assist;Patient would benefit from continued therapy after discharge  Activity Tolerance: Patient Tolerated treatment well;Patient limited by fatigue  Activity Tolerance: Pt tolerated 60 minutes AM ADL session with 2-3 brief rest breaks and no change in vitals  Safety Devices in place: Yes  Type of devices: Left in chair;Call light within reach; Chair alarm in place;Gait belt;Patient at risk for falls  Restraints  Initially in place: Yes  Restraints: chair alarm     Restrictions  Restrictions/Precautions: Fall Risk, Up as Tolerated, General Precautions  Implants present? : (none)  Other position/activity restrictions: up as tolerated  Required Braces or Orthoses?: No    Subjective  Subjective: AM: Pt noting generalized shoulder and RUE pain with radiation into forearm/hand.  Pt notes that she sleeps on her back and does not think she changes position while sleeping  Comments: Pt seated edge of bed finishing breakfast upon arrival. Agreeable to OT sessions today    Restrictions/Precautions: Fall Risk;Up as Tolerated;General Precautions  Overall Orientation Status: Within Functional Limits  Orientation Level: Oriented to place;Oriented to time;Oriented to person;Oriented to situation     Pain Assessment  Pain Assessment: 0-10  Pain Level: 0  Pain Type: Acute pain  Pain Location: Flank  Pain Orientation: Left  Pain Descriptors: Sore, Aching  Pain Frequency: Continuous  Pain Onset: On-going  Clinical Progression: Gradually improving  Functional Pain Assessment: Prevents or interferes some active activities and ADLs    Objective  Cognition  Overall Cognitive Status: Impaired  Arousal/Alertness: Appropriate responses to stimuli  Following Directions: Follows one step commands  Following Commands: Follows one step commands with repetition  Safety Judgement: Decreased awareness of need for safety  Awareness of Errors: Assistance required to identify errors made  Insights: Decreased awareness of deficits  Sequencing and Organization: Assistance required to identify errors made;Assistance required to generate solutions;Assistance required to implement solutions  Cognition  Cognition Comment: AM: Pt with mild impulsivity noted. Pt attempts to stand occasionally during ADLs for no apparent reason. Intermittent verbal cueing for sequencing this date    Balance  Sitting Balance: Supervision(Pt seated edge of bed with intermittent UE support on bed)  Standing Balance: Moderate assistance(minimal assist static, intermittently moderate assist)     Bed mobility  Comment: none this date  Transfers  Stand Pivot Transfers: Minimal assistance  Sit to stand: Minimal assistance  Stand to sit: Minimal assistance  Transfer Comments: AM: Writer provided physical assist to position RLE appropriately. Writer provided verbal cueing for hand placement.      Functional Mobility  Functional - Mobility Device: Wheelchair  Activity: To/from bathroom  Assist Level: Maximum assistance  Functional Mobility Comments: Assist to turn wheelchair and position within tight bathroom space. Pt requires max assist to propel    Toilet Transfers  Toilet - Technique: Stand pivot  Equipment Used: Grab bars  Toilet Transfer: Minimal assistance  Toilet Transfers Comments: AM: writer provided education on appropriate wheelchair set-up    Shower Transfers  Shower - Transfer From: Wheelchair  Shower - Transfer Type: To and From  Shower - Transfer To: Transfer tub bench  Shower - Technique: Stand pivot; To right; To left  Shower Transfers: Minimal assistance  Shower Transfers Comments: AM: writer provided wheelchair set-up to reduce fall risk, pt verbalized understanding. Writer provided 1 verbal cue for appropriate grab bar use, good return. No loss of balance. Minimal assist due to unsteadiness while bearing weight through RLE              ADL  Equipment Provided: Reacher;Sock aid  Feeding: Setup; Increased time to complete(writer still presenting with difficulty completing bimanual feeding tasks. Pt demo'd ability to scoop scrambled eggs with LUE without spilling.)  Grooming: Supervision;Verbal cueing;Setup(Writer provided education on placing toothpaste in mouth to compensate for decreased RUE function, good return. Pt completed oral care seated sink-side)  UE Bathing: Minimal assistance(Completed in shower. Writer provided assist for LUE. Writer also provided visual demonstration for emmanuel technique to wash R axillary region and RUE)  LE Bathing: Moderate assistance(assist to stand and complete posterior perineal hygiene. Pt requires assist B feet as well. Writer provided education on use of long-handled sponge, pt declined stating she will try tomorrow)  UE Dressing: Minimal assistance(Pt requires total assist with bra fasteners. Pt able to don shirt utilizing emmanuel-technique appropriately.)  LE Dressing: Moderate assistance(Pt donned pants and socks this date.  Pt required assist to stand and pull pants around waist. Pt demo'd ability to thread BLE. Writer provided education on use of sock-aid, pt demonstrated x 2.)  Toileting: None  Additional Comments: ADLs completed AM session    Patient Education:  Patient Goals   Patient goals : To return home with sister  Learner:patient  Method: demonstration and explanation       Outcome: needs reinforcement   OT Education  OT Education: OT Role;Plan of Care;Precautions; ADL Adaptive Strategies;Transfer Training;Energy Conservation;Equipment;Home Exercise Program  Patient Education: see relevant note sections for education details  Barriers to Learning: aphasia, cognitive deficits    Plan  Plan  Times per week: 5-7x/week, 1.5 hours/day  Times per day: Twice a day  Current Treatment Recommendations: Self-Care / ADL, Home Management Training, Cognitive/Perceptual Training, Strengthening, ROM, Balance Training, Functional Mobility Training, Endurance Training, Neuromuscular Re-education, Cognitive Reorientation, Pain Management, Safety Education & Training, Patient/Caregiver Education & Training, Equipment Evaluation, Education, & procurement, Positioning     Patient Goals   Patient goals : To return home with sister  Short term goals  Time Frame for Short term goals: 10 days  Short term goal 1: Pt will perform grooming with set-up and use of assistive equipment/modified techniques PRN.(goal met 9/8, updated)  Short term goal 2: Pt will perform upper body bathing/dressing with standby assist and use of assistive equipment/modified techniques PRN. Short term goal 3: Pt will perform toileting tasks and lower body bathing/dressing with minimal assist and use of assitive equipment/durable medical equipment/modified techniques PRN.(goal met 9/8: updated)  Short term goal 4: Pt will perform functional mobility and transfers during self-care with Minimal assist, least restrictive mobility device, and Fair safety.   Short term goal 5: Pt will stand for 4+ minutes with 1-2 UE support, contact guard assist, and no loss of balance while engaging in functional activity of choice. Short term goal 6: Pt will actively participate in 30+ minutes of therapeutic exercise/functional activities to promote increased independence with self-care and mobility. Short term goal 7: Pt will verbalize/demonstrate Good understanding of assistive equipment/durable medical equipment/modified techniques for increased independence with self-care and mobility. Long term goals  Time Frame for Long term goals : By discharge  Long term goal 1: Pt will perform self-feeding and grooming with modified independence. Long term goal 2: Pt will perform bathing, dressing, and toileting tasks with stand by assist, Fair safety, and assist PRN for MIRTA hose. Long term goal 3: Pt will perform functional mobility and transfers during self-care with stand by assist, least restrictive mobility device, and Fair safety. Long term goal 4: Pt will stand for 8+ minutes with 1-2 UE support, stand by assist, and no loss of balance while engaging in functional activity of choice.   Long term goal 5: (goal updated 9/6/20 MO) Patient will increase strength/coordination of RUE as evident by an increase in  strength R hand by 5-7#, 5-7 block increase in box and blocks test, and will attempt 9 Hole Peg test as appropriate  Timed Code Treatment Minutes: 61 Minutes      09/08/20 0730   OT Individual Minutes   Time In [address] [place] [address]   Time Out 0833   Minutes 61   Time Code Minutes    Timed Code Treatment Minutes 61 Minutes     Electronically signed by Yousuf Roland OT on 9/8/20 at 12:16 PM EDT

## 2020-09-08 NOTE — PLAN OF CARE
Problem: Skin Integrity:  Goal: Will show no infection signs and symptoms  Description: Will show no infection signs and symptoms  Outcome: Ongoing     Problem: Falls - Risk of:  Goal: Will remain free from falls  Description: Will remain free from falls  Outcome: Ongoing     Problem: Neurological  Goal: Maximum potential motor/sensory/cognitive function  Outcome: Ongoing     Problem: Musculor/Skeletal Functional Status  Goal: Highest potential functional level  Outcome: Ongoing     Problem: Pain:  Goal: Pain level will decrease  Description: Pain level will decrease  Outcome: Ongoing

## 2020-09-08 NOTE — PROGRESS NOTES
Physical Therapy  Facility/Department: [facility] ACUTE REHAB  Daily Treatment Note  NAME: Jose Oviedo  : 1950  MRN: [id number]    Date of Service: 2020    Discharge Recommendations:  Patient would benefit from continued therapy after discharge, 24 hour supervision or assist        Assessment      PT Education: Gait Training;General Safety;Precautions;Transfer Training  Activity Tolerance  Activity Tolerance: Patient limited by fatigue;Patient limited by endurance; Patient limited by cognitive status     Patient Diagnosis(es): There were no encounter diagnoses. has a past medical history of CHF (congestive heart failure) ([facility] [address] Diabetes mellitus ([facility] [address] H/O cardiac catheterization, H/O echocardiogram, History of echocardiogram, History of echocardiogram, Hyperlipidemia, and Hypertension. has a past surgical history that includes Cholecystectomy and Cardiac catheterization (Left, 2017). Restrictions  Restrictions/Precautions  Restrictions/Precautions: Fall Risk, Up as Tolerated, General Precautions  Required Braces or Orthoses?: No  Implants present? : (none)  Position Activity Restriction  Other position/activity restrictions: up as tolerated  Subjective             Orientation     Cognition      Objective         Ambulation  Ambulation?: Yes  More Ambulation?: Yes  Ambulation 1  Surface: level tile  Device: Large Klique  Other Apparatus: Wheelchair follow  Assistance: Minimal assistance  Quality of Gait: decreased right UE/LE coordination , weight shift , foot clearance , hip stabilization extensors  Gait Deviations: Slow Jacinda;Decreased step length;Decreased step height  Distance: 15 feet  Comments: tactile cueing vs verbal I/S and therapist guarding patient for fall risk safety with decreased gait belt use secondary to poor incorrect feedback gait belt uses provides. Patient in wide arm gaurd on RUE with noted WB through therapist support hand in hand technique.  Patient unable to utilize [place] on walker with  support secondary to increased tone and aproximation of RUE to advance walker effectively. Ambulation 2  Surface - 2: level tile  Device 2: Parallel Bars  Assistance 2: Minimal assistance;Contact guard assistance  Quality of Gait 2: mirror feedback with improved step sequencing and placement. Gait Deviations: Slow Jacinda;Decreased step length;Decreased step height  Distance: 25 feet  Comments: tactile and instruction for right foot placemant ,  Ambulation 3  Other Apparatus 3: (right hand  RW)  Gait Deviations: Staggers  Stairs/Curb  Stairs?: Yes  Stairs  # Steps : 5(3 + 2 steps)  Stairs Height: 4\"(+ 6\")  Rails: Left ascending(hand in hand support on right UE)  Assistance: Moderate assistance(supervision of 2nd person)  Comment: instruction, tactile and hand over hand step up and retro descending   Propulsion 1  Propulsion: Manual  Level: Level Tile  Method: LUE;LLE;RLE  Level of Assistance: Minimal assistance  Description/ Details: pt instructed in reciprical LE advancement , forward gaze with left <> right tracking, straight path    Distance: 55 feet with BLE and LUE +  30 feet with LUE/LLE        Other exercises  Other exercises 1: nustep L3  10 minutes  Other exercises 2: seated bilateral LE in reciprical pattern right AROM left 2# orange band x 15  Other exercises 3: supine/sidelying 20 reps  Other exercises 4: standing reciprical hip flexion  in//bars   Other exercises 5: neuro re supine/seated reciprocal                        G-Code     OutComes Score                                                     AM-PAC Score             Goals  Short term goals  Time Frame for Short term goals: 10 days   Short term goal 1: Pt to perform bed mobility independently. Short term goal 2: Pt to perform transfers with minAx1. Short term goal 3: Pt to ambulate 150 ft, least restrictive device, minAx1.    Short term goal 4: Pt to tolerate 30-60 mins physical therapy to increase endurance and strength. Short term goal 5: Pt to complete 5 steps, with LUE support, minAx1. Long term goals  Time Frame for Long term goals : By LOS. Long term goal 2: Pt to complete transfers with SBA to promote independence. Long term goal 3: Pt to ambulate 200 ft, least restrictive device, SBA. Long term goal 4: Pt to complete 5-12 steps, CGA to promote function. Long term goal 5: Pt to improve PASS score from 15/36 to 22/36. Patient Goals   Patient goals : To return back home. Plan    Plan  Times per week: 1.5 hrs/day 5-7 days/week  Times per day: (1-2 visits daily)  Specific instructions for Next Treatment: progress trasnfer training and ambulation as able, try balance activities. Current Treatment Recommendations: Strengthening, ROM, Balance Training, Functional Mobility Training, Transfer Training, Endurance Training, Gait Training, Stair training, Neuromuscular Re-education, Safety Education & Training, Patient/Caregiver Education & Training, Equipment Evaluation, Education, & procurement  Safety Devices  Type of devices:  All fall risk precautions in place, Gait belt, Chair alarm in place  Restraints  Initially in place: No     Therapy Time     09/08/20 0947 09/08/20 1510   PT Individual Minutes   Time In 0937 1300   Time Out 1020 1315   Minutes 43 15   PT Concurrent Minutes   Time In  --  1245   Time Out  --  1300   Minutes  --  15     Rosibel Mancilla, PTA

## 2020-09-08 NOTE — PROGRESS NOTES
Physical Medicine & Rehabilitation  Progress Note      Subjective:      81year-old female with ischemic CVA with R dominant hemiparesis and aphasia. She reports mild aching pain R arm localized to elbow. Denies any current issues with sleep, appetite, bowel, or bladder. Progressing in therapy. ROS:  Denies fevers, chills, sweats. No chest pain, palpitations, lightheadedness. Denies coughing, wheezing or shortness of breath. Denies abdominal pain, nausea, diarrhea or constipation. No new areas of joint pain. Denies new areas of numbness or weakness. Denies new anxiety or depression issues. No new skin problems. Rehabilitation:   Progressing in therapies. PT:  Restrictions/Precautions: Fall Risk, Up as Tolerated, General Precautions  Implants present? : (none)  Other position/activity restrictions: up as tolerated   Transfers  Sit to Stand: Minimal Assistance, Contact guard assistance(instruction left UE placement , right dependant)  Stand to sit: Minimal Assistance(instruction left UE placement , right dependant )  Bed to Chair: Minimal assistance(RW with right hand )  Stand Pivot Transfers: Moderate Assistance  Comment: 1 step instruction sequencing RW , and bilateral LEs  Ambulation 1  Surface: level tile  Device: [id number]  Other Apparatus: Wheelchair follow  Assistance: Minimal assistance, Moderate assistance  Quality of Gait: decreased right UE/LE coordination , weight shift , foot clearance , hip stabilization extensors  Gait Deviations: Slow Jacinda, Decreased step length, Decreased step height  Distance: 15 feet  Comments: tactile cueing vs verbal I/S and therapist guarding patient for fall risk safety with decreased gait belt use secondary to poor feedback provided. Patient in wide arm gaurd on RUE with noted WB through therapist support hand in hand technique.  Patient unable to utilize [place] on walker with  support secondary to increased tone and aproximation of RUE to advance walker effectively. Transfers  Sit to Stand: Minimal Assistance, Contact guard assistance(instruction left UE placement , right dependant)  Stand to sit: Minimal Assistance(instruction left UE placement , right dependant )  Bed to Chair: Minimal assistance(RW with right hand )  Stand Pivot Transfers: Moderate Assistance  Comment: 1 step instruction sequencing RW , and bilateral LEs  Ambulation  Ambulation?: Yes  More Ambulation?: Yes  Ambulation 1  Surface: level tile  Device: Hemiwalker  Other Apparatus: Wheelchair follow  Assistance: Minimal assistance, Moderate assistance  Quality of Gait: decreased right UE/LE coordination , weight shift , foot clearance , hip stabilization extensors  Gait Deviations: Slow Jacinda, Decreased step length, Decreased step height  Distance: 15 feet  Comments: tactile cueing vs verbal I/S and therapist guarding patient for fall risk safety with decreased gait belt use secondary to poor feedback provided. Patient in wide arm gaurd on RUE with noted WB through therapist support hand in hand technique. Patient unable to utilize [place] on walker with  support secondary to increased tone and aproximation of RUE to advance walker effectively. Surface: level tile  Ambulation 1  Surface: level tile  Device: Hemiwalker  Other Apparatus: Wheelchair follow  Assistance: Minimal assistance, Moderate assistance  Quality of Gait: decreased right UE/LE coordination , weight shift , foot clearance , hip stabilization extensors  Gait Deviations: Slow Jacinda, Decreased step length, Decreased step height  Distance: 15 feet  Comments: tactile cueing vs verbal I/S and therapist guarding patient for fall risk safety with decreased gait belt use secondary to poor feedback provided. Patient in wide arm gaurd on RUE with noted WB through therapist support hand in hand technique.  Patient unable to utilize [place] on walker with  support secondary to increased tone and aproximation of RUE to advance walker effectively. OT:  ADL  Feeding: Setup, Increased time to complete(finishing breakfast upon OT arrival, self feeding with LUE)  Grooming: Stand by assistance, Setup, Increased time to complete(oral care, washing face, hair care; seated at sink)  UE Bathing: Setup, Increased time to complete, Minimal assistance, Verbal cueing(seated at sink, assist for washing shoulders, slight assist/cues for washing LUE)  LE Bathing: Setup, Increased time to complete, Moderate assistance(able to wash tops of thighs/shins from wheelchair position, assist for backs of legs; assist for buttocks/luis area)  UE Dressing: Increased time to complete, Minimal assistance, Setup, Verbal cueing(able to doff night gown with SBA (slight cues); donning bra and overhead shirt with assistance to hook bra in back)  LE Dressing: Moderate assistance, Setup, Increased time to complete, Verbal cueing(able to thread BLEs, cues to thread RLE first, cues for threading LUE. Assist to pull up over hips. set up with wide sock aid for socks, assist for slip on shoes)  Toileting: Moderate assistance(assist for transfer, assist for hygiene and clothing management)  Additional Comments: ADLs sinkside this date. Trialed reacher to doff socks and wide sock aid to don socks, able to don once footie placed on sock aid. Patient with increased frustration with tasks this date, moans frequently during ADLs, when asked patient states she is tired and sore. Increased time required for all tasks. Balance  Sitting Balance: Contact guard assistance(close SBA-CGA while seated unsupported on toilet this date)  Standing Balance:  Moderate assistance(Min-Mod A this date, due to patient overall fatigue)   Standing Balance  Time: 1-2 min  Activity: standing at sink for lower body dressing tasks  Comment: 2 UE support, patient unable to free a hand to assist with pulling up pants- due mainly to fatigue, however also due to size of pants (tight), increased assist today. Patient yelling out 1x during, thinking she was going to lose her balance. Required reassurance and cueing for placement of UEs and to stand tall  Functional Mobility  Functional - Mobility Device: Wheelchair  Activity: To/from bathroom  Assist Level: Maximum assistance  Functional Mobility Comments: use of wheelchair for ADL routine     Bed mobility  Bridging: Stand by assistance  Rolling to Left: Contact guard assistance  Rolling to Right: Contact guard assistance  Supine to Sit: Minimal assistance(right side of bed)  Sit to Supine: Minimal assistance(right side of bed)  Scooting: Stand by assistance  Comment: assisted out of bed this AM into wheelchair  Transfers  Stand Pivot Transfers: Minimal assistance, Moderate assistance(no device used for SPT from edge of bed > wheelchair, going toward weak side)  Sit to stand: Minimal assistance  Stand to sit: Minimal assistance  Transfer Comments: sit<>stand performed at sink for lower body clothing management   Toilet Transfers  Toilet - Technique: Stand pivot  Equipment Used: Grab bars(verbal cues for hand placement on grab bars)  Toilet Transfer: Moderate assistance  Toilet Transfers Comments: unsteady with transfer, use of grab bars, verbal cues required for hand placement, \"plopped\" back in wheelchair after toileting - due to fatigue/generalized achy/sore     Shower Transfers  Shower - Transfer From: Wheelchair  Shower - Transfer Type: To and From  Shower - Transfer To: Transfer tub bench  Shower - Technique: Stand pivot, To right, To left  Shower Transfers: Moderate assistance, Dependent  Shower Transfers Comments: Moderate verbal/tactile cues for hand placement and safety for entry; total assist for exit due to seated on floor       SPEECH:  Subjective: [x]? Alert     [x]? Cooperative     []? Confused     []? Agitated      []? Lethargic     Objective/Assessment:  Auditory Comprehension:   Pt. Responding appropriately to simple yes/no questions.   Pt. Renee Real to point to items on picture board with 80% accuracy humberto, increasing to 100% c min cues provided.      Verbal Expression:     Confrontation naming (objects in room and objects in picture cards): pt producing phonemic paraphasia approximation of target word 40% accuracy, pt benefiting from sentence completiton to recall word throughout. MAX cues provided. Counting 1-10: 90% accuracy. Naming days of the week: 50% accuracy humberto, increasing to 100% c MAX cues.       Motor speech:  n/a     Reading Comprehension:  n/a      Other:  No family present.       Objective:  BP (!) 134/51   Pulse 65   Temp 97.7 °F (36.5 °C) (Oral)   Resp 16   Ht 5' 4\" (1.626 m)   Wt 191 lb 12.8 oz (87 kg)   SpO2 98%   BMI 32.92 kg/m²       GEN: Well developed, well nourished, in NAD  HEENT:  NCAT. PERRL. EOMI. Mucous membranes pink and moist.   PULM:  Clear to ausculation. No rales or rhonchi. Respirations WNL and unlabored. CV:  RRR. No murmurs or gallops. GI:  Abdomen soft. Nontender. Non-distended. BS + and equal.    NEUROLOGICAL: A&O x3. Sensation intact to light touch. Short sentences improving. MSK:  Functional ROM LUE and LLE. Impaired ROM RUE and RLE. Motor testing 4+/5 key muscles LUE and LLE. R  3/5, R wrist extension 3/5, R finger extension 3/5, R elbow flexion and extension 3/5 . Mild tenderness over R medial elbow with no erythema or effusion. Full painless AROM and PROM. SKIN: Warm dry and intact. Good turgor. EXTREMITIES:  No calf tenderness to palpation. No edema BLEs. Ronold Kanner PSYCH: Mood WNL. Appropriately interactive. Affect WNL. Diagnostics:     CBC:   No results for input(s): WBC, RBC, HGB, HCT, MCV, RDW, PLT in the last 72 hours. BMP:   Recent Labs     09/06/20  0545 09/07/20  0609 09/08/20  0604    138 137   K 4.7 4.6 4.4    105 104   CO2 24 24 25   BUN 41* 49* 47*   CREATININE 1.02* 0.85 0.91*   GLUCOSE 121* 122* 126*     BNP: No results for input(s): BNP in the last 72 hours.   PT/INR: No results for input(s): PROTIME, INR in the last 72 hours. APTT: No results for input(s): APTT in the last 72 hours. CARDIAC ENZYMES: No results for input(s): CKMB, CKMBINDEX, TROPONINT in the last 72 hours. Invalid input(s): CKTOTAL;3  FASTING LIPID PANEL:  Lab Results   Component Value Date    CHOL 160 08/24/2020    HDL 44 08/24/2020    TRIG 79 08/24/2020     LIVER PROFILE: No results for input(s): AST, ALT, ALB, BILIDIR, BILITOT, ALKPHOS in the last 72 hours. Current Medications:   Current Facility-Administered Medications: carvedilol (COREG) tablet 6.25 mg, 6.25 mg, Oral, BID WC  HYDROcodone-acetaminophen (NORCO) 5-325 MG per tablet 1 tablet, 1 tablet, Oral, Q4H PRN  gabapentin (NEURONTIN) capsule 100 mg, 100 mg, Oral, TID  ondansetron (ZOFRAN-ODT) disintegrating tablet 4 mg, 4 mg, Oral, Q6H PRN  metFORMIN (GLUCOPHAGE) tablet 500 mg, 500 mg, Oral, BID WC  insulin lispro (HUMALOG) injection vial 0-12 Units, 0-12 Units, Subcutaneous, TID WC  insulin lispro (HUMALOG) injection vial 0-6 Units, 0-6 Units, Subcutaneous, Nightly  clopidogrel (PLAVIX) tablet 75 mg, 75 mg, Oral, Daily  enoxaparin (LOVENOX) injection 40 mg, 40 mg, Subcutaneous, Daily  amLODIPine (NORVASC) tablet 10 mg, 10 mg, Oral, QPM  atorvastatin (LIPITOR) tablet 80 mg, 80 mg, Oral, Nightly  losartan (COZAAR) tablet 100 mg, 100 mg, Oral, Daily **AND** hydroCHLOROthiazide (HYDRODIURIL) tablet 25 mg, 25 mg, Oral, Daily  aspirin chewable tablet 81 mg, 81 mg, Oral, Daily  polyethylene glycol (GLYCOLAX) packet 17 g, 17 g, Oral, Daily  senna (SENOKOT) tablet 17.2 mg, 2 tablet, Oral, Daily PRN  bisacodyl (DULCOLAX) suppository 10 mg, 10 mg, Rectal, Daily PRN      Impression/Plan:   Impaired ADLs, gait, and mobility due to:      1. R hemiparesis secondary to ischemic L MCA CVA:  PT/OT for gait, mobility, strengthening, endurance, ADLs, and self care. On ASA, atorvastatin, plavix. 2. Transcortical motor aphasia: speech therapy treating.  Repetition intact, automatic responses intact, short sentences intact and improving. Some word finding difficulty. 3. HTN/CHF/non-ischemic cardiomyopathy: on amlodipine, carvedilol, HCTZ, losartan - IM following  4. DM: on insulin sliding scale and Metformin. Diarrhea resolved  5. Pain: Has Norco prn. Now localized to R elbow. Encouraged ice prn and to offload R elbow whenever possible. 6. Bowel Management: Miralax daily, senokot prn, dulcolax prn.  7. DVT Prophylaxis:  low molecular weight heparin, SCD's while in bed and MIRTA's   8. Internal medicine for medical management    Electronically signed by Jossie Baker MD on 9/8/2020 at 8:46 AM      This note is created with the assistance of a speech recognition program.  While intending to generate a document that actually reflects the content of the visit, the document can still have some errors including those of syntax and sound a like substitutions which may escape proof reading. In such instances, actual meaning can be extrapolated by contextual diversion.

## 2020-09-08 NOTE — PLAN OF CARE
Problem: Skin Integrity:  Goal: Will show no infection signs and symptoms  Description: Will show no infection signs and symptoms  9/8/2020 1349 by Miguelangel Buchanan RN  Outcome: Ongoing  Note: Pt educated on risks for impaired skin integrity. Pt assisted in keeping skin clean and dry, assisted and prompted to reposition frequently. OT discovered pt reddened under bilateral breasts, micotin order obtained. No s/s of infection noted this shift. Will continue to monitor and intervene as needed. Problem: Falls - Risk of:  Goal: Will remain free from falls  Description: Will remain free from falls  9/8/2020 1349 by Miguelangel Buchanan RN  Outcome: Ongoing  Note: Pt educated on and verbalizes understanding of fall risks. Pt wearing nonskid stockings, uses assistive devices appropriately, call light within reach, encouraged to ask for assistance. Yen gould for pt safety. Pt calls out appropriately this shift. Will continue to monitor and intervene as needed. Problem: Pain:  Goal: Control of acute pain  Description: Control of acute pain  Outcome: Ongoing  Note: Pt reported pain of 10/10 this shift in RUE. Pt medicated per MD orders. Writer notified MD of pt complaint. Non-pharmacological interventions discussed, pt accepting. Will continue to monitor and assist as needed.

## 2020-09-08 NOTE — PROGRESS NOTES
step commands  Memory: Unable to assess  Following Commands: Follows one step commands with repetition(pt has global aphasia)  Safety Judgement: Decreased awareness of need for safety  Awareness of Errors: Assistance required to identify errors made  Insights: Decreased awareness of deficits  Sequencing and Organization: Assistance required to identify errors made;Assistance required to generate solutions;Assistance required to implement solutions  Perception  Overall Perceptual Status: Impaired  Unilateral Attention: Cues to attend to right side of body(improving)  Initiation: Cues to initiate tasks  Motor Planning: Cues to use objects appropriately  Balance  Sitting Balance: Modified independent (w/c level, R Half lap tray for joint protection)  Standing Balance: Minimal assistance(steadying Assist)  Transfers  Sit to stand: Minimal assistance  Stand to sit: Minimal assistance  Transfer Comments: Writer provided physical assist to position RLE appropriately. Writer proivded verbal cueing for hand placement. Standing Balance  Time: 2min   Activity: IADL standing at washer  Comment: RUE stabilized on washer, G elbow stabilization, Assist required for wrist control  Functional Mobility  Functional Mobility Comments: standing at washer, pt intiated offloading RLE to complete side step and widen her COLT demoing carryover of stability stategies        Weight Bearing  Weight Bearing Technique: Yes  Response To Weight Bearing Technique: Fair tolerance to wrist placement, F+to forearm/elbow             Vision  Vision Comment: improved tracking and scanning this date to Right visual field  ADL  Additional Comments: AM session. Instrumental ADL's (focus on Affected side, restraint induced approach for non-affected)  Instrumental ADLs: Yes  Light Housekeeping  Light Housekeeping Level of Assistance:  Moderate assistance  Light Housekeeping: RUE focus for FM/GM planning, coordination, to address R side neglect (improving) Conservation;Equipment;Home Exercise Program  Patient Education: see relevant note sections for education details  Barriers to Learning: aphasia, cognitive deficits    Plan  Plan  Times per week: 5-7x/week, 1.5 hours/day  Times per day: Twice a day  Current Treatment Recommendations: Self-Care / ADL, Home Management Training, Cognitive/Perceptual Training, Strengthening, ROM, Balance Training, Functional Mobility Training, Endurance Training, Neuromuscular Re-education, Cognitive Reorientation, Pain Management, Safety Education & Training, Patient/Caregiver Education & Training, Equipment Evaluation, Education, & procurement, Positioning  Patient Goals   Patient goals : To return home with sister  Short term goals  Time Frame for Short term goals: 10 days  Short term goal 1: Pt will perform grooming with set-up and use of assistive equipment/modified techniques PRN.(goal met 9/8, updated)  Short term goal 2: Pt will perform upper body bathing/dressing with standby assist and use of assistive equipment/modified techniques PRN. Short term goal 3: Pt will perform toileting tasks and lower body bathing/dressing with minimal assist and use of assitive equipment/durable medical equipment/modified techniques PRN.(goal met 9/8: updated)  Short term goal 4: Pt will perform functional mobility and transfers during self-care with Minimal assist, least restrictive mobility device, and Fair safety. Short term goal 5: Pt will stand for 4+ minutes with 1-2 UE support, contact guard assist, and no loss of balance while engaging in functional activity of choice. Short term goal 6: Pt will actively participate in 30+ minutes of therapeutic exercise/functional activities to promote increased independence with self-care and mobility. Short term goal 7: Pt will verbalize/demonstrate Good understanding of assistive equipment/durable medical equipment/modified techniques for increased independence with self-care and mobility.   Long term goals  Time Frame for Long term goals : By discharge  Long term goal 1: Pt will perform self-feeding and grooming with modified independence. Long term goal 2: Pt will perform bathing, dressing, and toileting tasks with stand by assist, Fair safety, and assist PRN for MIRTA hose. Long term goal 3: Pt will perform functional mobility and transfers during self-care with stand by assist, least restrictive mobility device, and Fair safety. Long term goal 4: Pt will stand for 8+ minutes with 1-2 UE support, stand by assist, and no loss of balance while engaging in functional activity of choice.   Long term goal 5: (goal updated 9/6/20 MO) Patient will increase strength/coordination of RUE as evident by an increase in  strength R hand by 5-7#, 5-7 block increase in box and blocks test, and will attempt 9 Hole Peg test as appropriate        09/08/20 1508   OT Individual Minutes   Time In 1406   Time Out 1441   Minutes 35   Electronically signed by HERBERT Anderson on 9/8/20 at 4:03 PM EDT

## 2020-09-09 LAB
GLUCOSE BLD-MCNC: 115 MG/DL (ref 65–105)
GLUCOSE BLD-MCNC: 120 MG/DL (ref 65–105)
GLUCOSE BLD-MCNC: 215 MG/DL (ref 65–105)

## 2020-09-09 PROCEDURE — 97110 THERAPEUTIC EXERCISES: CPT

## 2020-09-09 PROCEDURE — 6370000000 HC RX 637 (ALT 250 FOR IP): Performed by: INTERNAL MEDICINE

## 2020-09-09 PROCEDURE — 97535 SELF CARE MNGMENT TRAINING: CPT

## 2020-09-09 PROCEDURE — 6360000002 HC RX W HCPCS: Performed by: INTERNAL MEDICINE

## 2020-09-09 PROCEDURE — 97116 GAIT TRAINING THERAPY: CPT

## 2020-09-09 PROCEDURE — 6370000000 HC RX 637 (ALT 250 FOR IP): Performed by: PHYSICAL MEDICINE & REHABILITATION

## 2020-09-09 PROCEDURE — 97129 THER IVNTJ 1ST 15 MIN: CPT

## 2020-09-09 PROCEDURE — 97112 NEUROMUSCULAR REEDUCATION: CPT

## 2020-09-09 PROCEDURE — 92507 TX SP LANG VOICE COMM INDIV: CPT

## 2020-09-09 PROCEDURE — 99231 SBSQ HOSP IP/OBS SF/LOW 25: CPT | Performed by: INTERNAL MEDICINE

## 2020-09-09 PROCEDURE — 97530 THERAPEUTIC ACTIVITIES: CPT

## 2020-09-09 PROCEDURE — 1180000000 HC REHAB R&B

## 2020-09-09 PROCEDURE — 99231 SBSQ HOSP IP/OBS SF/LOW 25: CPT | Performed by: PHYSICAL MEDICINE & REHABILITATION

## 2020-09-09 PROCEDURE — 82947 ASSAY GLUCOSE BLOOD QUANT: CPT

## 2020-09-09 RX ADMIN — ASPIRIN 81 MG CHEWABLE TABLET 81 MG: 81 TABLET CHEWABLE at 07:39

## 2020-09-09 RX ADMIN — CARVEDILOL 6.25 MG: 6.25 TABLET, FILM COATED ORAL at 17:46

## 2020-09-09 RX ADMIN — HYDROCODONE BITARTRATE AND ACETAMINOPHEN 1 TABLET: 5; 325 TABLET ORAL at 21:26

## 2020-09-09 RX ADMIN — METRONIDAZOLE 100 MG: 500 TABLET ORAL at 14:50

## 2020-09-09 RX ADMIN — ATORVASTATIN CALCIUM 80 MG: 80 TABLET, FILM COATED ORAL at 21:26

## 2020-09-09 RX ADMIN — METRONIDAZOLE 100 MG: 500 TABLET ORAL at 07:38

## 2020-09-09 RX ADMIN — INSULIN LISPRO 2 UNITS: 100 INJECTION, SOLUTION INTRAVENOUS; SUBCUTANEOUS at 21:25

## 2020-09-09 RX ADMIN — CLOPIDOGREL BISULFATE 75 MG: 75 TABLET ORAL at 07:39

## 2020-09-09 RX ADMIN — HYDROCHLOROTHIAZIDE 25 MG: 25 TABLET ORAL at 07:39

## 2020-09-09 RX ADMIN — METRONIDAZOLE 100 MG: 500 TABLET ORAL at 21:26

## 2020-09-09 RX ADMIN — AMLODIPINE BESYLATE 10 MG: 10 TABLET ORAL at 17:46

## 2020-09-09 RX ADMIN — CARVEDILOL 6.25 MG: 6.25 TABLET, FILM COATED ORAL at 07:39

## 2020-09-09 RX ADMIN — METFORMIN HYDROCHLORIDE 500 MG: 500 TABLET ORAL at 07:39

## 2020-09-09 RX ADMIN — LOSARTAN POTASSIUM 100 MG: 50 TABLET, FILM COATED ORAL at 07:39

## 2020-09-09 RX ADMIN — ANTI-FUNGAL POWDER MICONAZOLE NITRATE TALC FREE: 1.42 POWDER TOPICAL at 07:40

## 2020-09-09 RX ADMIN — INSULIN LISPRO 2 UNITS: 100 INJECTION, SOLUTION INTRAVENOUS; SUBCUTANEOUS at 11:41

## 2020-09-09 RX ADMIN — METFORMIN HYDROCHLORIDE 500 MG: 500 TABLET ORAL at 17:46

## 2020-09-09 RX ADMIN — POLYETHYLENE GLYCOL 3350 17 G: 17 POWDER, FOR SOLUTION ORAL at 07:39

## 2020-09-09 RX ADMIN — HYDROCODONE BITARTRATE AND ACETAMINOPHEN 1 TABLET: 5; 325 TABLET ORAL at 07:39

## 2020-09-09 RX ADMIN — ENOXAPARIN SODIUM 40 MG: 40 INJECTION SUBCUTANEOUS at 07:39

## 2020-09-09 RX ADMIN — ANTI-FUNGAL POWDER MICONAZOLE NITRATE TALC FREE: 1.42 POWDER TOPICAL at 21:26

## 2020-09-09 ASSESSMENT — PAIN SCALES - GENERAL
PAINLEVEL_OUTOF10: 0
PAINLEVEL_OUTOF10: 1
PAINLEVEL_OUTOF10: 3
PAINLEVEL_OUTOF10: 6

## 2020-09-09 NOTE — FLOWSHEET NOTE
Patient alone in room watching television. Patient engaged in conversation with , attempted to explain her speech d/t her stroke. Patient support system is through her family. SC will continue to offer emotional and spiritual support. 09/09/20 1800   Encounter Summary   Services provided to: Patient   Referral/Consult From: [address] [place] [address] Children;Family members   Continue Visiting   (9/9/20)   Complexity of Encounter Low   Length of Encounter 15 minutes   Routine   Type Follow up   Assessment Calm; Approachable   Intervention Active listening;Explored feelings, thoughts, concerns;Prayer;Sustaining presence/ Ministry of presence; Discussed illness/injury and it's impact   Outcome Expressed gratitude;Engaged in conversation;Coping;Receptive   Visited by Mady Brooks

## 2020-09-09 NOTE — CARE COORDINATION
ONGOING DISCHARGE PLAN:    Spoke with patient regarding discharge plan and patient confirms that plan is still discharge home with vns    Will continue to follow for additional discharge needs.     Electronically signed by MOMO Conrad, PAWAN on 9/9/2020 at 4:21 PM

## 2020-09-09 NOTE — PROGRESS NOTES
[address] [facility]    ACUTE REHABILITATION OCCUPATIONAL THERAPY  DAILY NOTE    Date: 20  Patient Name: Delia Genao      Room: 4855/0392-66    MRN: [id number]   : 1950  (80 y.o.)  Gender: female   Referring Practitioner: Dr. Adelita Gurrola  Diagnosis: Left ischemic middle cerebral artery stroke       Restrictions  Restrictions/Precautions: Fall Risk, Up as Tolerated, General Precautions  Implants present? : (none)  Other position/activity restrictions: up as tolerated  Required Braces or Orthoses?: No    Subjective  Subjective: Pt stated this date she was initially very emotional regarding her stroke but it has improved. She is pleased to be able to have clear speech at times and increased comprehensibility. Comments: AM treatment deferred as pt was very fatigued and writer had flexbility within therapy scheduling this date. PM pt fatigued requesting ot return to bed, utilized saratstedy for standing tolerance as theapeutic distraction with good return, completed 3 stands then returned to supine  Restrictions/Precautions: Fall Risk;Up as Tolerated;General Precautions  Overall Orientation Status: Within Functional Limits  Orientation Level: Oriented to place;Oriented to time;Oriented to person;Oriented to situation  Patient Observation  Observations: Pt has bruises right UE, dorsal wrist , forearm , and lateral humerus, Dr Adelita Gurrola aware  Pain Assessment  Pain Assessment: 0-10  Garza-Baker Pain Rating: Hurts even more  Pain Type: Acute pain  Pain Location: Leg  Pain Orientation: Right  Clinical Progression: Not changed  Response to Pain Intervention: Patient Satisfied    Objective  Cognition  Overall Cognitive Status: Impaired  Arousal/Alertness: Appropriate responses to stimuli  Following Directions: Follows two step commands  Following Commands:  Follows one step commands with repetition(pt has global aphasia)  Safety Judgement: Decreased awareness of need for safety  Awareness of Errors: peg into board     Weight Bearing  Weight Bearing Technique: Yes  Response To Weight Bearing Technique: Fair tolerance to wrist placement, F+to forearm/elbow        Weight Bearing  Weight Bearing Technique: Yes  Response To Weight Bearing Technique: Fair tolerance to wrist placement, F+to forearm/elbow        Vision  Vision Comment: pt reports wears glasses baseline; they are at her sisters; Inessa Longoria encouraged to have family bring them in  ADL  Equipment Provided: Reacher;Sock aid  Feeding: Setup; Increased time to complete(writer still presenting with difficulty completing bimanual feeding tasks)  Grooming: Supervision;Verbal cueing;Setup(good carryover or modified toothpaste mgt/into mouth)  UE Bathing: Minimal assistance(sinkside, Assist with LUE/encouraging emmanuel etch)  LE Bathing: Moderate assistance(luis<>ankles seated in w/c with VC for use of positioning, assist with buttocks/posterior thighs)  UE Dressing: Stand by assistance(Overhead tshirt this date; deferred bra d/t skin irritation under R breast)  LE Dressing: Moderate assistance(Pt donned pants and socks this date. Pt required assist to stand and pull pants around waist. Pt demo'd ability to thread BLE with increased time and RLE stabilization, using cross leg tech this date)  Toileting: None  Additional Comments: AM session. VC for use of reacher to obtain items from transfer bench on R side; VC for use for AE PRN only, if within reach to utilize hand(s). TEDs deferred, they roll down calves and leave bruise on RLE, per Dr Reynaldo Talamantes ace wrap to just below knee. AFO not required per PT if cuing for lower extrimity positioning and physical assist PRN prior to/with transfers. Positioning  Adaptations: TEDs/transfers/dycem for WB/stability       Assessment  Performance deficits / Impairments: Decreased functional mobility ; Decreased ADL status; Decreased ROM; Decreased strength;Decreased safe awareness;Decreased cognition;Decreased endurance;Decreased sensation;Decreased balance;Decreased high-level IADLs;Decreased fine motor control;Decreased coordination;Decreased posture  Assessment: Pt progressing well towards OT goals. Pt continues to be limited by aphasia, decreased RUE function/FM skills, and decreased standing balance. Pt sitting balance improved from prior visit, so pt completed full shower this morning. Prognosis: Good  Discharge Recommendations: 24 hour supervision or assist;Patient would benefit from continued therapy after discharge  Activity Tolerance: Patient Tolerated treatment well;Patient limited by fatigue  Safety Devices in place: Yes  Type of devices: Left in chair;Call light within reach; Chair alarm in place;Gait belt;Patient at risk for falls  Restraints  Initially in place: Yes  Restraints: chair alarm          Patient Education:  OT POC, compensation, foot placement with transfers  Patient Goals   Patient goals : To return home with sister Manuelito Talley  Method: demonstration and explanation       Outcome: needs reinforcement        Plan  Plan  Times per week: 5-7x/week, 1.5 hours/day  Times per day: Twice a day  Current Treatment Recommendations: Self-Care / ADL, Home Management Training, Cognitive/Perceptual Training, Strengthening, ROM, Balance Training, Functional Mobility Training, Endurance Training, Neuromuscular Re-education, Cognitive Reorientation, Pain Management, Safety Education & Training, Patient/Caregiver Education & Training, Equipment Evaluation, Education, & procurement, Positioning  Patient Goals   Patient goals : To return home with sister  Short term goals  Time Frame for Short term goals: 10 days  Short term goal 1: Pt will perform grooming with set-up and use of assistive equipment/modified techniques PRN.(goal met 9/8, updated)  Short term goal 2: Pt will perform upper body bathing/dressing with standby assist and use of assistive equipment/modified techniques PRN.   Short term goal 3: Pt will perform toileting tasks and lower body bathing/dressing with minimal assist and use of assitive equipment/durable medical equipment/modified techniques PRN.(goal met 9/8: updated)  Short term goal 4: Pt will perform functional mobility and transfers during self-care with Minimal assist, least restrictive mobility device, and Fair safety. Short term goal 5: Pt will stand for 4+ minutes with 1-2 UE support, contact guard assist, and no loss of balance while engaging in functional activity of choice. Short term goal 6: Pt will actively participate in 30+ minutes of therapeutic exercise/functional activities to promote increased independence with self-care and mobility. Short term goal 7: Pt will verbalize/demonstrate Good understanding of assistive equipment/durable medical equipment/modified techniques for increased independence with self-care and mobility. Long term goals  Time Frame for Long term goals : By discharge  Long term goal 1: Pt will perform self-feeding and grooming with modified independence. Long term goal 2: Pt will perform bathing, dressing, and toileting tasks with stand by assist, Fair safety, and assist PRN for MIRTA hose. Long term goal 3: Pt will perform functional mobility and transfers during self-care with stand by assist, least restrictive mobility device, and Fair safety. Long term goal 4: Pt will stand for 8+ minutes with 1-2 UE support, stand by assist, and no loss of balance while engaging in functional activity of choice.   Long term goal 5: (goal updated 9/6/20 MO) Patient will increase strength/coordination of RUE as evident by an increase in  strength R hand by 5-7#, 5-7 block increase in box and blocks test, and will attempt 9 Hole Peg test as appropriate        09/09/20 1602 09/09/20 1626   OT Individual Minutes   Time In 1024 1440   Time Out 1123 1459   Minutes 59 19   Electronically signed by HERBERT Zhao on 9/9/20 at 4:26 PM EDT

## 2020-09-09 NOTE — PROGRESS NOTES
Speech Language Pathology  Speech Language Pathology  [facility]    Speech Language Treatment Note    Date: 9/9/2020  Patients Name: Theta Mcardle  MRN: [id number]  Diagnosis: CVA  Patient Active Problem List   Diagnosis Code    Hypertension I10    Hyperlipidemia E78.5    Acute on chronic systolic CHF (congestive heart failure) ([facility] I50.23    Hypertensive urgency I16.0    Idiopathic cardiomyopathy ([facility] I42.8    Hypertensive emergency I16.1    Chronic combined systolic and diastolic HF (heart failure), NYHA class 2 ([facility]) I50.42    Acute on chronic combined systolic and diastolic CHF, NYHA class 4 ([facility]) I50.43    Hypoxia R09.02    Severe mitral regurgitation by prior echocardiogram I34.0    Morbid obesity ([facility]) E66.01    CKD (chronic kidney disease) stage 3, GFR 30-59 ml/min ([facility]) N18.3    Physical deconditioning R53.81    TIA (transient ischemic attack) G45.9    Old lacunar stroke without late effect Z86.73    Dysarthria R47.1    Stroke determined by clinical assessment ([facility] I63.9    Aphasia R47.01    Type 2 diabetes mellitus, without long-term current use of insulin ([facility] E11.9    Cerebral infarction ([facility] - left MCA distribution  I63.9    Uncontrolled type 2 diabetes mellitus with hyperglycemia ([facility] E11.65    Elevated blood pressure reading R03.0    Acute ischemic stroke ([facility]) I63.9       Pain: 0/10    Speech and Language Treatment  Treatment time:  4517-0674     Subjective: [x]? Alert     [x]? Cooperative     []? Confused     []? Agitated      []? Lethargic     Objective/Assessment:  Auditory Comprehension:   Pt. Responding appropriately to simple yes/no questions. Following 1-step commands: 80% accuracy humberto, 100% c cues.      Verbal Expression:     Confrontation naming (objects in room and objects in picture cards): pt producing phonemic paraphasia approximation of target word 50% accuracy, pt benefiting from sentence completiton to recall word throughout.  MAX cues provided. Pt. Self correcting phonemic paraphasias humberto 4x during session. Counting 1-10: 60% accuracy. Naming days of the week: 71% accuracy humberto, increasing to 100% c MAX cues.       Motor speech:  n/a     Reading Comprehension:  n/a      Other:  No family present. Plan:  [x] Continue  services    [] Discharge from :      Discharge recommendations: [] Inpatient Rehab   [] [place]   [] Outpatient Therapy  [] Follow up at trauma clinic   [] Other:       Treatment completed by:  Audrey JOHNY-SLP

## 2020-09-09 NOTE — CONSULTS
index of 30. trivial mitral regurg. Evidence of moderate (grade II) diastolic dysfunction seen.  History of echocardiogram 2019    EF of 50-55%. LV wall thickness is mildly increased. LA is mildly dilated (29-33) with a left atrial volume index of 31 m1/m2. mild diastolic dysfunction.  Hyperlipidemia     Hypertension         Past Surgical History:     Past Surgical History:   Procedure Laterality Date    CARDIAC CATHETERIZATION Left 2017    right radial, [facility] Dr. Ranjit Zavaleta          Medications Prior to Admission:     Prior to Admission medications    Medication Sig Start Date End Date Taking? Authorizing Provider   clopidogrel (PLAVIX) 75 MG tablet Take 1 tablet by mouth daily 20   Jerica Johnson MD   carvedilol (COREG) 12.5 MG tablet Take 1 tablet by mouth 2 times daily (with meals) 20   Bryanna Wise MD   losartan-hydrochlorothiazide [facility]) 100-25 MG per tablet Take 1 tablet by mouth daily 20   Bryanna Wise MD   amLODIPine (NORVASC) 10 MG tablet Take 1 tablet by mouth daily 20   Bryanna Wise MD   atorvastatin (LIPITOR) 80 MG tablet Take 1 tablet by mouth nightly 20   Bryanna Wise MD   aspirin 81 MG EC tablet Take 1 tablet by mouth daily 20   Bryanna Wise MD   metFORMIN (GLUCOPHAGE) 500 MG tablet Take 500 mg by mouth 2 times daily (with meals)    Historical Provider, MD        Allergies:     Patient has no known allergies. Social History:     Tobacco:    reports that she has never smoked. She has never used smokeless tobacco.  Alcohol:      reports no history of alcohol use. Drug Use:  reports no history of drug use. Family History:     Family History   Problem Relation Age of Onset    Coronary Art Dis Father          from [place]    COPD Sister         O2 dep    Coronary Art Dis Brother         2 brothers  from [place]       Review of Systems:     Positive and Negative as described in HPI.     CONSTITUTIONAL:  negative for fevers, chills, sweats, fatigue, weight loss  HEENT:  negative for vision, hearing changes, runny nose, throat pain  RESPIRATORY:  negative for shortness of breath, cough, congestion, wheezing. CARDIOVASCULAR:  negative for chest pain, palpitations. GASTROINTESTINAL:  negative for nausea, vomiting, diarrhea, constipation, change in bowel habits, abdominal pain   GENITOURINARY:  negative for difficulty of urination, burning with urination, frequency   INTEGUMENT:  negative for rash, skin lesions, easy bruising   HEMATOLOGIC/LYMPHATIC:  negative for swelling/edema   ALLERGIC/IMMUNOLOGIC:  negative for urticaria , itching  ENDOCRINE:  negative increase in drinking, increase in urination, hot or cold intolerance  MUSCULOSKELETAL:  negative joint pains, muscle aches, swelling of joints  NEUROLOGICAL: Right-sided weakness and speech deficit  BEHAVIOR/PSYCH:  negative for depression, anxiety    Physical Exam:     /72   Pulse 70   Temp 97.9 °F (36.6 °C) (Oral)   Resp 19   Ht 5' 4\" (1.626 m)   Wt 197 lb 9.6 oz (89.6 kg) Comment: chair scale  SpO2 98%   BMI 33.92 kg/m²   Temp (24hrs), Av.2 °F (36.8 °C), Min:97.9 °F (36.6 °C), Max:98.4 °F (36.9 °C)    Recent Labs     20  1600 209 20  0653 20  1546   POCGLU 110* 160* 120* 115*     No intake or output data in the 24 hours ending 20 1631  Speech deficit  General Appearance:  alert, well appearing, and in no acute distress  Mental status: oriented to person, place, and time with normal affect  Head:  normocephalic, atraumatic.   Eye: no icterus, redness, pupils equal and reactive, extraocular eye movements intact, conjunctiva clear  Ear: normal external ear, no discharge, hearing intact  Nose:  no drainage noted  Mouth: mucous membranes moist  Neck: supple, no carotid bruits, thyroid not palpable  Lungs: Bilateral equal air entry, clear to ausculation, no wheezing, rales or rhonchi, normal effort  Cardiovascular: normal rate, regular rhythm, no murmur, gallop, rub. Abdomen: Soft, nontender, nondistended, normal bowel sounds, no hepatomegaly or splenomegaly  Neurologic: Right hemiparesis with Broca's aphasia skin: No gross lesions, rashes, bruising or bleeding on exposed skin area  Extremities:  peripheral pulses palpable, no pedal edema or calf pain with palpation  P    Investigations:      Laboratory Testing:  Recent Results (from the past 24 hour(s))   POC Glucose Fingerstick    Collection Time: 09/08/20  8:29 PM   Result Value Ref Range    POC Glucose 160 (H) 65 - 105 mg/dL   POC Glucose Fingerstick    Collection Time: 09/09/20  6:53 AM   Result Value Ref Range    POC Glucose 120 (H) 65 - 105 mg/dL   POC Glucose Fingerstick    Collection Time: 09/09/20  3:46 PM   Result Value Ref Range    POC Glucose 115 (H) 65 - 105 mg/dL       Imaging/Diagonstics:      Assessment :      Primary Problem  <principal problem not specified>    Active Hospital Problems    Diagnosis Date Noted    Acute ischemic stroke ([address] [I63.9] 08/26/2020    Cerebral infarction ([address] - left MCA distribution  [I63.9] 08/26/2020    Type 2 diabetes mellitus, without long-term current use of insulin ([address] [E11.9] 08/25/2020    Aphasia [R47.01]     CKD (chronic kidney disease) stage 3, GFR 30-59 ml/min ([address] [N18.3] 12/20/2018    Morbid obesity ([address] [E66.01] 12/20/2018    Acute on chronic combined systolic and diastolic CHF, NYHA class 4 ([facility]) [I50.43] 12/17/2018    Chronic combined systolic and diastolic HF (heart failure), NYHA class 2 ([address] [I50.42] 08/28/2017    Hyperlipidemia [E78.5]     Hypertension [I10]     Idiopathic cardiomyopathy ([address] [I42.8]        Plan:     1. Uncontrolled diabetes mellitus with the peripheral organ damage with ischemic CVA  2. Ischemic CVA with right hemiparesis and Broca's aphasia  3. Metformin recent A1c is 7.3 reviewed and reconciled  4. Ischemic CVA hyperlipidemia aspirin and Lipitor Plavix 75 all started  5.  Losartan 100 mg for hypertension and diabetic renal protection  6. norco for pain  7. q6  Hr prn  8.   9.     Consultations:   IP CONSULT TO INTERNAL MEDICINE  IP CONSULT TO DIETITIAN  IP CONSULT TO SOCIAL WORK  IP CONSULT TO INTERNAL MEDICINE      Lorenzo Dye MD  9/9/2020  4:31 PM    Copy sent to Dr. Markie Camargo, APRN - CNP    Please note that this chart was generated using voice recognition Dragon dictation software. Although every effort was made to ensure the accuracy of this automated transcription, some errors in transcription may have occurred.

## 2020-09-09 NOTE — PROGRESS NOTES
Physical Medicine & Rehabilitation  Progress Note      Subjective:      81year-old female with ischemic CVA with R dominant hemiparesis and aphasia. Patient is well, and has had no acute complaints or problems. R elbow pain has improved. She has padded arm rest on R side of wheelchair which has helped. ROS:  Denies fevers, chills, sweats. No chest pain, palpitations, lightheadedness. Denies coughing, wheezing or shortness of breath. Denies abdominal pain, nausea, diarrhea or constipation. No new areas of joint pain. Denies new areas of numbness or weakness. Denies new anxiety or depression issues. No new skin problems. Rehabilitation:   Progressing in therapies. PT:  Restrictions/Precautions: Fall Risk, Up as Tolerated, General Precautions  Implants present? : (none)  Other position/activity restrictions: up as tolerated   Transfers  Sit to Stand: Minimal Assistance, Contact guard assistance(instruction left UE placement , right dependant)  Stand to sit: Minimal Assistance(instruction left UE placement , right dependant )  Bed to Chair: Minimal assistance(RW with right hand )  Stand Pivot Transfers: Moderate Assistance  Comment: 1 step instruction sequencing RW , and bilateral LEs  Ambulation 1  Surface: level tile  Device: Large Base Quad Cane  Other Apparatus: Wheelchair follow  Assistance: Minimal assistance  Quality of Gait: decreased right UE/LE coordination , weight shift , foot clearance , hip stabilization extensors  Gait Deviations: Slow Jacinda, Decreased step length, Decreased step height  Distance: 15 feet  Comments: tactile cueing vs verbal I/S and therapist guarding patient for fall risk safety with decreased gait belt use secondary to poor incorrect feedback gait belt uses provides. Patient in wide arm gaurd on RUE with noted WB through therapist support hand in hand technique.  Patient unable to utilize [place] on walker with  support secondary to increased tone and aproximation of RUE to advance walker effectively. Transfers  Sit to Stand: Minimal Assistance, Contact guard assistance(instruction left UE placement , right dependant)  Stand to sit: Minimal Assistance(instruction left UE placement , right dependant )  Bed to Chair: Minimal assistance(RW with right hand )  Stand Pivot Transfers: Moderate Assistance  Comment: 1 step instruction sequencing RW , and bilateral LEs  Ambulation  Ambulation?: Yes  More Ambulation?: Yes  Ambulation 1  Surface: level tile  Device: Large Base Quad Cane  Other Apparatus: Wheelchair follow  Assistance: Minimal assistance  Quality of Gait: decreased right UE/LE coordination , weight shift , foot clearance , hip stabilization extensors  Gait Deviations: Slow Jacinda, Decreased step length, Decreased step height  Distance: 15 feet  Comments: tactile cueing vs verbal I/S and therapist guarding patient for fall risk safety with decreased gait belt use secondary to poor incorrect feedback gait belt uses provides. Patient in wide arm gaurd on RUE with noted WB through therapist support hand in hand technique. Patient unable to utilize [place] on walker with  support secondary to increased tone and aproximation of RUE to advance walker effectively. Surface: level tile  Ambulation 1  Surface: level tile  Device: Large Base Quad Cane  Other Apparatus: Wheelchair follow  Assistance: Minimal assistance  Quality of Gait: decreased right UE/LE coordination , weight shift , foot clearance , hip stabilization extensors  Gait Deviations: Slow Jacinda, Decreased step length, Decreased step height  Distance: 15 feet  Comments: tactile cueing vs verbal I/S and therapist guarding patient for fall risk safety with decreased gait belt use secondary to poor incorrect feedback gait belt uses provides. Patient in wide arm gaurd on RUE with noted WB through therapist support hand in hand technique.  Patient unable to utilize [place] on walker with  support secondary to increased tone and aproximation of RUE to advance walker effectively. OT:  ADL  Equipment Provided: Reacher, Sock aid  Feeding: Setup, Increased time to complete(writer still presenting with difficulty completing bimanual feeding tasks. Pt demo'd ability to scoop scrambled eggs with LUE without spilling.)  Grooming: Supervision, Verbal cueing, Setup(Writer provided education on placing toothpaste in mouth to compensate for decreased RUE function, good return. Pt completed oral care seated sink-side)  UE Bathing: Minimal assistance(Completed in shower. Writer provided assist for LUE. Writer also provided visual demonstration for emmanuel technique to wash R axillary region and RUE)  LE Bathing: Moderate assistance(assist to stand and complete posterior perineal hygiene. Pt requires assist B feet as well. Writer provided education on use of long-handled sponge, pt declined stating she will try tomorrow)  UE Dressing: Minimal assistance(Pt requires total assist with bra fasteners. Pt able to don shirt utilizing emmanuel-technique appropriately.)  LE Dressing: Moderate assistance(Pt donned pants and socks this date. Pt required assist to stand and pull pants around waist. Pt demo'd ability to thread BLE. Writer provided education on use of sock-aid, pt demonstrated x 2.)  Toileting: None  Additional Comments: AM session.    Instrumental ADL's  Instrumental ADLs: Yes     Balance  Sitting Balance: Modified independent (w/c level, R Half lap tray for joint protection)  Standing Balance: Minimal assistance(steadying Assist)   Standing Balance  Time: 2min   Activity: IADL standing at washer  Comment: RUE stabilized on washer, G elbow stabilization, Assist required for wrist control  Functional Mobility  Functional - Mobility Device: Wheelchair  Activity: To/from bathroom  Assist Level: Maximum assistance  Functional Mobility Comments: standing at washer, pt intiated offloading RLE to complete side step and widen her COLT demoing carryover of stability stategies     Bed mobility  Bridging: Stand by assistance  Rolling to Left: Contact guard assistance  Rolling to Right: Contact guard assistance  Supine to Sit: Minimal assistance(right side of bed)  Sit to Supine: Minimal assistance(right side of bed)  Scooting: Stand by assistance  Comment: none this date  Transfers  Stand Pivot Transfers: Minimal assistance  Sit to stand: Minimal assistance  Stand to sit: Minimal assistance  Transfer Comments: Writer provided physical assist to position RLE appropriately. Writer proivded verbal cueing for hand placement. Toilet Transfers  Toilet - Technique: Stand pivot  Equipment Used: Grab bars  Toilet Transfer: Minimal assistance  Toilet Transfers Comments: AM: writer provided education on appropriate wheelchair set-up     Shower Transfers  Shower - Transfer From: Wheelchair  Shower - Transfer Type: To and From  Shower - Transfer To: Transfer tub bench  Shower - Technique: Stand pivot, To right, To left  Shower Transfers: Minimal assistance  Shower Transfers Comments: AM: writer provided wheelchair set-up to reduce fall risk, pt verbalized understanding. Writer proivded 1 verbal cue for appropriate grab bar use, good return. No loss of balance. Minimal assist due to unsteadiness while bearing weight through RLE       SPEECH:  Subjective: [x]? Alert     [x]? Cooperative     []? Confused     []? Agitated      []? Lethargic     Objective/Assessment:  Auditory Comprehension:   Pt. Responding appropriately to simple yes/no questions. Following 1-step commands: 80% accuracy humberto, 100% c cues.      Verbal Expression:     Confrontation naming (objects in room): pt producing phonemic paraphasia approximation of target word 20% accuracy, pt benefiting from sentence completiton and phonemic cue to recall word throughout. MAX cues provided. Counting 1-10: 70% accuracy. Naming days of the week: 50% accuracy humberto, increasing to 100% c MAX cues.   Pt. Noted to become Medications:   Current Facility-Administered Medications: miconazole (MICOTIN) 2 % powder, , Topical, BID  carvedilol (COREG) tablet 6.25 mg, 6.25 mg, Oral, BID WC  HYDROcodone-acetaminophen (NORCO) 5-325 MG per tablet 1 tablet, 1 tablet, Oral, Q4H PRN  gabapentin (NEURONTIN) capsule 100 mg, 100 mg, Oral, TID  ondansetron (ZOFRAN-ODT) disintegrating tablet 4 mg, 4 mg, Oral, Q6H PRN  metFORMIN (GLUCOPHAGE) tablet 500 mg, 500 mg, Oral, BID WC  insulin lispro (HUMALOG) injection vial 0-12 Units, 0-12 Units, Subcutaneous, TID WC  insulin lispro (HUMALOG) injection vial 0-6 Units, 0-6 Units, Subcutaneous, Nightly  clopidogrel (PLAVIX) tablet 75 mg, 75 mg, Oral, Daily  enoxaparin (LOVENOX) injection 40 mg, 40 mg, Subcutaneous, Daily  amLODIPine (NORVASC) tablet 10 mg, 10 mg, Oral, QPM  atorvastatin (LIPITOR) tablet 80 mg, 80 mg, Oral, Nightly  losartan (COZAAR) tablet 100 mg, 100 mg, Oral, Daily **AND** hydroCHLOROthiazide (HYDRODIURIL) tablet 25 mg, 25 mg, Oral, Daily  aspirin chewable tablet 81 mg, 81 mg, Oral, Daily  polyethylene glycol (GLYCOLAX) packet 17 g, 17 g, Oral, Daily  senna (SENOKOT) tablet 17.2 mg, 2 tablet, Oral, Daily PRN  bisacodyl (DULCOLAX) suppository 10 mg, 10 mg, Rectal, Daily PRN      Impression/Plan:   Impaired ADLs, gait, and mobility due to:      1. R hemiparesis secondary to ischemic L MCA CVA:  PT/OT for gait, mobility, strengthening, endurance, ADLs, and self care. On ASA, atorvastatin, plavix. 2. Transcortical motor aphasia: Improving. speech therapy treating. 3. HTN/CHF/non-ischemic cardiomyopathy: on amlodipine, carvedilol, HCTZ, losartan - IM following  4. DM: on insulin sliding scale and Metformin. Diarrhea resolved  5. Pain: Has Norco prn. Now localized to R elbow. Encouraged ice prn and to offload R elbow whenever possible. 6. Bowel Management: Miralax daily, senokot prn, dulcolax prn.  7. DVT Prophylaxis:  low molecular weight heparin, SCD's while in bed and MIRTA's   8.  Internal medicine for medical management    Electronically signed by Robina Muller MD on 9/9/2020 at 9:09 AM      This note is created with the assistance of a speech recognition program.  While intending to generate a document that actually reflects the content of the visit, the document can still have some errors including those of syntax and sound a like substitutions which may escape proof reading. In such instances, actual meaning can be extrapolated by contextual diversion.

## 2020-09-09 NOTE — CONSULTS
[facility] Internal Medicine    CONSULTATION / HISTORY AND PHYSICAL EXAMINATION            Date:   9/9/2020  Patient name:  Mai Islas  Date of admission:  8/26/2020  6:56 PM  MRN:   [id number]  Account:  [de-identified]  YOB: 1950  PCP:    SUE Contreras CNP  Room:   9886/0744-24  Code Status:    Full Code    Physician Requesting Consult: Marylee Rival, MD    Reason for Consult: Medical management    Chief Complaint:     No chief complaint on file. Stroke    History Obtained From:     Patient medical record nursing staff    History of Present Illness:     28-year-old pleasant  lady was admitted to [facility] on August 23 with a aphasia diagnosed with the left middle cerebral artery ischemic infarct  Known history of systolic congestive heart failure ejection fraction 35 to 40% secondary to nonischemic cardiomyopathy  Diabetes   Duration more than 7 years  Modifying factors on Glucophage and other med  Severity uncontrolled sever  Associated signs and symtoms neuropathy/ckd/ CAD. aggravated with sugar diet and better with low sugar diet             Past Medical History:     Past Medical History:   Diagnosis Date    [facility]est[facility]     Diabetes mellitus ([facility] [facility]     H/O cardiac catheterization 08/04/2017    LMCA: Mild irregularites 10-20%. LAD: Mild irregularites 10-20%. LCx: Mild irregularites 10-20%. RCA: Mild irregularites 10-20%. LV function assessed as : Abnormal. EF of 40%.  H/O echocardiogram 12/18/2018    EF 55%. Mildly increased LV wall thickness. The left atrium appears moderately to severely dilated. Moderate to severe mitral regurg. Moderate pulmonary HTN with an estimated RV systolic pressure of 80MVMK. Moderate tricuspid regurg. Evidnece fo moderate diastolic dysfunction is seen.  History of echocardiogram 01/17/2019    EF 55%. LV wall thickness moderately increased.  LA is mildly dilated w/ LA volume index of 30. trivial mitral regurg. Evidence of moderate (grade II) diastolic dysfunction seen.  History of echocardiogram 2019    EF of 50-55%. LV wall thickness is mildly increased. LA is mildly dilated (29-33) with a left atrial volume index of 31 m1/m2. mild diastolic dysfunction.  Hyperlipidemia     Hypertension         Past Surgical History:     Past Surgical History:   Procedure Laterality Date    CARDIAC CATHETERIZATION Left 2017    right radial, T Dr. Olegario Aguayo          Medications Prior to Admission:     Prior to Admission medications    Medication Sig Start Date End Date Taking? Authorizing Provider   clopidogrel (PLAVIX) 75 MG tablet Take 1 tablet by mouth daily 20   Montse Castillo MD   carvedilol (COREG) 12.5 MG tablet Take 1 tablet by mouth 2 times daily (with meals) 20   Marina Brock MD   losartan-hydrochlorothiazide [facility]) 100-25 MG per tablet Take 1 tablet by mouth daily 20   Marina Brock MD   amLODIPine (NORVASC) 10 MG tablet Take 1 tablet by mouth daily 20   Marina Brock MD   atorvastatin (LIPITOR) 80 MG tablet Take 1 tablet by mouth nightly 20   Marina Brock MD   aspirin 81 MG EC tablet Take 1 tablet by mouth daily 20   Marina Brock MD   metFORMIN (GLUCOPHAGE) 500 MG tablet Take 500 mg by mouth 2 times daily (with meals)    Historical Provider, MD        Allergies:     Patient has no known allergies. Social History:     Tobacco:    reports that she has never smoked. She has never used smokeless tobacco.  Alcohol:      reports no history of alcohol use. Drug Use:  reports no history of drug use. Family History:     Family History   Problem Relation Age of Onset    Coronary Art Dis Father          from [place]    COPD Sister         O2 dep    Coronary Art Dis Brother         2 brothers  from [place]       Review of Systems:     Positive and Negative as described in HPI.     CONSTITUTIONAL:  negative for fevers, chills, sweats, fatigue, weight loss  HEENT:  negative for vision, hearing changes, runny nose, throat pain  RESPIRATORY:  negative for shortness of breath, cough, congestion, wheezing. CARDIOVASCULAR:  negative for chest pain, palpitations. GASTROINTESTINAL:  negative for nausea, vomiting, diarrhea, constipation, change in bowel habits, abdominal pain   GENITOURINARY:  negative for difficulty of urination, burning with urination, frequency   INTEGUMENT:  negative for rash, skin lesions, easy bruising   HEMATOLOGIC/LYMPHATIC:  negative for swelling/edema   ALLERGIC/IMMUNOLOGIC:  negative for urticaria , itching  ENDOCRINE:  negative increase in drinking, increase in urination, hot or cold intolerance  MUSCULOSKELETAL:  negative joint pains, muscle aches, swelling of joints  NEUROLOGICAL: Right-sided weakness and speech deficit  BEHAVIOR/PSYCH:  negative for depression, anxiety    Physical Exam:     /72   Pulse 70   Temp 97.9 °F (36.6 °C) (Oral)   Resp 19   Ht 5' 4\" (1.626 m)   Wt 197 lb 9.6 oz (89.6 kg) Comment: chair scale  SpO2 98%   BMI 33.92 kg/m²   Temp (24hrs), Av.2 °F (36.8 °C), Min:97.9 °F (36.6 °C), Max:98.4 °F (36.9 °C)    Recent Labs     20  1600 209 20  0653 20  1546   POCGLU 110* 160* 120* 115*     No intake or output data in the 24 hours ending 20 1632  Speech deficit  General Appearance:  alert, well appearing, and in no acute distress  Mental status: oriented to person, place, and time with normal affect  Head:  normocephalic, atraumatic.   Eye: no icterus, redness, pupils equal and reactive, extraocular eye movements intact, conjunctiva clear  Ear: normal external ear, no discharge, hearing intact  Nose:  no drainage noted  Mouth: mucous membranes moist  Neck: supple, no carotid bruits, thyroid not palpable  Lungs: Bilateral equal air entry, clear to ausculation, no wheezing, rales or rhonchi, normal effort  Cardiovascular: normal rate, regular rhythm, no murmur, gallop, rub. Abdomen: Soft, nontender, nondistended, normal bowel sounds, no hepatomegaly or splenomegaly  Neurologic: Right hemiparesis with Broca's aphasia skin: No gross lesions, rashes, bruising or bleeding on exposed skin area  Extremities:  peripheral pulses palpable, no pedal edema or calf pain with palpation  P    Investigations:      Laboratory Testing:  Recent Results (from the past 24 hour(s))   POC Glucose Fingerstick    Collection Time: 09/08/20  8:29 PM   Result Value Ref Range    POC Glucose 160 (H) 65 - 105 mg/dL   POC Glucose Fingerstick    Collection Time: 09/09/20  6:53 AM   Result Value Ref Range    POC Glucose 120 (H) 65 - 105 mg/dL   POC Glucose Fingerstick    Collection Time: 09/09/20  3:46 PM   Result Value Ref Range    POC Glucose 115 (H) 65 - 105 mg/dL       Imaging/Diagonstics:      Assessment :      Primary Problem  <principal problem not specified>    Active Hospital Problems    Diagnosis Date Noted    Acute ischemic stroke ([address] [I63.9] 08/26/2020    Cerebral infarction ([address] - left MCA distribution  [I63.9] 08/26/2020    Type 2 diabetes mellitus, without long-term current use of insulin ([address] [E11.9] 08/25/2020    Aphasia [R47.01]     CKD (chronic kidney disease) stage 3, GFR 30-59 ml/min ([address] [N18.3] 12/20/2018    Morbid obesity ([address] [E66.01] 12/20/2018    Acute on chronic combined systolic and diastolic CHF, NYHA class 4 (HCC) [I50.43] 12/17/2018    Chronic combined systolic and diastolic HF (heart failure), NYHA class 2 ([address] [I50.42] 08/28/2017    Hyperlipidemia [E78.5]     Hypertension [I10]     Idiopathic cardiomyopathy ([address] [I42.8]        Plan:     1. Uncontrolled diabetes mellitus with the peripheral organ damage with ischemic CVA  2. Ischemic CVA with right hemiparesis and Broca's aphasia  3. Metformin recent A1c is 7.3 reviewed and reconciled  4. Ischemic CVA hyperlipidemia aspirin and Lipitor Plavix 75 all started  5.  Losartan 100 mg for hypertension and diabetic renal protection  6. norco for pain  7. q6  Hr prn  8.   9.     Consultations:   IP CONSULT TO INTERNAL MEDICINE  IP CONSULT TO DIETITIAN  IP CONSULT TO SOCIAL WORK  IP CONSULT TO INTERNAL MEDICINE      Kashif Chen MD  9/9/2020  4:32 PM    Copy sent to Dr. Carley De Paz, APRN - CNP    Please note that this chart was generated using voice recognition Dragon dictation software. Although every effort was made to ensure the accuracy of this automated transcription, some errors in transcription may have occurred.

## 2020-09-09 NOTE — PROGRESS NOTES
Physical Therapy  Facility/Department: [facility] ACUTE REHAB  Daily Treatment Note  NAME: Christiano Bronson  : 1950  MRN: [id number]    Date of Service: 2020    Discharge Recommendations:  Patient would benefit from continued therapy after discharge, 24 hour supervision or assist        Assessment      PT Education: Gait Training;General Safety;Precautions;Transfer Training  Patient Education: pt educated on transfers, gait training and stairs, wc mobility  Activity Tolerance  Activity Tolerance: Patient limited by fatigue;Patient limited by endurance     Patient Diagnosis(es): There were no encounter diagnoses. has a past medical history of CHF (congestive heart failure) ([facility] [address] Diabetes mellitus ([facility] [address] H/O cardiac catheterization, H/O echocardiogram, History of echocardiogram, History of echocardiogram, Hyperlipidemia, and Hypertension. has a past surgical history that includes Cholecystectomy and Cardiac catheterization (Left, 2017). Restrictions  Restrictions/Precautions  Restrictions/Precautions: Fall Risk, Up as Tolerated, General Precautions  Required Braces or Orthoses?: No  Implants present? : (none)  Position Activity Restriction  Other position/activity restrictions: up as tolerated  Subjective   General  Additional Pertinent Hx: Pt has a history of HTN, HLD, DM, CHF. Pt recently evaluated at [facility] for  new onset of aphasia. Pt was transferred to [facility] on 20 after diagnostic studies confirmed new L hemisphere CVA. Pt with known history of previous CVAs. MRI confirmed L MCA infarct on 2020. Pt admitted to [address] [place] [address] 20. Response To Previous Treatment: Patient with no complaints from previous session. Family / Caregiver Present: No  Subjective  Subjective: patient able to communicate needs with partial expression of words and gestures.  patient indicated needed to use toilet prior to going to gym with therapist in am. Therapist assisted toileting and hygiene and changing of clothing         Orientation     Cognition      Objective   Bed mobility  Bridging: Contact guard assistance  Rolling to Left: Contact guard assistance  Rolling to Right: Contact guard assistance  Supine to Sit: Contact guard assistance  Sit to Supine: Contact guard assistance  Scooting: Contact guard assistance  Transfers  Sit to Stand: Minimal Assistance;Contact guard assistance(instruction left UE placement , right dependant)  Stand to sit: Minimal Assistance(instruction left UE placement , right dependant )  Bed to Chair: Minimal assistance(RW with right hand )  Stand Pivot Transfers: Minimal Assistance  Squat Pivot Transfers: Minimal Assistance  Comment: simple cueing and visual directional cues; patient at times does not follow given commands and needs redirection to prevent unsafe technique and patient limited by awareness to deficits. Ambulation 1  Surface: level tile  Device: Large Gee Winfred  Assistance: Minimal assistance  Quality of Gait: decreased right UE/LE coordination , weight shift , foot clearance , hip stabilization extensors  Gait Deviations: Slow Jacinda;Decreased step length;Decreased step height  Distance: 15 feet x 4 for mirror feedback training and retraining ed on RLE awareness to placement; tolerates further training with wc follow 50 feet  Comments: tactile cueing vs verbal I/S and therapist guarding patient for fall risk safety with decreased gait belt use secondary to poor incorrect feedback gait belt use provides. Patient in wide arm guard on RUE with noted WB through therapist support hand in hand technique. Patient unable to utilize [place] on walker with  support secondary to increased tone and aproximation of RUE to advance walker effectively. Stairs  # Steps : 5  Stairs Height: 6\"  Rails: Left ascending(RUE support hand in hand with therapist feedback WB NDT principles applied for extension)  Assistance:  Moderate assistance  Comment: instruction, tactile cueing for proper technique and placement of RLE  Propulsion 1  Propulsion: Manual  Level: Level Tile  Method: LUE;LLE;RLE  Level of Assistance: Minimal assistance  Description/ Details: pt instructed in reciprical LE advancement , forward gaze with left <> right tracking, straight path    Distance: 55 feet with BLE and LUE +  30 feet with LUE/LLE        Other exercises  Other exercises 1: nustep L3  10 minutes  Other exercises 2: seated bilateral LE in reciprical pattern right AROM left 2# orange band x 15  Other exercises 3: supine/sidelying 20 reps  Other exercises 4: standing reciprical hip flexion  in//bars   Other exercises 5: neuro re supine/seated reciprocal                        G-Code     OutComes Score                                                     AM-PAC Score             Goals  Short term goals  Time Frame for Short term goals: 10 days   Short term goal 1: Pt to perform bed mobility independently. Short term goal 2: Pt to perform transfers with minAx1. Short term goal 3: Pt to ambulate 150 ft, least restrictive device, minAx1. Short term goal 4: Pt to tolerate 30-60 mins physical therapy to increase endurance and strength. Short term goal 5: Pt to complete 5 steps, with LUE support, minAx1. Long term goals  Time Frame for Long term goals : By LOS. Long term goal 2: Pt to complete transfers with SBA to promote independence. Long term goal 3: Pt to ambulate 200 ft, least restrictive device, SBA. Long term goal 4: Pt to complete 5-12 steps, CGA to promote function. Long term goal 5: Pt to improve PASS score from 15/36 to 22/36. Patient Goals   Patient goals : To return back home. Plan    Plan  Times per week: 1.5 hrs/day 5-7 days/week  Times per day: (1-2 visits daily)  Specific instructions for Next Treatment: progress trasnfer training and ambulation as able, try balance activities.    Current Treatment Recommendations: Strengthening, ROM, Balance Training, Functional Mobility Training, Transfer Training, Endurance Training, Gait Training, Stair training, Neuromuscular Re-education, Safety Education & Training, Patient/Caregiver Education & Training, Equipment Evaluation, Education, & procurement  Safety Devices  Type of devices:  All fall risk precautions in place, Gait belt, Chair alarm in place  Restraints  Initially in place: No     Therapy Time     09/09/20 1117 09/09/20 1439   PT Individual Minutes   Time In 0932 1246   Time Out 1015 1315   Minutes 43 [address] [facility]

## 2020-09-09 NOTE — PLAN OF CARE
Problem: Skin Integrity:  Goal: Will show no infection signs and symptoms  Description: Will show no infection signs and symptoms  9/9/2020 1338 by Renetta Angeles RN  Outcome: Ongoing  Note: Pt educated on risks for impaired skin integrity. Pt assisted in keeping skin clean and dry, assisted and prompted to reposition frequently. Pt has reddened areas under bilateral breasts, micotin powder applied. No s/s of infection or new breakdown noted this shift. Will continue to monitor and intervene as needed. Problem: Falls - Risk of:  Goal: Will remain free from falls  Description: Will remain free from falls  9/9/2020 1338 by Renetta Angeles RN  Outcome: Ongoing  Note: Pt educated on and verbalizes understanding of fall risks. Pt wearing nonskid stockings, uses assistive devices appropriately, call light within reach, encouraged to ask for assistance. Pt calls out appropriately this shift, personal alarm on when out of bed for added safety. Will continue to monitor and intervene as needed. Problem: Pain:  Goal: Control of acute pain  Description: Control of acute pain  9/9/2020 1338 by Renetta Angeles RN  Outcome: Ongoing  Note: Pt rates pain as 1/10 this shift in her right arm. Reports the pain is much better than yesterday. Pt accepting of PRN medications and non-pharmacological interventions. Will continue to monitor and assist as needed.

## 2020-09-09 NOTE — PROGRESS NOTES
accuracy. Naming days of the week: 50% accuracy humberto, increasing to 100% c MAX cues. Pt. Noted to become frustrated when word finding difficulties present in conversation. Emotional support provided. Motor speech:  n/a     Reading Comprehension:  n/a      Other:  No family present. Plan:  [x] Continue ST services    [] Discharge from ST:      Discharge recommendations: [] Inpatient Rehab   [] [place]   [] Outpatient Therapy  [] Follow up at trauma clinic   [] Other:       Treatment completed by:  Audrey SPIVEY-SLP

## 2020-09-10 LAB
ANION GAP SERPL CALCULATED.3IONS-SCNC: 9 MMOL/L (ref 9–17)
BUN BLDV-MCNC: 36 MG/DL (ref 8–23)
BUN/CREAT BLD: ABNORMAL (ref 9–20)
CALCIUM SERPL-MCNC: 10.3 MG/DL (ref 8.6–10.4)
CHLORIDE BLD-SCNC: 104 MMOL/L (ref 98–107)
CO2: 24 MMOL/L (ref 20–31)
CREAT SERPL-MCNC: 0.86 MG/DL (ref 0.5–0.9)
GFR AFRICAN AMERICAN: >60 ML/MIN
GFR NON-AFRICAN AMERICAN: >60 ML/MIN
GFR SERPL CREATININE-BSD FRML MDRD: ABNORMAL ML/MIN/{1.73_M2}
GFR SERPL CREATININE-BSD FRML MDRD: ABNORMAL ML/MIN/{1.73_M2}
GLUCOSE BLD-MCNC: 115 MG/DL (ref 65–105)
GLUCOSE BLD-MCNC: 117 MG/DL (ref 70–99)
GLUCOSE BLD-MCNC: 118 MG/DL (ref 65–105)
GLUCOSE BLD-MCNC: 138 MG/DL (ref 65–105)
GLUCOSE BLD-MCNC: 142 MG/DL (ref 65–105)
HCT VFR BLD CALC: 32.3 % (ref 36–46)
HEMOGLOBIN: 11.2 G/DL (ref 12–16)
MCH RBC QN AUTO: 31 PG (ref 26–34)
MCHC RBC AUTO-ENTMCNC: 34.7 G/DL (ref 31–37)
MCV RBC AUTO: 89.4 FL (ref 80–100)
NRBC AUTOMATED: ABNORMAL PER 100 WBC
PDW BLD-RTO: 13.5 % (ref 11.5–14.9)
PLATELET # BLD: 205 K/UL (ref 150–450)
PMV BLD AUTO: 9 FL (ref 6–12)
POTASSIUM SERPL-SCNC: 4.4 MMOL/L (ref 3.7–5.3)
RBC # BLD: 3.61 M/UL (ref 4–5.2)
SODIUM BLD-SCNC: 137 MMOL/L (ref 135–144)
WBC # BLD: 5.4 K/UL (ref 3.5–11)

## 2020-09-10 PROCEDURE — 80048 BASIC METABOLIC PNL TOTAL CA: CPT

## 2020-09-10 PROCEDURE — 36415 COLL VENOUS BLD VENIPUNCTURE: CPT

## 2020-09-10 PROCEDURE — 97129 THER IVNTJ 1ST 15 MIN: CPT

## 2020-09-10 PROCEDURE — 92507 TX SP LANG VOICE COMM INDIV: CPT

## 2020-09-10 PROCEDURE — 6370000000 HC RX 637 (ALT 250 FOR IP): Performed by: INTERNAL MEDICINE

## 2020-09-10 PROCEDURE — 97116 GAIT TRAINING THERAPY: CPT

## 2020-09-10 PROCEDURE — 6360000002 HC RX W HCPCS: Performed by: INTERNAL MEDICINE

## 2020-09-10 PROCEDURE — 99231 SBSQ HOSP IP/OBS SF/LOW 25: CPT | Performed by: PHYSICAL MEDICINE & REHABILITATION

## 2020-09-10 PROCEDURE — 97535 SELF CARE MNGMENT TRAINING: CPT

## 2020-09-10 PROCEDURE — 97110 THERAPEUTIC EXERCISES: CPT

## 2020-09-10 PROCEDURE — 97530 THERAPEUTIC ACTIVITIES: CPT

## 2020-09-10 PROCEDURE — 6370000000 HC RX 637 (ALT 250 FOR IP): Performed by: PHYSICAL MEDICINE & REHABILITATION

## 2020-09-10 PROCEDURE — 82947 ASSAY GLUCOSE BLOOD QUANT: CPT

## 2020-09-10 PROCEDURE — 99231 SBSQ HOSP IP/OBS SF/LOW 25: CPT | Performed by: INTERNAL MEDICINE

## 2020-09-10 PROCEDURE — 1180000000 HC REHAB R&B

## 2020-09-10 PROCEDURE — 85027 COMPLETE CBC AUTOMATED: CPT

## 2020-09-10 RX ADMIN — HYDROCODONE BITARTRATE AND ACETAMINOPHEN 1 TABLET: 5; 325 TABLET ORAL at 08:56

## 2020-09-10 RX ADMIN — CARVEDILOL 6.25 MG: 6.25 TABLET, FILM COATED ORAL at 08:49

## 2020-09-10 RX ADMIN — METRONIDAZOLE 100 MG: 500 TABLET ORAL at 12:31

## 2020-09-10 RX ADMIN — CLOPIDOGREL BISULFATE 75 MG: 75 TABLET ORAL at 08:49

## 2020-09-10 RX ADMIN — ENOXAPARIN SODIUM 40 MG: 40 INJECTION SUBCUTANEOUS at 08:49

## 2020-09-10 RX ADMIN — METFORMIN HYDROCHLORIDE 500 MG: 500 TABLET ORAL at 08:49

## 2020-09-10 RX ADMIN — METFORMIN HYDROCHLORIDE 500 MG: 500 TABLET ORAL at 16:26

## 2020-09-10 RX ADMIN — ATORVASTATIN CALCIUM 80 MG: 80 TABLET, FILM COATED ORAL at 20:22

## 2020-09-10 RX ADMIN — ANTI-FUNGAL POWDER MICONAZOLE NITRATE TALC FREE: 1.42 POWDER TOPICAL at 08:50

## 2020-09-10 RX ADMIN — POLYETHYLENE GLYCOL 3350 17 G: 17 POWDER, FOR SOLUTION ORAL at 08:49

## 2020-09-10 RX ADMIN — LOSARTAN POTASSIUM 100 MG: 50 TABLET, FILM COATED ORAL at 08:49

## 2020-09-10 RX ADMIN — HYDROCHLOROTHIAZIDE 25 MG: 25 TABLET ORAL at 08:49

## 2020-09-10 RX ADMIN — CARVEDILOL 6.25 MG: 6.25 TABLET, FILM COATED ORAL at 16:26

## 2020-09-10 RX ADMIN — METRONIDAZOLE 100 MG: 500 TABLET ORAL at 08:49

## 2020-09-10 RX ADMIN — HYDROCODONE BITARTRATE AND ACETAMINOPHEN 1 TABLET: 5; 325 TABLET ORAL at 20:28

## 2020-09-10 RX ADMIN — ASPIRIN 81 MG CHEWABLE TABLET 81 MG: 81 TABLET CHEWABLE at 08:49

## 2020-09-10 RX ADMIN — AMLODIPINE BESYLATE 10 MG: 10 TABLET ORAL at 16:26

## 2020-09-10 RX ADMIN — METRONIDAZOLE 100 MG: 500 TABLET ORAL at 20:21

## 2020-09-10 ASSESSMENT — PAIN SCALES - GENERAL
PAINLEVEL_OUTOF10: 4
PAINLEVEL_OUTOF10: 3
PAINLEVEL_OUTOF10: 7
PAINLEVEL_OUTOF10: 7

## 2020-09-10 ASSESSMENT — PAIN DESCRIPTION - PAIN TYPE: TYPE: ACUTE PAIN

## 2020-09-10 ASSESSMENT — PAIN DESCRIPTION - LOCATION: LOCATION: GENERALIZED

## 2020-09-10 ASSESSMENT — PAIN DESCRIPTION - FREQUENCY: FREQUENCY: CONTINUOUS

## 2020-09-10 ASSESSMENT — PAIN DESCRIPTION - DESCRIPTORS: DESCRIPTORS: ACHING;DULL

## 2020-09-10 ASSESSMENT — PAIN DESCRIPTION - PROGRESSION: CLINICAL_PROGRESSION: GRADUALLY IMPROVING

## 2020-09-10 ASSESSMENT — PAIN SCALES - WONG BAKER: WONGBAKER_NUMERICALRESPONSE: 4

## 2020-09-10 NOTE — PROGRESS NOTES
[address] [facility]    ACUTE REHABILITATION OCCUPATIONAL THERAPY  DAILY NOTE    Date: 9/10/20  Patient Name: Jasson Johnston      Room: 2727/9400-19    MRN: [id number]   : 1950  (80 y.o.)  Gender: female   Referring Practitioner: Dr. Andrae Garcia  Diagnosis: Left ischemic middle cerebral artery stroke        Assessment  Performance deficits / Impairments: Decreased functional mobility ; Decreased ADL status; Decreased ROM; Decreased strength;Decreased safe awareness;Decreased cognition;Decreased endurance;Decreased sensation;Decreased balance;Decreased high-level IADLs;Decreased fine motor control;Decreased coordination;Decreased posture  Assessment: Pt progressing well towards OT goals. Pt continues to be limited by aphasia, decreased RUE function/FM skills, and decreased standing balance. Prognosis: Good  Discharge Recommendations: 24 hour supervision or assist;Patient would benefit from continued therapy after discharge  Activity Tolerance: Patient Tolerated treatment well;Patient limited by fatigue  Safety Devices in place: Yes  Type of devices: All fall risk precautions in place;Call light within reach;Gait belt;Patient at risk for falls;Nurse notified; Left in chair  Restraints  Initially in place: No  Equipment Recommendations  Equipment Needed: Yes(continue to assess pending progress )         Restrictions  Restrictions/Precautions: Fall Risk, Up as Tolerated, General Precautions  Implants present? : (none)  Other position/activity restrictions: up as tolerated  Required Braces or Orthoses?: No    Subjective  Subjective: AM: Pt able to identify 1 item on breakfast try on first attempt. Pt with increased accuracy with use of words, however continues to struggle with aphasia.  PM: No new complaints to report   Comments: AM: Pt supine in bed upon arrival. PM: Pt seated in wheelchair upon arrival: Pt agreeable to participate and was pleasant and cooperative throughout   Patient Currently in Pain: Yes  Pain Location: Generalized    Restrictions/Precautions: Fall Risk;Up as Tolerated;General Precautions  Overall Orientation Status: Within Functional Limits     Pain Assessment  Pain Assessment: Faces  Garza-Baker Pain Rating: Hurts little more  Pain Type: Acute pain  Pain Location: Generalized  Pain Orientation: Left  Pain Descriptors: Aching, Dull  Pain Frequency: Continuous  Pain Onset: On-going  Clinical Progression: Gradually improving  Functional Pain Assessment: Prevents or interferes some active activities and ADLs  Response to Pain Intervention: Patient Satisfied(repositioned/rest, increased activity )    Objective  Cognition  Overall Cognitive Status: Impaired  Arousal/Alertness: Appropriate responses to stimuli  Following Directions: Follows two step commands  Following Commands: Follows multistep commands with repetition  Safety Judgement: Decreased awareness of need for safety  Awareness of Errors: Assistance required to identify errors made  Insights: Decreased awareness of deficits  Sequencing and Organization: Assistance required to identify errors made;Assistance required to generate solutions;Assistance required to implement solutions  Perception  Overall Perceptual Status: Impaired  Unilateral Attention: Cues to attend to right side of body  Initiation: Cues to initiate tasks     Balance  Sitting Balance: Modified independent   Standing Balance: Minimal assistance(static)     Bed mobility  Rolling to Right: Stand by assistance  Supine to Sit: Stand by assistance  Scooting: Stand by assistance  Comment: AM: Pt andrae'hunter appropriate technique, good safety awareness. Increased time and effort due to decreased RUE function     Transfers  Sit Pivot Transfers: Minimal assistance  Sit to stand: Minimal assistance  Stand to sit: Minimal assistance  Transfer Comments: AM: Writer provided verbal cueing to facilitate safety and independence with stand > pivot transfer.  Pt bharathio'd appropriate sequencing and verbalized understanding of wheelchair set-up. No loss of balance      Functional Mobility  Functional - Mobility Device: Wheelchair  Activity: To/from bathroom  Assist Level: Maximum assistance  Shower Transfers  Shower - Transfer From: Wheelchair  Shower - Transfer Type: To and From  Shower - Transfer To: Transfer tub bench  Shower - Technique: Stand pivot; To right; To left  Shower Transfers: Minimal assistance  Shower Transfers Comments: AM: Minimal assist provided to facilitate pivot on LLE to transition from wheelchair to tub transfer bench           Additional Activities: Other  Additional Activities: PM: Pt completed sustained  activity to challenge R hand strength to facilitate increased utilization during ADLs. Pt maintained grasp around highlight while pushing into theraputty to provide resistance. Pt completed 3 sets x 15 reps. Pt required incidental minimal assist to maintain . Pt triceps strength sufficient to push high lighter into putty. 3. Pt also engaged in table top cleaning activity to engage pt in [address] with RUE. Pt completed several patterns, including circles, forward/backwards/side-to-side with minimal assist from writer to facilitate AROM. ADL  Equipment Provided: Reacher;Sock aid  Feeding: Setup; Increased time to complete(difficulty with bimanual feeding tasks, requires assist for packages and to cut food at this time)  Grooming: Supervision;Verbal cueing;Setup(pt complete oral care seated sink-side. Writer provided education on use of RUE as gross support to hold down tooth brush while applying toothpaste)  UE Bathing: Minimal assistance(assist for LUE, able to complete L axillary region and all other parts without assist. 1 verbal cue in shower to utilize grab bar while reaching across body)  LE Bathing: Minimal assistance(minimal assist to stand and complete perineal hygiene. Pt demo'd appropriate grab bar use)  UE Dressing: Stand by assistance(pt demo'd appropriate sequencing. goal 5: Pt will stand for 4+ minutes with 1-2 UE support, contact guard assist, and no loss of balance while engaging in functional activity of choice. Short term goal 6: Pt will actively participate in 30+ minutes of therapeutic exercise/functional activities to promote increased independence with self-care and mobility. Short term goal 7: Pt will verbalize/demonstrate Good understanding of assistive equipment/durable medical equipment/modified techniques for increased independence with self-care and mobility. Long term goals  Time Frame for Long term goals : By discharge  Long term goal 1: Pt will perform self-feeding and grooming with modified independence. Long term goal 2: Pt will perform bathing, dressing, and toileting tasks with stand by assist, Fair safety, and assist PRN for MIRTA hose. Long term goal 3: Pt will perform functional mobility and transfers during self-care with stand by assist, least restrictive mobility device, and Fair safety. Long term goal 4: Pt will stand for 8+ minutes with 1-2 UE support, stand by assist, and no loss of balance while engaging in functional activity of choice.   Long term goal 5: Patient will increase strength/coordination of RUE as evident by an increase in  strength R hand by 5-7#, 5-7 block increase in box and blocks test, and will attempt 9 Hole Peg test as appropriate       09/10/20 0730 09/10/20 1532   OT Individual Minutes   Time In 1105 7045   Time Out 0831 1405   Minutes 58 32   Time Code Minutes    Timed Code Treatment Minutes 58 Minutes 32 Minutes     Electronically signed by Ivonne Casillas OT on 9/10/20 at 3:32 PM EDT

## 2020-09-10 NOTE — PLAN OF CARE
Radha Larson RN  Outcome: Ongoing  9/9/2020 1338 by Nancy Sampson RN  Outcome: Ongoing  Note: Pt rates pain as 1/10 this shift in her right arm. Reports the pain is much better than yesterday. Pt accepting of PRN medications and non-pharmacological interventions. Will continue to monitor and assist as needed.   Goal: Control of chronic pain  Description: Control of chronic pain  Outcome: Ongoing

## 2020-09-10 NOTE — CARE COORDINATION
ONGOING DISCHARGE PLAN:    Spoke with patient regarding discharge plan and patient confirms that plan is still discharge home with vns    Will continue to follow for additional discharge needs.     Electronically signed by MOMO Alcala, PAWAN on 9/10/2020 at 3:44 PM

## 2020-09-10 NOTE — PROGRESS NOTES
Speech Language Pathology  Speech Language Pathology  [facility]    Speech Language Treatment Note    Date: 9/10/2020  Patients Name: Mai Islas  MRN: [id number]  Diagnosis: CVA  Patient Active Problem List   Diagnosis Code    Hypertension I10    Hyperlipidemia E78.5    Acute on chronic systolic CHF (congestive heart failure) ([facility] [address] I50.23    Hypertensive urgency I16.0    Idiopathic cardiomyopathy ([facility] [address] I42.8    Hypertensive emergency I16.1    Chronic combined systolic and diastolic HF (heart failure), NYHA class 2 ([facility]) I50.42    Acute on chronic combined systolic and diastolic CHF, NYHA class 4 ([facility]) I50.43    Hypoxia R09.02    Severe mitral regurgitation by prior echocardiogram I34.0    Morbid obesity ([facility]) E66.01    CKD (chronic kidney disease) stage 3, GFR 30-59 ml/min ([facility]) N18.3    Physical deconditioning R53.81    TIA (transient ischemic attack) G45.9    Old lacunar stroke without late effect Z86.73    Dysarthria R47.1    Stroke determined by clinical assessment ([facility] [address] I63.9    Aphasia R47.01    Type 2 diabetes mellitus, without long-term current use of insulin ([facility] [address] E11.9    Cerebral infarction ([facility] [address] - left MCA distribution  I63.9    Uncontrolled type 2 diabetes mellitus with hyperglycemia ([facility] [address] E11.65    Elevated blood pressure reading R03.0    Acute ischemic stroke ([facility]) I63.9       Pain: 0/10    Speech and Language Treatment  Treatment time:  0586-7843    Subjective: [x] Alert [x] Cooperative     [] Confused     [] Agitated      [] Lethargic    Objective/Assessment:  Auditory Comprehension:   Pt. Responding appropriately to simple yes/no questions. Verbal Expression:     Verb Network Strengthening Treatment (VNeST) attempted with Pt. Using verbs eat, wear, and ride. Pt. Able to identify nouns from field of 9 picture cards associated with verb with 100% accuracy humberto.   Pt. Able to repeat subject-verb-object sentence with 50% accuracy humberto, producing phonemic

## 2020-09-10 NOTE — CONSULTS
[facility] Internal Medicine    CONSULTATION / HISTORY AND PHYSICAL EXAMINATION            Date:   9/10/2020  Patient name:  Eduardo Casper  Date of admission:  8/26/2020  6:56 PM  MRN:   [id number]  Account:  [de-identified]  YOB: 1950  PCP:    SUE Tavares CNP  Room:   [facility]/7099-96  Code Status:    Full Code    Physician Requesting Consult: Aleisha Drake MD    Reason for Consult: Medical management    Chief Complaint:     No chief complaint on file. Stroke    History Obtained From:     Patient medical record nursing staff    History of Present Illness:     68-year-old pleasant  lady was admitted to [place] [address] on August 23 with a aphasia diagnosed with the left middle cerebral artery ischemic infarct  Known history of systolic congestive heart failure ejection fraction 35 to 40% secondary to nonischemic cardiomyopathy  Diabetes   Duration more than 7 years  Modifying factors on Glucophage and other med  Severity uncontrolled sever  Associated signs and symtoms neuropathy/ckd/ CAD. aggravated with sugar diet and better with low sugar diet             Past Medical History:     Past Medical History:   Diagnosis Date    [facility]est[facility]     Diabetes mellitus ([facility] [facility]     H/O cardiac catheterization 08/04/2017    LMCA: Mild irregularites 10-20%. LAD: Mild irregularites 10-20%. LCx: Mild irregularites 10-20%. RCA: Mild irregularites 10-20%. LV function assessed as : Abnormal. EF of 40%.  H/O echocardiogram 12/18/2018    EF 55%. Mildly increased LV wall thickness. The left atrium appears moderately to severely dilated. Moderate to severe mitral regurg. Moderate pulmonary HTN with an estimated RV systolic pressure of 01JYIR. Moderate tricuspid regurg. Evidnece fo moderate diastolic dysfunction is seen.  History of echocardiogram 01/17/2019    EF 55%. LV wall thickness moderately increased.  LA is mildly dilated w/ LA volume index of 30. trivial mitral regurg. Evidence of moderate (grade II) diastolic dysfunction seen.  History of echocardiogram 2019    EF of 50-55%. LV wall thickness is mildly increased. LA is mildly dilated (29-33) with a left atrial volume index of 31 m1/m2. mild diastolic dysfunction.  Hyperlipidemia     Hypertension         Past Surgical History:     Past Surgical History:   Procedure Laterality Date    CARDIAC CATHETERIZATION Left 2017    right radial, [facility] Dr. Lm Clement          Medications Prior to Admission:     Prior to Admission medications    Medication Sig Start Date End Date Taking? Authorizing Provider   clopidogrel (PLAVIX) 75 MG tablet Take 1 tablet by mouth daily 20   Austin Chaudhary MD   carvedilol (COREG) 12.5 MG tablet Take 1 tablet by mouth 2 times daily (with meals) 20   Pop Simmons MD   losartan-hydrochlorothiazide [facility]) 100-25 MG per tablet Take 1 tablet by mouth daily 20   Pop Simmons MD   amLODIPine (NORVASC) 10 MG tablet Take 1 tablet by mouth daily 20   Pop Simmons MD   atorvastatin (LIPITOR) 80 MG tablet Take 1 tablet by mouth nightly 20   Pop Simmons MD   aspirin 81 MG EC tablet Take 1 tablet by mouth daily 20   Pop Simmons MD   metFORMIN (GLUCOPHAGE) 500 MG tablet Take 500 mg by mouth 2 times daily (with meals)    Historical Provider, MD        Allergies:     Patient has no known allergies. Social History:     Tobacco:    reports that she has never smoked. She has never used smokeless tobacco.  Alcohol:      reports no history of alcohol use. Drug Use:  reports no history of drug use. Family History:     Family History   Problem Relation Age of Onset    Coronary Art Dis Father          from [place]    COPD Sister         O2 dep    Coronary Art Dis Brother         2 brothers  from [place]       Review of Systems:     Positive and Negative as described in HPI.     CONSTITUTIONAL:  negative for fevers, chills, sweats, fatigue, weight loss  HEENT:  negative for vision, hearing changes, runny nose, throat pain  RESPIRATORY:  negative for shortness of breath, cough, congestion, wheezing. CARDIOVASCULAR:  negative for chest pain, palpitations. GASTROINTESTINAL:  negative for nausea, vomiting, diarrhea, constipation, change in bowel habits, abdominal pain   GENITOURINARY:  negative for difficulty of urination, burning with urination, frequency   INTEGUMENT:  negative for rash, skin lesions, easy bruising   HEMATOLOGIC/LYMPHATIC:  negative for swelling/edema   ALLERGIC/IMMUNOLOGIC:  negative for urticaria , itching  ENDOCRINE:  negative increase in drinking, increase in urination, hot or cold intolerance  MUSCULOSKELETAL:  negative joint pains, muscle aches, swelling of joints  NEUROLOGICAL: Right-sided weakness and speech deficit  BEHAVIOR/PSYCH:  negative for depression, anxiety    Physical Exam:     /63   Pulse 65   Temp 98.5 °F (36.9 °C) (Oral)   Resp 16   Ht 5' 4\" (1.626 m)   Wt 197 lb 9.6 oz (89.6 kg) Comment: chair scale  SpO2 95%   BMI 33.92 kg/m²   Temp (24hrs), Av.2 °F (36.8 °C), Min:97.9 °F (36.6 °C), Max:98.5 °F (36.9 °C)    Recent Labs     20  1546 20  1933 09/10/20  0627 09/10/20  1044   POCGLU 115* 215* 118* 115*     No intake or output data in the 24 hours ending 09/10/20 1434  Speech deficit  General Appearance:  alert, well appearing, and in no acute distress  Mental status: oriented to person, place, and time with normal affect  Head:  normocephalic, atraumatic.   Eye: no icterus, redness, pupils equal and reactive, extraocular eye movements intact, conjunctiva clear  Ear: normal external ear, no discharge, hearing intact  Nose:  no drainage noted  Mouth: mucous membranes moist  Neck: supple, no carotid bruits, thyroid not palpable  Lungs: Bilateral equal air entry, clear to ausculation, no wheezing, rales or rhonchi, normal effort  Cardiovascular: normal Primary Problem  <principal problem not specified>    Active Hospital Problems    Diagnosis Date Noted    Acute ischemic stroke ([facility] [I63.9] 08/26/2020    Cerebral infarction [facility]) - left MCA distribution  [I63.9] 08/26/2020    Type 2 diabetes mellitus, without long-term current use of insulin ([facility] [E11.9] 08/25/2020    Aphasia [R47.01]     CKD (chronic kidney disease) stage 3, GFR 30-59 ml/min ([facility]) [N18.3] 12/20/2018    Morbid obesity ([facility] [E66.01] 12/20/2018    Acute on chronic combined systolic and diastolic CHF, NYHA class 4 ([facility]) [I50.43] 12/17/2018    Chronic combined systolic and diastolic HF (heart failure), NYHA class 2 ([facility] [I50.42] 08/28/2017    Hyperlipidemia [E78.5]     Hypertension [I10]     Idiopathic cardiomyopathy ([facility] [I42.8]        Plan:     1. Uncontrolled diabetes mellitus with the peripheral organ damage with ischemic CVA  2. Ischemic CVA with right hemiparesis and Broca's aphasia  3. Metformin recent A1c is 7.3 reviewed and reconciled  4. Ischemic CVA hyperlipidemia aspirin and Lipitor Plavix 75 all started  5. Losartan 100 mg for hypertension and diabetic renal protection  6. norco for pain  7. q6  Hr prn  8.   9.     Consultations:   IP CONSULT TO INTERNAL MEDICINE  IP CONSULT TO DIETITIAN  IP CONSULT TO SOCIAL WORK  IP CONSULT TO INTERNAL MEDICINE      Gretchen Gonzalez MD  9/10/2020  2:34 PM    Copy sent to Dr. Helga Mathews, APRN - CNP    Please note that this chart was generated using voice recognition Dragon dictation software. Although every effort was made to ensure the accuracy of this automated transcription, some errors in transcription may have occurred.

## 2020-09-10 NOTE — PROGRESS NOTES
Physical Therapy  Facility/Department: [facility] ACUTE REHAB  Daily Treatment Note  NAME: Vivienne Rondon  : 1950  MRN: [id number]    Date of Service: 9/10/2020    Discharge Recommendations:  Patient would benefit from continued therapy after discharge, 24 hour supervision or assist        Assessment      PT Education: Gait Training;General Safety;Precautions;Transfer Training  Patient Education: pt educated on transfers, gait training and stairs, wc mobility  Activity Tolerance  Activity Tolerance: Patient limited by fatigue;Patient limited by endurance     Patient Diagnosis(es): There were no encounter diagnoses. has a past medical history of CHF (congestive heart failure) ([facility] [address] Diabetes mellitus ([facility] [address] H/O cardiac catheterization, H/O echocardiogram, History of echocardiogram, History of echocardiogram, Hyperlipidemia, and Hypertension. has a past surgical history that includes Cholecystectomy and Cardiac catheterization (Left, 2017).     Restrictions  Restrictions/Precautions  Restrictions/Precautions: Fall Risk, Up as Tolerated, General Precautions  Required Braces or Orthoses?: No  Implants present? : (none)  Position Activity Restriction  Other position/activity restrictions: up as tolerated  Subjective             Orientation     Cognition      Objective   Bed mobility  Bridging: Stand by assistance  Rolling to Left: Stand by assistance  Rolling to Right: Stand by assistance  Supine to Sit: Stand by assistance  Sit to Supine: Stand by assistance  Transfers  Sit to Stand: Minimal Assistance;Contact guard assistance(instruction left UE placement , right dependant)  Stand to sit: Minimal Assistance(instruction left UE placement , right dependant )  Bed to Chair: Minimal assistance(RW with right hand )  Stand Pivot Transfers: Minimal Assistance  Squat Pivot Transfers: Minimal Assistance  Comment: simple cueing and visual directional cues; patient at times does not follow given commands and needs redirection to prevent unsafe technique and patient limited by awareness to deficits. Ambulation 1  Surface: level tile  Device: Large Gee Foreign  Assistance: Minimal assistance  Quality of Gait: decreased right UE/LE coordination , weight shift , foot clearance , hip stabilization extensors  Gait Deviations: Slow Jacinda;Decreased step length;Decreased step height  Distance: 15 feet x 4 for mirror feedback training and retraining ed on RLE awareness to placement; tolerates further training with wc follow 50 feet  Comments: tactile cueing vs verbal I/S and therapist guarding patient for fall risk safety with decreased gait belt use secondary to poor incorrect feedback gait belt use provides. Patient in wide arm guard on RUE with noted WB through therapist support hand in hand technique. Patient unable to utilize [place] on walker with  support secondary to increased tone and aproximation of RUE to advance walker effectively. Stairs  # Steps : 5  Stairs Height: 6\"  Rails: Left ascending(RUE support hand in hand with therapist feedback WB NDT principles applied for extension)  Assistance:  Moderate assistance  Comment: instruction, tactile cueing for proper technique and placement of RLE  Propulsion 1  Propulsion: Manual  Level: Level Tile  Method: LUE;LLE;RLE  Level of Assistance: Minimal assistance  Description/ Details: pt instructed in reciprical LE advancement , forward gaze with left <> right tracking, straight path    Distance: 55 feet with BLE and LUE +  30 feet with LUE/LLE        Other exercises  Other exercises 1: nustep L3  10 minutes  Other exercises 2: seated bilateral LE in reciprical pattern right AROM left 2# orange band x 15  Other exercises 3: supine/sidelying 20 reps  Other exercises 4: standing reciprical hip flexion  in//bars   Other exercises 5: neuro re supine/seated reciprocal                        G-Code     OutComes Score                                                     AM-PAC Score             Goals  Short term goals  Time Frame for Short term goals: 10 days   Short term goal 1: Pt to perform bed mobility independently. Short term goal 2: Pt to perform transfers with minAx1. Short term goal 3: Pt to ambulate 150 ft, least restrictive device, minAx1. Short term goal 4: Pt to tolerate 30-60 mins physical therapy to increase endurance and strength. Short term goal 5: Pt to complete 5 steps, with LUE support, minAx1. Long term goals  Time Frame for Long term goals : By LOS. Long term goal 2: Pt to complete transfers with SBA to promote independence. Long term goal 3: Pt to ambulate 200 ft, least restrictive device, SBA. Long term goal 4: Pt to complete 5-12 steps, CGA to promote function. Long term goal 5: Pt to improve PASS score from 15/36 to 22/36. Patient Goals   Patient goals : To return back home. Plan    Plan  Times per week: 1.5 hrs/day 5-7 days/week  Times per day: (1-2 visits daily)  Specific instructions for Next Treatment: progress trasnfer training and ambulation as able, try balance activities. Current Treatment Recommendations: Strengthening, ROM, Balance Training, Functional Mobility Training, Transfer Training, Endurance Training, Gait Training, Stair training, Neuromuscular Re-education, Safety Education & Training, Patient/Caregiver Education & Training, Equipment Evaluation, Education, & procurement  Safety Devices  Type of devices:  All fall risk precautions in place, Gait belt, Chair alarm in place  Restraints  Initially in place: No     Therapy Time     09/10/20 1142 09/10/20 1523   PT Individual Minutes   Time In 0930 1245   Time Out 1018 1320   Minutes 48 35     Rosibel Mancilla, PTA

## 2020-09-10 NOTE — PLAN OF CARE
Problem: Skin Integrity:  Goal: Will show no infection signs and symptoms  Description: Will show no infection signs and symptoms  Outcome: Ongoing     Problem: Falls - Risk of:  Goal: Will remain free from falls  Description: Will remain free from falls  Outcome: Ongoing     Problem: Musculor/Skeletal Functional Status  Goal: Highest potential functional level  Outcome: Ongoing

## 2020-09-10 NOTE — PROGRESS NOTES
Physical Medicine & Rehabilitation  Progress Note      Subjective:      81year-old female with ischemic CVA with R dominant hemiparesis and aphasia. She denies any new issues or events overnight. She is sleeping well. She denies any issues with sleep, appetite, bowel, or bladder. R motor return continues to improve. Improving with sentence responses as well. ROS:  Denies fevers, chills, sweats. No chest pain, palpitations, lightheadedness. Denies coughing, wheezing or shortness of breath. Denies abdominal pain, nausea, diarrhea or constipation. No new areas of joint pain. Denies new areas of numbness or weakness. Denies new anxiety or depression issues. No new skin problems. Rehabilitation:   Progressing in therapies. PT:  Restrictions/Precautions: Fall Risk, Up as Tolerated, General Precautions  Implants present? : (none)  Other position/activity restrictions: up as tolerated   Transfers  Sit to Stand: Minimal Assistance, Contact guard assistance(instruction left UE placement , right dependant)  Stand to sit: Minimal Assistance(instruction left UE placement , right dependant )  Bed to Chair: Minimal assistance(RW with right hand )  Stand Pivot Transfers: Minimal Assistance  Squat Pivot Transfers: Minimal Assistance  Comment: simple cueing and visual directional cues; patient at times does not follow given commands and needs redirection to prevent unsafe technique and patient limited by awareness to deficits.   Ambulation 1  Surface: level tile  Device: Large Base Quad Cane  Other Apparatus: Wheelchair follow  Assistance: Minimal assistance  Quality of Gait: decreased right UE/LE coordination , weight shift , foot clearance , hip stabilization extensors  Gait Deviations: Slow Jacinda, Decreased step length, Decreased step height  Distance: 15 feet x 4 for mirror feedback training and retraining ed on RLE awareness to placement; tolerates further training with wc follow 50 feet  Comments: tactile cueing vs verbal I/S and therapist guarding patient for fall risk safety with decreased gait belt use secondary to poor incorrect feedback gait belt use provides. Patient in wide arm guard on RUE with noted WB through therapist support hand in hand technique. Patient unable to utilize [place] on walker with  support secondary to increased tone and aproximation of RUE to advance walker effectively. Transfers  Sit to Stand: Minimal Assistance, Contact guard assistance(instruction left UE placement , right dependant)  Stand to sit: Minimal Assistance(instruction left UE placement , right dependant )  Bed to Chair: Minimal assistance(RW with right hand )  Stand Pivot Transfers: Minimal Assistance  Squat Pivot Transfers: Minimal Assistance  Comment: simple cueing and visual directional cues; patient at times does not follow given commands and needs redirection to prevent unsafe technique and patient limited by awareness to deficits. Ambulation  Ambulation?: Yes  More Ambulation?: Yes  Ambulation 1  Surface: level tile  Device: Large [employer]  Other Apparatus: Wheelchair follow  Assistance: Minimal assistance  Quality of Gait: decreased right UE/LE coordination , weight shift , foot clearance , hip stabilization extensors  Gait Deviations: Slow Jacinda, Decreased step length, Decreased step height  Distance: 15 feet x 4 for mirror feedback training and retraining ed on RLE awareness to placement; tolerates further training with wc follow 50 feet  Comments: tactile cueing vs verbal I/S and therapist guarding patient for fall risk safety with decreased gait belt use secondary to poor incorrect feedback gait belt use provides. Patient in wide arm guard on RUE with noted WB through therapist support hand in hand technique. Patient unable to utilize [place] on walker with  support secondary to increased tone and aproximation of RUE to advance walker effectively.     Surface: level tile  Ambulation 1  Surface: required per PT if cuing for lower extrimity positioning and physical assist PRN prior to/with transfers. Instrumental ADL's  Instrumental ADLs: Yes     Balance  Sitting Balance: Modified independent (w/c level, R Half lap tray for joint protection)  Standing Balance: Minimal assistance(steadying Assist)   Standing Balance  Time: <10sec x4, 2min  x5  Activity: ADLs/tranfsers/static stands in [facility]  Comment: VC for visual assment of RLE placement c good self correction; attn to RUE placement on stable/dry surface (sink); improved initiation adjusting base of support this date; use of simple squat to assist with donning lower body clothing freddy camara, VC for Upper extremity use to return upright  Functional Mobility  Functional - Mobility Device: Wheelchair  Activity: To/from bathroom  Assist Level: Maximum assistance  Functional Mobility Comments: standing at washer, pt intiated offloading RLE to complete side step and widen her COLT demoing carryover of stability stategies     Bed mobility  Bridging: Contact guard assistance  Rolling to Left: Contact guard assistance  Rolling to Right: Contact guard assistance  Supine to Sit: Contact guard assistance  Sit to Supine: Contact guard assistance  Scooting: Contact guard assistance  Comment: none this date  Transfers  Stand Pivot Transfers: Minimal assistance  Sit to stand: Minimal assistance  Stand to sit: Minimal assistance  Transfer Comments: Writer provided physical assist to position RLE appropriately. Writer proivded verbal cueing for hand placement. Toilet Transfers  Toilet - Technique: Stand pivot  Equipment Used: Grab bars  Toilet Transfer: Minimal assistance  Toilet Transfers Comments: AM: writer provided education on appropriate wheelchair set-up     Shower Transfers  Shower - Transfer From: Wheelchair  Shower - Transfer Type: To and From  Shower - Transfer To:  Transfer tub bench  Shower - Technique: Stand pivot, To right, To left  Shower Transfers: Minimal assistance  Shower Transfers Comments: AM: writer provided wheelchair set-up to reduce fall risk, pt verbalized understanding. Writer proivded 1 verbal cue for appropriate grab bar use, good return. No loss of balance. Minimal assist due to unsteadiness while bearing weight through RLE       SPEECH:  Subjective: [x]? Alert     [x]? Cooperative     []? Confused     []? Agitated      []? Lethargic     Objective/Assessment:  Auditory Comprehension:   Pt. Responding appropriately to simple yes/no questions. Following 1-step commands: 66% accuracy humberto, 100% c cues. Pt. Pointing on communication board to items with prompt \"How would you say, 'I want. .. '\" with 100% accuracy humberto.       Verbal Expression:     Verb Network Strengthening Treatment (VNeST) attempted with Pt. Using verbs eat, wear, and ride. Pt. Able to identify nouns from field of 8 picture cards associated with verb with 100% accuracy humberto. Pt. Able to repeat subject-verb-object sentence with 50% accuracy humberto, producing phonemic paraphasia approximation of target words. MAX cues provided. Pt. Raul Burton from placement cues of initial sound in words. Counting 1-10: 90% accuracy, 100% c min cue. Naming days of the week: 100% accuracy humberto. Motor speech:  n/a     Reading Comprehension:  n/a      Other:  No family present. Objective:  /63   Pulse 65   Temp 98.5 °F (36.9 °C) (Oral)   Resp 16   Ht 5' 4\" (1.626 m)   Wt 197 lb 9.6 oz (89.6 kg) Comment: chair scale  SpO2 95%   BMI 33.92 kg/m²       GEN: Well developed, well nourished, in NAD  HEENT:  NCAT.  PERRL.  EOMI.  Mucous membranes pink and moist.   PULM:  Clear to ausculation. No rales or rhonchi. Respirations WNL and unlabored. CV:  RRR.  No murmurs or gallops. GI:  Abdomen soft. Nontender. Non-distended.  BS + and equal.    NEUROLOGICAL: A&O x3. Sensation intact to light touch. Short sentences improving. MSK:  Functional ROM LUE and LLE.  Impaired ROM RUE

## 2020-09-10 NOTE — PROGRESS NOTES
Speech Language Pathology  Speech Language Pathology  [facility], [place]    Speech Language Treatment Note    Date: 9/10/2020  Patients Name: Gurwinder Weaver  MRN: [id number]  Diagnosis: CVA  Patient Active Problem List   Diagnosis Code    Hypertension I10    Hyperlipidemia E78.5    Acute on chronic systolic CHF (congestive heart failure) ([facility] [address] I50.23    Hypertensive urgency I16.0    Idiopathic cardiomyopathy ([facility] [address] I42.8    Hypertensive emergency I16.1    Chronic combined systolic and diastolic HF (heart failure), NYHA class 2 ([facility]) I50.42    Acute on chronic combined systolic and diastolic CHF, NYHA class 4 ([facility]) I50.43    Hypoxia R09.02    Severe mitral regurgitation by prior echocardiogram I34.0    Morbid obesity ([facility]) E66.01    CKD (chronic kidney disease) stage 3, GFR 30-59 ml/min ([facility]) N18.3    Physical deconditioning R53.81    TIA (transient ischemic attack) G45.9    Old lacunar stroke without late effect Z86.73    Dysarthria R47.1    Stroke determined by clinical assessment ([facility] [facility] I63.9    Aphasia R47.01    Type 2 diabetes mellitus, without long-term current use of insulin ([facility] [address] E11.9    Cerebral infarction ([facility] [address] - left MCA distribution  I63.9    Uncontrolled type 2 diabetes mellitus with hyperglycemia ([facility] [address] E11.65    Elevated blood pressure reading R03.0    Acute ischemic stroke ([facility]) I63.9       Pain: 0/10    Speech and Language Treatment  Treatment time:  3454-7003    Subjective: [x] Alert [x] Cooperative     [] Confused     [] Agitated      [] Lethargic    Objective/Assessment:  Auditory Comprehension:   Pt. Responding appropriately to simple yes/no questions. Following 1-step commands: 66% accuracy humberto, 100% c cues. Pt. Pointing on communication board to items with prompt \"How would you say, 'I want. .. '\" with 100% accuracy humberto. Verbal Expression:     Verb Network Strengthening Treatment (VNeST) attempted with Pt. Using verbs eat, wear, and ride.   Pt. Able to identify nouns from field of 8 picture cards associated with verb with 100% accuracy humberto. Pt. Able to repeat subject-verb-object sentence with 50% accuracy humberto, producing phonemic paraphasia approximation of target words. MAX cues provided. Pt. Maxine Funk from placement cues of initial sound in words. Counting 1-10: 90% accuracy, 100% c min cue. Naming days of the week: 100% accuracy humberto. Motor speech:  n/a     Reading Comprehension:  n/a      Other:  No family present. Plan:  [x] Continue ST services    [] Discharge from ST:      Discharge recommendations: [] Inpatient Rehab   [] [place]   [] Outpatient Therapy  [] Follow up at trauma clinic   [] Other:       Treatment completed by:  Audrey JOHNY-SLP

## 2020-09-11 LAB
GLUCOSE BLD-MCNC: 108 MG/DL (ref 65–105)
GLUCOSE BLD-MCNC: 112 MG/DL (ref 65–105)
GLUCOSE BLD-MCNC: 114 MG/DL (ref 65–105)
GLUCOSE BLD-MCNC: 155 MG/DL (ref 65–105)

## 2020-09-11 PROCEDURE — 92507 TX SP LANG VOICE COMM INDIV: CPT

## 2020-09-11 PROCEDURE — 97530 THERAPEUTIC ACTIVITIES: CPT

## 2020-09-11 PROCEDURE — 99231 SBSQ HOSP IP/OBS SF/LOW 25: CPT | Performed by: PHYSICAL MEDICINE & REHABILITATION

## 2020-09-11 PROCEDURE — 97112 NEUROMUSCULAR REEDUCATION: CPT

## 2020-09-11 PROCEDURE — 6370000000 HC RX 637 (ALT 250 FOR IP): Performed by: PHYSICAL MEDICINE & REHABILITATION

## 2020-09-11 PROCEDURE — 99232 SBSQ HOSP IP/OBS MODERATE 35: CPT | Performed by: INTERNAL MEDICINE

## 2020-09-11 PROCEDURE — 97129 THER IVNTJ 1ST 15 MIN: CPT

## 2020-09-11 PROCEDURE — 6370000000 HC RX 637 (ALT 250 FOR IP): Performed by: INTERNAL MEDICINE

## 2020-09-11 PROCEDURE — 6360000002 HC RX W HCPCS: Performed by: INTERNAL MEDICINE

## 2020-09-11 PROCEDURE — 97116 GAIT TRAINING THERAPY: CPT

## 2020-09-11 PROCEDURE — 82947 ASSAY GLUCOSE BLOOD QUANT: CPT

## 2020-09-11 PROCEDURE — 97110 THERAPEUTIC EXERCISES: CPT

## 2020-09-11 PROCEDURE — 97535 SELF CARE MNGMENT TRAINING: CPT

## 2020-09-11 PROCEDURE — 1180000000 HC REHAB R&B

## 2020-09-11 RX ADMIN — ASPIRIN 81 MG CHEWABLE TABLET 81 MG: 81 TABLET CHEWABLE at 08:49

## 2020-09-11 RX ADMIN — LOSARTAN POTASSIUM 100 MG: 50 TABLET, FILM COATED ORAL at 10:04

## 2020-09-11 RX ADMIN — ANTI-FUNGAL POWDER MICONAZOLE NITRATE TALC FREE: 1.42 POWDER TOPICAL at 20:08

## 2020-09-11 RX ADMIN — HYDROCHLOROTHIAZIDE 25 MG: 25 TABLET ORAL at 08:49

## 2020-09-11 RX ADMIN — ENOXAPARIN SODIUM 40 MG: 40 INJECTION SUBCUTANEOUS at 08:49

## 2020-09-11 RX ADMIN — CLOPIDOGREL BISULFATE 75 MG: 75 TABLET ORAL at 08:49

## 2020-09-11 RX ADMIN — ATORVASTATIN CALCIUM 80 MG: 80 TABLET, FILM COATED ORAL at 20:07

## 2020-09-11 RX ADMIN — METRONIDAZOLE 100 MG: 500 TABLET ORAL at 14:09

## 2020-09-11 RX ADMIN — CARVEDILOL 6.25 MG: 6.25 TABLET, FILM COATED ORAL at 16:33

## 2020-09-11 RX ADMIN — INSULIN LISPRO 1 UNITS: 100 INJECTION, SOLUTION INTRAVENOUS; SUBCUTANEOUS at 20:09

## 2020-09-11 RX ADMIN — HYDROCODONE BITARTRATE AND ACETAMINOPHEN 1 TABLET: 5; 325 TABLET ORAL at 19:21

## 2020-09-11 RX ADMIN — METFORMIN HYDROCHLORIDE 500 MG: 500 TABLET ORAL at 08:49

## 2020-09-11 RX ADMIN — METRONIDAZOLE 100 MG: 500 TABLET ORAL at 08:49

## 2020-09-11 RX ADMIN — CARVEDILOL 6.25 MG: 6.25 TABLET, FILM COATED ORAL at 08:49

## 2020-09-11 RX ADMIN — METRONIDAZOLE 100 MG: 500 TABLET ORAL at 20:07

## 2020-09-11 RX ADMIN — HYDROCODONE BITARTRATE AND ACETAMINOPHEN 1 TABLET: 5; 325 TABLET ORAL at 08:49

## 2020-09-11 RX ADMIN — METFORMIN HYDROCHLORIDE 500 MG: 500 TABLET ORAL at 16:33

## 2020-09-11 RX ADMIN — ANTI-FUNGAL POWDER MICONAZOLE NITRATE TALC FREE: 1.42 POWDER TOPICAL at 08:50

## 2020-09-11 RX ADMIN — AMLODIPINE BESYLATE 10 MG: 10 TABLET ORAL at 16:33

## 2020-09-11 RX ADMIN — POLYETHYLENE GLYCOL 3350 17 G: 17 POWDER, FOR SOLUTION ORAL at 08:49

## 2020-09-11 ASSESSMENT — PAIN DESCRIPTION - ORIENTATION: ORIENTATION: RIGHT

## 2020-09-11 ASSESSMENT — PAIN SCALES - GENERAL
PAINLEVEL_OUTOF10: 4
PAINLEVEL_OUTOF10: 5
PAINLEVEL_OUTOF10: 3
PAINLEVEL_OUTOF10: 6

## 2020-09-11 NOTE — PLAN OF CARE
Problem: Skin Integrity:  Goal: Will show no infection signs and symptoms  Description: Will show no infection signs and symptoms  9/11/2020 1654 by Vineet Burkett RN  Outcome: Ongoing  9/11/2020 0335 by Karli Trammell RN  Outcome: Ongoing     Problem: Skin Integrity:  Goal: Absence of new skin breakdown  Description: Absence of new skin breakdown  9/11/2020 1654 by Vineet Burkett RN  Outcome: Ongoing  9/11/2020 0335 by Karli Trammell RN  Outcome: Ongoing     Problem: Falls - Risk of:  Goal: Will remain free from falls  Description: Will remain free from falls  9/11/2020 1654 by Vineet Burkett RN  Outcome: Ongoing  9/11/2020 0335 by Karli Trammell RN  Outcome: Ongoing     Problem: Falls - Risk of:  Goal: Absence of physical injury  Description: Absence of physical injury  9/11/2020 1654 by Vineet Burkett RN  Outcome: Ongoing  9/11/2020 0335 by Karli Trammell RN  Outcome: Ongoing     Problem: Neurological  Goal: Maximum potential motor/sensory/cognitive function  9/11/2020 1654 by Vineet Burkett RN  Outcome: Ongoing  9/11/2020 0335 by Karli Trammell RN  Outcome: Ongoing

## 2020-09-11 NOTE — PROGRESS NOTES
Speech Language Pathology  Speech Language Pathology  [facility]    Speech Language Treatment Note    Date: 9/11/2020  Patients Name: Yosvany Fuller  MRN: [id number]  Diagnosis: CVA  Patient Active Problem List   Diagnosis Code    Hypertension I10    Hyperlipidemia E78.5    Acute on chronic systolic CHF (congestive heart failure) ([facility] [address] I50.23    Hypertensive urgency I16.0    Idiopathic cardiomyopathy ([facility] [address] I42.8    Hypertensive emergency I16.1    Chronic combined systolic and diastolic HF (heart failure), NYHA class 2 ([facility]) I50.42    Acute on chronic combined systolic and diastolic CHF, NYHA class 4 ([facility]) I50.43    Hypoxia R09.02    Severe mitral regurgitation by prior echocardiogram I34.0    Morbid obesity ([facility]) E66.01    CKD (chronic kidney disease) stage 3, GFR 30-59 ml/min ([facility]) N18.3    Physical deconditioning R53.81    TIA (transient ischemic attack) G45.9    Old lacunar stroke without late effect Z86.73    Dysarthria R47.1    Stroke determined by clinical assessment ([facility] [address] I63.9    Aphasia R47.01    Type 2 diabetes mellitus, without long-term current use of insulin ([facility] [address] E11.9    Cerebral infarction ([facility] [address] - left MCA distribution  I63.9    Uncontrolled type 2 diabetes mellitus with hyperglycemia ([facility] [address] E11.65    Elevated blood pressure reading R03.0    Acute ischemic stroke ([facility]) I63.9       Pain: 0/10    Speech and Language Treatment  Treatment time:  7521-3261    Subjective: [x] Alert [x] Cooperative     [] Confused     [] Agitated      [] Lethargic    Objective/Assessment:  Auditory Comprehension:   Yes/no questions: 80% accuracy independently. Verbal Expression:     Upon arrival, Pt. Able to state that was waiting for RN and needed to use restroom. Pt. Appropriately using call button to notify RN of need to use restroom and when done with restroom independently. Phrase completion: 30% accuracy humberto, 90% accuracy c max cues provided.   Pt. Able to repeat 3 word phrase with 50% accuracy humberto, producing phonemic paraphasia approximation of target words. MAX cues provided. Pt. Fátima Flannery from placement cues of initial sound in words. Counting 1-10: 60% accuracy, 100% c min cue. Naming days of the week: 70% accuracy humberto. Motor speech:  n/a     Reading Comprehension:  n/a      Other:  No family present. Shortened tx time d/t Pt. using restroom during session. Plan:  [x] Continue ST services    [] Discharge from ST:      Discharge recommendations: [] Inpatient Rehab   [] [place]   [] Outpatient Therapy  [] Follow up at trauma clinic   [] Other:       Treatment completed by:  Audrey SPIVEY-SLP

## 2020-09-11 NOTE — PLAN OF CARE
Nutrition Problem #1: Inadequate oral intake  Intervention: Food and/or Nutrient Delivery: Continue Current Diet, Discontinue Oral Nutrition Supplement  Nutritional Goals: PO intake to meet % of estimated nutrition needs

## 2020-09-11 NOTE — PROGRESS NOTES
verbal I/S and therapist guarding patient for fall risk safety with decreased gait belt use secondary to poor incorrect feedback gait belt use provides. Patient in wide arm guard on RUE with noted WB through therapist support hand in hand technique. Patient unable to utilize [place] on walker with  support secondary to increased tone and aproximation of RUE to advance walker effectively. Transfers  Sit to Stand: Minimal Assistance, Contact guard assistance(instruction left UE placement , right dependant)  Stand to sit: Minimal Assistance(instruction left UE placement , right dependant )  Bed to Chair: Minimal assistance(RW with right hand )  Stand Pivot Transfers: Minimal Assistance  Squat Pivot Transfers: Minimal Assistance  Comment: simple cueing and visual directional cues; patient at times does not follow given commands and needs redirection to prevent unsafe technique and patient limited by awareness to deficits. Ambulation  Ambulation?: Yes  More Ambulation?: Yes  Ambulation 1  Surface: level tile  Device: [employer]  Other Apparatus: Wheelchair follow  Assistance: Minimal assistance  Quality of Gait: decreased right UE/LE coordination , weight shift , foot clearance , hip stabilization extensors  Gait Deviations: Slow Jacinda, Decreased step length, Decreased step height  Distance: 15 feet x 4 for mirror feedback training and retraining ed on RLE awareness to placement; tolerates further training with wc follow 50 feet  Comments: tactile cueing vs verbal I/S and therapist guarding patient for fall risk safety with decreased gait belt use secondary to poor incorrect feedback gait belt use provides. Patient in wide arm guard on RUE with noted WB through therapist support hand in hand technique. Patient unable to utilize [place] on walker with  support secondary to increased tone and aproximation of RUE to advance walker effectively.     Surface: level tile  Ambulation 1  Surface: level tile  Device: [employer]  Other Apparatus: Wheelchair follow  Assistance: Minimal assistance  Quality of Gait: decreased right UE/LE coordination , weight shift , foot clearance , hip stabilization extensors  Gait Deviations: Slow Jacinda, Decreased step length, Decreased step height  Distance: 15 feet x 4 for mirror feedback training and retraining ed on RLE awareness to placement; tolerates further training with wc follow 50 feet  Comments: tactile cueing vs verbal I/S and therapist guarding patient for fall risk safety with decreased gait belt use secondary to poor incorrect feedback gait belt use provides. Patient in wide arm guard on RUE with noted WB through therapist support hand in hand technique. Patient unable to utilize [place] on walker with  support secondary to increased tone and aproximation of RUE to advance walker effectively. OT:  ADL  Equipment Provided: Reacher, Sock aid  Feeding: Setup, Increased time to complete(difficulty with bimanual feeding tasks, requires assist for packages and to cut food at this time)  Grooming: Supervision, Verbal cueing, Setup(pt complete oral care seate sink-side. Writer provided education on use of RUE as gross support to hold down tooth brush while applying toothpaste)  UE Bathing: Minimal assistance(assist for LUE, able to complete L axillary region and all other parts without assist. 1 verbal cue in shower to utilize grab bar while reaching across body)  LE Bathing: Minimal assistance(minimal assist to stand and complete perineal hygiene. Pt demo'd appropriate grab bar use)  UE Dressing: Stand by assistance(pt demo'd appropirate sequencing. Pt declined bra this da te)  LE Dressing: Moderate assistance(Moderate assist to stand and pull pants around waist. Pt able to thread pants over BLE with increased time and effort.)  Toileting: None  Additional Comments: ADLs completed in AM session.  Writer provided education on use of sock aid and trialed while - Transfer To: Transfer tub bench  Shower - Technique: Stand pivot, To right, To left  Shower Transfers: Minimal assistance  Shower Transfers Comments: AM: Minimal assist provided to facilitate pivot on LLE to transtiion from wheelchair to tub transfer bench       SPEECH:      Objective:  /63   Pulse 58   Temp 97.7 °F (36.5 °C) (Oral)   Resp 16   Ht 5' 4\" (1.626 m)   Wt 196 lb 6.4 oz (89.1 kg)   SpO2 96%   BMI 33.71 kg/m²       GEN: Well developed, well nourished, in NAD  HEENT:  NCAT.  PERRL.  EOMI.  Mucous membranes pink and moist.   PULM:  Clear to ausculation. No rales or rhonchi. Respirations WNL and unlabored. CV:  Bradycardic rate regular rhythm.  No murmurs or gallops. GI:  Abdomen soft. Nontender. Non-distended.  BS + and equal.    NEUROLOGICAL: A&O x3. Sensation intact to light touch. Short sentences improving. MSK:  Functional ROM LUE and LLE. Impaired ROM RUE and RLE. Motor testing 4+/5 key muscles LUE and LLE. R  3/5, R wrist extension 3/5, R finger extension 3/5, R elbow flexion and extension 3/5 . Able to exhibit finger opposing to R thumb today. SKIN: Warm dry and intact. Good turgor. EXTREMITIES:  No calf tenderness to palpation. No edema BLEs. .    PSYCH: Mood WNL. Appropriately interactive. Affect WNL. Diagnostics:     CBC:   Recent Labs     09/10/20  0645   WBC 5.4   RBC 3.61*   HGB 11.2*   HCT 32.3*   MCV 89.4   RDW 13.5        BMP:   Recent Labs     09/10/20  0645      K 4.4      CO2 24   BUN 36*   CREATININE 0.86   GLUCOSE 117*     BNP: No results for input(s): BNP in the last 72 hours. PT/INR: No results for input(s): PROTIME, INR in the last 72 hours. APTT: No results for input(s): APTT in the last 72 hours. CARDIAC ENZYMES: No results for input(s): CKMB, CKMBINDEX, TROPONINT in the last 72 hours.     Invalid input(s): CKTOTAL;3  FASTING LIPID PANEL:  Lab Results   Component Value Date    CHOL 160 08/24/2020    HDL 44 08/24/2020    TRIG 79 08/24/2020     LIVER PROFILE: No results for input(s): AST, ALT, ALB, BILIDIR, BILITOT, ALKPHOS in the last 72 hours. Current Medications:   Current Facility-Administered Medications: miconazole (MICOTIN) 2 % powder, , Topical, BID  carvedilol (COREG) tablet 6.25 mg, 6.25 mg, Oral, BID WC  HYDROcodone-acetaminophen (NORCO) 5-325 MG per tablet 1 tablet, 1 tablet, Oral, Q4H PRN  gabapentin (NEURONTIN) capsule 100 mg, 100 mg, Oral, TID  ondansetron (ZOFRAN-ODT) disintegrating tablet 4 mg, 4 mg, Oral, Q6H PRN  metFORMIN (GLUCOPHAGE) tablet 500 mg, 500 mg, Oral, BID WC  insulin lispro (HUMALOG) injection vial 0-12 Units, 0-12 Units, Subcutaneous, TID WC  insulin lispro (HUMALOG) injection vial 0-6 Units, 0-6 Units, Subcutaneous, Nightly  clopidogrel (PLAVIX) tablet 75 mg, 75 mg, Oral, Daily  enoxaparin (LOVENOX) injection 40 mg, 40 mg, Subcutaneous, Daily  amLODIPine (NORVASC) tablet 10 mg, 10 mg, Oral, QPM  atorvastatin (LIPITOR) tablet 80 mg, 80 mg, Oral, Nightly  losartan (COZAAR) tablet 100 mg, 100 mg, Oral, Daily **AND** hydroCHLOROthiazide (HYDRODIURIL) tablet 25 mg, 25 mg, Oral, Daily  aspirin chewable tablet 81 mg, 81 mg, Oral, Daily  polyethylene glycol (GLYCOLAX) packet 17 g, 17 g, Oral, Daily  senna (SENOKOT) tablet 17.2 mg, 2 tablet, Oral, Daily PRN  bisacodyl (DULCOLAX) suppository 10 mg, 10 mg, Rectal, Daily PRN      Impression/Plan:   Impaired ADLs, gait, and mobility due to:      1. R hemiparesis secondary to ischemic L MCA CVA:  PT/OT for gait, mobility, strengthening, endurance, ADLs, and self care. On ASA, atorvastatin, plavix. 2. Transcortical motor aphasia: Improving. speech therapy treating. 3. HTN/CHF/non-ischemic cardiomyopathy: on amlodipine, carvedilol, HCTZ, losartan - IM following  4. DM: on insulin sliding scale and Metformin. Diarrhea resolved  5. Pain: Has Norco prn. R elbow pain improved.   6. Bowel Management: Miralax daily, senokot prn, dulcolax prn.  7. DVT Prophylaxis:  low molecular weight heparin, SCD's while in bed and MIRTA's   8. Internal medicine for medical management    Electronically signed by Lisa Cortes MD on 9/11/2020 at 9:45 AM      This note is created with the assistance of a speech recognition program.  While intending to generate a document that actually reflects the content of the visit, the document can still have some errors including those of syntax and sound a like substitutions which may escape proof reading. In such instances, actual meaning can be extrapolated by contextual diversion.

## 2020-09-11 NOTE — PROGRESS NOTES
Comprehensive Nutrition Assessment    Type and Reason for Visit:  Reassess    Nutrition Recommendations/Plan: Continue current diet. Discontinue Ensure High Protein. Nutrition Assessment:  Pt continues to eat well. Would like oral nutrition supplements discontinued. Malnutrition Assessment:  Malnutrition Status:  Insufficient data    Context:  Acute Illness     Findings of the 6 clinical characteristics of malnutrition:  Energy Intake:  Mild decrease in energy intake (Comment)(Previous decrease, has improved)  Weight Loss:  No significant weight loss(8.2% loss in 7 months)     Body Fat Loss:  Unable to assess     Muscle Mass Loss:  Unable to assess    Fluid Accumulation:  No significant fluid accumulation     Strength:  Not Performed    Estimated Daily Nutrient Needs:  Energy (kcal):  1668 kcal absed on [place]- [place] Je[place] and 1.2 factor; Weight Used for Energy Requirements:  Admission     Protein (g):  83-94 gm based on 1.5-1.7 gm/kg of ideal body weight; Weight Used for Protein Requirements:  Ideal          Nutrition Related Findings:  No edema. POC Gluc: 108-142 since 9/10. Wounds:  None       Current Nutrition Therapies:    DIET CARB CONTROL; Anthropometric Measures:  · Height: 5' 4\" (162.6 cm)  · Current Body Weight: 196 lb 6.9 oz (89.1 kg)   · Admission Body Weight: 191 lb 12.8 oz (87 kg)    · Usual Body Weight: 209 lb (94.8 kg)(Per past record)     · Ideal Body Weight: 120 lbs; % Ideal Body Weight 159.8 %   · BMI: 33.7  · BMI Categories: Obese Class 1 (BMI 30.0-34. 9)       Nutrition Diagnosis:   · Inadequate oral intake related to cognitive or neurological impairment as evidenced by weight loss(Previous decreased oral intake)    Nutrition Interventions:   Food and/or Nutrient Delivery:  Continue Current Diet, Discontinue Oral Nutrition Supplement  Nutrition Education/Counseling:  Education not indicated(Pt states she is familiar with diet)   Coordination of Nutrition Care:  Continued Inpatient Monitoring    Goals:  PO intake to meet % of estimated nutrition needs       Nutrition Monitoring and Evaluation:   Food/Nutrient Intake Outcomes:  Food and Nutrient Intake  Physical Signs/Symptoms Outcomes:  Biochemical Data, Weight     Discharge Planning:    Continue current diet     Some areas of assessment may be incomplete due to standard COVID-19 Precautions. Yuliet Swanson R.D., L.D.   Phone: [phone]

## 2020-09-11 NOTE — PROGRESS NOTES
Physical Therapy  Facility/Department: [facility] ACUTE REHAB  Daily Treatment Note  NAME: Fouzia Ho  : 1950  MRN: [id number]    Date of Service: 2020    Discharge Recommendations:  Patient would benefit from continued therapy after discharge, 24 hour supervision or assist        Assessment      PT Education: Gait Training;General Safety;Precautions;Transfer Training     Patient Diagnosis(es): There were no encounter diagnoses. has a past medical history of CHF (congestive heart failure) ([facility] [address] Diabetes mellitus ([facility] [address] H/O cardiac catheterization, H/O echocardiogram, History of echocardiogram, History of echocardiogram, Hyperlipidemia, and Hypertension. has a past surgical history that includes Cholecystectomy and Cardiac catheterization (Left, 2017). Restrictions  Restrictions/Precautions  Restrictions/Precautions: Fall Risk, Up as Tolerated, General Precautions  Required Braces or Orthoses?: No  Implants present? : (none)  Position Activity Restriction  Other position/activity restrictions: up as tolerated  Subjective             Orientation     Cognition      Objective   Bed mobility  Bridging: Stand by assistance  Rolling to Left: Stand by assistance  Rolling to Right: Stand by assistance  Supine to Sit: Stand by assistance  Sit to Supine: Stand by assistance  Transfers  Sit to Stand: Contact guard assistance(instruction left UE placement. hand in hand RUE)  Stand to sit: Minimal Assistance(instruction left UE placement , right dependant )  Bed to Chair: Minimal assistance(RW with right hand )  Stand Pivot Transfers: Minimal Assistance  Squat Pivot Transfers: Minimal Assistance  Comment: simple cueing and visual directional cues; patient at times does not follow given commands and needs redirection to prevent unsafe technique and patient limited by awareness to deficits.   Ambulation  More Ambulation?: Yes  Ambulation 1  Surface: level tile  Device: [address]  Other Apparatus: Wheelchair follow  Assistance: Minimal assistance  Quality of Gait: decreased right UE/LE coordination , weight shift , foot clearance , hip stabilization extensors  Gait Deviations: Slow Jacinda;Decreased step length;Decreased step height  Comments: therapist guarding patient for fall risk safety with decreased gait belt use secondary to poor incorrect feedback gait belt use provides. Patient in wide arm guard on RUE with noted WB through therapist support hand in hand technique. Patient unable to utilize [place] on walker with  support secondary to increased tone and aproximation of RUE to advance walker effectively d/t primative flexor synergy pattern of RUE with hip flexion during swing phase of gait  Stairs  # Steps : 3(tolerates up 2-3 steps prior to sitting rest break.  tolerated repeatively 4 time)  Stairs Height: 6\"(and 4\")  Rails: Left ascending(hand in hand support on RUE with therapist facilitation of tricep extension)  Comment: instruction, tactile cueing for proper technique and placement of RLE; pt able to place foot with extra time and cueing without manual assist.  Wheelchair Activities  Wheelchair Type: Standard  Propulsion 1  Propulsion: Manual  Level: Level Tile  Method: LUE;LLE;RLE  Description/ Details: pt instructed in reciprical LE advancement , forward gaze with left <> right tracking, straight path    Distance: 55 feet with BLE and LUE +  30 feet with LUE/LLE        Other exercises  Other exercises 1: steps leading with RLE x 6 reps LUE support on rail  Other exercises 2: seated bilateral LE in reciprical pattern  Other exercises 3: supine/sidelying 20 reps  Other exercises 4: standing balance/coordination exercises  Other exercises 5: neuro re supine/seated reciprocal                        G-Code     OutComes Score                                                     AM-PAC Score             Goals  Short term goals  Time Frame for Short term goals: 10 days   Short term goal 1: Pt to perform bed mobility independently. Short term goal 2: Pt to perform transfers with minAx1. Short term goal 3: Pt to ambulate 150 ft, least restrictive device, minAx1. Short term goal 4: Pt to tolerate 30-60 mins physical therapy to increase endurance and strength. Short term goal 5: Pt to complete 5 steps, with LUE support, minAx1. Long term goals  Time Frame for Long term goals : By LOS. Long term goal 2: Pt to complete transfers with SBA to promote independence. Long term goal 3: Pt to ambulate 200 ft, least restrictive device, SBA. Long term goal 4: Pt to complete 5-12 steps, CGA to promote function. Long term goal 5: Pt to improve PASS score from 15/36 to 22/36. Patient Goals   Patient goals : To return back home. Plan    Plan  Times per week: 1.5 hrs/day 5-7 days/week  Times per day: (1-2 visits daily)  Specific instructions for Next Treatment: progress trasnfer training and ambulation as able, try balance activities. Current Treatment Recommendations: Strengthening, ROM, Balance Training, Functional Mobility Training, Transfer Training, Endurance Training, Gait Training, Stair training, Neuromuscular Re-education, Safety Education & Training, Patient/Caregiver Education & Training, Equipment Evaluation, Education, & procurement  Safety Devices  Type of devices:  All fall risk precautions in place, Gait belt, Chair alarm in place  Restraints  Initially in place: No     Therapy Time     09/11/20 1004 09/11/20 1240   PT Individual Minutes   Time In 1000 1218   Time Out 1036 1305   Minutes 36 [address] [facility] [place], [facility]

## 2020-09-11 NOTE — PROGRESS NOTES
[facility]    ACUTE REHABILITATION OCCUPATIONAL THERAPY  DAILY NOTE    Date: 20  Patient Name: Yosvany Fuller      Room: 6691/3426-05    MRN: [id number]   : 1950  (81 y.o.)  Gender: female   Referring Practitioner: Dr. John Garcia  Diagnosis: Left ischemic middle cerebral artery stroke       Restrictions  Restrictions/Precautions: Fall Risk, Up as Tolerated, General Precautions  Implants present? : (none)  Other position/activity restrictions: up as tolerated  Required Braces or Orthoses?: No    Subjective  Subjective: Pt appears to frequently mix up letters B and   Comments: Improved alertness and compreensible speech this date; 6 word finding observations noted in AM.  Restrictions/Precautions: Fall Risk;Up as Tolerated;General Precautions  Overall Orientation Status: Within Functional Limits  Orientation Level: Oriented to place;Oriented to time;Oriented to person;Oriented to situation  Patient Observation  Observations: Pt has bruises right UE, dorsal wrist , forearm , and lateral humerus, Dr Lopez Held aware  Pain Assessment  Pain Assessment: 0-10  Garza-Baker Pain Rating: Hurts even more  Pain Type: Acute pain  Pain Location: Leg  Pain Orientation: Right  Clinical Progression: Not changed  Response to Pain Intervention: Patient Satisfied    Objective  Cognition  Overall Cognitive Status: Impaired  Arousal/Alertness: Appropriate responses to stimuli  Following Directions: Follows two step commands  Memory: Unable to assess  Following Commands:  Follows multistep commands with repetition  Safety Judgement: Decreased awareness of need for safety  Awareness of Errors: Assistance required to identify errors made  Insights: Decreased awareness of deficits  Sequencing and Organization: Assistance required to identify errors made;Assistance required to generate solutions;Assistance required to implement solutions  Perception  Overall Perceptual Status: Impaired  Unilateral Attention: Cues to elbow support provided for fluidity with forearm motor planning with f+ imrpovement; Tennis ball grasp and release, VC for overreach/digit positioning, use of compensatory tripod grasp, frequently uses raking approach, graded from large basin to graded slots with fair accurracy and mod difficulty releasing functional grasp     Weight Bearing  Weight Bearing Technique: Yes  Response To Weight Bearing Technique: Fair tolerance to wrist placement, F+to forearm/elbow  Additional Activities: Other     Weight Bearing  Weight Bearing Technique: Yes  Response To Weight Bearing Technique: Fair tolerance to wrist placement, F+to forearm/elbow        Vision  Vision Comment: pt reports wears glasses baseline; they are at her sisters; Ramon Dimes encouraged to have family bring them in  ADL  Equipment Provided: Reacher;Sock aid  Feeding: Setup; Increased time to complete(difficulty with bimanual feeding tasks, requires assist for packages and to cut food at this time)  Grooming: Supervision;Verbal cueing;Setup(pt complete oral care seate sink-side. Writer provided education on use of RUE as gross support to hold down tooth brush while applying toothpaste)  UE Bathing: Minimal assistance(assist for LUE, able to complete L axillary region and all other parts without assist. 1 verbal cue in shower to utilize grab bar while reaching across body)  LE Bathing: Minimal assistance(minimal assist to stand and complete perineal hygiene. Pt demo'd appropriate grab bar use)  UE Dressing: Stand by assistance(pt demo'd appropirate sequencing. Pt declined bra this da te)  LE Dressing: Moderate assistance(Moderate assist to stand and pull pants around waist. Pt able to thread pants over BLE with increased time and effort.)  Toileting: None  Additional Comments: ADL completed sinkside per pt demoing imrpoved intiation and task tolerance. Pt required VC for compensatory use of toothpaste setup in mouth, pt squeezing onto RUE.  G used of weight shifting safety. Long term goal 4: Pt will stand for 8+ minutes with 1-2 UE support, stand by assist, and no loss of balance while engaging in functional activity of choice.   Long term goal 5: Patient will increase strength/coordination of RUE as evident by an increase in  strength R hand by 5-7#, 5-7 block increase in box and blocks test, and will attempt 9 Hole Peg test as appropriate        09/10/20 1532 09/11/20 1615   OT Individual Minutes   Time In 1333 0729   Time Out 1405 0835   Minutes 32 66     Electronically signed by HERBERT Sheldon on 9/11/20 at 4:31 PM EDT

## 2020-09-11 NOTE — PLAN OF CARE
Problem: Skin Integrity:  Goal: Will show no infection signs and symptoms  Description: Will show no infection signs and symptoms  9/11/2020 0335 by Arturo Perla RN  Outcome: Ongoing     Problem: Skin Integrity:  Goal: Absence of new skin breakdown  Description: Absence of new skin breakdown  9/11/2020 0335 by Arturo Perla RN  Outcome: Ongoing     Problem: Falls - Risk of:  Goal: Will remain free from falls  Description: Will remain free from falls  9/11/2020 0335 by Arturo Perla RN  Outcome: Ongoing     Problem: Falls - Risk of:  Goal: Absence of physical injury  Description: Absence of physical injury  9/11/2020 0335 by Arturo Perla RN  Outcome: Ongoing     Problem: Neurological  Goal: Maximum potential motor/sensory/cognitive function  9/11/2020 0335 by Arturo Perla RN  Outcome: Ongoing     Problem: Neurological  Goal: Maximum potential motor/sensory/cognitive function  9/11/2020 0335 by Arturo Perla RN  Outcome: Ongoing

## 2020-09-11 NOTE — CARE COORDINATION
ONGOING DISCHARGE PLAN:    Spoke with patient regarding discharge plan and patient confirms that plan is still to go home with vns    Will continue to follow for additional discharge needs.     Electronically signed by MOMO Felix, PAWAN on 9/11/2020 at 3:14 PM

## 2020-09-11 NOTE — CONSULTS
[facility] Internal Medicine    CONSULTATION / HISTORY AND PHYSICAL EXAMINATION            Date:   9/11/2020  Patient name:  Leobardo Talbot  Date of admission:  8/26/2020  6:56 PM  MRN:   [id number]  Account:  [de-identified]  YOB: 1950  PCP:    SUE Khan CNP  Room:   6739/3582-77  Code Status:    Full Code    Physician Requesting Consult: Dennis Velazquez MD    Reason for Consult: Medical management    Chief Complaint:     No chief complaint on file. Stroke    History Obtained From:     Patient medical record nursing staff    History of Present Illness:     73-year-old pleasant  lady was admitted to [facility] on August 23 with a aphasia diagnosed with the left middle cerebral artery ischemic infarct  Known history of systolic congestive heart failure ejection fraction 35 to 40% secondary to nonischemic cardiomyopathy  Diabetes   Duration more than 7 years  Modifying factors on Glucophage and other med  Severity uncontrolled sever  Associated signs and symtoms neuropathy/ckd/ CAD. aggravated with sugar diet and better with low sugar diet             Past Medical History:     Past Medical History:   Diagnosis Date    CHF (congestive heart failure) ([address]     [facility] mellitus [facility] [address]     H/O cardiac catheterization 08/04/2017    LMCA: Mild irregularites 10-20%. LAD: Mild irregularites 10-20%. LCx: Mild irregularites 10-20%. RCA: Mild irregularites 10-20%. LV function assessed as : Abnormal. EF of 40%.  H/O echocardiogram 12/18/2018    EF 55%. Mildly increased LV wall thickness. The left atrium appears moderately to severely dilated. Moderate to severe mitral regurg. Moderate pulmonary HTN with an estimated RV systolic pressure of 15EGPV. Moderate tricuspid regurg. Evidnece fo moderate diastolic dysfunction is seen.  History of echocardiogram 01/17/2019    EF 55%. LV wall thickness moderately increased.  LA is mildly dilated w/ LA volume index of 30. trivial mitral regurg. Evidence of moderate (grade II) diastolic dysfunction seen.  History of echocardiogram 2019    EF of 50-55%. LV wall thickness is mildly increased. LA is mildly dilated (29-33) with a left atrial volume index of 31 m1/m2. mild diastolic dysfunction.  Hyperlipidemia     Hypertension         Past Surgical History:     Past Surgical History:   Procedure Laterality Date    CARDIAC CATHETERIZATION Left 2017    right radial, T Dr. Paula Kaiser          Medications Prior to Admission:     Prior to Admission medications    Medication Sig Start Date End Date Taking? Authorizing Provider   clopidogrel (PLAVIX) 75 MG tablet Take 1 tablet by mouth daily 20   Linnette Schroeder MD   carvedilol (COREG) 12.5 MG tablet Take 1 tablet by mouth 2 times daily (with meals) 20   Jorge Sandoval MD   losartan-hydrochlorothiazide [facility]) 100-25 MG per tablet Take 1 tablet by mouth daily 20   Jorge Sandoval MD   amLODIPine (NORVASC) 10 MG tablet Take 1 tablet by mouth daily 20   Jorge Sandoval MD   atorvastatin (LIPITOR) 80 MG tablet Take 1 tablet by mouth nightly 20   Jorge Sandoval MD   aspirin 81 MG EC tablet Take 1 tablet by mouth daily 20   Jorge Sandoval MD   metFORMIN (GLUCOPHAGE) 500 MG tablet Take 500 mg by mouth 2 times daily (with meals)    Historical Provider, MD        Allergies:     Patient has no known allergies. Social History:     Tobacco:    reports that she has never smoked. She has never used smokeless tobacco.  Alcohol:      reports no history of alcohol use. Drug Use:  reports no history of drug use. Family History:     Family History   Problem Relation Age of Onset    Coronary Art Dis Father          from [place]    COPD Sister         O2 dep    Coronary Art Dis Brother         2 brothers  from [place]       Review of Systems:     Positive and Negative as described in HPI.     CONSTITUTIONAL:  negative for fevers, chills, sweats, fatigue, weight loss  HEENT:  negative for vision, hearing changes, runny nose, throat pain  RESPIRATORY:  negative for shortness of breath, cough, congestion, wheezing. CARDIOVASCULAR:  negative for chest pain, palpitations. GASTROINTESTINAL:  negative for nausea, vomiting, diarrhea, constipation, change in bowel habits, abdominal pain   GENITOURINARY:  negative for difficulty of urination, burning with urination, frequency   INTEGUMENT:  negative for rash, skin lesions, easy bruising   HEMATOLOGIC/LYMPHATIC:  negative for swelling/edema   ALLERGIC/IMMUNOLOGIC:  negative for urticaria , itching  ENDOCRINE:  negative increase in drinking, increase in urination, hot or cold intolerance  MUSCULOSKELETAL:  negative joint pains, muscle aches, swelling of joints  NEUROLOGICAL: Right-sided weakness and speech deficit  BEHAVIOR/PSYCH:  negative for depression, anxiety    Physical Exam:     BP (!) 118/56   Pulse 58   Temp 97.9 °F (36.6 °C) (Oral)   Resp 14   Ht 5' 4\" (1.626 m)   Wt 196 lb 6.4 oz (89.1 kg)   SpO2 99%   BMI 33.71 kg/m²   Temp (24hrs), Av.7 °F (36.5 °C), Min:97.4 °F (36.3 °C), Max:97.9 °F (36.6 °C)    Recent Labs     09/10/20  2020 20  0619 20  1047 20  1603   POCGLU 138* 112* 108* 114*     No intake or output data in the 24 hours ending 20 1733  Speech deficit  General Appearance:  alert, well appearing, and in no acute distress  Mental status: oriented to person, place, and time with normal affect  Head:  normocephalic, atraumatic.   Eye: no icterus, redness, pupils equal and reactive, extraocular eye movements intact, conjunctiva clear  Ear: normal external ear, no discharge, hearing intact  Nose:  no drainage noted  Mouth: mucous membranes moist  Neck: supple, no carotid bruits, thyroid not palpable  Lungs: Bilateral equal air entry, clear to ausculation, no wheezing, rales or rhonchi, normal effort  Cardiovascular: normal rate, regular rhythm, no murmur, gallop, rub. Abdomen: Soft, nontender, nondistended, normal bowel sounds, no hepatomegaly or splenomegaly  Neurologic: Right hemiparesis with Broca's aphasia skin: No gross lesions, rashes, bruising or bleeding on exposed skin area  Extremities:  peripheral pulses palpable, no pedal edema or calf pain with palpation  P    Investigations:      Laboratory Testing:  Recent Results (from the past 24 hour(s))   POC Glucose Fingerstick    Collection Time: 09/10/20  8:20 PM   Result Value Ref Range    POC Glucose 138 (H) 65 - 105 mg/dL   POC Glucose Fingerstick    Collection Time: 09/11/20  6:19 AM   Result Value Ref Range    POC Glucose 112 (H) 65 - 105 mg/dL   POC Glucose Fingerstick    Collection Time: 09/11/20 10:47 AM   Result Value Ref Range    POC Glucose 108 (H) 65 - 105 mg/dL   POC Glucose Fingerstick    Collection Time: 09/11/20  4:03 PM   Result Value Ref Range    POC Glucose 114 (H) 65 - 105 mg/dL       Imaging/Diagonstics:      Assessment :      Primary Problem  <principal problem not specified>    Active Hospital Problems    Diagnosis Date Noted    Acute ischemic stroke ([facility] [address] [I63.9] 08/26/2020    Cerebral infarction ([facility] [address] - left MCA distribution  [I63.9] 08/26/2020    Type 2 diabetes mellitus, without long-term current use of insulin ([address] [E11.9] 08/25/2020    Aphasia [R47.01]     CKD (chronic kidney disease) stage 3, GFR 30-59 ml/min ([address] [N18.3] 12/20/2018    Morbid obesity ([address] [E66.01] 12/20/2018    Acute on chronic combined systolic and diastolic CHF, NYHA class 4 (HCC) [I50.43] 12/17/2018    Chronic combined systolic and diastolic HF (heart failure), NYHA class 2 ([address] [I50.42] 08/28/2017    Hyperlipidemia [E78.5]     Hypertension [I10]     Idiopathic cardiomyopathy ([address] [I42.8]        Plan:     1. Uncontrolled diabetes mellitus with the peripheral organ damage with ischemic CVA  2. Ischemic CVA with right hemiparesis and Broca's aphasia  3.  Metformin recent A1c is 7.3 reviewed and reconciled  4. Ischemic CVA hyperlipidemia aspirin and Lipitor Plavix 75 all started  5. Losartan 100 mg for hypertension and diabetic renal protection  6. norco for pain  7. q6  Hr prn    Consultations:   IP CONSULT TO INTERNAL MEDICINE  IP CONSULT TO DIETITIAN  IP CONSULT TO SOCIAL WORK  IP CONSULT TO INTERNAL MEDICINE      Paz Feng MD  9/11/2020  5:33 PM    Copy sent to Dr. Steven Severino, APRN - CNP    Please note that this chart was generated using voice recognition Dragon dictation software. Although every effort was made to ensure the accuracy of this automated transcription, some errors in transcription may have occurred.

## 2020-09-12 LAB
GLUCOSE BLD-MCNC: 100 MG/DL (ref 65–105)
GLUCOSE BLD-MCNC: 124 MG/DL (ref 65–105)
GLUCOSE BLD-MCNC: 139 MG/DL (ref 65–105)
GLUCOSE BLD-MCNC: 152 MG/DL (ref 65–105)

## 2020-09-12 PROCEDURE — 92507 TX SP LANG VOICE COMM INDIV: CPT

## 2020-09-12 PROCEDURE — 6370000000 HC RX 637 (ALT 250 FOR IP): Performed by: INTERNAL MEDICINE

## 2020-09-12 PROCEDURE — 97535 SELF CARE MNGMENT TRAINING: CPT

## 2020-09-12 PROCEDURE — 6370000000 HC RX 637 (ALT 250 FOR IP): Performed by: PHYSICAL MEDICINE & REHABILITATION

## 2020-09-12 PROCEDURE — 97542 WHEELCHAIR MNGMENT TRAINING: CPT

## 2020-09-12 PROCEDURE — 97110 THERAPEUTIC EXERCISES: CPT

## 2020-09-12 PROCEDURE — 6360000002 HC RX W HCPCS: Performed by: INTERNAL MEDICINE

## 2020-09-12 PROCEDURE — 1180000000 HC REHAB R&B

## 2020-09-12 PROCEDURE — 99232 SBSQ HOSP IP/OBS MODERATE 35: CPT | Performed by: INTERNAL MEDICINE

## 2020-09-12 PROCEDURE — 82947 ASSAY GLUCOSE BLOOD QUANT: CPT

## 2020-09-12 PROCEDURE — 97116 GAIT TRAINING THERAPY: CPT

## 2020-09-12 PROCEDURE — 97530 THERAPEUTIC ACTIVITIES: CPT

## 2020-09-12 PROCEDURE — 97129 THER IVNTJ 1ST 15 MIN: CPT

## 2020-09-12 RX ADMIN — CARVEDILOL 6.25 MG: 6.25 TABLET, FILM COATED ORAL at 08:41

## 2020-09-12 RX ADMIN — HYDROCHLOROTHIAZIDE 25 MG: 25 TABLET ORAL at 08:41

## 2020-09-12 RX ADMIN — METFORMIN HYDROCHLORIDE 500 MG: 500 TABLET ORAL at 17:50

## 2020-09-12 RX ADMIN — CARVEDILOL 6.25 MG: 6.25 TABLET, FILM COATED ORAL at 17:50

## 2020-09-12 RX ADMIN — METRONIDAZOLE 100 MG: 500 TABLET ORAL at 08:41

## 2020-09-12 RX ADMIN — METFORMIN HYDROCHLORIDE 500 MG: 500 TABLET ORAL at 08:41

## 2020-09-12 RX ADMIN — AMLODIPINE BESYLATE 10 MG: 10 TABLET ORAL at 17:50

## 2020-09-12 RX ADMIN — ANTI-FUNGAL POWDER MICONAZOLE NITRATE TALC FREE: 1.42 POWDER TOPICAL at 08:41

## 2020-09-12 RX ADMIN — LOSARTAN POTASSIUM 100 MG: 50 TABLET, FILM COATED ORAL at 08:41

## 2020-09-12 RX ADMIN — ENOXAPARIN SODIUM 40 MG: 40 INJECTION SUBCUTANEOUS at 08:42

## 2020-09-12 RX ADMIN — INSULIN LISPRO 1 UNITS: 100 INJECTION, SOLUTION INTRAVENOUS; SUBCUTANEOUS at 20:57

## 2020-09-12 RX ADMIN — POLYETHYLENE GLYCOL 3350 17 G: 17 POWDER, FOR SOLUTION ORAL at 08:41

## 2020-09-12 RX ADMIN — ATORVASTATIN CALCIUM 80 MG: 80 TABLET, FILM COATED ORAL at 20:57

## 2020-09-12 RX ADMIN — CLOPIDOGREL BISULFATE 75 MG: 75 TABLET ORAL at 08:41

## 2020-09-12 RX ADMIN — METRONIDAZOLE 100 MG: 500 TABLET ORAL at 14:01

## 2020-09-12 RX ADMIN — ANTI-FUNGAL POWDER MICONAZOLE NITRATE TALC FREE: 1.42 POWDER TOPICAL at 20:57

## 2020-09-12 RX ADMIN — METRONIDAZOLE 100 MG: 500 TABLET ORAL at 20:57

## 2020-09-12 RX ADMIN — ASPIRIN 81 MG CHEWABLE TABLET 81 MG: 81 TABLET CHEWABLE at 08:41

## 2020-09-12 ASSESSMENT — PAIN SCALES - WONG BAKER: WONGBAKER_NUMERICALRESPONSE: 0

## 2020-09-12 ASSESSMENT — PAIN DESCRIPTION - ONSET: ONSET: ON-GOING

## 2020-09-12 ASSESSMENT — PAIN DESCRIPTION - DESCRIPTORS: DESCRIPTORS: ACHING;DISCOMFORT

## 2020-09-12 ASSESSMENT — PAIN SCALES - GENERAL
PAINLEVEL_OUTOF10: 0
PAINLEVEL_OUTOF10: 7
PAINLEVEL_OUTOF10: 0

## 2020-09-12 ASSESSMENT — PAIN DESCRIPTION - FREQUENCY: FREQUENCY: INTERMITTENT

## 2020-09-12 ASSESSMENT — PAIN DESCRIPTION - LOCATION: LOCATION: LEG

## 2020-09-12 ASSESSMENT — PAIN DESCRIPTION - PAIN TYPE: TYPE: ACUTE PAIN

## 2020-09-12 ASSESSMENT — PAIN DESCRIPTION - ORIENTATION: ORIENTATION: LEFT

## 2020-09-12 NOTE — PROGRESS NOTES
accuracy humberto, increasing to 100% c max cues provided. Pt. able to repeat subject-verb-object sentence with 60% accuracy humberto, producing phonemic paraphasia approximation of target words. Pt. [place] from placement cues of initial sound in words. Counting 1-10: 90% accuracy, 100% c min cue. Naming days of the week: 85% accuracy humberto. Neologisms, phonemic paraphasias frequently present with spontaneous speech and during pt. Attempts at naming objects. Motor speech:  n/a     Reading Comprehension:   Pt. Able to read words/sentences from worksheet c 40% accuracy humberto. Pt. Able to respond to yes/no questions about sentences read with writer with 70% accuracy humberto. Other:  No family present. Plan:  [x] Continue ST services    [] Discharge from ST:      Discharge recommendations: [] Inpatient Rehab   [] [place]   [] Outpatient Therapy  [] Follow up at trauma clinic   [] Other:       Treatment completed by:  Audrey JOHNY-SLP

## 2020-09-12 NOTE — PROGRESS NOTES
Physical Therapy  Facility/Department: [facility]  Daily Treatment Note  NAME: Andrea Burton  : 1950  MRN: [id number]    Date of Service: 2020    Discharge Recommendations:  Patient would benefit from continued therapy after discharge, 24 hour supervision or assist   PT Equipment Recommendations  Equipment Needed: (TBD)  Other: to be determined at pt progreses    Assessment   Treatment Diagnosis: Impaired function, weakness 2\" L MCA CVA. History: PT with hx of HTN, DM, CHF, CVAs, pt with recent L MCA CVA. REQUIRES PT FOLLOW UP: Yes  Activity Tolerance  Activity Tolerance: Patient limited by fatigue;Patient limited by endurance     Patient Diagnosis(es): There were no encounter diagnoses. has a past medical history of CHF (congestive heart failure) ([facility] [address] Diabetes mellitus ([facility] [address] H/O cardiac catheterization, H/O echocardiogram, History of echocardiogram, History of echocardiogram, Hyperlipidemia, and Hypertension. has a past surgical history that includes Cholecystectomy and Cardiac catheterization (Left, 2017). Restrictions  Restrictions/Precautions  Restrictions/Precautions: Fall Risk, Up as Tolerated, General Precautions  Required Braces or Orthoses?: No  Implants present? : (none)  Position Activity Restriction  Other position/activity restrictions: up as tolerated  Subjective   General  Chart Reviewed: Yes  Additional Pertinent Hx: Pt has a history of HTN, HLD, DM, CHF. Pt recently evaluated at [facility] for  new onset of aphasia. Pt was transferred to [facility] on 20 after diagnostic studies confirmed new L hemisphere CVA. Pt with known history of previous CVAs. MRI confirmed L MCA infarct on 2020. Pt admitted to [address] [place] [address] 20. Response To Previous Treatment: Patient with no complaints from previous session. Family / Caregiver Present: No  Referring Practitioner: Dr. Magdalena Regalado: Pt seated in [facility] upon arrival, agreeable to tx. General Comment  Comments: Patient is aphasic  Pain Screening  Patient Currently in Pain: Denies  Vital Signs  Patient Currently in Pain: Denies  Patient Observation  Observations: Pt has bruises right UE, dorsal wrist , forearm , and lateral humerus, Dr Ameya Mae aware  Pre Treatment Pain Screening  Intervention List: Patient able to continue with treatment    Orientation  Orientation  Overall Orientation Status: Impaired  Orientation Level: Unable to assess(pt is aphasic unable to assess at this time)  Objective   Transfers  Sit to Stand: Contact guard assistance(hand in hand RUE)  Stand to sit: Contact guard assistance  Comment: cuing provided for safe hand placement with fair carryover, Pt completes transfers with [facility]  Ambulation  Ambulation?: Yes  More Ambulation?: Yes  Ambulation 1  Surface: level tile  Device: Small Gee [place] (L)  Other Apparatus: Wheelchair follow  Assistance: Minimal assistance  Quality of Gait: decreased right UE/LE coordination , weight shift , foot clearance , hip stabilization extensors  Gait Deviations: Slow Jacinda;Decreased step length;Decreased step height  Distance: 30ft x2 , small distance in gym  Comments: Seated rest break required between  distances, Pt expressing Increased pain/discomfort with ambulation In R ankle. Verbal cuing provided for increased step length/height on R LE, forward gaze. Focus on proper footing to avoid R ankle supination. R UE hand in hand with R wrist in extension to decrease flexion synergy when progressing R LE. Stairs/Curb  Stairs?: No  Propulsion 1  Propulsion: Manual  Level: Level Tile  Method: LUE;LLE;RLE  Level of Assistance: Minimal assistance  Description/ Details: straight path, Pt requires cuing for R LE advancement and reciprocal pattern.  Pt lacking awareness of R UE positioning, cuing provided for repositioning throughout propulsion  Distance: 124ft with breaks to reposition R UE PRN     Balance  Posture: Fair  Sitting - Static: Good;-  Sitting - Dynamic: Good;-  Standing - Static: Fair  Standing - Dynamic: Poor;+  Comments: standing balance with RW, and with no device, therapist assist needed to block RLE. Other exercises  Other exercises?: Yes  Other exercises 1: Seated ex's x15: L #2, orange Theraband   Other exercises 2: Standing in //bars alternating B LE ex's   Other exercises 3: Sit<>stands in //bars focusing on control and safe hand placement x10          Comment: Rest breaks PRN    Goals  Short term goals  Time Frame for Short term goals: 10 days   Short term goal 1: Pt to perform bed mobility independently. Short term goal 2: Pt to perform transfers with minAx1. Short term goal 3: Pt to ambulate 150 ft, least restrictive device, minAx1. Short term goal 4: Pt to tolerate 30-60 mins physical therapy to increase endurance and strength. Short term goal 5: Pt to complete 5 steps, with LUE support, minAx1. Long term goals  Time Frame for Long term goals : By LOS. Long term goal 2: Pt to complete transfers with SBA to promote independence. Long term goal 3: Pt to ambulate 200 ft, least restrictive device, SBA. Long term goal 4: Pt to complete 5-12 steps, CGA to promote function. Long term goal 5: Pt to improve PASS score from 15/36 to 22/36. Patient Goals   Patient goals : To return back home. Plan    Plan  Times per week: 1.5 hrs/day 5-7 days/week  Safety Devices  Type of devices:  All fall risk precautions in place, Gait belt, Chair alarm in place  Restraints  Initially in place: No     Therapy Time         09/12/20 1544 09/12/20 1545   PT Individual Minutes   Time In 0845 1215   Time Out 0945 1245   Minutes 60 30   Total Minutes: [address] [place] [address]

## 2020-09-12 NOTE — PROGRESS NOTES
placement cues of initial sound in words. Counting 1-10: 90% accuracy, 100% c min cue. Naming days of the week: 100% accuracy humberto. Neologisms, phonemic paraphasias frequently present with spontaneous speech and during pt. Attempts at naming objects. Motor speech:  n/a     Reading Comprehension:   Pt. Stated enjoys reading, however, has trouble doing so. Pt. Unable to read words from book present in room. Required max cues. ST to continue to address. Other:  No family present. Plan:  [x] Continue ST services    [] Discharge from ST:      Discharge recommendations: [] Inpatient Rehab   [] [place]   [] Outpatient Therapy  [] Follow up at trauma clinic   [] Other:       Treatment completed by:  Audrey SPIVEY-SLP

## 2020-09-12 NOTE — PROGRESS NOTES
[facility]: [facility]   ACUTE REHABILITATION OCCUPATIONAL THERAPY  DAILY NOTE    Date: 20  Patient Name: Jose Manuel Watts      Room: 2457/1839-44    MRN: [id number]   : 1950  (80 y.o.)  Gender: female   Referring Practitioner: Dr. Alex Linares  Diagnosis: Left ischemic middle cerebral artery stroke      Assessment  Performance deficits / Impairments: Decreased functional mobility ; Decreased ADL status; Decreased ROM; Decreased strength;Decreased safe awareness;Decreased cognition;Decreased endurance;Decreased sensation;Decreased balance;Decreased high-level IADLs;Decreased fine motor control;Decreased coordination;Decreased posture  Assessment: Pt progressing well towards OT goals. Pt continues to be limited by aphasia, decreased RUE function/FM skills, and decreased standing balance. Prognosis: Good  Discharge Recommendations: 24 hour supervision or assist;Patient would benefit from continued therapy after discharge  Activity Tolerance: Patient Tolerated treatment well;Patient limited by fatigue  Safety Devices in place: Yes  Type of devices: All fall risk precautions in place;Call light within reach;Gait belt;Patient at risk for falls;Nurse notified; Left in chair  Restraints  Initially in place: No  Equipment Recommendations  Equipment Needed: Yes(continue to assess pending progress )         Restrictions  Restrictions/Precautions: Fall Risk, Up as Tolerated, General Precautions  Implants present? : (none)  Other position/activity restrictions: up as tolerated  Required Braces or Orthoses?: No    Subjective  Patient Currently in Pain: Denies(AM/PM)  Pain Level: 0  Restrictions/Precautions: Fall Risk;Up as Tolerated;General Precautions  Overall Orientation Status: Within Functional Limits     Patient Observation  Observations: Pt with bruises covering MD SURENDRA aware per RN report      Pain Assessment  Pain Assessment: 0-10  Pain Level: 0  Garza-Baker Pain Rating: No hurt  Pain Type: Acute pain  Pain Location: Generalized  Pain Orientation: Left  Pain Descriptors: Aching, Dull  Pain Frequency: Continuous  Pain Onset: On-going  Clinical Progression: Gradually improving  Functional Pain Assessment: Prevents or interferes some active activities and ADLs  Response to Pain Intervention: Patient Satisfied(repositioned/rest, increased activity )    Objective     Balance  Sitting Balance: Modified independent   Standing Balance: Minimal assistance(contact gaurd assist static, minimal assist dynamic )     Bed mobility  Supine to Sit: Supervision  Scooting: Supervision  Comment: Pt demo 'd improved ability to transition from supine to sit this date. No verbal cueing provided for technique or safety     Transfers  Stand Pivot Transfers: Minimal assistance  Sit to stand: Minimal assistance  Stand to sit: Minimal assistance  Transfer Comments: AM: Verbal cueing provided for appropriate hand/foot placement. Writer provided support on RUE to keep in place while completing transfers      Functional Mobility  Functional - Mobility Device: Wheelchair  Activity: To/from bathroom  Assist Level: Maximum assistance  Toilet Transfers  Toilet - Technique: Stand pivot  Equipment Used: Grab bars  Toilet Transfer: Contact guard assistance  Toilet Transfers Comments: AM: writer provided education on appropriate wheelchair set-up, pt verbalized understanding. Pt provided minimal verbal cueing for appropriate sequencing with use of grab bars, good return no loss of b balance      Type of ROM/Therapeutic Exercise  Comment: AM/PM: Pt educated on RUE strength/coordination home exercise program to facilitate increased utilization during ADLs and ADL transfers. Writer provided visual demonstration and verbal cueing as needed to complete   Exercises  Shoulder Flexion: broom stick, 3 sets x 15 reps. writer provided cueing to maintain same height for R/L UE. Elbow Flexion: self-aarom completed with broom stick.  Writer provided stabilizing assist Comments: ADLs completed sink-side in morning session this date. Writer provided education on use of sock aid. Writer threaded sock over sock-aid for pt while pt focused on placing sock aid in appropriate position and pulling over feet. Patient Education:  Patient Goals   Patient goals : To return home with sister Anita Rowan  Method: demonstration and explanation       Outcome: needs reinforcement        Plan  Plan  Times per week: 5-7x/week, 1.5 hours/day  Times per day: Twice a day  Current Treatment Recommendations: Self-Care / ADL, Home Management Training, Cognitive/Perceptual Training, Strengthening, ROM, Balance Training, Functional Mobility Training, Endurance Training, Neuromuscular Re-education, Cognitive Reorientation, Pain Management, Safety Education & Training, Patient/Caregiver Education & Training, Equipment Evaluation, Education, & procurement, Positioning  Patient Goals   Patient goals : To return home with   Short term goals  Time Frame for Short term goals: 10 days  Short term goal 1: Pt will perform grooming with set-up and use of assistive equipment/modified techniques PRN. Short term goal 2: Pt will perform upper body bathing/dressing with standby assist and use of assistive equipment/modified techniques PRN. Short term goal 3: Pt will perform toileting tasks and lower body bathing/dressing with minimal assist and use of assitive equipment/durable medical equipment/modified techniques PRN. Short term goal 4: Pt will perform functional mobility and transfers during self-care with Minimal assist, least restrictive mobility device, and Fair safety. Short term goal 5: Pt will stand for 4+ minutes with 1-2 UE support, contact guard assist, and no loss of balance while engaging in functional activity of choice.   Short term goal 6: Pt will actively participate in 30+ minutes of therapeutic exercise/functional activities to promote increased independence with self-care and

## 2020-09-12 NOTE — PROGRESS NOTES
Physical Medicine & Rehabilitation  Progress Note      Subjective:      81year-old female with ischemic CVA with R dominant hemiparesis and aphasia. She continues to improve in motor recovery and speech with therapies. She denies pain today, seen during er ST cession     ROS:  Denies fevers, chills, sweats. No chest pain, palpitations, lightheadedness. Denies coughing, wheezing or shortness of breath. Denies abdominal pain, nausea, diarrhea or constipation. No new areas of joint pain. Denies new areas of numbness or weakness. Denies new anxiety or depression issues. No new skin problems. Rehabilitation:   Progressing in therapies. PT:     Bed mobility  Bridging: Stand by assistance  Rolling to Left: Stand by assistance  Rolling to Right: Stand by assistance  Supine to Sit: Stand by assistance  Sit to Supine: Stand by assistance  Transfers  Sit to Stand: Contact guard assistance(instruction left UE placement. hand in hand RUE)  Stand to sit: Minimal Assistance(instruction left UE placement , right dependant )  Bed to Chair: Minimal assistance(RW with right hand )  Stand Pivot Transfers: Minimal Assistance  Squat Pivot Transfers: Minimal Assistance  Comment: simple cueing and visual directional cues; patient at times does not follow given commands and needs redirection to prevent unsafe technique and patient limited by awareness to deficits. Ambulation  More Ambulation?: Yes  Ambulation 1  Surface: level tile  Device: Small [place]  Other Apparatus: Wheelchair follow  Assistance: Minimal assistance  Quality of Gait: decreased right UE/LE coordination , weight shift , foot clearance , hip stabilization extensors  Gait Deviations: Slow Jacinda;Decreased step length;Decreased step height  Comments: therapist guarding patient for fall risk safety with decreased gait belt use secondary to poor incorrect feedback gait belt use provides.  Patient in wide arm guard on RUE with noted WB through therapist support hand in hand technique. Patient unable to utilize Scurry on walker with  support secondary to increased tone and aproximation of RUE to advance walker effectively d/t primative flexor synergy pattern of RUE with hip flexion during swing phase of gait  Stairs  # Steps : 3(tolerates up 2-3 steps prior to sitting rest break. tolerated repeatively 4 time)  Stairs Height: 6\"(and 4\")  Rails: Left ascending(hand in hand support on RUE with therapist facilitation of tricep extension)  Comment: instruction, tactile cueing for proper technique and placement of RLE; pt able to place foot with extra time and cueing without manual assist.  Wheelchair Activities  Wheelchair Type: Standard  Propulsion 1  Propulsion: Manual  Level: Level Tile  Method: LUE;LLE;RLE  Description/ Details: pt instructed in reciprical LE advancement , forward gaze with left <> right tracking, straight path    Distance: 55 feet with BLE and LUE +  30 feet with LUE/LLE            OT    Cognition  Overall Cognitive Status: Impaired  Arousal/Alertness: Appropriate responses to stimuli  Following Directions: Follows two step commands  Memory: Unable to assess  Following Commands:  Follows multistep commands with repetition  Safety Judgement: Decreased awareness of need for safety  Awareness of Errors: Assistance required to identify errors made  Insights: Decreased awareness of deficits  Sequencing and Organization: Assistance required to identify errors made;Assistance required to generate solutions;Assistance required to implement solutions  Perception  Overall Perceptual Status: Impaired  Unilateral Attention: Cues to attend to right side of body  Initiation: Cues to initiate tasks  Motor Planning: Cues to use objects appropriately  Balance  Sitting Balance: Modified independent   Standing Balance: Minimal assistance(static)  Bed mobility  Bridging: Stand by assistance  Rolling to Left: Stand by assistance  Rolling to Right: Stand by assistance  Supine to Sit: Stand by assistance  Sit to Supine: Stand by assistance  Scooting: Stand by assistance  Transfers  Stand Pivot Transfers: Minimal assistance  Sit Pivot Transfers: Minimal assistance  Sit to stand: Minimal assistance  Stand to sit: Minimal assistance  Transfer Comments: VC for foot placement, RUE functionl placement, imrpoved technique noted; occassional incidence of CGA. Standing Balance  Time: <10sec x4, 2min  x5 PM: 7min  Activity: ADLs/tranfsers/static stands in ngoc PM: dynamic balloon tap  Comment: VC for visual assment of RLE placement c good self correction; attn to RUE placement on stable/dry surface (sink); improved initiation adjusting base of support in PM, use of simple squat to assist with donning lower body clothing iwith f tolerace. PM: no LOB with dynamic balloon tap, use of LUE with writer stabilzing RUE on RW for fxl grasp, task to address stand balance/tolerance, not motor planning, completed in sitting for motor planning  Functional Mobility  Functional - Mobility Device: Wheelchair  Activity: To/from bathroom  Assist Level: Maximum assistance  Functional Mobility Comments: standing at washer, pt intiated offloading RLE to complete side step and widen her COLT demoing carryover of stability stategies  Toilet Transfers  Toilet - Technique: Stand pivot  Equipment Used: Grab bars  Toilet Transfer: Dependent/Total  Toilet Transfers Comments: Ngoc in AM for urgency per pt request, Min/Mod typically using pivot  Gross Motor: RUE balloon tap seated/in all plane within base of support; VC for UE wrist/positioning c minimal imrpovement, occassional elbow support provided for fluidity with forearm motor planning with f+ imrpovement;  Tennis ball grasp and release, VC for overreach/digit positioning, use of compensatory tripod grasp, frequently uses raking approach, graded from large basin to graded slots with fair accurracy and mod difficulty releasing functional grasp Weight Bearing  Weight Bearing Technique: Yes  Response To Weight Bearing Technique: Fair tolerance to wrist placement, F+to forearm/elbow  Additional Activities: Other     Weight Bearing  Weight Bearing Technique: Yes  Response To Weight Bearing Technique: Fair tolerance to wrist placement, F+to forearm/elbow        Vision  Vision Comment: pt reports wears glasses baseline; they are at her sisters; Ramon Dimes encouraged to have family bring them in  ADL  Equipment Provided: Reacher;Sock aid  Feeding: Setup; Increased time to complete(difficulty with bimanual feeding tasks, requires assist for packages and to cut food at this time)  Grooming: Supervision;Verbal cueing;Setup(pt complete oral care seate sink-side. Writer provided education on use of RUE as gross support to hold down tooth brush while applying toothpaste)  UE Bathing: Minimal assistance(assist for LUE, able to complete L axillary region and all other parts without assist. 1 verbal cue in shower to utilize grab bar while reaching across body)  LE Bathing: Minimal assistance(minimal assist to stand and complete perineal hygiene. Pt demo'd appropriate grab bar use)  UE Dressing: Stand by assistance(pt demo'd appropirate sequencing. Pt declined bra this da te)  LE Dressing: Moderate assistance(Moderate assist to stand and pull pants around waist. Pt able to thread pants over BLE with increased time and effort.)  Toileting: None  Additional Comments: ADL completed sinkside per pt demoing imrpoved intiation and task tolerance. Pt required VC for compensatory use of toothpaste setup in mouth, pt squeezing onto RUE. G used of weight shifting to remove gown from buttocks while seated in w/c. While shaving chin hairs, pt applied shaving cream this date for participation, Geronimo Ayon noted, assisting with positioning as well, MaxA for task completion.  Ace wraps applied per Dr Melissa Perry, TEDs not appropraite for sizing, writer removed all TEDs from room to      BMP:   Recent Labs     09/10/20  0645      K 4.4      CO2 24   BUN 36*   CREATININE 0.86   GLUCOSE 117*     BNP: No results for input(s): BNP in the last 72 hours. PT/INR: No results for input(s): PROTIME, INR in the last 72 hours. APTT: No results for input(s): APTT in the last 72 hours. CARDIAC ENZYMES: No results for input(s): CKMB, CKMBINDEX, TROPONINT in the last 72 hours. Invalid input(s): CKTOTAL;3  FASTING LIPID PANEL:  Lab Results   Component Value Date    CHOL 160 08/24/2020    HDL 44 08/24/2020    TRIG 79 08/24/2020     LIVER PROFILE: No results for input(s): AST, ALT, ALB, BILIDIR, BILITOT, ALKPHOS in the last 72 hours.      Current Medications:   Current Facility-Administered Medications: miconazole (MICOTIN) 2 % powder, , Topical, BID  carvedilol (COREG) tablet 6.25 mg, 6.25 mg, Oral, BID WC  HYDROcodone-acetaminophen (NORCO) 5-325 MG per tablet 1 tablet, 1 tablet, Oral, Q4H PRN  gabapentin (NEURONTIN) capsule 100 mg, 100 mg, Oral, TID  ondansetron (ZOFRAN-ODT) disintegrating tablet 4 mg, 4 mg, Oral, Q6H PRN  metFORMIN (GLUCOPHAGE) tablet 500 mg, 500 mg, Oral, BID WC  insulin lispro (HUMALOG) injection vial 0-12 Units, 0-12 Units, Subcutaneous, TID WC  insulin lispro (HUMALOG) injection vial 0-6 Units, 0-6 Units, Subcutaneous, Nightly  clopidogrel (PLAVIX) tablet 75 mg, 75 mg, Oral, Daily  enoxaparin (LOVENOX) injection 40 mg, 40 mg, Subcutaneous, Daily  amLODIPine (NORVASC) tablet 10 mg, 10 mg, Oral, QPM  atorvastatin (LIPITOR) tablet 80 mg, 80 mg, Oral, Nightly  losartan (COZAAR) tablet 100 mg, 100 mg, Oral, Daily **AND** hydroCHLOROthiazide (HYDRODIURIL) tablet 25 mg, 25 mg, Oral, Daily  aspirin chewable tablet 81 mg, 81 mg, Oral, Daily  polyethylene glycol (GLYCOLAX) packet 17 g, 17 g, Oral, Daily  senna (SENOKOT) tablet 17.2 mg, 2 tablet, Oral, Daily PRN  bisacodyl (DULCOLAX) suppository 10 mg, 10 mg, Rectal, Daily PRN      Impression/Plan:   Impaired ADLs, gait, and mobility due to:      1. R hemiparesis secondary to ischemic L MCA CVA:  PT/OT for gait, mobility, strengthening, endurance, ADLs, and self care. On ASA, atorvastatin, plavix. 2. Transcortical motor aphasia: Improving. speech therapy treating. 3. HTN/CHF/non-ischemic cardiomyopathy: on amlodipine, carvedilol, HCTZ, losartan - IM following  4. DM: on insulin sliding scale and Metformin. Diarrhea resolved  5. Pain: Has Norco prn. R elbow pain improved. 6. Bowel Management: Miralax daily, senokot prn, dulcolax prn.  7. DVT Prophylaxis:  low molecular weight heparin, SCD's while in bed and MIRTA's   8.  Internal medicine for medical management    Electronically signed by Cristobal Alvarez MD on 9/12/2020 at 10:39 AM

## 2020-09-12 NOTE — PLAN OF CARE
Problem: Skin Integrity:  Goal: Will show no infection signs and symptoms  Description: Will show no infection signs and symptoms  9/12/2020 0241 by Celso Wyatt RN  Outcome: Ongoing     Problem: Skin Integrity:  Goal: Absence of new skin breakdown  Description: Absence of new skin breakdown  9/12/2020 0241 by Celso Wyatt RN  Outcome: Ongoing     Problem: Falls - Risk of:  Goal: Will remain free from falls  Description: Will remain free from falls  9/12/2020 0241 by Celso Wyatt RN  Outcome: Ongoing     Problem: Falls - Risk of:  Goal: Absence of physical injury  Description: Absence of physical injury  9/12/2020 0241 by Celso Wyatt RN  Outcome: Ongoing     Problem: Neurological  Goal: Maximum potential motor/sensory/cognitive function  9/12/2020 0241 by Celso Wyatt RN  Outcome: Ongoing     Problem: Musculor/Skeletal Functional Status  Goal: Absence of falls  9/12/2020 0241 by Celso Wyatt RN  Outcome: Ongoing

## 2020-09-12 NOTE — CONSULTS
index of 30. trivial mitral regurg. Evidence of moderate (grade II) diastolic dysfunction seen.  History of echocardiogram 2019    EF of 50-55%. LV wall thickness is mildly increased. LA is mildly dilated (29-33) with a left atrial volume index of 31 m1/m2. mild diastolic dysfunction.  Hyperlipidemia     Hypertension         Past Surgical History:     Past Surgical History:   Procedure Laterality Date    CARDIAC CATHETERIZATION Left 2017    right radial, T Dr. Capo Fuchs          Medications Prior to Admission:     Prior to Admission medications    Medication Sig Start Date End Date Taking? Authorizing Provider   clopidogrel (PLAVIX) 75 MG tablet Take 1 tablet by mouth daily 20   Antonio Mckeon MD   carvedilol (COREG) 12.5 MG tablet Take 1 tablet by mouth 2 times daily (with meals) 20   Theodore Maya MD   losartan-hydrochlorothiazide [place]iridion [place]) 100-25 MG per tablet Take 1 tablet by mouth daily 20   Theodore Maya MD   amLODIPine (NORVASC) 10 MG tablet Take 1 tablet by mouth daily 20   Theodore Maya MD   atorvastatin (LIPITOR) 80 MG tablet Take 1 tablet by mouth nightly 20   Theodore Maya MD   aspirin 81 MG EC tablet Take 1 tablet by mouth daily 20   Theodore Maya MD   metFORMIN (GLUCOPHAGE) 500 MG tablet Take 500 mg by mouth 2 times daily (with meals)    Historical Provider, MD        Allergies:     Patient has no known allergies. Social History:     Tobacco:    reports that she has never smoked. She has never used smokeless tobacco.  Alcohol:      reports no history of alcohol use. Drug Use:  reports no history of drug use. Family History:     Family History   Problem Relation Age of Onset    Coronary Art Dis Father          from [place]    COPD Sister         O2 dep    Coronary Art Dis Brother         2 brothers  from [place]       Review of Systems:     Positive and Negative as described in HPI.     CONSTITUTIONAL:  negative for fevers, chills, sweats, fatigue, weight loss  HEENT:  negative for vision, hearing changes, runny nose, throat pain  RESPIRATORY:  negative for shortness of breath, cough, congestion, wheezing. CARDIOVASCULAR:  negative for chest pain, palpitations. GASTROINTESTINAL:  negative for nausea, vomiting, diarrhea, constipation, change in bowel habits, abdominal pain   GENITOURINARY:  negative for difficulty of urination, burning with urination, frequency   INTEGUMENT:  negative for rash, skin lesions, easy bruising   HEMATOLOGIC/LYMPHATIC:  negative for swelling/edema   ALLERGIC/IMMUNOLOGIC:  negative for urticaria , itching  ENDOCRINE:  negative increase in drinking, increase in urination, hot or cold intolerance  MUSCULOSKELETAL:  negative joint pains, muscle aches, swelling of joints  NEUROLOGICAL: Right-sided weakness and speech deficit  BEHAVIOR/PSYCH:  negative for depression, anxiety    Physical Exam:     /66   Pulse 60   Temp 98.5 °F (36.9 °C) (Oral)   Resp 16   Ht 5' 4\" (1.626 m)   Wt 196 lb 6.4 oz (89.1 kg)   SpO2 98%   BMI 33.71 kg/m²   Temp (24hrs), Av.2 °F (36.8 °C), Min:97.9 °F (36.6 °C), Max:98.5 °F (36.9 °C)    Recent Labs     20  1603 20  1957 20  0723 20  1033   POCGLU 114* 155* 139* 124*       Intake/Output Summary (Last 24 hours) at 2020 1515  Last data filed at 2020 0910  Gross per 24 hour   Intake 120 ml   Output --   Net 120 ml     Speech deficit  General Appearance:  alert, well appearing, and in no acute distress  Mental status: oriented to person, place, and time with normal affect  Head:  normocephalic, atraumatic.   Eye: no icterus, redness, pupils equal and reactive, extraocular eye movements intact, conjunctiva clear  Ear: normal external ear, no discharge, hearing intact  Nose:  no drainage noted  Mouth: mucous membranes moist  Neck: supple, no carotid bruits, thyroid not palpable  Lungs: Bilateral equal air entry, clear to ausculation, no wheezing, rales or rhonchi, normal effort  Cardiovascular: normal rate, regular rhythm, no murmur, gallop, rub. Abdomen: Soft, nontender, nondistended, normal bowel sounds, no hepatomegaly or splenomegaly  Neurologic: Right hemiparesis with Broca's aphasia skin: No gross lesions, rashes, bruising or bleeding on exposed skin area  Extremities:  peripheral pulses palpable, no pedal edema or calf pain with palpation  P    Investigations:      Laboratory Testing:  Recent Results (from the past 24 hour(s))   POC Glucose Fingerstick    Collection Time: 09/11/20  4:03 PM   Result Value Ref Range    POC Glucose 114 (H) 65 - 105 mg/dL   POC Glucose Fingerstick    Collection Time: 09/11/20  7:57 PM   Result Value Ref Range    POC Glucose 155 (H) 65 - 105 mg/dL   POC Glucose Fingerstick    Collection Time: 09/12/20  7:23 AM   Result Value Ref Range    POC Glucose 139 (H) 65 - 105 mg/dL   POC Glucose Fingerstick    Collection Time: 09/12/20 10:33 AM   Result Value Ref Range    POC Glucose 124 (H) 65 - 105 mg/dL       Imaging/Diagonstics:      Assessment :      Primary Problem  <principal problem not specified>    Active Hospital Problems    Diagnosis Date Noted    Acute ischemic stroke ([address] [I63.9] 08/26/2020    Cerebral infarction ([address] - left MCA distribution  [I63.9] 08/26/2020    Type 2 diabetes mellitus, without long-term current use of insulin ([address] [E11.9] 08/25/2020    Aphasia [R47.01]     CKD (chronic kidney disease) stage 3, GFR 30-59 ml/min ([address] [N18.3] 12/20/2018    Morbid obesity ([address] [E66.01] 12/20/2018    Acute on chronic combined systolic and diastolic CHF, NYHA class 4 (HCC) [I50.43] 12/17/2018    Chronic combined systolic and diastolic HF (heart failure), NYHA class 2 ([address] [I50.42] 08/28/2017    Hyperlipidemia [E78.5]     Hypertension [I10]     Idiopathic cardiomyopathy ([address] [I42.8]        Plan:     1.  Uncontrolled diabetes mellitus with the peripheral organ damage with ischemic CVA  2. Ischemic CVA with right hemiparesis and Broca's aphasia  3. Metformin recent A1c is 7.3 reviewed and reconciled  4. Ischemic CVA hyperlipidemia aspirin and Lipitor Plavix 75 all started  5. Losartan 100 mg for hypertension and diabetic renal protection  6. norco for pain  7. q6  Hr prn    Consultations:   IP CONSULT TO INTERNAL MEDICINE  IP CONSULT TO DIETITIAN  IP CONSULT TO SOCIAL WORK  IP CONSULT TO INTERNAL MEDICINE      Brittani Cherry MD  9/12/2020  3:15 PM    Copy sent to Dr. Soledad Mcclure, APRN - CNP    Please note that this chart was generated using voice recognition Dragon dictation software. Although every effort was made to ensure the accuracy of this automated transcription, some errors in transcription may have occurred.

## 2020-09-13 LAB
GLUCOSE BLD-MCNC: 118 MG/DL (ref 65–105)
GLUCOSE BLD-MCNC: 118 MG/DL (ref 65–105)
GLUCOSE BLD-MCNC: 124 MG/DL (ref 65–105)
GLUCOSE BLD-MCNC: 95 MG/DL (ref 65–105)

## 2020-09-13 PROCEDURE — 6370000000 HC RX 637 (ALT 250 FOR IP): Performed by: INTERNAL MEDICINE

## 2020-09-13 PROCEDURE — 97116 GAIT TRAINING THERAPY: CPT

## 2020-09-13 PROCEDURE — 6370000000 HC RX 637 (ALT 250 FOR IP): Performed by: PHYSICAL MEDICINE & REHABILITATION

## 2020-09-13 PROCEDURE — 6360000002 HC RX W HCPCS: Performed by: INTERNAL MEDICINE

## 2020-09-13 PROCEDURE — 97542 WHEELCHAIR MNGMENT TRAINING: CPT

## 2020-09-13 PROCEDURE — 82947 ASSAY GLUCOSE BLOOD QUANT: CPT

## 2020-09-13 PROCEDURE — 1180000000 HC REHAB R&B

## 2020-09-13 PROCEDURE — 99231 SBSQ HOSP IP/OBS SF/LOW 25: CPT | Performed by: INTERNAL MEDICINE

## 2020-09-13 RX ADMIN — ATORVASTATIN CALCIUM 80 MG: 80 TABLET, FILM COATED ORAL at 20:24

## 2020-09-13 RX ADMIN — CARVEDILOL 6.25 MG: 6.25 TABLET, FILM COATED ORAL at 16:05

## 2020-09-13 RX ADMIN — AMLODIPINE BESYLATE 10 MG: 10 TABLET ORAL at 16:05

## 2020-09-13 RX ADMIN — ANTI-FUNGAL POWDER MICONAZOLE NITRATE TALC FREE: 1.42 POWDER TOPICAL at 07:19

## 2020-09-13 RX ADMIN — METRONIDAZOLE 100 MG: 500 TABLET ORAL at 07:18

## 2020-09-13 RX ADMIN — METRONIDAZOLE 100 MG: 500 TABLET ORAL at 11:59

## 2020-09-13 RX ADMIN — METRONIDAZOLE 100 MG: 500 TABLET ORAL at 20:24

## 2020-09-13 RX ADMIN — HYDROCHLOROTHIAZIDE 25 MG: 25 TABLET ORAL at 07:18

## 2020-09-13 RX ADMIN — METFORMIN HYDROCHLORIDE 500 MG: 500 TABLET ORAL at 16:05

## 2020-09-13 RX ADMIN — CLOPIDOGREL BISULFATE 75 MG: 75 TABLET ORAL at 07:19

## 2020-09-13 RX ADMIN — ASPIRIN 81 MG CHEWABLE TABLET 81 MG: 81 TABLET CHEWABLE at 07:18

## 2020-09-13 RX ADMIN — METFORMIN HYDROCHLORIDE 500 MG: 500 TABLET ORAL at 07:18

## 2020-09-13 RX ADMIN — CARVEDILOL 6.25 MG: 6.25 TABLET, FILM COATED ORAL at 07:18

## 2020-09-13 RX ADMIN — HYDROCODONE BITARTRATE AND ACETAMINOPHEN 1 TABLET: 5; 325 TABLET ORAL at 20:24

## 2020-09-13 RX ADMIN — ANTI-FUNGAL POWDER MICONAZOLE NITRATE TALC FREE: 1.42 POWDER TOPICAL at 20:26

## 2020-09-13 RX ADMIN — LOSARTAN POTASSIUM 100 MG: 50 TABLET, FILM COATED ORAL at 07:18

## 2020-09-13 RX ADMIN — HYDROCODONE BITARTRATE AND ACETAMINOPHEN 1 TABLET: 5; 325 TABLET ORAL at 00:52

## 2020-09-13 RX ADMIN — ENOXAPARIN SODIUM 40 MG: 40 INJECTION SUBCUTANEOUS at 07:19

## 2020-09-13 RX ADMIN — HYDROCODONE BITARTRATE AND ACETAMINOPHEN 1 TABLET: 5; 325 TABLET ORAL at 11:59

## 2020-09-13 ASSESSMENT — PAIN DESCRIPTION - PROGRESSION: CLINICAL_PROGRESSION: NOT CHANGED

## 2020-09-13 ASSESSMENT — PAIN SCALES - GENERAL
PAINLEVEL_OUTOF10: 7
PAINLEVEL_OUTOF10: 0
PAINLEVEL_OUTOF10: 6
PAINLEVEL_OUTOF10: 6
PAINLEVEL_OUTOF10: 7
PAINLEVEL_OUTOF10: 0
PAINLEVEL_OUTOF10: 7

## 2020-09-13 ASSESSMENT — PAIN DESCRIPTION - ORIENTATION: ORIENTATION: RIGHT

## 2020-09-13 ASSESSMENT — PAIN DESCRIPTION - ONSET: ONSET: ON-GOING

## 2020-09-13 ASSESSMENT — PAIN DESCRIPTION - PAIN TYPE: TYPE: ACUTE PAIN

## 2020-09-13 ASSESSMENT — PAIN DESCRIPTION - LOCATION: LOCATION: LEG

## 2020-09-13 ASSESSMENT — PAIN DESCRIPTION - DESCRIPTORS: DESCRIPTORS: ACHING;DISCOMFORT

## 2020-09-13 ASSESSMENT — PAIN DESCRIPTION - FREQUENCY: FREQUENCY: INTERMITTENT

## 2020-09-13 ASSESSMENT — PAIN SCALES - WONG BAKER: WONGBAKER_NUMERICALRESPONSE: 0

## 2020-09-13 NOTE — PROGRESS NOTES
Helped pt. To be repositioned in chair  No c/o of pain, or SOB at this time  Pt. Cont.  To watch  TV

## 2020-09-13 NOTE — PLAN OF CARE
Problem: Skin Integrity:  Goal: Absence of new skin breakdown  Description: Absence of new skin breakdown  Outcome: Met This Shift   Skin assessment completed this shift. Nutrition and Hydration status assessed with adequate intake. Fransisco Score as charted. Bilateral heels remain elevated on pillows throughout the shift. Patient able to reposition self for comfort and to prevent breakdown. Patient verbalizes understanding of pressure ulcer prevention measures. Skin integrity maintained. No new skin breakdown noted. Skin to high risk pressure areas including coccyx and heels are clear. Vicky / Incontinence care provided as needed throughout the shift. Aloe Vesta Moisture Barrier ointment applied to buttocks as a preventative measure. Scattered Bruising noted to BUE. Problem: Falls - Risk of:  Goal: Will remain free from falls  Description: Will remain free from falls  Outcome: Met This Shift  No falls or injuries sustained at this time. No attempts to get out of bed without nursing assistance. Call light within reach and pt. uses appropriately for assistance. Siderails up x 2. Nonskid footwear remains on. Bed in low and locked position. Hourly nursing rounds made. Pt. Alert and oriented, aware of limitations, and exhibits good safety judgement. Pt. uses assistive devices appropriately. Pt. understands individual fall risk factors. Pt. reminded to use call light with each nurse/patient interaction. Bed alarm remains engaged throughout the shift as a precaution. Problem: Pain:  Goal: Pain level will decrease  Description: Pain level will decrease  Outcome: Ongoing  Pain assessment and reassessment completed so far this shift. Pt. able to rest after the use of pain medication. Patient medicated with 1 Norco Q4hrs for c/o pain in right leg. Pt. Repositions per self for comfort. Nonverbal cues indicate pain relief.  Pt. Rests quietly with eyes closed after pain medication administration. Respirations easy and unlabored. Appears free from distress.

## 2020-09-13 NOTE — PROGRESS NOTES
Physical Medicine & Rehabilitation  Progress Note      Subjective:      81year-old female with ischemic CVA with R dominant hemiparesis and aphasia. She continues to improve in motor recovery and speech with therapies. She denies any pain today,     ROS:  Denies fevers, chills, sweats. No chest pain, palpitations, lightheadedness. Denies coughing, wheezing or shortness of breath. Denies abdominal pain, nausea, diarrhea or constipation. No new areas of joint pain. Denies new areas of numbness or weakness. Denies new anxiety or depression issues. No new skin problems. Rehabilitation:   Progressing in therapies. PT:     Transfers  Sit to Stand: Contact guard assistance(hand in hand RUE)  Stand to sit: Contact guard assistance  Comment: cuing provided for safe hand placement with fair carryover, Pt completes transfers with [facility]  Ambulation  Ambulation?: Yes  More Ambulation?: Yes  Ambulation 1  Surface: level tile  Device: Small Gee Foreign (L)  Other Apparatus: Wheelchair follow  Assistance: Minimal assistance  Quality of Gait: decreased right UE/LE coordination , weight shift , foot clearance , hip stabilization extensors  Gait Deviations: Slow Jacinda;Decreased step length;Decreased step height  Distance: 30ft x2 , small distance in gym  Comments: Seated rest break required between  distances, Pt expressing Increased pain/discomfort with ambulation In R ankle. Verbal cuing provided for increased step length/height on R LE, forward gaze. Focus on proper footing to avoid R ankle supination. R UE hand in hand with R wrist in extension to decrease flexion synergy when progressing R LE. Stairs/Curb  Stairs?: No  Propulsion 1  Propulsion: Manual  Level: Level Tile  Method: LUE;LLE;RLE  Level of Assistance: Minimal assistance  Description/ Details: straight path, Pt requires cuing for R LE advancement and reciprocal pattern.  Pt lacking awareness of R UE positioning, cuing provided for repositioning throughout propulsion  Distance: 124ft with breaks to reposition R UE PRN     Balance  Posture: Fair  Sitting - Static: Good;-  Sitting - Dynamic: Good;-  Standing - Static: Fair  Standing - Dynamic: Poor;+  Comments: standing balance with RW, and with no device, therapist assist needed to block RLE. OT    Balance  Sitting Balance: Modified independent   Standing Balance: Minimal assistance(contact gaurd assist static, minimal assist dynamic )      Bed mobility  Supine to Sit: Supervision  Scooting: Supervision  Comment: Pt demo 'd improved ability to transition from supine to sit this date. No verbal cueing provided for technique or safety      Transfers  Stand Pivot Transfers: Minimal assistance  Sit to stand: Minimal assistance  Stand to sit: Minimal assistance  Transfer Comments: AM: Verbal cueing provided for appropriate hand/foot placement. Writer provided support on RUE to keep in place while completing transfers      Functional Mobility  Functional - Mobility Device: Wheelchair  Activity: To/from bathroom  Assist Level: Maximum assistance  Toilet Transfers  Toilet - Technique: Stand pivot  Equipment Used: Grab bars  Toilet Transfer: Contact guard assistance  Toilet Transfers Comments: AM: writer provided education on appropriate wheelchair set-up, pt verbalized understanding. Pt provided minimal verbal cueing for appropriate sequencing with use of grab bars, good return no loss of b balance      Type of ROM/Therapeutic Exercise  Comment: AM/PM: Pt educated on RUE strength/coordination home exercise program to facilitate increased utilization during ADLs and ADL transfers. Writer provided visual demonstration and verbal cueing as needed to complete   Exercises  Shoulder Flexion: broom stick, 3 sets x 15 reps. writer provided cueing to maintain same height for R/L UE. Elbow Flexion: self-aarom completed with broom stick. Writer provided stabilizing assist to facilitate sustained grasp.    Wrist Extension: 3 sets x 20 completed against gravity   Grasp/Release: PM: pink sponge 4 sets x 20 reps. Verbal cueing to completely open hand each rep     Additional Activities: Other  Additional Activities: PM: Pt engaged in large peg board activity to improve targeting accuracy and utilization of functional pinch patterns to facilitate increased RUe utilization during ADLs. Writer provided visual demonstration on use of 3-jaw pinch pattern. Writer provided minimal assist to facilitate shoulder AROM to position hand appropriately over piece. With increased time and effort pt demo 'd ability to use appropriate pinch pattern to grasp peg. Pt completed ~ 20-25 pegs with minimal assist overall for targeting. ADL  Equipment Provided: Reacher;Sock aid  Feeding: Setup; Increased time to complete(writer educated pt on use of RUE for gross support while attempting to open drink containers and packages, pt needs reinforcement. Pt still unable to complete bimanual feeding tasks with RUE)  Grooming: Supervision;Verbal cueing;Setup  UE Bathing: Minimal assistance(assist provided for Left upper arm. Writer provided demonstration for use of bent long-handled sponge to complete L arm, writer provided hand-over-hand to facilitate)  LE Bathing: Minimal assistance(Writer provided minimal assist while standing while pt utilized LUE to reach posteriorly to complete posterior perineal hygiene. Pt demo 'd appropriate use of long-handled sponge for feet and B lower legs. Pt able to hike B hips to complete posterior thigh)  UE Dressing: Supervision(no assist required. Writer provided verbal cueing for sequencing for threading BUE.)  LE Dressing: Maximum assistance(Writer provided visual demonstration on use of reacher to thread BLE. Pt required assist after demonstration.  Pt minimal assist to stand and pull pants around waist. Pt requires assist for socks as well this date)  Toileting: None  Additional Comments: ADLs completed sink-side in morning RUE and RLE. Motor testing 4+/5 key muscles LUE and LLE. R  3/5, R wrist extension 3/5, R finger extension 3/5, R elbow flexion and extension 3/5 . Able to exhibit finger opposing to R thumb today. SKIN: Warm dry and intact. Good turgor. EXTREMITIES:  No calf tenderness to palpation. No edema BLEs. .    PSYCH: Mood WNL. Appropriately interactive. Affect WNL. Diagnostics:     CBC:   No results for input(s): WBC, RBC, HGB, HCT, MCV, RDW, PLT in the last 72 hours. BMP:   No results for input(s): NA, K, CL, CO2, PHOS, BUN, CREATININE, GLUCOSE in the last 72 hours. Invalid input(s): CA  BNP: No results for input(s): BNP in the last 72 hours. PT/INR: No results for input(s): PROTIME, INR in the last 72 hours. APTT: No results for input(s): APTT in the last 72 hours. CARDIAC ENZYMES: No results for input(s): CKMB, CKMBINDEX, TROPONINT in the last 72 hours. Invalid input(s): CKTOTAL;3  FASTING LIPID PANEL:  Lab Results   Component Value Date    CHOL 160 08/24/2020    HDL 44 08/24/2020    TRIG 79 08/24/2020     LIVER PROFILE: No results for input(s): AST, ALT, ALB, BILIDIR, BILITOT, ALKPHOS in the last 72 hours.      Current Medications:   Current Facility-Administered Medications: miconazole (MICOTIN) 2 % powder, , Topical, BID  carvedilol (COREG) tablet 6.25 mg, 6.25 mg, Oral, BID WC  HYDROcodone-acetaminophen (NORCO) 5-325 MG per tablet 1 tablet, 1 tablet, Oral, Q4H PRN  gabapentin (NEURONTIN) capsule 100 mg, 100 mg, Oral, TID  ondansetron (ZOFRAN-ODT) disintegrating tablet 4 mg, 4 mg, Oral, Q6H PRN  metFORMIN (GLUCOPHAGE) tablet 500 mg, 500 mg, Oral, BID WC  insulin lispro (HUMALOG) injection vial 0-12 Units, 0-12 Units, Subcutaneous, TID WC  insulin lispro (HUMALOG) injection vial 0-6 Units, 0-6 Units, Subcutaneous, Nightly  clopidogrel (PLAVIX) tablet 75 mg, 75 mg, Oral, Daily  enoxaparin (LOVENOX) injection 40 mg, 40 mg, Subcutaneous, Daily  amLODIPine (NORVASC) tablet 10 mg, 10 mg, Oral, QPM  atorvastatin (LIPITOR) tablet 80 mg, 80 mg, Oral, Nightly  losartan (COZAAR) tablet 100 mg, 100 mg, Oral, Daily **AND** hydroCHLOROthiazide (HYDRODIURIL) tablet 25 mg, 25 mg, Oral, Daily  aspirin chewable tablet 81 mg, 81 mg, Oral, Daily  polyethylene glycol (GLYCOLAX) packet 17 g, 17 g, Oral, Daily  senna (SENOKOT) tablet 17.2 mg, 2 tablet, Oral, Daily PRN  bisacodyl (DULCOLAX) suppository 10 mg, 10 mg, Rectal, Daily PRN      Impression/Plan:   Impaired ADLs, gait, and mobility due to:      1. R hemiparesis secondary to ischemic L MCA CVA:  PT/OT for gait, mobility, strengthening, endurance, ADLs, and self care. On ASA, atorvastatin, plavix. 2. Transcortical motor aphasia: Improving. speech therapy treating. 3. HTN/CHF/non-ischemic cardiomyopathy: on amlodipine, carvedilol, HCTZ, losartan - IM following  4. DM: on insulin sliding scale and Metformin. Diarrhea resolved  5. Pain: Has Norco prn. R elbow pain improved. 6. Bowel Management: Miralax daily, senokot prn, dulcolax prn.  7. DVT Prophylaxis:  low molecular weight heparin, SCD's while in bed and MIRTA's   8.  Internal medicine for medical management    Electronically signed by Joel Littlejohn MD on 9/13/2020 at 7:24 AM

## 2020-09-13 NOTE — PROGRESS NOTES
Physical Therapy  Facility/Department: [facility] ACUTE REHAB  Daily Treatment Note  NAME: Jasmina Savage  : 1950  MRN: [id number]    Date of Service: 2020    Discharge Recommendations:  Patient would benefit from continued therapy after discharge, 24 hour supervision or assist   PT Equipment Recommendations  Equipment Needed: (TBD)  Other: to be determined at pt progreses    Assessment   Treatment Diagnosis: Impaired function, weakness 2\" L MCA CVA. History: PT with hx of HTN, DM, CHF, CVAs, pt with recent L MCA CVA. REQUIRES PT FOLLOW UP: Yes  Activity Tolerance  Activity Tolerance: Patient limited by pain; Patient limited by fatigue     Patient Diagnosis(es): There were no encounter diagnoses. has a past medical history of CHF (congestive heart failure) ([address] Diabetes mellitus ([address] H/O cardiac catheterization, H/O echocardiogram, History of echocardiogram, History of echocardiogram, Hyperlipidemia, and Hypertension. has a past surgical history that includes Cholecystectomy and Cardiac catheterization (Left, 2017). Restrictions  Restrictions/Precautions  Restrictions/Precautions: Fall Risk, Up as Tolerated, General Precautions  Required Braces or Orthoses?: No  Implants present? : (none)  Position Activity Restriction  Other position/activity restrictions: up as tolerated  Subjective   General  Chart Reviewed: Yes  Additional Pertinent Hx: Pt has a history of HTN, HLD, DM, CHF. Pt recently evaluated at [facility] for  new onset of aphasia. Pt was transferred to [facility] on 20 after diagnostic studies confirmed new L hemisphere CVA. Pt with known history of previous CVAs. MRI confirmed L MCA infarct on 2020. Pt admitted to [address] [place] [address] 20. Response To Previous Treatment: Patient with no complaints from previous session.   Family / Caregiver Present: No  Referring Practitioner: Dr. Natalie Villegas: Pt arrives to therapy gym in [facility], reports of not standing balance with RW, and with no device, therapist assist needed to block RLE. Other exercises  Other exercises?: Yes  Other exercises 1: B LE Ace bandage wrap donned  Other exercises 2: Sit<>stand x6          Comment: Rest breaks PRN    Goals  Short term goals  Time Frame for Short term goals: 10 days   Short term goal 1: Pt to perform bed mobility independently. Short term goal 2: Pt to perform transfers with minAx1. Short term goal 3: Pt to ambulate 150 ft, least restrictive device, minAx1. Short term goal 4: Pt to tolerate 30-60 mins physical therapy to increase endurance and strength. Short term goal 5: Pt to complete 5 steps, with LUE support, minAx1. Long term goals  Time Frame for Long term goals : By LOS. Long term goal 2: Pt to complete transfers with SBA to promote independence. Long term goal 3: Pt to ambulate 200 ft, least restrictive device, SBA. Long term goal 4: Pt to complete 5-12 steps, CGA to promote function. Long term goal 5: Pt to improve PASS score from 15/36 to 22/36. Patient Goals   Patient goals : To return back home. Plan    Plan  Times per week: 1.5 hrs/day 5-7 days/week  Safety Devices  Type of devices:  All fall risk precautions in place, Gait belt, Chair alarm in place  Restraints  Initially in place: No     Therapy Time         09/13/20 0931   PT Individual Minutes   Time In 0835   Time Out 0905   Minutes [address]

## 2020-09-13 NOTE — CONSULTS
[facility] Internal Medicine    CONSULTATION / HISTORY AND PHYSICAL EXAMINATION            Date:   9/13/2020  Patient name:  India Marin  Date of admission:  8/26/2020  6:56 PM  MRN:   [id number]  Account:  [de-identified]  YOB: 1950  PCP:    SUE Kelsey CNP  Room:   7703/8016-49  Code Status:    Full Code    Physician Requesting Consult: Pedro Luis Travis MD    Reason for Consult: Medical management    Chief Complaint:     No chief complaint on file. Stroke    History Obtained From:     Patient medical record nursing staff    History of Present Illness:     82-year-old pleasant  lady was admitted to [facility] on August 23 with a aphasia diagnosed with the left middle cerebral artery ischemic infarct  Known history of systolic congestive heart failure ejection fraction 35 to 40% secondary to nonischemic cardiomyopathy  Diabetes   Duration more than 7 years  Modifying factors on Glucophage and other med  Severity uncontrolled sever  Associated signs and symtoms neuropathy/ckd/ CAD. aggravated with sugar diet and better with low sugar diet             Past Medical History:     Past Medical History:   Diagnosis Date    CHF (congestive heart failure) ([address]     Diabetes mellitus ([facility] [address]     H/O cardiac catheterization 08/04/2017    LMCA: Mild irregularites 10-20%. LAD: Mild irregularites 10-20%. LCx: Mild irregularites 10-20%. RCA: Mild irregularites 10-20%. LV function assessed as : Abnormal. EF of 40%.  H/O echocardiogram 12/18/2018    EF 55%. Mildly increased LV wall thickness. The left atrium appears moderately to severely dilated. Moderate to severe mitral regurg. Moderate pulmonary HTN with an estimated RV systolic pressure of [id number]. Moderate tricuspid regurg. Evidnece fo moderate diastolic dysfunction is seen.  History of echocardiogram 01/17/2019    EF 55%. LV wall thickness moderately increased.  LA is mildly dilated w/ LA volume index of 30. trivial mitral regurg. Evidence of moderate (grade II) diastolic dysfunction seen.  History of echocardiogram 2019    EF of 50-55%. LV wall thickness is mildly increased. LA is mildly dilated (29-33) with a left atrial volume index of 31 m1/m2. mild diastolic dysfunction.  Hyperlipidemia     Hypertension         Past Surgical History:     Past Surgical History:   Procedure Laterality Date    CARDIAC CATHETERIZATION Left 2017    right radial, T Dr. Tran Mabry          Medications Prior to Admission:     Prior to Admission medications    Medication Sig Start Date End Date Taking? Authorizing Provider   clopidogrel (PLAVIX) 75 MG tablet Take 1 tablet by mouth daily 20   Padmaja Irene MD   carvedilol (COREG) 12.5 MG tablet Take 1 tablet by mouth 2 times daily (with meals) 20   Zita Smith MD   losartan-hydrochlorothiazide [facility]) 100-25 MG per tablet Take 1 tablet by mouth daily 20   Zita Smith MD   amLODIPine (NORVASC) 10 MG tablet Take 1 tablet by mouth daily 20   Zita Smith MD   atorvastatin (LIPITOR) 80 MG tablet Take 1 tablet by mouth nightly 20   Zita Smith MD   aspirin 81 MG EC tablet Take 1 tablet by mouth daily 20   Zita Smith MD   metFORMIN (GLUCOPHAGE) 500 MG tablet Take 500 mg by mouth 2 times daily (with meals)    Historical Provider, MD        Allergies:     Patient has no known allergies. Social History:     Tobacco:    reports that she has never smoked. She has never used smokeless tobacco.  Alcohol:      reports no history of alcohol use. Drug Use:  reports no history of drug use. Family History:     Family History   Problem Relation Age of Onset    Coronary Art Dis Father          from [place]    COPD Sister         O2 dep    Coronary Art Dis Brother         2 brothers  from [place]       Review of Systems:     Positive and Negative as described in HPI.     CONSTITUTIONAL:  negative for fevers, chills, sweats, fatigue, weight loss  HEENT:  negative for vision, hearing changes, runny nose, throat pain  RESPIRATORY:  negative for shortness of breath, cough, congestion, wheezing. CARDIOVASCULAR:  negative for chest pain, palpitations. GASTROINTESTINAL:  negative for nausea, vomiting, diarrhea, constipation, change in bowel habits, abdominal pain   GENITOURINARY:  negative for difficulty of urination, burning with urination, frequency   INTEGUMENT:  negative for rash, skin lesions, easy bruising   HEMATOLOGIC/LYMPHATIC:  negative for swelling/edema   ALLERGIC/IMMUNOLOGIC:  negative for urticaria , itching  ENDOCRINE:  negative increase in drinking, increase in urination, hot or cold intolerance  MUSCULOSKELETAL:  negative joint pains, muscle aches, swelling of joints  NEUROLOGICAL: Right-sided weakness and speech deficit  BEHAVIOR/PSYCH:  negative for depression, anxiety    Physical Exam:     /73   Pulse 78   Temp 97.9 °F (36.6 °C) (Oral)   Resp 16   Ht 5' 4\" (1.626 m)   Wt 198 lb 13.7 oz (90.2 kg)   SpO2 95%   BMI 34.13 kg/m²   Temp (24hrs), Av.8 °F (36.6 °C), Min:97.7 °F (36.5 °C), Max:97.9 °F (36.6 °C)    Recent Labs     20  1553 20  0704 20  1032   POCGLU 100 152* 95 118*       Intake/Output Summary (Last 24 hours) at 2020 1157  Last data filed at 2020 1125  Gross per 24 hour   Intake 575 ml   Output --   Net 575 ml     Speech deficit  General Appearance:  alert, well appearing, and in no acute distress  Mental status: oriented to person, place, and time with normal affect  Head:  normocephalic, atraumatic.   Eye: no icterus, redness, pupils equal and reactive, extraocular eye movements intact, conjunctiva clear  Ear: normal external ear, no discharge, hearing intact  Nose:  no drainage noted  Mouth: mucous membranes moist  Neck: supple, no carotid bruits, thyroid not palpable  Lungs: Bilateral equal air entry, clear to ausculation, with right hemiparesis and Broca's aphasia  3. Metformin recent A1c is 7.3 reviewed and reconciled  4. Ischemic CVA hyperlipidemia aspirin and Lipitor Plavix 75 all started  5. Losartan 100 mg for hypertension and diabetic renal protection  6. norco for pain  7. q6  Hr prn    Consultations:   IP CONSULT TO INTERNAL MEDICINE  IP CONSULT TO DIETITIAN  IP CONSULT TO SOCIAL WORK  IP CONSULT TO INTERNAL MEDICINE      Ashish Wall MD  9/13/2020  11:57 AM    Copy sent to Dr. Kathrine Rubio, APRN - CNP    Please note that this chart was generated using voice recognition Dragon dictation software. Although every effort was made to ensure the accuracy of this automated transcription, some errors in transcription may have occurred.

## 2020-09-14 LAB
GLUCOSE BLD-MCNC: 105 MG/DL (ref 65–105)
GLUCOSE BLD-MCNC: 116 MG/DL (ref 65–105)
GLUCOSE BLD-MCNC: 118 MG/DL (ref 65–105)
GLUCOSE BLD-MCNC: 125 MG/DL (ref 65–105)

## 2020-09-14 PROCEDURE — 97530 THERAPEUTIC ACTIVITIES: CPT

## 2020-09-14 PROCEDURE — 92507 TX SP LANG VOICE COMM INDIV: CPT

## 2020-09-14 PROCEDURE — 6370000000 HC RX 637 (ALT 250 FOR IP): Performed by: INTERNAL MEDICINE

## 2020-09-14 PROCEDURE — 99231 SBSQ HOSP IP/OBS SF/LOW 25: CPT | Performed by: PHYSICAL MEDICINE & REHABILITATION

## 2020-09-14 PROCEDURE — 1180000000 HC REHAB R&B

## 2020-09-14 PROCEDURE — 99231 SBSQ HOSP IP/OBS SF/LOW 25: CPT | Performed by: INTERNAL MEDICINE

## 2020-09-14 PROCEDURE — 6360000002 HC RX W HCPCS: Performed by: INTERNAL MEDICINE

## 2020-09-14 PROCEDURE — 97542 WHEELCHAIR MNGMENT TRAINING: CPT

## 2020-09-14 PROCEDURE — 82947 ASSAY GLUCOSE BLOOD QUANT: CPT

## 2020-09-14 PROCEDURE — 97116 GAIT TRAINING THERAPY: CPT

## 2020-09-14 PROCEDURE — 97112 NEUROMUSCULAR REEDUCATION: CPT

## 2020-09-14 PROCEDURE — 97110 THERAPEUTIC EXERCISES: CPT

## 2020-09-14 PROCEDURE — 6370000000 HC RX 637 (ALT 250 FOR IP): Performed by: PHYSICAL MEDICINE & REHABILITATION

## 2020-09-14 PROCEDURE — 97535 SELF CARE MNGMENT TRAINING: CPT

## 2020-09-14 RX ADMIN — CARVEDILOL 6.25 MG: 6.25 TABLET, FILM COATED ORAL at 16:49

## 2020-09-14 RX ADMIN — ASPIRIN 81 MG CHEWABLE TABLET 81 MG: 81 TABLET CHEWABLE at 07:12

## 2020-09-14 RX ADMIN — METRONIDAZOLE 100 MG: 500 TABLET ORAL at 20:18

## 2020-09-14 RX ADMIN — AMLODIPINE BESYLATE 10 MG: 10 TABLET ORAL at 16:49

## 2020-09-14 RX ADMIN — HYDROCHLOROTHIAZIDE 25 MG: 25 TABLET ORAL at 07:12

## 2020-09-14 RX ADMIN — CARVEDILOL 6.25 MG: 6.25 TABLET, FILM COATED ORAL at 07:12

## 2020-09-14 RX ADMIN — CLOPIDOGREL BISULFATE 75 MG: 75 TABLET ORAL at 07:12

## 2020-09-14 RX ADMIN — METFORMIN HYDROCHLORIDE 500 MG: 500 TABLET ORAL at 16:49

## 2020-09-14 RX ADMIN — METRONIDAZOLE 100 MG: 500 TABLET ORAL at 07:12

## 2020-09-14 RX ADMIN — ATORVASTATIN CALCIUM 80 MG: 80 TABLET, FILM COATED ORAL at 20:18

## 2020-09-14 RX ADMIN — LOSARTAN POTASSIUM 100 MG: 50 TABLET, FILM COATED ORAL at 07:12

## 2020-09-14 RX ADMIN — ANTI-FUNGAL POWDER MICONAZOLE NITRATE TALC FREE: 1.42 POWDER TOPICAL at 20:18

## 2020-09-14 RX ADMIN — ANTI-FUNGAL POWDER MICONAZOLE NITRATE TALC FREE: 1.42 POWDER TOPICAL at 07:13

## 2020-09-14 RX ADMIN — HYDROCODONE BITARTRATE AND ACETAMINOPHEN 1 TABLET: 5; 325 TABLET ORAL at 20:18

## 2020-09-14 RX ADMIN — METFORMIN HYDROCHLORIDE 500 MG: 500 TABLET ORAL at 07:12

## 2020-09-14 RX ADMIN — ENOXAPARIN SODIUM 40 MG: 40 INJECTION SUBCUTANEOUS at 07:12

## 2020-09-14 RX ADMIN — METRONIDAZOLE 100 MG: 500 TABLET ORAL at 13:25

## 2020-09-14 ASSESSMENT — PAIN SCALES - GENERAL
PAINLEVEL_OUTOF10: 7
PAINLEVEL_OUTOF10: 8

## 2020-09-14 ASSESSMENT — PAIN DESCRIPTION - DESCRIPTORS: DESCRIPTORS: ACHING;DISCOMFORT

## 2020-09-14 ASSESSMENT — PAIN DESCRIPTION - PROGRESSION: CLINICAL_PROGRESSION: NOT CHANGED

## 2020-09-14 ASSESSMENT — PAIN DESCRIPTION - ORIENTATION: ORIENTATION: RIGHT

## 2020-09-14 ASSESSMENT — PAIN DESCRIPTION - FREQUENCY: FREQUENCY: INTERMITTENT

## 2020-09-14 ASSESSMENT — PAIN DESCRIPTION - LOCATION: LOCATION: LEG

## 2020-09-14 ASSESSMENT — PAIN DESCRIPTION - ONSET: ONSET: ON-GOING

## 2020-09-14 ASSESSMENT — PAIN DESCRIPTION - PAIN TYPE: TYPE: ACUTE PAIN

## 2020-09-14 NOTE — PROGRESS NOTES
Physical Medicine & Rehabilitation  Progress Note      Subjective:      81year-old female with ischemic CVA with R dominant hemiparesis and aphasia. Observed in physical therapy today. She denies any new issues or complaints today. ROS:  Denies fevers, chills, sweats. No chest pain, palpitations, lightheadedness. Denies coughing, wheezing or shortness of breath. Denies abdominal pain, nausea, diarrhea or constipation. No new areas of joint pain. Denies new areas of numbness or weakness. Denies new anxiety or depression issues. No new skin problems. Rehabilitation:   Progressing in therapies. PT:  Restrictions/Precautions: Fall Risk, Up as Tolerated, General Precautions  Implants present? : (none)  Other position/activity restrictions: up as tolerated   Transfers  Sit to Stand: Contact guard assistance(hand in hand RUE)  Stand to sit: Contact guard assistance  Bed to Chair: Minimal assistance(RW with right hand )  Stand Pivot Transfers: Minimal Assistance  Squat Pivot Transfers: Minimal Assistance  Comment: Pt completes transfer with [employer], requires cues for proper technique. Ambulation 1  Surface: level tile  Device: Small Base Quad Cane  Other Apparatus: Wheelchair follow  Assistance: Minimal assistance  Quality of Gait: decreased right UE/LE coordination , weight shift , foot clearance , hip stabilization extensors  Gait Deviations: Slow Jacinda, Decreased step length, Decreased step height  Distance: 20ft  Comments: Pt expressing Increased pain/discomfort with ambulation. Verbal cuing provided for increased step length/height on R LE, forward gaze. Focus on proper footing to avoid R ankle supination. SBQC on L side, R hand in hand with wrist in extension to avoid R flexion synergy with R LE progress.     Transfers  Sit to Stand: Contact guard assistance(hand in hand RUE)  Stand to sit: Contact guard assistance  Bed to Chair: Minimal assistance(RW with right hand )  Stand Pivot Transfers: Minimal Assistance  Squat Pivot Transfers: Minimal Assistance  Comment: Pt completes transfer with [facility], requires cues for proper technique. Ambulation  Ambulation?: Yes  More Ambulation?: Yes  Ambulation 1  Surface: level tile  Device: Small Gee [place]  Other Apparatus: Wheelchair follow  Assistance: Minimal assistance  Quality of Gait: decreased right UE/LE coordination , weight shift , foot clearance , hip stabilization extensors  Gait Deviations: Slow Jacinda, Decreased step length, Decreased step height  Distance: 20ft  Comments: Pt expressing Increased pain/discomfort with ambulation. Verbal cuing provided for increased step length/height on R LE, forward gaze. Focus on proper footing to avoid R ankle supination. SBQC on L side, R hand in hand with wrist in extension to avoid R flexion synergy with R LE progress. Surface: level tile  Ambulation 1  Surface: level tile  Device: Small Base Quad Cane  Other Apparatus: Wheelchair follow  Assistance: Minimal assistance  Quality of Gait: decreased right UE/LE coordination , weight shift , foot clearance , hip stabilization extensors  Gait Deviations: Slow Jacinda, Decreased step length, Decreased step height  Distance: 20ft  Comments: Pt expressing Increased pain/discomfort with ambulation. Verbal cuing provided for increased step length/height on R LE, forward gaze. Focus on proper footing to avoid R ankle supination. SBQC on L side, R hand in hand with wrist in extension to avoid R flexion synergy with R LE progress. OT:  ADL  Equipment Provided: Reacher, Sock aid  Feeding: Setup, Increased time to complete  Grooming: Supervision, Verbal cueing, Setup  UE Bathing: Minimal assistance(assist provided for Left upper arm.  Writer provided demonstration for use of bent long-handled sponge to complete L arm, writer provided hand-over-hand to facilitate)  LE Bathing: Minimal assistance(Writer provided minimal assist while standing while pt utilized LUE to reach posteriorly to complete posterior perineal hygiene. Pt demo'd apporpirate use of long-handled sponge for feet and B lower legs. Pt able to hike B hips to complete posteriorthigh)  UE Dressing: Supervision(no assist required. Writer provided verbal cueing for sequencing for threading BUE.)  LE Dressing: Maximum assistance(Writer provided visual demonstration on use of reacher to thread BLE. Pt required assist after demonstration. Pt minimal assist to stand and pull pants around waist. Pt requires assist for socks as well this date)  Toileting: None  Additional Comments: ADLs completed sink-side in morning session this date. Writer provided education on use of sock aid. Writer threaded sock over sock-aid for pt while pt focused on placing sock aid in appopriate position and pulling over feet. Instrumental ADL's  Instrumental ADLs: Yes     Balance  Sitting Balance: Modified independent   Standing Balance: Minimal assistance(standing with 1-2 UE support )   Standing Balance  Time: 1 min x4   Activity: ADL, transfers  Comment: VC for visual assment of RLE placement c good self correction; attn to RUE placement on stable/dry surface (sink); improved initiation adjusting base of support in PM, use of simple squat to assist with donning lower body clothing iwith f tolerace.  PM: no LOB with dynamic balloon tap, use of LUE with writer stabilzing RUE on RW for fxl grasp, task to address stand balance/tolerance, not motor planning, completed in sitting for motor planning  Functional Mobility  Functional - Mobility Device: Wheelchair  Activity: To/from bathroom  Assist Level: Maximum assistance  Functional Mobility Comments: standing at washer, pt intiated offloading RLE to complete side step and widen her COLT demoing carryover of stability stategies     Bed mobility  Bridging: Stand by assistance  Rolling to Left: Stand by assistance  Rolling to Right: Stand by assistance  Supine to Sit: Supervision  Sit to Supine: Stand by assistance  Scooting: Supervision  Comment: Pt demo'd improved ability to transition from supine to sit this date. No verbal cueing provided for technique or s afety   Transfers  Stand Pivot Transfers: Minimal assistance  Sit Pivot Transfers: Minimal assistance  Sit to stand: Minimal assistance  Stand to sit: Minimal assistance  Transfer Comments: AM: Verbal cueing provided for appropriate hand/foot placement. Writer provided support on RUE to keep in place while completing transfers    Toilet Transfers  Toilet - Technique: Stand pivot  Equipment Used: Grab bars  Toilet Transfer: Contact guard assistance  Toilet Transfers Comments: AM: writer proivded education on appropriate wheelchair set-up, pt verbalized understanding. Pt provided minimal verbal cueing for appropriate sequencing with use of grab bars, good return no loss of b alance      Shower Transfers  Shower - Transfer From: Wheelchair  Shower - Transfer Type: To and From  Shower - Transfer To: Transfer tub bench  Shower - Technique: Stand pivot, To right, To left  Shower Transfers: Minimal assistance  Shower Transfers Comments: AM: Minimal assist provided to facilitate pivot on LLE to transtiion from wheelchair to tub transfer bench       SPEECH:      Objective:  /61   Pulse 57   Temp 97.9 °F (36.6 °C) (Oral)   Resp 16   Ht 5' 4\" (1.626 m)   Wt 198 lb 13.7 oz (90.2 kg)   SpO2 97%   BMI 34.13 kg/m²       GEN: Well developed, well nourished, in NAD  HEENT:  NCAT.  PERRL.  EOMI.  Mucous membranes pink and moist.   PULM:  Clear to ausculation. No rales or rhonchi. Respirations WNL and unlabored. CV:  Bradycardic rate regular rhythm.  No murmurs or gallops. GI:  Abdomen soft. Nontender. Non-distended.  BS + and equal.    NEUROLOGICAL: A&O x3. Sensation intact to light touch. Short sentences improving. MSK:  Functional ROM LUE and LLE. Impaired ROM RUE and RLE. Motor testing 4+/5 key muscles LUE and LLE.  R  4-/5, R wrist extension 4-/5, R finger extension 3/5, R elbow flexion and extension 3/5 . SKIN: Warm dry and intact. Good turgor. EXTREMITIES:  No calf tenderness to palpation. No edema BLEs. .    PSYCH: Mood WNL. Appropriately interactive. Affect WNL. Diagnostics:     CBC:   No results for input(s): WBC, RBC, HGB, HCT, MCV, RDW, PLT in the last 72 hours. BMP:   No results for input(s): NA, K, CL, CO2, PHOS, BUN, CREATININE, GLUCOSE in the last 72 hours. Invalid input(s): CA  BNP: No results for input(s): BNP in the last 72 hours. PT/INR: No results for input(s): PROTIME, INR in the last 72 hours. APTT: No results for input(s): APTT in the last 72 hours. CARDIAC ENZYMES: No results for input(s): CKMB, CKMBINDEX, TROPONINT in the last 72 hours. Invalid input(s): CKTOTAL;3  FASTING LIPID PANEL:  Lab Results   Component Value Date    CHOL 160 08/24/2020    HDL 44 08/24/2020    TRIG 79 08/24/2020     LIVER PROFILE: No results for input(s): AST, ALT, ALB, BILIDIR, BILITOT, ALKPHOS in the last 72 hours.      Current Medications:   Current Facility-Administered Medications: miconazole (MICOTIN) 2 % powder, , Topical, BID  carvedilol (COREG) tablet 6.25 mg, 6.25 mg, Oral, BID WC  HYDROcodone-acetaminophen (NORCO) 5-325 MG per tablet 1 tablet, 1 tablet, Oral, Q4H PRN  gabapentin (NEURONTIN) capsule 100 mg, 100 mg, Oral, TID  ondansetron (ZOFRAN-ODT) disintegrating tablet 4 mg, 4 mg, Oral, Q6H PRN  metFORMIN (GLUCOPHAGE) tablet 500 mg, 500 mg, Oral, BID WC  insulin lispro (HUMALOG) injection vial 0-12 Units, 0-12 Units, Subcutaneous, TID WC  insulin lispro (HUMALOG) injection vial 0-6 Units, 0-6 Units, Subcutaneous, Nightly  clopidogrel (PLAVIX) tablet 75 mg, 75 mg, Oral, Daily  enoxaparin (LOVENOX) injection 40 mg, 40 mg, Subcutaneous, Daily  amLODIPine (NORVASC) tablet 10 mg, 10 mg, Oral, QPM  atorvastatin (LIPITOR) tablet 80 mg, 80 mg, Oral, Nightly  losartan (COZAAR) tablet 100 mg, 100 mg, Oral, Daily **AND**

## 2020-09-14 NOTE — PROGRESS NOTES
Physical Therapy  Facility/Department: [facility]  Daily Treatment Note  NAME: Edelmira Mcguire  : 1950  MRN: [id number]    Date of Service: 2020    Discharge Recommendations:  Patient would benefit from continued therapy after discharge, 24 hour supervision or assist        Assessment      PT Education: Gait Training;General Safety;Precautions;Transfer Training  Patient Education: pt educated on transfers, gait training and stairs, wc mobility     Patient Diagnosis(es): There were no encounter diagnoses. has a past medical history of CHF (congestive heart failure) ([facility] [address] Diabetes mellitus ([facility] [address] H/O cardiac catheterization, H/O echocardiogram, History of echocardiogram, History of echocardiogram, Hyperlipidemia, and Hypertension. has a past surgical history that includes Cholecystectomy and Cardiac catheterization (Left, 2017). Restrictions  Restrictions/Precautions  Restrictions/Precautions: Fall Risk, Up as Tolerated, General Precautions  Required Braces or Orthoses?: No  Implants present? : (none)  Position Activity Restriction  Other position/activity restrictions: up as tolerated  Subjective             Orientation     Cognition      Objective   Bed mobility  Bridging: Stand by assistance  Rolling to Left: Stand by assistance  Rolling to Right: Stand by assistance  Supine to Sit: Stand by assistance  Sit to Supine: Stand by assistance  Transfers  Sit to Stand: Contact guard assistance(instruction left UE placement. hand in hand RUE)  Stand to sit: Minimal Assistance(instruction left UE placement , right dependant )  Bed to Chair: Minimal assistance(RW with right hand )  Stand Pivot Transfers: Minimal Assistance  Squat Pivot Transfers: Minimal Assistance  Comment: simple cueing and visual directional cues; patient at times does not follow given commands and needs redirection to prevent unsafe technique and patient limited by awareness to deficits.   Ambulation 1  Surface: level tile  Device: Small Gee [place]  Other Apparatus: Wheelchair follow  Assistance: Minimal assistance  45 feet and 55 feet  Quality of Gait: decreased right UE/LE coordination , weight shift , foot clearance , hip stabilization extensors  Gait Deviations: Slow Jacinda;Decreased step length;Decreased step height  Comments: therapist guarding patient for fall risk safety with decreased gait belt use secondary to poor incorrect feedback gait belt use provides. Patient in wide arm guard on RUE with noted WB through therapist support hand in hand technique. Patient unable to utilize [place] on walker with  support secondary to increased tone and aproximation of RUE to advance walker effectively d/t primative flexor synergy pattern of RUE with hip flexion during swing phase of gait  Stairs  # Steps : 3(tolerates up 2-3 steps prior to sitting rest break. tolerated repeatively 4 time)  Stairs Height: 6\"(and 4\")  Rails: Left ascending(hand in hand support on RUE with therapist facilitation of tricep extension)  Assistance:  Moderate assistance  Comment: instruction, tactile cueing for proper technique and placement of RLE; pt able to place foot with extra time and cueing without manual assist.  Wheelchair Activities  Wheelchair Type: Standard  Propulsion 1  Propulsion: Manual  Level: Level Tile  Method: LUE;LLE;RLE  Description/ Details: pt instructed in reciprical LE advancement , forward gaze with left <> right tracking, straight path    Distance: 55 feet with BLE and LUE +  30 feet with LUE/LLE        Other exercises  Other exercises 1: B LE Ace bandage wrap donned  Other exercises 2: Sit<>stand x6   Other exercises 3: Sit<>stands in //bars focusing on control and safe hand placement x10   Other exercises 4: standing balance/coordination exercises  Other exercises 5: neuro re supine/seated reciprocal                        G-Code     OutComes Score                                                     AM-PAC Score Goals  Short term goals  Time Frame for Short term goals: 10 days   Short term goal 1: Pt to perform bed mobility independently. Short term goal 2: Pt to perform transfers with minAx1. Short term goal 3: Pt to ambulate 150 ft, least restrictive device, minAx1. Short term goal 4: Pt to tolerate 30-60 mins physical therapy to increase endurance and strength. Short term goal 5: Pt to complete 5 steps, with LUE support, minAx1. Long term goals  Time Frame for Long term goals : By LOS. Long term goal 2: Pt to complete transfers with SBA to promote independence. Long term goal 3: Pt to ambulate 200 ft, least restrictive device, SBA. Long term goal 4: Pt to complete 5-12 steps, CGA to promote function. Long term goal 5: Pt to improve PASS score from 15/36 to 22/36. Patient Goals   Patient goals : To return back home. Plan    Plan  Times per week: 1.5 hrs/day 5-7 days/week  Times per day: (1-2 visits daily)  Specific instructions for Next Treatment: progress trasnfer training and ambulation as able, try balance activities. Current Treatment Recommendations: Strengthening, ROM, Balance Training, Functional Mobility Training, Transfer Training, Endurance Training, Gait Training, Stair training, Neuromuscular Re-education, Safety Education & Training, Patient/Caregiver Education & Training, Equipment Evaluation, Education, & procurement  Safety Devices  Type of devices:  All fall risk precautions in place, Gait belt, Chair alarm in place  Restraints  Initially in place: No     Therapy Time     09/14/20 0900 09/14/20 1438   PT Individual Minutes   Time In 0900 1215   Time Out 1005 1305   Minutes 65 50     Rosibel Mancilla, PTA

## 2020-09-14 NOTE — CARE COORDINATION
ONGOING DISCHARGE PLAN:    Spoke with patient regarding discharge plan and patient confirms that plan is still to be discharge home with vns    Will continue to follow for additional discharge needs.     Electronically signed by MOMO Crews, PAWAN on 9/14/2020 at 12:56 PM

## 2020-09-14 NOTE — PROGRESS NOTES
Speech Language Pathology  Speech Language Pathology  [facility]    Speech Language Treatment Note    Date: 9/14/2020  Patients Name: Edilia Scheuermann  MRN: [id number]  Diagnosis: CVA  Patient Active Problem List   Diagnosis Code    Hypertension I10    Hyperlipidemia E78.5    Acute on chronic systolic CHF (congestive heart failure) ([facility] [address] I50.23    Hypertensive urgency I16.0    Idiopathic cardiomyopathy ([facility] [address] I42.8    Hypertensive emergency I16.1    Chronic combined systolic and diastolic HF (heart failure), NYHA class 2 ([facility]) I50.42    Acute on chronic combined systolic and diastolic CHF, NYHA class 4 ([facility]) I50.43    Hypoxia R09.02    Severe mitral regurgitation by prior echocardiogram I34.0    Morbid obesity ([facility]) E66.01    CKD (chronic kidney disease) stage 3, GFR 30-59 ml/min ([facility]) N18.3    Physical deconditioning R53.81    TIA (transient ischemic attack) G45.9    Old lacunar stroke without late effect Z86.73    Dysarthria R47.1    Stroke determined by clinical assessment ([facility] [address] I63.9    Aphasia R47.01    Type 2 diabetes mellitus, without long-term current use of insulin ([facility] [address] E11.9    Cerebral infarction ([facility] [address] - left MCA distribution  I63.9    Uncontrolled type 2 diabetes mellitus with hyperglycemia ([facility] [address] E11.65    Elevated blood pressure reading R03.0    Acute ischemic stroke ([facility]) I63.9       Pain: 0/10    Speech and Language Treatment  Treatment time:  3465-4634    Subjective: [x] Alert [x] Cooperative     [] Confused     [] Agitated      [] Lethargic    Objective/Assessment:  Auditory Comprehension:   Pt. Responding to yes/no and simple conversational questions appropriately throughout session. One step directions- 70%, 90% c cues. Verbal Expression:     Describe action pictures- 12%, 36% c cues when sentence structure set up [facility] is. .. Shruthi Hawkins Ra \"   Counting 1-10: mod A. Naming days of the week: 100% c initiation cues only. Name body parts- 40%, 70% c cues.        Neologisms, phonemic paraphasias frequently present with spontaneous speech and during pt. Attempts at expressive language tasks. Motor speech:  n/a     Reading Comprehension:   n/a     Other:  No family present. Plan:  [x] Continue ST services    [] Discharge from ST:      Discharge recommendations: [] Inpatient Rehab   [] [place]   [] Outpatient Therapy  [] Follow up at trauma clinic   [] Other:       Treatment completed by: Tracy Dudley A.CCC/SLP

## 2020-09-14 NOTE — PATIENT CARE CONFERENCE
[address]   ACUTE REHABILITATION  TEAM CONFERENCE NOTE  Date: 9/15/20  Patient Name: Jasmina Savage       Room: 8684/7337-51  MRN: [id number]       : 1950  (80 y.o.)     Gender: female   Referring Practitioner: Dr. Tiburcio Caban   Acute ischemic left middle cerebral artery (MCA) stroke [facility]) [I63.512]  Diagnosis: Left ischemic middle cerebral artery stroke     NURSING  Bladder  Continent  Bowel   Continent  Intervention    None     Wounds/Incisions/Ulcers: breast redness  Medication Education Program: Patient currently unable to manage medications and family being educated  Pain: Patient's pain is currently controlled with -      Fall Risk:  Falling star program initiated    PHYSICAL THERAPY  Bed mobility  Bridging: Stand by assistance  Rolling to Left: Stand by assistance  Rolling to Right: Stand by assistance  Supine to Sit: Stand by assistance  Sit to Supine: Stand by assistance    Transfers:  Sit to Stand: Contact guard assistance(instruction left UE placement. hand in hand RUE)  Stand to sit: Minimal Assistance(instruction left UE placement , right dependant )  Bed to Chair: Minimal assistance(RW with right hand )  Squat Pivot Transfers: Minimal Assistance  Comment: Rest breaks PRN   Comment: simple cueing and visual directional cues; patient at times does not follow given commands and needs redirection to prevent unsafe technique and patient limited by awareness to deficits. Ambulation 1  Surface: level tile  Device: Small Base Quad Cane  Other Apparatus: Wheelchair follow  Assistance: Minimal assistance  Quality of Gait: decreased right UE/LE coordination , weight shift , foot clearance , hip stabilization extensors  Gait Deviations: Slow Jacinda;Decreased step length;Decreased step height  Distance: 20ft  Comments: therapist guarding patient for fall risk safety with decreased gait belt use secondary to poor incorrect feedback gait belt use provides.  Patient in wide arm guard on RUE with noted WB through therapist support hand in hand technique. Patient unable to utilize [place] on walker with  support secondary to increased tone and aproximation of RUE to advance walker effectively d/t primative flexor synergy pattern of RUE with hip flexion during swing phase of gait      Comments: instruction , tactile, assist , and visual cues to correct gait deviations , 1 step instruction, pt demonstrates impulsive movements     # Steps : 3(tolerates up 2-3 steps prior to sitting rest break. tolerated repeatively 4 time)  Stairs Height: 6\"(and 4\")  Rails: Left ascending(hand in hand support on RUE with therapist facilitation of tricep extension)  Assistance: Moderate assistance  Comment: instruction, tactile cueing for proper technique and placement of RLE; pt able to place foot with extra time and cueing without manual assist.    Wheelchair Activities  Wheelchair Type: Standard  Propulsion 1  Propulsion: Manual  Level: Level Tile  Method: LUE;LLE;RLE  Description/ Details: pt instructed in reciprical LE advancement , forward gaze with left <> right tracking, straight path    Distance: 55 feet with BLE and LUE +  30 feet with LUE/LLE           Walk   Walk 10 Feet  Partial/moderate assistance    3   Walk 50 feet with 2 Turns  Partial/moderate assistance   3   Walk 150 Feet      88   Walking 10 feet Uneven Surface  Partial/moderate assistance   3     Steps  1 Step (Curb)  Partial/moderate assistance    3   4 Steps  Partial/moderate assistance    3   12 Steps       88     Wheelchair Ability   Wheel 50 Feet - 2 turns  Supervision or touching assistance     4  Wheel 150 Feet  Supervision or touching assistance    4    Equipment Needed: (TBD)  Other: to be determined at pt progreses        Goals  Time Frame for Short term goals: 10 days   Short term goal 1: Pt to perform bed mobility independently. Short term goal 2: Pt to perform transfers with minAx1.    Short term goal 3: Pt to ambulate 150 ft, least restrictive device, minAx1. Short term goal 4: Pt to tolerate 30-60 mins physical therapy to increase endurance and strength. Short term goal 5: Pt to complete 5 steps, with LUE support, minAx1. OCCUPATIONAL THERAPY  SELF CARE   Equipment Provided: Reacher, Sock aid  Eating   5  Setup or clean-up assistance     Setup; Increased time to complete   Oral Hygiene   5  Assistance Needed: Setup or clean-up assistance     Supervision;Verbal cueing;Setup   Shower/Bathe Self   4  Assistance Needed: Supervision or touching assistance      UE Bathing: Minimal assistance  LE Bathing: Minimal assistance(seated in shower utilizing long handled sponge for LE washin)   Upper Body Dressing   5  Assistance Needed: Setup or clean-up assistance     Supervision(don/doff over head shirt )   Lower Body Dressing   3  Assistance Needed: Partial/moderate assistance     Putting On/Taking Off Footwear   3  Assistance Needed: Partial/moderate assistance      Maximum assistance(assist threading BLE, pulling up and adjusting pants )   Toilet Transfer   7     Did not need during OT session      Toileting Hygiene   2 Assistance Needed: Substantial/maximal assistance for brief management   None    Bed mobility  Supine to Sit: Supervision     Shower Transfers: Minimal assistance  Balance  Sitting Balance: Modified independent   Standing Balance: Minimal assistance(standing with 1-2 UE support )  Standing Balance  Time: 1 min x4   Activity: ADL, transfers    Equipment Recommendations  Equipment Needed: Yes(continue to assess pending progress )  Assessment: Pt progressing well towards OT goals. Pt continues to be limited by aphasia, decreased RUE function/FM skills, and decreased standing balance. Short term goals  Time Frame for Short term goals: 10 days  Short term goal 1: Pt will perform grooming with set-up and use of assistive equipment/modified techniques PRN.   Short term goal 2: Pt will perform upper body bathing/dressing with Standard  Bathroom Equipment: Tub transfer bench  Bathroom Accessibility: (walker will fit if used sideways)  Home Equipment: Rolling walker(walker is Pt's sister's)  Receives Help From: Family  ADL Assistance: Independent  Homemaking Assistance: Needs assistance(Sister primary for cooking, niece primary laundry (pt and pt's sister were using laundromat prior to pandemic); pt primary for cleaning)  Homemaking Responsibilities: No  Ambulation Assistance: Independent  Transfer Assistance: Independent  Active : No  Patient's  Info: Pt's sister  IADL Comments: pt states she has a flat bed at home, needs one that raises [facility]  Additional Comments: Pt's sister Luis Manuel Gomez (listed as Pt's emergency contact) contacted during OT session with Pt's OK. Pt's sister provided insight regarding Pt's prior level of function due to Pt's global aphasia which impairs Pt's ability to provide information at this time. Per Pt's sister, Pt was independent with all self-care and mobility at home prior to most recent stroke. Pt's sister is currently on medical leave from her position at a local [employer] due to her COPD with oxygen dependence as well as her own septic infection which occurred in June 2020 and lead to Pt's sister requiring dialysis. Per Luis Manuel Gmoez, she is likely retiring permanently this year and will be able to provide 24/7 Supervision upon Pt's discharge. However, due to Pt's sister's personal medical journey, she is unable to provide significant assist to Pt upon Pt's discharge. Luis Manuel Gomez stated she could provide Minimal assist PRN upon Pt's discharge. Pt's sister's daughter (a nurse practitioner) has been doing Pt and Pt's sister's laundry due to the pandemic (they utilized a laundromat prior to pandemic). Pt's niece able to provide A PRN.      Family Education: No family available for education    Risk for Readmission: Low 0 - 18%   Readmission Risk              Risk of Unplanned Readmission:        17 Critical Items: None     Problem / Barrier Intervention / Plan  Results   Impaired function related to R side weakness, decreased coordination/ataxia from stroke  Strengthening ex's/activiites, functional mobility training with appropriate device to facilitate increased independence for safe return to home.      Impaired Ability to Care for Self  Training in Modified care techniques and use of devices to support safe care performance     Impaired comprehension and expression Language retraining exercises, motor planning                                          Total Self Care Score    Total Mobility Score  Admission Score:  15      Admission Score:  25  Progress:  27       Progress:  39  Goal:  31/42         Goal:  66/90   `  Discharge Plan   Estimated Discharge Date: 9/22/2020  Overnight or Day Pass: No  Factors facilitating achievement of predicted outcomes: Motivated, Cooperative, Pleasant and Good insight into deficits  Barriers to the achievement of predicted outcomes: Pain, Limited family support, Communication deficit, Decreased endurance, Upper extremity weakness, Lower extremity weakness and Medication managment    Functional Goals at discharge:  Predicted Outcome: Home with familyPATIENT'S LEVEL OF ASSISTANCE: Contact Guard / [address] and Stand By Assistance   Discharge therapy goals:  PT: Long term goals  Time Frame for Long term goals : By LOS. Long term goal 2: Pt to complete transfers with SBA to promote independence. Long term goal 3: Pt to ambulate 200 ft, least restrictive device, SBA. Long term goal 4: Pt to complete 5-12 steps, CGA to promote function. Long term goal 5: Pt to improve PASS score from 15/36 to 22/36. OT:Long term goals  Time Frame for Long term goals : By discharge  Long term goal 1: Pt will perform self-feeding and grooming with modified independence.   Long term goal 2: Pt will perform bathing, dressing, and toileting tasks with stand by assist, Fair safety,

## 2020-09-14 NOTE — PLAN OF CARE
Problem: Skin Integrity:  Goal: Absence of new skin breakdown  Description: Absence of new skin breakdown  Outcome: Met This Shift   Skin assessment completed this shift. Nutrition and Hydration status assessed with adequate intake. Fransisco Score as charted. Bilateral heels remain elevated on pillows throughout the shift. Patient able to reposition self for comfort and to prevent breakdown. Patient verbalizes understanding of pressure ulcer prevention measures. Skin integrity maintained. No new skin breakdown noted. Skin to high risk pressure areas including coccyx and heels are clear.     Vicky / Incontinence care provided as needed throughout the shift. Aloe Vesta Moisture Barrier ointment applied to buttocks as a preventative measure. Scattered Bruising noted to BUE. Miconazole powder applied under bilat breasts for slight pinkness in color. No open areas noted, and skin is dry under breasts.        Problem: Falls - Risk of:  Goal: Will remain free from falls  Description: Will remain free from falls  Outcome: Met This Shift  No falls or injuries sustained at this time. No attempts to get out of bed without nursing assistance. Call light within reach and pt. uses appropriately for assistance. Siderails up x 2. Nonskid footwear remains on. Bed in low and locked position. Hourly nursing rounds made. Pt. Alert and oriented, aware of limitations, and exhibits good safety judgement. Pt. uses assistive devices appropriately. Pt. understands individual fall risk factors.     Pt. reminded to use call light with each nurse/patient interaction.      Bed alarm remains engaged throughout the shift as a precaution.          Problem: Pain:  Goal: Pain level will decrease  Description: Pain level will decrease  Outcome: Ongoing  Pain assessment and reassessment completed so far this shift. Pt. able to rest after the use of pain medication. Patient medicated with 1 Norco Q4hrs for c/o pain in right leg.                Pt. Repositions per self for comfort. Nonverbal cues indicate pain relief. Pt. Rests quietly with eyes closed after pain medication administration. Respirations easy and unlabored.  Appears free from distress.

## 2020-09-14 NOTE — PROGRESS NOTES
[facility]   ACUTE REHABILITATION OCCUPATIONAL THERAPY  DAILY NOTE    Date: 20  Patient Name: Jasmina Savage      Room: 3673/0917-70    MRN: [id number]   : 1950  (80 y.o.)  Gender: female   Referring Practitioner: Dr. Tiburcio Caban  Diagnosis: Left ischemic middle cerebral artery stroke       Restrictions  Restrictions/Precautions: Fall Risk, Up as Tolerated, General Precautions  Implants present? : (none)  Other position/activity restrictions: up as tolerated  Required Braces or Orthoses?: No    Subjective  Comments: pt alert and motivated   Patient Currently in Pain: Denies  Restrictions/Precautions: Fall Risk;Up as Tolerated;General Precautions  Overall Orientation Status: Within Functional Limits          Objective  Cognition  Overall Cognitive Status: Impaired  Arousal/Alertness: Appropriate responses to stimuli  Following Directions: Follows two step commands  Memory: Unable to assess  Following Commands: Follows multistep commands with repetition  Safety Judgement: Decreased awareness of need for safety  Awareness of Errors: Assistance required to identify errors made  Insights: Decreased awareness of deficits  Sequencing and Organization: Assistance required to identify errors made;Assistance required to generate solutions;Assistance required to implement solutions  Balance  Sitting Balance: Modified independent   Standing Balance: Minimal assistance(standing with 1-2 UE support )  Bed mobility  Supine to Sit: Supervision  Transfers  Sit to stand: Minimal assistance  Stand to sit: Minimal assistance  Standing Balance  Time: 1 min x4   Activity: ADL, transfers  Functional Mobility  Functional - Mobility Device: Wheelchair  Activity: To/from bathroom  Assist Level: Maximum assistance  Shower Transfers  Shower - Transfer From: Wheelchair  Shower - Transfer Type: To and From  Shower - Transfer To: Transfer tub bench  Shower - Technique: Stand pivot; To right; To left  Shower Transfers: Minimal assistance  Fine Motor: Pt engaged in activity of grasping ball with RUE and moving form one container to the other. Pt requried verbal cues for only using RUE multiple times throughout activity. Activity completed to increase strenght and endurance for ADL and IADL activities                ADL  Feeding: Setup; Increased time to complete  Grooming: Supervision;Verbal cueing;Setup  UE Bathing: Minimal assistance  LE Bathing: Minimal assistance(seated in shower utilizing long handled sponge for LE washing)  UE Dressing: Supervision(don/doff over head shirt )  LE Dressing: Maximum assistance(assist threading BLE, pulling up and adjusting pants )  Toileting: None          Assessment  Performance deficits / Impairments: Decreased functional mobility ; Decreased ADL status; Decreased ROM; Decreased strength;Decreased safe awareness;Decreased cognition;Decreased endurance;Decreased sensation;Decreased balance;Decreased high-level IADLs;Decreased fine motor control;Decreased coordination;Decreased posture  Prognosis: Good  Activity Tolerance: Patient Tolerated treatment well;Patient limited by fatigue  Safety Devices in place: Yes  Type of devices: Left in chair;Gait belt;Call light within reach          Patient Education:  Patient Goals   Patient goals : To return home with sister  Paola Segovia  Method: demonstration and explanation       Outcome: acknowledged understanding         Plan  Plan  Times per week: 5-7x/week, 1.5 hours/day  Times per day: Twice a day  Current Treatment Recommendations: Self-Care / ADL, Home Management Training, Cognitive/Perceptual Training, Strengthening, ROM, Balance Training, Functional Mobility Training, Endurance Training, Neuromuscular Re-education, Cognitive Reorientation, Pain Management, Safety Education & Training, Patient/Caregiver Education & Training, Equipment Evaluation, Education, & procurement, Positioning  Patient Goals   Patient goals :  To return home with sister  Short term goals  Time Frame for Short term goals: 10 days  Short term goal 1: Pt will perform grooming with set-up and use of assistive equipment/modified techniques PRN. Short term goal 2: Pt will perform upper body bathing/dressing with standby assist and use of assistive equipment/modified techniques PRN. Short term goal 3: Pt will perform toileting tasks and lower body bathing/dressing with minimal assist and use of assitive equipment/durable medical equipment/modified techniques PRN. Short term goal 4: Pt will perform functional mobility and transfers during self-care with Minimal assist, least restrictive mobility device, and Fair safety. Short term goal 5: Pt will stand for 4+ minutes with 1-2 UE support, contact guard assist, and no loss of balance while engaging in functional activity of choice. Short term goal 6: Pt will actively participate in 30+ minutes of therapeutic exercise/functional activities to promote increased independence with self-care and mobility. Short term goal 7: Pt will verbalize/demonstrate Good understanding of assistive equipment/durable medical equipment/modified techniques for increased independence with self-care and mobility. Long term goals  Time Frame for Long term goals : By discharge  Long term goal 1: Pt will perform self-feeding and grooming with modified independence. Long term goal 2: Pt will perform bathing, dressing, and toileting tasks with stand by assist, Fair safety, and assist PRN for MIRTA hose. Long term goal 3: Pt will perform functional mobility and transfers during self-care with stand by assist, least restrictive mobility device, and Fair safety. Long term goal 4: Pt will stand for 8+ minutes with 1-2 UE support, stand by assist, and no loss of balance while engaging in functional activity of choice.   Long term goal 5: Patient will increase strength/coordination of RUE as evident by an increase in  strength R hand by 5-7#, 5-7 block increase in box and blocks test, and will attempt 9 Hole Peg test as appropriate        09/14/20 0730 09/14/20 1538   OT Individual Minutes   Time In 0730 1330   Time Out 0825 1400   Minutes 55 30     Electronically signed by HERBERT Gong on 9/14/20 at 3:39 PM EDT

## 2020-09-15 LAB
GLUCOSE BLD-MCNC: 112 MG/DL (ref 65–105)
GLUCOSE BLD-MCNC: 117 MG/DL (ref 65–105)
GLUCOSE BLD-MCNC: 123 MG/DL (ref 65–105)
GLUCOSE BLD-MCNC: 98 MG/DL (ref 65–105)

## 2020-09-15 PROCEDURE — 6370000000 HC RX 637 (ALT 250 FOR IP): Performed by: PHYSICAL MEDICINE & REHABILITATION

## 2020-09-15 PROCEDURE — 82947 ASSAY GLUCOSE BLOOD QUANT: CPT

## 2020-09-15 PROCEDURE — 97110 THERAPEUTIC EXERCISES: CPT

## 2020-09-15 PROCEDURE — 99231 SBSQ HOSP IP/OBS SF/LOW 25: CPT | Performed by: PHYSICAL MEDICINE & REHABILITATION

## 2020-09-15 PROCEDURE — 97542 WHEELCHAIR MNGMENT TRAINING: CPT

## 2020-09-15 PROCEDURE — 6370000000 HC RX 637 (ALT 250 FOR IP): Performed by: INTERNAL MEDICINE

## 2020-09-15 PROCEDURE — 1180000000 HC REHAB R&B

## 2020-09-15 PROCEDURE — 6360000002 HC RX W HCPCS: Performed by: INTERNAL MEDICINE

## 2020-09-15 PROCEDURE — 97116 GAIT TRAINING THERAPY: CPT

## 2020-09-15 PROCEDURE — 92507 TX SP LANG VOICE COMM INDIV: CPT

## 2020-09-15 RX ADMIN — LOSARTAN POTASSIUM 100 MG: 50 TABLET, FILM COATED ORAL at 08:01

## 2020-09-15 RX ADMIN — HYDROCODONE BITARTRATE AND ACETAMINOPHEN 1 TABLET: 5; 325 TABLET ORAL at 20:28

## 2020-09-15 RX ADMIN — METFORMIN HYDROCHLORIDE 500 MG: 500 TABLET ORAL at 17:00

## 2020-09-15 RX ADMIN — METRONIDAZOLE 100 MG: 500 TABLET ORAL at 20:28

## 2020-09-15 RX ADMIN — AMLODIPINE BESYLATE 10 MG: 10 TABLET ORAL at 17:00

## 2020-09-15 RX ADMIN — ANTI-FUNGAL POWDER MICONAZOLE NITRATE TALC FREE: 1.42 POWDER TOPICAL at 20:28

## 2020-09-15 RX ADMIN — ATORVASTATIN CALCIUM 80 MG: 80 TABLET, FILM COATED ORAL at 20:28

## 2020-09-15 RX ADMIN — ASPIRIN 81 MG CHEWABLE TABLET 81 MG: 81 TABLET CHEWABLE at 07:57

## 2020-09-15 RX ADMIN — CARVEDILOL 6.25 MG: 6.25 TABLET, FILM COATED ORAL at 07:57

## 2020-09-15 RX ADMIN — HYDROCODONE BITARTRATE AND ACETAMINOPHEN 1 TABLET: 5; 325 TABLET ORAL at 07:57

## 2020-09-15 RX ADMIN — HYDROCHLOROTHIAZIDE 25 MG: 25 TABLET ORAL at 07:57

## 2020-09-15 RX ADMIN — CLOPIDOGREL BISULFATE 75 MG: 75 TABLET ORAL at 07:57

## 2020-09-15 RX ADMIN — CARVEDILOL 6.25 MG: 6.25 TABLET, FILM COATED ORAL at 17:00

## 2020-09-15 RX ADMIN — METFORMIN HYDROCHLORIDE 500 MG: 500 TABLET ORAL at 07:57

## 2020-09-15 RX ADMIN — POLYETHYLENE GLYCOL 3350 17 G: 17 POWDER, FOR SOLUTION ORAL at 07:57

## 2020-09-15 RX ADMIN — METRONIDAZOLE 100 MG: 500 TABLET ORAL at 07:59

## 2020-09-15 RX ADMIN — ANTI-FUNGAL POWDER MICONAZOLE NITRATE TALC FREE: 1.42 POWDER TOPICAL at 07:57

## 2020-09-15 RX ADMIN — METRONIDAZOLE 100 MG: 500 TABLET ORAL at 14:52

## 2020-09-15 RX ADMIN — ENOXAPARIN SODIUM 40 MG: 40 INJECTION SUBCUTANEOUS at 07:57

## 2020-09-15 ASSESSMENT — PAIN DESCRIPTION - PAIN TYPE: TYPE: ACUTE PAIN

## 2020-09-15 ASSESSMENT — PAIN DESCRIPTION - PROGRESSION
CLINICAL_PROGRESSION: NOT CHANGED

## 2020-09-15 ASSESSMENT — PAIN SCALES - WONG BAKER
WONGBAKER_NUMERICALRESPONSE: 0

## 2020-09-15 ASSESSMENT — PAIN SCALES - GENERAL
PAINLEVEL_OUTOF10: 2
PAINLEVEL_OUTOF10: 5
PAINLEVEL_OUTOF10: 5
PAINLEVEL_OUTOF10: 7

## 2020-09-15 ASSESSMENT — PAIN DESCRIPTION - FREQUENCY: FREQUENCY: INTERMITTENT

## 2020-09-15 ASSESSMENT — PAIN DESCRIPTION - ORIENTATION: ORIENTATION: RIGHT

## 2020-09-15 ASSESSMENT — PAIN DESCRIPTION - ONSET: ONSET: ON-GOING

## 2020-09-15 ASSESSMENT — PAIN DESCRIPTION - LOCATION: LOCATION: LEG

## 2020-09-15 ASSESSMENT — PAIN DESCRIPTION - DESCRIPTORS: DESCRIPTORS: ACHING;DISCOMFORT

## 2020-09-15 NOTE — PROGRESS NOTES
Physical Therapy  Facility/Department: [facility] [place] ACUTE REHAB  Daily Treatment Note  NAME: Fouzia Ho  : 1950  MRN: [id number]    Date of Service: 9/15/2020    Discharge Recommendations:  Patient would benefit from continued therapy after discharge, 24 hour supervision or assist   PT Equipment Recommendations  Equipment Needed: Yes  Mobility Devices: Canes  Cane: [employer]    Assessment      PT Education: Gait Training;Functional Mobility Training  Patient Education: Pt educated on safe stair gait patterns and postural corrections. Patient also needed education for safe transfer from standing to sitting in [facility] regarding hand placement. Demonstrates poor positional awareness when approaching WC to sit. Activity Tolerance  Activity Tolerance: Patient limited by endurance; Patient limited by fatigue     Patient Diagnosis(es): There were no encounter diagnoses. has a past medical history of CHF (congestive heart failure) ([facility] [address] Diabetes mellitus ([facility] [address] H/O cardiac catheterization, H/O echocardiogram, History of echocardiogram, History of echocardiogram, Hyperlipidemia, and Hypertension. has a past surgical history that includes Cholecystectomy and Cardiac catheterization (Left, 2017). Restrictions  Restrictions/Precautions  Restrictions/Precautions: Fall Risk, Up as Tolerated, General Precautions  Required Braces or Orthoses?: No  Implants present? : (none)  Position Activity Restriction  Other position/activity restrictions: up as tolerated  Subjective   General  Chart Reviewed: Yes  Response To Previous Treatment: Patient reporting fatigue but able to participate. Family / Caregiver Present: No  Subjective  Subjective: Patient presented slightly fatigued, but willing to participate in therapy. no noted changes in meds. Two variations of AFO were tried, both improving foot drop, with no patient complaints. Minimal swelling in BLE, however more notable bruising seen in BUE.   Pain Screening  Patient Currently in Pain: No  Vital Signs  Patient Currently in Pain: No       Orientation     Cognition      Objective      Transfers  Sit to Stand: Contact guard assistance  Stand to sit: Moderate Assistance  Bed to Chair: Minimal assistance  Stand Pivot Transfers: Minimal Assistance  Ambulation 1  Surface: level tile  Device: Small Gee Foreign  Other Apparatus: Wheelchair follow;Right(Ankle brace to prevent eversion and PF)  Assistance: Contact guard assistance; Moderate assistance  Quality of Gait: Downward gaze, R toe catches in swing phase. Gait Deviations: Decreased step length;Decreased step height  Distance: 60', rest, 30'  Comments: Hand in hand RUE for balance, verbal cues to correct posture and gave, and mod assist to prevent fall due to toe drag  Stairs  # Steps : 5  Stairs Height: 4\"(3-4\", 2-6\")  Rails: Left ascending(Hand in hand assist on RUE)  Comment: Pt required verbal cues to perform step to pattern, had preference towards step through. Balance  Posture: Fair  Sitting - Static: Good  Sitting - Dynamic: Good  Standing - Static: Fair;-  Standing - Dynamic: Poor;+  Comments: LOB x1 when transfering stand to WC. Pt has tendency to lurch forward. G-Code     OutComes Score                                                     AM-PAC Score             Goals  Short term goals  Time Frame for Short term goals: 10 days   Short term goal 1: Pt to perform bed mobility independently. Short term goal 2: Pt to perform transfers with minAx1. Short term goal 3: Pt to ambulate 150 ft, least restrictive device, minAx1. Short term goal 4: Pt to tolerate 30-60 mins physical therapy to increase endurance and strength. Short term goal 5: Pt to complete 5 steps, with LUE support, minAx1. Long term goals  Time Frame for Long term goals : By LOS. Long term goal 2: Pt to complete transfers with SBA to promote independence.    Long term goal 3: Pt to ambulate 200 ft,

## 2020-09-15 NOTE — PLAN OF CARE
Problem: Skin Integrity:  Goal: Absence of new skin breakdown  Description: Absence of new skin breakdown  Outcome: Met This Shift   Skin assessment completed this shift. Nutrition and Hydration status assessed with adequate intake. Fransisco Score as charted. Bilateral heels remain elevated on pillows throughout the shift. Patient able to reposition self for comfort and to prevent breakdown. Patient verbalizes understanding of pressure ulcer prevention measures. Skin integrity maintained. No new skin breakdown noted. Skin to high risk pressure areas including coccyx and heels are clear.     Vicky / Incontinence care provided as needed throughout the shift. Aloe Vesta Moisture Barrier ointment applied to buttocks as a preventative measure. Scattered Bruising noted to BUE. Miconazole powder applied under bilat breasts for slight pinkness in color. No open areas noted, and skin is dry under breasts.        Problem: Falls - Risk of:  Goal: Will remain free from falls  Description: Will remain free from falls  Outcome: Met This Shift  No falls or injuries sustained at this time. No attempts to get out of bed without nursing assistance. Call light within reach and pt. uses appropriately for assistance. Siderails up x 2. Nonskid footwear remains on. Bed in low and locked position. Hourly nursing rounds made. Pt. Alert and oriented, aware of limitations, and exhibits good safety judgement. Pt. uses assistive devices appropriately. Pt. understands individual fall risk factors.     Pt. reminded to use call light with each nurse/patient interaction.      Bed alarm remains engaged throughout the shift as a precaution.          Problem: Pain:  Goal: Pain level will decrease  Description: Pain level will decrease  Outcome: Ongoing  Pain assessment and reassessment completed so far this shift. Pt. able to rest after the use of pain medication. Patient medicated Luis Miguel Treadwell Q4hrs for c/o pain in right leg.               Pt. Repositions per self for comfort. Nonverbal cues indicate pain relief. Pt. Rests quietly with eyes closed after pain medication administration. Respirations easy and unlabored.  Appears free from distress.

## 2020-09-15 NOTE — PROGRESS NOTES
Errors: Assistance required to identify errors made  Insights: Decreased awareness of deficits  Sequencing and Organization: Assistance required to identify errors made;Assistance required to generate solutions;Assistance required to implement solutions  Perception  Overall Perceptual Status: Impaired  Unilateral Attention: Cues to attend to right side of body  Initiation: Cues to initiate tasks  Motor Planning: Cues to use objects appropriately  Balance  Sitting Balance: Modified independent   Standing Balance: Minimal assistance(standing with 1-2 UE support )  Bed mobility  Bridging: Stand by assistance  Rolling to Left: Stand by assistance  Rolling to Right: Stand by assistance  Supine to Sit: Stand by assistance  Sit to Supine: Stand by assistance  Scooting: Supervision  Transfers  Stand Pivot Transfers: Minimal assistance  Sit Pivot Transfers: Minimal assistance  Sit to stand: Minimal assistance  Stand to sit: Minimal assistance  Transfer Comments: AM: Verbal cueing provided for appropriate hand/foot placement. Writer provided support on RUE to keep in place while completing transfers   Standing Balance  Time: 1-4min x4  Activity: ADLs  Comment: VC for RLE attention and adjusting Base of support;  VC for  attention to R hand and wrist positioning (max)  Functional Mobility  Functional - Mobility Device: Wheelchair  Activity: To/from bathroom  Assist Level: Maximum assistance  Functional Mobility Comments: max VC to tend to RLE mod VC to tend to RUE; demoed improved safety and technique using RLE footpedal,   Toilet Transfers  Toilet - Technique: Stand pivot  Equipment Used: Grab bars  Toilet Transfer: Contact guard assistance  Toilet Transfers Comments: AM: writer proivded education on appropriate wheelchair set-up, pt verbalized understanding. Pt provided minimal verbal cueing for appropriate sequencing with use of grab bars, good return no loss of balance .  VC for RUE hand placement on grab bars; fair carryover              Weight Bearing  Weight Bearing Technique: Yes  Response To Weight Bearing Technique: Fair tolerance to wrist placement, F+to forearm/elbow        Vision  Vision Comment: pt reports wears glasses baseline; they are at her sisters; Joanna Gray encouraged to have family bring them in  ADL  Feeding: Setup; Increased time to complete  Grooming: Supervision;Verbal cueing;Setup(cuing for toothpaste to mouth technique)  UE Bathing: Minimal assistance  LE Bathing: Minimal assistance(A with buttocks; completed to ankles only)  UE Dressing: Supervision;Setup(don/doff over head shirt ; declined bra)  LE Dressing: Maximum assistance(assist threading BLE, pulling up and adjusting pants )  Toileting: Minimal assistance; Moderate assistance(completed luis seated, assisted with clotng mgt)  Additional Comments: Foot care completed prior to writers treatment; use of ACE wraps due to poor sizing with TEDs. Able to manage  clothing over Left hip, requiring ModA over R hip; VC and [place] for hooking with R thumb with poor carrryover. Pt verbalized this date there she plans to wear dresses at home to reduce burden on LBD. Overall increased use of RUE and intiation with tasks despite neglect during mobility. Positioning  Adaptations: TEDs/transfers/dycem for WB/stability/communication board for languaged barrier       Assessment  Performance deficits / Impairments: Decreased functional mobility ; Decreased ADL status; Decreased ROM; Decreased strength;Decreased safe awareness;Decreased cognition;Decreased endurance;Decreased sensation;Decreased balance;Decreased high-level IADLs;Decreased fine motor control;Decreased coordination;Decreased posture  Assessment: Pt progressing well towards OT goals. Pt continues to be limited by aphasia, decreased RUE function/FM skills, and decreased standing balance.    Prognosis: Good  Discharge Recommendations: 24 hour supervision or assist;Patient would benefit from continued therapy after discharge  Activity Tolerance: Patient Tolerated treatment well  Safety Devices in place: Yes  Type of devices: Left in chair;Gait belt;Call light within reach  Restraints  Initially in place: No  Restraints: chair alarm  Equipment Recommendations  Equipment Needed: Yes(continue to assess pending progress )       Patient Education:  OT POC, R neglect, ADL compensation  Patient Goals   Patient goals : To return home with sister Margy Tavarez  Method: demonstration and explanation       Outcome: needs reinforcement        Plan  Plan  Times per week: 5-7x/week, 1.5 hours/day  Times per day: Twice a day  Current Treatment Recommendations: Self-Care / ADL, Home Management Training, Cognitive/Perceptual Training, Strengthening, ROM, Balance Training, Functional Mobility Training, Endurance Training, Neuromuscular Re-education, Cognitive Reorientation, Pain Management, Safety Education & Training, Patient/Caregiver Education & Training, Equipment Evaluation, Education, & procurement, Positioning  Patient Goals   Patient goals : To return home with   Short term goals  Time Frame for Short term goals: 10 days  Short term goal 1: Pt will perform grooming with set-up and use of assistive equipment/modified techniques PRN. Short term goal 2: Pt will perform upper body bathing/dressing with standby assist and use of assistive equipment/modified techniques PRN. Short term goal 3: Pt will perform toileting tasks and lower body bathing/dressing with minimal assist and use of assitive equipment/durable medical equipment/modified techniques PRN. Short term goal 4: Pt will perform functional mobility and transfers during self-care with Minimal assist, least restrictive mobility device, and Fair safety. Short term goal 5: Pt will stand for 4+ minutes with 1-2 UE support, contact guard assist, and no loss of balance while engaging in functional activity of choice.   Short term goal 6: Pt will actively participate in 30+ minutes of therapeutic exercise/functional activities to promote increased independence with self-care and mobility. Short term goal 7: Pt will verbalize/demonstrate Good understanding of assistive equipment/durable medical equipment/modified techniques for increased independence with self-care and mobility. Long term goals  Time Frame for Long term goals : By discharge  Long term goal 1: Pt will perform self-feeding and grooming with modified independence. Long term goal 2: Pt will perform bathing, dressing, and toileting tasks with stand by assist, Fair safety, and assist PRN for MIRTA hose. Long term goal 3: Pt will perform functional mobility and transfers during self-care with stand by assist, least restrictive mobility device, and Fair safety. Long term goal 4: Pt will stand for 8+ minutes with 1-2 UE support, stand by assist, and no loss of balance while engaging in functional activity of choice.   Long term goal 5: Patient will increase strength/coordination of RUE as evident by an increase in  strength R hand by 5-7#, 5-7 block increase in box and blocks test, and will attempt 9 Hole Peg test as appropriate         09/15/20 1519 09/15/20 1636   OT Individual Minutes   Time In 1015 1549   Time Out 1112 1600   Minutes 57 11       Electronically signed by HERBERT Lazo on 9/15/20 at 4:37 PM EDT

## 2020-09-15 NOTE — PROGRESS NOTES
Speech Language Pathology  Speech Language Pathology  [facility]    Speech Language Treatment Note    Date: 9/15/2020  Patients Name: Bunny Jackson  MRN: [id number]  Diagnosis: CVA  Patient Active Problem List   Diagnosis Code    Hypertension I10    Hyperlipidemia E78.5    Acute on chronic systolic CHF (congestive heart failure) ([facility] [address] I50.23    Hypertensive urgency I16.0    Idiopathic cardiomyopathy ([facility] [address] I42.8    Hypertensive emergency I16.1    Chronic combined systolic and diastolic HF (heart failure), NYHA class 2 ([facility]) I50.42    Acute on chronic combined systolic and diastolic CHF, NYHA class 4 ([facility]) I50.43    Hypoxia R09.02    Severe mitral regurgitation by prior echocardiogram I34.0    Morbid obesity ([facility]) E66.01    CKD (chronic kidney disease) stage 3, GFR 30-59 ml/min ([facility]) N18.3    Physical deconditioning R53.81    TIA (transient ischemic attack) G45.9    Old lacunar stroke without late effect Z86.73    Dysarthria R47.1    Stroke determined by clinical assessment ([facility] [address] I63.9    Aphasia R47.01    Type 2 diabetes mellitus, without long-term current use of insulin ([facility] [address] E11.9    Cerebral infarction ([facility] [address] - left MCA distribution  I63.9    Uncontrolled type 2 diabetes mellitus with hyperglycemia ([facility] [address] E11.65    Elevated blood pressure reading R03.0    Acute ischemic stroke ([facility]) I63.9       Pain: 0/10    Speech and Language Treatment  Treatment time:  9648-0330    Subjective: [x] Alert [x] Cooperative     [] Confused     [] Agitated      [] Lethargic    Objective/Assessment:  Auditory Comprehension:   Pt. Responding to yes/no and simple conversational questions appropriately throughout session. Verbal Expression:     One word sentence completion- 50%, 70% c cues. Name objects around room- 38%, 69% c sentence completion cues. Pt. Demonstrating some phonemic errors. Pt. Repeating c 50% accuracy.      Neologisms, phonemic paraphasias frequently present with spontaneous speech and during pt. Attempts at expressive language tasks. Motor speech:  n/a     Reading Comprehension:   Match word to picture out of field of 3- 88% c verbal, tactile cues to point to word. Pt. Read words aloud c 50% accuracy. Other:  No family present. Plan:  [x] Continue ST services    [] Discharge from ST:      Discharge recommendations: [] Inpatient Rehab   [] [place]   [] Outpatient Therapy  [] Follow up at trauma clinic   [] Other:       Treatment completed by: Khadra Dawson A.CCC/SLP

## 2020-09-15 NOTE — CONSULTS
[facility] Internal Medicine    CONSULTATION / HISTORY AND PHYSICAL EXAMINATION            Date:   9/14/2020  Patient name:  Christiano Bronson  Date of admission:  8/26/2020  6:56 PM  MRN:   [id number]  Account:  [de-identified]  YOB: 1950  PCP:    SUE Timmons CNP  Room:   [facility]/1627-15  Code Status:    Full Code    Physician Requesting Consult: Roddy Neff MD    Reason for Consult: Medical management    Chief Complaint:     No chief complaint on file. Stroke    History Obtained From:     Patient medical record nursing staff    History of Present Illness:     73-year-old pleasant  lady was admitted to [facility] on August 23 with a aphasia diagnosed with the left middle cerebral artery ischemic infarct  Known history of systolic congestive heart failure ejection fraction 35 to 40% secondary to nonischemic cardiomyopathy  Diabetes   Duration more than 7 years  Modifying factors on Glucophage and other med  Severity uncontrolled sever  Associated signs and symtoms neuropathy/ckd/ CAD. aggravated with sugar diet and better with low sugar diet             Past Medical History:     Past Medical History:   Diagnosis Date    CHF (congestive heart failure) ([address]     Diabetes mellitus ([facility] [address]     H/O cardiac catheterization 08/04/2017    LMCA: Mild irregularites 10-20%. LAD: Mild irregularites 10-20%. LCx: Mild irregularites 10-20%. RCA: Mild irregularites 10-20%. LV function assessed as : Abnormal. EF of 40%.  H/O echocardiogram 12/18/2018    EF 55%. Mildly increased LV wall thickness. The left atrium appears moderately to severely dilated. Moderate to severe mitral regurg. Moderate pulmonary HTN with an estimated RV systolic pressure of 82OHMT. Moderate tricuspid regurg. Evidnece fo moderate diastolic dysfunction is seen.  History of echocardiogram 01/17/2019    EF 55%. LV wall thickness moderately increased.  LA is mildly dilated w/ LA volume index of 30. trivial mitral regurg. Evidence of moderate (grade II) diastolic dysfunction seen.  History of echocardiogram 2019    EF of 50-55%. LV wall thickness is mildly increased. LA is mildly dilated (29-33) with a left atrial volume index of 31 m1/m2. mild diastolic dysfunction.  Hyperlipidemia     Hypertension         Past Surgical History:     Past Surgical History:   Procedure Laterality Date    CARDIAC CATHETERIZATION Left 2017    right radial, [facility] Dr. Marixa Johnson          Medications Prior to Admission:     Prior to Admission medications    Medication Sig Start Date End Date Taking? Authorizing Provider   clopidogrel (PLAVIX) 75 MG tablet Take 1 tablet by mouth daily 20   Kassandra Kruse MD   carvedilol (COREG) 12.5 MG tablet Take 1 tablet by mouth 2 times daily (with meals) 20   Angie Burns MD   losartan-hydrochlorothiazide [facility]) 100-25 MG per tablet Take 1 tablet by mouth daily 20   Angie Burns MD   amLODIPine (NORVASC) 10 MG tablet Take 1 tablet by mouth daily 20   Angie Burns MD   atorvastatin (LIPITOR) 80 MG tablet Take 1 tablet by mouth nightly 20   Angie Burns MD   aspirin 81 MG EC tablet Take 1 tablet by mouth daily 20   Angie Burns MD   metFORMIN (GLUCOPHAGE) 500 MG tablet Take 500 mg by mouth 2 times daily (with meals)    Historical Provider, MD        Allergies:     Patient has no known allergies. Social History:     Tobacco:    reports that she has never smoked. She has never used smokeless tobacco.  Alcohol:      reports no history of alcohol use. Drug Use:  reports no history of drug use. Family History:     Family History   Problem Relation Age of Onset    Coronary Art Dis Father          from [place]    COPD Sister         O2 dep    Coronary Art Dis Brother         2 brothers  from [place]       Review of Systems:     Positive and Negative as described in HPI.     CONSTITUTIONAL:  negative for fevers, chills, sweats, fatigue, weight loss  HEENT:  negative for vision, hearing changes, runny nose, throat pain  RESPIRATORY:  negative for shortness of breath, cough, congestion, wheezing. CARDIOVASCULAR:  negative for chest pain, palpitations. GASTROINTESTINAL:  negative for nausea, vomiting, diarrhea, constipation, change in bowel habits, abdominal pain   GENITOURINARY:  negative for difficulty of urination, burning with urination, frequency   INTEGUMENT:  negative for rash, skin lesions, easy bruising   HEMATOLOGIC/LYMPHATIC:  negative for swelling/edema   ALLERGIC/IMMUNOLOGIC:  negative for urticaria , itching  ENDOCRINE:  negative increase in drinking, increase in urination, hot or cold intolerance  MUSCULOSKELETAL:  negative joint pains, muscle aches, swelling of joints  NEUROLOGICAL: Right-sided weakness and speech deficit  BEHAVIOR/PSYCH:  negative for depression, anxiety    Physical Exam:     /63   Pulse 68   Temp 97.9 °F (36.6 °C) (Oral)   Resp 18   Ht 5' 4\" (1.626 m)   Wt 198 lb 13.7 oz (90.2 kg)   SpO2 99%   BMI 34.13 kg/m²   Temp (24hrs), Av.9 °F (36.6 °C), Min:97.9 °F (36.6 °C), Max:97.9 °F (36.6 °C)    Recent Labs     20  0625 20  1038 20  1545 20  1941   POCGLU 116* 118* 105 125*     No intake or output data in the 24 hours ending 20 2328  Speech deficit  General Appearance:  alert, well appearing, and in no acute distress  Mental status: oriented to person, place, and time with normal affect  Head:  normocephalic, atraumatic.   Eye: no icterus, redness, pupils equal and reactive, extraocular eye movements intact, conjunctiva clear  Ear: normal external ear, no discharge, hearing intact  Nose:  no drainage noted  Mouth: mucous membranes moist  Neck: supple, no carotid bruits, thyroid not palpable  Lungs: Bilateral equal air entry, clear to ausculation, no wheezing, rales or rhonchi, normal effort  Cardiovascular: normal rate, regular rhythm, no murmur, gallop, rub. Abdomen: Soft, nontender, nondistended, normal bowel sounds, no hepatomegaly or splenomegaly  Neurologic: Right hemiparesis with Broca's aphasia skin: No gross lesions, rashes, bruising or bleeding on exposed skin area  Extremities:  peripheral pulses palpable, no pedal edema or calf pain with palpation  P    Investigations:      Laboratory Testing:  Recent Results (from the past 24 hour(s))   POC Glucose Fingerstick    Collection Time: 09/14/20  6:25 AM   Result Value Ref Range    POC Glucose 116 (H) 65 - 105 mg/dL   POC Glucose Fingerstick    Collection Time: 09/14/20 10:38 AM   Result Value Ref Range    POC Glucose 118 (H) 65 - 105 mg/dL   POC Glucose Fingerstick    Collection Time: 09/14/20  3:45 PM   Result Value Ref Range    POC Glucose 105 65 - 105 mg/dL   POC Glucose Fingerstick    Collection Time: 09/14/20  7:41 PM   Result Value Ref Range    POC Glucose 125 (H) 65 - 105 mg/dL       Imaging/Diagonstics:      Assessment :      Primary Problem  <principal problem not specified>    Active Hospital Problems    Diagnosis Date Noted    Acute ischemic stroke ([address] [I63.9] 08/26/2020    Cerebral infarction ([address] - left MCA distribution  [I63.9] 08/26/2020    Type 2 diabetes mellitus, without long-term current use of insulin ([address] [E11.9] 08/25/2020    Aphasia [R47.01]     CKD (chronic kidney disease) stage 3, GFR 30-59 ml/min ([address] [N18.3] 12/20/2018    Morbid obesity ([address] [E66.01] 12/20/2018    Acute on chronic combined systolic and diastolic CHF, NYHA class 4 ([facility]) [I50.43] 12/17/2018    Chronic combined systolic and diastolic HF (heart failure), NYHA class 2 ([address] [I50.42] 08/28/2017    Hyperlipidemia [E78.5]     Hypertension [I10]     Idiopathic cardiomyopathy ([address] [I42.8]        Plan:     1. Uncontrolled diabetes mellitus with the peripheral organ damage with ischemic CVA  2. Ischemic CVA with right hemiparesis and Broca's aphasia  3.  Metformin recent A1c is 7.3 reviewed and reconciled  4. Ischemic CVA hyperlipidemia aspirin and Lipitor Plavix 75 all started  5. Losartan 100 mg for hypertension and diabetic renal protection  6. norco for pain  7. q6  Hr prn    Consultations:   IP CONSULT TO INTERNAL MEDICINE  IP CONSULT TO DIETITIAN  IP CONSULT TO SOCIAL WORK  IP CONSULT TO INTERNAL MEDICINE      Aline Matson MD  9/14/2020  11:28 PM    Copy sent to Dr. Duane Hopping, APRN - CNP    Please note that this chart was generated using voice recognition Dragon dictation software. Although every effort was made to ensure the accuracy of this automated transcription, some errors in transcription may have occurred.

## 2020-09-15 NOTE — PROGRESS NOTES
poor incorrect feedback gait belt use provides. Patient in wide arm guard on RUE with noted WB through therapist support hand in hand technique. Patient unable to utilize [place] on walker with  support secondary to increased tone and aproximation of RUE to advance walker effectively d/t primative flexor synergy pattern of RUE with hip flexion during swing phase of gait    Transfers  Sit to Stand: Contact guard assistance(instruction left UE placement. hand in hand RUE)  Stand to sit: Minimal Assistance(instruction left UE placement , right dependant )  Bed to Chair: Minimal assistance(RW with right hand )  Stand Pivot Transfers: Minimal Assistance  Squat Pivot Transfers: Minimal Assistance  Comment: simple cueing and visual directional cues; patient at times does not follow given commands and needs redirection to prevent unsafe technique and patient limited by awareness to deficits. Ambulation  Ambulation?: Yes  More Ambulation?: Yes  Ambulation 1  Surface: level tile  Device: NeoStem  Other Apparatus: Wheelchair follow  Assistance: Minimal assistance  Quality of Gait: decreased right UE/LE coordination , weight shift , foot clearance , hip stabilization extensors  Gait Deviations: Slow Jacinda, Decreased step length, Decreased step height  Distance: 20ft  Comments: therapist guarding patient for fall risk safety with decreased gait belt use secondary to poor incorrect feedback gait belt use provides. Patient in wide arm guard on RUE with noted WB through therapist support hand in hand technique.  Patient unable to utilize [place] on walker with  support secondary to increased tone and aproximation of RUE to advance walker effectively d/t primative flexor synergy pattern of RUE with hip flexion during swing phase of gait    Surface: level tile  Ambulation 1  Surface: level tile  Device: NeoStem  Other Apparatus: Wheelchair follow  Assistance: Minimal assistance  Quality of Gait: decreased right UE/LE coordination , weight shift , foot clearance , hip stabilization extensors  Gait Deviations: Slow Jacinda, Decreased step length, Decreased step height  Distance: 20ft  Comments: therapist guarding patient for fall risk safety with decreased gait belt use secondary to poor incorrect feedback gait belt use provides. Patient in wide arm guard on RUE with noted WB through therapist support hand in hand technique. Patient unable to utilize [place] on walker with  support secondary to increased tone and aproximation of RUE to advance walker effectively d/t primative flexor synergy pattern of RUE with hip flexion during swing phase of gait    OT:  ADL  Equipment Provided: Reacher, Sock aid  Feeding: Setup, Increased time to complete  Grooming: Supervision, Verbal cueing, Setup  UE Bathing: Minimal assistance  LE Bathing: Minimal assistance(seated in shower utilizing long handled sponge for LE washin)  UE Dressing: Supervision(don/doff over head shirt )  LE Dressing: Maximum assistance(assist threading BLE, pulling up and adjusting pants )  Toileting: None  Additional Comments: ADLs completed sink-side in morning session this date. Writer provided education on use of sock aid. Writer threaded sock over sock-aid for pt while pt focused on placing sock aid in appopriate position and pulling over feet. Instrumental ADL's  Instrumental ADLs: Yes     Balance  Sitting Balance: Modified independent   Standing Balance: Minimal assistance(standing with 1-2 UE support )   Standing Balance  Time: 1 min x4   Activity: ADL, transfers  Comment: VC for visual assment of RLE placement c good self correction; attn to RUE placement on stable/dry surface (sink); improved initiation adjusting base of support in PM, use of simple squat to assist with donning lower body clothing iwith f tolerace.  PM: no LOB with dynamic balloon tap, use of LUE with writer stabilzing RUE on RW for fxl grasp, task to address stand balance/tolerance, not motor planning, completed in sitting for motor planning  Functional Mobility  Functional - Mobility Device: Wheelchair  Activity: To/from bathroom  Assist Level: Maximum assistance  Functional Mobility Comments: standing at washer, pt intiated offloading RLE to complete side step and widen her COLT demoing carryover of stability stategies     Bed mobility  Bridging: Stand by assistance  Rolling to Left: Stand by assistance  Rolling to Right: Stand by assistance  Supine to Sit: Stand by assistance  Sit to Supine: Stand by assistance  Scooting: Supervision  Comment: Pt demo'd improved ability to transition from supine to sit this date. No verbal cueing provided for technique or s afety   Transfers  Stand Pivot Transfers: Minimal assistance  Sit Pivot Transfers: Minimal assistance  Sit to stand: Minimal assistance  Stand to sit: Minimal assistance  Transfer Comments: AM: Verbal cueing provided for appropriate hand/foot placement. Writer provided support on RUE to keep in place while completing transfers    Toilet Transfers  Toilet - Technique: Stand pivot  Equipment Used: Grab bars  Toilet Transfer: Contact guard assistance  Toilet Transfers Comments: AM: writer proivded education on appropriate wheelchair set-up, pt verbalized understanding. Pt provided minimal verbal cueing for appropriate sequencing with use of grab bars, good return no loss of b alance      Shower Transfers  Shower - Transfer From: Wheelchair  Shower - Transfer Type: To and From  Shower - Transfer To: Transfer tub bench  Shower - Technique: Stand pivot, To right, To left  Shower Transfers: Minimal assistance  Shower Transfers Comments: AM: Minimal assist provided to facilitate pivot on LLE to transtiion from wheelchair to tub transfer bench       SPEECH:  Subjective: [x]? Alert     [x]? Cooperative     []? Confused     []? Agitated      []? Lethargic     Objective/Assessment:  Auditory Comprehension:   Pt.  Responding to yes/no and simple conversational questions appropriately throughout session. One step directions- 70%, 90% c cues.       Verbal Expression:     Describe action pictures- 12%, 36% c cues when sentence structure set up [facility] is. .. Eron Martinez \"   Counting 1-10: mod A. Naming days of the week: 100% c initiation cues only. Name body parts- 40%, 70% c cues.        Neologisms, phonemic paraphasias frequently present with spontaneous speech and during pt. Attempts at expressive language tasks.      Motor speech:  n/a     Reading Comprehension:   n/a     Other:  No family present. Objective:  /73   Pulse 62   Temp 97.7 °F (36.5 °C) (Oral)   Resp 18   Ht 5' 4\" (1.626 m)   Wt 198 lb 13.7 oz (90.2 kg)   SpO2 97%   BMI 34.13 kg/m²       GEN: Well developed, well nourished, in NAD  HEENT:  NCAT.  PERRL.  EOMI.  Mucous membranes pink and moist.   PULM:  Clear to ausculation. No rales or rhonchi. Respirations WNL and unlabored. CV:  Bradycardic rate regular rhythm.  No murmurs or gallops. GI:  Abdomen soft. Nontender. Non-distended.  BS + and equal.    NEUROLOGICAL: A&O x3. Sensation intact to light touch. Short sentences improving. MSK:  Functional ROM LUE and LLE. Impaired ROM RUE and RLE. Motor testing 4+/5 key muscles LUE and LLE. R  4-/5, R wrist extension 4-/5, R finger extension 3/5, R elbow flexion and extension 3/5 . SKIN: Warm dry and intact. Good turgor. EXTREMITIES:  No calf tenderness to palpation. No edema BLEs. .    PSYCH: Mood WNL. Appropriately interactive. Affect WNL. Diagnostics:     CBC:   No results for input(s): WBC, RBC, HGB, HCT, MCV, RDW, PLT in the last 72 hours. BMP:   No results for input(s): NA, K, CL, CO2, PHOS, BUN, CREATININE, GLUCOSE in the last 72 hours. Invalid input(s): CA  BNP: No results for input(s): BNP in the last 72 hours. PT/INR: No results for input(s): PROTIME, INR in the last 72 hours. APTT: No results for input(s): APTT in the last 72 hours.   CARDIAC ENZYMES: No this week to ensure safe discharge home - discharge date adjusted. 2. Transcortical motor aphasia: Improving. speech therapy treating. 3. HTN/CHF/non-ischemic cardiomyopathy: on amlodipine, carvedilol, HCTZ, losartan - IM following  4. DM: on insulin sliding scale and Metformin. Diarrhea resolved  5. Pain: Has Norco prn. R elbow pain improved. 6. Bowel Management: Miralax daily, senokot prn, dulcolax prn.  7. DVT Prophylaxis:  low molecular weight heparin, SCD's while in bed and MIRTA's   8. Internal medicine for medical management    Electronically signed by Liliana Quinteros MD on 9/15/2020 at 9:21 AM      This note is created with the assistance of a speech recognition program.  While intending to generate a document that actually reflects the content of the visit, the document can still have some errors including those of syntax and sound a like substitutions which may escape proof reading. In such instances, actual meaning can be extrapolated by contextual diversion.

## 2020-09-15 NOTE — PROGRESS NOTES
Physical Therapy  Facility/Department: [facility]  Daily Treatment Note  NAME: Yesica Duff  : 1950  MRN: [id number]    Date of Service: 9/15/2020    Discharge Recommendations:  Patient would benefit from continued therapy after discharge, 24 hour supervision or assist   PT Equipment Recommendations  Other: to be determined asw pt progreses    Assessment      REQUIRES PT FOLLOW UP: Yes  Activity Tolerance  Activity Tolerance: Patient limited by fatigue     Patient Diagnosis(es): There were no encounter diagnoses. has a past medical history of CHF (congestive heart failure) ([facility] [address] Diabetes mellitus ([facility] [address] H/O cardiac catheterization, H/O echocardiogram, History of echocardiogram, History of echocardiogram, Hyperlipidemia, and Hypertension. has a past surgical history that includes Cholecystectomy and Cardiac catheterization (Left, 2017). Restrictions  Restrictions/Precautions  Restrictions/Precautions: Fall Risk, Up as Tolerated, General Precautions  Required Braces or Orthoses?: No  Implants present? : (none)  Position Activity Restriction  Other position/activity restrictions: up as tolerated  Subjective   General  Chart Reviewed: Yes  Additional Pertinent Hx: Pt has a history of HTN, HLD, DM, CHF. Pt recently evaluated at [facility] for  new onset of aphasia. 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 Pt with known history of previous CVAs. MRI confirmed L MCA infarct on 2020. Pt admitted to [address] [place] [address] 20. Response To Previous Treatment: Patient with no complaints from previous session.   Family / Caregiver Present: No  Referring Practitioner: Dr. Oskar Paula: Pt arrives to therapy gym in [facility]; reports sleeping well throughout the night   General Comment  Comments: Patient is aphasic  Pain Screening  Patient Currently in Pain: Denies  Vital Signs  Patient Currently in Pain: Denies  Patient Observation  Observations: Pt has bruises right UE, dorsal wrist , forearm , and lateral humerus, Dr Allan Betancourt aware        Objective      Transfers  Sit to Stand: Contact guard assistance  Stand to sit: Minimal Assistance  Comment: Pt requires verbal cuing for hand placement and using safe technique   Ambulation  Ambulation?: Yes  Ambulation 1  Surface: level tile  Device: Small Gee [place]qu[place]  Other Apparatus: Wheelchair follow  Assistance: Minimal assistance  Quality of Gait: decreased right UE/LE coordination , weight shift , foot clearance  Gait Deviations: Slow Jacinda;Decreased step length;Decreased step height;Decreased arm swing;Decreased head and trunk rotation  Distance: 75ft  Comments: 75ft with one seated rest break   Ambulation 2  Surface - 2: level tile  Device 2: Parallel Bars  Assistance 2: Minimal assistance;Contact guard assistance  Quality of Gait 2: improved step sequencing; patient requires assistance advancing R UE   Gait Deviations: Slow Jacinda;Decreased step length;Decreased step height  Distance: 6 steps forward; 6 steps backwards x2  Comments: instruction to advance R LE and L UE at the same time  Stairs  # Steps : 5  Stairs Height: 4\"  Rails: Bilateral(in parallel bars)  Assistance:  Moderate assistance  Comment: pt able to place R LE with no manual cuing; required extra time to place R LE  Propulsion 1  Propulsion: Manual  Level: Level Tile  Method: LUE;LLE;RLE  Level of Assistance: Minimal assistance  Description/ Details: pt required verbal cuing to avoid obstacles; fatigued easily    Distance: 139ft with 2 rest breaks and 1 90* turn         Other exercises  Other exercises?: Yes  Other exercises 1: B LE Ace bandage wrap donned  Other exercises 2: B LE exercises with blue theraband   Other exercises 3: Sit<>stands in //bars focusing on control and safe hand placement x5     Goals  Short term goals  Time Frame for Short term goals: 10 days   Short term goal 1: Pt to perform bed mobility independently. Short term goal 2: Pt to perform transfers with minAx1. Short term goal 3: Pt to ambulate 150 ft, least restrictive device, minAx1. Short term goal 4: Pt to tolerate 30-60 mins physical therapy to increase endurance and strength. Short term goal 5: Pt to complete 5 steps, with LUE support, minAx1. Long term goals  Time Frame for Long term goals : By LOS. Long term goal 2: Pt to complete transfers with SBA to promote independence. Long term goal 3: Pt to ambulate 200 ft, least restrictive device, SBA. Long term goal 4: Pt to complete 5-12 steps, CGA to promote function. Long term goal 5: Pt to improve PASS score from 15/36 to 22/36. Patient Goals   Patient goals : To return back home. Plan    Plan  Times per week: 1.5 hrs/day 5-7 days/week  Times per day: (1-2 visits daily)  Specific instructions for Next Treatment: progress trasnfer training and ambulation as able, try balance activities. Current Treatment Recommendations: Strengthening, ROM, Balance Training, Functional Mobility Training, Transfer Training, Endurance Training, Gait Training, Stair training, Neuromuscular Re-education, Safety Education & Training, Patient/Caregiver Education & Training, Equipment Evaluation, Education, & procurement  Safety Devices  Type of devices:  All fall risk precautions in place, Gait belt, Call light within reach, Left in chair  Restraints  Initially in place: No     Therapy Time   Individual Concurrent Group Co-treatment   Time In 0901         Time Out 1001         Minutes 1601 [place], [place]

## 2020-09-16 LAB
GLUCOSE BLD-MCNC: 110 MG/DL (ref 65–105)
GLUCOSE BLD-MCNC: 113 MG/DL (ref 65–105)
GLUCOSE BLD-MCNC: 161 MG/DL (ref 65–105)
GLUCOSE BLD-MCNC: 89 MG/DL (ref 65–105)

## 2020-09-16 PROCEDURE — 6370000000 HC RX 637 (ALT 250 FOR IP): Performed by: INTERNAL MEDICINE

## 2020-09-16 PROCEDURE — 97530 THERAPEUTIC ACTIVITIES: CPT

## 2020-09-16 PROCEDURE — 6360000002 HC RX W HCPCS: Performed by: INTERNAL MEDICINE

## 2020-09-16 PROCEDURE — 97110 THERAPEUTIC EXERCISES: CPT

## 2020-09-16 PROCEDURE — 6370000000 HC RX 637 (ALT 250 FOR IP): Performed by: PHYSICAL MEDICINE & REHABILITATION

## 2020-09-16 PROCEDURE — 92507 TX SP LANG VOICE COMM INDIV: CPT

## 2020-09-16 PROCEDURE — 97112 NEUROMUSCULAR REEDUCATION: CPT

## 2020-09-16 PROCEDURE — 97116 GAIT TRAINING THERAPY: CPT

## 2020-09-16 PROCEDURE — 1180000000 HC REHAB R&B

## 2020-09-16 PROCEDURE — 97129 THER IVNTJ 1ST 15 MIN: CPT

## 2020-09-16 PROCEDURE — 99231 SBSQ HOSP IP/OBS SF/LOW 25: CPT | Performed by: INTERNAL MEDICINE

## 2020-09-16 PROCEDURE — 97535 SELF CARE MNGMENT TRAINING: CPT

## 2020-09-16 PROCEDURE — 99231 SBSQ HOSP IP/OBS SF/LOW 25: CPT | Performed by: PHYSICAL MEDICINE & REHABILITATION

## 2020-09-16 PROCEDURE — 82947 ASSAY GLUCOSE BLOOD QUANT: CPT

## 2020-09-16 RX ADMIN — METRONIDAZOLE 100 MG: 500 TABLET ORAL at 16:25

## 2020-09-16 RX ADMIN — METFORMIN HYDROCHLORIDE 500 MG: 500 TABLET ORAL at 07:36

## 2020-09-16 RX ADMIN — CARVEDILOL 6.25 MG: 6.25 TABLET, FILM COATED ORAL at 16:25

## 2020-09-16 RX ADMIN — HYDROCODONE BITARTRATE AND ACETAMINOPHEN 1 TABLET: 5; 325 TABLET ORAL at 20:25

## 2020-09-16 RX ADMIN — AMLODIPINE BESYLATE 10 MG: 10 TABLET ORAL at 16:25

## 2020-09-16 RX ADMIN — CLOPIDOGREL BISULFATE 75 MG: 75 TABLET ORAL at 07:36

## 2020-09-16 RX ADMIN — METRONIDAZOLE 100 MG: 500 TABLET ORAL at 07:35

## 2020-09-16 RX ADMIN — ASPIRIN 81 MG CHEWABLE TABLET 81 MG: 81 TABLET CHEWABLE at 07:35

## 2020-09-16 RX ADMIN — ANTI-FUNGAL POWDER MICONAZOLE NITRATE TALC FREE: 1.42 POWDER TOPICAL at 20:25

## 2020-09-16 RX ADMIN — ANTI-FUNGAL POWDER MICONAZOLE NITRATE TALC FREE: 1.42 POWDER TOPICAL at 07:36

## 2020-09-16 RX ADMIN — ENOXAPARIN SODIUM 40 MG: 40 INJECTION SUBCUTANEOUS at 07:35

## 2020-09-16 RX ADMIN — METFORMIN HYDROCHLORIDE 500 MG: 500 TABLET ORAL at 16:25

## 2020-09-16 RX ADMIN — POLYETHYLENE GLYCOL 3350 17 G: 17 POWDER, FOR SOLUTION ORAL at 07:36

## 2020-09-16 RX ADMIN — METRONIDAZOLE 100 MG: 500 TABLET ORAL at 20:25

## 2020-09-16 RX ADMIN — CARVEDILOL 6.25 MG: 6.25 TABLET, FILM COATED ORAL at 07:35

## 2020-09-16 RX ADMIN — HYDROCHLOROTHIAZIDE 25 MG: 25 TABLET ORAL at 07:36

## 2020-09-16 RX ADMIN — LOSARTAN POTASSIUM 100 MG: 50 TABLET, FILM COATED ORAL at 07:35

## 2020-09-16 RX ADMIN — HYDROCODONE BITARTRATE AND ACETAMINOPHEN 1 TABLET: 5; 325 TABLET ORAL at 07:35

## 2020-09-16 RX ADMIN — ATORVASTATIN CALCIUM 80 MG: 80 TABLET, FILM COATED ORAL at 20:25

## 2020-09-16 ASSESSMENT — PAIN DESCRIPTION - DESCRIPTORS
DESCRIPTORS: ACHING
DESCRIPTORS: ACHING

## 2020-09-16 ASSESSMENT — PAIN DESCRIPTION - ORIENTATION
ORIENTATION: RIGHT
ORIENTATION: RIGHT

## 2020-09-16 ASSESSMENT — PAIN DESCRIPTION - LOCATION
LOCATION: LEG
LOCATION: LEG

## 2020-09-16 ASSESSMENT — PAIN DESCRIPTION - PROGRESSION: CLINICAL_PROGRESSION: NOT CHANGED

## 2020-09-16 ASSESSMENT — PAIN SCALES - GENERAL
PAINLEVEL_OUTOF10: 7
PAINLEVEL_OUTOF10: 4
PAINLEVEL_OUTOF10: 6
PAINLEVEL_OUTOF10: 4

## 2020-09-16 ASSESSMENT — PAIN DESCRIPTION - PAIN TYPE
TYPE: ACUTE PAIN
TYPE: ACUTE PAIN

## 2020-09-16 ASSESSMENT — PAIN - FUNCTIONAL ASSESSMENT: PAIN_FUNCTIONAL_ASSESSMENT: PREVENTS OR INTERFERES SOME ACTIVE ACTIVITIES AND ADLS

## 2020-09-16 ASSESSMENT — PAIN DESCRIPTION - ONSET: ONSET: ON-GOING

## 2020-09-16 ASSESSMENT — PAIN DESCRIPTION - FREQUENCY
FREQUENCY: CONTINUOUS
FREQUENCY: CONTINUOUS

## 2020-09-16 NOTE — CARE COORDINATION
ONGOING DISCHARGE PLAN:    Spoke with patient regarding discharge plan and patient confirms that plan is still discharge home possibly with her dtr. lsw waiting for dtr to call back to confirm    Will continue to follow for additional discharge needs.     Electronically signed by MOMO Sykes, LSW on 9/16/2020 at 2:48 PM

## 2020-09-16 NOTE — PLAN OF CARE
Repositions per self for comfort. Nonverbal cues indicate pain relief. Pt. Rests quietly with eyes closed after pain medication administration. Respirations easy and unlabored.  Appears free from distress.

## 2020-09-16 NOTE — PROGRESS NOTES
shower this morning, Pt cooperative and pleasant throughout     Restrictions/Precautions: Fall Risk;Up as Tolerated;General Precautions  Overall Orientation Status: Within Functional Limits  Orientation Level: Oriented to place;Oriented to time;Oriented to person;Oriented to situation     Pain Assessment  Pain Assessment: 0-10  Pain Level: 4  Garza-Baker Pain Rating: No hurt  Pain Type: Acute pain  Pain Location: Leg  Pain Orientation: Right  Pain Descriptors: Aching  Pain Frequency: Continuous  Pain Onset: On-going  Clinical Progression: Gradually improving  Functional Pain Assessment: Prevents or interferes some active activities and ADLs  Response to Pain Intervention: Patient Satisfied(writer provided increased activity and rest as needed )    Objective  Cognition  Overall Cognitive Status: Impaired  Arousal/Alertness: Appropriate responses to stimuli  Following Directions: Follows two step commands  Memory: Unable to assess  Following Commands:  Follows multistep commands with repetition  Safety Judgement: Decreased awareness of need for safety  Awareness of Errors: Assistance required to identify errors made  Insights: Decreased awareness of deficits  Sequencing and Organization: Assistance required to identify errors made;Assistance required to generate solutions;Assistance required to implement solutions  Additional Comments: AM: Improved ability to problem solve and adjust to new situations   Perception  Unilateral Attention: Cues to attend to right side of body  Initiation: Cues to initiate tasks     Balance  Sitting Balance: Modified independent (intermittent LUE support on bedside for stability )  Standing Balance: Minimal assistance(primarily contact guard assist, minimal assist with dynamic )     Transfers  Stand Pivot Transfers: Contact guard assistance  Sit to stand: Minimal assistance  Stand to sit: Minimal assistance  Transfer Comments: AM: Writer provided education on appropriate wheelchair set-up to facilitate safety and reduce fall risk, good return. Pt required 1 additional verbal cue for sequencing BUE on arm rests      Functional Mobility  Functional - Mobility Device: Wheelchair  Activity: To/from bathroom  Assist Level: Moderate assistance  Functional Mobility Comments: MAx verbal cueing for technique and to acknowledge potential obstacles     Shower Transfers  Shower - Transfer From: Wheelchair  Shower - Transfer Type: To and From  Shower - Transfer To: Transfer tub bench  Shower - Technique: Stand pivot; To right; To left  Shower Transfers: Contact Guard     Type of ROM/Therapeutic Exercise  Type of ROM/Therapeutic Exercise: AAROM;AROM  Comment: AM: Pt educated on RUE strength/coordination home exercise program to facilitate increased utilization during ADLs and ADL transfers. Writer provided visual demonstration and verbal cueing as needed to complete   Exercises  Shoulder Flexion: broom stick, 3 sets x 15 reps, 5 seconds up, 5 seconds down  Elbow Flexion: while grasping cone, writer instructed pt to touch nose to improve targeting accuracy. Pt required intermittent contact guard for targeting accuracy. Other: seated chest press: 3 seconds up, 3 seconds down     Additional Activities: Other  Additional Activities: PM: 1. Pt engaged in balloon tap activity to facilitate increased functional use of RUE and challenge dynamic seated balance to facilitate increased performance with ADLs and ADL transfers. Pt contact guard assist > moderate assist for seated balance. Writer provided visual demonstration and intermittent RUE positioning to help targeting. 2. Pt completed weighted clip activity to improve pinch/ strength and coordination of RUE. Writer provided visual demonstration and prn verbal cueing for technique. Writer with sufficient pinch strength to manipulate yellow clip.  Writer provided minimal assist to reach and target post.         ADL  Equipment Provided: Reacher;Sock aid  Feeding: Setup(assist to cut food, limited by decreased RUE coordination, however increased engagement noted. Pt able to use RUE to hold bottles/containers and LUE to twist top off with verbal cueing and visual demonstration from writer)  Grooming: Supervision;Verbal cueing;Setup(Pt completed oral care and donned Deoderant seated sink-side. Writer provided visual demonstration to utilize RUE to stabilize toothpaste on sink while utilizing LUE to apply toothpaste, good return)  UE Bathing: Minimal assistance(Assist provided to wash/dry LUE. Writer recommended bend sanjuanita-handled sponge, pt agreeable. Pt able to complete all other parts without assist)  LE Bathing: Contact guard assistance(Pt completed anterior/posterior thighs)    Patient Education:  Patient Goals   Patient goals : To return home with sister Denisse Lion  Method: demonstration and explanation       Outcome: needs reinforcement   OT Education  OT Education: OT Role;Plan of Care  Patient Education: see relevant note sections for education details  Barriers to Learning: aphasia, cognitive deficits    Plan  Plan  Times per week: 5-7x/week, 1.5 hours/day  Times per day: Twice a day  Current Treatment Recommendations: Self-Care / ADL, Home Management Training, Cognitive/Perceptual Training, Strengthening, ROM, Balance Training, Functional Mobility Training, Endurance Training, Neuromuscular Re-education, Cognitive Reorientation, Pain Management, Safety Education & Training, Patient/Caregiver Education & Training, Equipment Evaluation, Education, & procurement, Positioning    Patient Goals   Patient goals : To return home with sister  Short term goals  Time Frame for Short term goals: 10 days  Short term goal 1: Pt will perform grooming with set-up and use of assistive equipment/modified techniques PRN. Short term goal 2: Pt will perform upper body bathing/dressing with standby assist and use of assistive equipment/modified techniques PRN.   Short term goal 3: Pt will perform toileting tasks and lower body bathing/dressing with minimal assist and use of assistive equipment/durable medical equipment/modified techniques PRN. Short term goal 4: Pt will perform functional mobility and transfers during self-care with Minimal assist, least restrictive mobility device, and Fair safety. Short term goal 5: Pt will stand for 4+ minutes with 1-2 UE support, contact guard assist, and no loss of balance while engaging in functional activity of choice. Short term goal 6: Pt will actively participate in 30+ minutes of therapeutic exercise/functional activities to promote increased independence with self-care and mobility. Short term goal 7: Pt will verbalize/demonstrate Good understanding of assistive equipment/durable medical equipment/modified techniques for increased independence with self-care and mobility. Long term goals  Time Frame for Long term goals : By discharge  Long term goal 1: Pt will perform self-feeding and grooming with modified independence. Long term goal 2: Pt will perform bathing, dressing, and toileting tasks with stand by assist, Fair safety, and assist PRN for MIRTA hose. Long term goal 3: Pt will perform functional mobility and transfers during self-care with stand by assist, least restrictive mobility device, and Fair safety. Long term goal 4: Pt will stand for 8+ minutes with 1-2 UE support, stand by assist, and no loss of balance while engaging in functional activity of choice.   Long term goal 5: Patient will increase strength/coordination of RUE as evident by an increase in  strength R hand by 5-7#, 5-7 block increase in box and blocks test, and will attempt 9 Hole Peg test as appropriate         09/16/20 1000 09/16/20 1528   OT Individual Minutes   Time In [address] [place] [address]   Time Out [id number]   Minutes 58 31   Time Code Minutes    Timed Code Treatment Minutes 58 Minutes 31 Minutes     Electronically signed by Brigido Joseph OT on 9/16/20 at 3:29 PM EDT

## 2020-09-16 NOTE — PROGRESS NOTES
Physical Therapy  Facility/Department: [facility] ACUTE REHAB  Daily Treatment Note  NAME: Raisa Kirby  : 1950  MRN: [id number]    Date of Service: 2020    Discharge Recommendations:  Patient would benefit from continued therapy after discharge, 24 hour supervision or assist        Assessment      PT Education: Gait Training;General Safety;Transfer Training  Activity Tolerance  Activity Tolerance: Patient limited by endurance; Patient limited by fatigue     Patient Diagnosis(es): There were no encounter diagnoses. has a past medical history of CHF (congestive heart failure) ([facility] [address] Diabetes mellitus ([facility] [address] H/O cardiac catheterization, H/O echocardiogram, History of echocardiogram, History of echocardiogram, Hyperlipidemia, and Hypertension. has a past surgical history that includes Cholecystectomy and Cardiac catheterization (Left, 2017). Restrictions  Restrictions/Precautions  Restrictions/Precautions: Fall Risk, Up as Tolerated, General Precautions  Required Braces or Orthoses?: No  Implants present? : (none)  Position Activity Restriction  Other position/activity restrictions: up as tolerated  Subjective   General  Chart Reviewed: Yes  Additional Pertinent Hx: Pt has a history of HTN, HLD, DM, CHF. Pt recently evaluated at [facility] for  new onset of aphasia. Pt was transferred to [facility]. [facility] on 20 after diagnostic studies confirmed new L hemisphere CVA. Pt with known history of previous CVAs. MRI confirmed L MCA infarct on 2020. Pt admitted to [address] [place] [address] 20. Response To Previous Treatment: Patient reporting fatigue but able to participate. Family / Caregiver Present: No  Subjective  Subjective: patient without complaints; reported prefers her daughter to be part of training for home vs sister. plan is to go to home to daughters now.   General Comment  Comments: Patient is aphasic         Orientation     Cognition      Objective   Bed mobility  Bridging: Stand by of Assistance: Minimal assistance  Description/ Details: pt required verbal cuing to avoid obstacles; fatigued easily          Other exercises  Other exercises 2: B LE exercises with blue theraband   Other exercises 3: walking in //bars forward and laterally  Other exercises 4: standing balance/coordination exercises  Other exercises 5: neuro re supine/seated reciprocal                        G-Code     OutComes Score                                                     AM-PAC Score             Goals  Short term goals  Time Frame for Short term goals: 10 days   Short term goal 1: Pt to perform bed mobility independently. Short term goal 2: Pt to perform transfers with minAx1. Short term goal 3: Pt to ambulate 150 ft, least restrictive device, minAx1. Short term goal 4: Pt to tolerate 30-60 mins physical therapy to increase endurance and strength. Short term goal 5: Pt to complete 5 steps, with LUE support, minAx1. Long term goals  Time Frame for Long term goals : By LOS. Long term goal 2: Pt to complete transfers with SBA to promote independence. Long term goal 3: Pt to ambulate 200 ft, least restrictive device, SBA. Long term goal 4: Pt to complete 5-12 steps, CGA to promote function. Long term goal 5: Pt to improve PASS score from 15/36 to 22/36. Patient Goals   Patient goals : To return back home. Plan    Plan  Times per week: 1.5 hrs/day 5-7 days/week  Times per day: (1-2 visits daily)  Specific instructions for Next Treatment: progress trasnfer training and ambulation as able, try balance activities. Current Treatment Recommendations: Strengthening, ROM, Balance Training, Functional Mobility Training, Transfer Training, Endurance Training, Gait Training, Stair training, Neuromuscular Re-education, Safety Education & Training, Patient/Caregiver Education & Training, Equipment Evaluation, Education, & procurement  Safety Devices  Type of devices:  All fall risk precautions in place, Gait

## 2020-09-16 NOTE — PROGRESS NOTES
Speech Language Pathology  Speech Language Pathology  [facility]    Speech Language Treatment Note    Date: 9/16/2020  Patients Name: Shari Walker  MRN: [id number]  Diagnosis: CVA  Patient Active Problem List   Diagnosis Code    Hypertension I10    Hyperlipidemia E78.5    Acute on chronic systolic CHF (congestive heart failure) ([facility] [address] I50.23    Hypertensive urgency I16.0    Idiopathic cardiomyopathy ([facility] [address] I42.8    Hypertensive emergency I16.1    Chronic combined systolic and diastolic HF (heart failure), NYHA class 2 ([facility]) I50.42    Acute on chronic combined systolic and diastolic CHF, NYHA class 4 ([facility]) I50.43    Hypoxia R09.02    Severe mitral regurgitation by prior echocardiogram I34.0    Morbid obesity ([facility]) E66.01    CKD (chronic kidney disease) stage 3, GFR 30-59 ml/min ([facility]) N18.3    Physical deconditioning R53.81    TIA (transient ischemic attack) G45.9    Old lacunar stroke without late effect Z86.73    Dysarthria R47.1    Stroke determined by clinical assessment ([facility] [address] I63.9    Aphasia R47.01    Type 2 diabetes mellitus, without long-term current use of insulin ([facility] [address] E11.9    Cerebral infarction ([facility] [address] - left MCA distribution  I63.9    Uncontrolled type 2 diabetes mellitus with hyperglycemia ([facility] [address] E11.65    Elevated blood pressure reading R03.0    Acute ischemic stroke ([facility]) I63.9       Pain: 0/10    Speech and Language Treatment  Treatment time:  2431-0911    Subjective: [x] Alert [x] Cooperative     [] Confused     [] Agitated      [] Lethargic    Objective/Assessment:  Auditory Comprehension:   Pt. Responding to yes/no and simple conversational questions appropriately throughout session. Verbal Expression:     Automatic naming, counting 1-10 and KD- 100% accuracy humberto. Name objects around room- 20%, 80% c sentence completion and phonemic cues. Responsive naming- 25% accuracy humberto, 75% c sentence completion and phonemic cues.   ID opposite of target word- 20% accuracy humberto, 100% c cues provided. Pt. Demonstrating some phonemic errors. Pt. Repeating c 50% accuracy. Neologisms, phonemic paraphasias frequently present with spontaneous speech and during pt. Attempts at expressive language tasks. Motor speech:  n/a     Reading Comprehension:   Match word to picture out of field of 2- 85% c verbal, tactile cues to point to word. Pt. Read words aloud c 50% accuracy. Other:  No family present. Plan:  [x] Continue ST services    [] Discharge from ST:      Discharge recommendations: [] Inpatient Rehab   [] [place]   [] Outpatient Therapy  [] Follow up at trauma clinic   [] Other:       Treatment completed by:  Audrey SPIVEY-SLP

## 2020-09-16 NOTE — CARE COORDINATION
Writer left a message on the pt dtr vmail. The pt stated that her sister is ill and she wont be staying with her. She states she will be staying with her dtr. Writer asked her dtr to call back and confirm this.

## 2020-09-17 LAB
ANION GAP SERPL CALCULATED.3IONS-SCNC: 8 MMOL/L (ref 9–17)
BUN BLDV-MCNC: 30 MG/DL (ref 8–23)
BUN/CREAT BLD: ABNORMAL (ref 9–20)
CALCIUM SERPL-MCNC: 10 MG/DL (ref 8.6–10.4)
CHLORIDE BLD-SCNC: 102 MMOL/L (ref 98–107)
CO2: 25 MMOL/L (ref 20–31)
CREAT SERPL-MCNC: 0.82 MG/DL (ref 0.5–0.9)
GFR AFRICAN AMERICAN: >60 ML/MIN
GFR NON-AFRICAN AMERICAN: >60 ML/MIN
GFR SERPL CREATININE-BSD FRML MDRD: ABNORMAL ML/MIN/{1.73_M2}
GFR SERPL CREATININE-BSD FRML MDRD: ABNORMAL ML/MIN/{1.73_M2}
GLUCOSE BLD-MCNC: 118 MG/DL (ref 70–99)
GLUCOSE BLD-MCNC: 120 MG/DL (ref 65–105)
GLUCOSE BLD-MCNC: 123 MG/DL (ref 65–105)
GLUCOSE BLD-MCNC: 166 MG/DL (ref 65–105)
GLUCOSE BLD-MCNC: 85 MG/DL (ref 65–105)
HCT VFR BLD CALC: 33.6 % (ref 36–46)
HEMOGLOBIN: 11.4 G/DL (ref 12–16)
MCH RBC QN AUTO: 30.6 PG (ref 26–34)
MCHC RBC AUTO-ENTMCNC: 34 G/DL (ref 31–37)
MCV RBC AUTO: 90 FL (ref 80–100)
NRBC AUTOMATED: ABNORMAL PER 100 WBC
PDW BLD-RTO: 14.3 % (ref 11.5–14.9)
PLATELET # BLD: 188 K/UL (ref 150–450)
PMV BLD AUTO: 9.3 FL (ref 6–12)
POTASSIUM SERPL-SCNC: 4 MMOL/L (ref 3.7–5.3)
RBC # BLD: 3.73 M/UL (ref 4–5.2)
SODIUM BLD-SCNC: 135 MMOL/L (ref 135–144)
WBC # BLD: 4.5 K/UL (ref 3.5–11)

## 2020-09-17 PROCEDURE — 97535 SELF CARE MNGMENT TRAINING: CPT

## 2020-09-17 PROCEDURE — 85027 COMPLETE CBC AUTOMATED: CPT

## 2020-09-17 PROCEDURE — 6370000000 HC RX 637 (ALT 250 FOR IP): Performed by: INTERNAL MEDICINE

## 2020-09-17 PROCEDURE — 92507 TX SP LANG VOICE COMM INDIV: CPT

## 2020-09-17 PROCEDURE — 36415 COLL VENOUS BLD VENIPUNCTURE: CPT

## 2020-09-17 PROCEDURE — 99231 SBSQ HOSP IP/OBS SF/LOW 25: CPT | Performed by: INTERNAL MEDICINE

## 2020-09-17 PROCEDURE — 1180000000 HC REHAB R&B

## 2020-09-17 PROCEDURE — 99232 SBSQ HOSP IP/OBS MODERATE 35: CPT | Performed by: PHYSICAL MEDICINE & REHABILITATION

## 2020-09-17 PROCEDURE — 97530 THERAPEUTIC ACTIVITIES: CPT

## 2020-09-17 PROCEDURE — 97110 THERAPEUTIC EXERCISES: CPT

## 2020-09-17 PROCEDURE — 97542 WHEELCHAIR MNGMENT TRAINING: CPT

## 2020-09-17 PROCEDURE — 97116 GAIT TRAINING THERAPY: CPT

## 2020-09-17 PROCEDURE — 82947 ASSAY GLUCOSE BLOOD QUANT: CPT

## 2020-09-17 PROCEDURE — U0003 INFECTIOUS AGENT DETECTION BY NUCLEIC ACID (DNA OR RNA); SEVERE ACUTE RESPIRATORY SYNDROME CORONAVIRUS 2 (SARS-COV-2) (CORONAVIRUS DISEASE [COVID-19]), AMPLIFIED PROBE TECHNIQUE, MAKING USE OF HIGH THROUGHPUT TECHNOLOGIES AS DESCRIBED BY CMS-2020-01-R: HCPCS

## 2020-09-17 PROCEDURE — 80048 BASIC METABOLIC PNL TOTAL CA: CPT

## 2020-09-17 PROCEDURE — 6360000002 HC RX W HCPCS: Performed by: INTERNAL MEDICINE

## 2020-09-17 PROCEDURE — 6370000000 HC RX 637 (ALT 250 FOR IP): Performed by: PHYSICAL MEDICINE & REHABILITATION

## 2020-09-17 RX ADMIN — HYDROCODONE BITARTRATE AND ACETAMINOPHEN 1 TABLET: 5; 325 TABLET ORAL at 08:32

## 2020-09-17 RX ADMIN — HYDROCODONE BITARTRATE AND ACETAMINOPHEN 1 TABLET: 5; 325 TABLET ORAL at 13:35

## 2020-09-17 RX ADMIN — LOSARTAN POTASSIUM 100 MG: 50 TABLET, FILM COATED ORAL at 08:33

## 2020-09-17 RX ADMIN — METRONIDAZOLE 100 MG: 500 TABLET ORAL at 08:33

## 2020-09-17 RX ADMIN — POLYETHYLENE GLYCOL 3350 17 G: 17 POWDER, FOR SOLUTION ORAL at 08:34

## 2020-09-17 RX ADMIN — ENOXAPARIN SODIUM 40 MG: 40 INJECTION SUBCUTANEOUS at 08:34

## 2020-09-17 RX ADMIN — ATORVASTATIN CALCIUM 80 MG: 80 TABLET, FILM COATED ORAL at 20:24

## 2020-09-17 RX ADMIN — HYDROCODONE BITARTRATE AND ACETAMINOPHEN 1 TABLET: 5; 325 TABLET ORAL at 20:25

## 2020-09-17 RX ADMIN — CARVEDILOL 6.25 MG: 6.25 TABLET, FILM COATED ORAL at 08:34

## 2020-09-17 RX ADMIN — CLOPIDOGREL BISULFATE 75 MG: 75 TABLET ORAL at 08:33

## 2020-09-17 RX ADMIN — ASPIRIN 81 MG CHEWABLE TABLET 81 MG: 81 TABLET CHEWABLE at 08:31

## 2020-09-17 RX ADMIN — METFORMIN HYDROCHLORIDE 500 MG: 500 TABLET ORAL at 17:07

## 2020-09-17 RX ADMIN — METRONIDAZOLE 100 MG: 500 TABLET ORAL at 20:24

## 2020-09-17 RX ADMIN — METRONIDAZOLE 100 MG: 500 TABLET ORAL at 14:48

## 2020-09-17 RX ADMIN — AMLODIPINE BESYLATE 10 MG: 10 TABLET ORAL at 17:07

## 2020-09-17 RX ADMIN — ANTI-FUNGAL POWDER MICONAZOLE NITRATE TALC FREE: 1.42 POWDER TOPICAL at 08:36

## 2020-09-17 RX ADMIN — ANTI-FUNGAL POWDER MICONAZOLE NITRATE TALC FREE: 1.42 POWDER TOPICAL at 20:24

## 2020-09-17 RX ADMIN — HYDROCHLOROTHIAZIDE 25 MG: 25 TABLET ORAL at 08:34

## 2020-09-17 RX ADMIN — METFORMIN HYDROCHLORIDE 500 MG: 500 TABLET ORAL at 08:33

## 2020-09-17 ASSESSMENT — PAIN DESCRIPTION - ORIENTATION
ORIENTATION: RIGHT
ORIENTATION: RIGHT

## 2020-09-17 ASSESSMENT — PAIN SCALES - GENERAL
PAINLEVEL_OUTOF10: 7
PAINLEVEL_OUTOF10: 3
PAINLEVEL_OUTOF10: 4
PAINLEVEL_OUTOF10: 3
PAINLEVEL_OUTOF10: 6

## 2020-09-17 ASSESSMENT — PAIN DESCRIPTION - ONSET: ONSET: ON-GOING

## 2020-09-17 ASSESSMENT — PAIN DESCRIPTION - PAIN TYPE
TYPE: CHRONIC PAIN
TYPE: ACUTE PAIN

## 2020-09-17 ASSESSMENT — PAIN DESCRIPTION - PROGRESSION: CLINICAL_PROGRESSION: GRADUALLY IMPROVING

## 2020-09-17 ASSESSMENT — PAIN DESCRIPTION - LOCATION
LOCATION: SHOULDER
LOCATION: ARM;LEG

## 2020-09-17 ASSESSMENT — PAIN - FUNCTIONAL ASSESSMENT: PAIN_FUNCTIONAL_ASSESSMENT: ACTIVITIES ARE NOT PREVENTED

## 2020-09-17 ASSESSMENT — PAIN DESCRIPTION - DESCRIPTORS: DESCRIPTORS: ACHING

## 2020-09-17 ASSESSMENT — PAIN DESCRIPTION - FREQUENCY: FREQUENCY: CONTINUOUS

## 2020-09-17 NOTE — PROGRESS NOTES
Tolerated;General Precautions  Overall Orientation Status: Within Functional Limits     Pain Assessment  Pain Assessment: 0-10  Pain Level: 3  Garza-Baker Pain Rating: No hurt  Pain Type: Acute pain  Pain Location: Shoulder  Pain Orientation: Right  Pain Radiating Towards: to elbow intermittently   Pain Descriptors: Aching  Pain Frequency: Continuous  Pain Onset: On-going  Clinical Progression: Gradually improving  Functional Pain Assessment: Prevents or interferes some active activities and ADLs  Response to Pain Intervention: Patient Satisfied(rest, repositioned, increased activity )    Objective  Cognition  Overall Cognitive Status: Impaired  Arousal/Alertness: Appropriate responses to stimuli  Following Directions: Follows two step commands  Memory: Unable to assess  Following Commands: Follows multistep commands with repetition  Safety Judgement: Decreased awareness of need for safety  Awareness of Errors: Assistance required to identify errors made  Insights: Decreased awareness of deficits  Sequencing and Organization: Assistance required to identify errors made;Assistance required to generate solutions;Assistance required to implement solutions  Perception  Overall Perceptual Status: Impaired  Unilateral Attention: Cues to attend to right side of body(improved engagement with R side this date during ADLs )     Balance  Sitting Balance: Modified independent (BUE support on edge of bed for stability )  Standing Balance: (contact guard > minimal assist (dynamic reaching) )     Transfers  Stand Pivot Transfers: Contact guard assistance  Sit to stand: Contact guard assistance  Stand to sit: Contact guard assistance  Transfer Comments: AM: Pt provided appropriate wheelchair set-up for stand>pivot transfer. No cueing for appropriate BUE sequencing. Functional Mobility  Functional - Mobility Device: Rolling Walker  Activity: To/from bathroom  Assist Level:  Moderate assistance  Functional Mobility Comments: AM: Writer introduced rolling walker due to improving RUE function. Writer provided visual demonstration for use of rolling walker and sequencing. Writer provided MAx verbal cueing to  RLE during step progression. Intermittent moderate assist required with RUE weightbearing. No loss of balance      Exercises  Grasp/Release: PM: 3 sets x 10, writer provided verbal cueing to open and close hand completely before next repitition     Additional Activities: Other  Additional Activities: PM: Pt engaged in a variety of dynamic seated and functional reach activities to facilitate improved targeting, coordination, and accuracy with utilization of RUE. 1. Pt completed weighted clip activity, tennis ball transfer activity, and cone stacking activity. Pt did not required any assist this date to facilitate shoulder flexion. Pt demoing improved targeting accuracy with cone and tennis ball placement. Pt demo'd ability to reach over head (~ 120-130 degrees shoulder flexion. ). Pt demo'd improved coordination with ability to complete lateral pinch pattern. ADL  Equipment Provided: Reacher;Sock aid  Feeding: Setup(per pt report, confirmed with RN)  Grooming: Supervision;Verbal cueing;Setup(Oral care and face hygiene completed sink-side. Pt also able to apply Deoderant this date without assist)  UE Bathing: Minimal assistance(Writer provided hand-over hand to assist RUE in washing LUE. Pt able to wash/dry all other parts without assist)  LE Bathing: Contact guard assistance(Pt demo'd appropriate use of long-handled sponge to wash feet and B lower legs. Pt completed anterior/posterior thighs seated. Writer provided minimal assist with RUE support on sink while LUE complete posterior perineal hygiene)  UE Dressing: Supervision;Setup(to don shirt. 1 verbal cueing for appropriate sequencing, good return)  LE Dressing: Maximum assistance(Pt able to thread BLE this date. Writer provided assist to pull pants around R side of hip. Attempted to engage RUE in pulling up right side of pants with poor return due to decreased coordination. Pt requires assist for shoes and socks as well.)  Toileting: None  Additional Comments: ADLs completed AM session sink-side this date    Patient Education:  Patient Goals   Patient goals : To return home with sister  Monse Barr  Method: demonstration and explanation       Outcome: needs reinforcement   OT Education  OT Education: OT Role;Plan of Care  Patient Education: see relevant note sections for education details  Barriers to Learning: aphasia, cognitive deficits    Plan  Plan  Times per week: 5-7x/week, 1.5 hours/day  Times per day: Twice a day  Current Treatment Recommendations: Self-Care / ADL, Home Management Training, Cognitive/Perceptual Training, Strengthening, ROM, Balance Training, Functional Mobility Training, Endurance Training, Neuromuscular Re-education, Cognitive Reorientation, Pain Management, Safety Education & Training, Patient/Caregiver Education & Training, Equipment Evaluation, Education, & procurement, Positioning  Patient Goals   Patient goals : To return home with sister  Short term goals  Time Frame for Short term goals: 10 days  Short term goal 1: Pt will perform grooming with set-up and use of assistive equipment/modified techniques PRN. Short term goal 2: Pt will perform upper body bathing/dressing with standby assist and use of assistive equipment/modified techniques PRN. Short term goal 3: Pt will perform toileting tasks and lower body bathing/dressing with minimal assist and use of assitive equipment/durable medical equipment/modified techniques PRN. Short term goal 4: Pt will perform functional mobility and transfers during self-care with Minimal assist, least restrictive mobility device, and Fair safety.   Short term goal 5: Pt will stand for 4+ minutes with 1-2 UE support, contact guard assist, and no loss of balance while engaging in functional activity of choice. Short term goal 6: Pt will actively participate in 30+ minutes of therapeutic exercise/functional activities to promote increased independence with self-care and mobility. Short term goal 7: Pt will verbalize/demonstrate Good understanding of assistive equipment/durable medical equipment/modified techniques for increased independence with self-care and mobility. Long term goals  Time Frame for Long term goals : By discharge  Long term goal 1: Pt will perform self-feeding and grooming with modified independence. Long term goal 2: Pt will perform bathing, dressing, and toileting tasks with stand by assist, Fair safety, and assist PRN for MIRTA hose. Long term goal 3: Pt will perform functional mobility and transfers during self-care with stand by assist, least restrictive mobility device, and Fair safety. Long term goal 4: Pt will stand for 8+ minutes with 1-2 UE support, stand by assist, and no loss of balance while engaging in functional activity of choice.   Long term goal 5: Patient will increase strength/coordination of RUE as evident by an increase in  strength R hand by 5-7#, 5-7 block increase in box and blocks test, and will attempt 9 Hole Peg test as appropriate       09/17/20 0930 09/17/20 1502   OT Individual Minutes   Time In 0935 1335   Time Out 1032 1405   Minutes 57 30   Time Code Minutes    Timed Code Treatment Minutes 57 Minutes 30 Minutes     Electronically signed by Edilberto Flores on 9/17/20 at 3:03 PM EDT

## 2020-09-17 NOTE — CONSULTS
[facility] Internal Medicine    CONSULTATION / HISTORY AND PHYSICAL EXAMINATION            Date:   9/17/2020  Patient name:  Jose J Andrews  Date of admission:  8/26/2020  6:56 PM  MRN:   [id number]  Account:  [de-identified]  YOB: 1950  PCP:    SUE Serna CNP  Room:   9594/4357-27  Code Status:    Full Code    Physician Requesting Consult: Brendon Hill MD    Reason for Consult: Medical management    Chief Complaint:     No chief complaint on file. Stroke    History Obtained From:     Patient medical record nursing staff    History of Present Illness:     27-year-old pleasant  lady was admitted to [facility] on August 23 with a aphasia diagnosed with the left middle cerebral artery ischemic infarct  Known history of systolic congestive heart failure ejection fraction 35 to 40% secondary to nonischemic cardiomyopathy  Diabetes   Duration more than 7 years  Modifying factors on Glucophage and other med  Severity uncontrolled sever  Associated signs and symtoms neuropathy/ckd/ CAD. aggravated with sugar diet and better with low sugar diet             Past Medical History:     Past Medical History:   Diagnosis Date    [facility]     Diabetes mellitus ([facility]     H/O cardiac catheterization 08/04/2017    LMCA: Mild irregularites 10-20%. LAD: Mild irregularites 10-20%. LCx: Mild irregularites 10-20%. RCA: Mild irregularites 10-20%. LV function assessed as : Abnormal. EF of 40%.  H/O echocardiogram 12/18/2018    EF 55%. Mildly increased LV wall thickness. The left atrium appears moderately to severely dilated. Moderate to severe mitral regurg. Moderate pulmonary HTN with an estimated RV systolic pressure of 36YJVI. Moderate tricuspid regurg. Evidnece fo moderate diastolic dysfunction is seen.  History of echocardiogram 01/17/2019    EF 55%. LV wall thickness moderately increased.  LA is mildly dilated w/ LA volume index of 30. trivial mitral regurg. Evidence of moderate (grade II) diastolic dysfunction seen.  History of echocardiogram 2019    EF of 50-55%. LV wall thickness is mildly increased. LA is mildly dilated (29-33) with a left atrial volume index of 31 m1/m2. mild diastolic dysfunction.  Hyperlipidemia     Hypertension         Past Surgical History:     Past Surgical History:   Procedure Laterality Date    CARDIAC CATHETERIZATION Left 2017    right radial, [facility] Dr. Keli Landin          Medications Prior to Admission:     Prior to Admission medications    Medication Sig Start Date End Date Taking? Authorizing Provider   clopidogrel (PLAVIX) 75 MG tablet Take 1 tablet by mouth daily 20   Giovana Morrell MD   carvedilol (COREG) 12.5 MG tablet Take 1 tablet by mouth 2 times daily (with meals) 20   Jessi Tellez MD   losartan-hydrochlorothiazide [facility]) 100-25 MG per tablet Take 1 tablet by mouth daily 20   Jessi Tellez MD   amLODIPine (NORVASC) 10 MG tablet Take 1 tablet by mouth daily 20   Jessi Tellez MD   atorvastatin (LIPITOR) 80 MG tablet Take 1 tablet by mouth nightly 20   Jessi Tellez MD   aspirin 81 MG EC tablet Take 1 tablet by mouth daily 20   Jessi Tellez MD   metFORMIN (GLUCOPHAGE) 500 MG tablet Take 500 mg by mouth 2 times daily (with meals)    Historical Provider, MD        Allergies:     Patient has no known allergies. Social History:     Tobacco:    reports that she has never smoked. She has never used smokeless tobacco.  Alcohol:      reports no history of alcohol use. Drug Use:  reports no history of drug use. Family History:     Family History   Problem Relation Age of Onset    Coronary Art Dis Father          from [place]    COPD Sister         O2 dep    Coronary Art Dis Brother         2 brothers  from [place]       Review of Systems:     Positive and Negative as described in HPI.     CONSTITUTIONAL:  negative for fevers, chills, sweats, fatigue, weight loss  HEENT:  negative for vision, hearing changes, runny nose, throat pain  RESPIRATORY:  negative for shortness of breath, cough, congestion, wheezing. CARDIOVASCULAR:  negative for chest pain, palpitations. GASTROINTESTINAL:  negative for nausea, vomiting, diarrhea, constipation, change in bowel habits, abdominal pain   GENITOURINARY:  negative for difficulty of urination, burning with urination, frequency   INTEGUMENT:  negative for rash, skin lesions, easy bruising   HEMATOLOGIC/LYMPHATIC:  negative for swelling/edema   ALLERGIC/IMMUNOLOGIC:  negative for urticaria , itching  ENDOCRINE:  negative increase in drinking, increase in urination, hot or cold intolerance  MUSCULOSKELETAL:  negative joint pains, muscle aches, swelling of joints  NEUROLOGICAL: Right-sided weakness and speech deficit  BEHAVIOR/PSYCH:  negative for depression, anxiety    Physical Exam:     BP (!) 137/58   Pulse 66   Temp 97.9 °F (36.6 °C) (Oral)   Resp 20   Ht 5' 4\" (1.626 m)   Wt 198 lb 13.7 oz (90.2 kg)   SpO2 97%   BMI 34.13 kg/m²   Temp (24hrs), Av.9 °F (36.6 °C), Min:97.9 °F (36.6 °C), Max:97.9 °F (36.6 °C)    Recent Labs     20  1615 20  2021 20  0621 20  1058   POCGLU 89 161* 85 120*     No intake or output data in the 24 hours ending 20 1536  Speech deficit  General Appearance:  alert, well appearing, and in no acute distress  Mental status: oriented to person, place, and time with normal affect  Head:  normocephalic, atraumatic.   Eye: no icterus, redness, pupils equal and reactive, extraocular eye movements intact, conjunctiva clear  Ear: normal external ear, no discharge, hearing intact  Nose:  no drainage noted  Mouth: mucous membranes moist  Neck: supple, no carotid bruits, thyroid not palpable  Lungs: Bilateral equal air entry, clear to ausculation, no wheezing, rales or rhonchi, normal effort  Cardiovascular: normal rate, regular rhythm, no murmur, gallop, rub.   Abdomen: Soft, nontender, nondistended, normal bowel sounds, no hepatomegaly or splenomegaly  Neurologic: Right hemiparesis with Broca's aphasia skin: No gross lesions, rashes, bruising or bleeding on exposed skin area  Extremities:  peripheral pulses palpable, no pedal edema or calf pain with palpation  P    Investigations:      Laboratory Testing:  Recent Results (from the past 24 hour(s))   POC Glucose Fingerstick    Collection Time: 09/16/20  4:15 PM   Result Value Ref Range    POC Glucose 89 65 - 105 mg/dL   POC Glucose Fingerstick    Collection Time: 09/16/20  8:21 PM   Result Value Ref Range    POC Glucose 161 (H) 65 - 105 mg/dL   Basic Metabolic Panel w/ Reflex to MG    Collection Time: 09/17/20  6:19 AM   Result Value Ref Range    Glucose 118 (H) 70 - 99 mg/dL    BUN 30 (H) 8 - 23 mg/dL    CREATININE 0.82 0.50 - 0.90 mg/dL    Bun/Cre Ratio NOT REPORTED 9 - 20    Calcium 10.0 8.6 - 10.4 mg/dL    Sodium 135 135 - 144 mmol/L    Potassium 4.0 3.7 - 5.3 mmol/L    Chloride 102 98 - 107 mmol/L    CO2 25 20 - 31 mmol/L    Anion Gap 8 (L) 9 - 17 mmol/L    GFR Non-African American >60 >60 mL/min    GFR African American >60 >60 mL/min    GFR Comment          GFR Staging NOT REPORTED    CBC    Collection Time: 09/17/20  6:19 AM   Result Value Ref Range    WBC 4.5 3.5 - 11.0 k/uL    RBC 3.73 (L) 4.0 - 5.2 m/uL    Hemoglobin 11.4 (L) 12.0 - 16.0 g/dL    Hematocrit 33.6 (L) 36 - 46 %    MCV 90.0 80 - 100 fL    MCH 30.6 26 - 34 pg    MCHC 34.0 31 - 37 g/dL    RDW 14.3 11.5 - 14.9 %    Platelets 766 877 - 388 k/uL    MPV 9.3 6.0 - 12.0 fL    NRBC Automated NOT REPORTED per 100 WBC   POC Glucose Fingerstick    Collection Time: 09/17/20  6:21 AM   Result Value Ref Range    POC Glucose 85 65 - 105 mg/dL   POC Glucose Fingerstick    Collection Time: 09/17/20 10:58 AM   Result Value Ref Range    POC Glucose 120 (H) 65 - 105 mg/dL       Imaging/Diagonstics:      Assessment :      Primary Problem  <principal problem not specified>    Active Hospital Problems    Diagnosis Date Noted    Acute ischemic stroke ([facility] [I63.9] 08/26/2020    Cerebral infarction [facility]) - left MCA distribution  [I63.9] 08/26/2020    Type 2 diabetes mellitus, without long-term current use of insulin [facility] [E11.9] 08/25/2020    Aphasia [R47.01]     CKD (chronic kidney disease) stage 3, GFR 30-59 ml/min ([facility]) [N18.3] 12/20/2018    Morbid obesity ([facility] [E66.01] 12/20/2018    Acute on chronic combined systolic and diastolic CHF, NYHA class 4 ([facility]) [I50.43] 12/17/2018    Chronic combined systolic and diastolic HF (heart failure), NYHA class 2 ([facility] [I50.42] 08/28/2017    Hyperlipidemia [E78.5]     Hypertension [I10]     Idiopathic cardiomyopathy ([facility] [I42.8]        Plan:     1. Uncontrolled diabetes mellitus with the peripheral organ damage with ischemic CVA  2. Ischemic CVA with right hemiparesis and Broca's aphasia  3. Metformin recent A1c is 7.3 reviewed and reconciled  4. Ischemic CVA hyperlipidemia aspirin and Lipitor Plavix 75 all started  5. Losartan 100 mg for hypertension and diabetic renal protection  6. norco for pain  7. q6  Hr prn    Consultations:   IP CONSULT TO INTERNAL MEDICINE  IP CONSULT TO DIETITIAN  IP CONSULT TO SOCIAL WORK  IP CONSULT TO INTERNAL MEDICINE      Leti Isabel MD  9/17/2020  3:36 PM    Copy sent to Dr. Beck Guerra, APRN - CNP    Please note that this chart was generated using voice recognition Dragon dictation software. Although every effort was made to ensure the accuracy of this automated transcription, some errors in transcription may have occurred.

## 2020-09-17 NOTE — CONSULTS
[facility] Internal Medicine    CONSULTATION / HISTORY AND PHYSICAL EXAMINATION            Date:   9/17/2020  Patient name:  Eduardo Casper  Date of admission:  8/26/2020  6:56 PM  MRN:   [id number]  Account:  [de-identified]  YOB: 1950  PCP:    SUE Tavares CNP  Room:   5950/6515-46  Code Status:    Full Code    Physician Requesting Consult: Aleisha Drake MD    Reason for Consult: Medical management    Chief Complaint:     No chief complaint on file. Stroke    History Obtained From:     Patient medical record nursing staff    History of Present Illness:     71-year-old pleasant  lady was admitted to [facility] on August 23 with a aphasia diagnosed with the left middle cerebral artery ischemic infarct  Known history of systolic congestive heart failure ejection fraction 35 to 40% secondary to nonischemic cardiomyopathy  Diabetes   Duration more than 7 years  Modifying factors on Glucophage and other med  Severity uncontrolled sever  Associated signs and symtoms neuropathy/ckd/ CAD. aggravated with sugar diet and better with low sugar diet             Past Medical History:     Past Medical History:   Diagnosis Date    CH[facility] (congestive heart failure) ([facility]     Diabetes mellitus ([facility]     H/O cardiac catheterization 08/04/2017    LMCA: Mild irregularites 10-20%. LAD: Mild irregularites 10-20%. LCx: Mild irregularites 10-20%. RCA: Mild irregularites 10-20%. LV function assessed as : Abnormal. EF of 40%.  H/O echocardiogram 12/18/2018    EF 55%. Mildly increased LV wall thickness. The left atrium appears moderately to severely dilated. Moderate to severe mitral regurg. Moderate pulmonary HTN with an estimated RV systolic pressure of 42AZEB. Moderate tricuspid regurg. Evidnece fo moderate diastolic dysfunction is seen.  History of echocardiogram 01/17/2019    EF 55%. LV wall thickness moderately increased.  LA is mildly dilated w/ LA volume index of 30. trivial mitral regurg. Evidence of moderate (grade II) diastolic dysfunction seen.  History of echocardiogram 2019    EF of 50-55%. LV wall thickness is mildly increased. LA is mildly dilated (29-33) with a left atrial volume index of 31 m1/m2. mild diastolic dysfunction.  Hyperlipidemia     Hypertension         Past Surgical History:     Past Surgical History:   Procedure Laterality Date    CARDIAC CATHETERIZATION Left 2017    right radial, T Dr. Jorden Frazier          Medications Prior to Admission:     Prior to Admission medications    Medication Sig Start Date End Date Taking? Authorizing Provider   clopidogrel (PLAVIX) 75 MG tablet Take 1 tablet by mouth daily 20   Adriano Nation MD   carvedilol (COREG) 12.5 MG tablet Take 1 tablet by mouth 2 times daily (with meals) 20   Amita Benites MD   losartan-hydrochlorothiazide [facility]) 100-25 MG per tablet Take 1 tablet by mouth daily 20   Amita Benites MD   amLODIPine (NORVASC) 10 MG tablet Take 1 tablet by mouth daily 20   Amita Benites MD   atorvastatin (LIPITOR) 80 MG tablet Take 1 tablet by mouth nightly 20   Amita Benites MD   aspirin 81 MG EC tablet Take 1 tablet by mouth daily 20   Amita Benites MD   metFORMIN (GLUCOPHAGE) 500 MG tablet Take 500 mg by mouth 2 times daily (with meals)    Historical Provider, MD        Allergies:     Patient has no known allergies. Social History:     Tobacco:    reports that she has never smoked. She has never used smokeless tobacco.  Alcohol:      reports no history of alcohol use. Drug Use:  reports no history of drug use. Family History:     Family History   Problem Relation Age of Onset    Coronary Art Dis Father          from [place]    COPD Sister         O2 dep    Coronary Art Dis Brother         2 brothers  from [place]       Review of Systems:     Positive and Negative as described in HPI.     CONSTITUTIONAL:  negative for fevers, chills, sweats, fatigue, weight loss  HEENT:  negative for vision, hearing changes, runny nose, throat pain  RESPIRATORY:  negative for shortness of breath, cough, congestion, wheezing. CARDIOVASCULAR:  negative for chest pain, palpitations. GASTROINTESTINAL:  negative for nausea, vomiting, diarrhea, constipation, change in bowel habits, abdominal pain   GENITOURINARY:  negative for difficulty of urination, burning with urination, frequency   INTEGUMENT:  negative for rash, skin lesions, easy bruising   HEMATOLOGIC/LYMPHATIC:  negative for swelling/edema   ALLERGIC/IMMUNOLOGIC:  negative for urticaria , itching  ENDOCRINE:  negative increase in drinking, increase in urination, hot or cold intolerance  MUSCULOSKELETAL:  negative joint pains, muscle aches, swelling of joints  NEUROLOGICAL: Right-sided weakness and speech deficit  BEHAVIOR/PSYCH:  negative for depression, anxiety    Physical Exam:     BP (!) 137/58   Pulse 66   Temp 97.9 °F (36.6 °C) (Oral)   Resp 20   Ht 5' 4\" (1.626 m)   Wt 198 lb 13.7 oz (90.2 kg)   SpO2 97%   BMI 34.13 kg/m²   Temp (24hrs), Av.9 °F (36.6 °C), Min:97.9 °F (36.6 °C), Max:97.9 °F (36.6 °C)    Recent Labs     20  1615 20  2021 20  0621 20  1058   POCGLU 89 161* 85 120*     No intake or output data in the 24 hours ending 20 1536  Speech deficit  General Appearance:  alert, well appearing, and in no acute distress  Mental status: oriented to person, place, and time with normal affect  Head:  normocephalic, atraumatic.   Eye: no icterus, redness, pupils equal and reactive, extraocular eye movements intact, conjunctiva clear  Ear: normal external ear, no discharge, hearing intact  Nose:  no drainage noted  Mouth: mucous membranes moist  Neck: supple, no carotid bruits, thyroid not palpable  Lungs: Bilateral equal air entry, clear to ausculation, no wheezing, rales or rhonchi, normal effort  Cardiovascular: normal rate, regular rhythm, Problem  <principal problem not specified>    Active Hospital Problems    Diagnosis Date Noted    Acute ischemic stroke ([facility] [I63.9] 08/26/2020    Cerebral infarction [facility]) - left MCA distribution  [I63.9] 08/26/2020    Type 2 diabetes mellitus, without long-term current use of insulin ([facility] [E11.9] 08/25/2020    Aphasia [R47.01]     CKD (chronic kidney disease) stage 3, GFR 30-59 ml/min ([facility]) [N18.3] 12/20/2018    Morbid obesity ([facility] [E66.01] 12/20/2018    Acute on chronic combined systolic and diastolic CHF, NYHA class 4 ([facility]) [I50.43] 12/17/2018    Chronic combined systolic and diastolic HF (heart failure), NYHA class 2 ([facility] [I50.42] 08/28/2017    Hyperlipidemia [E78.5]     Hypertension [I10]     Idiopathic cardiomyopathy ([facility] [I42.8]        Plan:     1. Uncontrolled diabetes mellitus with the peripheral organ damage with ischemic CVA  2. Ischemic CVA with right hemiparesis and Broca's aphasia  3. Metformin recent A1c is 7.3 reviewed and reconciled  4. Ischemic CVA hyperlipidemia aspirin and Lipitor Plavix 75 all started  5. Losartan 100 mg for hypertension and diabetic renal protection  6. norco for pain  7. q6  Hr prn    Consultations:   IP CONSULT TO INTERNAL MEDICINE  IP CONSULT TO DIETITIAN  IP CONSULT TO SOCIAL WORK  IP CONSULT TO INTERNAL MEDICINE      Leeann Arboleda MD  9/17/2020  3:36 PM    Copy sent to Dr. Mac Taylor APRN - CNP    Please note that this chart was generated using voice recognition Dragon dictation software. Although every effort was made to ensure the accuracy of this automated transcription, some errors in transcription may have occurred.

## 2020-09-17 NOTE — PROGRESS NOTES
Physical Therapy  Facility/Department: [facility] ACUTE REHAB  Daily Treatment Note  NAME: Aguila Mata  : 1950  MRN: [id number]    Date of Service: 2020    Discharge Recommendations:  Patient would benefit from continued therapy after discharge, 24 hour supervision or assist   PT Equipment Recommendations  Equipment Needed: Yes  Cane: [employer]  Other: to be determined asw pt progreses    Assessment   Treatment Diagnosis: Impaired function, weakness 2\" L MCA CVA. History: PT with hx of HTN, DM, CHF, CVAs, pt with recent L MCA CVA. REQUIRES PT FOLLOW UP: Yes  Activity Tolerance  Activity Tolerance: Patient limited by endurance; Patient limited by fatigue     Patient Diagnosis(es): There were no encounter diagnoses. has a past medical history of CHF (congestive heart failure) ([facility] Diabetes mellitus ([facility] H/O cardiac catheterization, H/O echocardiogram, History of echocardiogram, History of echocardiogram, Hyperlipidemia, and Hypertension. has a past surgical history that includes Cholecystectomy and Cardiac catheterization (Left, 2017). Restrictions  Restrictions/Precautions  Restrictions/Precautions: Fall Risk, Up as Tolerated, General Precautions  Required Braces or Orthoses?: No  Implants present? : (none)  Position Activity Restriction  Other position/activity restrictions: up as tolerated  Subjective   General  Chart Reviewed: Yes  Additional Pertinent Hx: Pt has a history of HTN, HLD, DM, CHF. Pt recently evaluated at [facility] for  new onset of aphasia. Pt was transferred to [facility] on 20 after diagnostic studies confirmed new L hemisphere CVA. Pt with known history of previous CVAs. MRI confirmed L MCA infarct on 2020. Pt admitted to [address] [place] [address] 20. Response To Previous Treatment: Patient reporting fatigue but able to participate.   Family / Caregiver Present: No  Referring Practitioner: Dr. Alejandro Steward: Pt positioned semifowlers in bed, nursing staff present. Pt agreeable to tx   General Comment  Comments: Patient is aphasic  Pain Screening  Patient Currently in Pain: No  Vital Signs  Patient Currently in Pain: No  Patient Observation  Observations: Pt has bruises right UE, dorsal wrist , forearm , and lateral humerus, Dr Colten King aware  Pre Treatment Pain Screening  Intervention List: Patient able to continue with treatment    Orientation  Orientation  Overall Orientation Status: Impaired  Orientation Level: Unable to assess(Pt is aphasic)  Cognition      Objective   Bed mobility  Bridging: Stand by assistance  Transfers  Sit to Stand: Contact guard assistance  Stand to sit: Minimal Assistance  Bed to Chair: Minimal assistance  Comment: Pt requires verbal cuing for hand placement and using safe technique   Ambulation  Ambulation?: Yes  More Ambulation?: Yes  Ambulation 1  Surface: level tile  Device: Small [employer] Riskon  Other Apparatus: Right(soft medial/lateral stability brace)  Assistance: Contact guard assistance; Moderate assistance  Quality of Gait: Downward gaze, R toe catches in swing phase with increased extensor tone with fatigue. patient has active dorsiflexion. Gait Deviations: Decreased step length;Decreased step height  Distance: 35ft, 65ft   Comments: Hand in hand RUE for balance and RUE facilitation of extension, verbal cues to correct posture and look up  Ambulation 2  Other Apparatus 2: (soft ankle eversion prevention brace)  Stairs/Curb  Stairs?: No  Stairs  # Steps : 5  Stairs Height: 6\"(6\"/4\")  Rails: R Ascending   Device: [employer]   Assistance: Moderate assistance, Stand by Assistance   Comment: Pt requires Moderate cuing and assistance for sequencing on stairs. Pt has LOB x1 on stairs, dropping cane on floor,  able to complete navigation safely With 3D FUTURE VISION II [place] at MOD A x1 and SBA x1. Pt becoming tearful after completion.   Wheelchair Activities  Propulsion: Yes  Propulsion 1  Propulsion: Manual  Level: Level Tile  Method: LUE;LLE  Level of Assistance: Minimal assistance  Description/ Details: Pt requires assist with straight path. Distance: 100ft with 2 rest breaks     Balance  Posture: Fair  Sitting - Static: Good  Sitting - Dynamic: Good  Standing - Static: Fair;-  Standing - Dynamic: Poor;+  Comments: Seated in WC, standing with SBQC  Other exercises  Other exercises?: Yes  Other exercises 1: B LE Ace bandage wrap donned  Other exercises 2: B LE exercises with blue theraband #2 x20    Other exercises 3: UBE x 3min forward/retro          Comment: Rest breaks PRN    Goals  Short term goals  Time Frame for Short term goals: 10 days   Short term goal 1: Pt to perform bed mobility independently. Short term goal 2: Pt to perform transfers with minAx1. Short term goal 3: Pt to ambulate 150 ft, least restrictive device, minAx1. Short term goal 4: Pt to tolerate 30-60 mins physical therapy to increase endurance and strength. Short term goal 5: Pt to complete 5 steps, with LUE support, minAx1. Long term goals  Time Frame for Long term goals : By LOS. Long term goal 2: Pt to complete transfers with SBA to promote independence. Long term goal 3: Pt to ambulate 200 ft, least restrictive device, SBA. Long term goal 4: Pt to complete 5-12 steps, CGA to promote function. Long term goal 5: Pt to improve PASS score from 15/36 to 22/36. Patient Goals   Patient goals : To return back home. Plan    Plan  Times per week: 1.5 hrs/day 5-7 days/week  Safety Devices  Type of devices:  All fall risk precautions in place, Gait belt, Call light within reach, Left in chair  Restraints  Initially in place: No     Therapy Time         09/17/20 1556 09/17/20 1557   PT Individual Minutes   Time In 0845 1215   Time Out 0930 1300   Minutes 39 45   Total Minutes: [address] [place] [address]

## 2020-09-17 NOTE — PROGRESS NOTES
Speech Language Pathology  Speech Language Pathology  [facility]    Speech Language Treatment Note    Date: 9/17/2020  Patients Name: Aguila Mata  MRN: [id number]  Diagnosis: CVA  Patient Active Problem List   Diagnosis Code    Hypertension I10    Hyperlipidemia E78.5    Acute on chronic systolic CHF (congestive heart failure) ([facility] I50.23    Hypertensive urgency I16.0    Idiopathic cardiomyopathy ([facility] I42.8    Hypertensive emergency I16.1    Chronic combined systolic and diastolic HF (heart failure), NYHA class 2 ([facility]) I50.42    Acute on chronic combined systolic and diastolic CHF, NYHA class 4 ([facility]) I50.43    Hypoxia R09.02    Severe mitral regurgitation by prior echocardiogram I34.0    Morbid obesity ([facility]) E66.01    CKD (chronic kidney disease) stage 3, GFR 30-59 ml/min ([facility]) N18.3    Physical deconditioning R53.81    TIA (transient ischemic attack) G45.9    Old lacunar stroke without late effect Z86.73    Dysarthria R47.1    Stroke determined by clinical assessment ([facility] I63.9    Aphasia R47.01    Type 2 diabetes mellitus, without long-term current use of insulin ([facility] E11.9    Cerebral infarction ([facility] - left MCA distribution  I63.9    Uncontrolled type 2 diabetes mellitus with hyperglycemia ([facility] E11.65    Elevated blood pressure reading R03.0    Acute ischemic stroke ([facility]) I63.9       Pain: 5/10    Speech and Language Treatment  Treatment time:  6625-7151  (short session as student nurse in room with instructor getting meds together and administering meds, interrupting tx)    Subjective: [x] Alert [x] Cooperative     [] Confused     [] Agitated      [] Lethargic    Objective/Assessment:  Auditory Comprehension:   Pt. Responding to yes/no and simple conversational questions appropriately throughout session. Verbal Expression:     Confrontation naming- 20%, 90% c phonemic cues. All of pt. Error responses close approximations of target with phonemic errors.   Describe object function- 50%, 70% c phonemic cues. Pt. Named days of week with 86% with appropriate initiation. Pt. Needed mod to max A to count 1-15 as she kept perseverating on days of week despite break given. Pt. Responding appropriately in sentences conversationally throughout. Motor speech:  n/a     Reading Comprehension:   n/a     Other:  No family present. Plan:  [x] Continue ST services    [] Discharge from ST:      Discharge recommendations: [] Inpatient Rehab   [] [place]   [] Outpatient Therapy  [] Follow up at trauma clinic   [] Other:       Treatment completed by: Kingsley Mora. MARTHA/SLP

## 2020-09-17 NOTE — PROGRESS NOTES
Physical Medicine & Rehabilitation  Progress Note      Subjective:      81year-old female with ischemic CVA with R dominant hemiparesis and aphasia. Patient continues to gradually improve with word finding and sentence formation - still exhibiting expressive aphasia today. She denies any new issues with pain, sleep, appetite, bowel, or bladder. Discussed discharge plan with her today. Her sister and daughter are going to be unable to care for her at home at current level of function. ROS:  Denies fevers, chills, sweats. No chest pain, palpitations, lightheadedness. Denies coughing, wheezing or shortness of breath. Denies abdominal pain, nausea, diarrhea or constipation. No new areas of joint pain. Denies new areas of numbness or weakness. Denies new anxiety or depression issues. No new skin problems. Rehabilitation:   Progressing in therapies. PT:  Restrictions/Precautions: Fall Risk, Up as Tolerated, General Precautions  Implants present? : (none)  Other position/activity restrictions: up as tolerated   Transfers  Sit to Stand: Contact guard assistance  Stand to sit: Minimal Assistance  Bed to Chair: Minimal assistance  Stand Pivot Transfers: Minimal Assistance  Squat Pivot Transfers: Minimal Assistance  Comment: Pt requires verbal cuing for hand placement and using safe technique   Ambulation 1  Surface: level tile  Device: Small Gee Foreign  Other Apparatus: Right(soft medial/lateral stability brace)  Assistance: Contact guard assistance, Moderate assistance  Quality of Gait: Downward gaze, R toe catches in swing phase with increased extensor tone with fatigue. patient has active dorsiflexion.   Gait Deviations: Decreased step length, Decreased step height  Distance: 60', rest, 30'  Comments: Hand in hand RUE for balance and RUE facilitation of extension, verbal cues to correct posture and look up    Transfers  Sit to Stand: Contact guard assistance  Stand to sit: Minimal Assistance  Bed to Chair: Minimal assistance  Stand Pivot Transfers: Minimal Assistance  Squat Pivot Transfers: Minimal Assistance  Comment: Pt requires verbal cuing for hand placement and using safe technique   Ambulation  Ambulation?: Yes  More Ambulation?: Yes  Ambulation 1  Surface: level tile  Device: Small Gee Foreign  Other Apparatus: Right(soft medial/lateral stability brace)  Assistance: Contact guard assistance, Moderate assistance  Quality of Gait: Downward gaze, R toe catches in swing phase with increased extensor tone with fatigue. patient has active dorsiflexion. Gait Deviations: Decreased step length, Decreased step height  Distance: 60', rest, 30'  Comments: Hand in hand RUE for balance and RUE facilitation of extension, verbal cues to correct posture and look up    Surface: level tile  Ambulation 1  Surface: level tile  Device: Small Gee [place]  Other Apparatus: Right(soft medial/lateral stability brace)  Assistance: Contact guard assistance, Moderate assistance  Quality of Gait: Downward gaze, R toe catches in swing phase with increased extensor tone with fatigue. patient has active dorsiflexion. Gait Deviations: Decreased step length, Decreased step height  Distance: 60', rest, 30'  Comments: Hand in hand RUE for balance and RUE facilitation of extension, verbal cues to correct posture and look up    OT:  ADL  Equipment Provided: Reacher, Sock aid  Feeding: Setup(assist to cut food, limited by decreased RUE coorindation, however increased engagement noted. Pt able to use RUE to hold bottles/containers and LUE to twist top off with verbal cueing and visual demonstration from writer)  Grooming: Supervision, Verbal cueing, Setup(Pt completed oral care and donned deoderant seated sink-side. Writer provided visual demosntration to utilize RUE to stabilize toothpaste on sink while utilizing LUE to apply toothpaste, good return)  UE Bathing: Minimal assistance(Assist provided to wash/dry LUE.  Ailyntier recommended bend sanjuanita-handled sponge, pt agreeable. Pt able to complete all other parts without assist)  LE Bathing: Contact guard assistance(Pt completed anterior/posterior thighs)  UE Dressing: Supervision, Setup(don/doff over head shirt ; declined bra)  LE Dressing: Maximum assistance(assist threading BLE, pulling up and adjusting pants )  Toileting: Minimal assistance, Moderate assistance(completed luis seated, assisted with clotng mgt)  Additional Comments: Foot care completed prior to writers treatment; use of ACE wraps due to poor sizing with TEDs. Able to manage  clothing over Left hip, requiring ModA over R hip; VC and [place] for hooking with R thumb with poor carrryover. Pt verbalized this date there she plans to wear dresses at home to reduce burden on LBD. Overall increased use of RUE and intiation with tasks despite neglect during mobility. Instrumental ADL's  Instrumental ADLs: Yes     Balance  Sitting Balance: Modified independent (intermittent LUE support on bedside for stability )  Standing Balance: Minimal assistance(primairly contact gaurd assist, minimal assist with dynamic )   Standing Balance  Time: 1-4min x4  Activity: ADLs  Comment: VC for RLE attention and adjusting Base of support;  VC for  attention to R hand and wrist positioning (max)  Functional Mobility  Functional - Mobility Device: Wheelchair  Activity: To/from bathroom  Assist Level: Moderate assistance  Functional Mobility Comments: MAx verbal cueing for technique and to acknowlege potential obstacles      Bed mobility  Bridging: Stand by assistance  Rolling to Left: Stand by assistance  Rolling to Right: Stand by assistance  Supine to Sit: Stand by assistance  Sit to Supine: Stand by assistance  Scooting: Supervision  Comment: Pt demo'd improved ability to transition from supine to sit this date.  No verbal cueing provided for technique or s afety   Transfers  Stand Pivot Transfers: Contact guard assistance  Sit Pivot Transfers: Minimal assistance  Sit to stand: Minimal assistance  Stand to sit: Minimal assistance  Transfer Comments: AM: Writer provided education on approrpiate wheelchair set-up to facilitate safety and reduce fall risk, good return. Pt required 1 additional verbal cue for sequencing BUE on arm rests    Toilet Transfers  Toilet - Technique: Stand pivot  Equipment Used: Grab bars  Toilet Transfer: Contact guard assistance  Toilet Transfers Comments: AM: writer proivded education on appropriate wheelchair set-up, pt verbalized understanding. Pt provided minimal verbal cueing for appropriate sequencing with use of grab bars, good return no loss of balance . VC for RUE hand placement on grab bars; fair carryover     Shower Transfers  Shower - Transfer From: Wheelchair  Shower - Transfer Type: To and From  Shower - Transfer To: Transfer tub bench  Shower - Technique: Stand pivot, To right, To left  Shower Transfers: Contact Guard  Shower Transfers Comments: AM: Minimal assist provided to facilitate pivot on LLE to transtiion from wheelchair to tub transfer bench       SPEECH:  (short session as student nurse in room with instructor getting meds together and administering meds, interrupting tx)     Subjective: [x]? Alert     [x]? Cooperative     []? Confused     []? Agitated      []? Lethargic     Objective/Assessment:  Auditory Comprehension:   Pt. Responding to yes/no and simple conversational questions appropriately throughout session.       Verbal Expression:     Confrontation naming- 20%, 90% c phonemic cues. All of pt. Error responses close approximations of target with phonemic errors. Describe object function- 50%, 70% c phonemic cues. Pt. Named days of week with 86% with appropriate initiation. Pt. Needed mod to max A to count 1-15 as she kept perseverating on days of week despite break given. Pt. Responding appropriately in sentences conversationally throughout.       Motor speech:  n/a     Reading Comprehension:   n/a Other:  No family present. Objective:  BP (!) 137/58   Pulse 66   Temp 97.9 °F (36.6 °C) (Oral)   Resp 20   Ht 5' 4\" (1.626 m)   Wt 198 lb 13.7 oz (90.2 kg)   SpO2 97%   BMI 34.13 kg/m²       GEN: Well developed, well nourished, in NAD  HEENT:  NCAT.  PERRL.  EOMI.  Mucous membranes pink and moist.   PULM:  Clear to ausculation. No rales or rhonchi. Respirations WNL and unlabored. CV:  Bradycardic rate, regular rhythm.  No murmurs or gallops. GI:  Abdomen soft. Nontender. Non-distended.  BS + and equal.    NEUROLOGICAL: A&O x3. Sensation intact to light touch. Short sentences improving. MSK:  Functional ROM LUE and LLE. Impaired ROM RUE and RLE. Motor testing 4+/5 key muscles LUE and LLE. R  4-/5, R wrist extension 4-/5, R finger extension 3/5, R elbow flexion and extension 3/5 . SKIN: Warm dry and intact. Good turgor. EXTREMITIES:  No calf tenderness to palpation. No edema BLEs. .    PSYCH: Mood WNL. Appropriately interactive. Affect WNL. Diagnostics:     CBC:   Recent Labs     09/17/20  0619   WBC 4.5   RBC 3.73*   HGB 11.4*   HCT 33.6*   MCV 90.0   RDW 14.3        BMP:   Recent Labs     09/17/20  0619      K 4.0      CO2 25   BUN 30*   CREATININE 0.82   GLUCOSE 118*     BNP: No results for input(s): BNP in the last 72 hours. PT/INR: No results for input(s): PROTIME, INR in the last 72 hours. APTT: No results for input(s): APTT in the last 72 hours. CARDIAC ENZYMES: No results for input(s): CKMB, CKMBINDEX, TROPONINT in the last 72 hours. Invalid input(s): CKTOTAL;3  FASTING LIPID PANEL:  Lab Results   Component Value Date    CHOL 160 08/24/2020    HDL 44 08/24/2020    TRIG 79 08/24/2020     LIVER PROFILE: No results for input(s): AST, ALT, ALB, BILIDIR, BILITOT, ALKPHOS in the last 72 hours.      Current Medications:   Current Facility-Administered Medications: miconazole (MICOTIN) 2 % powder, , Topical, BID  carvedilol (COREG) tablet 6.25 mg, 6.25 mg, Oral, BID a speech recognition program.  While intending to generate a document that actually reflects the content of the visit, the document can still have some errors including those of syntax and sound a like substitutions which may escape proof reading. In such instances, actual meaning can be extrapolated by contextual diversion.

## 2020-09-17 NOTE — PLAN OF CARE
Nutrition Problem #1: Inadequate oral intake  Intervention: Food and/or Nutrient Delivery: Continue Current Diet  Nutritional Goals: PO intake to meet % of estimated nutrition needs

## 2020-09-17 NOTE — CARE COORDINATION
ONGOING DISCHARGE PLAN:    Spoke with patient regarding discharge plan and patient confirms that plan is still to be discharged to [place] [facility]    Will continue to follow for additional discharge needs.     Electronically signed by MOMO Toney, EWELINAW on 9/17/2020 at 2:36 PM

## 2020-09-17 NOTE — PLAN OF CARE
Problem: Skin Integrity:  Goal: Will show no infection signs and symptoms  Description: Will show no infection signs and symptoms  Outcome: Ongoing     Problem: Skin Integrity:  Goal: Absence of new skin breakdown  Description: Absence of new skin breakdown  Outcome: Ongoing     Problem: Falls - Risk of:  Goal: Will remain free from falls  Description: Will remain free from falls  Outcome: Ongoing     Problem: Falls - Risk of:  Goal: Absence of physical injury  Description: Absence of physical injury  Outcome: Ongoing     Problem: Neurological  Goal: Maximum potential motor/sensory/cognitive function  Outcome: Ongoing     Problem: Musculor/Skeletal Functional Status  Goal: Highest potential functional level  Outcome: Ongoing     Problem: Musculor/Skeletal Functional Status  Goal: Absence of falls  Outcome: Ongoing     Problem: Pain:  Goal: Pain level will decrease  Description: Pain level will decrease  Outcome: Ongoing     Problem: Pain:  Goal: Control of acute pain  Description: Control of acute pain  Outcome: Ongoing

## 2020-09-17 NOTE — CARE COORDINATION
Writedeejay discussed discharge plan with the pt. He explained to her that her dtr said going home with her is not an option. She agreed to go to a snf.she dis not have a preference.  then spoke to the pt dtr. She would like the pt referred to [facility]. Pradeepr made a referral,they have beds.

## 2020-09-17 NOTE — PROGRESS NOTES
Speech Language Pathology  Speech Language Pathology  [facility]    Speech Language Treatment Note    Date: 9/17/2020  Patients Name: Jasmina Savage  MRN: [id number]  Diagnosis: CVA  Patient Active Problem List   Diagnosis Code    Hypertension I10    Hyperlipidemia E78.5    Acute on chronic systolic CHF (congestive heart failure) ([facility] I50.23    Hypertensive urgency I16.0    Idiopathic cardiomyopathy ([facility] I42.8    Hypertensive emergency I16.1    Chronic combined systolic and diastolic HF (heart failure), NYHA class 2 ([facility]) I50.42    Acute on chronic combined systolic and diastolic CHF, NYHA class 4 ([facility]) I50.43    Hypoxia R09.02    Severe mitral regurgitation by prior echocardiogram I34.0    Morbid obesity ([facility]) E66.01    CKD (chronic kidney disease) stage 3, GFR 30-59 ml/min ([facility]) N18.3    Physical deconditioning R53.81    TIA (transient ischemic attack) G45.9    Old lacunar stroke without late effect Z86.73    Dysarthria R47.1    Stroke determined by clinical assessment ([facility] I63.9    Aphasia R47.01    Type 2 diabetes mellitus, without long-term current use of insulin ([facility] E11.9    Cerebral infarction ([facility] - left MCA distribution  I63.9    Uncontrolled type 2 diabetes mellitus with hyperglycemia ([facility] E11.65    Elevated blood pressure reading R03.0    Acute ischemic stroke ([facility]) I63.9       Pain: pt. denies    Speech and Language Treatment  Treatment time:  0872-9901    Subjective: [x] Alert [x] Cooperative     [] Confused     [] Agitated      [] Lethargic    Objective/Assessment:  Auditory Comprehension:   Pt. Responding to yes/no and simple conversational questions appropriately throughout session. Verbal Expression:     Conversational directed therapy resulted in pt. c moderate amount of phonemic and neologistic paraphasias, pt. Able to clarify message when clarification questions targeted to one word answers.     Pt. Responding appropriately in sentences conversationally throughout. Pt. Able to verbally state answer during reading task when ST read sentence c 75% accuracy with close phonemic approximations. Reading Comprehension:   Complete sentence c one word out of field of 3 c cues to point c 50% accuracy, 80% c cues. Other:  No family present. Plan:  [x] Continue ST services    [] Discharge from ST:      Discharge recommendations: [] Inpatient Rehab   [] [place]   [] Outpatient Therapy  [] Follow up at trauma clinic   [] Other:       Treatment completed by: Kingsley Lai A.CCC/SLP

## 2020-09-17 NOTE — PROGRESS NOTES
Comprehensive Nutrition Assessment    Type and Reason for Visit:  Reassess    Nutrition Recommendations/Plan: Continue current diet. Nutrition Assessment:  Pt is eating well. Voiced no nutrition concerns at this time. Malnutrition Assessment:  Malnutrition Status:  Insufficient data    Context:  Acute Illness     Findings of the 6 clinical characteristics of malnutrition:  Energy Intake:  Mild decrease in energy intake (Comment)(Previous decrease, has improved)  Weight Loss:  No significant weight loss(8.2% loss in 7 months)     Body Fat Loss:  Unable to assess     Muscle Mass Loss:  Unable to assess    Fluid Accumulation:  No significant fluid accumulation     Strength:  Not Performed    Estimated Daily Nutrient Needs:  Energy (kcal):  1668 kcal absed on [place]- [place] Jeor and 1.2 factor; Weight Used for Energy Requirements:  Admission     Protein (g):  83-94 gm based on 1.5-1.7 gm/kg of ideal body weight; Weight Used for Protein Requirements:  Ideal          Nutrition Related Findings:  Edema: Trace RLE. POC Glucose: 85, 120 (9/17). PO intake % of meals. Wounds:  None       Current Nutrition Therapies:    DIET CARB CONTROL; Anthropometric Measures:  · Height: 5' 4\" (162.6 cm)  · Current Body Weight: 198 lb 13.7 oz (90.2 kg)   · Admission Body Weight: 191 lb 12.8 oz (87 kg)    · Usual Body Weight: 209 lb (94.8 kg)(Per past record)     · Ideal Body Weight: 120 lbs; % Ideal Body Weight 159.8 %   · BMI: 34.1  · BMI Categories: Obese Class 1 (BMI 30.0-34. 9)       Nutrition Diagnosis:   · Inadequate oral intake related to cognitive or neurological impairment as evidenced by weight loss(Previous decreased oral intake)    Nutrition Interventions:   Food and/or Nutrient Delivery:  Continue Current Diet  Nutrition Education/Counseling:  Education not indicated   Coordination of Nutrition Care:  Continued Inpatient Monitoring    Goals:  PO intake to meet % of estimated nutrition needs Nutrition Monitoring and Evaluation:   Food/Nutrient Intake Outcomes:  Food and Nutrient Intake  Physical Signs/Symptoms Outcomes:  Biochemical Data, Weight     Discharge Planning:    Continue current diet     Some areas of assessment may be incomplete due to standard COVID-19 Precautions. Nidhi Montero R.D., L.GONSALO.   Phone: [phone]

## 2020-09-18 VITALS
SYSTOLIC BLOOD PRESSURE: 131 MMHG | HEIGHT: 64 IN | TEMPERATURE: 97.5 F | DIASTOLIC BLOOD PRESSURE: 62 MMHG | HEART RATE: 66 BPM | BODY MASS INDEX: 33.95 KG/M2 | OXYGEN SATURATION: 97 % | RESPIRATION RATE: 19 BRPM | WEIGHT: 198.85 LBS

## 2020-09-18 LAB
GLUCOSE BLD-MCNC: 100 MG/DL (ref 65–105)
GLUCOSE BLD-MCNC: 104 MG/DL (ref 65–105)
SARS-COV-2, RAPID: NORMAL
SARS-COV-2: NORMAL
SARS-COV-2: NOT DETECTED
SOURCE: NORMAL

## 2020-09-18 PROCEDURE — 82947 ASSAY GLUCOSE BLOOD QUANT: CPT

## 2020-09-18 PROCEDURE — 97535 SELF CARE MNGMENT TRAINING: CPT

## 2020-09-18 PROCEDURE — 6370000000 HC RX 637 (ALT 250 FOR IP): Performed by: INTERNAL MEDICINE

## 2020-09-18 PROCEDURE — 97116 GAIT TRAINING THERAPY: CPT

## 2020-09-18 PROCEDURE — 92507 TX SP LANG VOICE COMM INDIV: CPT

## 2020-09-18 PROCEDURE — 97530 THERAPEUTIC ACTIVITIES: CPT

## 2020-09-18 PROCEDURE — 97112 NEUROMUSCULAR REEDUCATION: CPT

## 2020-09-18 PROCEDURE — 99238 HOSP IP/OBS DSCHRG MGMT 30/<: CPT | Performed by: PHYSICAL MEDICINE & REHABILITATION

## 2020-09-18 PROCEDURE — 97110 THERAPEUTIC EXERCISES: CPT

## 2020-09-18 PROCEDURE — 6370000000 HC RX 637 (ALT 250 FOR IP): Performed by: PHYSICAL MEDICINE & REHABILITATION

## 2020-09-18 PROCEDURE — 6360000002 HC RX W HCPCS: Performed by: INTERNAL MEDICINE

## 2020-09-18 RX ORDER — SENNA PLUS 8.6 MG/1
2 TABLET ORAL DAILY PRN
DISCHARGE
Start: 2020-09-18 | End: 2020-10-18

## 2020-09-18 RX ORDER — GABAPENTIN 100 MG/1
100 CAPSULE ORAL 3 TIMES DAILY
Qty: 90 CAPSULE | Refills: 3 | Status: ON HOLD | DISCHARGE
Start: 2020-09-18 | End: 2021-11-20 | Stop reason: HOSPADM

## 2020-09-18 RX ORDER — CLOPIDOGREL BISULFATE 75 MG/1
75 TABLET ORAL DAILY
Qty: 30 TABLET | Refills: 3 | DISCHARGE
Start: 2020-09-19 | End: 2020-10-21

## 2020-09-18 RX ORDER — CARVEDILOL 6.25 MG/1
6.25 TABLET ORAL 2 TIMES DAILY WITH MEALS
Qty: 60 TABLET | Refills: 3 | DISCHARGE
Start: 2020-09-18 | End: 2020-09-25 | Stop reason: ALTCHOICE

## 2020-09-18 RX ORDER — HYDROCHLOROTHIAZIDE 25 MG/1
25 TABLET ORAL DAILY
Qty: 30 TABLET | Refills: 3 | DISCHARGE
Start: 2020-09-19 | End: 2021-08-23

## 2020-09-18 RX ORDER — AMLODIPINE BESYLATE 10 MG/1
10 TABLET ORAL EVERY EVENING
Qty: 30 TABLET | Refills: 3 | DISCHARGE
Start: 2020-09-18 | End: 2020-10-21 | Stop reason: SDUPTHER

## 2020-09-18 RX ORDER — POLYETHYLENE GLYCOL 3350 17 G/17G
17 POWDER, FOR SOLUTION ORAL DAILY
Qty: 527 G | Refills: 1 | DISCHARGE
Start: 2020-09-19 | End: 2020-10-19

## 2020-09-18 RX ORDER — ONDANSETRON 4 MG/1
4 TABLET, ORALLY DISINTEGRATING ORAL EVERY 6 HOURS PRN
DISCHARGE
Start: 2020-09-18 | End: 2021-04-08

## 2020-09-18 RX ORDER — BISACODYL 10 MG
10 SUPPOSITORY, RECTAL RECTAL DAILY PRN
DISCHARGE
Start: 2020-09-18 | End: 2020-10-18

## 2020-09-18 RX ORDER — LOSARTAN POTASSIUM 100 MG/1
100 TABLET ORAL DAILY
Qty: 30 TABLET | Refills: 3 | DISCHARGE
Start: 2020-09-19 | End: 2021-08-23

## 2020-09-18 RX ORDER — ASPIRIN 81 MG/1
81 TABLET, CHEWABLE ORAL DAILY
Qty: 30 TABLET | Refills: 3 | DISCHARGE
Start: 2020-09-19 | End: 2020-09-25 | Stop reason: ALTCHOICE

## 2020-09-18 RX ORDER — HYDROCODONE BITARTRATE AND ACETAMINOPHEN 5; 325 MG/1; MG/1
1 TABLET ORAL EVERY 4 HOURS PRN
Qty: 18 TABLET | Refills: 0 | Status: SHIPPED | OUTPATIENT
Start: 2020-09-18 | End: 2020-09-21

## 2020-09-18 RX ADMIN — CARVEDILOL 6.25 MG: 6.25 TABLET, FILM COATED ORAL at 07:16

## 2020-09-18 RX ADMIN — HYDROCHLOROTHIAZIDE 25 MG: 25 TABLET ORAL at 07:17

## 2020-09-18 RX ADMIN — ENOXAPARIN SODIUM 40 MG: 40 INJECTION SUBCUTANEOUS at 07:16

## 2020-09-18 RX ADMIN — HYDROCODONE BITARTRATE AND ACETAMINOPHEN 1 TABLET: 5; 325 TABLET ORAL at 07:07

## 2020-09-18 RX ADMIN — ASPIRIN 81 MG CHEWABLE TABLET 81 MG: 81 TABLET CHEWABLE at 07:17

## 2020-09-18 RX ADMIN — METFORMIN HYDROCHLORIDE 500 MG: 500 TABLET ORAL at 07:17

## 2020-09-18 RX ADMIN — METRONIDAZOLE 100 MG: 500 TABLET ORAL at 07:17

## 2020-09-18 RX ADMIN — ANTI-FUNGAL POWDER MICONAZOLE NITRATE TALC FREE: 1.42 POWDER TOPICAL at 07:17

## 2020-09-18 RX ADMIN — CLOPIDOGREL BISULFATE 75 MG: 75 TABLET ORAL at 07:17

## 2020-09-18 RX ADMIN — LOSARTAN POTASSIUM 100 MG: 50 TABLET, FILM COATED ORAL at 07:17

## 2020-09-18 ASSESSMENT — PAIN SCALES - WONG BAKER: WONGBAKER_NUMERICALRESPONSE: 0

## 2020-09-18 ASSESSMENT — PAIN SCALES - GENERAL: PAINLEVEL_OUTOF10: 4

## 2020-09-18 NOTE — PROGRESS NOTES
Speech Language Pathology  Speech Language Pathology  [facility]    Speech Language Treatment Note    Date: 9/18/2020  Patients Name: Vivienne Code  MRN: [id number]  Diagnosis: CVA  Patient Active Problem List   Diagnosis Code    Hypertension I10    Hyperlipidemia E78.5    Acute on chronic systolic CHF (congestive heart failure) ([facility] I50.23    Hypertensive urgency I16.0    Idiopathic cardiomyopathy ([facility] I42.8    Hypertensive emergency I16.1    Chronic combined systolic and diastolic HF (heart failure), NYHA class 2 ([facility]) I50.42    Acute on chronic combined systolic and diastolic CHF, NYHA class 4 ([facility]) I50.43    Hypoxia R09.02    Severe mitral regurgitation by prior echocardiogram I34.0    Morbid obesity ([facility]) E66.01    CKD (chronic kidney disease) stage 3, GFR 30-59 ml/min ([facility]) N18.3    Physical deconditioning R53.81    TIA (transient ischemic attack) G45.9    Old lacunar stroke without late effect Z86.73    Dysarthria R47.1    [facility] I63.9    Aphasia R47.01    Type 2 diabetes mellitus, without long-term current use of insulin ([facility] E11.9    Cerebral infarction ([facility] - left MCA distribution  I63.9    Uncontrolled type 2 diabetes mellitus with hyperglycemia ([facility] E11.65    Elevated blood pressure reading R03.0    Acute ischemic stroke ([facility]) I63.9       Pain: pt. denies    Speech and Language Treatment  Treatment time:  1403-8158    Subjective: [x] Alert [x] Cooperative     [] Confused     [] Agitated      [] Lethargic    Objective/Assessment:  Auditory Comprehension:   Pt. Responding to yes/no and simple conversational questions appropriately throughout session. Verbal Expression:      Pt. Responding appropriately in sentences conversationally throughout. Pt. humberto able to tell writer discharge plan has changed and to discharge from ARU today. Automatic naming, 1-10 & KD- 100% accuracy.   Confrontation naming, items around room: 40% accuracy humberto, 100% c max cues provided. Reading Comprehension:   Matching written word with picture: 100% accuracy humberto. Pt. Reading word with 50% accuracy humberto, including phonemic paraphasias throughout. Match picture with written word from field of three- 90% accuracy humberto, 100% c min cues. Other:  No family present. Plan:  [x] Continue ST services    [] Discharge from ST:      Discharge recommendations: [] Inpatient Rehab   [] [place]   [] Outpatient Therapy  [] Follow up at trauma clinic   [] Other:       Treatment completed by:  Audrey Agee M.A.CFY-SLP

## 2020-09-18 NOTE — PROGRESS NOTES
Pivot Transfers: Minimal Assistance  Comment: Pt requires verbal cuing for hand placement and using safe technique   Ambulation  Ambulation?: Yes  More Ambulation?: Yes  Ambulation 1  Surface: level tile  Device: Small Gee [place]  Other Apparatus: Right  Assistance: Moderate assistance  Quality of Gait: Downward gaze, R toe catches in swing phase with increased extensor tone with fatigue. patient has active dorsiflexion. one instance of foot drag at 40 ft  Gait Deviations: Decreased step length;Decreased step height  Distance: 50 feet continuous  Comments: Hand in hand contract on R UE to prevent flexion synergy. Forward lean accompanied with downward gaze. Balance  Posture: Fair  Sitting - Static: Good  Sitting - Dynamic: Good  Standing - Static: Fair;-  Standing - Dynamic: Poor  Exercises  Hamstring Sets: x 10 B with blue resistance band  Hip Flexion: seated knee raises x 20 B  Knee Short Arc Quad: x10 B  Ankle Pumps: x20 B  Other exercises  Other exercises 1: Sit to Stand x 8 with attention paid to utilizing RUE to push. Other exercises 2: Lateral sidestepping x 20 feet, cued for proper sequence  Other exercises 3: Pre gait activities including targeted hip and knee flexion                        G-Code     OutComes Score                                                     AM-PAC Score             Goals  Short term goals  Time Frame for Short term goals: 10 days   Short term goal 1: Pt to perform bed mobility independently. Short term goal 2: Pt to perform transfers with minAx1. Short term goal 3: Pt to ambulate 150 ft, least restrictive device, minAx1. Short term goal 4: Pt to tolerate 30-60 mins physical therapy to increase endurance and strength. Short term goal 5: Pt to complete 5 steps, with LUE support, minAx1. Long term goals  Time Frame for Long term goals : By LOS. Long term goal 2: Pt to complete transfers with SBA to promote independence.    Long term goal 3: Pt to ambulate 200 ft,

## 2020-09-18 NOTE — PROGRESS NOTES
Speech Language Pathology  Speech Language Pathology  [facility]    Speech Language Treatment Note    Date: 9/18/2020  Patients Name: Tyler Sanchez  MRN: [id number]  Diagnosis: CVA  Patient Active Problem List   Diagnosis Code    Hypertension I10    Hyperlipidemia E78.5    Acute on chronic systolic CHF (congestive heart failure) ([facility] [address] I50.23    Hypertensive urgency I16.0    Idiopathic cardiomyopathy ([facility] [address] I42.8    Hypertensive emergency I16.1    Chronic combined systolic and diastolic HF (heart failure), NYHA class 2 ([facility]) I50.42    Acute on chronic combined systolic and diastolic CHF, NYHA class 4 ([facility]) I50.43    Hypoxia R09.02    Severe mitral regurgitation by prior echocardiogram I34.0    Morbid obesity ([facility]) E66.01    CKD (chronic kidney disease) stage 3, GFR 30-59 ml/min ([facility]) N18.3    Physical deconditioning R53.81    TIA (transient ischemic attack) G45.9    Old lacunar stroke without late effect Z86.73    Dysarthria R47.1    Stroke determined by clinical assessment ([facility] [address] I63.9    Aphasia R47.01    Type 2 diabetes mellitus, without long-term current use of insulin ([facility] [address] E11.9    Cerebral infarction ([facility] [address] - left MCA distribution  I63.9    Uncontrolled type 2 diabetes mellitus with hyperglycemia ([facility] [address] E11.65    Elevated blood pressure reading R03.0    Acute ischemic stroke ([facility]) I63.9       Pain: pt. denies    Speech and Language Treatment  Treatment time:  9662-5436    Subjective: [x] Alert [x] Cooperative     [] Confused     [] Agitated      [] Lethargic    Objective/Assessment:  Auditory Comprehension:   Pt. Responding to yes/no and simple conversational questions appropriately throughout session. Verbal Expression:      Pt. Responding appropriately in sentences conversationally throughout. Automatic naming, 1-10 & KD- 100% accuracy. Responsive naming, name item from description- 20% accuracy humberto, 100% c max cues.       Reading Comprehension:   Complete sentence c one word out of field of 3 c cues to point c 40% accuracy, 90% c cues. Other:  No family present. Plan:  [x] Continue ST services    [] Discharge from ST:      Discharge recommendations: [] Inpatient Rehab   [] [place]   [] Outpatient Therapy  [] Follow up at trauma clinic   [] Other:       Treatment completed by:  Audrey SPIVEY-SLP

## 2020-09-18 NOTE — CARE COORDINATION
Writer arranged for [facility] to be transported to [facility]. Writer completed ppw and faxed to [facility]. writer then placed ppw at the rn station.

## 2020-09-18 NOTE — DISCHARGE INSTR - COC
Continuity of Care Form    Patient Name: Edilia Scheuermann   :  1950  MRN:  [id number]    Admit date:  2020  Discharge date:  2020    Code Status Order: Full Code   Advance Directives:   Advance Care Flowsheet Documentation       Date/Time Healthcare Directive Type of Healthcare Directive Copy in [address] Agent's Name Healthcare Agent's Phone Number    20 [phone]  No, patient does not have an advance directive for healthcare treatment -- -- -- -- --            Admitting Physician:  Isamar Hernandez MD  PCP: SUE Moreland - CNP    Discharging Nurse: [facility] Unit/Room#: 5093/2466-20  Discharging Unit Phone Number: [phone]    Emergency Contact:   Extended Emergency Contact Information  Primary Emergency Contact: Nolan Sterling [facility] [address]  Phone: [phone]  Work Phone: [phone]  Mobile Phone: [phone]  Relation: Brother/Sister  Hearing or visual needs: None  Other needs: None  Preferred language: English   needed? No  Secondary Emergency Contact: Dodie Miranda   [facility] [address]  Phone: [phone]  Relation: Child  Hearing or visual needs: None  Other needs: None  Preferred language: English   needed?  No    Past Surgical History:  Past Surgical History:   Procedure Laterality Date    CARDIAC CATHETERIZATION Left 2017    right radial, [facility] Dr. Penny Lee         Immunization History:   Immunization History   Administered Date(s) Administered    Influenza, Quadv, 6 mo and older, IM, PF (Flulaval, Fluarix) 2018    Pneumococcal Conjugate 13-valent (Gibran Landry) 2013       Active Problems:  Patient Active Problem List   Diagnosis Code    Hypertension I10    Hyperlipidemia E78.5    Acute on chronic systolic CHF (congestive heart failure) ([facility] [address] I50.23    Hypertensive urgency I16.0    Idiopathic cardiomyopathy ([facility] [address] I42.8    Hypertensive emergency I16.1    Chronic combined systolic and diastolic HF (heart failure), NYHA class 2 ([facility]) I50.42    Acute on chronic combined systolic and diastolic CHF, NYHA class 4 ([facility]) I50.43    Hypoxia R09.02    Severe mitral regurgitation by prior echocardiogram I34.0    Morbid obesity ([facility]) E66.01    CKD (chronic kidney disease) stage 3, GFR 30-59 ml/min ([facility]) N18.3    Physical deconditioning R53.81    TIA (transient ischemic attack) G45.9    Old lacunar stroke without late effect Z86.73    Dysarthria R47.1    Stroke determined by clinical assessment ([facility] [address] I63.9    Aphasia R47.01    Type 2 diabetes mellitus, without long-term current use of insulin ([facility]) E11.9    Cerebral infarction ([facility] [address] - left MCA distribution  I63.9    Uncontrolled type 2 diabetes mellitus with hyperglycemia ([facility]) E11.65    Elevated blood pressure reading R03.0    Acute ischemic stroke ([facility]) I63.9       Isolation/Infection:   Isolation            No Isolation          Patient Infection Status       Infection Onset Added Last Indicated Last Indicated By Review Planned Expiration Resolved Resolved By    None active    Resolved    COVID-19 Rule Out 09/17/20 09/17/20 09/17/20 COVID-19 (Ordered)   09/18/20 Rule-Out Test Resulted    COVID-19 Rule Out 08/24/20 08/24/20 08/24/20 COVID-19 (Ordered)   08/24/20 Rule-Out Test Resulted            Nurse Assessment:  Last Vital Signs: /62   Pulse 66   Temp 97.5 °F (36.4 °C) (Oral)   Resp 19   Ht 5' 4\" (1.626 m)   Wt 198 lb 13.7 oz (90.2 kg)   SpO2 97%   BMI 34.13 kg/m²     Last documented pain score (0-10 scale): Pain Level: 4  Last Weight:   Wt Readings from Last 1 Encounters:   09/13/20 198 lb 13.7 oz (90.2 kg)     Mental Status:  oriented    IV Access:  - None    Nursing Mobility/ADLs:  Walking   Assisted  Transfer  Assisted  Bathing  Assisted  Dressing  Assisted  Toileting  Assisted  Feeding  Assisted  Med Admin  Assisted  Med Delivery   whole and crushed    Wound Care Documentation and Therapy:  Incision 08/04/17 Wrist Right (Active)   Number of days: 1140        Elimination:  Continence: Bowel: Yes  Bladder: Yes  Urinary Catheter: None   Colostomy/Ileostomy/Ileal Conduit: No       Date of Last BM: 9/17/2020  No intake or output data in the 24 hours ending 09/18/20 1105  No intake/output data recorded. Safety Concerns:     None    Impairments/Disabilities:      SpeechPT/OT    Nutrition Therapy:  Current Nutrition Therapy:   - Oral Diet:  General and Carb Control 4 carbs/meal (1800kcals/day)    Routes of Feeding: Oral  Liquids: No Restrictions  Daily Fluid Restriction: no  Last Modified Barium Swallow with Video (Video Swallowing Test): not done    Treatments at the Time of Hospital Discharge:   Respiratory Treatments: ***  Oxygen Therapy:  is not on home oxygen therapy. Ventilator:    - No ventilator support    Rehab Therapies: Physical Therapy, Occupational Therapy, and Speech/Language Therapy  Weight Bearing Status/Restrictions: No weight bearing restirctions  Other Medical Equipment (for information only, NOT a DME order):  walker  Other Treatments:     Patient's personal belongings (please select all that are sent with patient):  {[facility] DME Belongings:[id number]}    RN SIGNATURE:  Electronically signed by Sue Lewis RN on 9/18/20 at 11:46 AM EDT    CASE MANAGEMENT/SOCIAL WORK SECTION    Inpatient Status Date: ***    Readmission Risk Assessment Score:  Readmission Risk              Risk of Unplanned Readmission:        22           [facility]   [facility]  Address:  Phone:  Fax:    Dialysis Facility (if applicable)   Name:  Address:  Dialysis Schedule:  Phone:  Fax:    / signature: Electronically signed by MOMO Conrad, LSW on 9/18/20 at 11:08 AM EDT    PHYSICIAN SECTION    Prognosis: Fair    Condition at Discharge: Stable    Rehab Potential (if transferring to Rehab):  Fair    Recommended Labs or Other Treatments After Discharge: PT/OT/SLP to eval and treat. Physician Certification: I certify the above information and transfer of Jeannette Sutherland  is necessary for the continuing treatment of the diagnosis listed and that she requires [facility] for less 30 days.      Update Admission H&P: No change in H&P    PHYSICIAN SIGNATURE:  Electronically signed by Yoshi Patton MD on 9/18/20 at 12:23 PM EDT

## 2020-09-18 NOTE — CARE COORDINATION
Writer left a message on the pt daughter,leni, voice mail. He informed her that the pt has omari accepted to [facility]. He also informed her that the pt will probably be leaving today at 200. He also informed her had arranged transportation.

## 2020-09-18 NOTE — PROGRESS NOTES
Precautions  Overall Orientation Status: Within Functional Limits  Orientation Level: Oriented to place;Oriented to time;Oriented to person;Oriented to situation     Pain Assessment  Pain Assessment: 0-10  Pain Level: 3  Garza-Baker Pain Rating: No hurt  Pain Type: Acute pain  Pain Location: Shoulder  Pain Orientation: Right  Pain Radiating Towards: to elbow intermittently   Pain Descriptors: Aching  Pain Frequency: Continuous  Pain Onset: On-going  Clinical Progression: Gradually improving  Functional Pain Assessment: Prevents or interferes some active activities and ADLs  Response to Pain Intervention: Patient Satisfied(rest, repositioned, increased activity )    Objective  Cognition  Overall Cognitive Status: Impaired  Arousal/Alertness: Appropriate responses to stimuli  Following Directions: Follows two step commands  Memory: Unable to assess  Following Commands: Follows multistep commands with repetition  Safety Judgement: Decreased awareness of need for assistance  Awareness of Errors: Assistance required to identify errors made  Insights: Decreased awareness of deficits  Sequencing and Organization: Assistance required to identify errors made;Assistance required to generate solutions;Assistance required to implement solutions  Additional Comments: AM: Minimal to moderate verbal cueing for sequencing and use of RUE this date   Perception  Overall Perceptual Status: Impaired  Unilateral Attention: Cues to attend to right side of body(intermittently looses track of RUE )     Balance  Sitting Balance: Modified independent   Standing Balance: Minimal assistance(minimal assist dynamic, contact guard static )     Transfers  Sit to stand: Contact guard assistance  Stand to sit: Contact guard assistance  Transfer Comments: AM: LUE pulling from sink. Pt demo'd appropriate BLE placement.  No loss of balance      Functional Mobility  Functional - Mobility Device: Wheelchair  Activity: To/from bathroom  Assist Level: Minimal assistance  Functional Mobility Comments: verbal cueing to coordination LUE/LLE to propell self. Minimal assist for turning and orienting wheelchair appropriately to complete turns            Additional Activities: AM: Pt engaged in functional reach activity to improve functional use of RUE during ADLs and ADL tranfers. Pt instructed to transfer tennis balls from one bin to another bin placed anteriorly out of base of support, across midline to left, and overhead. Pt completed 4 sets x 20 reps. Pt with increased difficulty reaching across body, requiring intermittent contact guard for targeting. Writer provided visual cueing and verbal cueing to \"place ball in palm, then grasp\", with good return. Pt able to reach ~ 120-140 degrees shoulder flexion/abduction, full elbow flexion/extension, and pt able to reach full composite fist at this time. Pt with decreased coordination between shoulder and digits.                   ADL  Equipment Provided: Reacher;Sock aid  Feeding: Setup  Grooming: Supervision;Verbal cueing;Setup  UE Bathing: Contact guard assistance  LE Bathing: Minimal assistance  UE Dressing: Setup  LE Dressing: Maximum assistance  Toileting: None  Additional Comments: ADLs completed AM session sink-side this date    OT scores   Eating  Assistance Needed: Setup or clean-up assistance  CARE Score: 5  Oral Hygiene  Assistance Needed: Setup or clean-up assistance  CARE Score: 5  Toileting Hygiene  Assistance Needed: Partial/moderate assistance  CARE Score: 3  Shower/Bathe Self  Assistance Needed: Partial/moderate assistance  CARE Score: 3  Upper Body Dressing  Assistance Needed: Setup or clean-up assistance  CARE Score: 5  Lower Body Dressing  Assistance Needed: Partial/moderate assistance  CARE Score: 3  Putting On/Taking Off Footwear  Assistance Needed: Substantial/maximal assistance  CARE Score: 2  Toilet Transfer  Assistance Needed: Supervision or touching assistance  CARE Score: 4    Patient Education:  Patient Goals   Patient goals : To return home with sister  Para Moulding  Method: demonstration and explanation       Outcome: needs reinforcement   OT Education  OT Education: OT Role;Plan of Care  Patient Education: see relevant note sections for education details  Barriers to Learning: aphasia, cognitive deficits    Plan  Plan  Times per week: 5-7x/week, 1.5 hours/day  Times per day: Twice a day  Current Treatment Recommendations: Self-Care / ADL, Home Management Training, Cognitive/Perceptual Training, Strengthening, ROM, Balance Training, Functional Mobility Training, Endurance Training, Neuromuscular Re-education, Cognitive Reorientation, Pain Management, Safety Education & Training, Patient/Caregiver Education & Training, Equipment Evaluation, Education, & procurement, Positioning     Patient Goals     Patient goals : To return home with sister  Short term goals  Time Frame for Short term goals: 10 days  Short term goal 1: Pt will perform grooming with set-up and use of assistive equipment/modified techniques PRN. Short term goal 2: Pt will perform upper body bathing/dressing with standby assist and use of assistive equipment/modified techniques PRN. Short term goal 3: Pt will perform toileting tasks and lower body bathing/dressing with minimal assist and use of assistive equipment/durable medical equipment/modified techniques PRN. Short term goal 4: Pt will perform functional mobility and transfers during self-care with Minimal assist, least restrictive mobility device, and Fair safety. Short term goal 5: Pt will stand for 4+ minutes with 1-2 UE support, contact guard assist, and no loss of balance while engaging in functional activity of choice. Short term goal 6: Pt will actively participate in 30+ minutes of therapeutic exercise/functional activities to promote increased independence with self-care and mobility.   Short term goal 7: Pt will verbalize/demonstrate Good understanding of assistive equipment/durable medical equipment/modified techniques for increased independence with self-care and mobility. Long term goals  Time Frame for Long term goals : By discharge  Long term goal 1: Pt will perform self-feeding and grooming with modified independence. Long term goal 2: Pt will perform bathing, dressing, and toileting tasks with stand by assist, Fair safety, and assist PRN for MIRTA hose. Long term goal 3: Pt will perform functional mobility and transfers during self-care with stand by assist, least restrictive mobility device, and Fair safety. Long term goal 4: Pt will stand for 8+ minutes with 1-2 UE support, stand by assist, and no loss of balance while engaging in functional activity of choice.   Long term goal 5: Patient will increase strength/coordination of RUE as evident by an increase in  strength R hand by 5-7#, 5-7 block increase in box and blocks test, and will attempt 9 Hole Peg test as appropriate       09/18/20 0930   OT Individual Minutes   Time In 0939   Time Out 1030   Minutes 51   Time Code Minutes    Timed Code Treatment Minutes 51 Minutes     Electronically signed by Tomer James OT on 9/18/20 at 2:36 PM EDT

## 2020-09-18 NOTE — DISCHARGE SUMMARY
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, Transcortical motor aphasia, HTN, CHF, non-ischemic cardiomyopathy, DM, R knee pain/OA    Discharge Functional Status:    Physical therapy:  Bed Mobility: Rolling: Stand by assistance  Supine to Sit: Unable to assess  Sit to Supine: Unable to assess  Scooting: Unable to assess  Transfers: Sit to Stand: Contact guard assistance  Stand to sit: Minimal Assistance  Bed to Chair: Minimal assistance  Squat Pivot Transfers: Minimal Assistance  Comment: Rest breaks PRN , Ambulation 1  Surface: level tile  Device: Small Gee [place]  Other Apparatus: Right  Assistance: Moderate assistance  Quality of Gait: Downward gaze, R toe catches in swing phase with increased extensor tone with fatigue. patient has active dorsiflexion. Gait Deviations: Decreased step length, Decreased step height  Distance: 50 feet continuous  Comments: one instance of foot drag at 40 ft, Stairs  # Steps : 5  Stairs Height: 6\"(6\"/4\")  Rails: Left ascending  Device: Hand Held Assist(RUE)  Assistance: Moderate assistance  Comment: Pt requires Moderate cuing and assistance for sequencing on stairs. Pt has LOB x1 on stairs, able to complete navigation safely. Pt becoming tearful after completion. Mobility:  , PT Equipment Recommendations  Equipment Needed: Yes  Mobility Devices: Canes  Cane: [employer]  Other: to be determined asw pt progreses, Assessment: Pt required modAx1 on strong side for safety during transfers, pt able to ambulate 10ft at maxA with LUE support on hand rail, therapist supported RLE to prevent buckling.      Occupational therapy:  , Equipment Recommendations  Equipment Needed: Yes  Transfer Tub Bench: x  Grab bars: x  Reacher: x  Sock Aid: x, Assessment: Pt progressin well towards OT goals. Pt's primariy area of difficulty at this time is lower body dressing and general reaching and targeting with RUE. Pt demo'd improved utiliation of RUE as a cross support and stabilizer during ADLs     Speech therapy:  Comprehension: 4 - Patient understands basic needs 75-90%+ of the time  Expression: 2 - Expresses basic ideas/needs 25-49% of the time  Social Interaction: 6 - Patient requires medication for mood and/or effect  Problem Solving: 3 - Patient solves simple/routine tasks 50%-74%  Memory: 3 - Patient remembers 50%-74% of the time      Inpatient Rehabilitation Course:   Francesco Morrell is a 81 y.o. female admitted to inpatient rehabilitation on 8/26/2020 for rehab for R dominant hemiparesis and aphasia secondary to ischemic CVA. INITIAL HPI:  She was originally admitted to [facility] 8/23/2020.       She was originally evaluated at [facility] for aphasia. [facility] after diagnostic studies confirmed new L hemisphere CVA. She has known history of previous CVAs. MRI confirmed L MCA infarct on 8/24/2020.      Cardiology consulted and performed TTE/ANKUR. She is being managed for known compensated CHF with reduced EF of 35-40%, secondary non-ischemic cardiomyopathy. NSR on EKG. Treating with carvedilol, hyzaar, amlodipine. No further workup planned.     Patient evaluated today:  GEN: Well developed, well nourished, in NAD  HEENT:  NCAT.  PERRL.  EOMI.  Mucous membranes pink and moist.   PULM:  Clear to ausculation. No rales or rhonchi. Respirations WNL and unlabored. CV:  Bradycardic rate, regular rhythm.  No murmurs or gallops. GI:  Abdomen soft. Nontender. Non-distended.  BS + and equal.    NEUROLOGICAL: A&O x3. Sensation intact to light touch. Short sentences improving. MSK:  Functional ROM LUE and LLE. Impaired ROM RUE and RLE.  Motor testing 4+/5 key muscles LUE and LLE. R  4-/5, R wrist extension 4-/5, R finger extension 3/5, R elbow flexion and extension 3/5 . SKIN: Warm dry and intact. Good turgor. EXTREMITIES:  No calf tenderness to palpation. No edema BLEs. .    PSYCH: Mood WNL. Appropriately interactive. Affect WNL. Rehab course:   Patient was continued on ASA, plavix and atorvastatin during stay. BP was stable. Initial diarrhea was attributed to resuming Metformin and it resolved spontaneously. Chronic R knee/leg pain responded to prn [place]. Aphasia improved with speech therapy. RUE motor return noted to all digits and flexion/extension R arm with residual weakness but improving. Chronic conditions were stable on home medications. The patient participated in an aggressive multidisciplinary inpatient rehabilitation program involving 3 hours per day, 5 days per week of rehabilitation. Patient benefited from inpatient rehab and was discharged in good stable condition.         Consults:   Internal Medicine    Significant Diagnostics:   CBC:   Lab Results   Component Value Date    WBC 4.5 09/17/2020    RBC 3.73 09/17/2020    HGB 11.4 09/17/2020    HCT 33.6 09/17/2020    MCV 90.0 09/17/2020    MCH 30.6 09/17/2020    MCHC 34.0 09/17/2020    RDW 14.3 09/17/2020     09/17/2020    MPV 9.3 09/17/2020     BMP:    Lab Results   Component Value Date     09/17/2020    K 4.0 09/17/2020     09/17/2020    CO2 25 09/17/2020    BUN 30 09/17/2020    LABALBU 4.1 08/23/2020    CREATININE 0.82 09/17/2020    CALCIUM 10.0 09/17/2020    GFRAA >60 09/17/2020    LABGLOM >60 09/17/2020    GLUCOSE 118 09/17/2020         Patient Instructions:    No driving until passes drivers evaluation and cleared by physician     Medications, precautions and follow up reviewed with patient and family    Follow-up visits: See after visit summary from hospitalization: PCP 1-2 weeks after discharge, neurology clinic 4 weeks, cardiology 4 weeks,  Omar 4-6 weeks. Disposition:  [facility]    Discharge Medications:   Asad Diop   Home Medication Instructions AWQ:[id number]    Printed on:09/18/20 1223   Medication Information                      amLODIPine (NORVASC) 10 MG tablet  Take 1 tablet by mouth every evening             aspirin 81 MG chewable tablet  Take 1 tablet by mouth daily             atorvastatin (LIPITOR) 80 MG tablet  Take 1 tablet by mouth nightly             bisacodyl (DULCOLAX) 10 MG suppository  Place 1 suppository rectally daily as needed for Constipation             carvedilol (COREG) 6.25 MG tablet  Take 1 tablet by mouth 2 times daily (with meals)             clopidogrel (PLAVIX) 75 MG tablet  Take 1 tablet by mouth daily             enoxaparin (LOVENOX) 40 MG/0.4ML injection  Inject 0.4 mLs into the skin daily             gabapentin (NEURONTIN) 100 MG capsule  Take 1 capsule by mouth 3 times daily for 120 days. hydroCHLOROthiazide (HYDRODIURIL) 25 MG tablet  Take 1 tablet by mouth daily             HYDROcodone-acetaminophen (NORCO) 5-325 MG per tablet  Take 1 tablet by mouth every 4 hours as needed for Pain for up to 3 days. insulin lispro (HUMALOG) 100 UNIT/ML injection vial  Inject 0-12 Units into the skin 3 times daily (with meals)             insulin lispro (HUMALOG) 100 UNIT/ML injection vial  Inject 0-6 Units into the skin nightly             losartan (COZAAR) 100 MG tablet  Take 1 tablet by mouth daily             metFORMIN (GLUCOPHAGE) 500 MG tablet  Take 500 mg by mouth 2 times daily (with meals)             miconazole (MICOTIN) 2 % powder  Apply topically 2 times daily.              ondansetron (ZOFRAN-ODT) 4 MG disintegrating tablet  Take 1 tablet by mouth every 6 hours as needed for Nausea or Vomiting             polyethylene glycol (GLYCOLAX) 17 g packet  Take 17 g by mouth daily             senna (SENOKOT) 8.6 MG tablet  Take 2 tablets by mouth daily as needed (Constipation)                 Harshad Simmons MD

## 2020-09-21 ENCOUNTER — HOSPITAL ENCOUNTER (OUTPATIENT)
Age: 70
Setting detail: SPECIMEN
Discharge: HOME | End: 2020-09-21
Payer: MEDICARE

## 2020-09-21 LAB
ABSOLUTE EOS #: 0.08 K/UL (ref 0–0.44)
ABSOLUTE IMMATURE GRANULOCYTE: <0.03 K/UL (ref 0–0.3)
ABSOLUTE LYMPH #: 1.06 K/UL (ref 1.1–3.7)
ABSOLUTE MONO #: 0.63 K/UL (ref 0.1–1.2)
ALBUMIN SERPL-MCNC: 3.6 G/DL (ref 3.5–5.2)
ALBUMIN/GLOBULIN RATIO: 1.4 (ref 1–2.5)
ALP BLD-CCNC: 158 U/L (ref 35–104)
ALT SERPL-CCNC: 47 U/L (ref 5–33)
ANION GAP SERPL CALCULATED.3IONS-SCNC: 8 MMOL/L (ref 9–17)
AST SERPL-CCNC: 22 U/L
BASOPHILS # BLD: 0 % (ref 0–2)
BASOPHILS ABSOLUTE: <0.03 K/UL (ref 0–0.2)
BILIRUB SERPL-MCNC: 0.33 MG/DL (ref 0.3–1.2)
BUN BLDV-MCNC: 40 MG/DL (ref 8–23)
BUN/CREAT BLD: 42 (ref 9–20)
CALCIUM SERPL-MCNC: 9.9 MG/DL (ref 8.6–10.4)
CHLORIDE BLD-SCNC: 103 MMOL/L (ref 98–107)
CO2: 26 MMOL/L (ref 20–31)
CREAT SERPL-MCNC: 0.95 MG/DL (ref 0.5–0.9)
DIFFERENTIAL TYPE: ABNORMAL
EOSINOPHILS RELATIVE PERCENT: 2 % (ref 1–4)
GFR AFRICAN AMERICAN: >60 ML/MIN
GFR NON-AFRICAN AMERICAN: 59 ML/MIN
GFR SERPL CREATININE-BSD FRML MDRD: ABNORMAL ML/MIN/{1.73_M2}
GFR SERPL CREATININE-BSD FRML MDRD: ABNORMAL ML/MIN/{1.73_M2}
GLUCOSE BLD-MCNC: 118 MG/DL (ref 70–99)
HCT VFR BLD CALC: 34.8 % (ref 36.3–47.1)
HEMOGLOBIN: 11 G/DL (ref 11.9–15.1)
IMMATURE GRANULOCYTES: 0 %
LYMPHOCYTES # BLD: 22 % (ref 24–43)
MCH RBC QN AUTO: 30.1 PG (ref 25.2–33.5)
MCHC RBC AUTO-ENTMCNC: 31.6 G/DL (ref 28.4–34.8)
MCV RBC AUTO: 95.1 FL (ref 82.6–102.9)
MONOCYTES # BLD: 13 % (ref 3–12)
NRBC AUTOMATED: 0 PER 100 WBC
PDW BLD-RTO: 13.8 % (ref 11.8–14.4)
PLATELET # BLD: 190 K/UL (ref 138–453)
PLATELET ESTIMATE: ABNORMAL
PMV BLD AUTO: 11.1 FL (ref 8.1–13.5)
POTASSIUM SERPL-SCNC: 4.3 MMOL/L (ref 3.7–5.3)
RBC # BLD: 3.66 M/UL (ref 3.95–5.11)
RBC # BLD: ABNORMAL 10*6/UL
SEG NEUTROPHILS: 63 % (ref 36–65)
SEGMENTED NEUTROPHILS ABSOLUTE COUNT: 3.13 K/UL (ref 1.5–8.1)
SODIUM BLD-SCNC: 137 MMOL/L (ref 135–144)
TOTAL PROTEIN: 6.2 G/DL (ref 6.4–8.3)
WBC # BLD: 4.9 K/UL (ref 3.5–11.3)
WBC # BLD: ABNORMAL 10*3/UL

## 2020-09-21 PROCEDURE — 85025 COMPLETE CBC W/AUTO DIFF WBC: CPT

## 2020-09-21 PROCEDURE — 36415 COLL VENOUS BLD VENIPUNCTURE: CPT

## 2020-09-21 PROCEDURE — P9604 ONE-WAY ALLOW PRORATED TRIP: HCPCS

## 2020-09-21 PROCEDURE — 80053 COMPREHEN METABOLIC PANEL: CPT

## 2020-09-23 ENCOUNTER — TELEPHONE (OUTPATIENT)
Dept: CARDIOLOGY | Age: 70
End: 2020-09-23

## 2020-09-23 NOTE — TELEPHONE ENCOUNTER
Rosaura QUICK from [facility] called and stated the patient had an episode of where her heart felt funny early this am, pt was assessed at that time and her heart did seem to have extra beats per LPN. Since then she is doing ok and has not had any other symptoms. BP: 120/55 HR:63. Denies CP. She also stated that her linq loop recorder was implanted at [facility] and [facility] accidentally threw out home monitor so they shipped her a new one to [facility] facility. Informed Dr. Donna Michelle and he had stated as long as she is not having anymore symptoms and that we are able to get her home monitor hooked up soon and a download sent, he is ok with waiting to see her at her scheduled visit. Called [employer] and Kena Gonzalez will is being delivered today. Informed Rosaura QUICK from [address] [place] [address] and she had stated she will send down load once they have the home monitor. Also requested transfer from [facility] so Dr Donna Michelle is able to take over home monitoring once received.

## 2020-09-23 NOTE — TELEPHONE ENCOUNTER
Received alerts for possible AF with PVC's and some pause alerts via home monitoring within the past month. Showed Dr Delgado Moravian and he would like to know if she on blood thinner and would like to see her this week via virtual visit. Called [facility] and spoke with Rosaura QUICK-she stated patient is on Lovenox for DVT prevention and ASA. She says she is not on any other blood thinner. Made appt for her via virtual visit as per dr Jessenia Mckeon instruction for this Friday to see Dr. Maldonado Friendly visit. Rosaura QUICK verbalized understanding.

## 2020-09-25 ENCOUNTER — TELEMEDICINE (OUTPATIENT)
Dept: CARDIOLOGY | Age: 70
End: 2020-09-25
Payer: MEDICARE

## 2020-09-25 PROBLEM — I48.0 PAF (PAROXYSMAL ATRIAL FIBRILLATION) (HCC): Status: ACTIVE | Noted: 2020-09-25

## 2020-09-25 PROCEDURE — G8400 PT W/DXA NO RESULTS DOC: HCPCS | Performed by: FAMILY MEDICINE

## 2020-09-25 PROCEDURE — 3017F COLORECTAL CA SCREEN DOC REV: CPT | Performed by: FAMILY MEDICINE

## 2020-09-25 PROCEDURE — 1123F ACP DISCUSS/DSCN MKR DOCD: CPT | Performed by: FAMILY MEDICINE

## 2020-09-25 PROCEDURE — 99215 OFFICE O/P EST HI 40 MIN: CPT | Performed by: FAMILY MEDICINE

## 2020-09-25 PROCEDURE — 1090F PRES/ABSN URINE INCON ASSESS: CPT | Performed by: FAMILY MEDICINE

## 2020-09-25 PROCEDURE — 1111F DSCHRG MED/CURRENT MED MERGE: CPT | Performed by: FAMILY MEDICINE

## 2020-09-25 PROCEDURE — G8427 DOCREV CUR MEDS BY ELIG CLIN: HCPCS | Performed by: FAMILY MEDICINE

## 2020-09-25 PROCEDURE — 4040F PNEUMOC VAC/ADMIN/RCVD: CPT | Performed by: FAMILY MEDICINE

## 2020-09-25 RX ORDER — METOPROLOL SUCCINATE 50 MG/1
50 TABLET, EXTENDED RELEASE ORAL DAILY
Qty: 90 TABLET | Refills: 3 | Status: SHIPPED | OUTPATIENT
Start: 2020-09-25 | End: 2020-10-21 | Stop reason: SDUPTHER

## 2020-09-25 RX ORDER — METOPROLOL SUCCINATE 50 MG/1
50 TABLET, EXTENDED RELEASE ORAL DAILY
Qty: 90 TABLET | Refills: 3 | Status: SHIPPED | OUTPATIENT
Start: 2020-09-25 | End: 2020-09-25

## 2020-09-25 NOTE — PROGRESS NOTES
Gabrielle Lima am scribing for and in the presence of Fabien Bolden. Jemal VILLALOBOS, MS, F.A.C.C. Patient: Jasson Johnston  : 1950  Date of Visit: 2020    REASON FOR VISIT / CONSULTATION: Follow-up (AF alerts/PVC alerts. Possibly start blood thinner. Is on Lovenox. Per Nurse -  something went with her heart, arm at heart. Has been taking Tylenol. Right leg is hurting. she feels like she is getting stronger. Denies: CP, SOB,Lightheaded/dizziness,Palpitations. )      Dear Luis Mcclendon, APRN - CNP,    HPI: Ms. Jennifer Ro is a 79 y.o. female who was admitted to the hospital with worsening shortness of breath. This has gotten worse over the past three months. She has a cardiac history of abnormal echocardiogram which showed that her ejection fraction was reduced to about 40-45%. And a heart catheterization that was relatively unremarkable. Fortunately her repeat echocardiogram in 2018 and in 2019 showed her ejection fraction increased from 45-50% to 55% as well as showing only trivial mitral regurgitation. She had an ECHO on 6/3/2019 that showed EF of 50-55%. LV wall thickness is mildly increased. LA is mildly dilated (29-33) with a left atrial volume index of 31 m1/m2. mild diastolic dysfunction. She came to the ER on 2019 for slurred speech. She was than taken to [facility] due to a transient ischemic attack. In 2020, she unfortunately went onto have an moderate sized left parietal ischemic stroke which effected her speech. Ms. Jennifer Ro is now in rehab and here via virtual visit today. She is currently at [facility]. She is feeling better and feels like she is getting stronger. Her right foot is hurting and giving her issues but other than that denies pain. She feels like something is going on with her heart at night. She denied any chest pain, pressure or tightness. She denies any shortness of breath or lightheaded/dizziness. She denies any abdominal pain, bleeding problems including blood in her stool, urine or black tarry stools, bowel issues, problems with her medications or any other concerns at this time. Bleeding Risks: Ms. Turner Libman denies any current or recent bleeding problems including a history of a GI bleed, ulcers, recent or upcoming surgeries, blood in her stool or black tarry stools or blood in her urine. Past Medical History:   Diagnosis Date    [facility]est[facility]     Diabetes mellitus ([facility]     H/O cardiac catheterization 08/04/2017    LMCA: Mild irregularites 10-20%. LAD: Mild irregularites 10-20%. LCx: Mild irregularites 10-20%. RCA: Mild irregularites 10-20%. LV function assessed as : Abnormal. EF of 40%.  H/O echocardiogram 12/18/2018    EF 55%. Mildly increased LV wall thickness. The left atrium appears moderately to severely dilated. Moderate to severe mitral regurg. Moderate pulmonary HTN with an estimated RV systolic pressure of 02SAVU. Moderate tricuspid regurg. Evidnece fo moderate diastolic dysfunction is seen.  History of echocardiogram 01/17/2019    EF 55%. LV wall thickness moderately increased. LA is mildly dilated w/ LA volume index of 30. trivial mitral regurg. Evidence of moderate (grade II) diastolic dysfunction seen.  History of echocardiogram 06/03/2019    EF of 50-55%. LV wall thickness is mildly increased. LA is mildly dilated (29-33) with a left atrial volume index of 31 m1/m2. mild diastolic dysfunction.  Hyperlipidemia     Hypertension        CURRENT ALLERGIES: Patient has no known allergies. REVIEW OF SYSTEMS: 14 systems were reviewed. Pertinent positives and negatives as above, all else negative.      Past Surgical History:   Procedure Laterality Date    CARDIAC CATHETERIZATION Left 08/04/2017    right radial, [facility] Dr. Paula Kaiser      Social History:  Social History     Tobacco Use    Smoking status: Never Smoker    Smokeless tobacco: Never Used   Substance Use Topics    Alcohol use: No    Drug use: No        CURRENT MEDICATIONS:  Outpatient Medications Marked as Taking for the 9/25/20 encounter (Telemedicine) with Radha Sanchez MD   Medication Sig Dispense Refill    enoxaparin (LOVENOX) 40 MG/0.4ML injection Inject 0.4 mLs into the skin daily  3    aspirin 81 MG chewable tablet Take 1 tablet by mouth daily 30 tablet 3    gabapentin (NEURONTIN) 100 MG capsule Take 1 capsule by mouth 3 times daily for 120 days. 90 capsule 3    insulin lispro (HUMALOG) 100 UNIT/ML injection vial Inject 0-12 Units into the skin 3 times daily (with meals) 1 vial 3    insulin lispro (HUMALOG) 100 UNIT/ML injection vial Inject 0-6 Units into the skin nightly 1 vial 3    ondansetron (ZOFRAN-ODT) 4 MG disintegrating tablet Take 1 tablet by mouth every 6 hours as needed for Nausea or Vomiting      losartan (COZAAR) 100 MG tablet Take 1 tablet by mouth daily 30 tablet 3    hydroCHLOROthiazide (HYDRODIURIL) 25 MG tablet Take 1 tablet by mouth daily 30 tablet 3    carvedilol (COREG) 6.25 MG tablet Take 1 tablet by mouth 2 times daily (with meals) 60 tablet 3    amLODIPine (NORVASC) 10 MG tablet Take 1 tablet by mouth every evening 30 tablet 3    miconazole (MICOTIN) 2 % powder Apply topically 2 times daily. 45 g 1    bisacodyl (DULCOLAX) 10 MG suppository Place 1 suppository rectally daily as needed for Constipation      polyethylene glycol (GLYCOLAX) 17 g packet Take 17 g by mouth daily 527 g 1    senna (SENOKOT) 8.6 MG tablet Take 2 tablets by mouth daily as needed (Constipation)      clopidogrel (PLAVIX) 75 MG tablet Take 1 tablet by mouth daily 30 tablet 3    atorvastatin (LIPITOR) 80 MG tablet Take 1 tablet by mouth nightly 90 tablet 3    metFORMIN (GLUCOPHAGE) 500 MG tablet Take 500 mg by mouth 2 times daily (with meals)         FAMILY HISTORY: family history includes COPD in her sister; Coronary Art Dis in her brother and father.      [ INSTRUCTIONS:  \"[x]\" Indicates a positive item  \"[]\" Indicates a negative item   Vital Signs: (As obtained by patient/caregiver or practitioner observation)    Blood pressure-  Heart rate-    Respiratory rate-    Temperature-  Pulse oximetry-     Constitutional: [x] Appears well-developed and well-nourished [x] No apparent distress      [] Abnormal-   Mental status  [x] Alert and awake  [x] Oriented to person/place/time [x]Able to follow commands      Eyes:  EOM    [x]  Normal  [] Abnormal-  Sclera  [x]  Normal  [] Abnormal -         Discharge [x]  None visible  [] Abnormal -    HENT:   [x] Normocephalic, atraumatic. [] Abnormal   [] Mouth/Throat: Mucous membranes are moist.     External Ears [x] Normal  [] Abnormal-     Neck: [x] No visualized mass     Pulmonary/Chest: [x] Respiratory effort normal.  [x] No visualized signs of difficulty breathing or respiratory distress        [] Abnormal-     Neurological:        [x] No Facial Asymmetry (Cranial nerve 7 motor function) (limited exam to video visit)          [] No gaze palsy        [] Abnormal-         Skin:        [x] No significant exanthematous lesions or discoloration noted on facial skin         [] Abnormal-            Psychiatric:       [x] Normal Affect [] No Hallucinations        [] Abnormal-     Other pertinent observable physical exam findings: None      MOST RECENT LABS ON RECORD:   Lab Results   Component Value Date    WBC 4.9 09/21/2020    HGB 11.0 (L) 09/21/2020    HCT 34.8 (L) 09/21/2020     09/21/2020    CHOL 160 08/24/2020    TRIG 79 08/24/2020    HDL 44 08/24/2020    ALT 47 (H) 09/21/2020    AST 22 09/21/2020     09/21/2020    K 4.3 09/21/2020     09/21/2020    CREATININE 0.95 (H) 09/21/2020    BUN 40 (H) 09/21/2020    CO2 26 09/21/2020    TSH 0.50 08/24/2020    INR 1.0 08/23/2020    LABA1C 7.6 (H) 08/24/2020       ASSESSMENT:  1. PAF [facility]oxysmal [facility] fibrillation) ([facility]    2. Cerebral infarction, unspecified mechanism ([facility]    3.  History of TIA (transient ischemic attack) and stroke 4. Chronic combined systolic and diastolic congestive heart failure ([facility]    5. Essential hypertension         PLAN:     Newly identified  atrial fibrillation with RVR with history of recent Cyptogenic stroke: Improving symptoms but at very high risk of recurrence as well as symptoms with her  atrial fibrillation with RVR    Anticoagulation: STOP LOVENOX and plavix and START Apixaban (Eliquis) 2.5 mg every 12 hours. I also reminded them to watch for signs of bloody or black tarry stools and stop the medication immediately if this develops as this could be life threatening. I would like her to do the 2.5 dose for 2 week and then increase to the 5 mg. I did explain that the timing of starting this can be difficult because of her recent stroke and the risk of her ischemic stroke turning into a hemorrhagic stroke but since she is out >1 month and her [id number] score is so high at 7 meaning her recurrence rate is so high, I do believe the benefits outweigh the risks and therefore as long as we stop the ASA and Plavix, I think it is safe for her to go on Eliquis with plans to increase to the full 5 mg dose in the next 2 weeks.  Antiplatelet Agent: Continue Aspirin 81 mg daily. [id number] Score for Atrial Fibrillation Stroke Risk   Risk   Factors  Component Value   C CHF Yes 1   H HTN Yes 1   A2 Age >= 75 No,  (80 y.o.) 0   D DM Yes 1   S2 Prior Stroke/TIA Yes 2   V Vascular Disease No 0   A Age 74-69 Yes,  (80 y.o.) 1   Sc Sex female 1    [id number]  Score  7   Score last updated 9/25/20 96:19 AM EDT    Click here for a link to the UpToDate guideline \"Atrial Fibrillation: Anticoagulation therapy to prevent embolization   Disclaimer: Risk Score calculation is dependent on accuracy of patient problem list and past encounter diagnosis.  Beta Blocker: STOP Carvedilol (Coreg) and START Metoprolol succinate (Toprol XL) 50 mg daily.  I also discussed the potential side effects of this medication including lightheadedness and dizziness and instructed them to stop the medication of this occurs and call our office if this occurs.  Anti-anginal medications: Continue amlodipine (Norvasc) 10 mg once daily.  Cholesterol Reduction Therapy: Continue Atorvastatin (Lipitor) 80 mg daily. Chronic Systolic and Diastolic Heart Failure: EF of 50-55% on 6/3/19  Beta Blocker: STOP Carvedilol (Coreg) and START Metoprolol succinate (Toprol XL) 50 mg daily. ACE Inibitor/ARB: Continue losartan/HCTZ (Hyzaar) 100/ 25 mg once daily. Nonpharmacologic management of Heart Failure: I told her to continue wearing lower extremity compression stockings and I advised her to try and keep her legs up whenever possible and to limit salt in her diet. Essential Hypertension: Controlled  · ACE Inibitor/ARB: Continue losartan/HCTZ (Hyzaar) 100/ 25 mg once daily. · Beta Blocker: STOP Carvedilol (Coreg) and START Metoprolol succinate (Toprol XL) 50 mg daily. · Calcium Channel Blocker: Continue amlodipine (Norvasc) 10 mg once daily. · History of Severe Mitral Regurgitation: probably just functional as her echo on 6/19 showed only trivial MR.   · ACE Inibitor/ARB: Continue losartan/HCTZ (Hyzaar) 100/25 mg every morning. · Hyperlipidemia: Mixed - LDL done on 12/2019 was 104 mg/dL   · Cholesterol Reduction Therapy: Continue Atorvastatin (Lipitor) 80 mg daily. Implantable Loop Recorder:  Indication for Device Placement: Paroxysmal atrial fibrillation seen on 9/2020 downloads. Start Eliquis as above  Interrogation Findings: We will plan to recheck their device at their next scheduled appointment date. Finally, I recommended that she continue her other medications and follow up with you as previously scheduled. FOLLOW UP:   I told Ms. Ragland to call my office if she had any problems, but otherwise told her to No follow-ups on file. However, I would be happy to see her sooner should the need arise.   However, I would be happy to see her sooner should the need arise. Once again, thank you for allowing me to participate in this patients care. Please do not hesitate to contact me could I be of further assistance. Sincerely,  Dio Martin. Jemal VILLALOBOS, MS, F.A.CMalickC. [facility] Cardiology Specialist  [address], [place], [address] [place] [address]  Phone: [phone], Fax: [phone]      I believe that the risk of significant morbidity and mortality related to the patient's current medical conditions are: intermediate-high. The documentation recorded by the scribe, accurately and completely reflects the services I personally performed and the decisions made by me. Bruce Calvo MD, MS, F.A.SHIVC. September 25, 2020     Francesco Morrell is a 81 y.o. female being evaluated by a Virtual Visit (video visit) encounter to address concerns as mentioned above. A caregiver was present when appropriate. Due to this being a TeleHealth encounter (During QNYYW-01 public health emergency), evaluation of the following organ systems was limited: Vitals/Constitutional/EENT/Resp/CV/GI//MS/Neuro/Skin/Heme-Lymph-Imm. Pursuant to the emergency declaration under the [address] [place] [address] and the [employer] and [employer] Act, this Virtual Visit was conducted with patient's (and/or legal guardian's) consent, to reduce the patient's risk of exposure to COVID-19 and provide necessary medical care. The patient (and/or legal guardian) has also been advised to contact this office for worsening conditions or problems, and seek emergency medical treatment and/or call 911 if deemed necessary. Services were provided through a video synchronous discussion virtually to substitute for in-person clinic visit. Patient and provider were located at their individual homes.     --Consuelo Barrientos MA on 9/25/2020 at 8:39 AM    An electronic signature was used to authenticate this note.

## 2020-09-28 ENCOUNTER — HOSPITAL ENCOUNTER (OUTPATIENT)
Age: 70
Setting detail: SPECIMEN
Discharge: HOME | End: 2020-09-28
Payer: MEDICARE

## 2020-09-28 LAB
ABSOLUTE EOS #: 0.1 K/UL (ref 0–0.44)
ABSOLUTE IMMATURE GRANULOCYTE: <0.03 K/UL (ref 0–0.3)
ABSOLUTE LYMPH #: 1.48 K/UL (ref 1.1–3.7)
ABSOLUTE MONO #: 0.57 K/UL (ref 0.1–1.2)
ALBUMIN SERPL-MCNC: 3.4 G/DL (ref 3.5–5.2)
ALBUMIN/GLOBULIN RATIO: 1.3 (ref 1–2.5)
ALP BLD-CCNC: 116 U/L (ref 35–104)
ALT SERPL-CCNC: 23 U/L (ref 5–33)
ANION GAP SERPL CALCULATED.3IONS-SCNC: 8 MMOL/L (ref 9–17)
AST SERPL-CCNC: 14 U/L
BASOPHILS # BLD: 1 % (ref 0–2)
BASOPHILS ABSOLUTE: 0.03 K/UL (ref 0–0.2)
BILIRUB SERPL-MCNC: 0.33 MG/DL (ref 0.3–1.2)
BUN BLDV-MCNC: 32 MG/DL (ref 8–23)
BUN/CREAT BLD: 39 (ref 9–20)
CALCIUM SERPL-MCNC: 9.6 MG/DL (ref 8.6–10.4)
CHLORIDE BLD-SCNC: 106 MMOL/L (ref 98–107)
CO2: 25 MMOL/L (ref 20–31)
CREAT SERPL-MCNC: 0.82 MG/DL (ref 0.5–0.9)
DIFFERENTIAL TYPE: ABNORMAL
EOSINOPHILS RELATIVE PERCENT: 2 % (ref 1–4)
GFR AFRICAN AMERICAN: >60 ML/MIN
GFR NON-AFRICAN AMERICAN: >60 ML/MIN
GFR SERPL CREATININE-BSD FRML MDRD: ABNORMAL ML/MIN/{1.73_M2}
GFR SERPL CREATININE-BSD FRML MDRD: ABNORMAL ML/MIN/{1.73_M2}
GLUCOSE BLD-MCNC: 128 MG/DL (ref 70–99)
HCT VFR BLD CALC: 35.1 % (ref 36.3–47.1)
HEMOGLOBIN: 11.1 G/DL (ref 11.9–15.1)
IMMATURE GRANULOCYTES: 0 %
LYMPHOCYTES # BLD: 26 % (ref 24–43)
MCH RBC QN AUTO: 30.7 PG (ref 25.2–33.5)
MCHC RBC AUTO-ENTMCNC: 31.6 G/DL (ref 28.4–34.8)
MCV RBC AUTO: 97.2 FL (ref 82.6–102.9)
MONOCYTES # BLD: 10 % (ref 3–12)
NRBC AUTOMATED: 0 PER 100 WBC
PDW BLD-RTO: 13.7 % (ref 11.8–14.4)
PLATELET # BLD: 196 K/UL (ref 138–453)
PLATELET ESTIMATE: ABNORMAL
PMV BLD AUTO: 11.3 FL (ref 8.1–13.5)
POTASSIUM SERPL-SCNC: 4.2 MMOL/L (ref 3.7–5.3)
RBC # BLD: 3.61 M/UL (ref 3.95–5.11)
RBC # BLD: ABNORMAL 10*6/UL
SEG NEUTROPHILS: 61 % (ref 36–65)
SEGMENTED NEUTROPHILS ABSOLUTE COUNT: 3.5 K/UL (ref 1.5–8.1)
SODIUM BLD-SCNC: 139 MMOL/L (ref 135–144)
TOTAL PROTEIN: 6 G/DL (ref 6.4–8.3)
WBC # BLD: 5.7 K/UL (ref 3.5–11.3)
WBC # BLD: ABNORMAL 10*3/UL

## 2020-09-28 PROCEDURE — 36415 COLL VENOUS BLD VENIPUNCTURE: CPT

## 2020-09-28 PROCEDURE — 85025 COMPLETE CBC W/AUTO DIFF WBC: CPT

## 2020-09-28 PROCEDURE — 80053 COMPREHEN METABOLIC PANEL: CPT

## 2020-09-28 PROCEDURE — P9603 ONE-WAY ALLOW PRORATED MILES: HCPCS

## 2020-10-05 ENCOUNTER — HOSPITAL ENCOUNTER (OUTPATIENT)
Age: 70
Setting detail: SPECIMEN
Discharge: HOME | End: 2020-10-05
Payer: MEDICARE

## 2020-10-05 LAB
ABSOLUTE EOS #: 0.07 K/UL (ref 0–0.44)
ABSOLUTE IMMATURE GRANULOCYTE: <0.03 K/UL (ref 0–0.3)
ABSOLUTE LYMPH #: 1.7 K/UL (ref 1.1–3.7)
ABSOLUTE MONO #: 0.64 K/UL (ref 0.1–1.2)
ALBUMIN SERPL-MCNC: 4.3 G/DL (ref 3.5–5.2)
ALBUMIN/GLOBULIN RATIO: 1.3 (ref 1–2.5)
ALP BLD-CCNC: 135 U/L (ref 35–104)
ALT SERPL-CCNC: 26 U/L (ref 5–33)
ANION GAP SERPL CALCULATED.3IONS-SCNC: 12 MMOL/L (ref 9–17)
AST SERPL-CCNC: 20 U/L
BASOPHILS # BLD: 0 % (ref 0–2)
BASOPHILS ABSOLUTE: 0.03 K/UL (ref 0–0.2)
BILIRUB SERPL-MCNC: 0.39 MG/DL (ref 0.3–1.2)
BUN BLDV-MCNC: 35 MG/DL (ref 8–23)
BUN/CREAT BLD: 40 (ref 9–20)
CALCIUM SERPL-MCNC: 10.5 MG/DL (ref 8.6–10.4)
CHLORIDE BLD-SCNC: 105 MMOL/L (ref 98–107)
CO2: 24 MMOL/L (ref 20–31)
CREAT SERPL-MCNC: 0.88 MG/DL (ref 0.5–0.9)
DIFFERENTIAL TYPE: NORMAL
EOSINOPHILS RELATIVE PERCENT: 1 % (ref 1–4)
GFR AFRICAN AMERICAN: >60 ML/MIN
GFR NON-AFRICAN AMERICAN: >60 ML/MIN
GFR SERPL CREATININE-BSD FRML MDRD: ABNORMAL ML/MIN/{1.73_M2}
GFR SERPL CREATININE-BSD FRML MDRD: ABNORMAL ML/MIN/{1.73_M2}
GLUCOSE BLD-MCNC: 138 MG/DL (ref 70–99)
HCT VFR BLD CALC: 43.6 % (ref 36.3–47.1)
HEMOGLOBIN: 13.6 G/DL (ref 11.9–15.1)
IMMATURE GRANULOCYTES: 0 %
LYMPHOCYTES # BLD: 25 % (ref 24–43)
MCH RBC QN AUTO: 30 PG (ref 25.2–33.5)
MCHC RBC AUTO-ENTMCNC: 31.2 G/DL (ref 28.4–34.8)
MCV RBC AUTO: 96.2 FL (ref 82.6–102.9)
MONOCYTES # BLD: 9 % (ref 3–12)
NRBC AUTOMATED: 0 PER 100 WBC
PDW BLD-RTO: 13.7 % (ref 11.8–14.4)
PLATELET # BLD: 267 K/UL (ref 138–453)
PLATELET ESTIMATE: NORMAL
PMV BLD AUTO: 11.4 FL (ref 8.1–13.5)
POTASSIUM SERPL-SCNC: 3.9 MMOL/L (ref 3.7–5.3)
RBC # BLD: 4.53 M/UL (ref 3.95–5.11)
RBC # BLD: NORMAL 10*6/UL
SEG NEUTROPHILS: 65 % (ref 36–65)
SEGMENTED NEUTROPHILS ABSOLUTE COUNT: 4.44 K/UL (ref 1.5–8.1)
SODIUM BLD-SCNC: 141 MMOL/L (ref 135–144)
TOTAL PROTEIN: 7.5 G/DL (ref 6.4–8.3)
WBC # BLD: 6.9 K/UL (ref 3.5–11.3)
WBC # BLD: NORMAL 10*3/UL

## 2020-10-05 PROCEDURE — 80053 COMPREHEN METABOLIC PANEL: CPT

## 2020-10-05 PROCEDURE — 85025 COMPLETE CBC W/AUTO DIFF WBC: CPT

## 2020-10-05 PROCEDURE — P9604 ONE-WAY ALLOW PRORATED TRIP: HCPCS

## 2020-10-05 PROCEDURE — 36415 COLL VENOUS BLD VENIPUNCTURE: CPT

## 2020-10-06 PROCEDURE — 93298 REM INTERROG DEV EVAL SCRMS: CPT | Performed by: FAMILY MEDICINE

## 2020-10-07 ENCOUNTER — TELEPHONE (OUTPATIENT)
Dept: CARDIOLOGY | Age: 70
End: 2020-10-07

## 2020-10-09 ENCOUNTER — NURSE ONLY (OUTPATIENT)
Dept: CARDIOLOGY | Age: 70
End: 2020-10-09
Payer: MEDICARE

## 2020-10-12 ENCOUNTER — HOSPITAL ENCOUNTER (OUTPATIENT)
Age: 70
Setting detail: SPECIMEN
Discharge: HOME | End: 2020-10-12
Payer: MEDICARE

## 2020-10-12 LAB
ABSOLUTE EOS #: 0.12 K/UL (ref 0–0.44)
ABSOLUTE IMMATURE GRANULOCYTE: <0.03 K/UL (ref 0–0.3)
ABSOLUTE LYMPH #: 1.54 K/UL (ref 1.1–3.7)
ABSOLUTE MONO #: 0.6 K/UL (ref 0.1–1.2)
ALBUMIN SERPL-MCNC: 3.5 G/DL (ref 3.5–5.2)
ALBUMIN/GLOBULIN RATIO: 1.3 (ref 1–2.5)
ALP BLD-CCNC: 116 U/L (ref 35–104)
ALT SERPL-CCNC: 23 U/L (ref 5–33)
ANION GAP SERPL CALCULATED.3IONS-SCNC: 10 MMOL/L (ref 9–17)
AST SERPL-CCNC: 14 U/L
BASOPHILS # BLD: 1 % (ref 0–2)
BASOPHILS ABSOLUTE: 0.04 K/UL (ref 0–0.2)
BILIRUB SERPL-MCNC: 0.28 MG/DL (ref 0.3–1.2)
BUN BLDV-MCNC: 42 MG/DL (ref 8–23)
BUN/CREAT BLD: 39 (ref 9–20)
CALCIUM SERPL-MCNC: 9.9 MG/DL (ref 8.6–10.4)
CHLORIDE BLD-SCNC: 109 MMOL/L (ref 98–107)
CO2: 24 MMOL/L (ref 20–31)
CREAT SERPL-MCNC: 1.07 MG/DL (ref 0.5–0.9)
DIFFERENTIAL TYPE: ABNORMAL
EOSINOPHILS RELATIVE PERCENT: 2 % (ref 1–4)
GFR AFRICAN AMERICAN: >60 ML/MIN
GFR NON-AFRICAN AMERICAN: 51 ML/MIN
GFR SERPL CREATININE-BSD FRML MDRD: ABNORMAL ML/MIN/{1.73_M2}
GFR SERPL CREATININE-BSD FRML MDRD: ABNORMAL ML/MIN/{1.73_M2}
GLUCOSE BLD-MCNC: 120 MG/DL (ref 70–99)
HCT VFR BLD CALC: 36.2 % (ref 36.3–47.1)
HEMOGLOBIN: 11.6 G/DL (ref 11.9–15.1)
IMMATURE GRANULOCYTES: 0 %
LYMPHOCYTES # BLD: 26 % (ref 24–43)
MCH RBC QN AUTO: 30.1 PG (ref 25.2–33.5)
MCHC RBC AUTO-ENTMCNC: 32 G/DL (ref 28.4–34.8)
MCV RBC AUTO: 94 FL (ref 82.6–102.9)
MONOCYTES # BLD: 10 % (ref 3–12)
NRBC AUTOMATED: 0 PER 100 WBC
PDW BLD-RTO: 13.3 % (ref 11.8–14.4)
PLATELET # BLD: 183 K/UL (ref 138–453)
PLATELET ESTIMATE: ABNORMAL
PMV BLD AUTO: 11.8 FL (ref 8.1–13.5)
POTASSIUM SERPL-SCNC: 4.3 MMOL/L (ref 3.7–5.3)
RBC # BLD: 3.85 M/UL (ref 3.95–5.11)
RBC # BLD: ABNORMAL 10*6/UL
SEG NEUTROPHILS: 61 % (ref 36–65)
SEGMENTED NEUTROPHILS ABSOLUTE COUNT: 3.64 K/UL (ref 1.5–8.1)
SODIUM BLD-SCNC: 143 MMOL/L (ref 135–144)
TOTAL PROTEIN: 6.3 G/DL (ref 6.4–8.3)
WBC # BLD: 6 K/UL (ref 3.5–11.3)
WBC # BLD: ABNORMAL 10*3/UL

## 2020-10-12 PROCEDURE — 80053 COMPREHEN METABOLIC PANEL: CPT

## 2020-10-12 PROCEDURE — 85025 COMPLETE CBC W/AUTO DIFF WBC: CPT

## 2020-10-12 PROCEDURE — 36415 COLL VENOUS BLD VENIPUNCTURE: CPT

## 2020-10-12 PROCEDURE — P9604 ONE-WAY ALLOW PRORATED TRIP: HCPCS

## 2020-10-19 ENCOUNTER — HOSPITAL ENCOUNTER (OUTPATIENT)
Age: 70
Setting detail: SPECIMEN
Discharge: HOME | End: 2020-10-19
Payer: MEDICARE

## 2020-10-19 LAB
ABSOLUTE EOS #: 0.14 K/UL (ref 0–0.44)
ABSOLUTE IMMATURE GRANULOCYTE: <0.03 K/UL (ref 0–0.3)
ABSOLUTE LYMPH #: 1.56 K/UL (ref 1.1–3.7)
ABSOLUTE MONO #: 0.61 K/UL (ref 0.1–1.2)
ALBUMIN SERPL-MCNC: 3.6 G/DL (ref 3.5–5.2)
ALBUMIN/GLOBULIN RATIO: 1.4 (ref 1–2.5)
ALP BLD-CCNC: 122 U/L (ref 35–104)
ALT SERPL-CCNC: 25 U/L (ref 5–33)
ANION GAP SERPL CALCULATED.3IONS-SCNC: 11 MMOL/L (ref 9–17)
AST SERPL-CCNC: 18 U/L
BASOPHILS # BLD: 1 % (ref 0–2)
BASOPHILS ABSOLUTE: 0.03 K/UL (ref 0–0.2)
BILIRUB SERPL-MCNC: 0.31 MG/DL (ref 0.3–1.2)
BUN BLDV-MCNC: 31 MG/DL (ref 8–23)
BUN/CREAT BLD: 41 (ref 9–20)
CALCIUM SERPL-MCNC: 9.9 MG/DL (ref 8.6–10.4)
CHLORIDE BLD-SCNC: 106 MMOL/L (ref 98–107)
CO2: 24 MMOL/L (ref 20–31)
CREAT SERPL-MCNC: 0.76 MG/DL (ref 0.5–0.9)
DIFFERENTIAL TYPE: ABNORMAL
EOSINOPHILS RELATIVE PERCENT: 2 % (ref 1–4)
GFR AFRICAN AMERICAN: >60 ML/MIN
GFR NON-AFRICAN AMERICAN: >60 ML/MIN
GFR SERPL CREATININE-BSD FRML MDRD: ABNORMAL ML/MIN/{1.73_M2}
GFR SERPL CREATININE-BSD FRML MDRD: ABNORMAL ML/MIN/{1.73_M2}
GLUCOSE BLD-MCNC: 118 MG/DL (ref 70–99)
HCT VFR BLD CALC: 36.9 % (ref 36.3–47.1)
HEMOGLOBIN: 11.8 G/DL (ref 11.9–15.1)
IMMATURE GRANULOCYTES: 0 %
LYMPHOCYTES # BLD: 26 % (ref 24–43)
MCH RBC QN AUTO: 30.1 PG (ref 25.2–33.5)
MCHC RBC AUTO-ENTMCNC: 32 G/DL (ref 28.4–34.8)
MCV RBC AUTO: 94.1 FL (ref 82.6–102.9)
MONOCYTES # BLD: 10 % (ref 3–12)
NRBC AUTOMATED: 0 PER 100 WBC
PDW BLD-RTO: 13.3 % (ref 11.8–14.4)
PLATELET # BLD: 162 K/UL (ref 138–453)
PLATELET ESTIMATE: ABNORMAL
PMV BLD AUTO: 11.7 FL (ref 8.1–13.5)
POTASSIUM SERPL-SCNC: 4.1 MMOL/L (ref 3.7–5.3)
RBC # BLD: 3.92 M/UL (ref 3.95–5.11)
RBC # BLD: ABNORMAL 10*6/UL
SEG NEUTROPHILS: 61 % (ref 36–65)
SEGMENTED NEUTROPHILS ABSOLUTE COUNT: 3.74 K/UL (ref 1.5–8.1)
SODIUM BLD-SCNC: 141 MMOL/L (ref 135–144)
TOTAL PROTEIN: 6.1 G/DL (ref 6.4–8.3)
WBC # BLD: 6.1 K/UL (ref 3.5–11.3)
WBC # BLD: ABNORMAL 10*3/UL

## 2020-10-19 PROCEDURE — 85025 COMPLETE CBC W/AUTO DIFF WBC: CPT

## 2020-10-19 PROCEDURE — 36415 COLL VENOUS BLD VENIPUNCTURE: CPT

## 2020-10-19 PROCEDURE — 80053 COMPREHEN METABOLIC PANEL: CPT

## 2020-10-19 PROCEDURE — P9604 ONE-WAY ALLOW PRORATED TRIP: HCPCS

## 2020-10-21 ENCOUNTER — CARE COORDINATION (OUTPATIENT)
Dept: CARE COORDINATION | Age: 70
End: 2020-10-21

## 2020-10-21 ENCOUNTER — VIRTUAL VISIT (OUTPATIENT)
Dept: CARDIOLOGY | Age: 70
End: 2020-10-21
Payer: MEDICARE

## 2020-10-21 PROCEDURE — 1123F ACP DISCUSS/DSCN MKR DOCD: CPT | Performed by: FAMILY MEDICINE

## 2020-10-21 PROCEDURE — 4040F PNEUMOC VAC/ADMIN/RCVD: CPT | Performed by: FAMILY MEDICINE

## 2020-10-21 PROCEDURE — 1090F PRES/ABSN URINE INCON ASSESS: CPT | Performed by: FAMILY MEDICINE

## 2020-10-21 PROCEDURE — 99214 OFFICE O/P EST MOD 30 MIN: CPT | Performed by: FAMILY MEDICINE

## 2020-10-21 PROCEDURE — G8427 DOCREV CUR MEDS BY ELIG CLIN: HCPCS | Performed by: FAMILY MEDICINE

## 2020-10-21 PROCEDURE — G8400 PT W/DXA NO RESULTS DOC: HCPCS | Performed by: FAMILY MEDICINE

## 2020-10-21 PROCEDURE — 3017F COLORECTAL CA SCREEN DOC REV: CPT | Performed by: FAMILY MEDICINE

## 2020-10-21 RX ORDER — METOPROLOL SUCCINATE 50 MG/1
50 TABLET, EXTENDED RELEASE ORAL DAILY
Qty: 90 TABLET | Refills: 3 | Status: ON HOLD | OUTPATIENT
Start: 2020-10-21 | End: 2021-11-20 | Stop reason: HOSPADM

## 2020-10-21 RX ORDER — AMLODIPINE BESYLATE 10 MG/1
10 TABLET ORAL EVERY EVENING
Qty: 90 TABLET | Refills: 3 | Status: SHIPPED | OUTPATIENT
Start: 2020-10-21 | End: 2022-09-28

## 2020-10-21 NOTE — PATIENT INSTRUCTIONS
SURVEY:    You may be receiving a survey from [employer] regarding your visit today. Please complete the survey to enable us to provide the highest quality of care to you and your family. If you cannot score us a very good on any question, please call the office to discuss how we could have made your experience a very good one. Thank you. Tiffani Jerez was your MA today!

## 2020-10-21 NOTE — PROGRESS NOTES
Saqib Daniel am scribing for and in the presence of Lisa Sharma. Jemal VILLALOBOS, MS, F.A.C.C. Patient: Ashanti Camargo  : 1950  Date of Visit: 2020    REASON FOR VISIT / CONSULTATION: Follow-up (HX: CHF, TIA, Severe Mitral Reg, HTN. Pt states she is doing not to bad. C/o: Speech issues still. Questions on meds. Denies: CP, Palpitaitons, Lighteaded/dizziness, SOB.)      Dear Steve Farr, APRN - CNP,    HPI: Ms. Nathaniel Carnes is a 79 y.o. female who was admitted to the hospital with worsening shortness of breath. This has gotten worse over the past three months. She has a cardiac history of abnormal echocardiogram which showed that her ejection fraction was reduced to about 40-45%. And a heart catheterization that was relatively unremarkable. Fortunately her repeat echocardiogram in 2018 and in 2019 showed her ejection fraction increased from 45-50% to 55% as well as showing only trivial mitral regurgitation. She had an ECHO on 6/3/2019 that showed EF of 50-55%. LV wall thickness is mildly increased. LA is mildly dilated (29-33) with a left atrial volume index of 31 m1/m2. mild diastolic dysfunction. She came to the ER on 2019 for slurred speech. She was than taken to [facility] due to a transient ischemic attack. In 2020, she unfortunately went onto have an moderate sized left parietal ischemic stroke which effected her speech. Ms. Nathaniel Carnes is now in rehab and here via virtual visit today. She is now home from rehab and is doing well. She denied any chest pain, pressure or tightness. She denies any shortness of breath or lightheaded/dizziness. She also denies any abdominal pain, bleeding problems including blood in her stool, urine or black tarry stools, bowel issues, problems with her medications or any other concerns at this time.       Bleeding Risks: Ms. Nathaniel Carnes denies any current or recent bleeding problems including a history of a GI bleed, ulcers, recent or upcoming surgeries, blood in her stool or black tarry stools or blood in her urine. Past Medical History:   Diagnosis Date    [facility]F [facility]congestive heart failure) [facility] [address]     Diabetes mellitus ([facility] [address]     H/O cardiac catheterization 08/04/2017    LMCA: Mild irregularites 10-20%. LAD: Mild irregularites 10-20%. LCx: Mild irregularites 10-20%. RCA: Mild irregularites 10-20%. LV function assessed as : Abnormal. EF of 40%.  H/O echocardiogram 12/18/2018    EF 55%. Mildly increased LV wall thickness. The left atrium appears moderately to severely dilated. Moderate to severe mitral regurg. Moderate pulmonary HTN with an estimated RV systolic pressure of 17DCWS. Moderate tricuspid regurg. Evidnece fo moderate diastolic dysfunction is seen.  History of echocardiogram 01/17/2019    EF 55%. LV wall thickness moderately increased. LA is mildly dilated w/ LA volume index of 30. trivial mitral regurg. Evidence of moderate (grade II) diastolic dysfunction seen.  History of echocardiogram 06/03/2019    EF of 50-55%. LV wall thickness is mildly increased. LA is mildly dilated (29-33) with a left atrial volume index of 31 m1/m2. mild diastolic dysfunction.  Hyperlipidemia     Hypertension        CURRENT ALLERGIES: Patient has no known allergies. REVIEW OF SYSTEMS: 14 systems were reviewed. Pertinent positives and negatives as above, all else negative.      Past Surgical History:   Procedure Laterality Date    CARDIAC CATHETERIZATION Left 08/04/2017    right radial, [facility] Dr. Jocy Mckee      Social History:  Social History     Tobacco Use    Smoking status: Never Smoker    Smokeless tobacco: Never Used   Substance Use Topics    Alcohol use: No    Drug use: No        CURRENT MEDICATIONS:  Outpatient Medications Marked as Taking for the 10/21/20 encounter (Virtual Visit) with Darnell Shone, MD   Medication Sig Dispense Refill    apixaban (ELIQUIS) 5 MG TABS tablet Take 1 tablet by mouth 2 times daily 180 tablet 3    metoprolol succinate (TOPROL XL) 50 MG extended release tablet Take 1 tablet by mouth daily 90 tablet 3    gabapentin (NEURONTIN) 100 MG capsule Take 1 capsule by mouth 3 times daily for 120 days. 90 capsule 3    ondansetron (ZOFRAN-ODT) 4 MG disintegrating tablet Take 1 tablet by mouth every 6 hours as needed for Nausea or Vomiting      losartan (COZAAR) 100 MG tablet Take 1 tablet by mouth daily 30 tablet 3    hydroCHLOROthiazide (HYDRODIURIL) 25 MG tablet Take 1 tablet by mouth daily 30 tablet 3    amLODIPine (NORVASC) 10 MG tablet Take 1 tablet by mouth every evening 30 tablet 3    miconazole (MICOTIN) 2 % powder Apply topically 2 times daily. 45 g 1    atorvastatin (LIPITOR) 80 MG tablet Take 1 tablet by mouth nightly 90 tablet 3    metFORMIN (GLUCOPHAGE) 500 MG tablet Take 500 mg by mouth 2 times daily (with meals)         FAMILY HISTORY: family history includes COPD in her sister; Coronary Art Dis in her brother and father. [ INSTRUCTIONS:  \"[x]\" Indicates a positive item  \"[]\" Indicates a negative item   Vital Signs: (As obtained by patient/caregiver or practitioner observation)    Blood pressure-  Heart rate-    Respiratory rate-    Temperature-  Pulse oximetry-     Constitutional: [x] Appears well-developed and well-nourished [x] No apparent distress      [] Abnormal-   Mental status  [x] Alert and awake  [x] Oriented to person/place/time [x]Able to follow commands      Eyes:  EOM    [x]  Normal  [] Abnormal-  Sclera  [x]  Normal  [] Abnormal -         Discharge [x]  None visible  [] Abnormal -    HENT:   [x] Normocephalic, atraumatic.   [] Abnormal   [] Mouth/Throat: Mucous membranes are moist.     External Ears [x] Normal  [] Abnormal-     Neck: [x] No visualized mass     Pulmonary/Chest: [x] Respiratory effort normal.  [x] No visualized signs of difficulty breathing or respiratory distress        [] Abnormal-     Neurological:        [x] No Facial Asymmetry (Cranial nerve 7 motor function) (limited exam to video visit)          [] No gaze palsy        [] Abnormal-         Skin:        [x] No significant exanthematous lesions or discoloration noted on facial skin         [] Abnormal-            Psychiatric:       [x] Normal Affect [] No Hallucinations        [] Abnormal-     Other pertinent observable physical exam findings: None      MOST RECENT LABS ON RECORD:   Lab Results   Component Value Date    WBC 6.1 10/19/2020    HGB 11.8 (L) 10/19/2020    HCT 36.9 10/19/2020     10/19/2020    CHOL 160 08/24/2020    TRIG 79 08/24/2020    HDL 44 08/24/2020    ALT 25 10/19/2020    AST 18 10/19/2020     10/19/2020    K 4.1 10/19/2020     10/19/2020    CREATININE 0.76 10/19/2020    BUN 31 (H) 10/19/2020    CO2 24 10/19/2020    TSH 0.50 08/24/2020    INR 1.0 08/23/2020    LABA1C 7.6 (H) 08/24/2020       ASSESSMENT:  1. PAF (paroxysmal atrial fibrillation) ([facility] [address]    2. Chronic combined systolic and diastolic HF (heart failure), NYHA class 2 ([facility] [address]    3. Coronary artery disease involving native coronary artery of native heart without angina pectoris    4. Cryptogenic stroke ([facility] [address]    5. Essential hypertension    6. Mixed hyperlipidemia       PLAN:   Paroxysmal Atrial Fibrillation with RVR with history of recent Cyptogenic Stroke: Improving symptoms, will continue to keep an eye on her via her LINQ recorder.  Beta Blocker Therapy: Continue Metoprolol succinate (Toprol XL)  50 mg  daily      Calcium Channel Blocker: Continue amlodipine (Norvasc) 10 mg once daily.   [id number] Score for Atrial Fibrillation Stroke Risk   Risk   Factors  Component Value   C CHF Yes 1   H HTN Yes 1   A2 Age >= 75 No,  (80 y.o.) 0   D DM Yes 1   S2 Prior Stroke/TIA Yes 2   V Vascular Disease No 0   A Age 74-69 Yes,  (80 y.o.) 1   Sc Sex female 1    [id number]  Score  7   Score last updated 8/11/90 51:39 AM EDT  Click here for a link to the UpToDate guideline \"Atrial Fibrillation: Anticoagulation therapy to prevent authenticate this note.

## 2020-10-21 NOTE — CARE COORDINATION
JUANA MORALES E-MAIL: ABDELRAHMAN Vaughan / [facility] Coordinator           Patient Name: Jevon Abdul  [facility] Patient : 1950   Facility Discharging: [facility] Discharge Date: 10/20/2020   Services at Discharge: [address] Patient Agreeable to Calls: yes, lives with sister Bryanna Liang Brief Background: Member is a 81 y.o. female admitted to [facility] on 2020 s/p hospitalization for right hemiparesis, aphasia secondary to ischemic L MCA CVA, onset 20. Relevant MH of HTN, CHF, cardiomyopathy, DM. At discharge, member able to ambulate with WW and SBA; transfers SBA. Dressing, bathing, toileting CGA, continues to have expressive aphasia. Member lives with sister in 1 level house with 3 NING home.     Follow up appts:  o Dr. Lady Reeder (cardiology) 10/21/20 at 3:20 pm

## 2020-10-22 ENCOUNTER — CARE COORDINATION (OUTPATIENT)
Dept: CASE MANAGEMENT | Age: 70
End: 2020-10-22

## 2020-10-22 PROCEDURE — 1111F DSCHRG MED/CURRENT MED MERGE: CPT | Performed by: FAMILY MEDICINE

## 2020-10-22 NOTE — CARE COORDINATION
[facility] Transitions Initial Follow Up Call    Call within 2 business days of discharge: Yes    Patient: Manjula Sheehan Patient : 1950   MRN: [id number]  Reason for Admission: TIA  Discharge Date: 20 RARS: Readmission Risk Score: 24      Last Discharge [facility]       Complaint Diagnosis Description Type Department Provider    20  Primary osteoarthritis of right knee Admission (Discharged) Kalpesh Arias MD        Challenges to be reviewed by the provider   Additional needs identified to be addressed with provider Yes  medications-Reconciliation and ACP    Discussed COVID-19 related testing which was available at this time. Test results were negative. Patient informed of results, if available? Yes         Method of communication with provider : Called the office    Advance Care Planning:   Does patient have an Advance Directive:  not on file. Was this a readmission? No  Patient stated reason for admission: TIA  Patients top risk factors for readmission: functional physical ability and caregiver stress    Care Transition Nurse (CTN) contacted the family by telephone to perform post hospital discharge assessment. Verified name and  with family as identifiers. Provided introduction to self, and explanation of the CTN role. CTN reviewed discharge instructions, medical action plan and red flags with family who verbalized understanding. Family given an opportunity to ask questions and does not have any further questions or concerns at this time. Were discharge instructions available to patient? Yes. Reviewed appropriate site of care based on symptoms and resources available to patient including: PCP and When to call 911. The family agrees to contact the PCP office for questions related to their healthcare. Medication reconciliation was performed with family, who verbalizes understanding of administration of home medications.     Covid Risk Education    Patient has following risk factors of: heart failure and diabetes. Education provided regarding infection prevention, and signs and symptoms of COVID-19 and when to seek medical attention with family who verbalized understanding. Discussed exposure protocols and quarantine From CDC: Are you at higher risk for severe illness?   and given an opportunity for questions and concerns. The family agrees to contact the COVID-19 hotline [phone] or PCP office for questions related to COVID-19. For more information on steps you can take to protect yourself, see CDC's How to Protect Yourself     Patient/family/caregiver given information for [employer] and agrees to enroll no  Patient's preferred e-mail: declines  Patient's preferred phone number: declines    Discussed follow-up appointments. If no appointment was previously scheduled, appointment scheduling offered: Yes. Is follow up appointment scheduled within 7 days of discharge? Yes with Cardiology and will schedule with primary and neurology  Non-[facility] follow up appointment(s):     Plan for follow-up call in 1-2 days based on severity of symptoms and risk factors. Plan for next call: medication management-refills and appointment scheduling  CTN provided contact information for future needs. Admitted to [facility] for a TIA on 8/23-8/26. Discharging diagnosis Left MCA ischemic stroke. transferred to [facility] on 8/26 for inpatient rehabilitation for R dominant hemiparesis and aphasia secondary to ischemic CVA until 9/18. PMH hypertension, hyperlipidemia diabetes, CHF, CKD Stage 3, history of cryptogenic strokes. She discharged to [facility] until home on 10/20 per [facility]. See note. Patient granted permission to speak with sister and caregiver Jovi Bray. Patient provided answers in the background. Aaron Gomez states patient c/o pain in mame legs/knees. Will take over the counter Tylenol.  Denies headaches, sob, cough, n/v, bowel or bladder concerns. Slight mame feet swelling. Patient has compression hose, encouraged to wear and elevate legs. States Rt side effect and aphasia. States patient ambulates independently using a walker. [facility] to visit home tomorrow. Medications reviewed. Patient needs Amlodipine, Metoprolol and Metformin. I called pharmacy to check status. Pharmacy was processing Amlodipine and Metoprolol. They needed an order for Metformin. CNP that wrote the order is no longer with the office. I asked the pharmacy to submit request. I called the [facility] and spoke with Zeeshan Flowers. I explained the situation. I was placed on hold. Zeeshan Flowers states they will put the request in to the staff, they highly recommend a visit since she has not been seen since February. I explained the patient/sister concerns with condition and inability to use steps. Zeeshan Flowers states she will discuss with team and the possibility of a virtual visit. She states she will contact Joel Rogers with an answer and to schedule. Raquel updated. She expressed frustration. I provided encouragement, stressed that she was doing a good job and progress is being made. Patient had a virtual visit with Cardiology yesterday. Joel Rogers states she will also schedule with neuro. Will follow up. KAYLAN KamN RN  Care Transition Nurse. Non-face-to-face services provided:      Care Transitions 24 Hour Call    Do you have a copy of your discharge instructions?:  Yes  Do you have all of your prescriptions and are they filled?:  No (Comment: Needs Amlodipine, Metoprolol and Metformin. Pharmacy/office called)  Have you been contacted by a [employer] [address]?:  No  Have you scheduled your follow up appointment?:  Yes (Comment: Virtual visit with Cardiology 10/21/20.  Sister will schedule with Primary)  Were you discharged with any Home Care or Post Acute Services:  Yes  Post Acute Services:  Home Health (Comment: [facility])  Care Transitions Interventions Follow Up  Future Appointments   Date Time Provider Sonja Leanna   11/6/2020 10:00 AM Joshua Franklin MD [facility]   11/17/2020 10:15 AM SCHEDULE, [facility]   7/21/2021  1:20 PM [facility] Franklin [facility]       Aftab Lara RN

## 2020-10-23 ENCOUNTER — CARE COORDINATION (OUTPATIENT)
Dept: CASE MANAGEMENT | Age: 70
End: 2020-10-23

## 2020-10-23 ENCOUNTER — TELEPHONE (OUTPATIENT)
Dept: CARDIOLOGY | Age: 70
End: 2020-10-23

## 2020-10-23 NOTE — CARE COORDINATION
[facility] Transitions Follow Up Call    10/23/2020    Patient: Young Cho  Patient : 1950   MRN: [id number]  Reason for Admission: GBS  Discharge Date: 20 RARS: Readmission Risk Score: 24  I called patient to follow up on medications and status of appointment with Primary. Patient new to establish care with a new NP. Patient has a appointment scheduled with Cardiology. Unable to reach patient. Left a confidential VM on home and mobile number. Will attempt again.  CINDY Zavaleta RN  Care Transition Nurse [phone]           Spoke with: LVM        Follow Up  Future Appointments   Date Time Provider Sonja Ram   2020 10:00 AM [facility]   [facility] 10:15 AM SCHEDULE, [facility] C[facility]   2021  1:20 PM Kathleen Rea MD TIFF CARD [facility]       Mayra Ro RN

## 2020-10-23 NOTE — TELEPHONE ENCOUNTER
Possible pause alert ( 4 seconds at 1:00 am on 10/22/20). Scanned in media for you to view. Called patient to see if she had any symptoms that night and patient denies any symptoms that she can remember and says she was sleeping. Spoke with Dr Ward Del Rio about this via phone call and he will continue to monitor. No new orders given.

## 2020-10-26 ENCOUNTER — HOSPITAL ENCOUNTER (OUTPATIENT)
Age: 70
Setting detail: SPECIMEN
Discharge: HOME | End: 2020-10-26
Payer: MEDICARE

## 2020-10-28 ENCOUNTER — CARE COORDINATION (OUTPATIENT)
Dept: CASE MANAGEMENT | Age: 70
End: 2020-10-28

## 2020-10-28 NOTE — CARE COORDINATION
[facility] Transitions Follow Up Call    10/28/2020    Patient: Anjali Roman  Patient : 1950   MRN: [id number]  Reason for Admission: TIA  Discharge Date: 20 RARS: Readmission Risk Score: 24  left hippa compliant message with my phone number       Spoke with: lvm    Care Transitions Subsequent and Final Call    Subsequent and Final Calls  Care Transitions Interventions  Other Interventions:             Follow Up  Future Appointments   Date Time Provider Sonja Ram   2020 10:00 AM Kaylynn Rick MD TIFF CARD [facility]   2020 10:15 AM SCHEDULE, [facility]FF CARD [facility]   2021  1:20 PM Kaylynn Rick MD TIFF CARD [facility]       Alejandro Rodriguez RN [phone]

## 2020-11-03 PROBLEM — I63.9 CEREBRAL INFARCTION (HCC): Status: RESOLVED | Noted: 2020-08-26 | Resolved: 2020-11-03

## 2020-11-06 ENCOUNTER — VIRTUAL VISIT (OUTPATIENT)
Dept: CARDIOLOGY | Age: 70
End: 2020-11-06
Payer: MEDICARE

## 2020-11-06 PROBLEM — Z79.01 ENCOUNTER FOR CURRENT LONG-TERM USE OF ANTICOAGULANTS: Status: ACTIVE | Noted: 2020-11-06

## 2020-11-06 PROCEDURE — 3017F COLORECTAL CA SCREEN DOC REV: CPT | Performed by: FAMILY MEDICINE

## 2020-11-06 PROCEDURE — 99213 OFFICE O/P EST LOW 20 MIN: CPT | Performed by: FAMILY MEDICINE

## 2020-11-06 PROCEDURE — 1123F ACP DISCUSS/DSCN MKR DOCD: CPT | Performed by: FAMILY MEDICINE

## 2020-11-06 PROCEDURE — 4040F PNEUMOC VAC/ADMIN/RCVD: CPT | Performed by: FAMILY MEDICINE

## 2020-11-06 PROCEDURE — G8400 PT W/DXA NO RESULTS DOC: HCPCS | Performed by: FAMILY MEDICINE

## 2020-11-06 PROCEDURE — 1090F PRES/ABSN URINE INCON ASSESS: CPT | Performed by: FAMILY MEDICINE

## 2020-11-06 PROCEDURE — G8427 DOCREV CUR MEDS BY ELIG CLIN: HCPCS | Performed by: FAMILY MEDICINE

## 2020-11-06 RX ORDER — FAMOTIDINE 20 MG/1
20 TABLET, FILM COATED ORAL DAILY
COMMUNITY

## 2020-11-06 NOTE — PROGRESS NOTES
Patient: Mikel Bermudez  : 1950  Date of Visit: 2020    REASON FOR VISIT / CONSULTATION: Follow-up (HX: PAF, CHF, CAD, HTN, HLD. Pt states she is doing ok. C/o: Lighteaded/diziness at times. Denies: CP, Palpitations, SOB.)      Dear Maritza Mcdowell, APRN - CNP,    HPI: Ms. Ruel Charlton is a 79 y.o. female who was admitted to the hospital with worsening shortness of breath. This has gotten worse over the past three months. She has a cardiac history of abnormal echocardiogram which showed that her ejection fraction was reduced to about 40-45%. And a heart catheterization that was relatively unremarkable. Fortunately her repeat echocardiogram in 2018 and in 2019 showed her ejection fraction increased from 45-50% to 55% as well as showing only trivial mitral regurgitation. She had an ECHO on 6/3/2019 that showed EF of 50-55%. LV wall thickness is mildly increased. LA is mildly dilated (29-33) with a left atrial volume index of 31 m1/m2. mild diastolic dysfunction. She came to the ER on 2019 for slurred speech. She was than taken to [facility] due to a transient ischemic attack. In 2020, she unfortunately went onto have an moderate sized left parietal ischemic stroke which effected her speech. Since the last time I saw Ms. Ragland she reports doing well. She states that she continues to work with physical therapy and continues to get stronger. She denied any chest pain, abdominal pain, bleeding problems, lightheaded/dizziness, or problems with her medications or any other concerns at this time. She reports taking her Eliquis samples without any known problems and has sent in her patient assistance paperwork. Bleeding Risks: Ms. Ruel Charlton denies any current or recent bleeding problems including a history of a GI bleed, ulcers, recent or upcoming surgeries, blood in her stool or black tarry stools or blood in her urine.      Past Medical History:   Diagnosis Date    CHF (congestive heart failure) ([email or web address]     Diabetes mellitus ([email or web address]     H/O cardiac catheterization 08/04/2017    LMCA: Mild irregularites 10-20%. LAD: Mild irregularites 10-20%. LCx: Mild irregularites 10-20%. RCA: Mild irregularites 10-20%. LV function assessed as : Abnormal. EF of 40%.  H/O echocardiogram 12/18/2018    EF 55%. Mildly increased LV wall thickness. The left atrium appears moderately to severely dilated. Moderate to severe mitral regurg. Moderate pulmonary HTN with an estimated RV systolic pressure of [id number]. Moderate tricuspid regurg. Evidnece fo moderate diastolic dysfunction is seen.  History of echocardiogram 01/17/2019    EF 55%. LV wall thickness moderately increased. LA is mildly dilated w/ LA volume index of 30. trivial mitral regurg. Evidence of moderate (grade II) diastolic dysfunction seen.  History of echocardiogram 06/03/2019    EF of 50-55%. LV wall thickness is mildly increased. LA is mildly dilated (29-33) with a left atrial volume index of 31 m1/m2. mild diastolic dysfunction.  Hyperlipidemia     Hypertension        CURRENT ALLERGIES: Patient has no known allergies. REVIEW OF SYSTEMS: 14 systems were reviewed. Pertinent positives and negatives as above, all else negative.      Past Surgical History:   Procedure Laterality Date    CARDIAC CATHETERIZATION Left 08/04/2017    right radial, [facility] Dr. Gus Vargas      Social History:  Social History     Tobacco Use    Smoking status: Never Smoker    Smokeless tobacco: Never Used   Substance Use Topics    Alcohol use: No    Drug use: No        CURRENT MEDICATIONS:  Outpatient Medications Marked as Taking for the 11/6/20 encounter (Virtual Visit) with Bhavna Dalton MD   Medication Sig Dispense Refill    famotidine (PEPCID) 20 MG tablet Take 20 mg by mouth 2 times daily      apixaban (ELIQUIS) 5 MG TABS tablet Take 1 tablet by mouth 2 times daily 180 tablet 3    amLODIPine (NORVASC) 10 MG tablet Take 1 tablet by mouth every evening 90 tablet 3    metoprolol succinate (TOPROL XL) 50 MG extended release tablet Take 1 tablet by mouth daily 90 tablet 3    gabapentin (NEURONTIN) 100 MG capsule Take 1 capsule by mouth 3 times daily for 120 days. 90 capsule 3    ondansetron (ZOFRAN-ODT) 4 MG disintegrating tablet Take 1 tablet by mouth every 6 hours as needed for Nausea or Vomiting      losartan (COZAAR) 100 MG tablet Take 1 tablet by mouth daily 30 tablet 3    hydroCHLOROthiazide (HYDRODIURIL) 25 MG tablet Take 1 tablet by mouth daily 30 tablet 3    miconazole (MICOTIN) 2 % powder Apply topically 2 times daily. 45 g 1    atorvastatin (LIPITOR) 80 MG tablet Take 1 tablet by mouth nightly 90 tablet 3    metFORMIN (GLUCOPHAGE) 500 MG tablet Take 500 mg by mouth 2 times daily (with meals)         FAMILY HISTORY: family history includes COPD in her sister; Coronary Art Dis in her brother and father. [ INSTRUCTIONS:  \"[x]\" Indicates a positive item  \"[]\" Indicates a negative item   Vital Signs: (As obtained by patient/caregiver or practitioner observation)    Blood pressure-  Heart rate-    Respiratory rate-    Temperature-  Pulse oximetry-     Constitutional: [x] Appears well-developed and well-nourished [x] No apparent distress      [] Abnormal-   Mental status  [x] Alert and awake  [x] Oriented to person/place/time [x]Able to follow commands      Eyes:  EOM    [x]  Normal  [] Abnormal-  Sclera  [x]  Normal  [] Abnormal -         Discharge [x]  None visible  [] Abnormal -    HENT:   [x] Normocephalic, atraumatic.   [] Abnormal   [] Mouth/Throat: Mucous membranes are moist.     External Ears [x] Normal  [] Abnormal-     Neck: [x] No visualized mass     Pulmonary/Chest: [x] Respiratory effort normal.  [x] No visualized signs of difficulty breathing or respiratory distress        [] Abnormal-     Neurological:        [x] No Facial Asymmetry (Cranial nerve 7 motor function) (limited exam to video visit)          [] No gaze palsy        [] Abnormal- Skin:        [x] No significant exanthematous lesions or discoloration noted on facial skin         [] Abnormal-            Psychiatric:       [x] Normal Affect [] No Hallucinations        [] Abnormal-     Other pertinent observable physical exam findings: None      MOST RECENT LABS ON RECORD:   Lab Results   Component Value Date    WBC 6.1 10/19/2020    HGB 11.8 (L) 10/19/2020    HCT 36.9 10/19/2020     10/19/2020    CHOL 160 08/24/2020    TRIG 79 08/24/2020    HDL 44 08/24/2020    ALT 25 10/19/2020    AST 18 10/19/2020     10/19/2020    K 4.1 10/19/2020     10/19/2020    CREATININE 0.76 10/19/2020    BUN 31 (H) 10/19/2020    CO2 24 10/19/2020    TSH 0.50 08/24/2020    INR 1.0 08/23/2020    LABA1C 7.6 (H) 08/24/2020       ASSESSMENT:  1. PAF (paroxysmal atrial fibrillation) ([facility]    2. Chronic combined systolic and diastolic HF (heart failure), NYHA class 2 ([facility]    3. Stroke determined by clinical assessment ([facility]    4. Essential hypertension    5. Encounter for current long-term use of anticoagulants       PLAN:   Paroxysmal Atrial Fibrillation with RVR with history of recent Cyptogenic Stroke: Improving symptoms, will continue to keep an eye on her via her LINQ recorder.  Beta Blocker Therapy: Continue Metoprolol succinate (Toprol XL)  50 mg  daily      Calcium Channel Blocker: Continue amlodipine (Norvasc) 10 mg once daily.   [id number] Score for Atrial Fibrillation Stroke Risk   Risk   Factors  Component Value   C CHF Yes 1   H HTN Yes 1   A2 Age >= 75 No,  (80 y.o.) 0   D DM Yes 1   S2 Prior Stroke/TIA Yes 2   V Vascular Disease No 0   A Age 74-69 Yes,  (80 y.o.) 1   Sc Sex female 1    [id number]  Score  7   Score last updated 6/63/69 65:45 AM EDT  Click here for a link to the UpToDate guideline \"Atrial Fibrillation: Anticoagulation therapy to prevent embolization   Disclaimer: Risk Score calculation is dependent on accuracy of patient problem list and past encounter diagnosis.  Her CHADS2 score is 7/9(9.6% stroke risk)   Anticoagulation: Continue Apixaban (Eliquis) 5 mg every 12 hours. Because of her atrial fibrillation, I also would also recommend that she continue with anticoagulation to decrease her risk of stoke but also reminded her to watch for signs of blood in her stool or black tarry stools and stop the medication immediately if this develops as this could be life threatening. She has filled out the paperwork for the assistance and we have sent this into the company for review. Chronic Systolic and Diastolic Heart Failure: EF of 50-55% on 6/3/19  Beta Blocker: Continue Metoprolol succinate (Toprol XL) 50 mg daily. ACE Inibitor/ARB: Continue losartan (Cozaar) 100 mg once daily. Diuretics: Continue Hydrochlorothiazide (HCTZ) 25 mg every morning. Nonpharmacologic management of Heart Failure: I told her to continue wearing lower extremity compression stockings and I advised her to try and keep her legs up whenever possible and to limit salt in her diet.  Atherosclerotic Heart Disease: Ischemic Stroke    Antiplatelet Agent: STOP clopidogrel (Plavix)    Beta Blocker: Continue Metoprolol succinate (Toprol XL) 50 mg daily.  Cholesterol Reduction Therapy: Continue Atorvastatin (Lipitor) 80 mg daily. Essential Hypertension: Controlled  · ACE Inibitor/ARB: Continue losartan (Cozaar) 100 mg once daily. · Diuretics: Continue Hydrochlorothiazide (HCTZ) 25 mg every morning. · Beta Blocker: Continue Metoprolol succinate (Toprol XL) 50 mg daily. · Calcium Channel Blocker: Continue amlodipine (Norvasc) 10 mg once daily. · Hyperlipidemia: Mixed - LDL done on 8/24/2020 was 100 mg/dL   · Cholesterol Reduction Therapy: Continue Atorvastatin (Lipitor) 80 mg daily. Finally, I recommended that she continue her other medications and follow up with you as previously scheduled. FOLLOW UP:   I told Ms. Ragland to call my office if she had any problems, but otherwise told her to Return in about 3 months (around 2/6/2021). However, I would be happy to see her sooner should the need arise. However, I would be happy to see her sooner should the need arise. Sincerely,  Gabi Joaquin MD, MS, F.A.C.C. [facility] Card[facility] Specialist  [address] [facility], [address] [place] [address]  Phone: [phone], Fax: [phone]      I believe that the risk of significant morbidity and mortality related to the patient's current medical conditions are: Intermediate. The documentation recorded by the scribe, accurately and completely reflects the services I personally performed and the decisions made by me. Joshua Franklin MD, MS, F.A.C.C. November 6, 2020     India Tenorio is a 81 y.o. female being evaluated by a Virtual Visit (video visit) encounter to address concerns as mentioned above. A caregiver was present when appropriate. Due to this being a TeleHealth encounter (During BIUR-26 public health emergency), evaluation of the following organ systems was limited: Vitals/Constitutional/EENT/Resp/CV/GI//MS/Neuro/Skin/Heme-Lymph-Imm. Pursuant to the emergency declaration under the [address] [place] [address], [address] [place] [address] authority and the [employer] Act, this Virtual Visit was conducted with patient's (and/or legal guardian's) consent, to reduce the patient's risk of exposure to COVID-19 and provide necessary medical care. The patient (and/or legal guardian) has also been advised to contact this office for worsening conditions or problems, and seek emergency medical treatment and/or call 911 if deemed necessary. Services were provided through a video synchronous discussion virtually to substitute for in-person clinic visit. Patient and provider were located at their individual homes.     --Nataliia Perdomo MD on 11/6/2020 at 10:27 AM    An electronic signature was used to authenticate this note.

## 2020-11-06 NOTE — PATIENT INSTRUCTIONS
SURVEY:    You may be receiving a survey from [employer] regarding your visit today. Please complete the survey to enable us to provide the highest quality of care to you and your family. If you cannot score us a very good on any question, please call the office to discuss how we could have made your experience a very good one. Thank you.

## 2020-12-15 PROCEDURE — 93298 REM INTERROG DEV EVAL SCRMS: CPT | Performed by: FAMILY MEDICINE

## 2020-12-23 ENCOUNTER — NURSE ONLY (OUTPATIENT)
Dept: CARDIOLOGY | Age: 70
End: 2020-12-23
Payer: MEDICARE

## 2020-12-24 ENCOUNTER — TELEPHONE (OUTPATIENT)
Dept: CARDIOLOGY | Age: 70
End: 2020-12-24

## 2020-12-24 NOTE — TELEPHONE ENCOUNTER
Ms. Wolfgang Bowles had an alert on her device. Scanned into media for Dr. Leandro Cortes to view. Thanks!

## 2021-02-22 ENCOUNTER — VIRTUAL VISIT (OUTPATIENT)
Dept: CARDIOLOGY | Age: 71
End: 2021-02-22
Payer: MEDICARE

## 2021-02-22 ENCOUNTER — TELEPHONE (OUTPATIENT)
Dept: CARDIOLOGY | Age: 71
End: 2021-02-22

## 2021-02-22 DIAGNOSIS — I50.42 CHRONIC COMBINED SYSTOLIC AND DIASTOLIC HF (HEART FAILURE), NYHA CLASS 2 (HCC): ICD-10-CM

## 2021-02-22 DIAGNOSIS — I63.9 CRYPTOGENIC STROKE (HCC): ICD-10-CM

## 2021-02-22 DIAGNOSIS — E78.2 MIXED HYPERLIPIDEMIA: ICD-10-CM

## 2021-02-22 DIAGNOSIS — Z86.73 HISTORY OF TIA (TRANSIENT ISCHEMIC ATTACK) AND STROKE: ICD-10-CM

## 2021-02-22 DIAGNOSIS — I10 ESSENTIAL HYPERTENSION: ICD-10-CM

## 2021-02-22 DIAGNOSIS — I25.10 CORONARY ARTERY DISEASE INVOLVING NATIVE CORONARY ARTERY OF NATIVE HEART WITHOUT ANGINA PECTORIS: ICD-10-CM

## 2021-02-22 DIAGNOSIS — I48.0 PAF (PAROXYSMAL ATRIAL FIBRILLATION) (HCC): Primary | ICD-10-CM

## 2021-02-22 PROCEDURE — 1090F PRES/ABSN URINE INCON ASSESS: CPT | Performed by: FAMILY MEDICINE

## 2021-02-22 PROCEDURE — G8400 PT W/DXA NO RESULTS DOC: HCPCS | Performed by: FAMILY MEDICINE

## 2021-02-22 PROCEDURE — G8427 DOCREV CUR MEDS BY ELIG CLIN: HCPCS | Performed by: FAMILY MEDICINE

## 2021-02-22 PROCEDURE — 3017F COLORECTAL CA SCREEN DOC REV: CPT | Performed by: FAMILY MEDICINE

## 2021-02-22 PROCEDURE — 99213 OFFICE O/P EST LOW 20 MIN: CPT | Performed by: FAMILY MEDICINE

## 2021-02-22 PROCEDURE — 1123F ACP DISCUSS/DSCN MKR DOCD: CPT | Performed by: FAMILY MEDICINE

## 2021-02-22 PROCEDURE — 4040F PNEUMOC VAC/ADMIN/RCVD: CPT | Performed by: FAMILY MEDICINE

## 2021-02-22 RX ORDER — LOSARTAN POTASSIUM AND HYDROCHLOROTHIAZIDE 25; 100 MG/1; MG/1
1 TABLET ORAL DAILY
COMMUNITY
End: 2021-04-08

## 2021-02-22 RX ORDER — BUSPIRONE HYDROCHLORIDE 10 MG/1
10 TABLET ORAL 2 TIMES DAILY
COMMUNITY
End: 2022-02-09

## 2021-02-22 NOTE — PROGRESS NOTES
Oscar Hare am scribing for and in the presence of Lester Cordoba. Jemal VLILALOBOS, MS, F.A.C.C..      Patient: Becky Lang  : 1950  Date of Visit: 2021    REASON FOR VISIT / CONSULTATION: Follow-up (Hx: PAF, CHF, ASHD, HTN, HLD. was started on anxiety medication. has occasional SOB on exertion Denies: CP, dizziness, lightheaded, palpitations.)      Dear Zina Rollins, SUE - CNP,    HPI: Ms. Viky Vergara is a 79 y.o. female who was admitted to the hospital with worsening shortness of breath. This has gotten worse over the past three months. She has a cardiac history of abnormal echocardiogram which showed that her ejection fraction was reduced to about 40-45%. And a heart catheterization that was relatively unremarkable. Fortunately her repeat echocardiogram in 2018 and in 2019 showed her ejection fraction increased from 45-50% to 55% as well as showing only trivial mitral regurgitation. She had an ECHO on 6/3/2019 that showed EF of 50-55%. LV wall thickness is mildly increased. LA is mildly dilated (29-33) with a left atrial volume index of 31 m1/m2. mild diastolic dysfunction. She came to the ER on 2019 for slurred speech. She was than taken to [facility] due to a transient ischemic attack. In 2020, she unfortunately went onto have an moderate sized left parietal ischemic stroke which effected her speech. Since the last time I saw Ms. Ragland she continues to do well. She says her gabapentin was increased due to pain. She was also started on an anxiety pill. She thinks it is helping a little. She denied any chest pain, abdominal pain, bleeding problems, lightheaded/dizziness, or problems with her medications or any other concerns at this time. She says her breathing is good. Bleeding Risks: Ms. Viky Vergara denies any current or recent bleeding problems including a history of a GI bleed, ulcers, recent or upcoming surgeries, blood in her stool or black tarry stools or blood in her urine. Exercise Tolerance: Ms. Domo Guerra reports that she has a fairly poor exercise tolerance. Her says that she can not walk far at all without having to stop due to pain and weakness. She could walk to her car. Past Medical History:   Diagnosis Date    [facility]est[facility]     Diabetes mellitus ([facility]     H/O cardiac catheterization 08/04/2017    LMCA: Mild irregularites 10-20%. LAD: Mild irregularites 10-20%. LCx: Mild irregularites 10-20%. RCA: Mild irregularites 10-20%. LV function assessed as : Abnormal. EF of 40%.  H/O echocardiogram 12/18/2018    EF 55%. Mildly increased LV wall thickness. The left atrium appears moderately to severely dilated. Moderate to severe mitral regurg. Moderate pulmonary HTN with an estimated RV systolic pressure of [id number]. Moderate tricuspid regurg. Evidnece fo moderate diastolic dysfunction is seen.  History of echocardiogram 01/17/2019    EF 55%. LV wall thickness moderately increased. LA is mildly dilated w/ LA volume index of 30. trivial mitral regurg. Evidence of moderate (grade II) diastolic dysfunction seen.  History of echocardiogram 06/03/2019    EF of 50-55%. LV wall thickness is mildly increased. LA is mildly dilated (29-33) with a left atrial volume index of 31 m1/m2. mild diastolic dysfunction.  Hyperlipidemia     Hypertension        CURRENT ALLERGIES: Patient has no known allergies. REVIEW OF SYSTEMS: 14 systems were reviewed. Pertinent positives and negatives as above, all else negative.      Past Surgical History:   Procedure Laterality Date    CARDIAC CATHETERIZATION Left 08/04/2017    right radial, MHT Dr. Roshan Kline      Social History:  Social History     Tobacco Use    Smoking status: Never Smoker    Smokeless tobacco: Never Used   Substance Use Topics    Alcohol use: No    Drug use: No        CURRENT MEDICATIONS: Outpatient Medications Marked as Taking for the 2/22/21 encounter (Virtual Visit) with Nehemias Urrutia MD   Medication Sig Dispense Refill    busPIRone (BUSPAR) 10 MG tablet Take 10 mg by mouth 2 times daily      apixaban (ELIQUIS) 5 MG TABS tablet Take 1 tablet by mouth 2 times daily 180 tablet 3    amLODIPine (NORVASC) 10 MG tablet Take 1 tablet by mouth every evening 90 tablet 3    metoprolol succinate (TOPROL XL) 50 MG extended release tablet Take 1 tablet by mouth daily 90 tablet 3    gabapentin (NEURONTIN) 100 MG capsule Take 1 capsule by mouth 3 times daily for 120 days. (Patient taking differently: Take 300 mg by mouth 3 times daily. ) 90 capsule 3    ondansetron (ZOFRAN-ODT) 4 MG disintegrating tablet Take 1 tablet by mouth every 6 hours as needed for Nausea or Vomiting      losartan (COZAAR) 100 MG tablet Take 1 tablet by mouth daily 30 tablet 3    hydroCHLOROthiazide (HYDRODIURIL) 25 MG tablet Take 1 tablet by mouth daily 30 tablet 3    atorvastatin (LIPITOR) 80 MG tablet Take 1 tablet by mouth nightly 90 tablet 3    metFORMIN (GLUCOPHAGE) 500 MG tablet Take 500 mg by mouth 2 times daily (with meals)         FAMILY HISTORY: family history includes COPD in her sister; Coronary Art Dis in her brother and father. [ INSTRUCTIONS:  \"[x]\" Indicates a positive item  \"[]\" Indicates a negative item   Vital Signs: (As obtained by patient/caregiver or practitioner observation)    Blood pressure-  Heart rate-    Respiratory rate-    Temperature-  Pulse oximetry-     Constitutional: [x] Appears well-developed and well-nourished [x] No apparent distress      [] Abnormal-   Mental status  [x] Alert and awake  [x] Oriented to person/place/time [x]Able to follow commands      Eyes:  EOM    [x]  Normal  [] Abnormal-  Sclera  [x]  Normal  [] Abnormal -         Discharge [x]  None visible  [] Abnormal -    HENT:   [x] Normocephalic, atraumatic.   [] Abnormal   [] Mouth/Throat: Mucous membranes are moist. External Ears [x] Normal  [] Abnormal-     Neck: [x] No visualized mass     Pulmonary/Chest: [x] Respiratory effort normal.  [x] No visualized signs of difficulty breathing or respiratory distress        [] Abnormal-     Neurological:        [x] No Facial Asymmetry (Cranial nerve 7 motor function) (limited exam to video visit)          [] No gaze palsy        [] Abnormal-         Skin:        [x] No significant exanthematous lesions or discoloration noted on facial skin         [] Abnormal-            Psychiatric:       [x] Normal Affect [] No Hallucinations        [] Abnormal-     Other pertinent observable physical exam findings: None      MOST RECENT LABS ON RECORD:   Lab Results   Component Value Date    WBC 6.1 10/19/2020    HGB 11.8 (L) 10/19/2020    HCT 36.9 10/19/2020     10/19/2020    CHOL 160 08/24/2020    TRIG 79 08/24/2020    HDL 44 08/24/2020    ALT 25 10/19/2020    AST 18 10/19/2020     10/19/2020    K 4.1 10/19/2020     10/19/2020    CREATININE 0.76 10/19/2020    BUN 31 (H) 10/19/2020    CO2 24 10/19/2020    TSH 0.50 08/24/2020    INR 1.0 08/23/2020    LABA1C 7.6 (H) 08/24/2020       ASSESSMENT:  1. PAF (paroxysmal atrial fibrillation) ([facility]    2. Cryptogenic stroke ([facility]    3. Chronic combined systolic and diastolic HF (heart failure), NYHA class 2 ([facility]    4. Coronary artery disease involving native coronary artery of native heart without angina pectoris    5. History of TIA (transient ischemic attack) and stroke    6. Essential hypertension    7. Mixed hyperlipidemia       PLAN:  ? Paroxysmal Atrial Fibrillation with RVR with history of recent Cyptogenic Stroke: Improving symptoms, will continue to keep an eye on her via her [employer] recorder. ? Beta Blocker Therapy: Continue Metoprolol succinate (Toprol XL)  50 mg  daily     ? Calcium Channel Blocker: Continue amlodipine (Norvasc) 10 mg once daily.   [id number] Score for Atrial Fibrillation Stroke Risk   Risk Factors  Component Value   C CHF Yes 1   H HTN Yes 1   A2 Age >= 76 No,  (79 y.o.) 0   D DM Yes 1   S2 Prior Stroke/TIA Yes 2   V Vascular Disease No 0   A Age 74-69 Yes,  (79 y.o.) 1   Sc Sex female 1    [id number]  Score  7   Score last updated 3/55/52 31:13 AM EDT  Click here for a link to the UpToDate guideline \"Atrial Fibrillation: Anticoagulation therapy to prevent embolization  ? Disclaimer: Risk Score calculation is dependent on accuracy of patient problem list and past encounter diagnosis. ? Her CHADS2 score is 7/9(9.6% stroke risk)  ? Anticoagulation: Continue Apixaban (Eliquis) 5 mg every 12 hours. Because of her atrial fibrillation, I also would also recommend that she continue with anticoagulation to decrease her risk of stoke but also reminded her to watch for signs of blood in her stool or black tarry stools and stop the medication immediately if this develops as this could be life threatening. Chronic Systolic and Diastolic Heart Failure: EF down from 50-55% to 35-40% on 8/23/2020, up 9 pounds since last visit but sound like due to eating rather than edema  Beta Blocker: Continue Metoprolol succinate (Toprol XL) 50 mg daily. ACE Inibitor/ARB: Continue losartan/HCTZ (Hyzaar) 100/25 mg every morning. Diuretics: Continue losartan/HCTZ (Hyzaar) 100/25 mg every morning. Nonpharmacologic management of Heart Failure: I told her to continue wearing lower extremity compression stockings and I advised her to try and keep her legs up whenever possible and to limit salt in her diet. I will get a repeat echocardiogram at her next visit to re-assess her EF    ? Mild Atherosclerotic Heart Disease with a history of Ischemic Stroke   ? Antiplatelet Agent: Not indicated at this time. ? Beta Blocker: Continue Metoprolol succinate (Toprol XL) 50 mg daily. ? Cholesterol Reduction Therapy: Continue Atorvastatin (Lipitor) 80 mg daily.       Essential Hypertension: Controlled · ACE Inibitor/ARB: Continue losartan (Cozaar) 100 mg once daily. · Diuretics: Continue losartan/HCTZ (Hyzaar) 100/25 mg every morning. · Beta Blocker: Continue Metoprolol succinate (Toprol XL) 50 mg daily. · Calcium Channel Blocker: Continue amlodipine (Norvasc) 10 mg once daily. · Hyperlipidemia: Mixed - LDL done on 8/24/2020 was 100 mg/dL   · Cholesterol Reduction Therapy: Continue Atorvastatin (Lipitor) 80 mg daily. Finally, I recommended that she continue her other medications and follow up with you as previously scheduled. FOLLOW UP:   I told Ms. Ragland to call my office if she had any problems, but otherwise told her to Return in about 6 months (around 8/22/2021). However, I would be happy to see her sooner should the need arise. However, I would be happy to see her sooner should the need arise. Sincerely,  Nian Fischer. Jemal VILLALOBOS, MS, F.A.C.C. [facility]  [address] [place] [address], [address] [place] [address]  Phone: [phone], Fax: [phone]      I believe that the risk of significant morbidity and mortality related to the patient's current medical conditions are: low-intermediate. The documentation recorded by the scribe, accurately and completely reflects the services I personally performed and the decisions made by me. Cinthya Chakraborty MD, MS, F.A.C.C.  February 22, 2021 Master Elliott is a 78 y.o. female being evaluated by a Virtual Visit (video visit) encounter to address concerns as mentioned above. A caregiver was present when appropriate. Due to this being a TeleHealth encounter (During JUYDH-36 public health emergency), evaluation of the following organ systems was limited: Vitals/Constitutional/EENT/Resp/CV/GI//MS/Neuro/Skin/Heme-Lymph-Imm. Pursuant to the emergency declaration under the [address] [place] [address] and the [employer] Act, this Virtual Visit was conducted with patient's (and/or legal guardian's) consent, to reduce the patient's risk of exposure to COVID-19 and provide necessary medical care. The patient (and/or legal guardian) has also been advised to contact this office for worsening conditions or problems, and seek emergency medical treatment and/or call 911 if deemed necessary. Services were provided through a video synchronous discussion virtually to substitute for in-person clinic visit. Patient and provider were located at their individual homes. --Yonny Watson MA on 2/22/2021 at 2:19 PM    An electronic signature was used to authenticate this note.

## 2021-03-08 DIAGNOSIS — I10 ESSENTIAL HYPERTENSION: ICD-10-CM

## 2021-03-08 DIAGNOSIS — Z86.73 HISTORY OF TIA (TRANSIENT ISCHEMIC ATTACK) AND STROKE: ICD-10-CM

## 2021-03-08 DIAGNOSIS — I50.42 CHRONIC COMBINED SYSTOLIC AND DIASTOLIC CONGESTIVE HEART FAILURE (HCC): ICD-10-CM

## 2021-03-09 PROCEDURE — 93298 REM INTERROG DEV EVAL SCRMS: CPT | Performed by: FAMILY MEDICINE

## 2021-03-17 ENCOUNTER — NURSE ONLY (OUTPATIENT)
Dept: CARDIOLOGY | Age: 71
End: 2021-03-17
Payer: MEDICARE

## 2021-03-17 DIAGNOSIS — I63.9 CRYPTOGENIC STROKE (HCC): Primary | ICD-10-CM

## 2021-03-17 DIAGNOSIS — Z45.09 ENCOUNTER FOR LOOP RECORDER CHECK: ICD-10-CM

## 2021-04-08 ENCOUNTER — OFFICE VISIT (OUTPATIENT)
Dept: NEUROLOGY | Age: 71
End: 2021-04-08
Payer: MEDICARE

## 2021-04-08 VITALS
TEMPERATURE: 97.6 F | BODY MASS INDEX: 38.09 KG/M2 | HEIGHT: 62 IN | SYSTOLIC BLOOD PRESSURE: 121 MMHG | DIASTOLIC BLOOD PRESSURE: 66 MMHG | RESPIRATION RATE: 18 BRPM | WEIGHT: 207 LBS | HEART RATE: 67 BPM

## 2021-04-08 DIAGNOSIS — I69.351 HEMIPLEGIA AND HEMIPARESIS FOLLOWING CEREBRAL INFARCTION AFFECTING RIGHT DOMINANT SIDE (HCC): ICD-10-CM

## 2021-04-08 DIAGNOSIS — I63.311 CEREBROVASCULAR ACCIDENT (CVA) DUE TO THROMBOSIS OF RIGHT MIDDLE CEREBRAL ARTERY (HCC): Primary | ICD-10-CM

## 2021-04-08 PROCEDURE — 99203 OFFICE O/P NEW LOW 30 MIN: CPT | Performed by: NEUROMUSCULOSKELETAL MEDICINE, SPORTS MEDICINE

## 2021-04-08 PROCEDURE — G8427 DOCREV CUR MEDS BY ELIG CLIN: HCPCS | Performed by: NEUROMUSCULOSKELETAL MEDICINE, SPORTS MEDICINE

## 2021-04-08 PROCEDURE — 1123F ACP DISCUSS/DSCN MKR DOCD: CPT | Performed by: NEUROMUSCULOSKELETAL MEDICINE, SPORTS MEDICINE

## 2021-04-08 PROCEDURE — G8400 PT W/DXA NO RESULTS DOC: HCPCS | Performed by: NEUROMUSCULOSKELETAL MEDICINE, SPORTS MEDICINE

## 2021-04-08 PROCEDURE — 99214 OFFICE O/P EST MOD 30 MIN: CPT | Performed by: NEUROMUSCULOSKELETAL MEDICINE, SPORTS MEDICINE

## 2021-04-08 PROCEDURE — 3017F COLORECTAL CA SCREEN DOC REV: CPT | Performed by: NEUROMUSCULOSKELETAL MEDICINE, SPORTS MEDICINE

## 2021-04-08 PROCEDURE — 4040F PNEUMOC VAC/ADMIN/RCVD: CPT | Performed by: NEUROMUSCULOSKELETAL MEDICINE, SPORTS MEDICINE

## 2021-04-08 PROCEDURE — 1090F PRES/ABSN URINE INCON ASSESS: CPT | Performed by: NEUROMUSCULOSKELETAL MEDICINE, SPORTS MEDICINE

## 2021-04-08 PROCEDURE — 1036F TOBACCO NON-USER: CPT | Performed by: NEUROMUSCULOSKELETAL MEDICINE, SPORTS MEDICINE

## 2021-04-08 PROCEDURE — G8417 CALC BMI ABV UP PARAM F/U: HCPCS | Performed by: NEUROMUSCULOSKELETAL MEDICINE, SPORTS MEDICINE

## 2021-04-08 NOTE — PROGRESS NOTES
NEUROLOGY CONSULT    Patient Name:  Tomer Barreto  :   1950  Clinic Visit Date: 2021    I saw Ms. Tomer Barreto  in the neurology clinic today for follow-up after a stroke in 2020. Patient is a 62-year-old right-handed lady and known history of diabetes, hypertension, hyperlipidemia, left-sided ischemic infarction and 2020 manifesting as aphasia and right-sided weakness. She had initially presented to the [facility] emergency room on 2020 with right-sided weakness and aphasia. She was later transferred to  [facility] in [facility] for further evaluation. .  Work-up  revealed that she had a left MCA ischemic infarction on MRI of the brain and also found to have a cardiomyopathy. ANKUR demonstrated an ejection fraction of 35 to 40%. She was started on apixaban, and underwent extensive rehabilitation. She is doing generally well now. Able to walk with a walker. No difficulty swallowing but she continues to have difficulty with her speech, manifesting mainly as mild slurred speech and difficulty finding words. No headache, abnormal visual symptoms. She complains of right leg pain which is on gabapentin. Brie Smith History of having had a few TIAs prior to the stroke in 2020    REVIEW OF SYSTEMS    Constitutional Weight changes: present, change in appetite: absent Fatigue: present; Fevers : absent, Any recent hospitalizations:  absent   HEENT Ears: normal,  Visual disturbance: present   Respiratory Shortness of breath: present, choking:  absent, Cough: absent, Snoring : absent   Cardiovascular Chest pain: absent, Leg swelling :present, palpitations : absent, fainting : absent   GI Constipation: present, Diarrhea: present, Swallowing change: absent    Urinary frequency: present, Urinary urgency: present, Urinary incontinence: present   Musculoskeletal Neck pain: absent, Back pain: present, Stiffness: present, Muscle pain: absent, Joint pain: absent, restless leg : present   Dermatological Hair loss: absent, Skin changes: absent   Neurological Confusion: absent, Trouble concentrating: present, Seizures: absent;  Memory loss: absent, balance problem: present, Dizziness: absent, vertigo: absent, Weakness: present, Numbness present, Tremor: absent, Spasm: absent, involuntary movement: absent, Speech difficulty: present, Headache: absent, Light sensitivity: absent   Psychiatric Anxiety: absent, Depression  absent, drug abuse: absent, Hallucination: absent, mood disorder: present, Suicidal ideations absent   Hematologic Abnormal bleeding: absent, Anemia: absent, Lymph gland changes: absent Clotting disorder: absent     Past Medical History:   Diagnosis Date    [facility]     Diabetes mellitus [facility]     H/O cardiac catheterization 08/04/2017    LMCA: Mild irregularites 10-20%. LAD: Mild irregularites 10-20%. LCx: Mild irregularites 10-20%. RCA: Mild irregularites 10-20%. LV function assessed as : Abnormal. EF of 40%.  H/O echocardiogram 12/18/2018    EF 55%. Mildly increased LV wall thickness. The left atrium appears moderately to severely dilated. Moderate to severe mitral regurg. Moderate pulmonary HTN with an estimated RV systolic pressure of [id number]. Moderate tricuspid regurg. Evidnece fo moderate diastolic dysfunction is seen.  History of echocardiogram 01/17/2019    EF 55%. LV wall thickness moderately increased. LA is mildly dilated w/ LA volume index of 30. trivial mitral regurg. Evidence of moderate (grade II) diastolic dysfunction seen.  History of echocardiogram 06/03/2019    EF of 50-55%. LV wall thickness is mildly increased. LA is mildly dilated (29-33) with a left atrial volume index of 31 m1/m2. mild diastolic dysfunction.     Hyperlipidemia     Hypertension        Past Surgical History:   Procedure Laterality Date    CARDIAC CATHETERIZATION Left 08/04/2017    right radial, MHT Dr. Yesika Lehman History     Socioeconomic History    Marital status:       Spouse name: Not on file    Number of children: Not on file    Years of education: Not on file    Highest education level: Not on file   Occupational History    Not on file   Social Needs    Financial resource strain: Not on file    Food insecurity     Worry: Not on file     Inability: Not on file    Transportation needs     Medical: Not on file     Non-medical: Not on file   Tobacco Use    Smoking status: Never Smoker    Smokeless tobacco: Never Used   Substance and Sexual Activity    Alcohol use: No    Drug use: No    Sexual activity: Not Currently   Lifestyle    Physical activity     Days per week: Not on file     Minutes per session: Not on file    Stress: Not on file   Relationships    Social connections     Talks on phone: Not on file     Gets together: Not on file     Attends [employer] service: Not on file     Active member of club or organization: Not on file     Attends meetings of clubs or organizations: Not on file     Relationship status: Not on file    Intimate partner violence     Fear of current or ex partner: Not on file     Emotionally abused: Not on file     Physically abused: Not on file     Forced sexual activity: Not on file   Other Topics Concern    Not on file   Social History Narrative    Not on file       Family History   Problem Relation Age of Onset    Coronary Art Dis Father          from [facility] COPD Sister         O2 dep    Coronary Art Dis Brother         2 brothers  from [place]       Current Outpatient Medications   Medication Sig Dispense Refill    apixaban (ELIQUIS) 5 MG TABS tablet Take 1 tablet by mouth 2 times daily 180 tablet 3    busPIRone (BUSPAR) 10 MG tablet Take 10 mg by mouth 2 times daily      famotidine (PEPCID) 20 MG tablet Take 20 mg by mouth 2 times daily      amLODIPine (NORVASC) 10 MG tablet Take 1 tablet by mouth every evening 90 tablet 3    metoprolol succinate (TOPROL XL) 50 MG extended release tablet Take 1 tablet by mouth daily 90 tablet 3    gabapentin (NEURONTIN) 100 MG capsule Take 1 capsule by mouth 3 times daily for 120 days. (Patient taking differently: Take 300 mg by mouth 3 times daily. ) 90 capsule 3    losartan (COZAAR) 100 MG tablet Take 1 tablet by mouth daily 30 tablet 3    hydroCHLOROthiazide (HYDRODIURIL) 25 MG tablet Take 1 tablet by mouth daily 30 tablet 3    miconazole (MICOTIN) 2 % powder Apply topically 2 times daily. 45 g 1    atorvastatin (LIPITOR) 80 MG tablet Take 1 tablet by mouth nightly 90 tablet 3    metFORMIN (GLUCOPHAGE) 500 MG tablet Take 500 mg by mouth 2 times daily (with meals)       No current facility-administered medications for this visit. DATA:  Lab Results   Component Value Date    WBC 6.1 10/19/2020    HGB 11.8 (L) 10/19/2020     10/19/2020    CHOL 160 08/24/2020    TRIG 79 08/24/2020    HDL 44 08/24/2020    ALT 25 10/19/2020    AST 18 10/19/2020     10/19/2020    K 4.1 10/19/2020     10/19/2020    CREATININE 0.76 10/19/2020    BUN 31 (H) 10/19/2020    CO2 24 10/19/2020    TSH 0.50 08/24/2020    INR 1.0 08/23/2020    LABA1C 7.6 (H) 08/24/2020       /66 (Site: Left Upper Arm, Position: Sitting, Cuff Size: Medium Adult)   Pulse 67   Temp 97.6 °F (36.4 °C) (Tympanic)   Resp 18   Ht 5' 2\" (1.575 m)   Wt 207 lb (93.9 kg)   BMI 37.86 kg/m²     NEUROLOGICAL EXAMINATION:     MENTAL STATUS: Patient is alert and oriented x3. She has expressive and mild right-sided weakness aphasia. Comprehension is normal.  Memory is normal.     CRANIAL NERVES: Pupils are equal and reactive. EOMS are equal in all directions. There is no nystagmus or any other abnormal eye movements. Facial sensation is normal.  There is no facial weakness. There is no loss of hearing. Palate and tongue movements are normal.     MOTOR EXAMINATION: Muscle tone is normal in all the limbs. Mild right-sided hemiparesis.   Right handgrip is weak with impaired fine finger movements. .  There are no abnormal limb movements. SENSORY EXAMINATION:.  Mild decrease in  sensation over the right side of the face and right arm . STRETCH REFLEXES: Brisk and symmetrical in both the upper and lower limbs. GAIT: Unsteady wide-based gait. IMPRESSION:    1. Left MCA ischemic stroke in August 2020, with residual expressive aphasia and mild right-sided weakness. 2.  Cardiomyopathy on apixaban. 3.  Hypertension. 4.  Hyperlipidemia,  5  Obesity. 6.  Diabetes. PLAN:    1. Continue apixaban. 2.  Follow-up in 6 months    NOTE: This neurology evaluation is part of outpatient coverage at [facility]  1-2 days per week. Patients requiring frequent evaluations or uncomfortable with potential 3-4 day turnaround on questions or calls  may be better served by a neurologist in the area full time. [facility] at [facility] is available for outpatient visits and procedures including EMG/NCS. Non-[facility] system neurologists also practice in [facility] (Dr. Manuelito Ardon) and [facility] (Messi Fernandes).        Piper Powers MD   4/8/2021  4:37 PM          CC: BIJAN Do

## 2021-04-08 NOTE — PATIENT INSTRUCTIONS
SURVEY:    You may be receiving a survey from [employer] regarding your visit today. Please complete the survey to enable us to provide the highest quality of care to you and your family. If you cannot score us a very good on any question, please call the office to discuss how we could have made your experience a very good one. Thank you.

## 2021-05-17 ENCOUNTER — HOSPITAL ENCOUNTER (OUTPATIENT)
Age: 71
Setting detail: SPECIMEN
Discharge: HOME | End: 2021-05-17
Payer: MEDICARE

## 2021-05-17 LAB
ABSOLUTE EOS #: 0.07 K/UL (ref 0–0.44)
ABSOLUTE IMMATURE GRANULOCYTE: <0.03 K/UL (ref 0–0.3)
ABSOLUTE LYMPH #: 1.13 K/UL (ref 1.1–3.7)
ABSOLUTE MONO #: 0.7 K/UL (ref 0.1–1.2)
ALBUMIN SERPL-MCNC: 3.9 G/DL (ref 3.5–5.2)
ALBUMIN/GLOBULIN RATIO: 1.3 (ref 1–2.5)
ALP BLD-CCNC: 124 U/L (ref 35–104)
ALT SERPL-CCNC: 14 U/L (ref 5–33)
ANION GAP SERPL CALCULATED.3IONS-SCNC: 16 MMOL/L (ref 9–17)
AST SERPL-CCNC: 13 U/L
BASOPHILS # BLD: 1 % (ref 0–2)
BASOPHILS ABSOLUTE: 0.04 K/UL (ref 0–0.2)
BILIRUB SERPL-MCNC: 0.37 MG/DL (ref 0.3–1.2)
BUN BLDV-MCNC: 32 MG/DL (ref 8–23)
BUN/CREAT BLD: ABNORMAL (ref 9–20)
CALCIUM SERPL-MCNC: 10.3 MG/DL (ref 8.6–10.4)
CHLORIDE BLD-SCNC: 109 MMOL/L (ref 98–107)
CHOLESTEROL, FASTING: 120 MG/DL
CHOLESTEROL/HDL RATIO: 2.6
CO2: 21 MMOL/L (ref 20–31)
CREAT SERPL-MCNC: 1.04 MG/DL (ref 0.5–0.9)
DIFFERENTIAL TYPE: ABNORMAL
EOSINOPHILS RELATIVE PERCENT: 1 % (ref 1–4)
GFR AFRICAN AMERICAN: >60 ML/MIN
GFR NON-AFRICAN AMERICAN: 53 ML/MIN
GFR SERPL CREATININE-BSD FRML MDRD: ABNORMAL ML/MIN/{1.73_M2}
GFR SERPL CREATININE-BSD FRML MDRD: ABNORMAL ML/MIN/{1.73_M2}
GLUCOSE BLD-MCNC: 122 MG/DL (ref 70–99)
HCT VFR BLD CALC: 38 % (ref 36.3–47.1)
HDLC SERPL-MCNC: 47 MG/DL
HEMOGLOBIN: 12 G/DL (ref 11.9–15.1)
IMMATURE GRANULOCYTES: 0 %
LDL CHOLESTEROL: 46 MG/DL (ref 0–130)
LYMPHOCYTES # BLD: 18 % (ref 24–43)
MCH RBC QN AUTO: 29.9 PG (ref 25.2–33.5)
MCHC RBC AUTO-ENTMCNC: 31.6 G/DL (ref 28.4–34.8)
MCV RBC AUTO: 94.5 FL (ref 82.6–102.9)
MONOCYTES # BLD: 11 % (ref 3–12)
NRBC AUTOMATED: 0 PER 100 WBC
PDW BLD-RTO: 13.1 % (ref 11.8–14.4)
PLATELET # BLD: 204 K/UL (ref 138–453)
PLATELET ESTIMATE: ABNORMAL
PMV BLD AUTO: 11.8 FL (ref 8.1–13.5)
POTASSIUM SERPL-SCNC: 4.6 MMOL/L (ref 3.7–5.3)
RBC # BLD: 4.02 M/UL (ref 3.95–5.11)
RBC # BLD: ABNORMAL 10*6/UL
SEG NEUTROPHILS: 69 % (ref 36–65)
SEGMENTED NEUTROPHILS ABSOLUTE COUNT: 4.3 K/UL (ref 1.5–8.1)
SODIUM BLD-SCNC: 146 MMOL/L (ref 135–144)
TOTAL PROTEIN: 6.8 G/DL (ref 6.4–8.3)
TRIGLYCERIDE, FASTING: 136 MG/DL
VLDLC SERPL CALC-MCNC: NORMAL MG/DL (ref 1–30)
WBC # BLD: 6.3 K/UL (ref 3.5–11.3)
WBC # BLD: ABNORMAL 10*3/UL

## 2021-05-18 LAB — TSH SERPL DL<=0.05 MIU/L-ACNC: 1.24 MIU/L (ref 0.3–5)

## 2021-06-04 ENCOUNTER — TELEPHONE (OUTPATIENT)
Dept: CARDIOLOGY | Age: 71
End: 2021-06-04

## 2021-06-07 ENCOUNTER — TELEPHONE (OUTPATIENT)
Dept: CARDIOLOGY | Age: 71
End: 2021-06-07

## 2021-06-09 ENCOUNTER — HOSPITAL ENCOUNTER (OUTPATIENT)
Dept: WOMENS IMAGING | Age: 71
Discharge: HOME | End: 2021-06-11
Payer: MEDICARE

## 2021-06-09 DIAGNOSIS — Z12.31 ENCOUNTER FOR SCREENING MAMMOGRAM FOR MALIGNANT NEOPLASM OF BREAST: ICD-10-CM

## 2021-06-09 PROCEDURE — 77063 BREAST TOMOSYNTHESIS BI: CPT

## 2021-06-10 ENCOUNTER — NURSE ONLY (OUTPATIENT)
Dept: CARDIOLOGY | Age: 71
End: 2021-06-10
Payer: MEDICARE

## 2021-06-10 DIAGNOSIS — I63.9 CRYPTOGENIC STROKE (HCC): ICD-10-CM

## 2021-06-10 DIAGNOSIS — Z45.09 ENCOUNTER FOR LOOP RECORDER CHECK: Primary | ICD-10-CM

## 2021-06-10 PROCEDURE — 93298 REM INTERROG DEV EVAL SCRMS: CPT | Performed by: FAMILY MEDICINE

## 2021-06-21 ENCOUNTER — APPOINTMENT (OUTPATIENT)
Dept: CT IMAGING | Age: 71
End: 2021-06-21
Payer: MEDICARE

## 2021-06-21 ENCOUNTER — HOSPITAL ENCOUNTER (EMERGENCY)
Age: 71
Discharge: HOME | End: 2021-06-21
Payer: MEDICARE

## 2021-06-21 VITALS
RESPIRATION RATE: 18 BRPM | DIASTOLIC BLOOD PRESSURE: 78 MMHG | TEMPERATURE: 97.1 F | HEART RATE: 69 BPM | WEIGHT: 214 LBS | BODY MASS INDEX: 39.38 KG/M2 | HEIGHT: 62 IN | OXYGEN SATURATION: 96 % | SYSTOLIC BLOOD PRESSURE: 156 MMHG

## 2021-06-21 DIAGNOSIS — N28.1 RENAL CYST: ICD-10-CM

## 2021-06-21 DIAGNOSIS — R19.7 DIARRHEA, UNSPECIFIED TYPE: Primary | ICD-10-CM

## 2021-06-21 LAB
-: NORMAL
ABSOLUTE EOS #: <0.03 K/UL (ref 0–0.44)
ABSOLUTE IMMATURE GRANULOCYTE: 0.03 K/UL (ref 0–0.3)
ABSOLUTE LYMPH #: 1.29 K/UL (ref 1.1–3.7)
ABSOLUTE MONO #: 0.88 K/UL (ref 0.1–1.2)
ALBUMIN SERPL-MCNC: 4.4 G/DL (ref 3.5–5.2)
ALBUMIN/GLOBULIN RATIO: 1.2 (ref 1–2.5)
ALP BLD-CCNC: 137 U/L (ref 35–104)
ALT SERPL-CCNC: 19 U/L (ref 5–33)
AMORPHOUS: NORMAL
ANION GAP SERPL CALCULATED.3IONS-SCNC: 11 MMOL/L (ref 9–17)
AST SERPL-CCNC: 24 U/L
BACTERIA: NORMAL
BASOPHILS # BLD: 1 % (ref 0–2)
BASOPHILS ABSOLUTE: 0.04 K/UL (ref 0–0.2)
BILIRUB SERPL-MCNC: 0.99 MG/DL (ref 0.3–1.2)
BILIRUBIN URINE: NEGATIVE
BUN BLDV-MCNC: 20 MG/DL (ref 8–23)
BUN/CREAT BLD: 13 (ref 9–20)
CALCIUM SERPL-MCNC: 11.5 MG/DL (ref 8.6–10.4)
CASTS UA: NORMAL /LPF
CHLORIDE BLD-SCNC: 103 MMOL/L (ref 98–107)
CO2: 24 MMOL/L (ref 20–31)
COLOR: YELLOW
COMMENT UA: ABNORMAL
CREAT SERPL-MCNC: 1.57 MG/DL (ref 0.5–0.9)
CRYSTALS, UA: NORMAL /HPF
DIFFERENTIAL TYPE: ABNORMAL
EOSINOPHILS RELATIVE PERCENT: 0 % (ref 1–4)
EPITHELIAL CELLS UA: NORMAL /HPF (ref 0–25)
GFR AFRICAN AMERICAN: 40 ML/MIN
GFR NON-AFRICAN AMERICAN: 33 ML/MIN
GFR SERPL CREATININE-BSD FRML MDRD: ABNORMAL ML/MIN/{1.73_M2}
GFR SERPL CREATININE-BSD FRML MDRD: ABNORMAL ML/MIN/{1.73_M2}
GLUCOSE BLD-MCNC: 141 MG/DL (ref 70–99)
GLUCOSE URINE: NEGATIVE
HCT VFR BLD CALC: 44.4 % (ref 36.3–47.1)
HEMOGLOBIN: 14.4 G/DL (ref 11.9–15.1)
IMMATURE GRANULOCYTES: 0 %
KETONES, URINE: NEGATIVE
LACTIC ACID, WHOLE BLOOD: NORMAL MMOL/L (ref 0.7–2.1)
LACTIC ACID: 1.8 MMOL/L (ref 0.5–2.2)
LEUKOCYTE ESTERASE, URINE: NEGATIVE
LIPASE: 17 U/L (ref 13–60)
LYMPHOCYTES # BLD: 15 % (ref 24–43)
MCH RBC QN AUTO: 29 PG (ref 25.2–33.5)
MCHC RBC AUTO-ENTMCNC: 32.4 G/DL (ref 28.4–34.8)
MCV RBC AUTO: 89.5 FL (ref 82.6–102.9)
MONOCYTES # BLD: 10 % (ref 3–12)
MUCUS: NORMAL
NITRITE, URINE: NEGATIVE
NRBC AUTOMATED: 0 PER 100 WBC
OTHER OBSERVATIONS UA: NORMAL
PDW BLD-RTO: 12.9 % (ref 11.8–14.4)
PH UA: 6 (ref 5–9)
PLATELET # BLD: 233 K/UL (ref 138–453)
PLATELET ESTIMATE: ABNORMAL
PMV BLD AUTO: 10.8 FL (ref 8.1–13.5)
POTASSIUM SERPL-SCNC: 3.9 MMOL/L (ref 3.7–5.3)
PROTEIN UA: ABNORMAL
RBC # BLD: 4.96 M/UL (ref 3.95–5.11)
RBC # BLD: ABNORMAL 10*6/UL
RBC UA: NORMAL /HPF (ref 0–2)
RENAL EPITHELIAL, UA: NORMAL /HPF
SEG NEUTROPHILS: 74 % (ref 36–65)
SEGMENTED NEUTROPHILS ABSOLUTE COUNT: 6.61 K/UL (ref 1.5–8.1)
SODIUM BLD-SCNC: 138 MMOL/L (ref 135–144)
SPECIFIC GRAVITY UA: 1.02 (ref 1.01–1.02)
TOTAL PROTEIN: 8 G/DL (ref 6.4–8.3)
TRICHOMONAS: NORMAL
TURBIDITY: CLEAR
URINE HGB: ABNORMAL
UROBILINOGEN, URINE: NORMAL
WBC # BLD: 8.9 K/UL (ref 3.5–11.3)
WBC # BLD: ABNORMAL 10*3/UL
WBC UA: NORMAL /HPF (ref 0–5)
YEAST: NORMAL

## 2021-06-21 PROCEDURE — 2580000003 HC RX 258: Performed by: PHYSICIAN ASSISTANT

## 2021-06-21 PROCEDURE — 99282 EMERGENCY DEPT VISIT SF MDM: CPT

## 2021-06-21 PROCEDURE — 80053 COMPREHEN METABOLIC PANEL: CPT

## 2021-06-21 PROCEDURE — 81001 URINALYSIS AUTO W/SCOPE: CPT

## 2021-06-21 PROCEDURE — 83605 ASSAY OF LACTIC ACID: CPT

## 2021-06-21 PROCEDURE — 85025 COMPLETE CBC W/AUTO DIFF WBC: CPT

## 2021-06-21 PROCEDURE — 83690 ASSAY OF LIPASE: CPT

## 2021-06-21 PROCEDURE — 74177 CT ABD & PELVIS W/CONTRAST: CPT

## 2021-06-21 PROCEDURE — 96360 HYDRATION IV INFUSION INIT: CPT

## 2021-06-21 PROCEDURE — 6360000004 HC RX CONTRAST MEDICATION: Performed by: PHYSICIAN ASSISTANT

## 2021-06-21 RX ORDER — 0.9 % SODIUM CHLORIDE 0.9 %
1000 INTRAVENOUS SOLUTION INTRAVENOUS ONCE
Status: DISCONTINUED | OUTPATIENT
Start: 2021-06-21 | End: 2021-06-21

## 2021-06-21 RX ORDER — 0.9 % SODIUM CHLORIDE 0.9 %
250 INTRAVENOUS SOLUTION INTRAVENOUS ONCE
Status: COMPLETED | OUTPATIENT
Start: 2021-06-21 | End: 2021-06-21

## 2021-06-21 RX ADMIN — IOPAMIDOL 50 ML: 755 INJECTION, SOLUTION INTRAVENOUS at 17:58

## 2021-06-21 RX ADMIN — SODIUM CHLORIDE 250 ML: 9 INJECTION, SOLUTION INTRAVENOUS at 18:39

## 2021-06-21 ASSESSMENT — ENCOUNTER SYMPTOMS
DIARRHEA: 1
EYE REDNESS: 0
EYE DISCHARGE: 0
VOMITING: 0
ABDOMINAL PAIN: 1
NAUSEA: 0
RHINORRHEA: 0
BLOOD IN STOOL: 0
COUGH: 0
SORE THROAT: 0
WHEEZING: 0
SHORTNESS OF BREATH: 0
CONSTIPATION: 0
BACK PAIN: 0
CHEST TIGHTNESS: 0

## 2021-06-21 NOTE — ED PROVIDER NOTES
[facility] [place] ED  EMERGENCY DEPARTMENT ENCOUNTER      Pt Name: Tomer Barreto  MRN: [id number]  [place] 1950  Date of evaluation: 6/21/2021  Provider: Keely Rocha Dr     Chief Complaint   Patient presents with    Constipation     Last BM yesterday. Pt reports urge to have a BM, but unable to         HISTORY OF PRESENT ILLNESS   (Location/Symptom, Timing/Onset, Context/Setting,Quality, Duration, Modifying Factors, Severity)  Note limiting factors. Tomer Barreto is a79 y.o. female who presents to the emergency department with complaints of diarrhea and abdominal discomfort for the past 3 days. Associated complaints include tiredness. Patient reports that she is not had any blood in her stool. She states that today she feels a little constipated so she decided to come to the ER for further evaluation. Reports she had a bowel movement earlier today. She denies any chest pain or shortness of breath. She denies any vomiting. She denies any decrease in urine output. She reports he is taking her normal medications. She has no other complaints at this time. Denies fever denies chills. HPI    Nursing Notes werereviewed. REVIEW OF SYSTEMS    (2-9 systems for level 4, 10 or more for level 5)     Review of Systems   Constitutional: Negative for chills, diaphoresis and fever. HENT: Negative for congestion, ear pain, rhinorrhea and sore throat. Eyes: Negative for discharge, redness and visual disturbance. Respiratory: Negative for cough, chest tightness, shortness of breath and wheezing. Cardiovascular: Negative for chest pain and palpitations. Gastrointestinal: Positive for abdominal pain and diarrhea. Negative for blood in stool, constipation, nausea and vomiting. Endocrine: Negative for polydipsia, polyphagia and polyuria. Genitourinary: Negative for decreased urine volume, difficulty urinating, dysuria, frequency and hematuria.    Musculoskeletal: Negative for arthralgias, back pain and myalgias. Skin: Negative for pallor and rash. Allergic/Immunologic: Negative for food allergies and immunocompromised state. Neurological: Negative for dizziness, syncope, weakness and light-headedness. Hematological: Negative for adenopathy. Does not bruise/bleed easily. Psychiatric/Behavioral: Negative for behavioral problems and suicidal ideas. The patient is not nervous/anxious. Except as noted above the remainder of the review of systems was reviewed and negative. PAST MEDICAL HISTORY     Past Medical History:   Diagnosis Date    Cerebral artery occlusion with cerebral infarction [facility])     [facility]     Diabetes mellitus ([facility] [address]     H/O cardiac catheterization 08/04/2017    LMCA: Mild irregularites 10-20%. LAD: Mild irregularites 10-20%. LCx: Mild irregularites 10-20%. RCA: Mild irregularites 10-20%. LV function assessed as : Abnormal. EF of 40%.  H/O echocardiogram 12/18/2018    EF 55%. Mildly increased LV wall thickness. The left atrium appears moderately to severely dilated. Moderate to severe mitral regurg. Moderate pulmonary HTN with an estimated RV systolic pressure of 68GLMR. Moderate tricuspid regurg. Evidnece fo moderate diastolic dysfunction is seen.  History of echocardiogram 01/17/2019    EF 55%. LV wall thickness moderately increased. LA is mildly dilated w/ LA volume index of 30. trivial mitral regurg. Evidence of moderate (grade II) diastolic dysfunction seen.  History of echocardiogram 06/03/2019    EF of 50-55%. LV wall thickness is mildly increased. LA is mildly dilated (29-33) with a left atrial volume index of 31 m1/m2. mild diastolic dysfunction.     Hyperlipidemia     Hypertension          SURGICALHISTORY       Past Surgical History:   Procedure Laterality Date    CARDIAC CATHETERIZATION Left 08/04/2017    right radial, MHT Dr. Kristin Fay Previous Medications    AMLODIPINE (NORVASC) 10 MG TABLET    Take 1 tablet by mouth every evening    APIXABAN (ELIQUIS) 5 MG TABS TABLET    Take 1 tablet by mouth 2 times daily    ATORVASTATIN (LIPITOR) 80 MG TABLET    Take 1 tablet by mouth nightly    BUSPIRONE (BUSPAR) 10 MG TABLET    Take 10 mg by mouth 2 times daily    FAMOTIDINE (PEPCID) 20 MG TABLET    Take 20 mg by mouth 2 times daily    GABAPENTIN (NEURONTIN) 100 MG CAPSULE    Take 1 capsule by mouth 3 times daily for 120 days. HYDROCHLOROTHIAZIDE (HYDRODIURIL) 25 MG TABLET    Take 1 tablet by mouth daily    LOSARTAN (COZAAR) 100 MG TABLET    Take 1 tablet by mouth daily    METFORMIN (GLUCOPHAGE) 500 MG TABLET    Take 500 mg by mouth 2 times daily (with meals)    METOPROLOL SUCCINATE (TOPROL XL) 50 MG EXTENDED RELEASE TABLET    Take 1 tablet by mouth daily    MICONAZOLE (MICOTIN) 2 % POWDER    Apply topically 2 times daily. ALLERGIES   Patient has no known allergies. FAMILY HISTORY       Family History   Problem Relation Age of Onset    Coronary Art Dis Father          from [place]   Wilhelminia Blazing COPD Sister         O2 dep    Coronary Art Dis Brother         2 brothers  from [place]          SOCIAL HISTORY       Social History     Socioeconomic History    Marital status:       Spouse name: None    Number of children: None    Years of education: None    Highest education level: None   Occupational History    None   Tobacco Use    Smoking status: Never Smoker    Smokeless tobacco: Never Used   Vaping Use    Vaping Use: Never used   Substance and Sexual Activity    Alcohol use: No    Drug use: No    Sexual activity: Not Currently   Other Topics Concern    None   Social History Narrative    None     Social Determinants of Health     Financial Resource Strain:     Difficulty of Paying Living Expenses:    Food Insecurity:     Worried About Running Out of Food in the Last Year:     Lauryn of Food in the Last Year: soft.      Tenderness: There is abdominal tenderness (minimal discomfort to umbilical area). There is no guarding or rebound. Musculoskeletal:         General: No tenderness or deformity. Normal range of motion. Cervical back: Normal range of motion and neck supple. Lymphadenopathy:      Cervical: No cervical adenopathy. Skin:     General: Skin is warm and dry. Capillary Refill: Capillary refill takes less than 2 seconds. Findings: No erythema or rash. Neurological:      General: No focal deficit present. Mental Status: She is alert and oriented to person, place, and time. Cranial Nerves: No cranial nerve deficit. Motor: No abnormal muscle tone. Deep Tendon Reflexes: Reflexes are normal and symmetric. Reflexes normal.   Psychiatric:         Behavior: Behavior normal.         DIAGNOSTIC RESULTS     EKG: All EKG's are interpreted by the Emergency Department Physician who either signs orCo-signs this chart in the absence of a cardiologist.      RADIOLOGY:   Non-plainfilm images such as CT, Ultrasound and MRI are read by the radiologist. Plain radiographic images are visualized and preliminarily interpreted by the emergency physician with the below findings:      Interpretationper the Radiologist below, if available at the time of this note:    CT ABDOMEN PELVIS W IV CONTRAST Additional Contrast? None   Final Result   Unremarkable contrast-enhanced CT examination of the abdomen and pelvis,   without finding to account for the patient's symptoms. Indeterminate 1.3 cm right lower pole renal cortical lesion, possibly a   benign cyst.  If not a known finding, further evaluation with a renal   ultrasound in the outpatient setting is recommended.                LABS:  Labs Reviewed   CBC WITH AUTO DIFFERENTIAL - Abnormal; Notable for the following components:       Result Value    Seg Neutrophils 74 (*)     Lymphocytes 15 (*)     Eosinophils % 0 (*)     All other components within normal limits   COMPREHENSIVE METABOLIC PANEL - Abnormal; Notable for the following components:    Glucose 141 (*)     CREATININE 1.57 (*)     Calcium 11.5 (*)     Alkaline Phosphatase 137 (*)     GFR Non- 33 (*)     GFR  40 (*)     All other components within normal limits   URINE RT REFLEX TO CULTURE - Abnormal; Notable for the following components:    Specific Gravity, UA 1.025 (*)     Urine Hgb TRACE (*)     Protein, UA 1+ (*)     All other components within normal limits   LIPASE   LACTIC ACID, PLASMA   MICROSCOPIC URINALYSIS       All other labs were within normal range or not returned as of this dictation. EMERGENCY DEPARTMENT COURSE and DIFFERENTIAL DIAGNOSIS/MDM:   Vitals:    Vitals:    06/21/21 1611   BP: 133/66   Pulse: 70   Resp: 18   Temp: 97.1 °F (36.2 °C)   SpO2: 96%   Weight: 214 lb (97.1 kg)   Height: 5' 2\" (1.575 m)         UTE Simon is a 78 y.o. female who presents to the emergency department with complaints of abdominal pain; will order CBC CMP and lipase amylase UA lactic acid. Rule out infection, dehydration, electroylte imbalance, colitis, diverticulitis, pancreatitis, cholelithiasis, nephrolithiasis, obstruction, mass, AAA    Patient has had ongoing diarrhea but that is starting to slow down. Imaging does not show any significant finding other than a renal cyst which I discussed with the patient. At this point she has an EF of 35%. I will gently give her 250 cc bolus. Clinically she does not appear dry. I called and spoke to Dr. Elena Fontaine who is on-call for cardiology and partners with patient's main cardiologist.  At this point he will ensure the patient gets repeat blood work this week. She is not hypoxic she is not tachypneic after fluids. Patient is resting comfortably at this time and in no distress. I have updated patient on current results. We discussed at length the patient's diagnosis.   Patient understands to follow up with primary care provider in the next 2 days. Patient verbalized understanding of this. We went over medications. Strict return warnings were given. Patient will return to the emergency room as needed with new or worsening complaints. She understands that she will need repeat blood work within 48 hours. She will hold her hydrochlorothiazide for 24 hours. Procedures    FINAL IMPRESSION      1. Diarrhea, unspecified type    2. Renal cyst        DISPOSITION/PLAN   DISPOSITION Decision To Discharge 06/21/2021 06:23:05 PM      PATIENT REFERRED TO:  [facility] ED  [address]  [address]  [phone]    If symptoms worsen, As needed    SUE Kaye CNP  [address]  [phone]    Schedule an appointment as soon as possible for a visit in 1 day        DISCHARGE MEDICATIONS:  New Prescriptions    No medications on file              Summation      Patient Course:      ED Medications administered this visit:    Medications   0.9 % sodium chloride bolus (250 mLs Intravenous New Bag 6/21/21 1830)   iopamidol (ISOVUE-370) 76 % injection 50 mL (50 mLs Intravenous Given 6/21/21 3259)       New Prescriptions from this visit:    New Prescriptions    No medications on file       Follow-up:  [facility] ED  [address]  [address]  [phone]    If symptoms worsen, As needed    SUE Kaye CNP  [address]  [phone]    Schedule an appointment as soon as possible for a visit in 1 day          Final Impression:   1. Diarrhea, unspecified type    2.  Renal cyst               (Please note that portions of this note were completed with a voice recognition program.  Efforts were made to edit the dictations but occasionally words are mis-transcribed.)           Mitra Ann PA-C  06/21/21 [address] [place] [address] [place]  06/21/21 [address] [place] [address]  06/21/21 1933

## 2021-06-24 ENCOUNTER — HOSPITAL ENCOUNTER (OUTPATIENT)
Age: 71
Setting detail: SPECIMEN
Discharge: HOME | End: 2021-06-24
Payer: MEDICARE

## 2021-06-24 LAB
ABSOLUTE EOS #: 0.06 K/UL (ref 0–0.44)
ABSOLUTE IMMATURE GRANULOCYTE: <0.03 K/UL (ref 0–0.3)
ABSOLUTE LYMPH #: 1.35 K/UL (ref 1.1–3.7)
ABSOLUTE MONO #: 0.6 K/UL (ref 0.1–1.2)
ALBUMIN SERPL-MCNC: 4.3 G/DL (ref 3.5–5.2)
ALBUMIN/GLOBULIN RATIO: 1.5 (ref 1–2.5)
ALP BLD-CCNC: 111 U/L (ref 35–104)
ALT SERPL-CCNC: 25 U/L (ref 5–33)
ANION GAP SERPL CALCULATED.3IONS-SCNC: 14 MMOL/L (ref 9–17)
AST SERPL-CCNC: 22 U/L
BASOPHILS # BLD: 1 % (ref 0–2)
BASOPHILS ABSOLUTE: 0.05 K/UL (ref 0–0.2)
BILIRUB SERPL-MCNC: 0.45 MG/DL (ref 0.3–1.2)
BUN BLDV-MCNC: 38 MG/DL (ref 8–23)
BUN/CREAT BLD: ABNORMAL (ref 9–20)
CALCIUM SERPL-MCNC: 10.3 MG/DL (ref 8.6–10.4)
CHLORIDE BLD-SCNC: 103 MMOL/L (ref 98–107)
CO2: 22 MMOL/L (ref 20–31)
CREAT SERPL-MCNC: 1.52 MG/DL (ref 0.5–0.9)
DIFFERENTIAL TYPE: ABNORMAL
EOSINOPHILS RELATIVE PERCENT: 1 % (ref 1–4)
GFR AFRICAN AMERICAN: 41 ML/MIN
GFR NON-AFRICAN AMERICAN: 34 ML/MIN
GFR SERPL CREATININE-BSD FRML MDRD: ABNORMAL ML/MIN/{1.73_M2}
GFR SERPL CREATININE-BSD FRML MDRD: ABNORMAL ML/MIN/{1.73_M2}
GLUCOSE BLD-MCNC: 128 MG/DL (ref 70–99)
HCT VFR BLD CALC: 42.8 % (ref 36.3–47.1)
HEMOGLOBIN: 13.6 G/DL (ref 11.9–15.1)
IMMATURE GRANULOCYTES: 0 %
LYMPHOCYTES # BLD: 21 % (ref 24–43)
MAGNESIUM: 1.4 MG/DL (ref 1.6–2.6)
MCH RBC QN AUTO: 28.6 PG (ref 25.2–33.5)
MCHC RBC AUTO-ENTMCNC: 31.8 G/DL (ref 28.4–34.8)
MCV RBC AUTO: 89.9 FL (ref 82.6–102.9)
MONOCYTES # BLD: 10 % (ref 3–12)
NRBC AUTOMATED: 0 PER 100 WBC
PDW BLD-RTO: 13.2 % (ref 11.8–14.4)
PLATELET # BLD: 230 K/UL (ref 138–453)
PLATELET ESTIMATE: ABNORMAL
PMV BLD AUTO: 11.8 FL (ref 8.1–13.5)
POTASSIUM SERPL-SCNC: 3.5 MMOL/L (ref 3.7–5.3)
RBC # BLD: 4.76 M/UL (ref 3.95–5.11)
RBC # BLD: ABNORMAL 10*6/UL
SEG NEUTROPHILS: 67 % (ref 36–65)
SEGMENTED NEUTROPHILS ABSOLUTE COUNT: 4.26 K/UL (ref 1.5–8.1)
SODIUM BLD-SCNC: 139 MMOL/L (ref 135–144)
TOTAL PROTEIN: 7.1 G/DL (ref 6.4–8.3)
WBC # BLD: 6.3 K/UL (ref 3.5–11.3)
WBC # BLD: ABNORMAL 10*3/UL

## 2021-06-28 ENCOUNTER — TELEPHONE (OUTPATIENT)
Dept: CARDIOLOGY | Age: 71
End: 2021-06-28

## 2021-06-28 NOTE — TELEPHONE ENCOUNTER
,    Would you like to see patient any sooner?       Blood work repeated by Corina Rick on 6/24/2021 and her creatinine is a stable.  She is scheduled to see Dr. Anai Garay on 8/30/2021April Oliveros, please ask Dr. Anai Garay to look at the chart and to see if he needs to see her sooner. Yoana Escalante you

## 2021-07-16 ENCOUNTER — HOSPITAL ENCOUNTER (OUTPATIENT)
Dept: ULTRASOUND IMAGING | Age: 71
Discharge: HOME | End: 2021-07-18
Payer: MEDICARE

## 2021-07-16 DIAGNOSIS — N28.1 CYST OF KIDNEY, ACQUIRED: ICD-10-CM

## 2021-07-16 PROCEDURE — 76775 US EXAM ABDO BACK WALL LIM: CPT

## 2021-08-02 ENCOUNTER — HOSPITAL ENCOUNTER (OUTPATIENT)
Age: 71
Setting detail: SPECIMEN
Discharge: HOME | End: 2021-08-02
Payer: MEDICARE

## 2021-08-03 LAB
ANION GAP SERPL CALCULATED.3IONS-SCNC: 12 MMOL/L (ref 9–17)
BUN BLDV-MCNC: 24 MG/DL (ref 8–23)
BUN/CREAT BLD: ABNORMAL (ref 9–20)
CALCIUM SERPL-MCNC: 9.4 MG/DL (ref 8.6–10.4)
CHLORIDE BLD-SCNC: 106 MMOL/L (ref 98–107)
CO2: 23 MMOL/L (ref 20–31)
CREAT SERPL-MCNC: 0.95 MG/DL (ref 0.5–0.9)
GFR AFRICAN AMERICAN: >60 ML/MIN
GFR NON-AFRICAN AMERICAN: 59 ML/MIN
GFR SERPL CREATININE-BSD FRML MDRD: ABNORMAL ML/MIN/{1.73_M2}
GFR SERPL CREATININE-BSD FRML MDRD: ABNORMAL ML/MIN/{1.73_M2}
GLUCOSE BLD-MCNC: 169 MG/DL (ref 70–99)
POTASSIUM SERPL-SCNC: 4.4 MMOL/L (ref 3.7–5.3)
SODIUM BLD-SCNC: 141 MMOL/L (ref 135–144)

## 2021-08-23 RX ORDER — LOSARTAN POTASSIUM AND HYDROCHLOROTHIAZIDE 25; 100 MG/1; MG/1
1 TABLET ORAL DAILY
Qty: 90 TABLET | Refills: 3 | Status: SHIPPED | OUTPATIENT
Start: 2021-08-23

## 2021-08-23 RX ORDER — HYDROCHLOROTHIAZIDE 25 MG/1
25 TABLET ORAL DAILY
Qty: 90 TABLET | Refills: 3 | Status: CANCELLED | OUTPATIENT
Start: 2021-08-23

## 2021-08-23 RX ORDER — LOSARTAN POTASSIUM 100 MG/1
100 TABLET ORAL DAILY
Qty: 90 TABLET | Refills: 3 | Status: CANCELLED | OUTPATIENT
Start: 2021-08-23

## 2021-08-30 ENCOUNTER — OFFICE VISIT (OUTPATIENT)
Dept: CARDIOLOGY | Age: 71
End: 2021-08-30
Payer: MEDICARE

## 2021-08-30 VITALS
HEART RATE: 60 BPM | RESPIRATION RATE: 17 BRPM | DIASTOLIC BLOOD PRESSURE: 76 MMHG | HEIGHT: 62 IN | BODY MASS INDEX: 40.37 KG/M2 | WEIGHT: 219.4 LBS | OXYGEN SATURATION: 98 % | SYSTOLIC BLOOD PRESSURE: 138 MMHG

## 2021-08-30 DIAGNOSIS — Z79.01 ENCOUNTER FOR CURRENT LONG-TERM USE OF ANTICOAGULANTS: ICD-10-CM

## 2021-08-30 DIAGNOSIS — I50.42 CHRONIC COMBINED SYSTOLIC AND DIASTOLIC HF (HEART FAILURE), NYHA CLASS 2 (HCC): ICD-10-CM

## 2021-08-30 DIAGNOSIS — I48.0 PAF (PAROXYSMAL ATRIAL FIBRILLATION) (HCC): Primary | ICD-10-CM

## 2021-08-30 DIAGNOSIS — E66.01 OBESITY, CLASS III, BMI 40-49.9 (MORBID OBESITY) (HCC): ICD-10-CM

## 2021-08-30 PROCEDURE — G8427 DOCREV CUR MEDS BY ELIG CLIN: HCPCS | Performed by: FAMILY MEDICINE

## 2021-08-30 PROCEDURE — 1090F PRES/ABSN URINE INCON ASSESS: CPT | Performed by: FAMILY MEDICINE

## 2021-08-30 PROCEDURE — G8417 CALC BMI ABV UP PARAM F/U: HCPCS | Performed by: FAMILY MEDICINE

## 2021-08-30 PROCEDURE — G8400 PT W/DXA NO RESULTS DOC: HCPCS | Performed by: FAMILY MEDICINE

## 2021-08-30 PROCEDURE — 3017F COLORECTAL CA SCREEN DOC REV: CPT | Performed by: FAMILY MEDICINE

## 2021-08-30 PROCEDURE — 93005 ELECTROCARDIOGRAM TRACING: CPT | Performed by: FAMILY MEDICINE

## 2021-08-30 PROCEDURE — 93010 ELECTROCARDIOGRAM REPORT: CPT | Performed by: FAMILY MEDICINE

## 2021-08-30 PROCEDURE — 4040F PNEUMOC VAC/ADMIN/RCVD: CPT | Performed by: FAMILY MEDICINE

## 2021-08-30 PROCEDURE — 1036F TOBACCO NON-USER: CPT | Performed by: FAMILY MEDICINE

## 2021-08-30 PROCEDURE — 1123F ACP DISCUSS/DSCN MKR DOCD: CPT | Performed by: FAMILY MEDICINE

## 2021-08-30 PROCEDURE — 99213 OFFICE O/P EST LOW 20 MIN: CPT | Performed by: FAMILY MEDICINE

## 2021-08-30 NOTE — PROGRESS NOTES
Patient: Jasmin Kaminski  : 1950  Date of Visit: 2021    REASON FOR VISIT / CONSULTATION: 6 Month Follow-Up (Hx: PAF, CHF, ASHD, HTN, HLD. She has been doing well. Started to lose weight, then stopped. Some SOB at times. Palpitations at times. Denies: CP, Lightheaded/dizziness. )      Dear Kiel Taylor, APRN - CNP,    HPI: Ms. Manju Goncalves is a 79 y.o. female who was admitted to the hospital with worsening shortness of breath. This has gotten worse over the past three months. She has a cardiac history of abnormal echocardiogram which showed that her ejection fraction was reduced to about 40-45%. And a heart catheterization that was relatively unremarkable. Fortunately her repeat echocardiogram in 2018 and in 2019 showed her ejection fraction increased from 45-50% to 55% as well as showing only trivial mitral regurgitation. She had an ECHO on 6/3/2019 that showed EF of 50-55%. LV wall thickness is mildly increased. LA is mildly dilated (29-33) with a left atrial volume index of 31 m1/m2. mild diastolic dysfunction. She came to the ER on 2019 for slurred speech. She was than taken to [facility] due to a transient ischemic attack. Since the last time I saw Ms. Ragland she reports doing well. She states she has been feeling good and has been able to do everything she wants to do with no limitations. She denied any chest pain, abdominal pain, bleeding problems, lightheaded/dizziness, or problems with her medications or any other concerns at this time. Bleeding Risks: Ms. Manju Goncalves denies any current or recent bleeding problems including a history of a GI bleed, ulcers, recent or upcoming surgeries, blood in her stool or black tarry stools or blood in her urine.      Past Medical History:   Diagnosis Date    Cerebral artery occlusion with cerebral infarction ([facility] [address]     CHF (congestive heart failure) ([facility])     Diabetes mellitus ([facility] [address]     H/O cardiac catheterization 2017    LMCA: Mild irregularites 10-20%. LAD: Mild irregularites 10-20%. LCx: Mild irregularites 10-20%. RCA: Mild irregularites 10-20%. LV function assessed as : Abnormal. EF of 40%.  H/O echocardiogram 12/18/2018    EF 55%. Mildly increased LV wall thickness. The left atrium appears moderately to severely dilated. Moderate to severe mitral regurg. Moderate pulmonary HTN with an estimated RV systolic pressure of 04TPCS. Moderate tricuspid regurg. Evidnece fo moderate diastolic dysfunction is seen.  History of echocardiogram 01/17/2019    EF 55%. LV wall thickness moderately increased. LA is mildly dilated w/ LA volume index of 30. trivial mitral regurg. Evidence of moderate (grade II) diastolic dysfunction seen.  History of echocardiogram 06/03/2019    EF of 50-55%. LV wall thickness is mildly increased. LA is mildly dilated (29-33) with a left atrial volume index of 31 m1/m2. mild diastolic dysfunction.  Hyperlipidemia     Hypertension        CURRENT ALLERGIES: Patient has no known allergies. REVIEW OF SYSTEMS: 14 systems were reviewed. Pertinent positives and negatives as above, all else negative.      Past Surgical History:   Procedure Laterality Date    CARDIAC CATHETERIZATION Left 08/04/2017    right radial, MHT Dr. Mica Bills      Social History:  Social History     Tobacco Use    Smoking status: Never Smoker    Smokeless tobacco: Never Used   Vaping Use    Vaping Use: Never used   Substance Use Topics    Alcohol use: No    Drug use: No        CURRENT MEDICATIONS:  Outpatient Medications Marked as Taking for the 8/30/21 encounter (Office Visit) with Osiris Newman MD   Medication Sig Dispense Refill    losartan-hydroCHLOROthiazide (HYZAAR) 100-25 MG per tablet Take 1 tablet by mouth daily 90 tablet 3    atorvastatin (LIPITOR) 80 MG tablet TAKE ONE TABLET BY MOUTH ONCE NIGHTLY 90 tablet 3    apixaban (ELIQUIS) 5 MG TABS tablet Take 1 tablet by mouth 2 times daily 180 tablet 3    busPIRone time. No evidence of gross cranial nerve deficit. Coordination appeared normal.   Skin: Skin is warm and dry. There is no rash or diaphoresis. Psychiatric: She has a normal mood and affect. Her speech is normal and behavior is normal.      MOST RECENT LABS ON RECORD:   Lab Results   Component Value Date    WBC 6.3 06/24/2021    HGB 13.6 06/24/2021    HCT 42.8 06/24/2021     06/24/2021    CHOL 160 08/24/2020    TRIG 79 08/24/2020    HDL 47 05/17/2021    ALT 25 06/24/2021    AST 22 06/24/2021     08/02/2021    K 4.4 08/02/2021     08/02/2021    CREATININE 0.95 (H) 08/02/2021    BUN 24 (H) 08/02/2021    CO2 23 08/02/2021    TSH 1.24 05/17/2021    INR 1.0 08/23/2020    LABA1C 7.6 (H) 08/24/2020       ASSESSMENT:  1. PAF (paroxysmal atrial fibrillation) ([facility] [address]    2. Encounter for current long-term use of anticoagulants    3. Chronic combined systolic and diastolic HF (heart failure), NYHA class 2 ([facility])       PLAN:   History of TIA/Cryptogenic stroke: Still with some residual symptoms   Antiplatelet Agent: Continue Aspirin 81 mg daily.  Beta Blocker: Continue Carvedilol (Coreg) 12.5 mg twice daily.  Anti-anginal medications: Continue amlodipine (Norvasc) 10 mg once daily.  Cholesterol Reduction Therapy: Continue Atorvastatin (Lipitor) 80 mg daily.  Additional Testing List: None     Chronic Systolic and Diastolic Heart Failure: EF of 50-55% on 6/3/19  Beta Blocker: Continue carvedilol (Coreg) 12.5 mg twice daily. ACE Inibitor/ARB: Continue losartan/HCTZ (Hyzaar) 100/ 25 mg once daily. Nonpharmacologic management of Heart Failure: I told her to continue wearing lower extremity compression stockings and I advised her to try and keep her legs up whenever possible and to limit salt in her diet. Essential Hypertension: Controlled  · ACE Inibitor/ARB: Continue losartan/HCTZ (Hyzaar) 100/ 25 mg once daily.     · Beta Blocker: Continue carvedilol (Coreg)       · Calcium Channel Blocker: Continue amlodipine (Norvasc) 10 mg once daily. · History of Severe Mitral Regurgitation: probably just functional as her echo on 6/19 showed only trivial MR. · Beta Blocker: Continue Carvedilol (Coreg) 12.5 mg twice daily. · ACE Inibitor/ARB: Continue losartan/HCTZ (Hyzaar) 100/25 mg every morning. · Hyperlipidemia: Mixed, LDL done on 12/2019 was 104 mg/dL   · Cholesterol Reduction Therapy: Continue Atorvastatin (Lipitor) 80 mg daily. Finally, I recommended that she continue her other medications and follow up with you as previously scheduled. FOLLOW UP:   I told Ms. Ragland to call my office if she had any problems, but otherwise told her to No follow-ups on file. However, I would be happy to see her sooner should the need arise. However, I would be happy to see her sooner should the need arise. Once again, thank you for allowing me to participate in this patients care. Please do not hesitate to contact me could I be of further assistance. Sincerely,  Mariza Joaquin MD, MS, F.A.C.C. [facility]  [address] [place] [address]  Phone: [phone], Fax: [phone]      I believe that the risk of significant morbidity and mortality related to the patient's current medical conditions are: low-intermediate. The documentation recorded by the scribe, accurately and completely reflects the services I personally performed and the decisions made by me. Ethan Rothman MD, MS, F.A.C.C.  August 30, 2021

## 2021-08-30 NOTE — PATIENT INSTRUCTIONS
SURVEY:    You may be receiving a survey from [employer] regarding your visit today. Please complete the survey to enable us to provide the highest quality of care to you and your family. If you cannot score us a very good on any question, please call the office to discuss how we could have made your experience a very good one. Thank you.

## 2021-09-07 PROCEDURE — 93298 REM INTERROG DEV EVAL SCRMS: CPT | Performed by: FAMILY MEDICINE

## 2021-09-17 ENCOUNTER — NURSE ONLY (OUTPATIENT)
Dept: CARDIOLOGY | Age: 71
End: 2021-09-17
Payer: MEDICARE

## 2021-09-17 DIAGNOSIS — I63.9 CRYPTOGENIC STROKE (HCC): ICD-10-CM

## 2021-09-17 DIAGNOSIS — Z45.09 ENCOUNTER FOR LOOP RECORDER CHECK: Primary | ICD-10-CM

## 2021-10-03 NOTE — PROGRESS NOTES
Called pts mom and relayed results and information. 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 Reports mild aching chronic pain in her R leg which responds to pain medication. She continues to improve in mobility, RUE fine motor skills and speech with therapies. ROS:  Denies fevers, chills, sweats. No chest pain, palpitations, lightheadedness. Denies coughing, wheezing or shortness of breath. Denies abdominal pain, nausea, diarrhea or constipation. No new areas of joint pain. Denies new areas of numbness or weakness. Denies new anxiety or depression issues. No new skin problems. Rehabilitation:   Progressing in therapies. PT:  Restrictions/Precautions: Fall Risk, Up as Tolerated, General Precautions  Implants present? : (none)  Other position/activity restrictions: up as tolerated   Transfers  Sit to Stand: Contact guard assistance  Stand to sit: Moderate Assistance  Bed to Chair: Minimal assistance  Stand Pivot Transfers: Minimal Assistance  Squat Pivot Transfers: Minimal Assistance  Comment: Pt requires verbal cuing for hand placement and using safe technique   Ambulation 1  Surface: level tile  Device: Small [employer]  Other Apparatus: Wheelchair follow, Right(Ankle brace to prevent eversion and PF)  Assistance: Contact guard assistance, Moderate assistance  Quality of Gait: Downward gaze, R toe catches in swing phase.   Gait Deviations: Decreased step length, Decreased step height  Distance: 60', rest, 30'  Comments: Hand in hand RUE for balance, verbal cues to correct posture and gave, and mod assist to prevent fall due to toe drag    Transfers  Sit to Stand: Contact guard assistance  Stand to sit: Moderate Assistance  Bed to Chair: Minimal assistance  Stand Pivot Transfers: Minimal Assistance  Squat Pivot Transfers: Minimal Assistance  Comment: Pt requires verbal cuing for hand placement and using safe technique Ambulation  Ambulation?: Yes  More Ambulation?: Yes  Ambulation 1  Surface: level tile  Device: Small [employer]  Other Apparatus: Wheelchair follow, Right(Ankle brace to prevent eversion and PF)  Assistance: Contact guard assistance, Moderate assistance  Quality of Gait: Downward gaze, R toe catches in swing phase. Gait Deviations: Decreased step length, Decreased step height  Distance: 60', rest, 30'  Comments: Hand in hand RUE for balance, verbal cues to correct posture and gave, and mod assist to prevent fall due to toe drag    Surface: level tile  Ambulation 1  Surface: level tile  Device: Small [employer] [place]  Other Apparatus: Wheelchair follow, Right(Ankle brace to prevent eversion and PF)  Assistance: Contact guard assistance, Moderate assistance  Quality of Gait: Downward gaze, R toe catches in swing phase. Gait Deviations: Decreased step length, Decreased step height  Distance: 60', rest, 30'  Comments: Hand in hand RUE for balance, verbal cues to correct posture and gave, and mod assist to prevent fall due to toe drag    OT:  ADL  Equipment Provided: Reacher, Sock aid  Feeding: Setup, Increased time to complete  Grooming: Supervision, Verbal cueing, Setup(cuing for toothpaste to mouth technique)  UE Bathing: Minimal assistance  LE Bathing: Minimal assistance(A with buttocks; completed to ankles only)  UE Dressing: Supervision, Setup(don/doff over head shirt ; declined bra)  LE Dressing: Maximum assistance(assist threading BLE, pulling up and adjusting pants )  Toileting: Minimal assistance, Moderate assistance(completed luis seated, assisted with clotng mgt)  Additional Comments: Foot care completed prior to writers treatment; use of ACE wraps due to poor sizing with TEDs. Able to manage  clothing over Left hip, requiring ModA over R hip; VC and [place] for hooking with R thumb with poor carrryover. Pt verbalized this date there she plans to wear dresses at home to reduce burden on LBD.  Overall left  Shower Transfers: Minimal assistance  Shower Transfers Comments: AM: Minimal assist provided to facilitate pivot on LLE to transtiion from wheelchair to tub transfer bench       SPEECH:  Subjective: [x]? Alert     [x]? Cooperative     []? Confused     []? Agitated      []? Lethargic     Objective/Assessment:  Auditory Comprehension:   Pt. Responding to yes/no and simple conversational questions appropriately throughout session.        Verbal Expression:     Automatic naming, counting 1-10 and KD- 100% accuracy humberto. Name objects around room- 20%, 80% c sentence completion and phonemic cues. Responsive naming- 25% accuracy humberto, 75% c sentence completion and phonemic cues. ID opposite of target word- 20% accuracy humberto, 100% c cues provided. Pt. Demonstrating some phonemic errors. Pt. Repeating c 50% accuracy.     Neologisms, phonemic paraphasias frequently present with spontaneous speech and during pt. Attempts at expressive language tasks.      Motor speech:  n/a     Reading Comprehension:   Match word to picture out of field of 2- 85% c verbal, tactile cues to point to word. Pt. Read words aloud c 50% accuracy. Other:  No family present. Objective:  /69   Pulse 75   Temp 97.9 °F (36.6 °C) (Oral)   Resp 16   Ht 5' 4\" (1.626 m)   Wt 198 lb 13.7 oz (90.2 kg)   SpO2 92%   BMI 34.13 kg/m²       GEN: Well developed, well nourished, in NAD  HEENT:  NCAT.  PERRL.  EOMI.  Mucous membranes pink and moist.   PULM:  Clear to ausculation. No rales or rhonchi. Respirations WNL and unlabored. CV:  RRR.  No murmurs or gallops. GI:  Abdomen soft. Nontender. Non-distended.  BS + and equal.    NEUROLOGICAL: A&O x3. Sensation intact to light touch. Short sentences improving. MSK:  Functional ROM LUE and LLE. Impaired ROM RUE and RLE. Motor testing 4+/5 key muscles LUE and LLE. R  4-/5, R wrist extension 4-/5, R finger extension 3/5, R elbow flexion and extension 3/5 .   SKIN: Warm dry and intact. Good turgor. EXTREMITIES:  No calf tenderness to palpation. No edema BLEs. .    PSYCH: Mood WNL. Appropriately interactive. Affect WNL. Diagnostics:     CBC:   No results for input(s): WBC, RBC, HGB, HCT, MCV, RDW, PLT in the last 72 hours. BMP:   No results for input(s): NA, K, CL, CO2, PHOS, BUN, CREATININE, GLUCOSE in the last 72 hours. Invalid input(s): CA  BNP: No results for input(s): BNP in the last 72 hours. PT/INR: No results for input(s): PROTIME, INR in the last 72 hours. APTT: No results for input(s): APTT in the last 72 hours. CARDIAC ENZYMES: No results for input(s): CKMB, CKMBINDEX, TROPONINT in the last 72 hours. Invalid input(s): CKTOTAL;3  FASTING LIPID PANEL:  Lab Results   Component Value Date    CHOL 160 08/24/2020    HDL 44 08/24/2020    TRIG 79 08/24/2020     LIVER PROFILE: No results for input(s): AST, ALT, ALB, BILIDIR, BILITOT, ALKPHOS in the last 72 hours.      Current Medications:   Current Facility-Administered Medications: miconazole (MICOTIN) 2 % powder, , Topical, BID  carvedilol (COREG) tablet 6.25 mg, 6.25 mg, Oral, BID WC  HYDROcodone-acetaminophen (NORCO) 5-325 MG per tablet 1 tablet, 1 tablet, Oral, Q4H PRN  gabapentin (NEURONTIN) capsule 100 mg, 100 mg, Oral, TID  ondansetron (ZOFRAN-ODT) disintegrating tablet 4 mg, 4 mg, Oral, Q6H PRN  metFORMIN (GLUCOPHAGE) tablet 500 mg, 500 mg, Oral, BID WC  insulin lispro (HUMALOG) injection vial 0-12 Units, 0-12 Units, Subcutaneous, TID WC  insulin lispro (HUMALOG) injection vial 0-6 Units, 0-6 Units, Subcutaneous, Nightly  clopidogrel (PLAVIX) tablet 75 mg, 75 mg, Oral, Daily  enoxaparin (LOVENOX) injection 40 mg, 40 mg, Subcutaneous, Daily  amLODIPine (NORVASC) tablet 10 mg, 10 mg, Oral, QPM  atorvastatin (LIPITOR) tablet 80 mg, 80 mg, Oral, Nightly  losartan (COZAAR) tablet 100 mg, 100 mg, Oral, Daily **AND** hydroCHLOROthiazide (HYDRODIURIL) tablet 25 mg, 25 mg, Oral, Daily  aspirin chewable tablet 81 mg, 81 mg, Oral, Daily  polyethylene glycol (GLYCOLAX) packet 17 g, 17 g, Oral, Daily  senna (SENOKOT) tablet 17.2 mg, 2 tablet, Oral, Daily PRN  bisacodyl (DULCOLAX) suppository 10 mg, 10 mg, Rectal, Daily PRN      Impression/Plan:   Impaired ADLs, gait, and mobility due to:      1. R hemiparesis secondary to ischemic L MCA CVA:  PT/OT for gait, mobility, strengthening, endurance, ADLs, and self care. On ASA, atorvastatin, plavix. 2. Transcortical motor aphasia: Improving. speech therapy treating. 3. HTN/CHF/non-ischemic cardiomyopathy: on amlodipine, carvedilol, HCTZ, losartan  4. DM: on insulin sliding scale and Metformin. Diarrhea resolved  5. Pain: Has Norco prn. R elbow pain improved. Chronic R leg pain stable  6. Bowel Management: Miralax daily, senokot prn, dulcolax prn.  7. DVT Prophylaxis:  low molecular weight heparin, SCD's while in bed and MIRTA's   8. Internal medicine for medical management    Electronically signed by Aristides Pierce MD on 9/16/2020 at 9:52 AM      This note is created with the assistance of a speech recognition program.  While intending to generate a document that actually reflects the content of the visit, the document can still have some errors including those of syntax and sound a like substitutions which may escape proof reading. In such instances, actual meaning can be extrapolated by contextual diversion.

## 2021-11-16 ENCOUNTER — HOSPITAL ENCOUNTER (INPATIENT)
Age: 71
LOS: 4 days | Discharge: NURSING, REHAB OR LONG-TERM CARE | DRG: 308 | End: 2021-11-20
Attending: EMERGENCY MEDICINE | Admitting: INTERNAL MEDICINE
Payer: MEDICARE

## 2021-11-16 ENCOUNTER — APPOINTMENT (OUTPATIENT)
Dept: GENERAL RADIOLOGY | Age: 71
DRG: 308 | End: 2021-11-16
Payer: MEDICARE

## 2021-11-16 DIAGNOSIS — I48.91 ATRIAL FIBRILLATION, UNSPECIFIED TYPE (HCC): Primary | ICD-10-CM

## 2021-11-16 DIAGNOSIS — F41.9 ANXIETY: ICD-10-CM

## 2021-11-16 DIAGNOSIS — I50.9 CONGESTIVE HEART FAILURE, UNSPECIFIED HF CHRONICITY, UNSPECIFIED HEART FAILURE TYPE (HCC): ICD-10-CM

## 2021-11-16 DIAGNOSIS — Z78.9 UNABLE TO CARE FOR SELF: ICD-10-CM

## 2021-11-16 LAB
ABSOLUTE EOS #: 0.03 K/UL (ref 0–0.44)
ABSOLUTE IMMATURE GRANULOCYTE: <0.03 K/UL (ref 0–0.3)
ABSOLUTE LYMPH #: 1.06 K/UL (ref 1.1–3.7)
ABSOLUTE MONO #: 0.62 K/UL (ref 0.1–1.2)
ANION GAP SERPL CALCULATED.3IONS-SCNC: 12 MMOL/L (ref 9–17)
BASOPHILS # BLD: 0 % (ref 0–2)
BASOPHILS ABSOLUTE: <0.03 K/UL (ref 0–0.2)
BNP INTERPRETATION: ABNORMAL
BUN BLDV-MCNC: 28 MG/DL (ref 8–23)
BUN/CREAT BLD: 28 (ref 9–20)
CALCIUM SERPL-MCNC: 9.9 MG/DL (ref 8.6–10.4)
CHLORIDE BLD-SCNC: 104 MMOL/L (ref 98–107)
CO2: 23 MMOL/L (ref 20–31)
CREAT SERPL-MCNC: 1.01 MG/DL (ref 0.5–0.9)
DIFFERENTIAL TYPE: ABNORMAL
EOSINOPHILS RELATIVE PERCENT: 0 % (ref 1–4)
GFR AFRICAN AMERICAN: >60 ML/MIN
GFR NON-AFRICAN AMERICAN: 54 ML/MIN
GFR SERPL CREATININE-BSD FRML MDRD: ABNORMAL ML/MIN/{1.73_M2}
GFR SERPL CREATININE-BSD FRML MDRD: ABNORMAL ML/MIN/{1.73_M2}
GLUCOSE BLD-MCNC: 147 MG/DL (ref 74–100)
GLUCOSE BLD-MCNC: 160 MG/DL (ref 70–99)
HCT VFR BLD CALC: 36.6 % (ref 36.3–47.1)
HEMOGLOBIN: 11.7 G/DL (ref 11.9–15.1)
IMMATURE GRANULOCYTES: 0 %
LACTIC ACID, SEPSIS WHOLE BLOOD: NORMAL MMOL/L (ref 0.5–1.9)
LACTIC ACID, SEPSIS: 1.5 MMOL/L (ref 0.5–1.9)
LYMPHOCYTES # BLD: 16 % (ref 24–43)
MCH RBC QN AUTO: 29.3 PG (ref 25.2–33.5)
MCHC RBC AUTO-ENTMCNC: 32 G/DL (ref 28.4–34.8)
MCV RBC AUTO: 91.7 FL (ref 82.6–102.9)
MONOCYTES # BLD: 9 % (ref 3–12)
NRBC AUTOMATED: 0 PER 100 WBC
PDW BLD-RTO: 14.1 % (ref 11.8–14.4)
PLATELET # BLD: 181 K/UL (ref 138–453)
PLATELET ESTIMATE: ABNORMAL
PMV BLD AUTO: 12 FL (ref 8.1–13.5)
POTASSIUM SERPL-SCNC: 4.1 MMOL/L (ref 3.7–5.3)
PRO-BNP: 3280 PG/ML
RBC # BLD: 3.99 M/UL (ref 3.95–5.11)
RBC # BLD: ABNORMAL 10*6/UL
SARS-COV-2, RAPID: NOT DETECTED
SEG NEUTROPHILS: 75 % (ref 36–65)
SEGMENTED NEUTROPHILS ABSOLUTE COUNT: 5.06 K/UL (ref 1.5–8.1)
SODIUM BLD-SCNC: 139 MMOL/L (ref 135–144)
SPECIMEN DESCRIPTION: NORMAL
TROPONIN INTERP: ABNORMAL
TROPONIN T: ABNORMAL NG/ML
TROPONIN, HIGH SENSITIVITY: 24 NG/L (ref 0–14)
WBC # BLD: 6.8 K/UL (ref 3.5–11.3)
WBC # BLD: ABNORMAL 10*3/UL

## 2021-11-16 PROCEDURE — 94761 N-INVAS EAR/PLS OXIMETRY MLT: CPT

## 2021-11-16 PROCEDURE — 2580000003 HC RX 258: Performed by: INTERNAL MEDICINE

## 2021-11-16 PROCEDURE — 71045 X-RAY EXAM CHEST 1 VIEW: CPT

## 2021-11-16 PROCEDURE — 93005 ELECTROCARDIOGRAM TRACING: CPT | Performed by: EMERGENCY MEDICINE

## 2021-11-16 PROCEDURE — 87635 SARS-COV-2 COVID-19 AMP PRB: CPT

## 2021-11-16 PROCEDURE — 2700000000 HC OXYGEN THERAPY PER DAY

## 2021-11-16 PROCEDURE — 6360000002 HC RX W HCPCS: Performed by: INTERNAL MEDICINE

## 2021-11-16 PROCEDURE — 96376 TX/PRO/DX INJ SAME DRUG ADON: CPT

## 2021-11-16 PROCEDURE — 80048 BASIC METABOLIC PNL TOTAL CA: CPT

## 2021-11-16 PROCEDURE — 84484 ASSAY OF TROPONIN QUANT: CPT

## 2021-11-16 PROCEDURE — 85025 COMPLETE CBC W/AUTO DIFF WBC: CPT

## 2021-11-16 PROCEDURE — 83880 ASSAY OF NATRIURETIC PEPTIDE: CPT

## 2021-11-16 PROCEDURE — 96365 THER/PROPH/DIAG IV INF INIT: CPT

## 2021-11-16 PROCEDURE — 99285 EMERGENCY DEPT VISIT HI MDM: CPT

## 2021-11-16 PROCEDURE — 2000000000 HC ICU R&B

## 2021-11-16 PROCEDURE — 2500000003 HC RX 250 WO HCPCS: Performed by: EMERGENCY MEDICINE

## 2021-11-16 PROCEDURE — 96375 TX/PRO/DX INJ NEW DRUG ADDON: CPT

## 2021-11-16 PROCEDURE — 36415 COLL VENOUS BLD VENIPUNCTURE: CPT

## 2021-11-16 PROCEDURE — 6370000000 HC RX 637 (ALT 250 FOR IP): Performed by: INTERNAL MEDICINE

## 2021-11-16 PROCEDURE — 82947 ASSAY GLUCOSE BLOOD QUANT: CPT

## 2021-11-16 PROCEDURE — 83605 ASSAY OF LACTIC ACID: CPT

## 2021-11-16 PROCEDURE — 94660 CPAP INITIATION&MGMT: CPT

## 2021-11-16 PROCEDURE — 6360000002 HC RX W HCPCS: Performed by: EMERGENCY MEDICINE

## 2021-11-16 RX ORDER — DIGOXIN 0.25 MG/ML
250 INJECTION INTRAMUSCULAR; INTRAVENOUS ONCE
Status: DISCONTINUED | OUTPATIENT
Start: 2021-11-17 | End: 2021-11-18

## 2021-11-16 RX ORDER — METOPROLOL SUCCINATE 50 MG/1
50 TABLET, EXTENDED RELEASE ORAL 2 TIMES DAILY
Status: DISCONTINUED | OUTPATIENT
Start: 2021-11-16 | End: 2021-11-19

## 2021-11-16 RX ORDER — BUSPIRONE HYDROCHLORIDE 10 MG/1
10 TABLET ORAL ONCE
Status: DISCONTINUED | OUTPATIENT
Start: 2021-11-16 | End: 2021-11-20 | Stop reason: HOSPADM

## 2021-11-16 RX ORDER — FUROSEMIDE 10 MG/ML
20 INJECTION INTRAMUSCULAR; INTRAVENOUS ONCE
Status: COMPLETED | OUTPATIENT
Start: 2021-11-16 | End: 2021-11-16

## 2021-11-16 RX ORDER — DIGOXIN 0.25 MG/ML
250 INJECTION INTRAMUSCULAR; INTRAVENOUS ONCE
Status: COMPLETED | OUTPATIENT
Start: 2021-11-16 | End: 2021-11-16

## 2021-11-16 RX ORDER — ATORVASTATIN CALCIUM 10 MG/1
10 TABLET, FILM COATED ORAL DAILY
Status: DISCONTINUED | OUTPATIENT
Start: 2021-11-17 | End: 2021-11-20 | Stop reason: HOSPADM

## 2021-11-16 RX ORDER — ACETAMINOPHEN 325 MG/1
650 TABLET ORAL EVERY 6 HOURS PRN
Status: DISCONTINUED | OUTPATIENT
Start: 2021-11-16 | End: 2021-11-20 | Stop reason: HOSPADM

## 2021-11-16 RX ORDER — DILTIAZEM HYDROCHLORIDE 5 MG/ML
15 INJECTION INTRAVENOUS ONCE
Status: COMPLETED | OUTPATIENT
Start: 2021-11-16 | End: 2021-11-16

## 2021-11-16 RX ORDER — FAMOTIDINE 20 MG/1
20 TABLET, FILM COATED ORAL 2 TIMES DAILY
Status: DISCONTINUED | OUTPATIENT
Start: 2021-11-16 | End: 2021-11-20 | Stop reason: HOSPADM

## 2021-11-16 RX ORDER — SODIUM CHLORIDE 0.9 % (FLUSH) 0.9 %
5-40 SYRINGE (ML) INJECTION EVERY 12 HOURS SCHEDULED
Status: DISCONTINUED | OUTPATIENT
Start: 2021-11-16 | End: 2021-11-20 | Stop reason: HOSPADM

## 2021-11-16 RX ORDER — ONDANSETRON 4 MG/1
4 TABLET, ORALLY DISINTEGRATING ORAL EVERY 8 HOURS PRN
Status: DISCONTINUED | OUTPATIENT
Start: 2021-11-16 | End: 2021-11-20 | Stop reason: HOSPADM

## 2021-11-16 RX ORDER — SODIUM CHLORIDE 9 MG/ML
25 INJECTION, SOLUTION INTRAVENOUS PRN
Status: DISCONTINUED | OUTPATIENT
Start: 2021-11-16 | End: 2021-11-20 | Stop reason: HOSPADM

## 2021-11-16 RX ORDER — SODIUM CHLORIDE 0.9 % (FLUSH) 0.9 %
10 SYRINGE (ML) INJECTION PRN
Status: DISCONTINUED | OUTPATIENT
Start: 2021-11-16 | End: 2021-11-20 | Stop reason: HOSPADM

## 2021-11-16 RX ORDER — POLYETHYLENE GLYCOL 3350 17 G/17G
17 POWDER, FOR SOLUTION ORAL DAILY PRN
Status: DISCONTINUED | OUTPATIENT
Start: 2021-11-16 | End: 2021-11-20 | Stop reason: HOSPADM

## 2021-11-16 RX ORDER — ONDANSETRON 2 MG/ML
4 INJECTION INTRAMUSCULAR; INTRAVENOUS EVERY 6 HOURS PRN
Status: DISCONTINUED | OUTPATIENT
Start: 2021-11-16 | End: 2021-11-20 | Stop reason: HOSPADM

## 2021-11-16 RX ORDER — DILTIAZEM HYDROCHLORIDE 5 MG/ML
10 INJECTION INTRAVENOUS ONCE
Status: COMPLETED | OUTPATIENT
Start: 2021-11-16 | End: 2021-11-16

## 2021-11-16 RX ORDER — ACETAMINOPHEN 650 MG/1
650 SUPPOSITORY RECTAL EVERY 6 HOURS PRN
Status: DISCONTINUED | OUTPATIENT
Start: 2021-11-16 | End: 2021-11-20 | Stop reason: HOSPADM

## 2021-11-16 RX ORDER — LOSARTAN POTASSIUM AND HYDROCHLOROTHIAZIDE 25; 100 MG/1; MG/1
1 TABLET ORAL DAILY
Status: DISCONTINUED | OUTPATIENT
Start: 2021-11-17 | End: 2021-11-16 | Stop reason: CLARIF

## 2021-11-16 RX ORDER — BUSPIRONE HYDROCHLORIDE 5 MG/1
10 TABLET ORAL 2 TIMES DAILY
Status: DISCONTINUED | OUTPATIENT
Start: 2021-11-16 | End: 2021-11-20 | Stop reason: HOSPADM

## 2021-11-16 RX ORDER — HYDROCHLOROTHIAZIDE 25 MG/1
25 TABLET ORAL DAILY
Status: DISCONTINUED | OUTPATIENT
Start: 2021-11-17 | End: 2021-11-20 | Stop reason: HOSPADM

## 2021-11-16 RX ORDER — LOSARTAN POTASSIUM 50 MG/1
100 TABLET ORAL DAILY
Status: DISCONTINUED | OUTPATIENT
Start: 2021-11-17 | End: 2021-11-20 | Stop reason: HOSPADM

## 2021-11-16 RX ADMIN — FUROSEMIDE 20 MG: 10 INJECTION, SOLUTION INTRAVENOUS at 15:49

## 2021-11-16 RX ADMIN — FAMOTIDINE 20 MG: 20 TABLET, FILM COATED ORAL at 21:50

## 2021-11-16 RX ADMIN — DILTIAZEM HYDROCHLORIDE 10 MG/HR: 5 INJECTION, SOLUTION INTRAVENOUS at 16:25

## 2021-11-16 RX ADMIN — APIXABAN 5 MG: 5 TABLET, FILM COATED ORAL at 21:51

## 2021-11-16 RX ADMIN — BUSPIRONE HYDROCHLORIDE 10 MG: 5 TABLET ORAL at 21:51

## 2021-11-16 RX ADMIN — METOPROLOL SUCCINATE 50 MG: 50 TABLET, EXTENDED RELEASE ORAL at 21:50

## 2021-11-16 RX ADMIN — DILTIAZEM HYDROCHLORIDE 15 MG: 5 INJECTION INTRAVENOUS at 16:32

## 2021-11-16 RX ADMIN — SODIUM CHLORIDE, PRESERVATIVE FREE 10 ML: 5 INJECTION INTRAVENOUS at 21:51

## 2021-11-16 RX ADMIN — DILTIAZEM HYDROCHLORIDE 10 MG: 5 INJECTION INTRAVENOUS at 15:52

## 2021-11-16 RX ADMIN — DIGOXIN 250 MCG: 250 INJECTION, SOLUTION INTRAMUSCULAR; INTRAVENOUS; PARENTERAL at 22:33

## 2021-11-16 ASSESSMENT — ENCOUNTER SYMPTOMS
DIARRHEA: 0
ABDOMINAL PAIN: 0
RHINORRHEA: 1
SHORTNESS OF BREATH: 1
COUGH: 1
VOMITING: 0

## 2021-11-16 NOTE — ED NOTES
Oxygen applied per nasal cannula at 3 liters per minute related to oxygen saturation of 85% on room air     Jyoti Santos RN  11/16/21 2581

## 2021-11-16 NOTE — ED PROVIDER NOTES
[facility] [address]      Pt Name: Maryam Johnson  MRN: [id number]  [place] 1950  Date of evaluation: 11/16/2021  Provider: Luda Ramos MD    [address] [place] [address]       Chief Complaint   Patient presents with    Shortness of Breath     onset yesterday    Fatigue    Extremity Weakness         HISTORY OF PRESENT ILLNESS   (Location/Symptom, Timing/Onset, Context/Setting, Quality, Duration, Modifying Factors, Severity)  Note limiting factors. Maryam Johnson is a 73 y.o. female who presents to the emergency department for chief complaint of shortness of breath. Patient admits that for the last couple of days she has had increased shortness of breath. She noted lower extremity swelling. Patient does not know if she has ever had atrial fibrillation prior to today. She does believe she is on a blood thinner. She denies any upper respiratory complaints. No fevers or chills, nausea, vomiting or diarrhea. No chest pain, but has palpitations, coughing and shortness of breath. She denies abdominal pain, has been eating and drinking well, no change in her bowel or bladder habits. No headache or dizziness, numbness, tingling or focal weakness. Review of systems-all other systems reviewed and negative except as per HPI. Nursing notes reviewed. HPI    Nursing Notes were reviewed. REVIEW OF SYSTEMS    (2-9 systems for level 4, 10 or more for level 5)     Review of Systems   Constitutional: Negative for chills and fever. HENT: Positive for rhinorrhea. Negative for congestion. Respiratory: Positive for cough and shortness of breath. Cardiovascular: Positive for palpitations and leg swelling. Negative for chest pain. Gastrointestinal: Negative for abdominal pain, diarrhea and vomiting. Genitourinary: Negative for dysuria. Musculoskeletal: Negative for arthralgias. Skin: Negative for rash. Neurological: Negative for dizziness and numbness.    Psychiatric/Behavioral: Negative for agitation. Except as noted above the remainder of the review of systems was reviewed and negative. PAST MEDICAL HISTORY     Past Medical History:   Diagnosis Date    Cerebral artery occlusion with cerebral infarction [facility])     [facility])     Diabetes mellitus ([facility]     H/O cardiac catheterization 08/04/2017    LMCA: Mild irregularites 10-20%. LAD: Mild irregularites 10-20%. LCx: Mild irregularites 10-20%. RCA: Mild irregularites 10-20%. LV function assessed as : Abnormal. EF of 40%.  H/O echocardiogram 12/18/2018    EF 55%. Mildly increased LV wall thickness. The left atrium appears moderately to severely dilated. Moderate to severe mitral regurg. Moderate pulmonary HTN with an estimated RV systolic pressure of 19STOV. Moderate tricuspid regurg. Evidnece fo moderate diastolic dysfunction is seen.  History of echocardiogram 01/17/2019    EF 55%. LV wall thickness moderately increased. LA is mildly dilated w/ LA volume index of 30. trivial mitral regurg. Evidence of moderate (grade II) diastolic dysfunction seen.  History of echocardiogram 06/03/2019    EF of 50-55%. LV wall thickness is mildly increased. LA is mildly dilated (29-33) with a left atrial volume index of 31 m1/m2. mild diastolic dysfunction.  Hyperlipidemia     Hypertension          SURGICAL HISTORY       Past Surgical History:   Procedure Laterality Date    CARDIAC CATHETERIZATION Left 08/04/2017    right radial, [facility] Dr. Daryl Sharma       Current Discharge Medication List      CONTINUE these medications which have NOT CHANGED    Details   losartan-hydroCHLOROthiazide (HYZAAR) 100-25 MG per tablet Take 1 tablet by mouth daily  Qty: 90 tablet, Refills: 3      atorvastatin (LIPITOR) 80 MG tablet TAKE ONE TABLET BY MOUTH ONCE NIGHTLY  Qty: 90 tablet, Refills: 3    Associated Diagnoses: TIA (transient ischemic attack);  Chronic combined systolic and diastolic HF (heart failure), NYHA class 2 ([facility] Essential hypertension; Severe mitral regurgitation by prior echocardiogram; Mixed hyperlipidemia      apixaban (ELIQUIS) 5 MG TABS tablet Take 1 tablet by mouth 2 times daily  Qty: 180 tablet, Refills: 3    Associated Diagnoses: History of TIA (transient ischemic attack) and stroke; Chronic combined systolic and diastolic congestive heart failure ([facility] Essential hypertension      busPIRone (BUSPAR) 10 MG tablet Take 10 mg by mouth 2 times daily      famotidine (PEPCID) 20 MG tablet Take 20 mg by mouth 2 times daily      amLODIPine (NORVASC) 10 MG tablet Take 1 tablet by mouth every evening  Qty: 90 tablet, Refills: 3    Associated Diagnoses: PAF (paroxysmal atrial fibrillation) ([facility] Chronic combined systolic and diastolic HF (heart failure), NYHA class 2 ([facility] Coronary artery disease involving native coronary artery of native heart without angina pectoris; Cryptogenic stroke ([facility] Essential hypertension; Mixed hyperlipidemia      metoprolol succinate (TOPROL XL) 50 MG extended release tablet Take 1 tablet by mouth daily  Qty: 90 tablet, Refills: 3    Associated Diagnoses: Essential hypertension; PAF (paroxysmal atrial fibrillation) ([facility] Chronic combined systolic and diastolic HF (heart failure), NYHA class 2 ([facility] Coronary artery disease involving native coronary artery of native heart without angina pectoris; Cryptogenic stroke ([facility] Mixed hyperlipidemia      miconazole (MICOTIN) 2 % powder Apply topically 2 times daily. Qty: 45 g, Refills: 1      metFORMIN (GLUCOPHAGE) 500 MG tablet Take 500 mg by mouth 2 times daily (with meals)      gabapentin (NEURONTIN) 100 MG capsule Take 1 capsule by mouth 3 times daily for 120 days. Qty: 90 capsule, Refills: 3             ALLERGIES     Patient has no known allergies.     FAMILY HISTORY       Family History   Problem Relation Age of Onset    Coronary Art Dis Father          from [place]   [employer] COPD Sister O2 dep    Coronary Art Dis Brother         2 brothers  from [place]          SOCIAL HISTORY       Social History     Socioeconomic History    Marital status:      Spouse name: None    Number of children: None    Years of education: None    Highest education level: None   Occupational History    None   Tobacco Use    Smoking status: Never Smoker    Smokeless tobacco: Never Used   Vaping Use    Vaping Use: Never used   Substance and Sexual Activity    Alcohol use: No    Drug use: No    Sexual activity: Not Currently   Other Topics Concern    None   Social History Narrative    None     Social Determinants of Health     Financial Resource Strain:     Difficulty of Paying Living Expenses: Not on file   Food Insecurity:     Worried About Running Out of Food in the Last Year: Not on file    Lauryn of Food in the Last Year: Not on file   Transportation Needs:     Lack of Transportation (Medical): Not on file    Lack of Transportation (Non-Medical):  Not on file   Physical Activity:     Days of Exercise per Week: Not on file    Minutes of Exercise per Session: Not on file   Stress:     Feeling of Stress : Not on file   Social Connections:     Frequency of Communication with Friends and Family: Not on file    Frequency of Social Gatherings with Friends and Family: Not on file    Attends Protestant Services: Not on file    Active Member of [address] [place] [address] Metaps or Organizations: Not on file    Attends Club or Organization Meetings: Not on file    Marital Status: Not on file   Intimate Partner Violence:     Fear of Current or Ex-Partner: Not on file    Emotionally Abused: Not on file    Physically Abused: Not on file    Sexually Abused: Not on file   Housing Stability:     Unable to Pay for Housing in the Last Year: Not on file    Number of [place] in the Last Year: Not on file    Unstable Housing in the Last Year: Not on file       SCREENINGS        Neva Coma Scale  Eye Opening: Spontaneous  Best Verbal Response: Oriented  Best Motor Response: Obeys commands  Neva Coma Scale Score: 15               PHYSICAL EXAM    (up to 7 for level 4, 8 or more for level 5)     ED Triage Vitals [11/16/21 0946]   BP Temp Temp src Pulse Resp SpO2 Height Weight   (!) 131/92 -- -- 137 30 (!) 85 % -- --       Physical Exam  Constitutional:       Appearance: She is well-developed. HENT:      Head: Normocephalic and atraumatic. Eyes:      Extraocular Movements: Extraocular movements intact. Pupils: Pupils are equal, round, and reactive to light. Cardiovascular:      Rate and Rhythm: Tachycardia present. Rhythm irregular. Pulmonary:      Effort: Pulmonary effort is normal.      Breath sounds: Normal breath sounds. Chest:      Chest wall: No tenderness. Abdominal:      General: Bowel sounds are normal.      Palpations: Abdomen is soft. Musculoskeletal:         General: Normal range of motion. Cervical back: Normal range of motion. Right lower leg: Edema present. Left lower leg: Edema present. Skin:     General: Skin is warm and dry. Neurological:      General: No focal deficit present. Mental Status: She is alert. Psychiatric:         Mood and Affect: Mood normal.         Behavior: Behavior normal.         DIAGNOSTIC RESULTS     EKG: All EKG's are interpreted by the Emergency Department Physician who either signs or Co-signs this chart in the absence of a cardiologist.    Shows atrial fibrillation at a rate of 129 with rapid ventricular response. Low voltage QRS.   No STEMI, nonspecific T wave changes    RADIOLOGY:   Non-plain film images such as CT, Ultrasound and MRI are read by the radiologist. Plain radiographic images are visualized and preliminarily interpreted by the emergency physician with the below findings:        Interpretation per the Radiologist below, if available at the time of this note:    XR CHEST PORTABLE   Final Result   Cardiomegaly with pulmonary edema and/or multifocal pneumonia. Recommend   follow-up to resolution. ED BEDSIDE ULTRASOUND:   Performed by ED Physician - none    LABS:  Labs Reviewed   BASIC METABOLIC PANEL - Abnormal; Notable for the following components:       Result Value    Glucose 160 (*)     BUN 28 (*)     CREATININE 1.01 (*)     Bun/Cre Ratio 28 (*)     GFR Non- 54 (*)     All other components within normal limits   BRAIN NATRIURETIC PEPTIDE - Abnormal; Notable for the following components:    Pro-BNP 3,280 (*)     All other components within normal limits   CBC WITH AUTO DIFFERENTIAL - Abnormal; Notable for the following components:    Hemoglobin 11.7 (*)     Seg Neutrophils 75 (*)     Lymphocytes 16 (*)     Eosinophils % 0 (*)     Absolute Lymph # 1.06 (*)     All other components within normal limits   TROPONIN - Abnormal; Notable for the following components:    Troponin, High Sensitivity 24 (*)     All other components within normal limits   COMPREHENSIVE METABOLIC PANEL W/ REFLEX TO MG FOR LOW K - Abnormal; Notable for the following components:    Glucose 149 (*)     BUN 24 (*)     CREATININE 0.96 (*)     Bun/Cre Ratio 25 (*)     Alkaline Phosphatase 143 (*)     GFR Non- 58 (*)     All other components within normal limits   CBC WITH AUTO DIFFERENTIAL - Abnormal; Notable for the following components:    RBC 3.87 (*)     Hemoglobin 11.3 (*)     Hematocrit 35.7 (*)     Seg Neutrophils 80 (*)     Lymphocytes 11 (*)     Eosinophils % 0 (*)     Absolute Lymph # 0.76 (*)     All other components within normal limits   GLUCOSE, WHOLE BLOOD - Abnormal; Notable for the following components:    POC Glucose 147 (*)     All other components within normal limits   COVID-19, RAPID   LACTATE, SEPSIS   POCT GLUCOSE   POCT GLUCOSE       All other labs were within normal range or not returned as of this dictation.     EMERGENCY DEPARTMENT COURSE and DIFFERENTIAL DIAGNOSIS/MDM:   Vitals: Vitals:    11/17/21 0554 11/17/21 0600 11/17/21 0630 11/17/21 0648   BP:  134/80 (!) 149/102    Pulse:  87 97    Resp:   20    Temp:   97.5 °F (36.4 °C)    TempSrc:   Temporal    SpO2: (!) 89% 90% 92%    Weight:       Height:    5' 2\" (1.575 m)           MDM  Number of Diagnoses or Management Options  Patient was discussed with Dr. Selvin Sequeira, no beds are currently available he has asked that we tune up her diuretics,and  that social work start her evaluation for nursing home placement. REASSESSMENT     ED Course as of 11/17/21 0706   Tue Nov 16, 2021   1318 Troponin, High Sensitivity(!): 24 [KR]      ED Course User Index  [KR] Guerline Stovall MD         CRITICAL CARE TIME   Total Critical Care time was 0 minutes, excluding separately reportable procedures. There was a high probability of clinically significant/life threatening deterioration in the patient's condition which required my urgent intervention. NA    CONSULTS:  IP CONSULT TO SOCIAL WORK  IP CONSULT TO CASE MANAGEMENT  IP CONSULT TO CARDIOLOGY    PROCEDURES:  Unless otherwise noted below, none     Procedures        FINAL IMPRESSION      1. Atrial fibrillation, unspecified type ([facility]    2. Congestive heart failure, unspecified HF chronicity, unspecified heart failure type ([address]    3. Unable to care for self          DISPOSITION/PLAN   DISPOSITION        PATIENT REFERRED TO:  [facility]  [address] [facility]  [phone]          DISCHARGE MEDICATIONS:  Current Discharge Medication List        Controlled Substances Monitoring:     No flowsheet data found. (Please note that portions of this note were completed with a voice recognition program.  Efforts were made to edit the dictations but occasionally words are mis-transcribed. )    Gaurang Talbot MD (electronically signed)  Attending Emergency Physician           Guerline Stovall MD  11/17/21 6819

## 2021-11-16 NOTE — PROGRESS NOTES
Patient accepted to go to [facility] tomorrow, 11/17/2021. ER physician and staff aware.       Lionel. [address]  11/16/2021

## 2021-11-16 NOTE — ED NOTES
Freddie Zuniga called back. Referral made to [facility], just waiting on acceptance.  Patient will need a therapy order     [facility]  11/16/21 5333

## 2021-11-16 NOTE — ED NOTES
Bed: 05  Expected date: 11/16/21  Expected time: 9:28 AM  Means of arrival:   Comments:  Renato Diamond RN  11/16/21 4399

## 2021-11-17 ENCOUNTER — APPOINTMENT (OUTPATIENT)
Dept: NON INVASIVE DIAGNOSTICS | Age: 71
DRG: 308 | End: 2021-11-17
Payer: MEDICARE

## 2021-11-17 PROBLEM — Z78.9 UNABLE TO CARE FOR SELF: Status: ACTIVE | Noted: 2021-11-17

## 2021-11-17 LAB
ABSOLUTE EOS #: <0.03 K/UL (ref 0–0.44)
ABSOLUTE IMMATURE GRANULOCYTE: <0.03 K/UL (ref 0–0.3)
ABSOLUTE LYMPH #: 0.76 K/UL (ref 1.1–3.7)
ABSOLUTE MONO #: 0.63 K/UL (ref 0.1–1.2)
ALBUMIN SERPL-MCNC: 3.9 G/DL (ref 3.5–5.2)
ALBUMIN/GLOBULIN RATIO: 1.5 (ref 1–2.5)
ALP BLD-CCNC: 143 U/L (ref 35–104)
ALT SERPL-CCNC: 25 U/L (ref 5–33)
ANION GAP SERPL CALCULATED.3IONS-SCNC: 11 MMOL/L (ref 9–17)
AST SERPL-CCNC: 14 U/L
BASOPHILS # BLD: 0 % (ref 0–2)
BASOPHILS ABSOLUTE: <0.03 K/UL (ref 0–0.2)
BILIRUB SERPL-MCNC: 0.86 MG/DL (ref 0.3–1.2)
BUN BLDV-MCNC: 24 MG/DL (ref 8–23)
BUN/CREAT BLD: 25 (ref 9–20)
CALCIUM SERPL-MCNC: 10.1 MG/DL (ref 8.6–10.4)
CHLORIDE BLD-SCNC: 103 MMOL/L (ref 98–107)
CO2: 23 MMOL/L (ref 20–31)
CREAT SERPL-MCNC: 0.96 MG/DL (ref 0.5–0.9)
DIFFERENTIAL TYPE: ABNORMAL
EKG ATRIAL RATE: 101 BPM
EKG Q-T INTERVAL: 316 MS
EKG QRS DURATION: 98 MS
EKG QTC CALCULATION (BAZETT): 462 MS
EKG R AXIS: 24 DEGREES
EKG T AXIS: 174 DEGREES
EKG VENTRICULAR RATE: 129 BPM
EOSINOPHILS RELATIVE PERCENT: 0 % (ref 1–4)
GFR AFRICAN AMERICAN: >60 ML/MIN
GFR NON-AFRICAN AMERICAN: 58 ML/MIN
GFR SERPL CREATININE-BSD FRML MDRD: ABNORMAL ML/MIN/{1.73_M2}
GFR SERPL CREATININE-BSD FRML MDRD: ABNORMAL ML/MIN/{1.73_M2}
GLUCOSE BLD-MCNC: 130 MG/DL (ref 74–100)
GLUCOSE BLD-MCNC: 149 MG/DL (ref 70–99)
GLUCOSE BLD-MCNC: 156 MG/DL (ref 74–100)
GLUCOSE BLD-MCNC: 158 MG/DL (ref 74–100)
HCT VFR BLD CALC: 35.7 % (ref 36.3–47.1)
HEMOGLOBIN: 11.3 G/DL (ref 11.9–15.1)
IMMATURE GRANULOCYTES: 0 %
LV EF: 53 %
LVEF MODALITY: NORMAL
LYMPHOCYTES # BLD: 11 % (ref 24–43)
MCH RBC QN AUTO: 29.2 PG (ref 25.2–33.5)
MCHC RBC AUTO-ENTMCNC: 31.7 G/DL (ref 28.4–34.8)
MCV RBC AUTO: 92.2 FL (ref 82.6–102.9)
MONOCYTES # BLD: 9 % (ref 3–12)
NRBC AUTOMATED: 0 PER 100 WBC
PDW BLD-RTO: 14 % (ref 11.8–14.4)
PLATELET # BLD: 175 K/UL (ref 138–453)
PLATELET ESTIMATE: ABNORMAL
PMV BLD AUTO: 12.5 FL (ref 8.1–13.5)
POTASSIUM SERPL-SCNC: 4.4 MMOL/L (ref 3.7–5.3)
RBC # BLD: 3.87 M/UL (ref 3.95–5.11)
RBC # BLD: ABNORMAL 10*6/UL
SEG NEUTROPHILS: 80 % (ref 36–65)
SEGMENTED NEUTROPHILS ABSOLUTE COUNT: 5.66 K/UL (ref 1.5–8.1)
SODIUM BLD-SCNC: 137 MMOL/L (ref 135–144)
TOTAL PROTEIN: 6.5 G/DL (ref 6.4–8.3)
WBC # BLD: 7.1 K/UL (ref 3.5–11.3)
WBC # BLD: ABNORMAL 10*3/UL

## 2021-11-17 PROCEDURE — 2700000000 HC OXYGEN THERAPY PER DAY

## 2021-11-17 PROCEDURE — 2500000003 HC RX 250 WO HCPCS: Performed by: EMERGENCY MEDICINE

## 2021-11-17 PROCEDURE — 82947 ASSAY GLUCOSE BLOOD QUANT: CPT

## 2021-11-17 PROCEDURE — 6360000002 HC RX W HCPCS: Performed by: NURSE PRACTITIONER

## 2021-11-17 PROCEDURE — 80053 COMPREHEN METABOLIC PANEL: CPT

## 2021-11-17 PROCEDURE — 6370000000 HC RX 637 (ALT 250 FOR IP): Performed by: INTERNAL MEDICINE

## 2021-11-17 PROCEDURE — 6370000000 HC RX 637 (ALT 250 FOR IP): Performed by: NURSE PRACTITIONER

## 2021-11-17 PROCEDURE — 6360000002 HC RX W HCPCS: Performed by: FAMILY MEDICINE

## 2021-11-17 PROCEDURE — 94660 CPAP INITIATION&MGMT: CPT

## 2021-11-17 PROCEDURE — 2580000003 HC RX 258: Performed by: EMERGENCY MEDICINE

## 2021-11-17 PROCEDURE — 36415 COLL VENOUS BLD VENIPUNCTURE: CPT

## 2021-11-17 PROCEDURE — 2580000003 HC RX 258: Performed by: FAMILY MEDICINE

## 2021-11-17 PROCEDURE — 97167 OT EVAL HIGH COMPLEX 60 MIN: CPT

## 2021-11-17 PROCEDURE — 2000000000 HC ICU R&B

## 2021-11-17 PROCEDURE — 2500000003 HC RX 250 WO HCPCS: Performed by: FAMILY MEDICINE

## 2021-11-17 PROCEDURE — 94761 N-INVAS EAR/PLS OXIMETRY MLT: CPT

## 2021-11-17 PROCEDURE — 99223 1ST HOSP IP/OBS HIGH 75: CPT | Performed by: FAMILY MEDICINE

## 2021-11-17 PROCEDURE — 85025 COMPLETE CBC W/AUTO DIFF WBC: CPT

## 2021-11-17 PROCEDURE — 93306 TTE W/DOPPLER COMPLETE: CPT

## 2021-11-17 PROCEDURE — 93010 ELECTROCARDIOGRAM REPORT: CPT | Performed by: INTERNAL MEDICINE

## 2021-11-17 PROCEDURE — 2580000003 HC RX 258: Performed by: INTERNAL MEDICINE

## 2021-11-17 RX ORDER — AMIODARONE HYDROCHLORIDE 200 MG/1
200 TABLET ORAL DAILY
Status: DISCONTINUED | OUTPATIENT
Start: 2021-11-17 | End: 2021-11-17

## 2021-11-17 RX ORDER — NICOTINE POLACRILEX 4 MG
15 LOZENGE BUCCAL PRN
Status: DISCONTINUED | OUTPATIENT
Start: 2021-11-17 | End: 2021-11-20 | Stop reason: HOSPADM

## 2021-11-17 RX ORDER — ALPRAZOLAM 0.25 MG/1
0.25 TABLET ORAL ONCE
Status: COMPLETED | OUTPATIENT
Start: 2021-11-17 | End: 2021-11-17

## 2021-11-17 RX ORDER — FUROSEMIDE 10 MG/ML
40 INJECTION INTRAMUSCULAR; INTRAVENOUS 2 TIMES DAILY
Status: DISCONTINUED | OUTPATIENT
Start: 2021-11-17 | End: 2021-11-20

## 2021-11-17 RX ORDER — METOPROLOL TARTRATE 50 MG/1
50 TABLET, FILM COATED ORAL EVERY 6 HOURS PRN
Status: DISCONTINUED | OUTPATIENT
Start: 2021-11-17 | End: 2021-11-20 | Stop reason: HOSPADM

## 2021-11-17 RX ADMIN — METFORMIN HYDROCHLORIDE 500 MG: 500 TABLET ORAL at 08:10

## 2021-11-17 RX ADMIN — FAMOTIDINE 20 MG: 20 TABLET, FILM COATED ORAL at 08:10

## 2021-11-17 RX ADMIN — AMIODARONE HYDROCHLORIDE 150 MG: 50 INJECTION, SOLUTION INTRAVENOUS at 19:58

## 2021-11-17 RX ADMIN — METFORMIN HYDROCHLORIDE 500 MG: 500 TABLET ORAL at 16:49

## 2021-11-17 RX ADMIN — APIXABAN 5 MG: 5 TABLET, FILM COATED ORAL at 08:10

## 2021-11-17 RX ADMIN — SODIUM CHLORIDE, PRESERVATIVE FREE 10 ML: 5 INJECTION INTRAVENOUS at 21:00

## 2021-11-17 RX ADMIN — Medication 1 MG/MIN: at 20:14

## 2021-11-17 RX ADMIN — METOPROLOL SUCCINATE 50 MG: 50 TABLET, EXTENDED RELEASE ORAL at 21:15

## 2021-11-17 RX ADMIN — ALPRAZOLAM 0.25 MG: 0.25 TABLET ORAL at 08:10

## 2021-11-17 RX ADMIN — LOSARTAN POTASSIUM 100 MG: 50 TABLET, FILM COATED ORAL at 08:12

## 2021-11-17 RX ADMIN — FUROSEMIDE 40 MG: 10 INJECTION, SOLUTION INTRAMUSCULAR; INTRAVENOUS at 08:10

## 2021-11-17 RX ADMIN — METOPROLOL SUCCINATE 50 MG: 50 TABLET, EXTENDED RELEASE ORAL at 08:10

## 2021-11-17 RX ADMIN — FUROSEMIDE 40 MG: 10 INJECTION, SOLUTION INTRAMUSCULAR; INTRAVENOUS at 16:49

## 2021-11-17 RX ADMIN — DILTIAZEM HYDROCHLORIDE 5 MG/HR: 5 INJECTION, SOLUTION INTRAVENOUS at 12:41

## 2021-11-17 RX ADMIN — BUSPIRONE HYDROCHLORIDE 10 MG: 5 TABLET ORAL at 21:15

## 2021-11-17 RX ADMIN — BUSPIRONE HYDROCHLORIDE 10 MG: 5 TABLET ORAL at 08:10

## 2021-11-17 RX ADMIN — DILTIAZEM HYDROCHLORIDE 15 MG/HR: 5 INJECTION, SOLUTION INTRAVENOUS at 01:22

## 2021-11-17 RX ADMIN — HYDROCHLOROTHIAZIDE 25 MG: 25 TABLET ORAL at 08:12

## 2021-11-17 RX ADMIN — ATORVASTATIN CALCIUM 10 MG: 10 TABLET, FILM COATED ORAL at 08:10

## 2021-11-17 RX ADMIN — APIXABAN 5 MG: 5 TABLET, FILM COATED ORAL at 21:15

## 2021-11-17 RX ADMIN — FAMOTIDINE 20 MG: 20 TABLET, FILM COATED ORAL at 21:15

## 2021-11-17 NOTE — CONSULTS
Patient: Nancy Godoy  : 1950  Date of Visit: 2021    REASON FOR VISIT / CONSULTATION: Shortness of Breath (onset yesterday), Fatigue, and Extremity Weakness      HPI: Ms. Noel Anaya is a 79 y.o. female who was admitted to the hospital with worsening shortness of breath. This has gotten worse over the past three months. She has a cardiac history of abnormal echocardiogram which showed that her ejection fraction was reduced to about 40-45%. And a heart catheterization that was relatively unremarkable. Fortunately her repeat echocardiogram in 2018 and in 2019 showed her ejection fraction increased from 45-50% to 55% as well as showing only trivial mitral regurgitation. She had an ECHO on 6/3/2019 that showed EF of 50-55%. LV wall thickness is mildly increased. LA is mildly dilated (29-33) with a left atrial volume index of 31 m1/m2. mild diastolic dysfunction. She came to the ER on 2019 for slurred speech. She was than taken to [facility] V due to a transient ischemic attack. Ms. Noel Anaya came to the ER with a 2-3 day history of increasing shortness of breath as well as lower extremity edema. Ms. Noel Anaya denied any current or recent chest pain, but says she has had diarrhea since admission. Bleeding Risks: Ms. Noel Aanya denies any current or recent bleeding problems including a history of a GI bleed, ulcers, recent or upcoming surgeries, blood in her stool or black tarry stools or blood in her urine. Past Medical History:   Diagnosis Date    Cerebral artery occlusion with cerebral infarction ([facility]     CHF (congestive heart failure) (HCC)     Diabetes mellitus ([facility]     H/O cardiac catheterization 2017    LMCA: Mild irregularites 10-20%. LAD: Mild irregularites 10-20%. LCx: Mild irregularites 10-20%. RCA: Mild irregularites 10-20%. LV function assessed as : Abnormal. EF of 40%.  H/O echocardiogram 2018    EF 55%. Mildly increased LV wall thickness.  The left atrium appears moderately to severely dilated. Moderate to severe mitral regurg. Moderate pulmonary HTN with an estimated RV systolic pressure of 90VNOR. Moderate tricuspid regurg. Evidnece fo moderate diastolic dysfunction is seen.  History of echocardiogram 01/17/2019    EF 55%. LV wall thickness moderately increased. LA is mildly dilated w/ LA volume index of 30. trivial mitral regurg. Evidence of moderate (grade II) diastolic dysfunction seen.  History of echocardiogram 06/03/2019    EF of 50-55%. LV wall thickness is mildly increased. LA is mildly dilated (29-33) with a left atrial volume index of 31 m1/m2. mild diastolic dysfunction.  Hyperlipidemia     Hypertension        CURRENT ALLERGIES: Patient has no known allergies. REVIEW OF SYSTEMS: 14 systems were reviewed. Pertinent positives and negatives as above, all else negative.      Past Surgical History:   Procedure Laterality Date    CARDIAC CATHETERIZATION Left 08/04/2017    right radial, MHT Dr. Arreguin June      Social History:  Social History     Tobacco Use    Smoking status: Never Smoker    Smokeless tobacco: Never Used   Vaping Use    Vaping Use: Never used   Substance Use Topics    Alcohol use: No    Drug use: No        CURRENT MEDICATIONS:  Outpatient Medications Marked as Taking for the 11/16/21 encounter [facility] Encounter)   Medication Sig Dispense Refill    losartan-hydroCHLOROthiazide (HYZAAR) 100-25 MG per tablet Take 1 tablet by mouth daily 90 tablet 3    atorvastatin (LIPITOR) 80 MG tablet TAKE ONE TABLET BY MOUTH ONCE NIGHTLY 90 tablet 3    apixaban (ELIQUIS) 5 MG TABS tablet Take 1 tablet by mouth 2 times daily 180 tablet 3    busPIRone (BUSPAR) 10 MG tablet Take 10 mg by mouth 2 times daily      famotidine (PEPCID) 20 MG tablet Take 20 mg by mouth 2 times daily      amLODIPine (NORVASC) 10 MG tablet Take 1 tablet by mouth every evening 90 tablet 3    metoprolol succinate (TOPROL XL) 50 MG extended release tablet Take 1 tablet by mouth daily 90 tablet 3    miconazole (MICOTIN) 2 % powder Apply topically 2 times daily. 45 g 1    metFORMIN (GLUCOPHAGE) 500 MG tablet Take 500 mg by mouth 2 times daily (with meals)       FAMILY HISTORY: family history includes COPD in her sister; Coronary Art Dis in her brother and father. PHYSICAL EXAM:   BP (!) 149/102   Pulse 97   Temp 97.5 °F (36.4 °C) (Temporal)   Resp 20   Ht 5' 2\" (1.575 m)   Wt 222 lb 7 oz (100.9 kg)   SpO2 92%   BMI 40.68 kg/m²  Body mass index is 40.68 kg/m². Constitutional: She is oriented to person, place, and time. She appears well-developed and well-nourished. In no acute distress. HEENT: Normocephalic and atraumatic. No JVD present. Carotid bruit is not present. No mass and no thyromegaly present. No lymphadenopathy present. Cardiovascular: Normal rate, regular rhythm, normal heart sounds. Exam reveals no gallop and no friction rubs. 3/6 systolic murmur, 5th intercostal space on the LEFT in the mid-clavicular line (cardiac apex). Pulmonary/Chest: Effort normal and breath sounds normal. No respiratory distress. She has no wheezes, rhonchi or rales. Abdominal: Soft, non-tender. Bowel sounds and aorta are normal. She exhibits no organomegaly, mass or bruit. Extremities: Trace. No cyanosis or clubbing. 2+ radial and carotid pulses. Distal extremity pulses: 2+ bilaterally. Compression stockings in place. Neurological: She is alert and oriented to person, place, and time. No evidence of gross cranial nerve deficit. Coordination appeared normal.   Skin: Skin is warm and dry. There is no rash or diaphoresis. Psychiatric: She has a normal mood and affect.  Her speech is normal and behavior is normal.      MOST RECENT LABS ON RECORD:   Lab Results   Component Value Date    WBC 7.1 11/17/2021    HGB 11.3 (L) 11/17/2021    HCT 35.7 (L) 11/17/2021     11/17/2021    CHOL 160 08/24/2020    TRIG 79 08/24/2020    HDL 47 05/17/2021    ALT 25 effects of this medication including lightheadedness and dizziness and instructed them to stop the medication of this occurs and call our office if this occurs.  Anti-Arrhythmic: amiodarone drip WITH a 150 mg IV bolus, then starting with 1 mg/min x 6 hours and then reduce down to 1/2 mg/min x 8 hours, then stop and start amiodarone 200 mg bid. Monitoring: Since they will being maintained on Amiodarone, I told them that we will need to closely monitor them for potential side effects. These include monitoring LFTs and TSH at least every 6 months as well as chest x-rays, pulmonary function tests, and eye exams at least yearly. [id number] Score for Atrial Fibrillation Stroke Risk   Risk   Factors  Component Value   C CHF Yes 1   H HTN Yes 1   A2 Age >= 75 No,  (77 y.o.) 0   D DM Yes 1   S2 Prior Stroke/TIA Yes 2   V Vascular Disease No 0   A Age 74-69 Yes,  (77 y.o.) 1   Sc Sex female 1    [id number]  Score  7   Score last updated 42/74/63 9:20 AM EST  Click here for a link to the UpToDate guideline \"Atrial Fibrillation: Anticoagulation therapy to prevent embolization    Disclaimer: Risk Score calculation is dependent on accuracy of patient problem list and past encounter diagnosis.  Stroke Risk: CHADS2-VASc Score: 7/9 (9.6% stroke risk)   Anticoagulation: Continue Apixaban (Eliquis) 5 mg every 12 hours.  Additional Testing List: None right now but I reviewed her echo which showed moderate mitral regurgitation with an EF at the lower limits of normal.     History of TIA/Cryptogenic stroke: Still with some residual symptoms   Antiplatelet Agent: Continue Aspirin 81 mg daily.  Beta Blocker: Continue Carvedilol (Coreg) 12.5 mg twice daily.  Cholesterol Reduction Therapy: Continue Atorvastatin (Lipitor) 80 mg daily.  Additional Testing List: None     Acute on Chronic Systolic and Diastolic Heart Failure: EF of 50-55% on 6/3/19  Beta Blocker: Continue carvedilol (Coreg) 12.5 mg twice daily.      ACE Inibitor/ARB: Continue losartan (Cozaar) 100 once daily. Diuretics: Continue furosemide (Lasix) 40 mg IV 2 times daily. Nonpharmacologic management of Heart Failure: I told her to continue wearing lower extremity compression stockings and I advised her to try and keep her legs up whenever possible and to limit salt in her diet. Essential Hypertension: Controlled  · ACE Inibitor/ARB: Continue losartan/HCTZ (Hyzaar) 100/ 25 mg once daily. · Beta Blocker: Start metoprolol tartrate (Lopressor) 50 mg every 6 hours PRN HR>110 bpm    · Calcium Channel Blocker: STOP  Diltiazem drip for now     · History of Severe Mitral Regurgitation: probably just functional as her echo on 6/19 showed only trivial MR. repeat echo showed only moderate mitral regurgitation. · Beta Blocker:   Start metoprolol tartrate (Lopressor) 50 mg every 6 hours PRN HR>110 bpm    · ACE Inibitor/ARB: Continue losartan (Cozaar) 100 mg daily. · Hyperlipidemia: Mixed, LDL done on 12/2019 was 104 mg/dL   · Cholesterol Reduction Therapy: Continue Atorvastatin (Lipitor) 80 mg daily. Once again, thank you for allowing me to participate in this patients care. Please do not hesitate to contact me if I could be of any further assistance. Sincerely,  Tom Joaquin MD, MS, F.A.C.C. [facility]) [facility], [address] [facility], [facility], [place] [employer], [address] [place] [address]  Phone: [phone], Fax: [phone]     I believe that the risk of significant morbidity and mortality related to the patient's current medical conditions are: intermediate-high.         November 17, 2021

## 2021-11-17 NOTE — PROGRESS NOTES
Patient ripped off bipap stating she cant wear this anymore.  Oxygen replaced at [facility]

## 2021-11-17 NOTE — PROGRESS NOTES
Dr Tommie Mann updated on increased heart rate with diltiazem infusion max dose at 15 mg/hr. New orders received, see orders.

## 2021-11-17 NOTE — PROGRESS NOTES
Occupational Therapy   Occupational Therapy Initial Assessment  Date: 2021   Patient Name: Shannan Oneal  MRN: [id number]     : 1950    Date of Service: 2021    Discharge Recommendations:  Continue to assess pending progress  OT Equipment Recommendations  Equipment Needed: Yes  Mobility Devices: Alvaro [place]: Rolling    Assessment   Assessment: Pt is a 73 y.o female admitted with A-fib. Pt demo decreased endurance, strength, functional mobility, postural control, poor activity tolerance and decreased participation in ADL/IADL's. Pt would benefit from skilled OT services to address deficits and to improve QOL. Treatment Diagnosis: generalized weakness  Prognosis: Fair  Decision Making: High Complexity  OT Education: OT Role; Plan of Care; Transfer Training  Barriers to Learning: none  REQUIRES OT FOLLOW UP: Yes  Activity Tolerance  Activity Tolerance: Patient limited by fatigue  Safety Devices  Safety Devices in place: Yes  Type of devices: All fall risk precautions in place; Left in chair; Nurse notified; Call light within reach           Patient Diagnosis(es): The primary encounter diagnosis was Atrial fibrillation, unspecified type ([facility] Diagnoses of Congestive heart failure, unspecified HF chronicity, unspecified heart failure type ([address] and Unable to care for self were also pertinent to this visit. has a past medical history of Cerebral artery occlusion with cerebral infarction [facility]), CHF (congestive heart failure) ([facility] Diabetes mellitus ([facility] H/O cardiac catheterization, H/O echocardiogram, History of echocardiogram, History of echocardiogram, Hyperlipidemia, and Hypertension. has a past surgical history that includes Cholecystectomy and Cardiac catheterization (Left, 2017).     Treatment Diagnosis: generalized weakness      Restrictions       Subjective   General  Chart Reviewed: Yes  Patient assessed for rehabilitation services?: Yes  Family / Caregiver Present: No  Referring Practitioner: Shimon Calvo  Diagnosis: A-fib  Subjective  Subjective: Pt has no complaints of pain at this time. General Comment  Comments: Pt sitting in bedside chair upon arrival. Pt agreeable to OT evaluation. Vital Signs  Pulse: 80  BP: (!) 136/115  MAP (mmHg): 121  Oxygen Therapy  SpO2: 91 %  Social/Functional History  Social/Functional History  Bathroom Shower/Tub: Tub/Shower unit, Shower chair with back  Home Equipment: Rolling walker  Receives Help From: Family  ADL Assistance: Independent  Homemaking Assistance: Needs assistance  Ambulation Assistance: Independent  Transfer Assistance: Needs assistance  Active : No  Additional Comments: Pt was living with sister prior to hospitalization. Pt states she is going to [facility] from the [facility].       Objective   Vision: Impaired  Vision Exceptions: Wears glasses at all times  Hearing: Within functional limits    Orientation  Overall Orientation Status: Within Functional Limits     Balance  Sitting Balance: Stand by assistance  Standing Balance: Contact guard assistance  ADL  Feeding: Setup  Grooming: Contact guard assistance  UE Bathing: Minimal assistance; Increased time to complete  LE Bathing: Minimal assistance; Increased time to complete  UE Dressing: Increased time to complete; Minimal assistance  LE Dressing: Increased time to complete; Minimal assistance  Additional Comments: Pt demo increased need for assistance d/t increased fatigue. Pt reports being IND at home.            Transfers  Sit to stand: Contact guard assistance  Stand to sit: Contact guard assistance     Cognition  Overall Cognitive Status: WFL  Perception  Overall Perceptual Status: WFL     Sensation  Overall Sensation Status: WFL        LUE AROM (degrees)  LUE AROM : WFL  Left Hand AROM (degrees)  Left Hand AROM: WFL  RUE AROM (degrees)  RUE AROM : WFL  Right Hand AROM (degrees)  Right Hand AROM: WFL  LUE Strength  Gross LUE Strength: WFL  RUE Strength  Gross RUE Strength: WFL                   Plan   Plan  Times per week: 7x  Times per day: Daily  Current Treatment Recommendations: Strengthening, ROM, Safety Education & Training, Patient/Caregiver Education & Training, Self-Care / ADL, Endurance Training, Functional Mobility Training    G-Code     OutComes Score                                                  AM-PAC Score        AM-PAC Inpatient Daily Activity Raw Score: 15 (11/17/21 1227)  AM-PAC Inpatient ADL T-Scale Score : 34.69 (11/17/21 1227)  ADL Inpatient CMS 0-100% Score: 56.46 (11/17/21 1227)  ADL Inpatient CMS G-Code Modifier : CK (11/17/21 1227)    Goals  Short term goals  Time Frame for Short term goals: 21  Short term goal 1: Patient to complete self care routine c SBA for increased independence. Short term goal 2: Patient to engage in 15 minutes of ther ex/ther act  to improve strength as well as activity tolerance for self care tasks. Short term goal 3: Patient to tolerate standing >7' while participating in functional task of choice to improve standing tolerane, balance and safety during ADL, mobility and transfers. Short term goal 4: Pt will report and demo understanding of discharge recommendations and HEP. Short term goal 5: Pt will complete UB/LB dressing mod I with AE PRN for increased IND.        Therapy Time   Individual Concurrent Group Co-treatment   Time In 1210         Time Out 1227         Minutes [address]

## 2021-11-17 NOTE — DISCHARGE INSTR - COC
Continuity of Care Form    Patient Name: Sean Rivas   :  1950  MRN:  [id number]    Admit date:  2021  Discharge date:  2021    Code Status Order: Full Code   Advance Directives:      Admitting Physician:  Mark Ireland MD  PCP: SUE Burrell CNP    Discharging Nurse: [facility] Unit/Room#: I306/I306-01  Discharging Unit Phone Number: [phone]    Emergency Contact:   Extended Emergency Contact Information  Primary Emergency Contact: Orlando Herr[address] Phone: [phone]  Work Phone: [phone]  Mobile Phone: [phone]  Relation: Brother/Sister  Hearing or visual needs: None  Other needs: None  Preferred language: English   needed? No  Secondary Emergency Contact: Dodie Miranda   [address] Phone: [phone]  Relation: Child  Hearing or visual needs: None  Other needs: None  Preferred language: English   needed?  No    Past Surgical History:  Past Surgical History:   Procedure Laterality Date    CARDIAC CATHETERIZATION Left 2017    right radial, MHT Dr. Baldomero Ren         Immunization History:   Immunization History   Administered Date(s) Administered    Influenza, Quadv, 6 mo and older, IM, PF (Flulaval, Fluarix) 2018    Pneumococcal Conjugate 13-valent (Sharon Sermon) 2013       Active Problems:  Patient Active Problem List   Diagnosis Code    Hypertension I10    Hyperlipidemia E78.5    Acute on chronic systolic CHF (congestive heart failure) ([facility]) I50.23    Hypertensive urgency I16.0    Idiopathic cardiomyopathy ([facility]) I42.8    Hypertensive emergency I16.1    Chronic combined systolic and diastolic HF (heart failure), NYHA class 2 ([facility]) I50.42    Acute on chronic combined systolic and diastolic CHF, NYHA class 4 ([facility]) I50.43    Hypoxia R09.02    Moderate mitral regurgitation by prior echocardiogram I34.0    Morbid obesity ([facility]) E66.01    CKD (chronic kidney disease) stage 3, GFR 30-59 ml/min ([facility]) N18.30    Physical deconditioning R53.81    TIA (transient ischemic attack) G45.9    Old lacunar stroke without late effect Z86.73    Dysarthria R47.1    Stroke determined by clinical assessment ([facility] I63.9    Aphasia R47.01    Type 2 diabetes mellitus, without long-term current use of insulin ([facility]) E11.9    Uncontrolled type 2 diabetes mellitus with hyperglycemia ([facility]) E11.65    Acute ischemic stroke ([facility]) I63.9    PAF (paroxysmal atrial fibrillation) ([facility]) I48.0    Encounter for current long-term use of anticoagulants Z79.01    Hemiplegia and hemiparesis following cerebral infarction affecting right dominant side ([facility]) I69.351    Atrial fibrillation with RVR ([facility]) I48.91    Unable to care for self Z78.9       Isolation/Infection:   Isolation            No Isolation          Patient Infection Status       Infection Onset Added Last Indicated Last Indicated By Review Planned Expiration Resolved Resolved By    None active    Resolved    COVID-19 (Rule Out) 11/16/21 11/16/21 11/16/21 COVID-19, Rapid (Ordered)   11/16/21 Rule-Out Test Resulted    COVID-19 (Rule Out) 09/17/20 09/17/20 09/17/20 COVID-19 (Ordered)   09/18/20 Rule-Out Test Resulted    COVID-19 (Rule Out) 08/24/20 08/24/20 08/24/20 COVID-19 (Ordered)   08/24/20 Rule-Out Test Resulted            Nurse Assessment:  Last Vital Signs: BP (!) 144/52   Pulse 65   Temp 96.5 °F (35.8 °C) (Temporal)   Resp 16   Ht 5' 2\" (1.575 m)   Wt 213 lb 1.6 oz (96.7 kg)   SpO2 96%   BMI 38.98 kg/m²     Last documented pain score (0-10 scale): Pain Level: 7  Last Weight:   Wt Readings from Last 1 Encounters:   11/19/21 213 lb 1.6 oz (96.7 kg)     Mental Status:  oriented, alert, and able to concentrate and follow conversation    IV Access:  - None    Nursing Mobility/ADLs:  Walking   Assisted  Transfer  Independent  Bathing  Assisted  Dressing  Independent  [address] Dependent  Med Delivery   whole    Wound Care Documentation and Therapy:  Incision 08/04/17 Wrist Right (Active)   Number of days: 7130        Elimination:  Continence: Bowel: Yes  Bladder: Yes  Urinary Catheter: Removal Date 11/19/2021    Colostomy/Ileostomy/Ileal Conduit: No       Date of Last BM: 11/20/2021    Intake/Output Summary (Last 24 hours) at 11/19/2021 1117  Last data filed at 11/19/2021 0530  Gross per 24 hour   Intake 924 ml   Output 2525 ml   Net -1601 ml     I/O last 3 completed shifts: In: 421 [P.O.:450; I.V.:474]  Out: 2525 [Urine:2525]    Safety Concerns:      At Risk for Falls    Impairments/Disabilities:      None    Nutrition Therapy:  Current Nutrition Therapy:   - Oral Diet:  Low Sodium (3-4gm)    Routes of Feeding: Oral  Liquids: No Restrictions  Daily Fluid Restriction: no  Last Modified Barium Swallow with Video (Video Swallowing Test): not done    Treatments at the Time of Hospital Discharge:   Respiratory Treatments:   Oxygen Therapy:   wore 2 liters at night at times due to apnea  Ventilator:    - No ventilator support    Rehab Therapies: Physical Therapy and Occupational Therapy  Weight Bearing Status/Restrictions: No weight bearing restirctions  Other Medical Equipment (for information only, NOT a DME order):  walker  Other Treatments:     Patient's personal belongings (please select all that are sent with patient):  None    RN SIGNATURE:  Electronically signed by Alessio Ornelas RN on 11/20/21 at 10:40 AM EST    CASE MANAGEMENT/SOCIAL WORK SECTION    Inpatient Status Date: ***    Readmission Risk Assessment Score:  Readmission Risk              Risk of Unplanned Readmission:  14           Discharging to Facility/ Agency   Name: [facility]o Huggins  Phone:[phone]  Fax:[phone]    Dialysis Facility (if applicable)   Name:  Address:  Dialysis Schedule:  Phone:  Fax:    / signature: Electronically signed by PAWAN Mckeon on 11/17/21 at 3:29 PM EST    PHYSICIAN SECTION    Prognosis: Good    Condition at Discharge: Stable    Rehab Potential (if transferring to Rehab): Good    Recommended Labs or Other Treatments After Discharge: ***    Physician Certification: I certify the above information and transfer of Sasha Mckoy  is necessary for the continuing treatment of the diagnosis listed and that she requires [facility] for less 30 days.      Update Admission H&P: No change in H&P    PHYSICIAN SIGNATURE:  Electronically signed by Lucila Bonilla MD on 11/18/21 at 8:26 AM EST

## 2021-11-17 NOTE — PROGRESS NOTES
Comprehensive Nutrition Assessment    Type and Reason for Visit:  Initial (missing nutrition screenings)    Nutrition Recommendations/Plan:  Encourage oral intakes    Nutrition Assessment:  Obesity r/t excess energy intakes relative to expenditure, AEB BMI >40. Weight trends upward noted. Fair usual diabetes control per an older A1C of 7.6. Is not very forthcoming with information. Would predict vitamin D deficient. Malnutrition Assessment:  Malnutrition Status: At risk for malnutrition (Comment)    Context:  Acute Illness     Findings of the 6 clinical characteristics of malnutrition:  Energy Intake:  Mild decrease in energy intake (Comment) (vague reports of intakes)  Weight Loss:  No significant weight loss     Body Fat Loss:  No significant body fat loss     Muscle Mass Loss:  No significant muscle mass loss    Fluid Accumulation:  1 - Mild Extremities   Strength:  Not Performed    Estimated Daily Nutrient Needs:  Energy (kcal):  2017-2493 (11-15); Weight Used for Energy Requirements:  Current     Protein (g):  65-75 (1.3-1.5); Weight Used for Protein Requirements:  Ideal        Fluid (ml/day):  1600; Method Used for Fluid Requirements:  1 ml/kcal      Nutrition Related Findings:  obese, +1 BLE edema      Wounds:  None       Current Nutrition Therapies:    ADULT DIET;  Regular; 3 carb choices (45 gm/meal)    Anthropometric Measures:  · Height: 5' 2\" (157.5 cm)  · Current Body Weight: 222 lb 7.1 oz (100.9 kg)   · Admission Body Weight: 222 lb 7.1 oz (100.9 kg)    · Usual Body Weight: 219 lb 6.4 oz (99.5 kg) (August)     · Ideal Body Weight: 110 lbs; % Ideal Body Weight 202.2 %   · BMI: 40.7  · BMI Categories: Obese Class 3 (BMI 40.0 or greater)       Nutrition Diagnosis:   · Overweight/Obese related to excessive energy intake as evidenced by BMI    Lab Results   Component Value Date     11/17/2021    K 4.4 11/17/2021     11/17/2021    CO2 23 11/17/2021    BUN 24 (H) 11/17/2021    CREATININE 0.96 (H) 11/17/2021    GLUCOSE 149 (H) 11/17/2021    CALCIUM 10.1 11/17/2021    PROT 6.5 11/17/2021    LABALBU 3.9 11/17/2021    BILITOT 0.86 11/17/2021    ALKPHOS 143 (H) 11/17/2021    AST 14 11/17/2021    ALT 25 11/17/2021    LABGLOM 58 (L) 11/17/2021    GFRAA >60 11/17/2021    GLOB NOT REPORTED 08/01/2017     Lab Results   Component Value Date    LABA1C 7.6 (H) 08/24/2020     Lab Results   Component Value Date     08/24/2020     No results found for: VITD25    Nutrition Interventions:   Food and/or Nutrient Delivery:  Continue Current Diet  Nutrition Education/Counseling:  No recommendation at this time   Coordination of Nutrition Care:  Continue to monitor while inpatient    Goals:  PO >75% meals       Nutrition Monitoring and Evaluation:   Behavioral-Environmental Outcomes:  Knowledge or Skill   Food/Nutrient Intake Outcomes:  Food and Nutrient Intake  Physical Signs/Symptoms Outcomes:  Biochemical Data, Fluid Status or Edema, Weight     Discharge Planning:    No discharge needs at this time     Electronically signed by Beth Patel RD, LD on 11/17/21 at 7:48 AM EST    Contact: [phone]

## 2021-11-18 LAB
ABSOLUTE EOS #: <0.03 K/UL (ref 0–0.44)
ABSOLUTE IMMATURE GRANULOCYTE: 0.03 K/UL (ref 0–0.3)
ABSOLUTE LYMPH #: 0.81 K/UL (ref 1.1–3.7)
ABSOLUTE MONO #: 0.84 K/UL (ref 0.1–1.2)
ANION GAP SERPL CALCULATED.3IONS-SCNC: 15 MMOL/L (ref 9–17)
BASOPHILS # BLD: 0 % (ref 0–2)
BASOPHILS ABSOLUTE: 0.03 K/UL (ref 0–0.2)
BUN BLDV-MCNC: 21 MG/DL (ref 8–23)
BUN/CREAT BLD: 19 (ref 9–20)
CALCIUM SERPL-MCNC: 10.2 MG/DL (ref 8.6–10.4)
CHLORIDE BLD-SCNC: 98 MMOL/L (ref 98–107)
CO2: 23 MMOL/L (ref 20–31)
CREAT SERPL-MCNC: 1.11 MG/DL (ref 0.5–0.9)
DIFFERENTIAL TYPE: ABNORMAL
EOSINOPHILS RELATIVE PERCENT: 0 % (ref 1–4)
GFR AFRICAN AMERICAN: 59 ML/MIN
GFR NON-AFRICAN AMERICAN: 49 ML/MIN
GFR SERPL CREATININE-BSD FRML MDRD: ABNORMAL ML/MIN/{1.73_M2}
GFR SERPL CREATININE-BSD FRML MDRD: ABNORMAL ML/MIN/{1.73_M2}
GLUCOSE BLD-MCNC: 132 MG/DL (ref 74–100)
GLUCOSE BLD-MCNC: 144 MG/DL (ref 74–100)
GLUCOSE BLD-MCNC: 171 MG/DL (ref 70–99)
HCT VFR BLD CALC: 38 % (ref 36.3–47.1)
HEMOGLOBIN: 12.3 G/DL (ref 11.9–15.1)
IMMATURE GRANULOCYTES: 0 %
LYMPHOCYTES # BLD: 9 % (ref 24–43)
MCH RBC QN AUTO: 29.2 PG (ref 25.2–33.5)
MCHC RBC AUTO-ENTMCNC: 32.4 G/DL (ref 28.4–34.8)
MCV RBC AUTO: 90.3 FL (ref 82.6–102.9)
MONOCYTES # BLD: 9 % (ref 3–12)
NRBC AUTOMATED: 0 PER 100 WBC
PDW BLD-RTO: 13.7 % (ref 11.8–14.4)
PLATELET # BLD: 201 K/UL (ref 138–453)
PLATELET ESTIMATE: ABNORMAL
PMV BLD AUTO: 11.6 FL (ref 8.1–13.5)
POTASSIUM SERPL-SCNC: 3.6 MMOL/L (ref 3.7–5.3)
RBC # BLD: 4.21 M/UL (ref 3.95–5.11)
RBC # BLD: ABNORMAL 10*6/UL
SEG NEUTROPHILS: 82 % (ref 36–65)
SEGMENTED NEUTROPHILS ABSOLUTE COUNT: 7.51 K/UL (ref 1.5–8.1)
SODIUM BLD-SCNC: 136 MMOL/L (ref 135–144)
WBC # BLD: 9.2 K/UL (ref 3.5–11.3)
WBC # BLD: ABNORMAL 10*3/UL

## 2021-11-18 PROCEDURE — 99233 SBSQ HOSP IP/OBS HIGH 50: CPT | Performed by: FAMILY MEDICINE

## 2021-11-18 PROCEDURE — 97162 PT EVAL MOD COMPLEX 30 MIN: CPT

## 2021-11-18 PROCEDURE — 36415 COLL VENOUS BLD VENIPUNCTURE: CPT

## 2021-11-18 PROCEDURE — 6370000000 HC RX 637 (ALT 250 FOR IP): Performed by: FAMILY MEDICINE

## 2021-11-18 PROCEDURE — 6370000000 HC RX 637 (ALT 250 FOR IP): Performed by: NURSE PRACTITIONER

## 2021-11-18 PROCEDURE — 2580000003 HC RX 258: Performed by: INTERNAL MEDICINE

## 2021-11-18 PROCEDURE — 2700000000 HC OXYGEN THERAPY PER DAY

## 2021-11-18 PROCEDURE — 80048 BASIC METABOLIC PNL TOTAL CA: CPT

## 2021-11-18 PROCEDURE — 2000000000 HC ICU R&B

## 2021-11-18 PROCEDURE — 6370000000 HC RX 637 (ALT 250 FOR IP): Performed by: INTERNAL MEDICINE

## 2021-11-18 PROCEDURE — 82947 ASSAY GLUCOSE BLOOD QUANT: CPT

## 2021-11-18 PROCEDURE — 97110 THERAPEUTIC EXERCISES: CPT

## 2021-11-18 PROCEDURE — 94761 N-INVAS EAR/PLS OXIMETRY MLT: CPT

## 2021-11-18 PROCEDURE — 85025 COMPLETE CBC W/AUTO DIFF WBC: CPT

## 2021-11-18 PROCEDURE — 97530 THERAPEUTIC ACTIVITIES: CPT

## 2021-11-18 PROCEDURE — 6360000002 HC RX W HCPCS: Performed by: NURSE PRACTITIONER

## 2021-11-18 RX ORDER — ALPRAZOLAM 0.25 MG/1
0.25 TABLET ORAL 2 TIMES DAILY PRN
Status: DISCONTINUED | OUTPATIENT
Start: 2021-11-18 | End: 2021-11-20 | Stop reason: HOSPADM

## 2021-11-18 RX ORDER — SERTRALINE HYDROCHLORIDE 25 MG/1
25 TABLET, FILM COATED ORAL DAILY
Status: DISCONTINUED | OUTPATIENT
Start: 2021-11-18 | End: 2021-11-20 | Stop reason: HOSPADM

## 2021-11-18 RX ORDER — AMIODARONE HYDROCHLORIDE 200 MG/1
200 TABLET ORAL 2 TIMES DAILY
Status: DISCONTINUED | OUTPATIENT
Start: 2021-11-18 | End: 2021-11-20 | Stop reason: HOSPADM

## 2021-11-18 RX ORDER — GABAPENTIN 300 MG/1
300 CAPSULE ORAL 3 TIMES DAILY
Status: DISCONTINUED | OUTPATIENT
Start: 2021-11-18 | End: 2021-11-20 | Stop reason: HOSPADM

## 2021-11-18 RX ADMIN — METFORMIN HYDROCHLORIDE 500 MG: 500 TABLET ORAL at 08:30

## 2021-11-18 RX ADMIN — METOPROLOL TARTRATE 50 MG: 50 TABLET, FILM COATED ORAL at 03:37

## 2021-11-18 RX ADMIN — GABAPENTIN 300 MG: 300 CAPSULE ORAL at 21:01

## 2021-11-18 RX ADMIN — FAMOTIDINE 20 MG: 20 TABLET, FILM COATED ORAL at 21:01

## 2021-11-18 RX ADMIN — METOPROLOL SUCCINATE 50 MG: 50 TABLET, EXTENDED RELEASE ORAL at 21:01

## 2021-11-18 RX ADMIN — ALPRAZOLAM 0.25 MG: 0.25 TABLET ORAL at 15:16

## 2021-11-18 RX ADMIN — APIXABAN 5 MG: 5 TABLET, FILM COATED ORAL at 08:30

## 2021-11-18 RX ADMIN — ACETAMINOPHEN 650 MG: 325 TABLET ORAL at 08:30

## 2021-11-18 RX ADMIN — HYDROCHLOROTHIAZIDE 25 MG: 25 TABLET ORAL at 08:30

## 2021-11-18 RX ADMIN — ACETAMINOPHEN 650 MG: 325 TABLET ORAL at 15:16

## 2021-11-18 RX ADMIN — BUSPIRONE HYDROCHLORIDE 10 MG: 5 TABLET ORAL at 21:01

## 2021-11-18 RX ADMIN — FUROSEMIDE 40 MG: 10 INJECTION, SOLUTION INTRAMUSCULAR; INTRAVENOUS at 08:31

## 2021-11-18 RX ADMIN — LOSARTAN POTASSIUM 100 MG: 50 TABLET, FILM COATED ORAL at 08:30

## 2021-11-18 RX ADMIN — SODIUM CHLORIDE, PRESERVATIVE FREE 10 ML: 5 INJECTION INTRAVENOUS at 22:40

## 2021-11-18 RX ADMIN — SERTRALINE 25 MG: 25 TABLET, FILM COATED ORAL at 15:16

## 2021-11-18 RX ADMIN — ATORVASTATIN CALCIUM 10 MG: 10 TABLET, FILM COATED ORAL at 08:30

## 2021-11-18 RX ADMIN — GABAPENTIN 300 MG: 300 CAPSULE ORAL at 15:16

## 2021-11-18 RX ADMIN — ACETAMINOPHEN 650 MG: 325 TABLET ORAL at 02:01

## 2021-11-18 RX ADMIN — AMIODARONE HYDROCHLORIDE 200 MG: 200 TABLET ORAL at 21:01

## 2021-11-18 RX ADMIN — SODIUM CHLORIDE, PRESERVATIVE FREE 10 ML: 5 INJECTION INTRAVENOUS at 08:31

## 2021-11-18 RX ADMIN — METOPROLOL SUCCINATE 50 MG: 50 TABLET, EXTENDED RELEASE ORAL at 08:30

## 2021-11-18 RX ADMIN — BUSPIRONE HYDROCHLORIDE 10 MG: 5 TABLET ORAL at 08:30

## 2021-11-18 RX ADMIN — APIXABAN 5 MG: 5 TABLET, FILM COATED ORAL at 21:01

## 2021-11-18 RX ADMIN — FAMOTIDINE 20 MG: 20 TABLET, FILM COATED ORAL at 08:30

## 2021-11-18 RX ADMIN — FUROSEMIDE 40 MG: 10 INJECTION, SOLUTION INTRAMUSCULAR; INTRAVENOUS at 17:18

## 2021-11-18 RX ADMIN — SODIUM CHLORIDE, PRESERVATIVE FREE 10 ML: 5 INJECTION INTRAVENOUS at 17:18

## 2021-11-18 RX ADMIN — METFORMIN HYDROCHLORIDE 500 MG: 500 TABLET ORAL at 17:18

## 2021-11-18 ASSESSMENT — PAIN DESCRIPTION - PAIN TYPE
TYPE: ACUTE PAIN
TYPE: ACUTE PAIN;CHRONIC PAIN

## 2021-11-18 ASSESSMENT — PAIN DESCRIPTION - LOCATION
LOCATION: GENERALIZED
LOCATION: FOOT
LOCATION: GENERALIZED
LOCATION: GENERALIZED

## 2021-11-18 ASSESSMENT — PAIN DESCRIPTION - ONSET
ONSET: ON-GOING

## 2021-11-18 ASSESSMENT — PAIN DESCRIPTION - PROGRESSION
CLINICAL_PROGRESSION: GRADUALLY WORSENING

## 2021-11-18 ASSESSMENT — PAIN SCALES - GENERAL
PAINLEVEL_OUTOF10: 4
PAINLEVEL_OUTOF10: 2
PAINLEVEL_OUTOF10: 10
PAINLEVEL_OUTOF10: 5
PAINLEVEL_OUTOF10: 3

## 2021-11-18 ASSESSMENT — PAIN DESCRIPTION - FREQUENCY
FREQUENCY: CONTINUOUS

## 2021-11-18 ASSESSMENT — PAIN DESCRIPTION - DESCRIPTORS
DESCRIPTORS: ACHING

## 2021-11-18 ASSESSMENT — PAIN - FUNCTIONAL ASSESSMENT
PAIN_FUNCTIONAL_ASSESSMENT: ACTIVITIES ARE NOT PREVENTED

## 2021-11-18 ASSESSMENT — PAIN DESCRIPTION - ORIENTATION: ORIENTATION: RIGHT

## 2021-11-18 NOTE — ACP (ADVANCE CARE PLANNING)
Advance Care Planning     Advance Care Planning Activator (Inpatient)  Conversation Note      Date of ACP Conversation: 11/18/2021     Conversation Conducted with: Patient with Decision Making Capacity    ACP Activator: Evie Levi RN    Health Care Decision Maker:     Current Designated Health Care Decision Maker:     Primary Decision Maker: Patria Beckham Child - [phone]    Secondary Decision Maker: Bobby Leonard - Brother/Sister - [phone]    Today we documented Decision Maker(s) consistent with ACP documents on file. Care Preferences    Ventilation: \"If you were in your present state of health and suddenly became very ill and were unable to breathe on your own, what would your preference be about the use of a ventilator (breathing machine) if it were available to you? \"      Would the patient desire the use of ventilator (breathing machine)?: no    \"If your health worsens and it becomes clear that your chance of recovery is unlikely, what would your preference be about the use of a ventilator (breathing machine) if it were available to you? \"     Would the patient desire the use of ventilator (breathing machine)?: No      Resuscitation  \"CPR works best to restart the heart when there is a sudden event, like a heart attack, in someone who is otherwise healthy. Unfortunately, CPR does not typically restart the heart for people who have serious health conditions or who are very sick. \"    \"In the event your heart stopped as a result of an underlying serious health condition, would you want attempts to be made to restart your heart (answer \"yes\" for attempt to resuscitate) or would you prefer a natural death (answer \"no\" for do not attempt to resuscitate)? \" no       [x] Yes   [] No   Educated Patient / Lizzeth Graysonval regarding differences between Advance Directives and portable DNR orders.     Length of ACP Conversation in minutes:      Conversation Outcomes:  [x] ACP discussion completed  [] Existing advance directive reviewed with patient; no changes to patient's previously recorded wishes  [] New Advance Directive completed  [x] Portable Do Not Rescitate prepared for Provider review and signature  [] POLST/POST/MOLST/MOST prepared for Provider review and signature      Follow-up plan:    [] Schedule follow-up conversation to continue planning  [] Referred individual to Provider for additional questions/concerns   [] Advised patient/agent/surrogate to review completed ACP document and update if needed with changes in condition, patient preferences or care setting    [x] This note routed to one or more involved healthcare providers

## 2021-11-18 NOTE — PROGRESS NOTES
--------------  ADMISSION REVIEW     Payor: [address]  Subscriber #:  [id number]  Authorization Number: N/A    Admit date: N/A  Admit time: N/A       Admitting Physician: Kem Bey MD  Attending Physician:  Kem Bey MD Returned to bed from bedside commode. Lungs diminished throughout. Heart tones strong and irregular. +1 pitting edema noted in lower legs. Amiodarone continues at 1 mg/hr. Cardizem drip is now off. date: REMOVAL GALLBLADDER  No date: TOTAL ABDOM HYSTERECTOMY    Review of Systems  Positive for stated complaint: PAIN   Other systems are as noted in HPI. Constitutional and vital signs reviewed.     All other systems reviewed and negative except as noted (*)     Potassium 2.7 (*)     Chloride 97 (*)     CO2 33.0 (*)     All other components within normal limits   CBC W/ DIFFERENTIAL - Abnormal; Notable for the following:     Neutrophil Absolute Prelim 7.04 (*)     Neutrophil Absolute 7.04 (*)     All other continued pain, discharge home is not reasonable at this time given her living alone, having already taken her Valium and trazodone this evening.     Admission disposition: 8/28/2018  1:26 AM  I discussed case with the hospitalist from [place] [address] potassium chloride 40 mEq in sodium chloride 0.9 % 250 mL IVPB     Date Action Dose Route User    8/28/2018 0052 New Bag 40 mEq Intravenous Jigna [place], RN          REVIEWER COMMENTS:     FOR REVIEW/APPROVAL OF INPT ADMISSION    PLEASE FAX ALL INPT DA

## 2021-11-18 NOTE — PROGRESS NOTES
Physician Progress Note      Lorena Moncada  CSN #:                  [id number]  :                       1950  ADMIT DATE:       2021 9:33 AM  DISCH DATE:  RESPONDING  PROVIDER #:        Hoyt Osler MD          QUERY TEXT:    Pt admitted with Atrial fib with RVR, and acute on chronic combined CHF. Pt noted to have respiratory distress, admission SpO2 85% on room air, RR max   31/min, treated with BiPAP and NC. If possible, please document in the progress notes and discharge summary if   you are evaluating and/or treating any of the following: The medical record reflects the following:  Risk Factors: Acute on chronic CHF, DM, HTN,  Clinical Indicators:  SOB, H&P: mild-moderate respiratory distress;  admission   SpO2 85% on room air,  RR max 31/min  Treatment:  BiPAP,  O2 4 lpm nc    Jordan Xavier RN, [address] [place] [address]   [phone]  . Options provided:  -- Acute respiratory failure with hypoxia  -- Acute respiratory failure with hypercapnia  -- Acute respiratory failure with hypoxia and hypercapnia  -- Hypoxia without respiratory failure  -- Other - I will add my own diagnosis  -- Disagree - Not applicable / Not valid  -- Disagree - Clinically unable to determine / Unknown  -- Refer to Clinical Documentation Reviewer    PROVIDER RESPONSE TEXT:    This patient is in acute respiratory failure with hypoxia.     Query created by: Maxine Busch on 2021 4:14 PM      Electronically signed by:  Hoyt Osler MD 2021 7:54 AM

## 2021-11-18 NOTE — PROGRESS NOTES
Physical Therapy  Facility/Department: [facility] AT THE [facility]  Daily Treatment Note  NAME: Axel Fragoso  : 1950  MRN: [id number]    Date of Service: 2021    Discharge Recommendations:  Continue to assess pending progress        Assessment   Body structures, Functions, Activity limitations: Decreased functional mobility ; Decreased balance; Decreased ADL status; Decreased strength; Decreased vision/visual deficit; Decreased safe awareness; Decreased endurance  Assessment: Bed mobility with SBA. Stand pivot transfer to commode with RW and CGA. Pt rushed through transfers and cues provided for proper form for safety with poor follow through. Treatment Diagnosis: general weakness  Prognosis: Good  Decision Making: Medium Complexity  PT Education: PT Role; General Safety; Transfer Training  Patient Education: Educated on safety with transfers d/t patient rushing through with poor follow through. REQUIRES PT FOLLOW UP: Yes  Activity Tolerance  Activity Tolerance: Other  Activity Tolerance: Patient was very anxious throughout treatment and rushed transfers and bed mobility. Patient Diagnosis(es): The primary encounter diagnosis was Atrial fibrillation, unspecified type ([facility] Diagnoses of Congestive heart failure, unspecified HF chronicity, unspecified heart failure type ([address] and Unable to care for self were also pertinent to this visit. has a past medical history of Cerebral artery occlusion with cerebral infarction [facility]), CHF (congestive heart failure) ([facility] Diabetes mellitus ([facility] H/O cardiac catheterization, H/O echocardiogram, History of echocardiogram, History of echocardiogram, Hyperlipidemia, and Hypertension. has a past surgical history that includes Cholecystectomy and Cardiac catheterization (Left, 2017).     Restrictions  Restrictions/Precautions  Restrictions/Precautions: General Precautions, Fall Risk  Subjective   General  Chart Reviewed: Yes  Family / Caregiver Present: Yes (Daughter present)  Referring Practitioner: Dr. Claudean Moron: Pt reports pain in R LE but is unable to rate. Pt is crying and stating she needs to have a BM. Pain Screening  Patient Currently in Pain: Yes  Vital Signs  Patient Currently in Pain: Yes       Orientation  Orientation  Overall Orientation Status: Within Normal Limits  Cognition      Objective   Bed mobility  Rolling to Right: Stand by assistance  Supine to Sit: Stand by assistance  Transfers  Stand Pivot Transfers: Contact guard assistance  Comment: Bed to commode transfer. V/c for proper form with patient rushing through transfer due to stating she needs to use the commode. Comment: Transfer bed to commode with CGA and RW.      Goals  Short term goals  Time Frame for Short term goals: 20 days  Short term goal 1: Pt will be educated onher POC  Short term goal 2: Pt will perform all transfers SBA in order to increase independence  Short term goal 3: Pt will ambulate >50 feet with FWW CGA in order to use the bathroom  Short term goal 4: Pt will increase dynamic standing balance ot [place] in order to reduce fall risk    Plan    Plan  Times per week: 7x/wk  Times per day: Daily  Plan weeks: 2x/daily except weekends 1x/daily  Current Treatment Recommendations: Strengthening, Neuromuscular Re-education, Home Exercise Program, Safety Education & Training, ROM, Balance Training, Endurance Training, Patient/Caregiver Education & Training, Functional Mobility Training, Transfer Training, Gait Training, Stair training  Safety Devices  Type of devices: Left in bed, Call light within reach, Gait belt     Therapy Time   Individual Concurrent Group Co-treatment   Time In 1449         Time Out 1507         Minutes [place], [place]

## 2021-11-18 NOTE — PROGRESS NOTES
Physical Therapy    Facility/Department: [facility] AT THE [facility]  Initial Assessment    NAME: Rita Ling  : 1950  MRN: [id number]    Date of Service: 2021    Discharge Recommendations:  Continue to assess pending progress        Assessment   Body structures, Functions, Activity limitations: Decreased functional mobility ; Decreased balance; Decreased ADL status; Decreased strength; Decreased vision/visual deficit; Decreased safe awareness; Decreased endurance  Assessment: Pt is a 73year old that was admitted for afib. Pt presents with general weakness, decreased ambulation tolerance, reduced activity tolerance, and decreased ambulation tolerance. Pt will benefit from skilled PT in order to address these deficits. Treatment Diagnosis: general weakness  Prognosis: Good  Decision Making: Medium Complexity  PT Education: Goals; PT Role; Plan of Care  REQUIRES PT FOLLOW UP: Yes  Activity Tolerance  Activity Tolerance: Other       Patient Diagnosis(es): The primary encounter diagnosis was Atrial fibrillation, unspecified type ([facility] Diagnoses of Congestive heart failure, unspecified HF chronicity, unspecified heart failure type ([address] and Unable to care for self were also pertinent to this visit. has a past medical history of Cerebral artery occlusion with cerebral infarction [facility]), CHF (congestive heart failure) ([facility] Diabetes mellitus ([facility] H/O cardiac catheterization, H/O echocardiogram, History of echocardiogram, History of echocardiogram, Hyperlipidemia, and Hypertension. has a past surgical history that includes Cholecystectomy and Cardiac catheterization (Left, 2017).     Restrictions  Restrictions/Precautions  Restrictions/Precautions: General Precautions, Fall Risk  Vision/Hearing  Vision: Impaired  Vision Exceptions: Wears glasses at all times  Hearing: Within functional limits     Subjective  General  Chart Reviewed: Yes  Response To Previous Treatment: Not applicable  Family / Caregiver Present:

## 2021-11-18 NOTE — PROGRESS NOTES
Tylenol 650 mg po given for complaints of right foot pain. Rates at a #5. Returned to bed from commode. Had moderate loose brown stool. Amiodarone continued at 1 mg/hr.

## 2021-11-18 NOTE — PROGRESS NOTES
Dr Louie Hester at bedside, plan is for pt to have a cardioversion in the AM. Supervisor made aware. As well as respiratory.

## 2021-11-18 NOTE — PROGRESS NOTES
Writer to bedside to complete morning assessment. Upon entry to room, pt awake in bed, respirations even and unlabored while on 3 liters of oxygen per nasal cannula. Vitals obtained and assessment completed, see flow sheet for details. Pt very anxious and tearful. Complaining of pain but cannot qualitate pain at this time, just state that she hurts. Informed pt that she can have tylenol again at 0800. Pt denies needs from writer at this time. Call light in reach. Will continue to monitor.
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
## 2021-11-18 NOTE — PROGRESS NOTES
INTENSIVE CARE UNIT   APRN - Progress Note    Patient - Ngoc Grey  Date of Admission -  11/16/2021  9:33 AM  Date of Evaluation -  11/18/2021  Hospital Day - 2      SUBJECTIVE:     The Ngoc Grey is a 73 y.o. female who is seen for follow up in the ICU. Per nursing report and notes, overnight events include: Anxiety. She sitting up in the chair alert and pleasant but anxious. Patient complains of shortness of breath. She is afebrile. ROS:   Constitutional: negative  for fevers, and negative for chills. Respiratory: positive for shortness of breath, negative for cough, and negative for wheezing  Cardiovascular: negative for chest pain, and negative for palpitations  Gastrointestinal: negative for abdominal pain, negative for nausea,negative for vomiting, negative for diarrhea, and negative for constipation    All other systems were reviewed with the patient and are negative unless otherwise stated in HPI.       OBJECTIVE:     VITAL SIGNS:  Patient Vitals for the past 8 hrs:   BP Temp Temp src Pulse Resp SpO2   11/18/21 1145 139/89 -- -- 101 22 --   11/18/21 1130 (!) 153/83 -- -- 109 24 --   11/18/21 1115 (!) 156/64 97.6 °F (36.4 °C) Temporal 91 22 94 %   11/18/21 1100 -- -- -- 113 27 --   11/18/21 1045 135/83 -- -- 108 19 --   11/18/21 1030 (!) 157/111 -- -- 117 (!) 31 --   11/18/21 1015 137/78 -- -- 108 19 --   11/18/21 1000 (!) 151/115 -- -- 108 20 --   11/18/21 0945 (!) 137/123 -- -- 111 26 --   11/18/21 0930 (!) 134/95 -- -- 106 25 --   11/18/21 0900 (!) 145/87 -- -- 108 20 --   11/18/21 0845 (!) 159/127 -- -- 100 23 --   11/18/21 0830 (!) 156/141 -- -- 101 30 --   11/18/21 0815 (!) 142/104 -- -- 122 19 --   11/18/21 0800 (!) 131/109 -- -- 126 20 --   11/18/21 0745 129/87 -- -- 109 (!) 31 --   11/18/21 0730 (!) 168/134 -- -- 121 14 --   11/18/21 0715 (!) 142/107 96.7 °F (35.9 °C) Temporal 95 16 93 %   11/18/21 0700 (!) 166/95 -- -- 98 16 --   11/18/21 0645 (!) 150/98 -- -- 106 17 --   11/18/21 0630 (!) 146/99 -- -- 97 (!) 40 --   21 0615 (!) 164/110 -- -- 111 22 --   21 0600 (!) 151/94 -- -- 104 16 --   21 0545 (!) 163/91 -- -- 101 19 --   21 0530 -- -- -- 97 25 --   21 0515 (!) 163/128 -- -- 112 24 --   21 0500 (!) 150/94 -- -- 106 23 --   21 0445 (!) 155/102 -- -- 118 17 --   21 0430 (!) 147/97 -- -- 109 18 --   21 0415 124/89 -- -- 94 18 --   21 0400 (!) 136/92 -- -- 118 22 --         Temp: 97.6 °F (36.4 °C)  Temp range:    Temp  Av.5 °F (36.4 °C)  Min: 96.7 °F (35.9 °C)  Max: 97.8 °F (36.6 °C)    BP: 139/89  BP Range:      Systolic (22REZ), XDH:736 , Min:91 , IKO:935      Diastolic (52CLM), GMC:97, Min:44, Max:141    Pulse: 101  Pulse Range:    Pulse  Av.1  Min: 90  Max: 135    Resp: 22  Resp Range:   Resp  Av.5  Min: 14  Max: 40    SpO2: 94 % on supplemental O2  SpO2 range:   SpO2  Av %  Min: 80 %  Max: 95 %    Weight  Wt Readings from Last 3 Encounters:   21 217 lb 4.8 oz (98.6 kg)   21 219 lb 6.4 oz (99.5 kg)   21 214 lb (97.1 kg)     Body mass index is 39.74 kg/m². 24HR INTAKE/OUTPUT:      Intake/Output Summary (Last 24 hours) at 2021 1150  Last data filed at 2021 0530  Gross per 24 hour   Intake 1445 ml   Output 1550 ml   Net -105 ml     Date 21 0000 - 21 2359   Shift 9504-1000 7283-1338 7877-2338 24 Hour Total   INTAKE   P.O. 400   400   I. V.(mL/kg/hr) 200(0.3)   200   Shift Total(mL/kg) 600(6.1)   600(6.1)   OUTPUT   Shift Total(mL/kg)       Weight (kg) 98.6 98.6 98.6 98.6         PHYSICAL EXAM:  GEN:    Awake and following commands:     [] No   [x] Yes  MENTAL STATUS: Alert and oriented x3, anxious. DISTRESS: Acute respiratory distress:       [] No   [] Yes  EYES:  EOMI, pupils equal   NECK: Supple. No lymphadenopathy.   No carotid bruit  CVS:    irregularly irregular, no audible murmur  PULM:  Diminished fine basilar rales, no acute respiratory distress  ABD: Bowels sounds normal.  Abdomen is soft. No distention. no tenderness to palpation. EXT:   2+ edema bilaterally . No calf tenderness. NEURO: Moves all extremities. Motor and sensory are grossly intact  SKIN:  No rashes. No skin lesions.           MEDICATIONS:  Scheduled Meds:   furosemide  40 mg IntraVENous BID    busPIRone  10 mg Oral Once    busPIRone  10 mg Oral BID    metoprolol succinate  50 mg Oral BID    apixaban  5 mg Oral BID    atorvastatin  10 mg Oral Daily    famotidine  20 mg Oral BID    metFORMIN  500 mg Oral BID WC    sodium chloride flush  5-40 mL IntraVENous 2 times per day    insulin lispro  0-6 Units SubCUTAneous TID WC    insulin lispro  0-3 Units SubCUTAneous Nightly    losartan  100 mg Oral Daily    And    hydroCHLOROthiazide  25 mg Oral Daily    digoxin  250 mcg IntraVENous Once     Continuous Infusions:   amiodarone 0.5 mg/min (11/18/21 0236)    sodium chloride       PRN Meds:   glucose, 15 g, PRN  glucagon (rDNA), 1 mg, PRN  metoprolol tartrate, 50 mg, Q6H PRN  sodium chloride flush, 10 mL, PRN  sodium chloride, 25 mL, PRN  ondansetron, 4 mg, Q8H PRN   Or  ondansetron, 4 mg, Q6H PRN  polyethylene glycol, 17 g, Daily PRN  acetaminophen, 650 mg, Q6H PRN   Or  acetaminophen, 650 mg, Q6H PRN            VASOPRESSORS:    [] No      [] Yes  [] Levophed      [] Dopamine     [] Vasopressin      [] Dobutamine     [] Phenylephrine     [] Epinephrine        DATA:  Complete Blood Count:   Recent Labs     11/16/21  0953 11/17/21  0510 11/18/21  0520   WBC 6.8 7.1 9.2   RBC 3.99 3.87* 4.21   HGB 11.7* 11.3* 12.3   HCT 36.6 35.7* 38.0   MCV 91.7 92.2 90.3   RDW 14.1 14.0 13.7    175 201   SEGS 75* 80* 82*   NEUTROABS 5.06 5.66 7.51   LYMPHOPCT 16* 11* 9*   LYMPHSABS 1.06* 0.76* 0.81*   MONOPCT 9 9 9   EOSRELPCT 0* 0* 0*   BASOPCT 0 0 0   IMMGRAN 0 0 0        Recent Blood Glucose:   Recent Labs     11/16/21  0953 11/17/21  0510 11/18/21  0520   GLUCOSE 160* 149* 171* Comprehensive Metabolic Profile:   Recent Labs     11/16/21  0953 11/17/21  0510 11/18/21  0520   BUN 28* 24* 21   CREATININE 1.01* 0.96* 1.11*    137 136   K 4.1 4.4 3.6*    103 98   CALCIUM 9.9 10.1 10.2   ANIONGAP 12 11 15   CO2 23 23 23   PROT  --  6.5  --    LABALBU  --  3.9  --    BILITOT  --  0.86  --    ALKPHOS  --  143*  --    AST  --  14  --    ALT  --  25  --         Urinalysis:   Lab Results   Component Value Date    NITRU NEGATIVE 06/21/2021    COLORU YELLOW 06/21/2021    PHUR 6.0 06/21/2021    WBCUA 0 TO 2 06/21/2021    RBCUA None 06/21/2021    MUCUS NOT REPORTED 06/21/2021    TRICHOMONAS NOT REPORTED 06/21/2021    YEAST NOT REPORTED 06/21/2021    BACTERIA NOT REPORTED 06/21/2021    SPECGRAV 1.025 06/21/2021    LEUKOCYTESUR NEGATIVE 06/21/2021    UROBILINOGEN Normal 06/21/2021    BILIRUBINUR NEGATIVE 06/21/2021    GLUCOSEU NEGATIVE 06/21/2021    KETUA NEGATIVE 06/21/2021    AMORPHOUS NOT REPORTED 06/21/2021       HgBA1c:    Lab Results   Component Value Date    LABA1C 7.6 08/24/2020       TSH:    Lab Results   Component Value Date    TSH 1.24 05/17/2021       Lactic Acid:   Lab Results   Component Value Date    LACTA 1.8 06/21/2021    LACTA 1.5 12/17/2018        High Sensitivity Troponin:  Recent Labs     11/16/21  0953   TROPHS 24*     Pro-BNP:  Lab Results   Component Value Date    PROBNP 3,280 (H) 11/16/2021     D-Dimer:No results found for: DDIMER  PT/INR:    Lab Results   Component Value Date    PROTIME 13.4 08/23/2020    INR 1.0 08/23/2020     PTT:    Lab Results   Component Value Date    APTT 25.2 08/23/2020       CRP: No results for input(s): CRP in the last 72 hours. ABGs:   Lab Results   Component Value Date    FIO2 NOT REPORTED 12/17/2018         Radiology/Imaging:  XR CHEST PORTABLE   Final Result   Cardiomegaly with pulmonary edema and/or multifocal pneumonia. Recommend   follow-up to resolution.                ASSESSMENT / PLAN:     Atrial fibrillation with RVR ([facility] [address]  · Continue current therapy  · Consults: Cardiology  · Continue IV Lasix  · Continue losartan/HCTZ  · Continue Eliquis, Toprol-XL  · Failed Cardizem drip  · Currently on amiodarone IV  · Type 2 diabetes  · POCT before meals and at bedtime  · Low-dose sliding scale  · Hypoglycemia protocol  · Continue Glucophage  · Hypoxia  · Supplemental oxygen to maintain SPO2 greater than 92%  · Currently on nasal cannula  · Nutrition status:   · obesity, non-morbid  · Dietician consult initiated  · [] NPO [] TPN      [] Tube feed [] Clear liquid        [] Full liquid [] regular diet         [x] Fluid restriction   [] Diabetic diet   · ICU Prophylaxis:   · DVT: Eliquis   · Stress Ulcer: H2 Blocker   · High risk medications: Amiodarone drip   · Disposition:    · Transfer out of ICU:  [] yes [x] no   · Discharge plan is 262 SUE Ramirez - CNP , SUE-NP-C  11/18/2021  11:50 AM

## 2021-11-18 NOTE — PROGRESS NOTES
Second assessment completed at this time. OT at bedside, linens changed on pts bed. Pt changed her mind about going back to bed. Denies other needs at this time. Call light in reach. Will continue to monitor.

## 2021-11-18 NOTE — PROGRESS NOTES
Occupational Therapy  Facility/Department: [facility] AT THE [facility]  Daily Treatment Note  NAME: Rodrigo Reynolds  : 1950  MRN: [id number]    Date of Service: 2021    Discharge Recommendations:  Continue to assess pending progress       Assessment      OT Education: OT Role; Energy Conservation  Safety Devices  Safety Devices in place: Yes  Type of devices: All fall risk precautions in place; Left in chair; Nurse notified; Call light within reach         Patient Diagnosis(es): The primary encounter diagnosis was Atrial fibrillation, unspecified type ([facility] [address] Diagnoses of Congestive heart failure, unspecified HF chronicity, unspecified heart failure type ([facility] [address] and Unable to care for self were also pertinent to this visit. has a past medical history of Cerebral artery occlusion with cerebral infarction [facility]), CHF (congestive heart failure) ([facility] [address] Diabetes mellitus ([facility] [address] H/O cardiac catheterization, H/O echocardiogram, History of echocardiogram, History of echocardiogram, Hyperlipidemia, and Hypertension. has a past surgical history that includes Cholecystectomy and Cardiac catheterization (Left, 2017). Restrictions  Restrictions/Precautions  Restrictions/Precautions: General Precautions, Fall Risk  Subjective   General  Chart Reviewed: Yes  Patient assessed for rehabilitation services?: Yes  Family / Caregiver Present: No  Referring Practitioner: Tereso  Diagnosis: A-fib  Subjective  Subjective: Pt has no complaints of pain at this time. General Comment  Comments: Pt sitting in bedside chair upon arrival. Pt agreeable to OT session      Orientation     Objective                                                                Type of ROM/Therapeutic Exercise  Type of ROM/Therapeutic Exercise: Free weights  Comment: B Ue ther ex completed to increase strength and endurance for ADLs and tranfers. 1 # free weight x 10 reps x all planes and mod RB d/t fatigue.                     Plan   Plan  Times per week: 7x  Times per day: Daily  Current Treatment Recommendations: Strengthening, ROM, Safety Education & Training, Patient/Caregiver Education & Training, Self-Care / ADL, Endurance Training, Functional Mobility Training  G-Code     OutComes Score                                                  AM-PAC Score             Goals  Short term goals  Time Frame for Short term goals: 21  Short term goal 1: Patient to complete self care routine c SBA for increased independence. Short term goal 2: Patient to engage in 15 minutes of ther ex/ther act  to improve strength as well as activity tolerance for self care tasks. Short term goal 3: Patient to tolerate standing >7' while participating in functional task of choice to improve standing tolerane, balance and safety during ADL, mobility and transfers. Short term goal 4: Pt will report and demo understanding of discharge recommendations and HEP. Short term goal 5: Pt will complete UB/LB dressing mod I with AE PRN for increased IND.        Therapy Time   Individual Concurrent Group Co-treatment   Time In 1110         Time Out 1134         Minutes TREVOR Menendez/ADAMARIS Cortes

## 2021-11-18 NOTE — PROGRESS NOTES
Amiodarone infusion started at 1 mg/hr. Cardizem infusion continued at 5 mg/hr. Quiet in bed with eyes closed.

## 2021-11-18 NOTE — PROGRESS NOTES
Evelyne Calvin am scribing for and in the presence of Johana Levi PA-C. Patient: Romi Baltazar  : 1950  Date of Visit: 2021    REASON FOR VISIT / CONSULTATION: Shortness of Breath (onset yesterday), Fatigue, and Extremity Weakness      HPI: Ms. Cyndee Marin is a 79 y.o. female who was admitted to the hospital with worsening shortness of breath. This has gotten worse over the past three months. She has a cardiac history of abnormal echocardiogram which showed that her ejection fraction was reduced to about 40-45%. And a heart catheterization that was relatively unremarkable. Fortunately her repeat echocardiogram in 2018 and in 2019 showed her ejection fraction increased from 45-50% to 55% as well as showing only trivial mitral regurgitation. She had an ECHO on 6/3/2019 that showed EF of 50-55%. LV wall thickness is mildly increased. LA is mildly dilated (29-33) with a left atrial volume index of 31 m1/m2. mild diastolic dysfunction. She came to the ER on 2019 for slurred speech. She was than taken to [facility] due to a transient ischemic attack. Ms. Cyndee Marin came to the ER with a 2-3 day history of increasing shortness of breath as well as lower extremity edema. She reports she is doing okay today. She did work with therapy today for a little bit. She has a hard time moving her bowels today. She has felt her heart racing throughout today. She denies having any lightheaded/dizziness. Her potassium was 3.6 today. Pulse has remained elevated, blood pressure under good control. She is -3,958mL since admission. She denied any current or recent chest pain, but says she has had intermittent diarrhea since admission. Bleeding Risks: Ms. Cyndee Marin denies any current or recent bleeding problems including a history of a GI bleed, ulcers, recent or upcoming surgeries, blood in her stool or black tarry stools or blood in her urine.      Past Medical History:   Diagnosis Date    Cerebral artery occlusion with cerebral infarction [facility])     [facility]est[facility]     Diabetes mellitus ([facility]     H/O cardiac catheterization 08/04/2017    LMCA: Mild irregularites 10-20%. LAD: Mild irregularites 10-20%. LCx: Mild irregularites 10-20%. RCA: Mild irregularites 10-20%. LV function assessed as : Abnormal. EF of 40%.  H/O echocardiogram 12/18/2018    EF 55%. Mildly increased LV wall thickness. The left atrium appears moderately to severely dilated. Moderate to severe mitral regurg. Moderate pulmonary HTN with an estimated RV systolic pressure of 54CMZF. Moderate tricuspid regurg. Evidnece fo moderate diastolic dysfunction is seen.  History of echocardiogram 01/17/2019    EF 55%. LV wall thickness moderately increased. LA is mildly dilated w/ LA volume index of 30. trivial mitral regurg. Evidence of moderate (grade II) diastolic dysfunction seen.  History of echocardiogram 06/03/2019    EF of 50-55%. LV wall thickness is mildly increased. LA is mildly dilated (29-33) with a left atrial volume index of 31 m1/m2. mild diastolic dysfunction.  Hyperlipidemia     Hypertension        CURRENT ALLERGIES: Patient has no known allergies. REVIEW OF SYSTEMS: 14 systems were reviewed. Pertinent positives and negatives as above, all else negative.      Past Surgical History:   Procedure Laterality Date    CARDIAC CATHETERIZATION Left 08/04/2017    right radial, T Dr. Gabriela Claude      Social History:  Social History     Tobacco Use    Smoking status: Never Smoker    Smokeless tobacco: Never Used   Vaping Use    Vaping Use: Never used   Substance Use Topics    Alcohol use: No    Drug use: No        CURRENT MEDICATIONS:  Outpatient Medications Marked as Taking for the 11/16/21 encounter [facility] Encounter)   Medication Sig Dispense Refill    losartan-hydroCHLOROthiazide (HYZAAR) 100-25 MG per tablet Take 1 tablet by mouth daily 90 tablet 3    atorvastatin (LIPITOR) 80 MG tablet TAKE ONE TABLET BY MOUTH ONCE NIGHTLY 90 tablet 3    apixaban (ELIQUIS) 5 MG TABS tablet Take 1 tablet by mouth 2 times daily 180 tablet 3    busPIRone (BUSPAR) 10 MG tablet Take 10 mg by mouth 2 times daily      famotidine (PEPCID) 20 MG tablet Take 20 mg by mouth 2 times daily      amLODIPine (NORVASC) 10 MG tablet Take 1 tablet by mouth every evening 90 tablet 3    metoprolol succinate (TOPROL XL) 50 MG extended release tablet Take 1 tablet by mouth daily 90 tablet 3    miconazole (MICOTIN) 2 % powder Apply topically 2 times daily. 45 g 1    metFORMIN (GLUCOPHAGE) 500 MG tablet Take 500 mg by mouth 2 times daily (with meals)       FAMILY HISTORY: family history includes COPD in her sister; Coronary Art Dis in her brother and father. PHYSICAL EXAM:   BP (!) 116/38   Pulse 92   Temp 97.8 °F (36.6 °C) (Temporal)   Resp 22   Ht 5' 2\" (1.575 m)   Wt 217 lb 4.8 oz (98.6 kg)   SpO2 94%   BMI 39.74 kg/m²  Body mass index is 39.74 kg/m². Constitutional: She is oriented to person, place, and time. She appears well-developed and well-nourished. In no acute distress. HEENT: Normocephalic and atraumatic. No JVD present. Carotid bruit is not present. No mass and no thyromegaly present. No lymphadenopathy present. Cardiovascular: Normal rate, regular rhythm, normal heart sounds. Exam reveals no gallop and no friction rubs. 3/6 systolic murmur, 5th intercostal space on the LEFT in the mid-clavicular line (cardiac apex). Pulmonary/Chest: Effort normal and breath sounds normal. No respiratory distress. She has no wheezes, rhonchi or rales. Abdominal: Soft, non-tender. Bowel sounds and aorta are normal. She exhibits no organomegaly, mass or bruit. Extremities: Trace. No cyanosis or clubbing. 2+ radial and carotid pulses. Distal extremity pulses: 2+ bilaterally. Compression stockings in place. Neurological: She is alert and oriented to person, place, and time.  No evidence of gross cranial nerve deficit. Coordination appeared normal.   Skin: Skin is warm and dry. There is no rash or diaphoresis. Psychiatric: She has a normal mood and affect.  Her speech is normal and behavior is normal.      MOST RECENT LABS ON RECORD:   Lab Results   Component Value Date    WBC 9.2 11/18/2021    HGB 12.3 11/18/2021    HCT 38.0 11/18/2021     11/18/2021    CHOL 160 08/24/2020    TRIG 79 08/24/2020    HDL 47 05/17/2021    ALT 25 11/17/2021    AST 14 11/17/2021     11/18/2021    K 3.6 (L) 11/18/2021    CL 98 11/18/2021    CREATININE 1.11 (H) 11/18/2021    BUN 21 11/18/2021    CO2 23 11/18/2021    TSH 1.24 05/17/2021    INR 1.0 08/23/2020    LABA1C 7.6 (H) 08/24/2020       ASSESSMENT:  Patient Active Problem List    Diagnosis Date Noted    Encounter for current long-term use of anticoagulants 11/06/2020    Stroke determined by clinical assessment ([facility] 07/20/2020    Physical deconditioning 06/12/2019    Moderate mitral regurgitation by prior echocardiogram     Unable to care for self 11/17/2021    Atrial fibrillation with RVR ([address] 11/16/2021    Hemiplegia and hemiparesis following cerebral infarction affecting right dominant side ([address] 04/08/2021    PAF (paroxysmal atrial fibrillation) ([facility] 09/25/2020    Acute ischemic stroke ([facility] [facility] 08/26/2020    Uncontrolled type 2 diabetes mellitus with hyperglycemia ([facility])     Type 2 diabetes mellitus, without long-term current use of insulin ([address] 08/25/2020    Aphasia     Old lacunar stroke without late effect     Dysarthria     TIA (transient ischemic attack) 12/22/2019    Morbid obesity ([address] 12/20/2018    CKD (chronic kidney disease) stage 3, GFR 30-59 ml/min ([facility]) 12/20/2018    Hypoxia 12/18/2018    Acute on chronic combined systolic and diastolic CHF, NYHA class 4 ([address] 12/17/2018    Chronic combined systolic and diastolic HF (heart failure), NYHA class 2 ([facility] 08/28/2017    Acute on chronic systolic CHF (congestive heart failure) ([facility] 08/01/2017  Hypertensive urgency 08/01/2017    Hypertensive emergency 08/01/2017    Hypertension     Hyperlipidemia     Idiopathic cardiomyopathy (HCC)       PLAN:  · Paroxysmal Atrial Fibrillation with RVR: Rate Control Symptomatic so will switch to rhythm control strategy   Beta Blocker: Continue Metoprolol succinate (Toprol XL) 50 mg bid for now. Continue lopressor PRN if heart rate remains elevated over 110bpm.   Anti-Arrhythmic: Stop amiodarone drip and start amiodarone 200 mg bid. [id number] Score for Atrial Fibrillation Stroke Risk   Risk   Factors  Component Value   C CHF Yes 1   H HTN Yes 1   A2 Age >= 75 No,  (77 y.o.) 0   D DM Yes 1   S2 Prior Stroke/TIA Yes 2   V Vascular Disease No 0   A Age 74-69 Yes,  (77 y.o.) 1   Sc Sex female 1    [id number]  Score  7   Score last updated 87/85/06 0:88 AM EST  Click here for a link to the UpToDate guideline \"Atrial Fibrillation: Anticoagulation therapy to prevent embolization    Disclaimer: Risk Score calculation is dependent on accuracy of patient problem list and past encounter diagnosis.  Stroke Risk: CHADS2-VASc Score: 7/9 (9.6% stroke risk)   Anticoagulation: Continue Apixaban (Eliquis) 5 mg every 12 hours.  Additional Testing List: None right now but I reviewed her echo which showed moderate mitral regurgitation with an EF at the lower limits of normal.   Additional Testing List: Additional Testing List: Cardioversion: Because of this, I spent about 10 minutes discussing the multiple treatment options including rate control and rhythm control as well as medication and ablation therapies. In the end I recommended that they consider proceeding with a cardioversion to see if this could help improve their symptoms. I also discussed the risks, benefits, and alternatives of the procedure including the risk of arrythmia, stroke, heart attack, death, or the need for further procedures.   I also discussed the alternate treatment strategy of simple medical management without cardioversion and just trying to maximize things with medications. The patient verbalized understanding of the risks benefits and stated that they would like to proceed with the cardioversion. I will plan to do this tomorrow at 7:45 am.     History of TIA/Cryptogenic stroke: Still with some residual symptoms   Antiplatelet Agent: Continue Aspirin 81 mg daily.  Beta Blocker: Continue Metoprolol succinate (Toprol XL) 50 mg bid.  Cholesterol Reduction Therapy: Continue Atorvastatin (Lipitor) 80 mg daily.  Additional Testing List: None     Acute on Chronic Systolic and Diastolic Heart Failure: EF of 50-55% on 6/3/19  Beta Blocker: Continue metoprolol succinate (Toprol XL) 50 mg bid    ACE Inibitor/ARB: Continue losartan (Cozaar) 100 mg daily. Diuretics: Continue furosemide (Lasix) 40 mg IV 2 times daily. Nonpharmacologic management of Heart Failure: I told her to continue wearing lower extremity compression stockings and I advised her to try and keep her legs up whenever possible and to limit salt in her diet. Essential Hypertension: Controlled  · ACE Inibitor/ARB: Continue losartan/HCTZ (Hyzaar) 100/ 25 mg once daily. · Beta Blocker: Continue metoprolol succinate (Toprol XL) 50 mg twice daily. · Calcium Channel Blocker: STOP  Diltiazem drip for now     · History of Severe Mitral Regurgitation: probably just functional as her echo on 6/19 showed only trivial MR. repeat echo showed only moderate mitral regurgitation. · Beta Blocker: Continue Toprol XL 50 mg bid   · ACE Inibitor/ARB: Continue losartan (Cozaar) 100 mg daily. · Hyperlipidemia: Mixed, LDL done on 12/2019 was 104 mg/dL   · Cholesterol Reduction Therapy: Continue Atorvastatin (Lipitor) 80 mg daily. Once again, thank you for allowing me to participate in this patients care. Please do not hesitate to contact me if I could be of any further assistance. Sincerely,  Kristine Alvarado.  Jemal VILLALOBOS, MS, ED BADILLO [facility]) [facility], [address], [facility], [address] [place] [address]  Phone: [phone], Fax: [phone]     I believe that the risk of significant morbidity and mortality related to the patient's current medical conditions are: intermediate-high.         November 18, 2021

## 2021-11-19 LAB
ABSOLUTE EOS #: 0.05 K/UL (ref 0–0.44)
ABSOLUTE IMMATURE GRANULOCYTE: 0.03 K/UL (ref 0–0.3)
ABSOLUTE LYMPH #: 0.96 K/UL (ref 1.1–3.7)
ABSOLUTE MONO #: 0.73 K/UL (ref 0.1–1.2)
ANION GAP SERPL CALCULATED.3IONS-SCNC: 14 MMOL/L (ref 9–17)
BASOPHILS # BLD: 0 % (ref 0–2)
BASOPHILS ABSOLUTE: 0.03 K/UL (ref 0–0.2)
BUN BLDV-MCNC: 22 MG/DL (ref 8–23)
BUN/CREAT BLD: 16 (ref 9–20)
CALCIUM SERPL-MCNC: 10.1 MG/DL (ref 8.6–10.4)
CHLORIDE BLD-SCNC: 95 MMOL/L (ref 98–107)
CO2: 28 MMOL/L (ref 20–31)
CREAT SERPL-MCNC: 1.36 MG/DL (ref 0.5–0.9)
DIFFERENTIAL TYPE: ABNORMAL
EKG ATRIAL RATE: 138 BPM
EKG ATRIAL RATE: 68 BPM
EKG P-R INTERVAL: 188 MS
EKG Q-T INTERVAL: 376 MS
EKG Q-T INTERVAL: 448 MS
EKG QRS DURATION: 102 MS
EKG QRS DURATION: 102 MS
EKG QTC CALCULATION (BAZETT): 476 MS
EKG QTC CALCULATION (BAZETT): 494 MS
EKG R AXIS: -21 DEGREES
EKG R AXIS: -25 DEGREES
EKG T AXIS: 153 DEGREES
EKG T AXIS: 154 DEGREES
EKG VENTRICULAR RATE: 104 BPM
EKG VENTRICULAR RATE: 68 BPM
EOSINOPHILS RELATIVE PERCENT: 1 % (ref 1–4)
GFR AFRICAN AMERICAN: 47 ML/MIN
GFR NON-AFRICAN AMERICAN: 39 ML/MIN
GFR SERPL CREATININE-BSD FRML MDRD: ABNORMAL ML/MIN/{1.73_M2}
GFR SERPL CREATININE-BSD FRML MDRD: ABNORMAL ML/MIN/{1.73_M2}
GLUCOSE BLD-MCNC: 112 MG/DL (ref 74–100)
GLUCOSE BLD-MCNC: 113 MG/DL (ref 74–100)
GLUCOSE BLD-MCNC: 122 MG/DL (ref 74–100)
GLUCOSE BLD-MCNC: 132 MG/DL (ref 70–99)
GLUCOSE BLD-MCNC: 160 MG/DL (ref 74–100)
HCT VFR BLD CALC: 39 % (ref 36.3–47.1)
HEMOGLOBIN: 12.5 G/DL (ref 11.9–15.1)
IMMATURE GRANULOCYTES: 0 %
LYMPHOCYTES # BLD: 13 % (ref 24–43)
MCH RBC QN AUTO: 28.9 PG (ref 25.2–33.5)
MCHC RBC AUTO-ENTMCNC: 32.1 G/DL (ref 28.4–34.8)
MCV RBC AUTO: 90.3 FL (ref 82.6–102.9)
MONOCYTES # BLD: 10 % (ref 3–12)
NRBC AUTOMATED: 0 PER 100 WBC
PDW BLD-RTO: 13.5 % (ref 11.8–14.4)
PLATELET # BLD: 222 K/UL (ref 138–453)
PLATELET ESTIMATE: ABNORMAL
PMV BLD AUTO: 11.4 FL (ref 8.1–13.5)
POTASSIUM SERPL-SCNC: 3.7 MMOL/L (ref 3.7–5.3)
RBC # BLD: 4.32 M/UL (ref 3.95–5.11)
RBC # BLD: ABNORMAL 10*6/UL
SEG NEUTROPHILS: 76 % (ref 36–65)
SEGMENTED NEUTROPHILS ABSOLUTE COUNT: 5.41 K/UL (ref 1.5–8.1)
SODIUM BLD-SCNC: 137 MMOL/L (ref 135–144)
WBC # BLD: 7.2 K/UL (ref 3.5–11.3)
WBC # BLD: ABNORMAL 10*3/UL

## 2021-11-19 PROCEDURE — 97535 SELF CARE MNGMENT TRAINING: CPT

## 2021-11-19 PROCEDURE — 93005 ELECTROCARDIOGRAM TRACING: CPT | Performed by: FAMILY MEDICINE

## 2021-11-19 PROCEDURE — 94761 N-INVAS EAR/PLS OXIMETRY MLT: CPT

## 2021-11-19 PROCEDURE — 6370000000 HC RX 637 (ALT 250 FOR IP): Performed by: NURSE PRACTITIONER

## 2021-11-19 PROCEDURE — 92960 CARDIOVERSION ELECTRIC EXT: CPT

## 2021-11-19 PROCEDURE — 97530 THERAPEUTIC ACTIVITIES: CPT

## 2021-11-19 PROCEDURE — 2700000000 HC OXYGEN THERAPY PER DAY

## 2021-11-19 PROCEDURE — 1200000000 HC SEMI PRIVATE

## 2021-11-19 PROCEDURE — 36415 COLL VENOUS BLD VENIPUNCTURE: CPT

## 2021-11-19 PROCEDURE — 6370000000 HC RX 637 (ALT 250 FOR IP): Performed by: FAMILY MEDICINE

## 2021-11-19 PROCEDURE — 2580000003 HC RX 258: Performed by: FAMILY MEDICINE

## 2021-11-19 PROCEDURE — 6370000000 HC RX 637 (ALT 250 FOR IP): Performed by: INTERNAL MEDICINE

## 2021-11-19 PROCEDURE — 6360000002 HC RX W HCPCS: Performed by: NURSE PRACTITIONER

## 2021-11-19 PROCEDURE — 93010 ELECTROCARDIOGRAM REPORT: CPT | Performed by: INTERNAL MEDICINE

## 2021-11-19 PROCEDURE — 97116 GAIT TRAINING THERAPY: CPT

## 2021-11-19 PROCEDURE — 85025 COMPLETE CBC W/AUTO DIFF WBC: CPT

## 2021-11-19 PROCEDURE — 2580000003 HC RX 258: Performed by: NURSE PRACTITIONER

## 2021-11-19 PROCEDURE — 5A2204Z RESTORATION OF CARDIAC RHYTHM, SINGLE: ICD-10-PCS | Performed by: FAMILY MEDICINE

## 2021-11-19 PROCEDURE — 6360000002 HC RX W HCPCS: Performed by: FAMILY MEDICINE

## 2021-11-19 PROCEDURE — 92960 CARDIOVERSION ELECTRIC EXT: CPT | Performed by: FAMILY MEDICINE

## 2021-11-19 PROCEDURE — 97110 THERAPEUTIC EXERCISES: CPT

## 2021-11-19 PROCEDURE — 82947 ASSAY GLUCOSE BLOOD QUANT: CPT

## 2021-11-19 PROCEDURE — 80048 BASIC METABOLIC PNL TOTAL CA: CPT

## 2021-11-19 RX ORDER — SODIUM CHLORIDE 0.9 % (FLUSH) 0.9 %
5-40 SYRINGE (ML) INJECTION PRN
Status: DISCONTINUED | OUTPATIENT
Start: 2021-11-19 | End: 2021-11-20 | Stop reason: HOSPADM

## 2021-11-19 RX ORDER — LOPERAMIDE HYDROCHLORIDE 2 MG/1
2 CAPSULE ORAL 4 TIMES DAILY PRN
Status: DISCONTINUED | OUTPATIENT
Start: 2021-11-19 | End: 2021-11-20 | Stop reason: HOSPADM

## 2021-11-19 RX ORDER — METOPROLOL SUCCINATE 100 MG/1
200 TABLET, EXTENDED RELEASE ORAL DAILY
Status: DISCONTINUED | OUTPATIENT
Start: 2021-11-20 | End: 2021-11-20 | Stop reason: HOSPADM

## 2021-11-19 RX ORDER — SODIUM CHLORIDE 0.9 % (FLUSH) 0.9 %
5-40 SYRINGE (ML) INJECTION EVERY 12 HOURS SCHEDULED
Status: DISCONTINUED | OUTPATIENT
Start: 2021-11-19 | End: 2021-11-20 | Stop reason: HOSPADM

## 2021-11-19 RX ORDER — MIDAZOLAM HYDROCHLORIDE 1 MG/ML
INJECTION INTRAMUSCULAR; INTRAVENOUS
Status: COMPLETED | OUTPATIENT
Start: 2021-11-19 | End: 2021-11-19

## 2021-11-19 RX ORDER — SODIUM CHLORIDE 9 MG/ML
25 INJECTION, SOLUTION INTRAVENOUS PRN
Status: DISCONTINUED | OUTPATIENT
Start: 2021-11-19 | End: 2021-11-20 | Stop reason: HOSPADM

## 2021-11-19 RX ORDER — METOPROLOL SUCCINATE 100 MG/1
100 TABLET, EXTENDED RELEASE ORAL 2 TIMES DAILY
Status: DISCONTINUED | OUTPATIENT
Start: 2021-11-19 | End: 2021-11-19

## 2021-11-19 RX ORDER — SODIUM CHLORIDE 9 MG/ML
INJECTION, SOLUTION INTRAVENOUS CONTINUOUS
Status: DISCONTINUED | OUTPATIENT
Start: 2021-11-19 | End: 2021-11-19

## 2021-11-19 RX ORDER — FENTANYL CITRATE 50 UG/ML
INJECTION, SOLUTION INTRAMUSCULAR; INTRAVENOUS
Status: COMPLETED | OUTPATIENT
Start: 2021-11-19 | End: 2021-11-19

## 2021-11-19 RX ADMIN — BUSPIRONE HYDROCHLORIDE 10 MG: 5 TABLET ORAL at 20:13

## 2021-11-19 RX ADMIN — SODIUM CHLORIDE, PRESERVATIVE FREE 10 ML: 5 INJECTION INTRAVENOUS at 20:18

## 2021-11-19 RX ADMIN — METFORMIN HYDROCHLORIDE 500 MG: 500 TABLET ORAL at 16:27

## 2021-11-19 RX ADMIN — APIXABAN 5 MG: 5 TABLET, FILM COATED ORAL at 10:10

## 2021-11-19 RX ADMIN — FUROSEMIDE 40 MG: 10 INJECTION, SOLUTION INTRAMUSCULAR; INTRAVENOUS at 16:29

## 2021-11-19 RX ADMIN — MIDAZOLAM 1 MG: 1 INJECTION INTRAMUSCULAR; INTRAVENOUS at 08:16

## 2021-11-19 RX ADMIN — SODIUM CHLORIDE: 9 INJECTION, SOLUTION INTRAVENOUS at 08:15

## 2021-11-19 RX ADMIN — METOPROLOL TARTRATE 50 MG: 50 TABLET, FILM COATED ORAL at 13:34

## 2021-11-19 RX ADMIN — LOPERAMIDE HYDROCHLORIDE 2 MG: 2 CAPSULE ORAL at 15:19

## 2021-11-19 RX ADMIN — FAMOTIDINE 20 MG: 20 TABLET, FILM COATED ORAL at 20:15

## 2021-11-19 RX ADMIN — METOPROLOL SUCCINATE 50 MG: 50 TABLET, EXTENDED RELEASE ORAL at 10:10

## 2021-11-19 RX ADMIN — BUSPIRONE HYDROCHLORIDE 10 MG: 5 TABLET ORAL at 10:10

## 2021-11-19 RX ADMIN — SODIUM CHLORIDE, PRESERVATIVE FREE 10 ML: 5 INJECTION INTRAVENOUS at 10:11

## 2021-11-19 RX ADMIN — FENTANYL CITRATE 25 MCG: 50 INJECTION, SOLUTION INTRAMUSCULAR; INTRAVENOUS at 08:12

## 2021-11-19 RX ADMIN — GABAPENTIN 300 MG: 300 CAPSULE ORAL at 20:13

## 2021-11-19 RX ADMIN — HYDROCHLOROTHIAZIDE 25 MG: 25 TABLET ORAL at 10:10

## 2021-11-19 RX ADMIN — APIXABAN 5 MG: 5 TABLET, FILM COATED ORAL at 20:14

## 2021-11-19 RX ADMIN — SODIUM CHLORIDE, PRESERVATIVE FREE 10 ML: 5 INJECTION INTRAVENOUS at 16:28

## 2021-11-19 RX ADMIN — FAMOTIDINE 20 MG: 20 TABLET, FILM COATED ORAL at 10:10

## 2021-11-19 RX ADMIN — GABAPENTIN 300 MG: 300 CAPSULE ORAL at 10:10

## 2021-11-19 RX ADMIN — ATORVASTATIN CALCIUM 10 MG: 10 TABLET, FILM COATED ORAL at 10:10

## 2021-11-19 RX ADMIN — METFORMIN HYDROCHLORIDE 500 MG: 500 TABLET ORAL at 10:10

## 2021-11-19 RX ADMIN — LOSARTAN POTASSIUM 100 MG: 50 TABLET, FILM COATED ORAL at 10:10

## 2021-11-19 RX ADMIN — INSULIN LISPRO 1 UNITS: 100 INJECTION, SOLUTION INTRAVENOUS; SUBCUTANEOUS at 20:14

## 2021-11-19 RX ADMIN — FUROSEMIDE 40 MG: 10 INJECTION, SOLUTION INTRAMUSCULAR; INTRAVENOUS at 10:09

## 2021-11-19 RX ADMIN — SERTRALINE 25 MG: 25 TABLET, FILM COATED ORAL at 10:10

## 2021-11-19 RX ADMIN — MIDAZOLAM 1 MG: 1 INJECTION INTRAMUSCULAR; INTRAVENOUS at 08:11

## 2021-11-19 RX ADMIN — AMIODARONE HYDROCHLORIDE 200 MG: 200 TABLET ORAL at 20:12

## 2021-11-19 RX ADMIN — AMIODARONE HYDROCHLORIDE 200 MG: 200 TABLET ORAL at 10:11

## 2021-11-19 RX ADMIN — MIDAZOLAM 1 MG: 1 INJECTION INTRAMUSCULAR; INTRAVENOUS at 08:20

## 2021-11-19 RX ADMIN — GABAPENTIN 300 MG: 300 CAPSULE ORAL at 13:34

## 2021-11-19 ASSESSMENT — PAIN - FUNCTIONAL ASSESSMENT: PAIN_FUNCTIONAL_ASSESSMENT: ACTIVITIES ARE NOT PREVENTED

## 2021-11-19 ASSESSMENT — PAIN DESCRIPTION - DESCRIPTORS: DESCRIPTORS: ACHING

## 2021-11-19 ASSESSMENT — PAIN DESCRIPTION - FREQUENCY: FREQUENCY: CONTINUOUS

## 2021-11-19 ASSESSMENT — PAIN DESCRIPTION - PROGRESSION
CLINICAL_PROGRESSION: GRADUALLY WORSENING

## 2021-11-19 ASSESSMENT — PAIN SCALES - GENERAL
PAINLEVEL_OUTOF10: 5
PAINLEVEL_OUTOF10: 0
PAINLEVEL_OUTOF10: 7
PAINLEVEL_OUTOF10: 0

## 2021-11-19 ASSESSMENT — PAIN DESCRIPTION - ORIENTATION: ORIENTATION: LEFT

## 2021-11-19 ASSESSMENT — PAIN DESCRIPTION - PAIN TYPE: TYPE: CHRONIC PAIN

## 2021-11-19 ASSESSMENT — PAIN DESCRIPTION - ONSET: ONSET: ON-GOING

## 2021-11-19 ASSESSMENT — PAIN DESCRIPTION - LOCATION: LOCATION: FOOT

## 2021-11-19 NOTE — PROGRESS NOTES
INTENSIVE CARE UNIT   APRN - Progress Note    Patient - Sean Rivas  Date of Admission -  11/16/2021  9:33 AM  Date of Evaluation -  11/19/2021  Hospital Day - 3      SUBJECTIVE:     The Sean Rivas is a 73 y.o. female who is seen for follow up in the ICU. Per nursing report and notes, overnight events include: She sitting up in the chair alert and pleasant and calm. Patient complains of shortness of breath. She is afebrile. ROS:   Constitutional: negative  for fevers, and negative for chills. Respiratory: positive for shortness of breath, negative for cough, and negative for wheezing  Cardiovascular: negative for chest pain, and negative for palpitations  Gastrointestinal: negative for abdominal pain, negative for nausea,negative for vomiting, negative for diarrhea, and negative for constipation    All other systems were reviewed with the patient and are negative unless otherwise stated in HPI.       OBJECTIVE:     VITAL SIGNS:  Patient Vitals for the past 8 hrs:   BP Temp Temp src Pulse Resp SpO2   11/19/21 0900 -- -- -- 70 19 93 %   11/19/21 0858 133/84 -- -- 71 17 93 %   11/19/21 0855 138/70 -- -- 63 20 93 %   11/19/21 0851 138/61 -- -- 63 20 93 %   11/19/21 0845 (!) 123/54 -- -- 65 18 92 %   11/19/21 0841 116/77 -- -- 69 19 94 %   11/19/21 0836 135/66 -- -- 66 16 93 %   11/19/21 0833 (!) 136/59 -- -- 67 17 93 %   11/19/21 0831 136/64 -- -- 68 19 92 %   11/19/21 0829 (!) 144/41 -- -- 88 17 92 %   11/19/21 0825 (!) 142/83 -- -- 72 19 93 %   11/19/21 0822 125/64 -- -- 106 19 91 %   11/19/21 0818 108/85 -- -- 114 18 92 %   11/19/21 0815 132/67 -- -- 91 20 92 %   11/19/21 0812 126/86 -- -- 129 26 93 %   11/19/21 0809 105/88 -- -- 112 27 91 %   11/19/21 0806 123/73 -- -- 105 27 93 %   11/19/21 0804 (!) 131/97 -- -- 104 23 94 %   11/19/21 0700 115/87 96.5 °F (35.8 °C) Temporal 98 18 91 %   11/19/21 0500 136/85 -- -- 99 19 --   11/19/21 0400 122/81 -- -- 98 14 --   11/19/21 0300 113/71 -- -- 117 17 -- Temp: 96.5 °F (35.8 °C)  Temp range:    Temp  Av.2 °F (36.2 °C)  Min: 96.5 °F (35.8 °C)  Max: 97.8 °F (36.6 °C)    BP: 133/84  BP Range:      Systolic (30URL), FPH:233 , Min:98 , YWY:843      Diastolic (29KDN), IEE:02, Min:38, Max:117    Pulse: 70  Pulse Range:    Pulse  Av.7  Min: 63  Max: 130    Resp: 19  Resp Range:   Resp  Av.4  Min: 14  Max: 42    SpO2: 93 % on supplemental O2  SpO2 range:   SpO2  Av.7 %  Min: 91 %  Max: 94 %    Weight  Wt Readings from Last 3 Encounters:   21 213 lb 1.6 oz (96.7 kg)   21 219 lb 6.4 oz (99.5 kg)   21 214 lb (97.1 kg)     Body mass index is 38.98 kg/m². 24HR INTAKE/OUTPUT:      Intake/Output Summary (Last 24 hours) at 2021 1024  Last data filed at 2021 0530  Gross per 24 hour   Intake 924 ml   Output 2525 ml   Net -1601 ml     Date 21 0000 - 21 2359   Shift 6865-1439 2383-9531 4249-6127 24 Hour Total   INTAKE   P.O. 0   0   Shift Total(mL/kg) 0(0)   0(0)   OUTPUT   Urine(mL/kg/hr) 200(0.3)   200   Shift Total(mL/kg) 200(2.1)   200(2.1)   Weight (kg) 96.7 96.7 96.7 96.7         PHYSICAL EXAM:  GEN:    Awake and following commands:     [] No   [x] Yes  MENTAL STATUS: Alert and oriented x3, anxious. DISTRESS: Acute respiratory distress:       [x] No   [] Yes  EYES:  EOMI, pupils equal   NECK: Supple. No lymphadenopathy. No carotid bruit  CVS:    irregularly irregular, no audible murmur  PULM:  Diminished fine basilar rales, no acute respiratory distress  ABD:    Bowels sounds normal.  Abdomen is soft. No distention. no tenderness to palpation. EXT:   1+ edema bilaterally . No calf tenderness. NEURO: Moves all extremities. Motor and sensory are grossly intact  SKIN:  No rashes. No skin lesions.           MEDICATIONS:  Scheduled Meds:   sodium chloride flush  5-40 mL IntraVENous 2 times per day    sodium chloride flush  5-40 mL IntraVENous 2 times per day    sertraline  25 mg Oral Daily    gabapentin  300 mg Oral TID    amiodarone  200 mg Oral BID    furosemide  40 mg IntraVENous BID    busPIRone  10 mg Oral Once    busPIRone  10 mg Oral BID    metoprolol succinate  50 mg Oral BID    apixaban  5 mg Oral BID    atorvastatin  10 mg Oral Daily    famotidine  20 mg Oral BID    metFORMIN  500 mg Oral BID WC    sodium chloride flush  5-40 mL IntraVENous 2 times per day    insulin lispro  0-6 Units SubCUTAneous TID WC    insulin lispro  0-3 Units SubCUTAneous Nightly    losartan  100 mg Oral Daily    And    hydroCHLOROthiazide  25 mg Oral Daily     Continuous Infusions:   sodium chloride 25 mL/hr at 11/19/21 0815    sodium chloride      sodium chloride      sodium chloride       PRN Meds:   sodium chloride flush, 5-40 mL, PRN  sodium chloride, 25 mL, PRN  sodium chloride flush, 5-40 mL, PRN  sodium chloride, 25 mL, PRN  ALPRAZolam, 0.25 mg, BID PRN  glucose, 15 g, PRN  glucagon (rDNA), 1 mg, PRN  metoprolol tartrate, 50 mg, Q6H PRN  sodium chloride flush, 10 mL, PRN  sodium chloride, 25 mL, PRN  ondansetron, 4 mg, Q8H PRN   Or  ondansetron, 4 mg, Q6H PRN  polyethylene glycol, 17 g, Daily PRN  acetaminophen, 650 mg, Q6H PRN   Or  acetaminophen, 650 mg, Q6H PRN            VASOPRESSORS:    [x] No      [] Yes  [] Levophed      [] Dopamine     [] Vasopressin      [] Dobutamine     [] Phenylephrine     [] Epinephrine        DATA:  Complete Blood Count:   Recent Labs     11/17/21  0510 11/18/21  0520 11/19/21  0515   WBC 7.1 9.2 7.2   RBC 3.87* 4.21 4.32   HGB 11.3* 12.3 12.5   HCT 35.7* 38.0 39.0   MCV 92.2 90.3 90.3   RDW 14.0 13.7 13.5    201 222   SEGS 80* 82* 76*   NEUTROABS 5.66 7.51 5.41   LYMPHOPCT 11* 9* 13*   LYMPHSABS 0.76* 0.81* 0.96*   MONOPCT 9 9 10   EOSRELPCT 0* 0* 1   BASOPCT 0 0 0   IMMGRAN 0 0 0        Recent Blood Glucose:   Recent Labs     11/17/21  0510 11/18/21  0520 11/19/21  0515   GLUCOSE 149* 171* 132*        Comprehensive Metabolic Profile:   Recent Labs 11/17/21  0510 11/18/21  0520 11/19/21  0515   BUN 24* 21 22   CREATININE 0.96* 1.11* 1.36*    136 137   K 4.4 3.6* 3.7    98 95*   CALCIUM 10.1 10.2 10.1   ANIONGAP 11 15 14   CO2 23 23 28   PROT 6.5  --   --    LABALBU 3.9  --   --    BILITOT 0.86  --   --    ALKPHOS 143*  --   --    AST 14  --   --    ALT 25  --   --         Urinalysis:   Lab Results   Component Value Date    NITRU NEGATIVE 06/21/2021    COLORU YELLOW 06/21/2021    PHUR 6.0 06/21/2021    WBCUA 0 TO 2 06/21/2021    RBCUA None 06/21/2021    MUCUS NOT REPORTED 06/21/2021    TRICHOMONAS NOT REPORTED 06/21/2021    YEAST NOT REPORTED 06/21/2021    BACTERIA NOT REPORTED 06/21/2021    SPECGRAV 1.025 06/21/2021    LEUKOCYTESUR NEGATIVE 06/21/2021    UROBILINOGEN Normal 06/21/2021    BILIRUBINUR NEGATIVE 06/21/2021    GLUCOSEU NEGATIVE 06/21/2021    KETUA NEGATIVE 06/21/2021    AMORPHOUS NOT REPORTED 06/21/2021       HgBA1c:    Lab Results   Component Value Date    LABA1C 7.6 08/24/2020       TSH:    Lab Results   Component Value Date    TSH 1.24 05/17/2021       Lactic Acid:   Lab Results   Component Value Date    LACTA 1.8 06/21/2021    LACTA 1.5 12/17/2018        High Sensitivity Troponin:  No results for input(s): TROPHS in the last 72 hours. Pro-BNP:  Lab Results   Component Value Date    PROBNP 3,280 (H) 11/16/2021     D-Dimer:No results found for: DDIMER  PT/INR:    Lab Results   Component Value Date    PROTIME 13.4 08/23/2020    INR 1.0 08/23/2020     PTT:    Lab Results   Component Value Date    APTT 25.2 08/23/2020       CRP: No results for input(s): CRP in the last 72 hours. ABGs:   Lab Results   Component Value Date    FIO2 NOT REPORTED 12/17/2018         Radiology/Imaging:  XR CHEST PORTABLE   Final Result   Cardiomegaly with pulmonary edema and/or multifocal pneumonia. Recommend   follow-up to resolution.                ASSESSMENT / PLAN:     Atrial fibrillation with RVR (HCC)  · Continue current therapy  · Consults: Cardiology  · Continue IV Lasix  · Continue losartan/HCTZ  · Continue Eliquis, Toprol-XL  · Failed Cardizem drip  · Currently oral amiodarone  · Successful cardioversion today  · Type 2 diabetes  · POCT before meals and at bedtime  · Low-dose sliding scale  · Hypoglycemia protocol  · Continue Glucophage  · Hypoxia  · Supplemental oxygen to maintain SPO2 greater than 92%  · Currently on nasal cannula  · Nutrition status:   · obesity, non-morbid  · Dietician consult initiated  · [] NPO [] TPN      [] Tube feed [] Clear liquid        [] Full liquid [] regular diet         [x] Fluid restriction   [] Diabetic diet   · ICU Prophylaxis:   · DVT: Eliquis   · Stress Ulcer: H2 Blocker   · High risk medications: Amiodarone drip   · Disposition:    · Transfer out of ICU:  [x] yes [x] no   · Discharge plan is [facility], APRN - CNP , SUE-NP-C  11/19/2021  10:24 AM

## 2021-11-19 NOTE — PROGRESS NOTES
Patient awake and giving lunch order to staff. Denies needs or complaints at this time. Post cardioversion recovery completed and report handed off to [employer].

## 2021-11-19 NOTE — PROGRESS NOTES
Awake.  No complaints voiced. Monitor continues to show  Atrial fibrillation in 's. No complaints voiced.

## 2021-11-19 NOTE — PROGRESS NOTES
Awake.  Lungs clear throughout. Heart tones strong and irregular. Monitor shows atrial fibrillation. Amiodarone 200 mg po given as ordered. Trace ankle edema noted. Perkins draining clear yellow urine. IV amiodarone continued at 0. .5 mg/hr.

## 2021-11-19 NOTE — PROGRESS NOTES
Occupational Therapy  Facility/Department: [facility] AT THE [facility]  Daily Treatment Note  NAME: Lana Nava  : 1950  MRN: [id number]    Date of Service: 2021    Discharge Recommendations:  Continue to assess pending progress       Assessment      OT Education: OT Role; Energy Conservation  Patient Education: educated on energy conservation techniques and safety with transfers (pt reaching for walker when attempting to stand) with good carry over. Barriers to Learning: none  Safety Devices  Safety Devices in place: Yes  Type of devices: All fall risk precautions in place; Left in chair; Nurse notified; Call light within reach         Patient Diagnosis(es): The primary encounter diagnosis was Atrial fibrillation, unspecified type ([facility] Diagnoses of Congestive heart failure, unspecified HF chronicity, unspecified heart failure type ([facility] and Unable to care for self were also pertinent to this visit. has a past medical history of Cerebral artery occlusion with cerebral infarction [facility]), CHF (congestive heart failure) ([facility] Diabetes mellitus ([facility] H/O cardiac catheterization, H/O echocardiogram, History of echocardiogram, History of echocardiogram, Hyperlipidemia, and Hypertension. has a past surgical history that includes Cholecystectomy and Cardiac catheterization (Left, 2017). Restrictions  Restrictions/Precautions  Restrictions/Precautions: General Precautions, Fall Risk  Subjective   General  Chart Reviewed: Yes  Patient assessed for rehabilitation services?: Yes  Family / Caregiver Present: No  Referring Practitioner: Tereso  Diagnosis: A-fib  Subjective  Subjective: Checked with nursing prior to tx, confirmed appropriate for tx. Pt upright in chair upon arrival.  General Comment  Comments: Pt finishing lunch upon arrival, agreeable to tx stating \"I need to use the rest room. \"  Pain Assessment  Pain Assessment: 0-10  Pain Level: 5 (pt reports multiple numbers, 3, 6, 7, before stating 5.)  Vital Signs  Patient Currently in Pain: Yes   Orientation     Objective    ADL  UE Bathing: Minimal assistance; Increased time to complete  LE Bathing: Maximum assistance (Pt req. max A to maddy brief this date due to fatigue and increased heart rate.)  UE Dressing: Increased time to complete; Contact guard assistance  Toileting: Increased time to complete; Dependent/Total (DEP for posterior hygeine.)  Additional Comments: Pt dep for posterior hygeine, max A to maddy new breif due to increased fatigue and heart rate, min A for UB bathing. Pt demo increased need for assistance d/t increased fatigue. Balance  Standing Balance: Contact guard assistance  Functional Mobility  Functional Mobility Comments: CGA to/from rest room with 2 seated RBs due to increased heart rate. Transfers  Sit to stand: Contact guard assistance  Stand to sit: Contact guard assistance  Transfer Comments: Occasional min A with sit to stand due to increased fatigue with duration of session. Plan   Plan  Times per week: 7x  Times per day: Daily  Current Treatment Recommendations: Strengthening, ROM, Safety Education & Training, Patient/Caregiver Education & Training, Self-Care / ADL, Endurance Training, Functional Mobility Training  G-Code     OutComes Score                                                  AM-PAC Score             Goals  Short term goals  Time Frame for Short term goals: 21  Short term goal 1: Patient to complete self care routine c SBA for increased independence. Short term goal 2: Patient to engage in 15 minutes of ther ex/ther act  to improve strength as well as activity tolerance for self care tasks. Short term goal 3: Patient to tolerate standing >7' while participating in functional task of choice to improve standing tolerane, balance and safety during ADL, mobility and transfers.   Short term goal 4: Pt will report and demo understanding of discharge recommendations and HEP. Short term goal 5: Pt will complete UB/LB dressing mod I with AE PRN for increased IND.        Therapy Time   Individual Concurrent Group Co-treatment   Time In  12:44 pm         Time Out  1:22 pm         Minutes  38                 HERBERT Fu/ADAMARIS

## 2021-11-19 NOTE — PROGRESS NOTES
Writer to bedside to complete morning assessment. Upon entry to room, pt awake in bed, respirations even and unlabored while on 2 liters of oxygen per nasal cannula. SpO2 only 90%, increased oxygen to 3 liters. Assessment completed, see flow sheet for details. Macario taylor applied. Room set up for cardioversion. Pt denies needs from writer at this time. Call light in reach. Will continue to monitor.

## 2021-11-19 NOTE — PROGRESS NOTES
Pt pulled cord in bathroom for assistance out of bathroom. Writer to bedside. Pt standby assist out of bathroom and up to chair. When portable monitor was docked it was noted that pt was in A Fib. EKG obtained at 1042. Will notify [facility] and Dr Lesia Downey.

## 2021-11-19 NOTE — PROGRESS NOTES
Patient is approved to go to [facility] anytime when she is medically stable.     PAWAN Joel

## 2021-11-19 NOTE — PROGRESS NOTES
Returned to bed from commode. Had a small loose brown stool. Lungs clear throughout. Heart tones strong and irregular. Slight ankle edema noted bilaterally. Monitor continues to show atrial fibrillation in 90's.   Remains NPO for cardio version in am.

## 2021-11-19 NOTE — PROGRESS NOTES
Writer reviewed telemetry strips, found that at 1014 pt was in [place] and then changed to A Fib while getting up to go to bathroom and was on portable Jd monitor.

## 2021-11-19 NOTE — PROGRESS NOTES
[facility] SPECIALTY [facility]  OCCUPATIONAL THERAPY  No Visit Note    [x] ICU    [] Acute   Patient: Ngoc Grey  [facility]      Ngoc Grey not seen on 11/19/2021 at 7:42 AM due to patient hold per RN due to cardio version this AM; Will check back this afternoon.          Signature: Abhinav Castro, OTR/L

## 2021-11-19 NOTE — OP NOTE
Brief Postoperative Note:    Patient name: Ninfa Dize   YOB: 1950   Date of Procedure: 11/19/2021   Medical Record Number: [id number]    Procedure: Cardioversion    Pre-operative Diagnosis: Persistent atrial fibrillation, symptomatic    Post-operative Diagnosis: Successful cardioversion to NSR with  200J biphasic    Anesthesia: Conscious sedation    Surgeons/Assistants: Jemal    Estimated Blood Loss: None    Complications: None    I asked Ms. Ragland to call my office if she had any problems, but otherwise to follow up with me in about a month as well as follow up with her primary care physician as previously scheduled. Ron Joaquin MD, MS, F.A.C.C.   [facility]  [address] [place] [address], [address] [place] [address]  Phone: [phone], Fax: [phone]       Electronically signed by Edvin Antonio MD on 11/19/2021 at 8:34 AM

## 2021-11-19 NOTE — PROGRESS NOTES
Called cardiology office, message left with staff for Dr Susan Carter that pt back in A. Fib, will fax EKG.

## 2021-11-19 NOTE — PROGRESS NOTES
Physical Therapy  Facility/Department: [facility] AT THE [facility]  Daily Treatment Note  NAME: Sasha Mckoy  : 1950  MRN: [id number]    Date of Service: 2021    Discharge Recommendations:  Continue to assess pending progress        Assessment   Treatment Diagnosis: general weakness  Prognosis: Good  PT Education: PT Role; General Safety; Transfer Training; Goals; Gait Training  Patient Education: Educated pt on above with fair to good understanding noted. REQUIRES PT FOLLOW UP: Yes  Activity Tolerance  Activity Tolerance: Patient Tolerated treatment well     Patient Diagnosis(es): The primary encounter diagnosis was Atrial fibrillation, unspecified type ([facility] Diagnoses of Congestive heart failure, unspecified HF chronicity, unspecified heart failure type ([facility] and Unable to care for self were also pertinent to this visit. has a past medical history of Cerebral artery occlusion with cerebral infarction [facility]), CHF (congestive heart failure) ([facility] Diabetes mellitus ([facility] H/O cardiac catheterization, H/O echocardiogram, History of echocardiogram, History of echocardiogram, Hyperlipidemia, and Hypertension. has a past surgical history that includes Cholecystectomy and Cardiac catheterization (Left, 2017). Restrictions  Restrictions/Precautions  Restrictions/Precautions: General Precautions, Fall Risk  Subjective   General  Chart Reviewed: Yes  Response To Previous Treatment: Patient with no complaints from previous session. Family / Caregiver Present: No  Referring Practitioner: Dr. Glenys Toro: Pt reports pain in R LE but is unable to rate. General Comment  Comments: Nursing Jenelle De La Torre) aware.           Orientation  Orientation  Overall Orientation Status: Within Normal Limits  Cognition      Objective      Transfers  Sit to Stand: Contact guard assistance  Stand to sit: Contact guard assistance  Comment: Verbal cues for hand placement with fair to good understanding noted. Ambulation  Ambulation?: Yes  Ambulation 1  Surface: level tile  Device: Rolling Walker  Assistance: Contact guard assistance  Distance: 30 feet x 1  Comments: Verbal cues for posture, to stay closer to AD, and for safety with turns. Pt with fair to good understanding of cues. Stairs/Curb  Stairs?: No     Balance  Posture: Fair  Sitting - Static: Good  Sitting - Dynamic: Good  Standing - Static: Fair  Standing - Dynamic: Fair  Exercises  Straight Leg Raise: 15x  Quad Sets: 15x  Heelslides: 15x  Gluteal Sets: 15x  Hip Abduction: 15x  Knee Short Arc Quad: 15x  Ankle Pumps: 15x  Comments: Above exercises completed while reclined in chair. Pt needed frequent rest breaks throughout treatment d/t fatigue. G-Code     OutComes Score    AM-PAC Score    Goals  Short term goals  Time Frame for Short term goals: 20 days  Short term goal 1: Pt will be educated onher POC  Short term goal 2: Pt will perform all transfers SBA in order to increase independence  Short term goal 3: Pt will ambulate >50 feet with FWW CGA in order to use the bathroom  Short term goal 4: Pt will increase dynamic standing balance ot Fair in order to reduce fall risk    Plan    Plan  Times per week: 7x/wk  Times per day: Daily  Plan weeks: 2x/daily except weekends 1x/daily  Current Treatment Recommendations: Strengthening, Neuromuscular Re-education, Home Exercise Program, Safety Education & Training, ROM, Balance Training, Endurance Training, Patient/Caregiver Education & Training, Functional Mobility Training, Transfer Training, Gait Training, Stair training  Safety Devices  Type of devices:  All fall risk precautions in place, Left in chair, Call light within reach, Nurse notified (No alarm upon entry and none needed per Roney Recinos RN.)     Therapy Time   Individual Concurrent Group Co-treatment   Time In [facility] 4397         Time Out 1534         Minutes [address]

## 2021-11-19 NOTE — PROGRESS NOTES
[facility]  Inpatient/Observation/Outpatient Rehabilitation    Date: 2021  Patient Name: Waleska Galloway       [x] Inpatient Acute/Observation       []  Outpatient  : 1950                    [x] Pt cancelled due to:  [] No Reason Given   [] Sick/ill   [x] Other: Hold per [facility]G d/t Cardioversion soon.            Ricarda Rosas, PTA Date: 2021

## 2021-11-19 NOTE — PROGRESS NOTES
Amiodarone drip stopped. IV lock flushed with 10 ml of normal saline. Quiet in bed. Perkins emptied of 900 ml of clear urine.

## 2021-11-20 VITALS
TEMPERATURE: 97.4 F | SYSTOLIC BLOOD PRESSURE: 88 MMHG | DIASTOLIC BLOOD PRESSURE: 51 MMHG | OXYGEN SATURATION: 91 % | RESPIRATION RATE: 20 BRPM | BODY MASS INDEX: 38.57 KG/M2 | WEIGHT: 209.6 LBS | HEART RATE: 91 BPM | HEIGHT: 62 IN

## 2021-11-20 LAB
ABSOLUTE EOS #: 0.09 K/UL (ref 0–0.44)
ABSOLUTE IMMATURE GRANULOCYTE: <0.03 K/UL (ref 0–0.3)
ABSOLUTE LYMPH #: 1.21 K/UL (ref 1.1–3.7)
ABSOLUTE MONO #: 0.65 K/UL (ref 0.1–1.2)
ANION GAP SERPL CALCULATED.3IONS-SCNC: 13 MMOL/L (ref 9–17)
BASOPHILS # BLD: 1 % (ref 0–2)
BASOPHILS ABSOLUTE: 0.04 K/UL (ref 0–0.2)
BUN BLDV-MCNC: 31 MG/DL (ref 8–23)
BUN/CREAT BLD: 18 (ref 9–20)
CALCIUM SERPL-MCNC: 9.5 MG/DL (ref 8.6–10.4)
CHLORIDE BLD-SCNC: 99 MMOL/L (ref 98–107)
CO2: 25 MMOL/L (ref 20–31)
CREAT SERPL-MCNC: 1.75 MG/DL (ref 0.5–0.9)
DIFFERENTIAL TYPE: ABNORMAL
EOSINOPHILS RELATIVE PERCENT: 1 % (ref 1–4)
GFR AFRICAN AMERICAN: 35 ML/MIN
GFR NON-AFRICAN AMERICAN: 29 ML/MIN
GFR SERPL CREATININE-BSD FRML MDRD: ABNORMAL ML/MIN/{1.73_M2}
GFR SERPL CREATININE-BSD FRML MDRD: ABNORMAL ML/MIN/{1.73_M2}
GLUCOSE BLD-MCNC: 107 MG/DL (ref 74–100)
GLUCOSE BLD-MCNC: 113 MG/DL (ref 74–100)
GLUCOSE BLD-MCNC: 126 MG/DL (ref 70–99)
HCT VFR BLD CALC: 39.3 % (ref 36.3–47.1)
HEMOGLOBIN: 13 G/DL (ref 11.9–15.1)
IMMATURE GRANULOCYTES: 0 %
LYMPHOCYTES # BLD: 18 % (ref 24–43)
MAGNESIUM: 1.5 MG/DL (ref 1.6–2.6)
MCH RBC QN AUTO: 30.1 PG (ref 25.2–33.5)
MCHC RBC AUTO-ENTMCNC: 33.1 G/DL (ref 28.4–34.8)
MCV RBC AUTO: 91 FL (ref 82.6–102.9)
MONOCYTES # BLD: 10 % (ref 3–12)
NRBC AUTOMATED: 0 PER 100 WBC
PDW BLD-RTO: 13.5 % (ref 11.8–14.4)
PLATELET # BLD: 227 K/UL (ref 138–453)
PLATELET ESTIMATE: ABNORMAL
PMV BLD AUTO: 11.4 FL (ref 8.1–13.5)
POTASSIUM SERPL-SCNC: 3.3 MMOL/L (ref 3.7–5.3)
RBC # BLD: 4.32 M/UL (ref 3.95–5.11)
RBC # BLD: ABNORMAL 10*6/UL
SEG NEUTROPHILS: 70 % (ref 36–65)
SEGMENTED NEUTROPHILS ABSOLUTE COUNT: 4.58 K/UL (ref 1.5–8.1)
SODIUM BLD-SCNC: 137 MMOL/L (ref 135–144)
WBC # BLD: 6.6 K/UL (ref 3.5–11.3)
WBC # BLD: ABNORMAL 10*3/UL

## 2021-11-20 PROCEDURE — 36415 COLL VENOUS BLD VENIPUNCTURE: CPT

## 2021-11-20 PROCEDURE — 6370000000 HC RX 637 (ALT 250 FOR IP): Performed by: NURSE PRACTITIONER

## 2021-11-20 PROCEDURE — 97116 GAIT TRAINING THERAPY: CPT

## 2021-11-20 PROCEDURE — 80048 BASIC METABOLIC PNL TOTAL CA: CPT

## 2021-11-20 PROCEDURE — 97110 THERAPEUTIC EXERCISES: CPT

## 2021-11-20 PROCEDURE — 94761 N-INVAS EAR/PLS OXIMETRY MLT: CPT

## 2021-11-20 PROCEDURE — 6370000000 HC RX 637 (ALT 250 FOR IP): Performed by: FAMILY MEDICINE

## 2021-11-20 PROCEDURE — 85025 COMPLETE CBC W/AUTO DIFF WBC: CPT

## 2021-11-20 PROCEDURE — 83735 ASSAY OF MAGNESIUM: CPT

## 2021-11-20 PROCEDURE — 2700000000 HC OXYGEN THERAPY PER DAY

## 2021-11-20 PROCEDURE — 2580000003 HC RX 258: Performed by: NURSE PRACTITIONER

## 2021-11-20 PROCEDURE — 82947 ASSAY GLUCOSE BLOOD QUANT: CPT

## 2021-11-20 PROCEDURE — 6370000000 HC RX 637 (ALT 250 FOR IP): Performed by: INTERNAL MEDICINE

## 2021-11-20 PROCEDURE — 97535 SELF CARE MNGMENT TRAINING: CPT

## 2021-11-20 RX ORDER — AMIODARONE HYDROCHLORIDE 200 MG/1
200 TABLET ORAL 2 TIMES DAILY
DISCHARGE
Start: 2021-11-20 | End: 2022-04-25

## 2021-11-20 RX ORDER — FUROSEMIDE 40 MG/1
40 TABLET ORAL DAILY
Qty: 60 TABLET | Refills: 3 | DISCHARGE
Start: 2021-11-21

## 2021-11-20 RX ORDER — METOPROLOL SUCCINATE 200 MG/1
200 TABLET, EXTENDED RELEASE ORAL DAILY
Qty: 30 TABLET | Refills: 3 | DISCHARGE
Start: 2021-11-21

## 2021-11-20 RX ORDER — FUROSEMIDE 40 MG/1
40 TABLET ORAL DAILY
Status: DISCONTINUED | OUTPATIENT
Start: 2021-11-20 | End: 2021-11-20 | Stop reason: HOSPADM

## 2021-11-20 RX ORDER — POTASSIUM CHLORIDE 7.45 MG/ML
10 INJECTION INTRAVENOUS PRN
Status: DISCONTINUED | OUTPATIENT
Start: 2021-11-20 | End: 2021-11-20 | Stop reason: HOSPADM

## 2021-11-20 RX ORDER — ALPRAZOLAM 0.25 MG/1
0.25 TABLET ORAL 2 TIMES DAILY PRN
Status: SHIPPED | OUTPATIENT
Start: 2021-11-20 | End: 2021-12-20

## 2021-11-20 RX ORDER — POTASSIUM CHLORIDE 20 MEQ/1
40 TABLET, EXTENDED RELEASE ORAL PRN
Status: DISCONTINUED | OUTPATIENT
Start: 2021-11-20 | End: 2021-11-20 | Stop reason: HOSPADM

## 2021-11-20 RX ORDER — GABAPENTIN 300 MG/1
300 CAPSULE ORAL 3 TIMES DAILY
Qty: 90 CAPSULE | Refills: 3 | DISCHARGE
Start: 2021-11-20 | End: 2022-10-06

## 2021-11-20 RX ORDER — POTASSIUM CHLORIDE 20 MEQ/1
20 TABLET, EXTENDED RELEASE ORAL DAILY
Qty: 30 TABLET | Refills: 0 | DISCHARGE
Start: 2021-11-20

## 2021-11-20 RX ORDER — SERTRALINE HYDROCHLORIDE 25 MG/1
25 TABLET, FILM COATED ORAL DAILY
Qty: 30 TABLET | Refills: 3 | DISCHARGE
Start: 2021-11-21

## 2021-11-20 RX ADMIN — FAMOTIDINE 20 MG: 20 TABLET, FILM COATED ORAL at 07:49

## 2021-11-20 RX ADMIN — ALPRAZOLAM 0.25 MG: 0.25 TABLET ORAL at 12:57

## 2021-11-20 RX ADMIN — SODIUM CHLORIDE, PRESERVATIVE FREE 10 ML: 5 INJECTION INTRAVENOUS at 07:52

## 2021-11-20 RX ADMIN — ACETAMINOPHEN 650 MG: 325 TABLET ORAL at 12:57

## 2021-11-20 RX ADMIN — HYDROCHLOROTHIAZIDE 25 MG: 25 TABLET ORAL at 07:49

## 2021-11-20 RX ADMIN — GABAPENTIN 300 MG: 300 CAPSULE ORAL at 12:57

## 2021-11-20 RX ADMIN — METFORMIN HYDROCHLORIDE 500 MG: 500 TABLET ORAL at 07:48

## 2021-11-20 RX ADMIN — APIXABAN 5 MG: 5 TABLET, FILM COATED ORAL at 07:48

## 2021-11-20 RX ADMIN — BUSPIRONE HYDROCHLORIDE 10 MG: 5 TABLET ORAL at 07:48

## 2021-11-20 RX ADMIN — LOSARTAN POTASSIUM 100 MG: 50 TABLET, FILM COATED ORAL at 07:48

## 2021-11-20 RX ADMIN — SERTRALINE 25 MG: 25 TABLET, FILM COATED ORAL at 07:48

## 2021-11-20 RX ADMIN — LOPERAMIDE HYDROCHLORIDE 2 MG: 2 CAPSULE ORAL at 09:04

## 2021-11-20 RX ADMIN — POTASSIUM CHLORIDE 40 MEQ: 1500 TABLET, EXTENDED RELEASE ORAL at 07:53

## 2021-11-20 RX ADMIN — ATORVASTATIN CALCIUM 10 MG: 10 TABLET, FILM COATED ORAL at 07:48

## 2021-11-20 RX ADMIN — AMIODARONE HYDROCHLORIDE 200 MG: 200 TABLET ORAL at 07:48

## 2021-11-20 RX ADMIN — FUROSEMIDE 40 MG: 40 TABLET ORAL at 09:00

## 2021-11-20 RX ADMIN — GABAPENTIN 300 MG: 300 CAPSULE ORAL at 07:49

## 2021-11-20 RX ADMIN — METOPROLOL SUCCINATE 200 MG: 100 TABLET, EXTENDED RELEASE ORAL at 07:48

## 2021-11-20 ASSESSMENT — PAIN SCALES - GENERAL: PAINLEVEL_OUTOF10: 3

## 2021-11-20 NOTE — PROGRESS NOTES
Daughter called at this time about discharge to [facility]. Message left, trying to figure out transportation for discharge. Will continue to monitor.

## 2021-11-20 NOTE — PROGRESS NOTES
Dr. Bard Bonilla updated at this time on patient condition and possible discharge to [facility] today.

## 2021-11-20 NOTE — PROGRESS NOTES
Physical Therapy  Facility/Department: [facility] AT THE [facility]  Daily Treatment Note  NAME: Rina Aschoff  : 1950  MRN: [id number]    Date of Service: 2021    Discharge Recommendations:  Continue to assess pending progress        Assessment   Treatment Diagnosis: general weakness  Prognosis: Good  Activity Tolerance  Activity Tolerance: Patient Tolerated treatment well     Patient Diagnosis(es): The primary encounter diagnosis was Atrial fibrillation, unspecified type ([facility] Diagnoses of Congestive heart failure, unspecified HF chronicity, unspecified heart failure type ([facility] Unable to care for self, and Anxiety were also pertinent to this visit. has a past medical history of Cerebral artery occlusion with cerebral infarction [facility]), CHF (congestive heart failure) ([facility] Diabetes mellitus ([facility] H/O cardiac catheterization, H/O echocardiogram, History of echocardiogram, History of echocardiogram, Hyperlipidemia, and Hypertension. has a past surgical history that includes Cholecystectomy and Cardiac catheterization (Left, 2017). Restrictions  Restrictions/Precautions  Restrictions/Precautions: General Precautions, Fall Risk  Subjective   General  Chart Reviewed: Yes  Response To Previous Treatment: Patient with no complaints from previous session. Family / Caregiver Present: No  Referring Practitioner: Dr. Geoff Best: Patient agreeable to therapy at this time. Orientation  Orientation  Overall Orientation Status: Within Normal Limits  Cognition      Objective   Bed mobility  Comment: Pt up in chair upon arrival.  Transfers  Sit to Stand: Contact guard assistance  Stand to sit: Contact guard assistance  Stand Pivot Transfers: Contact guard assistance  Comment: Verbal cues for hand placement with fair to good understanding noted.   Ambulation  Ambulation?: Yes  Ambulation 1  Surface: level tile  Device: Rolling Walker  Assistance: Contact guard assistance  Distance: 30 feet x 1  Comments: Verbal cues for posture, to stay closer to AD, and for safety with turns. Pt with fair to good understanding of cues. Balance  Sitting - Static: Good  Sitting - Dynamic: Good  Standing - Static: Fair  Standing - Dynamic: Fair  Exercises  Straight Leg Raise: 15x  Quad Sets: 15x  Heelslides: 15x  Gluteal Sets: 15x  Hip Abduction: 15x  Knee Short Arc Quad: 15x  Ankle Pumps: 15x  Comments: Above exercises completed while reclined in chair. Goals  Short term goals  Time Frame for Short term goals: 20 days  Short term goal 1: Pt will be educated onher POC  Short term goal 2: Pt will perform all transfers SBA in order to increase independence  Short term goal 3: Pt will ambulate >50 feet with FWW CGA in order to use the bathroom  Short term goal 4: Pt will increase dynamic standing balance ot Fair in order to reduce fall risk    Plan    Plan  Times per week: 7x/wk  Times per day: Daily  Plan weeks: 2x/daily except weekends 1x/daily  Current Treatment Recommendations: Strengthening, Neuromuscular Re-education, Home Exercise Program, Safety Education & Training, ROM, Balance Training, Endurance Training, Patient/Caregiver Education & Training, Functional Mobility Training, Transfer Training, Gait Training, Stair training  Safety Devices  Type of devices:  All fall risk precautions in place, Left in chair, Call light within reach, Nurse notified     Therapy Time   Individual Concurrent Group Co-treatment   Time In [address] [place] [address] Drive         Time Out 1222         Minutes 23         Timed Code Treatment Minutes: [address], PT, DPT

## 2021-11-20 NOTE — PLAN OF CARE
Problem: Falls - Risk of:  Goal: Will remain free from falls  Description: Will remain free from falls  Outcome: Completed  Goal: Absence of physical injury  Description: Absence of physical injury  Outcome: Completed     Problem: Nutrition  Goal: Optimal nutrition therapy  Outcome: Completed     Problem: Skin Integrity:  Goal: Will show no infection signs and symptoms  Description: Will show no infection signs and symptoms  Outcome: Completed  Goal: Absence of new skin breakdown  Description: Absence of new skin breakdown  Outcome: Completed     Problem: Pain:  Description: Pain management should include both nonpharmacologic and pharmacologic interventions.   Goal: Pain level will decrease  Description: Pain level will decrease  Outcome: Completed  Goal: Control of acute pain  Description: Control of acute pain  Outcome: Completed  Goal: Control of chronic pain  Description: Control of chronic pain  Outcome: Completed

## 2021-11-20 NOTE — PROGRESS NOTES
Reassessment completed at this time. When taking pulse ox patient was 86% on room air while laying in bed. Patient was assisted to commode. pulse ox increased to 93% while patient was up to commode. Bath given at this time as well. Patient returned to bed and placed on 2L nasal cannula while sleeping. Will continue to monitor pulse ox.

## 2021-11-20 NOTE — DISCHARGE SUMMARY
Discharge Summary    Sherry Rodriguez  :  1950  MRN:  [id number]    Admit date:  2021      Discharge date:   2021    Admitting Physician:  Shahram Mancilla MD    Discharge Diagnoses:      Principal Problem:    Atrial fibrillation with RVR [facility])  Active Problems: Moderate mitral regurgitation by prior echocardiogram    Acute on chronic combined systolic and diastolic CHF, NYHA class 4 (HCC)    Hypoxia    Type 2 diabetes mellitus, without long-term current use of insulin ([address]    Unable to care for self  Resolved Problems:    * No resolved hospital problems. *      Active Hospital Problems    Diagnosis Date Noted    Moderate mitral regurgitation by prior echocardiogram [I34.0]      Priority: High    Unable to care for self [Z78.9] 2021    Atrial fibrillation with RVR ([address] [I48.91] 2021    Type 2 diabetes mellitus, without long-term current use of insulin ([address] [E11.9] 2020    Hypoxia [R09.02] 2018    Acute on chronic combined systolic and diastolic CHF, NYHA class 4 ([address] [I50.43] 2018       Discharge Medications:         Medication List      START taking these medications    ALPRAZolam 0.25 MG tablet  Commonly known as: XANAX  Take 1 tablet by mouth 2 times daily as needed for Anxiety or Sleep for up to 30 days. amiodarone 200 MG tablet  Commonly known as: CORDARONE  Take 1 tablet by mouth 2 times daily     furosemide 40 MG tablet  Commonly known as: LASIX  Take 1 tablet by mouth daily  Start taking on: 2021     potassium chloride 20 MEQ extended release tablet  Commonly known as: KLOR-CON M  Take 1 tablet by mouth daily     sertraline 25 MG tablet  Commonly known as: ZOLOFT  Take 1 tablet by mouth daily  Start taking on: 2021        CHANGE how you take these medications    gabapentin 300 MG capsule  Commonly known as: NEURONTIN  Take 1 capsule by mouth 3 times daily for 30 days.   What changed:   · medication strength  · how much to take     metoprolol succinate 200 MG extended release tablet  Commonly known as: TOPROL XL  Take 1 tablet by mouth daily  Start taking on: November 21, 2021  What changed:   · medication strength  · how much to take        CONTINUE taking these medications    amLODIPine 10 MG tablet  Commonly known as: NORVASC  Take 1 tablet by mouth every evening     apixaban 5 MG Tabs tablet  Commonly known as: Eliquis  Take 1 tablet by mouth 2 times daily     atorvastatin 80 MG tablet  Commonly known as: LIPITOR  TAKE ONE TABLET BY MOUTH ONCE NIGHTLY     busPIRone 10 MG tablet  Commonly known as: BUSPAR     famotidine 20 MG tablet  Commonly known as: PEPCID     losartan-hydroCHLOROthiazide 100-25 MG per tablet  Commonly known as: HYZAAR  Take 1 tablet by mouth daily     metFORMIN 500 MG tablet  Commonly known as: GLUCOPHAGE     miconazole 2 % powder  Commonly known as: MICOTIN  Apply topically 2 times daily. Where to Get Your Medications      You can get these medications from any pharmacy    Bring a paper prescription for each of these medications  · ALPRAZolam 0.25 MG tablet     Information about where to get these medications is not yet available    Ask your nurse or doctor about these medications  · amiodarone 200 MG tablet  · furosemide 40 MG tablet  · gabapentin 300 MG capsule  · metoprolol succinate 200 MG extended release tablet  · potassium chloride 20 MEQ extended release tablet  · sertraline 25 MG tablet         Consultants:  cardiology     Hospital Course:   Ninfa Diez is a 73 y.o. female admitted with atrial fib with RVR. Cardioversion. Went back into A. fib. On amiodarone. Exam: Regular. Rate controlled. Clear lung sounds. No edema.     Condition: Fair    Disposition:   Nursing home skilled care    DC summary time : 35 minutes    Patient will be followed by SUE Ballard CNP in 1-2 weeks    Signed:  Brayan Benavides MD  11/20/2021, 10:36 AM

## 2021-11-20 NOTE — PROGRESS NOTES
Patient one assist with walker to bathroom. Gait steady but weak. Patient returns to chair. Denies any pain.

## 2021-11-22 ENCOUNTER — OUTSIDE SERVICES (OUTPATIENT)
Dept: PRIMARY CARE CLINIC | Age: 71
End: 2021-11-22
Payer: MEDICARE

## 2021-11-22 DIAGNOSIS — N18.32 TYPE 2 DIABETES MELLITUS WITH STAGE 3B CHRONIC KIDNEY DISEASE, WITHOUT LONG-TERM CURRENT USE OF INSULIN (HCC): ICD-10-CM

## 2021-11-22 DIAGNOSIS — I34.0 MODERATE MITRAL REGURGITATION BY PRIOR ECHOCARDIOGRAM: ICD-10-CM

## 2021-11-22 DIAGNOSIS — I48.20 CHRONIC ATRIAL FIBRILLATION (HCC): ICD-10-CM

## 2021-11-22 DIAGNOSIS — I10 ESSENTIAL HYPERTENSION: ICD-10-CM

## 2021-11-22 DIAGNOSIS — E11.22 TYPE 2 DIABETES MELLITUS WITH STAGE 3B CHRONIC KIDNEY DISEASE, WITHOUT LONG-TERM CURRENT USE OF INSULIN (HCC): ICD-10-CM

## 2021-11-22 DIAGNOSIS — I50.43 ACUTE ON CHRONIC COMBINED SYSTOLIC AND DIASTOLIC CHF, NYHA CLASS 4 (HCC): Primary | ICD-10-CM

## 2021-11-22 PROCEDURE — 99306 1ST NF CARE HIGH MDM 50: CPT | Performed by: FAMILY MEDICINE

## 2021-11-22 NOTE — PROGRESS NOTES
mg by mouth 2 times daily      amLODIPine (NORVASC) 10 MG tablet Take 1 tablet by mouth every evening 90 tablet 3    miconazole (MICOTIN) 2 % powder Apply topically 2 times daily. 45 g 1    metFORMIN (GLUCOPHAGE) 500 MG tablet Take 500 mg by mouth 2 times daily (with meals)       No current facility-administered medications for this visit. Allergies:  Patient has no known allergies. Past Medical History:    Past Medical History:   Diagnosis Date    [facility]     CHF (congestive heart failure) (HCC)     Diabetes mellitus ([facility]     H/O cardiac catheterization 08/04/2017    LMCA: Mild irregularites 10-20%. LAD: Mild irregularites 10-20%. LCx: Mild irregularites 10-20%. RCA: Mild irregularites 10-20%. LV function assessed as : Abnormal. EF of 40%.  H/O echocardiogram 12/18/2018    EF 55%. Mildly increased LV wall thickness. The left atrium appears moderately to severely dilated. Moderate to severe mitral regurg. Moderate pulmonary HTN with an estimated RV systolic pressure of 98WOGU. Moderate tricuspid regurg. Evidnece fo moderate diastolic dysfunction is seen.  History of echocardiogram 01/17/2019    EF 55%. LV wall thickness moderately increased. LA is mildly dilated w/ LA volume index of 30. trivial mitral regurg. Evidence of moderate (grade II) diastolic dysfunction seen.  History of echocardiogram 06/03/2019    EF of 50-55%. LV wall thickness is mildly increased. LA is mildly dilated (29-33) with a left atrial volume index of 31 m1/m2. mild diastolic dysfunction.  Hyperlipidemia     Hypertension        Past Surgical History:    Past Surgical History:   Procedure Laterality Date    CARDIAC CATHETERIZATION Left 08/04/2017    right radial, T Dr. Kunal Mitchell         Social History:   Social History     Tobacco Use    Smoking status: Never Smoker    Smokeless tobacco: Never Used   Substance Use Topics    Alcohol use:  No Family History:   Family History   Problem Relation Age of Onset    Coronary Art Dis Father          from [place]   Marycruz Safer COPD Sister         O2 dep    Coronary Art Dis Brother         2 brothers  from [place]           Review of Systems:  Constitutional: negative for fevers or chills  Eyes: negative for visual disturbance   ENT: negative for sore throat or nasal congestion,neg for dysphagia  Respiratory: neg for shortness of breath , cough  Cardiovascular: neg for for chest pain , palpitations,pnd,neg for leg edema  Gastrointestinal: neg for abd pain, nausea, vomiting, diarrhea or constipation,no monalisa,no blood in stool  Genitourinary: negative for dysuria, urgency,hematuria or frequency  Integument/breast: negative for skin rash or lesions  Neurological: negative for unilateral weakness, numbness or tingling. Muscular Skeletal: no joint pain   Psych -mood stable    Objective:    Vitals:   BP-111/74,Pulse-94,Respi-12,Temp-97.8  -----------------------------------------------------------------  Exam:  GEN:   A & O x3, no apparent distress  EYES: No gross abnormalities. ENT:ENT exam normal, no neck nodes or sinus tenderness  NECK: normal,  no carotid bruits  PULM: clear to auscultation bilaterally- no wheezes, rales or rhonchi, normal air movement, no respiratory distress  COR: Iregular rate & rhythm, 2/6 murmur and no gallops,no edema  ABD:  soft, non-tender, non-distended, normal bowel sounds, no masses or organomegaly  : no cva or flank tendernes  EXT:no cyanosis, clubbing or edema present  no calf tenderness, and warm to touch. NEURO: Motor and sensory grossly intact  PSYCH: mood stable  SKIN:  No skin lesions or rashes    -----------------------------------------------------------------  Diagnostic Data:   · All diagnostic data was reviewed    Assessment:        ICD-10-CM    1. Acute on chronic combined systolic and diastolic CHF, NYHA class 4 ([facility])  I50.43    2.  Chronic atrial fibrillation

## 2021-11-24 ENCOUNTER — OUTSIDE SERVICES (OUTPATIENT)
Dept: PRIMARY CARE CLINIC | Age: 71
End: 2021-11-24

## 2021-11-24 DIAGNOSIS — N18.32 TYPE 2 DIABETES MELLITUS WITH STAGE 3B CHRONIC KIDNEY DISEASE, WITHOUT LONG-TERM CURRENT USE OF INSULIN (HCC): ICD-10-CM

## 2021-11-24 DIAGNOSIS — I50.43 ACUTE ON CHRONIC COMBINED SYSTOLIC AND DIASTOLIC CHF, NYHA CLASS 4 (HCC): Primary | ICD-10-CM

## 2021-11-24 DIAGNOSIS — I10 ESSENTIAL HYPERTENSION: ICD-10-CM

## 2021-11-24 DIAGNOSIS — I48.20 CHRONIC ATRIAL FIBRILLATION (HCC): ICD-10-CM

## 2021-11-24 DIAGNOSIS — E11.22 TYPE 2 DIABETES MELLITUS WITH STAGE 3B CHRONIC KIDNEY DISEASE, WITHOUT LONG-TERM CURRENT USE OF INSULIN (HCC): ICD-10-CM

## 2021-11-24 PROCEDURE — 99308 SBSQ NF CARE LOW MDM 20: CPT | Performed by: FAMILY MEDICINE

## 2021-11-24 NOTE — PROGRESS NOTES
Patient:  Phill Miller, 1950  I saw this patient on 11/24/2021  , at [facility] [place]. She had afib with rvr and chf and admitted here for rehab. She has some weakness of rt arm and leg. Declines speech therapy. Reason for Visit:      ICD-10-CM    1. Acute on chronic combined systolic and diastolic CHF, NYHA class 4 ([facility])  I50.43    2. Chronic atrial fibrillation ([facility])  I48.20    3. Moderate mitral regurgitation by prior echocardiogram  I34.0    4. Essential hypertension  I10    5. Type 2 diabetes mellitus with stage 3b chronic kidney disease, without long-term current use of insulin ([facility])  E11.22     N18.32        Changes since last visit:    has weakness of rt arm and leg,insomnia  1. Fall:  none  2. Behavioral Change: none  3. Pain Control: no isues  4. Mobility: improving  5. Pressure Sore:  none  Current Outpatient Medications   Medication Sig Dispense Refill    ALPRAZolam (XANAX) 0.25 MG tablet Take 1 tablet by mouth 2 times daily as needed for Anxiety or Sleep for up to 30 days.  amiodarone (CORDARONE) 200 MG tablet Take 1 tablet by mouth 2 times daily      sertraline (ZOLOFT) 25 MG tablet Take 1 tablet by mouth daily 30 tablet 3    metoprolol succinate (TOPROL XL) 200 MG extended release tablet Take 1 tablet by mouth daily 30 tablet 3    furosemide (LASIX) 40 MG tablet Take 1 tablet by mouth daily 60 tablet 3    gabapentin (NEURONTIN) 300 MG capsule Take 1 capsule by mouth 3 times daily for 30 days.  90 capsule 3    potassium chloride (KLOR-CON M) 20 MEQ extended release tablet Take 1 tablet by mouth daily 30 tablet 0    losartan-hydroCHLOROthiazide (HYZAAR) 100-25 MG per tablet Take 1 tablet by mouth daily 90 tablet 3    atorvastatin (LIPITOR) 80 MG tablet TAKE ONE TABLET BY MOUTH ONCE NIGHTLY 90 tablet 3    apixaban (ELIQUIS) 5 MG TABS tablet Take 1 tablet by mouth 2 times daily 180 tablet 3    busPIRone (BUSPAR) 10 MG tablet Take 10 mg by mouth 2 times daily  famotidine (PEPCID) 20 MG tablet Take 20 mg by mouth 2 times daily      amLODIPine (NORVASC) 10 MG tablet Take 1 tablet by mouth every evening 90 tablet 3    miconazole (MICOTIN) 2 % powder Apply topically 2 times daily. 45 g 1    metFORMIN (GLUCOPHAGE) 500 MG tablet Take 500 mg by mouth 2 times daily (with meals)       No current facility-administered medications for this visit. Allergies:  Patient has no known allergies. Past Medical History:    Past Medical History:   Diagnosis Date    Cerebral artery occlusion with cerebral infarction ([facility]     CHF (congestive heart failure) (HCC)     Diabetes mellitus ([facility]     H/O cardiac catheterization 08/04/2017    LMCA: Mild irregularites 10-20%. LAD: Mild irregularites 10-20%. LCx: Mild irregularites 10-20%. RCA: Mild irregularites 10-20%. LV function assessed as : Abnormal. EF of 40%.  H/O echocardiogram 12/18/2018    EF 55%. Mildly increased LV wall thickness. The left atrium appears moderately to severely dilated. Moderate to severe mitral regurg. Moderate pulmonary HTN with an estimated RV systolic pressure of 44NVOI. Moderate tricuspid regurg. Evidnece fo moderate diastolic dysfunction is seen.  History of echocardiogram 01/17/2019    EF 55%. LV wall thickness moderately increased. LA is mildly dilated w/ LA volume index of 30. trivial mitral regurg. Evidence of moderate (grade II) diastolic dysfunction seen.  History of echocardiogram 06/03/2019    EF of 50-55%. LV wall thickness is mildly increased. LA is mildly dilated (29-33) with a left atrial volume index of 31 m1/m2. mild diastolic dysfunction.     Hyperlipidemia     Hypertension        Past Surgical History:    Past Surgical History:   Procedure Laterality Date    CARDIAC CATHETERIZATION Left 08/04/2017    right radial, T Dr. Radha Kimble         Social History:   Social History     Tobacco Use    Smoking status: Never Smoker    Smokeless tobacco: Never Used Substance Use Topics    Alcohol use: Yes     Alcohol/week: 1.0 standard drink     Types: 1 Glasses of wine per week       Family History:   Family History   Problem Relation Age of Onset    Coronary Art Dis Father          from [place]   [employer] COPD Sister         O2 dep    Coronary Art Dis Brother         2 brothers  from [place]           Review of Systems:  Constitutional: negative for fevers or chills  Eyes: negative for visual disturbance   ENT: negative for sore throat or nasal congestion,neg for dysphagia  Respiratory: neg for shortness of breath , cough  Cardiovascular: neg for for chest pain , palpitations,pnd,neg for leg edema  Gastrointestinal: neg for abd pain, nausea, vomiting, diarrhea or constipation,no monalisa,no blood in stool  Genitourinary: negative for dysuria, urgency,hematuria or frequency  Integument/breast: negative for skin rash or lesions  Neurological: negative for unilateral weakness, numbness or tingling. Muscular Skeletal: no joint pain   Psych -mood stable    Objective:    Vitals:   BP-111/74,Pulse-94,Respi-12,Temp-97.8  -----------------------------------------------------------------  Exam:  GEN:   A & O x3, no apparent distress  EYES: No gross abnormalities. ENT:ENT exam normal, no neck nodes or sinus tenderness  NECK: normal,  no carotid bruits  PULM: clear to auscultation bilaterally- no wheezes, rales or rhonchi, normal air movement, no respiratory distress  COR: Iregular rate & rhythm, 2/6 murmur and no gallops,no edema  ABD:  soft, non-tender, non-distended, normal bowel sounds, no masses or organomegaly  : no cva or flank tendernes  EXT:no cyanosis, clubbing or edema present  no calf tenderness, and warm to touch.    NEURO: Motor -has minimal weakness rt arm and leg and sensory grossly intact  PSYCH: mood stable  SKIN:  No skin lesions or rashes    -----------------------------------------------------------------  Diagnostic Data:   · All diagnostic data was falls    Electronically signed by Dain Gracia MD on 11/24/2021 at 12:06 PM

## 2021-11-24 NOTE — PROGRESS NOTES
Patient:  Ngoc Grey, 1950  I saw this patient on  11/24/2021, at [facility]. She is about same. Tolerating pt well      Reason for Visit:      ICD-10-CM    1. Acute on chronic combined systolic and diastolic CHF, NYHA class 4 ([facility])  I50.43    2. Chronic atrial fibrillation ([facility])  I48.20    3. Essential hypertension  I10    4. Type 2 diabetes mellitus with stage 3b chronic kidney disease, without long-term current use of insulin ([facility])  E11.22     N18.32        Changes since last visit:    weakness improving  1. Fall:  none  2. Behavioral Change: none  3. Pain Control: no isues  4. Mobility: improving  5. Pressure Sore:  none  Current Outpatient Medications   Medication Sig Dispense Refill    ALPRAZolam (XANAX) 0.25 MG tablet Take 1 tablet by mouth 2 times daily as needed for Anxiety or Sleep for up to 30 days.  amiodarone (CORDARONE) 200 MG tablet Take 1 tablet by mouth 2 times daily      sertraline (ZOLOFT) 25 MG tablet Take 1 tablet by mouth daily 30 tablet 3    metoprolol succinate (TOPROL XL) 200 MG extended release tablet Take 1 tablet by mouth daily 30 tablet 3    furosemide (LASIX) 40 MG tablet Take 1 tablet by mouth daily 60 tablet 3    gabapentin (NEURONTIN) 300 MG capsule Take 1 capsule by mouth 3 times daily for 30 days.  90 capsule 3    potassium chloride (KLOR-CON M) 20 MEQ extended release tablet Take 1 tablet by mouth daily 30 tablet 0    losartan-hydroCHLOROthiazide (HYZAAR) 100-25 MG per tablet Take 1 tablet by mouth daily 90 tablet 3    atorvastatin (LIPITOR) 80 MG tablet TAKE ONE TABLET BY MOUTH ONCE NIGHTLY 90 tablet 3    apixaban (ELIQUIS) 5 MG TABS tablet Take 1 tablet by mouth 2 times daily 180 tablet 3    busPIRone (BUSPAR) 10 MG tablet Take 10 mg by mouth 2 times daily      famotidine (PEPCID) 20 MG tablet Take 20 mg by mouth 2 times daily      amLODIPine (NORVASC) 10 MG tablet Take 1 tablet by mouth every evening 90 tablet 3    miconazole (MICOTIN) 2 % powder Apply topically 2 times daily. 45 g 1    metFORMIN (GLUCOPHAGE) 500 MG tablet Take 500 mg by mouth 2 times daily (with meals)       No current facility-administered medications for this visit. Allergies:  Patient has no known allergies. Past Medical History:    Past Medical History:   Diagnosis Date    [facility]     CHF (congestive heart failure) (HCC)     [facility]     H/O cardiac catheterization 08/04/2017    LMCA: Mild irregularites 10-20%. LAD: Mild irregularites 10-20%. LCx: Mild irregularites 10-20%. RCA: Mild irregularites 10-20%. LV function assessed as : Abnormal. EF of 40%.  H/O echocardiogram 12/18/2018    EF 55%. Mildly increased LV wall thickness. The left atrium appears moderately to severely dilated. Moderate to severe mitral regurg. Moderate pulmonary HTN with an estimated RV systolic pressure of 03LAVE. Moderate tricuspid regurg. Evidnece fo moderate diastolic dysfunction is seen.  History of echocardiogram 01/17/2019    EF 55%. LV wall thickness moderately increased. LA is mildly dilated w/ LA volume index of 30. trivial mitral regurg. Evidence of moderate (grade II) diastolic dysfunction seen.  History of echocardiogram 06/03/2019    EF of 50-55%. LV wall thickness is mildly increased. LA is mildly dilated (29-33) with a left atrial volume index of 31 m1/m2. mild diastolic dysfunction.  Hyperlipidemia     Hypertension        Past Surgical History:    Past Surgical History:   Procedure Laterality Date    CARDIAC CATHETERIZATION Left 08/04/2017    right radial, [facility] Dr. Elmer King         Social History:   Social History     Tobacco Use    Smoking status: Never Smoker    Smokeless tobacco: Never Used   Substance Use Topics    Alcohol use:  Yes     Alcohol/week: 1.0 standard drink     Types: 1 Glasses of wine per week       Family History:   Family History   Problem Relation Age of Onset    Coronary Art Dis Father          from [place]   [employer] COPD Sister         O2 dep    Coronary Art Dis Brother         2 brothers  from [place]           Review of Systems:  Constitutional: negative for fevers or chills  Eyes: negative for visual disturbance   ENT: negative for sore throat or nasal congestion,neg for dysphagia  Respiratory: neg for shortness of breath , cough  Cardiovascular: neg for for chest pain , palpitations,pnd,neg for leg edema  Gastrointestinal: neg for abd pain, nausea, vomiting, diarrhea or constipation,no monalisa,no blood in stool  Genitourinary: negative for dysuria, urgency,hematuria or frequency  Integument/breast: negative for skin rash or lesions  Neurological: positive for unilateral weakness of rt arm and leg, neg for numbness or tingling. Muscular Skeletal: no joint pain   Psych -mood stable    Objective:    Vitals:   BP-122/94,Pulse-84,Respi-20,Temp-98.1  -----------------------------------------------------------------  Exam:  GEN:   A & O x3, no apparent distress  EYES: No gross abnormalities. ENT:ENT exam normal, no neck nodes or sinus tenderness  NECK: normal,  no carotid bruits  PULM: clear to auscultation bilaterally- no wheezes, rales or rhonchi, normal air movement, no respiratory distress  COR: Iregular rate & rhythm, 2/6 murmur and no gallops,no edema  ABD:  soft, non-tender, non-distended, normal bowel sounds, no masses or organomegaly  : no cva or flank tendernes  EXT:no cyanosis, clubbing or edema present  no calf tenderness, and warm to touch. NEURO: Motor- has minimal rt leg weakness and sensory grossly intact  PSYCH: mood stable  SKIN:  No skin lesions or rashes    -----------------------------------------------------------------  Diagnostic Data:   · All diagnostic data was reviewed    Assessment:        ICD-10-CM    1. Acute on chronic combined systolic and diastolic CHF, NYHA class 4 (HCC)  I50.43    2.  Chronic atrial fibrillation (HCC) I48.20    3. Essential hypertension  I10    4.  Type 2 diabetes mellitus with stage 3b chronic kidney disease, without long-term current use of insulin ([facility])  E11.22     N18.32      Patient Active Problem List   Diagnosis Code    Hypertension I10    Hyperlipidemia E78.5    Acute on chronic systolic CHF (congestive heart failure) ([facility]) I50.23    Hypertensive urgency I16.0    Idiopathic cardiomyopathy ([facility]) I42.8    Hypertensive emergency I16.1    Chronic combined systolic and diastolic HF (heart failure), NYHA class 2 ([facility]) I50.42    Acute on chronic combined systolic and diastolic CHF, NYHA class 4 ([facility]) I50.43    Hypoxia R09.02    Moderate mitral regurgitation by prior echocardiogram I34.0    Morbid obesity ([facility]) E66.01    CKD (chronic kidney disease) stage 3, GFR 30-59 ml/min ([facility]) N18.30    Physical deconditioning R53.81    TIA (transient ischemic attack) G45.9    Old lacunar stroke without late effect Z86.73    Dysarthria R47.1    [facility] I63.9    Aphasia R47.01    Type 2 diabetes mellitus, without long-term current use of insulin ([facility]) E11.9    Uncontrolled type 2 diabetes mellitus with hyperglycemia ([facility]) E11.65    Acute ischemic stroke ([facility]) I63.9    PAF (paroxysmal atrial fibrillation) ([facility]) I48.0    Encounter for current long-term use of anticoagulants Z79.01    Hemiplegia and hemiparesis following cerebral infarction affecting right dominant side ([facility]) I69.351    Atrial fibrillation with RVR ([facility] I48.91    Unable to care for self Z78.9         Plan:     Pt and ot  Monitor afib,blood sugar and bp  Monitor for side effects of apixaben  Continue current medications  Prevent pressure sore  Prevent falls    Electronically signed by Shruthi Wagoner MD on 11/26/2021 at 3:42 PM

## 2021-11-29 ENCOUNTER — OUTSIDE SERVICES (OUTPATIENT)
Dept: PRIMARY CARE CLINIC | Age: 71
End: 2021-11-29
Payer: MEDICARE

## 2021-11-29 DIAGNOSIS — I48.20 CHRONIC ATRIAL FIBRILLATION (HCC): ICD-10-CM

## 2021-11-29 DIAGNOSIS — I10 ESSENTIAL HYPERTENSION: ICD-10-CM

## 2021-11-29 DIAGNOSIS — N18.32 TYPE 2 DIABETES MELLITUS WITH STAGE 3B CHRONIC KIDNEY DISEASE, WITHOUT LONG-TERM CURRENT USE OF INSULIN (HCC): ICD-10-CM

## 2021-11-29 DIAGNOSIS — E11.22 TYPE 2 DIABETES MELLITUS WITH STAGE 3B CHRONIC KIDNEY DISEASE, WITHOUT LONG-TERM CURRENT USE OF INSULIN (HCC): ICD-10-CM

## 2021-11-29 DIAGNOSIS — I50.43 ACUTE ON CHRONIC COMBINED SYSTOLIC AND DIASTOLIC CHF, NYHA CLASS 4 (HCC): Primary | ICD-10-CM

## 2021-11-29 DIAGNOSIS — I34.0 MODERATE MITRAL REGURGITATION BY PRIOR ECHOCARDIOGRAM: ICD-10-CM

## 2021-11-29 PROCEDURE — 99308 SBSQ NF CARE LOW MDM 20: CPT | Performed by: FAMILY MEDICINE

## 2021-11-29 NOTE — PROGRESS NOTES
Patient:  Thom Clifford, 1950  I saw this patient on  11/29/2021, at [facility]. She continues to have weakness  Tolerating pt well  Has chronic pain rt arm and leg,states it is from her previous cva      Reason for Visit:      ICD-10-CM    1. Acute on chronic combined systolic and diastolic CHF, NYHA class 4 ([facility])  I50.43    2. Chronic atrial fibrillation ([facility])  I48.20    3. Type 2 diabetes mellitus with stage 3b chronic kidney disease, without long-term current use of insulin ([facility])  E11.22     N18.32    4. Essential hypertension  I10    5. Moderate mitral regurgitation by prior echocardiogram  I34.0        Changes since last visit:    weakness same,has some pain  1. Fall:  none  2. Behavioral Change: none  3. Pain Control: no isues  4. Mobility: improving  5. Pressure Sore:  none  Current Outpatient Medications   Medication Sig Dispense Refill    ALPRAZolam (XANAX) 0.25 MG tablet Take 1 tablet by mouth 2 times daily as needed for Anxiety or Sleep for up to 30 days.  amiodarone (CORDARONE) 200 MG tablet Take 1 tablet by mouth 2 times daily      sertraline (ZOLOFT) 25 MG tablet Take 1 tablet by mouth daily 30 tablet 3    metoprolol succinate (TOPROL XL) 200 MG extended release tablet Take 1 tablet by mouth daily 30 tablet 3    furosemide (LASIX) 40 MG tablet Take 1 tablet by mouth daily 60 tablet 3    gabapentin (NEURONTIN) 300 MG capsule Take 1 capsule by mouth 3 times daily for 30 days.  90 capsule 3    potassium chloride (KLOR-CON M) 20 MEQ extended release tablet Take 1 tablet by mouth daily 30 tablet 0    losartan-hydroCHLOROthiazide (HYZAAR) 100-25 MG per tablet Take 1 tablet by mouth daily 90 tablet 3    atorvastatin (LIPITOR) 80 MG tablet TAKE ONE TABLET BY MOUTH ONCE NIGHTLY 90 tablet 3    apixaban (ELIQUIS) 5 MG TABS tablet Take 1 tablet by mouth 2 times daily 180 tablet 3    busPIRone (BUSPAR) 10 MG tablet Take 10 mg by mouth 2 times daily      famotidine (PEPCID) 20 MG tablet Take 20 mg by mouth 2 times daily      amLODIPine (NORVASC) 10 MG tablet Take 1 tablet by mouth every evening 90 tablet 3    miconazole (MICOTIN) 2 % powder Apply topically 2 times daily. 45 g 1    metFORMIN (GLUCOPHAGE) 500 MG tablet Take 500 mg by mouth 2 times daily (with meals)       No current facility-administered medications for this visit. Allergies:  Patient has no known allergies. Past Medical History:    Past Medical History:   Diagnosis Date    Cerebral artery occlusion with cerebral infarction ([facility] [address]     CHF (congestive heart failure) (HCC)     Diabetes mellitus ([facility] [address]     H/O cardiac catheterization 08/04/2017    LMCA: Mild irregularites 10-20%. LAD: Mild irregularites 10-20%. LCx: Mild irregularites 10-20%. RCA: Mild irregularites 10-20%. LV function assessed as : Abnormal. EF of 40%.  H/O echocardiogram 12/18/2018    EF 55%. Mildly increased LV wall thickness. The left atrium appears moderately to severely dilated. Moderate to severe mitral regurg. Moderate pulmonary HTN with an estimated RV systolic pressure of 76CNDK. Moderate tricuspid regurg. Evidnece fo moderate diastolic dysfunction is seen.  History of echocardiogram 01/17/2019    EF 55%. LV wall thickness moderately increased. LA is mildly dilated w/ LA volume index of 30. trivial mitral regurg. Evidence of moderate (grade II) diastolic dysfunction seen.  History of echocardiogram 06/03/2019    EF of 50-55%. LV wall thickness is mildly increased. LA is mildly dilated (29-33) with a left atrial volume index of 31 m1/m2. mild diastolic dysfunction.     Hyperlipidemia     Hypertension        Past Surgical History:    Past Surgical History:   Procedure Laterality Date    CARDIAC CATHETERIZATION Left 08/04/2017    right radial, T Dr. Radha Kimble         Social History:   Social History     Tobacco Use    Smoking status: Never Smoker    Smokeless tobacco: Never Used   Substance Use Topics    Alcohol use: Yes     Alcohol/week: 1.0 standard drink     Types: 1 Glasses of wine per week       Family History:   Family History   Problem Relation Age of Onset    Coronary Art Dis Father          from [place]   [place] COPD Sister         O2 dep    Coronary Art Dis Brother         2 brothers  from [place]           Review of Systems:  Constitutional: negative for fevers or chills, has weakness  Eyes: negative for visual disturbance   ENT: negative for sore throat or nasal congestion,neg for dysphagia  Respiratory: neg for shortness of breath , cough  Cardiovascular: neg for for chest pain , palpitations,pnd,neg for leg edema  Gastrointestinal: neg for abd pain, nausea, vomiting, diarrhea or constipation,no monalisa,no blood in stool  Genitourinary: negative for dysuria, urgency,hematuria or frequency  Integument/breast: negative for skin rash or lesions  Neurological: positive for unilateral weakness of rt arm and leg, neg for numbness or tingling. Muscular Skeletal: no joint pain   Psych -mood stable    Objective:    Vitals:   BP-102/70,Pulse-73,Respi-18,Temp-97.7  -----------------------------------------------------------------  Exam:  GEN:   A & O x3, no apparent distress  EYES: No gross abnormalities. ENT:ENT exam normal, no neck nodes or sinus tenderness  NECK: normal,  no carotid bruits  PULM: clear to auscultation bilaterally- no wheezes, rales or rhonchi, normal air movement, no respiratory distress  COR: Iregular rate & rhythm, 2/6 murmur and no gallops,no edema  ABD:  soft, non-tender, non-distended, normal bowel sounds, no masses or organomegaly  : no cva or flank tendernes  EXT:no cyanosis, clubbing or edema present  no calf tenderness, and warm to touch.    NEURO: Motor- has minimal rt leg weakness and sensory grossly intact  PSYCH: mood stable  SKIN:  No skin lesions or rashes    -----------------------------------------------------------------  Diagnostic Data:   · All diagnostic data was reviewed    Assessment:        ICD-10-CM    1. Acute on chronic combined systolic and diastolic CHF, NYHA class 4 ([facility])  I50.43    2. Chronic atrial fibrillation ([facility])  I48.20    3. Type 2 diabetes mellitus with stage 3b chronic kidney disease, without long-term current use of insulin ([facility])  E11.22     N18.32    4. Essential hypertension  I10    5.  Moderate mitral regurgitation by prior echocardiogram  I34.0      Patient Active Problem List   Diagnosis Code    Hypertension I10    Hyperlipidemia E78.5    Acute on chronic systolic CHF (congestive heart failure) ([facility]) I50.23    Hypertensive urgency I16.0    Idiopathic cardiomyopathy ([facility]) I42.8    Hypertensive emergency I16.1    Chronic combined systolic and diastolic HF (heart failure), NYHA class 2 ([facility]) I50.42    Acute on chronic combined systolic and diastolic CHF, NYHA class 4 ([facility]) I50.43    Hypoxia R09.02    Moderate mitral regurgitation by prior echocardiogram I34.0    Morbid obesity ([facility]) E66.01    CKD (chronic kidney disease) stage 3, GFR 30-59 ml/min ([facility]) N18.30    Physical deconditioning R53.81    TIA (transient ischemic attack) G45.9    Old lacunar stroke without late effect Z86.73    Dysarthria R47.1    Stroke determined by clinical assessment ([facility] [address] I63.9    Aphasia R47.01    Type 2 diabetes mellitus, without long-term current use of insulin ([facility]) E11.9    Uncontrolled type 2 diabetes mellitus with hyperglycemia ([facility]) E11.65    Acute ischemic stroke ([facility]) I63.9    PAF (paroxysmal atrial fibrillation) ([facility]) I48.0    Encounter for current long-term use of anticoagulants Z79.01    Hemiplegia and hemiparesis following cerebral infarction affecting right dominant side ([facility]) I69.351    Atrial fibrillation with RVR ([facility]) I48.91    Unable to care for self Z78.9         Plan:     Pt and ot  Pain control  Monitor afib,blood sugar and bp  Monitor for side effects of apixaben  Continue current medications  Prevent pressure

## 2021-11-30 ENCOUNTER — OUTSIDE SERVICES (OUTPATIENT)
Dept: PRIMARY CARE CLINIC | Age: 71
End: 2021-11-30
Payer: MEDICARE

## 2021-11-30 DIAGNOSIS — I50.43 ACUTE ON CHRONIC COMBINED SYSTOLIC AND DIASTOLIC CHF, NYHA CLASS 4 (HCC): Primary | ICD-10-CM

## 2021-11-30 DIAGNOSIS — E11.22 TYPE 2 DIABETES MELLITUS WITH STAGE 3B CHRONIC KIDNEY DISEASE, WITHOUT LONG-TERM CURRENT USE OF INSULIN (HCC): ICD-10-CM

## 2021-11-30 DIAGNOSIS — I10 ESSENTIAL HYPERTENSION: ICD-10-CM

## 2021-11-30 DIAGNOSIS — I34.0 MODERATE MITRAL REGURGITATION BY PRIOR ECHOCARDIOGRAM: ICD-10-CM

## 2021-11-30 DIAGNOSIS — I48.20 CHRONIC ATRIAL FIBRILLATION (HCC): ICD-10-CM

## 2021-11-30 DIAGNOSIS — N18.32 TYPE 2 DIABETES MELLITUS WITH STAGE 3B CHRONIC KIDNEY DISEASE, WITHOUT LONG-TERM CURRENT USE OF INSULIN (HCC): ICD-10-CM

## 2021-11-30 NOTE — PROGRESS NOTES
Patient:  Lana Nava, 1950  I saw this patient on  12/01/2021, at [facility]. She continues to have weakness,   Tolerating pt well  Has chronic pain rt arm and leg,states it is from her previous cva      Reason for Visit:      ICD-10-CM    1. Acute on chronic combined systolic and diastolic CHF, NYHA class 4 ([facility])  I50.43    2. Chronic atrial fibrillation ([facility])  I48.20    3. Type 2 diabetes mellitus with stage 3b chronic kidney disease, without long-term current use of insulin ([facility])  E11.22     N18.32    4. Essential hypertension  I10    5. Moderate mitral regurgitation by prior echocardiogram  I34.0        Changes since last visit:    weakness same,has rt leg pain  1. Fall:  none  2. Behavioral Change: none  3. Pain Control: has rt leg pain  4. Mobility: improving  5. Pressure Sore:  none  Current Outpatient Medications   Medication Sig Dispense Refill    ALPRAZolam (XANAX) 0.25 MG tablet Take 1 tablet by mouth 2 times daily as needed for Anxiety or Sleep for up to 30 days.  amiodarone (CORDARONE) 200 MG tablet Take 1 tablet by mouth 2 times daily      sertraline (ZOLOFT) 25 MG tablet Take 1 tablet by mouth daily 30 tablet 3    metoprolol succinate (TOPROL XL) 200 MG extended release tablet Take 1 tablet by mouth daily 30 tablet 3    furosemide (LASIX) 40 MG tablet Take 1 tablet by mouth daily 60 tablet 3    gabapentin (NEURONTIN) 300 MG capsule Take 1 capsule by mouth 3 times daily for 30 days.  90 capsule 3    potassium chloride (KLOR-CON M) 20 MEQ extended release tablet Take 1 tablet by mouth daily 30 tablet 0    losartan-hydroCHLOROthiazide (HYZAAR) 100-25 MG per tablet Take 1 tablet by mouth daily 90 tablet 3    atorvastatin (LIPITOR) 80 MG tablet TAKE ONE TABLET BY MOUTH ONCE NIGHTLY 90 tablet 3    apixaban (ELIQUIS) 5 MG TABS tablet Take 1 tablet by mouth 2 times daily 180 tablet 3    busPIRone (BUSPAR) 10 MG tablet Take 10 mg by mouth 2 times daily      famotidine (PEPCID) 20 MG tablet Take 20 mg by mouth 2 times daily      amLODIPine (NORVASC) 10 MG tablet Take 1 tablet by mouth every evening 90 tablet 3    miconazole (MICOTIN) 2 % powder Apply topically 2 times daily. 45 g 1    metFORMIN (GLUCOPHAGE) 500 MG tablet Take 500 mg by mouth 2 times daily (with meals)       No current facility-administered medications for this visit. Allergies:  Patient has no known allergies. Past Medical History:    Past Medical History:   Diagnosis Date    Cerebral artery occlusion with cerebral infarction ([facility] [address]     CHF (congestive heart failure) (HCC)     Diabetes mellitus ([facility] [address]     H/O cardiac catheterization 08/04/2017    LMCA: Mild irregularites 10-20%. LAD: Mild irregularites 10-20%. LCx: Mild irregularites 10-20%. RCA: Mild irregularites 10-20%. LV function assessed as : Abnormal. EF of 40%.  H/O echocardiogram 12/18/2018    EF 55%. Mildly increased LV wall thickness. The left atrium appears moderately to severely dilated. Moderate to severe mitral regurg. Moderate pulmonary HTN with an estimated RV systolic pressure of 71VQIU. Moderate tricuspid regurg. Evidnece fo moderate diastolic dysfunction is seen.  History of echocardiogram 01/17/2019    EF 55%. LV wall thickness moderately increased. LA is mildly dilated w/ LA volume index of 30. trivial mitral regurg. Evidence of moderate (grade II) diastolic dysfunction seen.  History of echocardiogram 06/03/2019    EF of 50-55%. LV wall thickness is mildly increased. LA is mildly dilated (29-33) with a left atrial volume index of 31 m1/m2. mild diastolic dysfunction.     Hyperlipidemia     Hypertension        Past Surgical History:    Past Surgical History:   Procedure Laterality Date    CARDIAC CATHETERIZATION Left 08/04/2017    right radial, T Dr. Marrufo         Social History:   Social History     Tobacco Use    Smoking status: Never Smoker    Smokeless tobacco: Never Used Substance Use Topics    Alcohol use: Yes     Alcohol/week: 1.0 standard drink     Types: 1 Glasses of wine per week       Family History:   Family History   Problem Relation Age of Onset    Coronary Art Dis Father          from [facility] COPD Sister         O2 dep    Coronary Art Dis Brother         2 brothers  from [place]           Review of Systems:  Constitutional: negative for fevers or chills, has weakness  Eyes: negative for visual disturbance   ENT: negative for sore throat or nasal congestion,neg for dysphagia  Respiratory: neg for shortness of breath , cough  Cardiovascular: neg for for chest pain , palpitations,pnd,neg for leg edema  Gastrointestinal: neg for abd pain, nausea, vomiting, diarrhea or constipation,no monalisa,no blood in stool  Genitourinary: negative for dysuria, urgency,hematuria or frequency  Integument/breast: negative for skin rash or lesions  Neurological: positive for unilateral weakness of rt arm and leg, neg for numbness or tingling. has rt leg pain  Muscular Skeletal: no joint pain   Psych -mood stable    Objective:    Vitals:   BP-102/70,Pulse-73,Respi-18,Temp-97.7  -----------------------------------------------------------------  Exam:  GEN:   A & O x3, no apparent distress  EYES: No gross abnormalities. ENT:ENT exam normal, no neck nodes or sinus tenderness  NECK: normal,  no carotid bruits  PULM: clear to auscultation bilaterally- no wheezes, rales or rhonchi, normal air movement, no respiratory distress  COR: Iregular rate & rhythm, 2/6 murmur and no gallops,no edema  ABD:  soft, non-tender, non-distended, normal bowel sounds, no masses or organomegaly  : no cva or flank tendernes  EXT:no cyanosis, clubbing or edema present  no calf tenderness, and warm to touch.    NEURO: Motor- has minimal rt leg weakness and sensory grossly intact  PSYCH: mood stable  SKIN:  No skin lesions or ángel    -----------------------------------------------------------------  Diagnostic Data:   · All diagnostic data was reviewed    Assessment:        ICD-10-CM    1. Acute on chronic combined systolic and diastolic CHF, NYHA class 4 ([facility])  I50.43    2. Chronic atrial fibrillation ([facility])  I48.20    3. Type 2 diabetes mellitus with stage 3b chronic kidney disease, without long-term current use of insulin ([facility])  E11.22     N18.32    4. Essential hypertension  I10    5.  Moderate mitral regurgitation by prior echocardiogram  I34.0      Patient Active Problem List   Diagnosis Code    Hypertension I10    Hyperlipidemia E78.5    Acute on chronic systolic CHF (congestive heart failure) ([facility]) I50.23    Hypertensive urgency I16.0    Idiopathic cardiomyopathy ([facility]) I42.8    Hypertensive emergency I16.1    Chronic combined systolic and diastolic HF (heart failure), NYHA class 2 ([facility]) I50.42    Acute on chronic combined systolic and diastolic CHF, NYHA class 4 ([facility]) I50.43    Hypoxia R09.02    Moderate mitral regurgitation by prior echocardiogram I34.0    Morbid obesity ([facility]) E66.01    CKD (chronic kidney disease) stage 3, GFR 30-59 ml/min ([facility]) N18.30    Physical deconditioning R53.81    TIA (transient ischemic attack) G45.9    Old lacunar stroke without late effect Z86.73    Dysarthria R47.1    [facility] I63.9    Aphasia R47.01    Type 2 diabetes mellitus, without long-term current use of insulin ([facility]) E11.9    Uncontrolled type 2 diabetes mellitus with hyperglycemia ([facility]) E11.65    Acute ischemic stroke ([facility]) I63.9    PAF (paroxysmal atrial fibrillation) ([facility]) I48.0    Encounter for current long-term use of anticoagulants Z79.01    Hemiplegia and hemiparesis following cerebral infarction affecting right dominant side ([facility]) I69.351    Atrial fibrillation with RVR ([facility] [address] I48.91    Unable to care for self Z78.9         Plan:     Pt and ot  Pain control- add toradol  Monitor afib,blood sugar and bp  Monitor for side effects of apixaben  Continue current medications  Prevent pressure sore  Prevent falls    Electronically signed by Amil Gitelman MD on 12/1/2021 at 5:57 PM

## 2021-12-01 PROCEDURE — 99308 SBSQ NF CARE LOW MDM 20: CPT | Performed by: FAMILY MEDICINE

## 2021-12-01 RX ORDER — KETOROLAC TROMETHAMINE 10 MG/1
10 TABLET, FILM COATED ORAL 2 TIMES DAILY PRN
COMMUNITY
End: 2022-06-09

## 2021-12-06 ENCOUNTER — OUTSIDE SERVICES (OUTPATIENT)
Dept: PRIMARY CARE CLINIC | Age: 71
End: 2021-12-06
Payer: MEDICARE

## 2021-12-06 DIAGNOSIS — I10 ESSENTIAL HYPERTENSION: ICD-10-CM

## 2021-12-06 DIAGNOSIS — I50.43 ACUTE ON CHRONIC COMBINED SYSTOLIC AND DIASTOLIC CHF, NYHA CLASS 4 (HCC): Primary | ICD-10-CM

## 2021-12-06 DIAGNOSIS — N18.32 TYPE 2 DIABETES MELLITUS WITH STAGE 3B CHRONIC KIDNEY DISEASE, WITHOUT LONG-TERM CURRENT USE OF INSULIN (HCC): ICD-10-CM

## 2021-12-06 DIAGNOSIS — E11.22 TYPE 2 DIABETES MELLITUS WITH STAGE 3B CHRONIC KIDNEY DISEASE, WITHOUT LONG-TERM CURRENT USE OF INSULIN (HCC): ICD-10-CM

## 2021-12-06 DIAGNOSIS — I48.20 CHRONIC ATRIAL FIBRILLATION (HCC): ICD-10-CM

## 2021-12-06 PROCEDURE — 99308 SBSQ NF CARE LOW MDM 20: CPT | Performed by: FAMILY MEDICINE

## 2021-12-06 NOTE — PROGRESS NOTES
Patient:  Chidi Mae, 1950  I saw this patient on  12/06/2021, at [facility]. She continues to have weakness,   Tolerating pt well  Has chronic pain rt arm and leg,states it is from her previous cva -uses walker for ambulation      Reason for Visit:      ICD-10-CM    1. Acute on chronic combined systolic and diastolic CHF, NYHA class 4 ([facility])  I50.43    2. Chronic atrial fibrillation ([facility])  I48.20    3. Type 2 diabetes mellitus with stage 3b chronic kidney disease, without long-term current use of insulin ([facility])  E11.22     N18.32    4. Essential hypertension  I10        Changes since last visit:    Working with therapy   Weakness same,has rt leg pain  1. Fall:  none  2. Behavioral Change: none  3. Pain Control: has rt leg pain  4. Mobility: improving  5. Pressure Sore:  none  Current Outpatient Medications   Medication Sig Dispense Refill    ketorolac (TORADOL) 10 MG tablet Take 10 mg by mouth 2 times daily as needed for Pain      ALPRAZolam (XANAX) 0.25 MG tablet Take 1 tablet by mouth 2 times daily as needed for Anxiety or Sleep for up to 30 days.  amiodarone (CORDARONE) 200 MG tablet Take 1 tablet by mouth 2 times daily      sertraline (ZOLOFT) 25 MG tablet Take 1 tablet by mouth daily 30 tablet 3    metoprolol succinate (TOPROL XL) 200 MG extended release tablet Take 1 tablet by mouth daily 30 tablet 3    furosemide (LASIX) 40 MG tablet Take 1 tablet by mouth daily 60 tablet 3    gabapentin (NEURONTIN) 300 MG capsule Take 1 capsule by mouth 3 times daily for 30 days.  90 capsule 3    potassium chloride (KLOR-CON M) 20 MEQ extended release tablet Take 1 tablet by mouth daily 30 tablet 0    losartan-hydroCHLOROthiazide (HYZAAR) 100-25 MG per tablet Take 1 tablet by mouth daily 90 tablet 3    atorvastatin (LIPITOR) 80 MG tablet TAKE ONE TABLET BY MOUTH ONCE NIGHTLY 90 tablet 3    apixaban (ELIQUIS) 5 MG TABS tablet Take 1 tablet by mouth 2 times daily 180 tablet 3    busPIRone (BUSPAR) 10 MG tablet Take 10 mg by mouth 2 times daily      famotidine (PEPCID) 20 MG tablet Take 20 mg by mouth 2 times daily      amLODIPine (NORVASC) 10 MG tablet Take 1 tablet by mouth every evening 90 tablet 3    miconazole (MICOTIN) 2 % powder Apply topically 2 times daily. 45 g 1    metFORMIN (GLUCOPHAGE) 500 MG tablet Take 500 mg by mouth 2 times daily (with meals)       No current facility-administered medications for this visit. Allergies:  Patient has no known allergies. Past Medical History:    Past Medical History:   Diagnosis Date    Cerebral artery occlusion with cerebral infarction ([facility]     CHF (congestive heart failure) (HCC)     [facility]     H/O cardiac catheterization 08/04/2017    LMCA: Mild irregularites 10-20%. LAD: Mild irregularites 10-20%. LCx: Mild irregularites 10-20%. RCA: Mild irregularites 10-20%. LV function assessed as : Abnormal. EF of 40%.  H/O echocardiogram 12/18/2018    EF 55%. Mildly increased LV wall thickness. The left atrium appears moderately to severely dilated. Moderate to severe mitral regurg. Moderate pulmonary HTN with an estimated RV systolic pressure of [id number]. Moderate tricuspid regurg. Evidnece fo moderate diastolic dysfunction is seen.  History of echocardiogram 01/17/2019    EF 55%. LV wall thickness moderately increased. LA is mildly dilated w/ LA volume index of 30. trivial mitral regurg. Evidence of moderate (grade II) diastolic dysfunction seen.  History of echocardiogram 06/03/2019    EF of 50-55%. LV wall thickness is mildly increased. LA is mildly dilated (29-33) with a left atrial volume index of 31 m1/m2. mild diastolic dysfunction.     Hyperlipidemia     Hypertension        Past Surgical History:    Past Surgical History:   Procedure Laterality Date    CARDIAC CATHETERIZATION Left 08/04/2017    right radial, [facility] Dr. Arreguin June         Social History:   Social History     Tobacco Use  Smoking status: Never Smoker    Smokeless tobacco: Never Used   Substance Use Topics    Alcohol use: Yes     Alcohol/week: 1.0 standard drink     Types: 1 Glasses of wine per week       Family History:   Family History   Problem Relation Age of Onset    Coronary Art Dis Father          from [facility] COPD Sister         O2 dep    Coronary Art Dis Brother         2 brothers  from [place]           Review of Systems:  Constitutional: negative for fevers or chills, has weakness  Eyes: negative for visual disturbance   ENT: negative for sore throat or nasal congestion,neg for dysphagia  Respiratory: neg for shortness of breath , cough  Cardiovascular: neg for for chest pain , palpitations,pnd,neg for leg edema  Gastrointestinal: neg for abd pain, nausea, vomiting, diarrhea or constipation,no monalisa,no blood in stool  Genitourinary: negative for dysuria, urgency,hematuria or frequency  Integument/breast: negative for skin rash or lesions  Neurological: positive for unilateral weakness of rt arm and leg, neg for numbness or tingling,has rt leg pain  Muscular Skeletal: no joint pain   Psych -mood stable    Objective:    Vitals:   BP-110/70,Pulse-66,Respi-18,Temp-97.8  -----------------------------------------------------------------  Exam:  GEN:   A & O x3, no apparent distress  EYES: No gross abnormalities. ENT:ENT exam normal, no neck nodes or sinus tenderness  NECK: normal,  no carotid bruits  PULM: clear to auscultation bilaterally- no wheezes, rales or rhonchi, normal air movement, no respiratory distress  COR: Iregular rate & rhythm, 2/6 murmur and no gallops,no edema  ABD:  soft, non-tender, non-distended, normal bowel sounds, no masses or organomegaly  : no cva or flank tendernes  EXT:no cyanosis, clubbing or edema present  no calf tenderness, and warm to touch.    NEURO: Motor- has minimal rt leg weakness and sensory grossly intact  PSYCH: mood stable  SKIN:  No skin lesions or ángel    -----------------------------------------------------------------  Diagnostic Data:   · All diagnostic data was reviewed    Assessment:        ICD-10-CM    1. Acute on chronic combined systolic and diastolic CHF, NYHA class 4 ([facility])  I50.43    2. Chronic atrial fibrillation ([facility])  I48.20    3. Type 2 diabetes mellitus with stage 3b chronic kidney disease, without long-term current use of insulin ([facility])  E11.22     N18.32    4.  Essential hypertension  I10      Patient Active Problem List   Diagnosis Code    Hypertension I10    Hyperlipidemia E78.5    Acute on chronic systolic CHF (congestive heart failure) ([facility]) I50.23    Hypertensive urgency I16.0    Idiopathic cardiomyopathy ([facility]) I42.8    Hypertensive emergency I16.1    Chronic combined systolic and diastolic HF (heart failure), NYHA class 2 ([facility]) I50.42    Acute on chronic combined systolic and diastolic CHF, NYHA class 4 ([facility]) I50.43    Hypoxia R09.02    Moderate mitral regurgitation by prior echocardiogram I34.0    Morbid obesity ([facility]) E66.01    CKD (chronic kidney disease) stage 3, GFR 30-59 ml/min ([facility]) N18.30    Physical deconditioning R53.81    TIA (transient ischemic attack) G45.9    Old lacunar stroke without late effect Z86.73    Dysarthria R47.1    Stroke determined by clinical assessment ([facility] [address] I63.9    Aphasia R47.01    Type 2 diabetes mellitus, without long-term current use of insulin ([facility]) E11.9    Uncontrolled type 2 diabetes mellitus with hyperglycemia ([facility]) E11.65    Acute ischemic stroke ([facility]) I63.9    PAF (paroxysmal atrial fibrillation) ([facility]) I48.0    Encounter for current long-term use of anticoagulants Z79.01    Hemiplegia and hemiparesis following cerebral infarction affecting right dominant side ([facility]) I69.351    Atrial fibrillation with RVR ([facility]) I48.91    Unable to care for self Z78.9         Plan:     Pt and ot  Pain control- prn toradol  Monitor afib,blood sugar and bp  Monitor for side effects of apixaben  Continue current medications  Prevent pressure sore  Prevent falls    Electronically signed by Dolores Umaña MD on 12/6/2021 at 6:56 PM

## 2021-12-07 PROCEDURE — 93298 REM INTERROG DEV EVAL SCRMS: CPT | Performed by: FAMILY MEDICINE

## 2021-12-08 ENCOUNTER — OUTSIDE SERVICES (OUTPATIENT)
Dept: PRIMARY CARE CLINIC | Age: 71
End: 2021-12-08
Payer: MEDICARE

## 2021-12-08 DIAGNOSIS — I48.20 CHRONIC ATRIAL FIBRILLATION (HCC): ICD-10-CM

## 2021-12-08 DIAGNOSIS — I50.43 ACUTE ON CHRONIC COMBINED SYSTOLIC AND DIASTOLIC CHF, NYHA CLASS 4 (HCC): Primary | ICD-10-CM

## 2021-12-08 DIAGNOSIS — N18.32 TYPE 2 DIABETES MELLITUS WITH STAGE 3B CHRONIC KIDNEY DISEASE, WITHOUT LONG-TERM CURRENT USE OF INSULIN (HCC): ICD-10-CM

## 2021-12-08 DIAGNOSIS — I10 ESSENTIAL HYPERTENSION: ICD-10-CM

## 2021-12-08 DIAGNOSIS — E11.22 TYPE 2 DIABETES MELLITUS WITH STAGE 3B CHRONIC KIDNEY DISEASE, WITHOUT LONG-TERM CURRENT USE OF INSULIN (HCC): ICD-10-CM

## 2021-12-08 PROCEDURE — 99308 SBSQ NF CARE LOW MDM 20: CPT | Performed by: FAMILY MEDICINE

## 2021-12-08 NOTE — PROGRESS NOTES
Patient:  Ofelia Unger, 1950  I saw this patient on  12/08/2021, at [facility]. She continues to have weakness, ambulates with walker  Tolerating pt well        Reason for Visit:      ICD-10-CM    1. Acute on chronic combined systolic and diastolic CHF, NYHA class 4 (HCC)  I50.43    2. Chronic atrial fibrillation (HCC)  I48.20    3. Essential hypertension  I10    4. Type 2 diabetes mellitus with stage 3b chronic kidney disease, without long-term current use [facility] insulin [facility]  E11.22     N18.32        Changes since last visit:    Working with therapy,edema better  Blood sugars ok  1. Fall:  none  2. Behavioral Change: none  3. Pain Control: has rt leg pain  4. Mobility: improving  5. Pressure Sore:  none  Current Outpatient Medications   Medication Sig Dispense Refill    ketorolac (TORADOL) 10 MG tablet Take 10 mg by mouth 2 times daily as needed for Pain      ALPRAZolam (XANAX) 0.25 MG tablet Take 1 tablet by mouth 2 times daily as needed for Anxiety or Sleep for up to 30 days.  amiodarone (CORDARONE) 200 MG tablet Take 1 tablet by mouth 2 times daily      sertraline (ZOLOFT) 25 MG tablet Take 1 tablet by mouth daily 30 tablet 3    metoprolol succinate (TOPROL XL) 200 MG extended release tablet Take 1 tablet by mouth daily 30 tablet 3    furosemide (LASIX) 40 MG tablet Take 1 tablet by mouth daily 60 tablet 3    gabapentin (NEURONTIN) 300 MG capsule Take 1 capsule by mouth 3 times daily for 30 days.  90 capsule 3    potassium chloride (KLOR-CON M) 20 MEQ extended release tablet Take 1 tablet by mouth daily 30 tablet 0    losartan-hydroCHLOROthiazide (HYZAAR) 100-25 MG per tablet Take 1 tablet by mouth daily 90 tablet 3    atorvastatin (LIPITOR) 80 MG tablet TAKE ONE TABLET BY MOUTH ONCE NIGHTLY 90 tablet 3    apixaban (ELIQUIS) 5 MG TABS tablet Take 1 tablet by mouth 2 times daily 180 tablet 3    busPIRone (BUSPAR) 10 MG tablet Take 10 mg by mouth 2 times daily      famotidine (PEPCID) 20 MG tablet Take 20 mg by mouth 2 times daily      amLODIPine (NORVASC) 10 MG tablet Take 1 tablet by mouth every evening 90 tablet 3    miconazole (MICOTIN) 2 % powder Apply topically 2 times daily. 45 g 1    metFORMIN (GLUCOPHAGE) 500 MG tablet Take 500 mg by mouth 2 times daily (with meals)       No current facility-administered medications for this visit. Allergies:  Patient has no known allergies. Past Medical History:    Past Medical History:   Diagnosis Date    [facility]erebral artery occlusion [facility] infarction ([facility] [address]     CHF (congestive heart failure) (HCC)     Diabetes mellitus ([facility] [facility]     H/O cardiac catheterization 08/04/2017    LMCA: Mild irregularites 10-20%. LAD: Mild irregularites 10-20%. LCx: Mild irregularites 10-20%. RCA: Mild irregularites 10-20%. LV function assessed as : Abnormal. EF of 40%.  H/O echocardiogram 12/18/2018    EF 55%. Mildly increased LV wall thickness. The left atrium appears moderately to severely dilated. Moderate to severe mitral regurg. Moderate pulmonary HTN with an estimated RV systolic pressure of [id number]. Moderate tricuspid regurg. Evidnece fo moderate diastolic dysfunction is seen.  History of echocardiogram 01/17/2019    EF 55%. LV wall thickness moderately increased. LA is mildly dilated w/ LA volume index of 30. trivial mitral regurg. Evidence of moderate (grade II) diastolic dysfunction seen.  History of echocardiogram 06/03/2019    EF of 50-55%. LV wall thickness is mildly increased. LA is mildly dilated (29-33) with a left atrial volume index of 31 m1/m2. mild diastolic dysfunction.     Hyperlipidemia     Hypertension        Past Surgical History:    Past Surgical History:   Procedure Laterality Date    CARDIAC CATHETERIZATION Left 08/04/2017    right radial, T Dr. Sarita Davis         Social History:   Social History     Tobacco Use    Smoking status: Never Smoker    Smokeless tobacco: Never Used Substance Use Topics    Alcohol use: Yes     Alcohol/week: 1.0 standard drink     Types: 1 Glasses of wine per week       Family History:   Family History   Problem Relation Age of Onset    Coronary Art Dis Father          from [place]   [place] COPD Sister         O2 dep    Coronary Art Dis Brother         2 brothers  from [place]           Review of Systems:  Constitutional: negative for fevers or chills, has weakness  Eyes: negative for visual disturbance   ENT: negative for sore throat or nasal congestion,neg for dysphagia  Respiratory: neg for shortness of breath , cough  Cardiovascular: neg for for chest pain , palpitations,pnd,neg for leg edema  Gastrointestinal: neg for abd pain, nausea, vomiting, diarrhea or constipation,no monalisa,no blood in stool  Genitourinary: negative for dysuria, urgency,hematuria or frequency  Integument/breast: negative for skin rash or lesions  Neurological: positive for unilateral weakness of rt arm and leg, neg for numbness or tingling,has rt leg pain  Muscular Skeletal: has rt leg  pain   Psych -mood stable    Objective:    Vitals:   BP-110/70,Pulse-66,Respi-18,Temp-97.4  -----------------------------------------------------------------  Exam:  GEN:   A & O x3, no apparent distress  EYES: No gross abnormalities. ENT:ENT exam normal, no neck nodes or sinus tenderness  NECK: normal,  no carotid bruits  PULM: clear to auscultation bilaterally- no wheezes, rales or rhonchi, normal air movement, no respiratory distress  COR: Iregular rate & rhythm, 2/6 murmur and no gallops,no edema  ABD:  soft, non-tender, non-distended, normal bowel sounds, no masses or organomegaly  : no cva or flank tendernes  EXT:no cyanosis, clubbing or edema present  no calf tenderness, and warm to touch.    NEURO: Motor- has minimal rt leg weakness and sensory grossly intact  PSYCH: mood stable  SKIN:  No skin lesions or ángel    -----------------------------------------------------------------  Diagnostic Data:   · All diagnostic data was reviewed    Assessment:        ICD-10-CM    1. Acute on chronic combined systolic and diastolic CHF, NYHA class 4 ([facility])  I50.43    2. Chronic atrial fibrillation ([facility])  I48.20    3. Essential hypertension  I10    4.  Type 2 diabetes mellitus with stage 3b chronic kidney disease, without long-term current use of insulin ([facility])  E11.22     N18.32      Patient Active Problem List   Diagnosis Code    Hypertension I10    Hyperlipidemia E78.5    Acute on chronic systolic CHF (congestive heart failure) ([facility]) I50.23    Hypertensive urgency I16.0    Idiopathic cardiomyopathy ([facility]) I42.8    Hypertensive emergency I16.1    Chronic combined systolic and diastolic HF (heart failure), NYHA class 2 ([facility]) I50.42    Acute on chronic combined systolic and diastolic CHF, NYHA class 4 ([facility]) I50.43    Hypoxia R09.02    Moderate mitral regurgitation by prior echocardiogram I34.0    Morbid obesity ([facility]) E66.01    CKD (chronic kidney disease) stage 3, GFR 30-59 ml/min ([facility]) N18.30    Physical deconditioning R53.81    TIA (transient ischemic attack) G45.9    Old lacunar stroke without late effect Z86.73    Dysarthria R47.1    Stroke determined by clinical assessment ([facility] [address] I63.9    Aphasia R47.01    Type 2 diabetes mellitus, without long-term current use of insulin ([facility]) E11.9    Uncontrolled type 2 diabetes mellitus with hyperglycemia ([facility]) E11.65    Acute ischemic stroke ([facility]) I63.9    PAF (paroxysmal atrial fibrillation) ([facility]) I48.0    Encounter for current long-term use of anticoagulants Z79.01    Hemiplegia and hemiparesis following cerebral infarction affecting right dominant side ([facility]) I69.351    Atrial fibrillation with RVR ([facility]) I48.91    Unable to care for self Z78.9         Plan:     Pt and ot  Pain control- prn toradol  Monitor afib,blood sugar and bp  Monitor for side effects of apixaben  Continue current medications  Prevent pressure sore  Prevent falls    Electronically signed by Deep Lee MD on 12/10/2021 at 1:44 PM

## 2021-12-14 ENCOUNTER — NURSE ONLY (OUTPATIENT)
Dept: CARDIOLOGY | Age: 71
End: 2021-12-14
Payer: MEDICARE

## 2021-12-14 DIAGNOSIS — I63.9 CRYPTOGENIC STROKE (HCC): ICD-10-CM

## 2021-12-14 DIAGNOSIS — Z45.09 ENCOUNTER FOR LOOP RECORDER CHECK: Primary | ICD-10-CM

## 2021-12-22 ENCOUNTER — TELEPHONE (OUTPATIENT)
Dept: CARDIOLOGY | Age: 71
End: 2021-12-22

## 2022-01-06 ENCOUNTER — OUTSIDE SERVICES (OUTPATIENT)
Dept: PRIMARY CARE CLINIC | Age: 72
End: 2022-01-06
Payer: MEDICARE

## 2022-01-06 DIAGNOSIS — N18.32 TYPE 2 DIABETES MELLITUS WITH STAGE 3B CHRONIC KIDNEY DISEASE, WITHOUT LONG-TERM CURRENT USE OF INSULIN (HCC): ICD-10-CM

## 2022-01-06 DIAGNOSIS — E11.22 TYPE 2 DIABETES MELLITUS WITH STAGE 3B CHRONIC KIDNEY DISEASE, WITHOUT LONG-TERM CURRENT USE OF INSULIN (HCC): ICD-10-CM

## 2022-01-06 DIAGNOSIS — I50.42 CHRONIC COMBINED SYSTOLIC (CONGESTIVE) AND DIASTOLIC (CONGESTIVE) HEART FAILURE (HCC): Primary | ICD-10-CM

## 2022-01-06 DIAGNOSIS — I48.20 CHRONIC ATRIAL FIBRILLATION (HCC): ICD-10-CM

## 2022-01-06 DIAGNOSIS — I10 ESSENTIAL HYPERTENSION: ICD-10-CM

## 2022-01-06 PROCEDURE — 99308 SBSQ NF CARE LOW MDM 20: CPT | Performed by: FAMILY MEDICINE

## 2022-01-06 NOTE — PROGRESS NOTES
Patient:  Jay Aguilar, 1950  I saw this patient on  1/05/2022, at [facility]. She continues to have weakness, ambulates with walker  Has rt leg pain  Tolerating pt well        Reason for Visit:      ICD-10-CM    1. Chronic combined systolic (congestive) and diastolic (congestive) heart failure ([facility])  I50.42    2. Type 2 diabetes mellitus with stage 3b chronic kidney disease, without long-term current use of insulin (HCC)  E11.22     N18.32    3. Chronic atrial fibrillation (HCC)  I48.20    4. Essential hypertension  I10        Changes since last visit:    Has rt leg pain  Working with therapy,edema better  Blood sugars ok  1. Fall:  none  2. Behavioral Change: none  3. Pain Control: has rt leg pain  4. Mobility: improving  5. Pressure Sore:  none    Current Outpatient Medications   Medication Sig Dispense Refill    ketorolac (TORADOL) 10 MG tablet Take 10 mg by mouth 2 times daily as needed for Pain      amiodarone (CORDARONE) 200 MG tablet Take 1 tablet by mouth 2 times daily      sertraline (ZOLOFT) 25 MG tablet Take 1 tablet by mouth daily 30 tablet 3    metoprolol succinate (TOPROL XL) 200 MG extended release tablet Take 1 tablet by mouth daily 30 tablet 3    furosemide (LASIX) 40 MG tablet Take 1 tablet by mouth daily 60 tablet 3    gabapentin (NEURONTIN) 300 MG capsule Take 1 capsule by mouth 3 times daily for 30 days.  90 capsule 3    potassium chloride (KLOR-CON M) 20 MEQ extended release tablet Take 1 tablet by mouth daily 30 tablet 0    losartan-hydroCHLOROthiazide (HYZAAR) 100-25 MG per tablet Take 1 tablet by mouth daily 90 tablet 3    atorvastatin (LIPITOR) 80 MG tablet TAKE ONE TABLET BY MOUTH ONCE NIGHTLY 90 tablet 3    apixaban (ELIQUIS) 5 MG TABS tablet Take 1 tablet by mouth 2 times daily 180 tablet 3    busPIRone (BUSPAR) 10 MG tablet Take 10 mg by mouth 2 times daily      famotidine (PEPCID) 20 MG tablet Take 20 mg by mouth 2 times daily      amLODIPine (NORVASC) 10 MG tablet Take 1 tablet by mouth every evening 90 tablet 3    miconazole (MICOTIN) 2 % powder Apply topically 2 times daily. 45 g 1    metFORMIN (GLUCOPHAGE) 500 MG tablet Take 500 mg by mouth 2 times daily (with meals)       No current facility-administered medications for this visit. Allergies:  Patient has no known allergies. Past Medical History:    Past Medical History:   Diagnosis Date    Cerebral artery occlusion with cerebral infarction ([facility]     CHF (congestive heart failure) (HCC)     Diabetes mellitus ([facility]     H/O cardiac catheterization 08/04/2017    LMCA: Mild irregularites 10-20%. LAD: Mild irregularites 10-20%. LCx: Mild irregularites 10-20%. RCA: Mild irregularites 10-20%. LV function assessed as : Abnormal. EF of 40%.  H/O echocardiogram 12/18/2018    EF 55%. Mildly increased LV wall thickness. The left atrium appears moderately to severely dilated. Moderate to severe mitral regurg. Moderate pulmonary HTN with an estimated RV systolic pressure of 99FKCF. Moderate tricuspid regurg. Evidnece fo moderate diastolic dysfunction is seen.  History of echocardiogram 01/17/2019    EF 55%. LV wall thickness moderately increased. LA is mildly dilated w/ LA volume index of 30. trivial mitral regurg. Evidence of moderate (grade II) diastolic dysfunction seen.  History of echocardiogram 06/03/2019    EF of 50-55%. LV wall thickness is mildly increased. LA is mildly dilated (29-33) with a left atrial volume index of 31 m1/m2. mild diastolic dysfunction.  Hyperlipidemia     Hypertension        Past Surgical History:    Past Surgical History:   Procedure Laterality Date    CARDIAC CATHETERIZATION Left 08/04/2017    right radial, MHT Dr. Cally Denson         Social History:   Social History     Tobacco Use    Smoking status: Never Smoker    Smokeless tobacco: Never Used   Substance Use Topics    Alcohol use:  Yes     Alcohol/week: 1.0 standard drink Types: 1 Glasses of wine per week       Family History:   Family History   Problem Relation Age of Onset    Coronary Art Dis Father          from [place]   [facility] Sister         O2 dep    Coronary Art Dis Brother         2 brothers  from [place]           Review of Systems:  Constitutional: negative for fevers or chills, has weakness  Eyes: negative for visual disturbance   ENT: negative for sore throat or nasal congestion,neg for dysphagia  Respiratory: neg for shortness of breath , cough  Cardiovascular: neg for for chest pain , palpitations,pnd,neg for leg edema  Gastrointestinal: neg for abd pain, nausea, vomiting, diarrhea or constipation,no monalisa,no blood in stool  Genitourinary: negative for dysuria, urgency,hematuria or frequency  Integument/breast: negative for skin rash or lesions  Neurological: positive for unilateral weakness of rt arm and leg, neg for numbness or tingling,has rt leg pain  Muscular Skeletal: has rt leg  pain   Psych -mood stable    Objective:    Vitals:   BP-121/81,Pulse-85,Respi-18,Temp-98.4  -----------------------------------------------------------------  Exam:  GEN:   A & O x3, no apparent distress  EYES: No gross abnormalities. ENT:ENT exam normal, no neck nodes or sinus tenderness  NECK: normal,  no carotid bruits  PULM: clear to auscultation bilaterally- no wheezes, rales or rhonchi, normal air movement, no respiratory distress  COR: Iregular rate & rhythm, 2/6 murmur and no gallops,no edema  ABD:  soft, non-tender, non-distended, normal bowel sounds, no masses or organomegaly  : no cva or flank tendernes  EXT:no cyanosis, clubbing or edema present  no calf tenderness, and warm to touch.    NEURO: Motor- has minimal rt leg weakness and sensory grossly intact  PSYCH: mood stable  SKIN:  No skin lesions or rashes    -----------------------------------------------------------------  Diagnostic Data:   · All diagnostic data was reviewed    Assessment:        ICD-10-CM 1. Chronic combined systolic (congestive) and diastolic (congestive) heart failure ([facility])  I50.42    2. Type 2 diabetes mellitus with stage 3b chronic kidney disease, without long-term current use of insulin ([facility])  E11.22     N18.32    3. Chronic atrial fibrillation ([facility])  I48.20    4.  Essential hypertension  I10      Patient Active Problem List   Diagnosis Code    Hypertension I10    Hyperlipidemia E78.5    Acute on chronic systolic CHF (congestive heart failure) ([facility]) I50.23    Hypertensive urgency I16.0    Idiopathic cardiomyopathy ([facility]) I42.8    Hypertensive emergency I16.1    Chronic combined systolic and diastolic HF (heart failure), NYHA class 2 ([facility]) I50.42    Acute on chronic combined systolic and diastolic CHF, NYHA class 4 ([facility]) I50.43    Hypoxia R09.02    Moderate mitral regurgitation by prior echocardiogram I34.0    Morbid obesity ([facility]) E66.01    CKD (chronic kidney disease) stage 3, GFR 30-59 ml/min ([facility]) N18.30    Physical deconditioning R53.81    TIA (transient ischemic attack) G45.9    Old lacunar stroke without late effect Z86.73    Dysarthria R47.1    Stroke determined by clinical assessment ([facility] I63.9    Aphasia R47.01    Type 2 diabetes mellitus, without long-term current use of insulin ([facility]) E11.9    Uncontrolled type 2 diabetes mellitus with hyperglycemia ([facility]) E11.65    Acute ischemic stroke ([facility]) I63.9    PAF (paroxysmal atrial fibrillation) ([facility]) I48.0    Encounter for current long-term use of anticoagulants Z79.01    Hemiplegia and hemiparesis following cerebral infarction affecting right dominant side ([facility]) I69.351    Atrial fibrillation with RVR ([facility]) I48.91    Unable to care for self Z78.9         Plan:     Increase neurontin to 400 mg tid  Monitor afib,blood sugar and bp  Monitor for side effects of apixaben  Continue current medications  Prevent pressure sore  Prevent falls    Electronically signed by Christine Ruiz MD on 1/9/2022 at 5:48 PM

## 2022-02-02 ENCOUNTER — OUTSIDE SERVICES (OUTPATIENT)
Dept: PRIMARY CARE CLINIC | Age: 72
End: 2022-02-02
Payer: MEDICARE

## 2022-02-02 DIAGNOSIS — I10 ESSENTIAL HYPERTENSION: ICD-10-CM

## 2022-02-02 DIAGNOSIS — I69.351 HEMIPLEGIA AND HEMIPARESIS FOLLOWING CEREBRAL INFARCTION AFFECTING RIGHT DOMINANT SIDE (HCC): ICD-10-CM

## 2022-02-02 DIAGNOSIS — I48.20 CHRONIC ATRIAL FIBRILLATION (HCC): ICD-10-CM

## 2022-02-02 DIAGNOSIS — N18.32 TYPE 2 DIABETES MELLITUS WITH STAGE 3B CHRONIC KIDNEY DISEASE, WITHOUT LONG-TERM CURRENT USE OF INSULIN (HCC): ICD-10-CM

## 2022-02-02 DIAGNOSIS — I50.42 CHRONIC COMBINED SYSTOLIC (CONGESTIVE) AND DIASTOLIC (CONGESTIVE) HEART FAILURE (HCC): Primary | ICD-10-CM

## 2022-02-02 DIAGNOSIS — E11.22 TYPE 2 DIABETES MELLITUS WITH STAGE 3B CHRONIC KIDNEY DISEASE, WITHOUT LONG-TERM CURRENT USE OF INSULIN (HCC): ICD-10-CM

## 2022-02-02 PROCEDURE — 99308 SBSQ NF CARE LOW MDM 20: CPT | Performed by: FAMILY MEDICINE

## 2022-02-02 NOTE — PROGRESS NOTES
Patient:  Ana De Jesus, 1950  I saw this patient on  02/02/2022, at [facility]. She continues to have weakness, ambulates with walker  Has rt leg pain  Blood sugars are ok        Reason for Visit:      ICD-10-CM    1. Chronic combined systolic (congestive) and diastolic (congestive) heart failure ([facility])  I50.42    2. Hemiplegia and hemiparesis following cerebral infarction affecting right dominant side ([facility])  I69.351    3. Type 2 diabetes mellitus with stage 3b chronic kidney disease, without long-term current use of insulin ([facility])  E11.22     N18.32    4. Chronic atrial fibrillation ([facility])  I48.20    5. Essential hypertension  I10        Changes since last visit:    Has rt leg pain  Blood sugars ok  1. Fall:  none  2. Behavioral Change: none  3. Pain Control: has rt leg pain  4. Mobility: improving  5. Pressure Sore:  none    Current Outpatient Medications   Medication Sig Dispense Refill    ketorolac (TORADOL) 10 MG tablet Take 10 mg by mouth 2 times daily as needed for Pain      amiodarone (CORDARONE) 200 MG tablet Take 1 tablet by mouth 2 times daily      sertraline (ZOLOFT) 25 MG tablet Take 1 tablet by mouth daily 30 tablet 3    metoprolol succinate (TOPROL XL) 200 MG extended release tablet Take 1 tablet by mouth daily 30 tablet 3    furosemide (LASIX) 40 MG tablet Take 1 tablet by mouth daily 60 tablet 3    gabapentin (NEURONTIN) 300 MG capsule Take 1 capsule by mouth 3 times daily for 30 days.  (Patient taking differently: Take 400 mg by mouth 3 times daily. ) 90 capsule 3    potassium chloride (KLOR-CON M) 20 MEQ extended release tablet Take 1 tablet by mouth daily 30 tablet 0    losartan-hydroCHLOROthiazide (HYZAAR) 100-25 MG per tablet Take 1 tablet by mouth daily 90 tablet 3    atorvastatin (LIPITOR) 80 MG tablet TAKE ONE TABLET BY MOUTH ONCE NIGHTLY 90 tablet 3    apixaban (ELIQUIS) 5 MG TABS tablet Take 1 tablet by mouth 2 times daily 180 tablet 3    busPIRone (BUSPAR) 10 MG tablet Take 10 mg by mouth 2 times daily      famotidine (PEPCID) 20 MG tablet Take 20 mg by mouth 2 times daily      amLODIPine (NORVASC) 10 MG tablet Take 1 tablet by mouth every evening 90 tablet 3    miconazole (MICOTIN) 2 % powder Apply topically 2 times daily. 45 g 1    metFORMIN (GLUCOPHAGE) 500 MG tablet Take 500 mg by mouth 2 times daily (with meals)       No current facility-administered medications for this visit. Allergies:  Patient has no known allergies. Past Medical History:    Past Medical History:   Diagnosis Date    [facility]erebral artery occlusion with [facility] infarction ([facility]     CHF (congestive heart failure) (HCC)     Diabetes mellitus ([facility]     H/O cardiac catheterization 08/04/2017    LMCA: Mild irregularites 10-20%. LAD: Mild irregularites 10-20%. LCx: Mild irregularites 10-20%. RCA: Mild irregularites 10-20%. LV function assessed as : Abnormal. EF of 40%.  H/O echocardiogram 12/18/2018    EF 55%. Mildly increased LV wall thickness. The left atrium appears moderately to severely dilated. Moderate to severe mitral regurg. Moderate pulmonary HTN with an estimated RV systolic pressure of 35MAAO. Moderate tricuspid regurg. Evidnece fo moderate diastolic dysfunction is seen.  History of echocardiogram 01/17/2019    EF 55%. LV wall thickness moderately increased. LA is mildly dilated w/ LA volume index of 30. trivial mitral regurg. Evidence of moderate (grade II) diastolic dysfunction seen.  History of echocardiogram 06/03/2019    EF of 50-55%. LV wall thickness is mildly increased. LA is mildly dilated (29-33) with a left atrial volume index of 31 m1/m2. mild diastolic dysfunction.     Hyperlipidemia     Hypertension        Past Surgical History:    Past Surgical History:   Procedure Laterality Date    CARDIAC CATHETERIZATION Left 08/04/2017    right radial, [facility]HT Dr. [place]         Social History:   Social History     Tobacco Use    Smoking status: Never Smoker    Smokeless tobacco: Never Used   Substance Use Topics    Alcohol use: Yes     Alcohol/week: 1.0 standard drink     Types: 1 Glasses of wine per week       Family History:   Family History   Problem Relation Age of Onset    Coronary Art Dis Father          from [place]   [de-identified] COPD Sister         O2 dep    Coronary Art Dis Brother         2 brothers  from [place]           Review of Systems:  Constitutional: negative for fevers or chills, has weakness  Eyes: negative for visual disturbance   ENT: negative for sore throat or nasal congestion,neg for dysphagia  Respiratory: neg for shortness of breath , cough  Cardiovascular: neg for for chest pain , palpitations,pnd,neg for leg edema  Gastrointestinal: neg for abd pain, nausea, vomiting, diarrhea or constipation,no monalisa,no blood in stool  Genitourinary: negative for dysuria, urgency,hematuria or frequency  Integument/breast: negative for skin rash or lesions  Neurological: positive for unilateral weakness of rt arm and leg, neg for numbness or tingling,has rt leg pain  Muscular Skeletal: has rt leg  pain   Psych -mood stable    Objective:    Vitals:   BP-128/81,Pulse-85,Respi-18,Temp-98.3  -----------------------------------------------------------------  Exam:  GEN:   A & O x3, no apparent distress  EYES: No gross abnormalities. ENT:ENT exam normal, no neck nodes or sinus tenderness  NECK: normal,  no carotid bruits  PULM: clear to auscultation bilaterally- no wheezes, rales or rhonchi, normal air movement, no respiratory distress  COR: Iregular rate & rhythm, 2/6 murmur and no gallops,no edema  ABD:  soft, non-tender, non-distended, normal bowel sounds, no masses or organomegaly  : no cva or flank tendernes  EXT:no cyanosis, clubbing or edema present  no calf tenderness, and warm to touch.    NEURO: Motor- has minimal rt leg weakness and sensory grossly intact  PSYCH: mood stable  SKIN:  No skin lesions or ángel    -----------------------------------------------------------------  Diagnostic Data:   · All diagnostic data was reviewed    Assessment:        ICD-10-CM    1. Chronic combined systolic (congestive) and diastolic (congestive) heart failure ([facility])  I50.42    2. Hemiplegia and hemiparesis following cerebral infarction affecting right dominant side ([facility])  I69.351    3. Type 2 diabetes mellitus with stage 3b chronic kidney disease, without long-term current use of insulin ([facility])  E11.22     N18.32    4. Chronic atrial fibrillation ([facility])  I48.20    5.  Essential hypertension  I10      Patient Active Problem List   Diagnosis Code    Hypertension I10    Hyperlipidemia E78.5    Acute on chronic systolic CHF (congestive heart failure) ([facility]) I50.23    Hypertensive urgency I16.0    Idiopathic cardiomyopathy ([facility]) I42.8    Hypertensive emergency I16.1    Chronic combined systolic and diastolic HF (heart failure), NYHA class 2 ([facility]) I50.42    Acute on chronic combined systolic and diastolic CHF, NYHA class 4 ([facility]) I50.43    Hypoxia R09.02    Moderate mitral regurgitation by prior echocardiogram I34.0    Morbid obesity ([facility]) E66.01    CKD (chronic kidney disease) stage 3, GFR 30-59 ml/min ([facility]) N18.30    Physical deconditioning R53.81    TIA (transient ischemic attack) G45.9    Old lacunar stroke without late effect Z86.73    Dysarthria R47.1    Stroke determined by clinical assessment ([facility] I63.9    Aphasia R47.01    Type 2 diabetes mellitus, without long-term current use of insulin ([facility]) E11.9    Uncontrolled type 2 diabetes mellitus with hyperglycemia ([facility]) E11.65    Acute ischemic stroke ([facility]) I63.9    PAF (paroxysmal atrial fibrillation) ([facility]) I48.0    Encounter for current long-term use of anticoagulants Z79.01    Hemiplegia and hemiparesis following cerebral infarction affecting right dominant side ([facility]) I69.351    Atrial fibrillation with RVR ([facility]) I48.91    Unable to care for self Z78.9 Plan:       Monitor afib,blood sugar and bp  Monitor for side effects of apixaben  Continue current medications  Prevent pressure sore  Prevent falls    Electronically signed by Jose Miner MD on 2/2/2022 at 6:07 PM

## 2022-02-09 RX ORDER — BISACODYL 10 MG
10 SUPPOSITORY, RECTAL RECTAL DAILY
COMMUNITY
End: 2022-06-09

## 2022-02-09 RX ORDER — HYDROXYZINE HYDROCHLORIDE 25 MG/1
25 TABLET, FILM COATED ORAL EVERY 8 HOURS PRN
COMMUNITY
End: 2022-06-09

## 2022-02-09 RX ORDER — SODIUM PHOSPHATE, DIBASIC AND SODIUM PHOSPHATE, MONOBASIC 7; 19 G/133ML; G/133ML
1 ENEMA RECTAL
COMMUNITY
End: 2022-06-09 | Stop reason: ALTCHOICE

## 2022-02-09 RX ORDER — ACETAMINOPHEN 325 MG/1
650 TABLET ORAL EVERY 4 HOURS PRN
COMMUNITY

## 2022-03-02 ENCOUNTER — NURSE ONLY (OUTPATIENT)
Dept: CARDIOLOGY | Age: 72
End: 2022-03-02
Payer: MEDICARE

## 2022-03-02 ENCOUNTER — OUTSIDE SERVICES (OUTPATIENT)
Dept: PRIMARY CARE CLINIC | Age: 72
End: 2022-03-02
Payer: MEDICARE

## 2022-03-02 DIAGNOSIS — I63.9 CRYPTOGENIC STROKE (HCC): ICD-10-CM

## 2022-03-02 DIAGNOSIS — N18.32 TYPE 2 DIABETES MELLITUS WITH STAGE 3B CHRONIC KIDNEY DISEASE, WITHOUT LONG-TERM CURRENT USE OF INSULIN (HCC): ICD-10-CM

## 2022-03-02 DIAGNOSIS — I10 ESSENTIAL HYPERTENSION: ICD-10-CM

## 2022-03-02 DIAGNOSIS — I50.42 CHRONIC COMBINED SYSTOLIC (CONGESTIVE) AND DIASTOLIC (CONGESTIVE) HEART FAILURE (HCC): Primary | ICD-10-CM

## 2022-03-02 DIAGNOSIS — Z45.09 ENCOUNTER FOR LOOP RECORDER CHECK: Primary | ICD-10-CM

## 2022-03-02 DIAGNOSIS — I48.20 CHRONIC ATRIAL FIBRILLATION (HCC): ICD-10-CM

## 2022-03-02 DIAGNOSIS — E11.22 TYPE 2 DIABETES MELLITUS WITH STAGE 3B CHRONIC KIDNEY DISEASE, WITHOUT LONG-TERM CURRENT USE OF INSULIN (HCC): ICD-10-CM

## 2022-03-02 DIAGNOSIS — I69.351 HEMIPLEGIA AND HEMIPARESIS FOLLOWING CEREBRAL INFARCTION AFFECTING RIGHT DOMINANT SIDE (HCC): ICD-10-CM

## 2022-03-02 PROCEDURE — 99308 SBSQ NF CARE LOW MDM 20: CPT | Performed by: FAMILY MEDICINE

## 2022-03-02 NOTE — PROGRESS NOTES
Patient:  Pearl Duque, 1950  I saw this patient on  03/02/2022, at [facility]. She continues to have rt leg pain, ambulates with walker  Blood sugars are ok        Reason for Visit:      ICD-10-CM    1. Chronic combined systolic (congestive) and diastolic (congestive) heart failure ([facility])  I50.42    2. Hemiplegia and hemiparesis following cerebral infarction affecting right dominant side ([facility])  I69.351    3. Type 2 diabetes mellitus with stage 3b chronic kidney disease, without long-term current use of insulin ([facility])  E11.22     N18.32    4. Chronic atrial fibrillation ([facility])  I48.20    5. Essential hypertension  I10        Changes since last visit:    Has rt leg pain  Blood sugars well controlled  1. Fall:  none  2. Behavioral Change: none  3. Pain Control: has rt leg pain  4. Mobility: improving  5. Pressure Sore:  none    Current Outpatient Medications   Medication Sig Dispense Refill    acetaminophen (TYLENOL) 325 MG tablet Take 650 mg by mouth every 4 hours as needed for Pain      magnesium hydroxide (MILK OF MAGNESIA) 400 MG/5ML suspension Take by mouth daily as needed for Constipation      bisacodyl (DULCOLAX) 10 MG suppository Place 10 mg rectally daily      Sodium Phosphates (FLEET) 7-19 GM/118ML Place 1 enema rectally once as needed      hydrOXYzine (ATARAX) 25 MG tablet Take 25 mg by mouth every 8 hours as needed for Itching      ketorolac (TORADOL) 10 MG tablet Take 10 mg by mouth 2 times daily as needed for Pain      amiodarone (CORDARONE) 200 MG tablet Take 1 tablet by mouth 2 times daily      sertraline (ZOLOFT) 25 MG tablet Take 1 tablet by mouth daily 30 tablet 3    metoprolol succinate (TOPROL XL) 200 MG extended release tablet Take 1 tablet by mouth daily 30 tablet 3    furosemide (LASIX) 40 MG tablet Take 1 tablet by mouth daily 60 tablet 3    gabapentin (NEURONTIN) 300 MG capsule Take 1 capsule by mouth 3 times daily for 30 days.  (Patient taking differently: Take 400 mg by mouth 3 times daily. ) 90 capsule 3    potassium chloride (KLOR-CON M) 20 MEQ extended release tablet Take 1 tablet by mouth daily 30 tablet 0    losartan-hydroCHLOROthiazide (HYZAAR) 100-25 MG per tablet Take 1 tablet by mouth daily 90 tablet 3    atorvastatin (LIPITOR) 80 MG tablet TAKE ONE TABLET BY MOUTH ONCE NIGHTLY 90 tablet 3    apixaban (ELIQUIS) 5 MG TABS tablet Take 1 tablet by mouth 2 times daily 180 tablet 3    famotidine (PEPCID) 20 MG tablet Take 20 mg by mouth 2 times daily      amLODIPine (NORVASC) 10 MG tablet Take 1 tablet by mouth every evening 90 tablet 3    miconazole (MICOTIN) 2 % powder Apply topically 2 times daily. 45 g 1    metFORMIN (GLUCOPHAGE) 500 MG tablet Take 500 mg by mouth 2 times daily (with meals)       No current facility-administered medications for this visit. Allergies:  Patient has no known allergies. Past Medical History:    Past Medical History:   Diagnosis Date    [facility]     CHF (congestive heart failure) (HCC)     Diabetes mellitus ([facility]     H/O cardiac catheterization 08/04/2017    LMCA: Mild irregularites 10-20%. LAD: Mild irregularites 10-20%. LCx: Mild irregularites 10-20%. RCA: Mild irregularites 10-20%. LV function assessed as : Abnormal. EF of 40%.  H/O echocardiogram 12/18/2018    EF 55%. Mildly increased LV wall thickness. The left atrium appears moderately to severely dilated. Moderate to severe mitral regurg. Moderate pulmonary HTN with an estimated RV systolic pressure of 73AIHJ. Moderate tricuspid regurg. Evidnece fo moderate diastolic dysfunction is seen.  History of echocardiogram 01/17/2019    EF 55%. LV wall thickness moderately increased. LA is mildly dilated w/ LA volume index of 30. trivial mitral regurg. Evidence of moderate (grade II) diastolic dysfunction seen.  History of echocardiogram 06/03/2019    EF of 50-55%. LV wall thickness is mildly increased. LA is mildly dilated (29-33) with a left atrial volume index of 31 m1/m2. mild diastolic dysfunction.  Hyperlipidemia     Hypertension        Past Surgical History:    Past Surgical History:   Procedure Laterality Date    CARDIAC CATHETERIZATION Left 2017    right radial, [facility] Dr. Biju Garza         Social History:   Social History     Tobacco Use    Smoking status: Never Smoker    Smokeless tobacco: Never Used   Substance Use Topics    Alcohol use: Yes     Alcohol/week: 1.0 standard drink     Types: 1 Glasses of wine per week       Family History:   Family History   Problem Relation Age of Onset    Coronary Art Dis Father          from [place]   Michael Outhouse COPD Sister         O2 dep    Coronary Art Dis Brother         2 brothers  from [place]           Review of Systems:  Constitutional: negative for fevers or chills, has weakness  Eyes: negative for visual disturbance   ENT: negative for sore throat or nasal congestion,neg for dysphagia  Respiratory: neg for shortness of breath , cough  Cardiovascular: neg for for chest pain , palpitations,pnd,neg for leg edema  Gastrointestinal: neg for abd pain, nausea, vomiting, diarrhea or constipation,no monalisa,no blood in stool  Genitourinary: negative for dysuria, urgency,hematuria or frequency  Integument/breast: negative for skin rash or lesions  Neurological: positive for unilateral weakness of rt arm and leg, neg for numbness or tingling,has rt leg pain  Muscular Skeletal: has rt leg  pain   Psych -mood stable    Objective:    Vitals:   BP-128/81,Pulse-85,Respi-18,Temp-97.9  -----------------------------------------------------------------  Exam:  GEN:   A & O x3, no apparent distress  EYES: No gross abnormalities.   ENT:ENT exam normal, no neck nodes or sinus tenderness  NECK: normal,  no carotid bruits  PULM: clear to auscultation bilaterally- no wheezes, rales or rhonchi, normal air movement, no respiratory distress  COR: Iregular rate & rhythm, 2/6 murmur and no gallops,no edema  ABD:  soft, non-tender, non-distended, normal bowel sounds, no masses or organomegaly  : no cva or flank tendernes  EXT:no cyanosis, clubbing or edema present  no calf tenderness, and warm to touch. NEURO: Motor- has minimal rt leg weakness and sensory grossly intact  PSYCH: mood stable  SKIN:  No skin lesions or rashes    -----------------------------------------------------------------  Diagnostic Data:   · All diagnostic data was reviewed    Assessment:        ICD-10-CM    1. Chronic combined systolic (congestive) and diastolic (congestive) heart failure ([facility])  I50.42    2. Hemiplegia and hemiparesis following cerebral infarction affecting right dominant side ([facility])  I69.351    3. Type 2 diabetes mellitus with stage 3b chronic kidney disease, without long-term current use of insulin ([facility])  E11.22     N18.32    4. Chronic atrial fibrillation ([facility])  I48.20    5.  Essential hypertension  I10      Patient Active Problem List   Diagnosis Code    Hypertension I10    Hyperlipidemia E78.5    Acute on chronic systolic CHF (congestive heart failure) ([facility]) I50.23    Hypertensive urgency I16.0    Idiopathic cardiomyopathy ([facility]) I42.8    Hypertensive emergency I16.1    Chronic combined systolic and diastolic HF (heart failure), NYHA class 2 ([facility]) I50.42    Acute on chronic combined systolic and diastolic CHF, NYHA class 4 ([facility]) I50.43    Hypoxia R09.02    Moderate mitral regurgitation by prior echocardiogram I34.0    Morbid obesity ([facility]) E66.01    CKD (chronic kidney disease) stage 3, GFR 30-59 ml/min ([facility]) N18.30    Physical deconditioning R53.81    TIA (transient ischemic attack) G45.9    Old lacunar stroke without late effect Z86.73    Dysarthria R47.1    Stroke determined by clinical assessment ([facility] I63.9    Aphasia R47.01    Type 2 diabetes mellitus, without long-term current use of insulin ([facility]) E11.9    Uncontrolled type 2 diabetes mellitus with hyperglycemia (HCC) E11.65    Acute ischemic stroke ([facility]) I63.9    PAF (paroxysmal atrial fibrillation) ([facility]) I48.0    Encounter for current long-term use of anticoagulants Z79.01    Hemiplegia and hemiparesis following cerebral infarction affecting right dominant side ([facility]) I69.351    Atrial fibrillation with RVR ([facility]) I48.91    Unable to care for self Z78.9         Plan:       Monitor afib,blood sugar and bp  Monitor for side effects of apixaben  Continue current medications  Prevent pressure sore  Prevent falls    Electronically signed by Gisele Ormond MD on 3/3/2022 at 7:50 AM

## 2022-03-03 PROCEDURE — 93298 REM INTERROG DEV EVAL SCRMS: CPT | Performed by: FAMILY MEDICINE

## 2022-04-05 ENCOUNTER — OUTSIDE SERVICES (OUTPATIENT)
Dept: PRIMARY CARE CLINIC | Age: 72
End: 2022-04-05
Payer: MEDICARE

## 2022-04-05 DIAGNOSIS — I50.42 CHRONIC COMBINED SYSTOLIC (CONGESTIVE) AND DIASTOLIC (CONGESTIVE) HEART FAILURE (HCC): ICD-10-CM

## 2022-04-05 DIAGNOSIS — N18.32 TYPE 2 DIABETES MELLITUS WITH STAGE 3B CHRONIC KIDNEY DISEASE, WITHOUT LONG-TERM CURRENT USE OF INSULIN (HCC): Primary | ICD-10-CM

## 2022-04-05 DIAGNOSIS — E11.22 TYPE 2 DIABETES MELLITUS WITH STAGE 3B CHRONIC KIDNEY DISEASE, WITHOUT LONG-TERM CURRENT USE OF INSULIN (HCC): Primary | ICD-10-CM

## 2022-04-05 DIAGNOSIS — I10 ESSENTIAL HYPERTENSION: ICD-10-CM

## 2022-04-05 DIAGNOSIS — I48.20 CHRONIC ATRIAL FIBRILLATION (HCC): ICD-10-CM

## 2022-04-05 NOTE — PROGRESS NOTES
Patient:  Chriss Libman, 1950  I saw this patient on  04/06/2022, at [facility]. She continues to have rt leg pain, ambulates with walker  Blood sugars are better        Reason for Visit:      ICD-10-CM    1. Type 2 diabetes mellitus with stage 3b chronic kidney disease, without long-term current use of insulin ([facility])  E11.22     N18.32    2. Chronic combined systolic (congestive) and diastolic (congestive) heart failure ([facility])  I50.42    3. Chronic atrial fibrillation ([facility])  I48.20    4. Essential hypertension  I10        Changes since last visit:    Blood sugars well controlled  1. Fall:  none  2. Behavioral Change: none  3. Pain Control: has rt leg pain  4. Mobility: improving  5. Pressure Sore:  none    Current Outpatient Medications   Medication Sig Dispense Refill    acetaminophen (TYLENOL) 325 MG tablet Take 650 mg by mouth every 4 hours as needed for Pain      magnesium hydroxide (MILK OF MAGNESIA) 400 MG/5ML suspension Take by mouth daily as needed for Constipation      bisacodyl (DULCOLAX) 10 MG suppository Place 10 mg rectally daily      Sodium Phosphates (FLEET) 7-19 GM/118ML Place 1 enema rectally once as needed      hydrOXYzine (ATARAX) 25 MG tablet Take 25 mg by mouth every 8 hours as needed for Itching      ketorolac (TORADOL) 10 MG tablet Take 10 mg by mouth 2 times daily as needed for Pain      amiodarone (CORDARONE) 200 MG tablet Take 1 tablet by mouth 2 times daily      sertraline (ZOLOFT) 25 MG tablet Take 1 tablet by mouth daily 30 tablet 3    metoprolol succinate (TOPROL XL) 200 MG extended release tablet Take 1 tablet by mouth daily 30 tablet 3    furosemide (LASIX) 40 MG tablet Take 1 tablet by mouth daily 60 tablet 3    gabapentin (NEURONTIN) 300 MG capsule Take 1 capsule by mouth 3 times daily for 30 days.  (Patient taking differently: Take 400 mg by mouth 3 times daily. ) 90 capsule 3    potassium chloride (KLOR-CON M) 20 MEQ extended release tablet Take 1 tablet by mouth daily 30 tablet 0    losartan-hydroCHLOROthiazide (HYZAAR) 100-25 MG per tablet Take 1 tablet by mouth daily 90 tablet 3    atorvastatin (LIPITOR) 80 MG tablet TAKE ONE TABLET BY MOUTH ONCE NIGHTLY 90 tablet 3    apixaban (ELIQUIS) 5 MG TABS tablet Take 1 tablet by mouth 2 times daily 180 tablet 3    famotidine (PEPCID) 20 MG tablet Take 20 mg by mouth 2 times daily      amLODIPine (NORVASC) 10 MG tablet Take 1 tablet by mouth every evening 90 tablet 3    miconazole (MICOTIN) 2 % powder Apply topically 2 times daily. 45 g 1    metFORMIN (GLUCOPHAGE) 500 MG tablet Take 500 mg by mouth 2 times daily (with meals)       No current facility-administered medications for this visit. Allergies:  Patient has no known allergies. Past Medical History:    Past Medical History:   Diagnosis Date    [facility]     CHF (congestive heart failure) (HCC)     Diabetes mellitus ([facility]     H/O cardiac catheterization 08/04/2017    LMCA: Mild irregularites 10-20%. LAD: Mild irregularites 10-20%. LCx: Mild irregularites 10-20%. RCA: Mild irregularites 10-20%. LV function assessed as : Abnormal. EF of 40%.  H/O echocardiogram 12/18/2018    EF 55%. Mildly increased LV wall thickness. The left atrium appears moderately to severely dilated. Moderate to severe mitral regurg. Moderate pulmonary HTN with an estimated RV systolic pressure of 90IALE. Moderate tricuspid regurg. Evidnece fo moderate diastolic dysfunction is seen.  History of echocardiogram 01/17/2019    EF 55%. LV wall thickness moderately increased. LA is mildly dilated w/ LA volume index of 30. trivial mitral regurg. Evidence of moderate (grade II) diastolic dysfunction seen.  History of echocardiogram 06/03/2019    EF of 50-55%. LV wall thickness is mildly increased. LA is mildly dilated (29-33) with a left atrial volume index of 31 m1/m2. mild diastolic dysfunction.     Hyperlipidemia     Hypertension        Past Surgical History:    Past Surgical History:   Procedure Laterality Date    CARDIAC CATHETERIZATION Left 2017    right radial, [facility] Dr. Azra Osei         Social History:   Social History     Tobacco Use    Smoking status: Never Smoker    Smokeless tobacco: Never Used   Substance Use Topics    Alcohol use: Yes     Alcohol/week: 1.0 standard drink     Types: 1 Glasses of wine per week       Family History:   Family History   Problem Relation Age of Onset    Coronary Art Dis Father          from [place]   [de-identified] COPD Sister         O2 dep    Coronary Art Dis Brother         2 brothers  from [place]           Review of Systems:  Constitutional: negative for fevers or chills, has weakness  Eyes: negative for visual disturbance   ENT: negative for sore throat or nasal congestion,neg for dysphagia  Respiratory: neg for shortness of breath , cough  Cardiovascular: neg for for chest pain , palpitations,pnd,neg for leg edema  Gastrointestinal: neg for abd pain, nausea, vomiting, diarrhea or constipation,no monalisa,no blood in stool  Genitourinary: negative for dysuria, urgency,hematuria or frequency  Integument/breast: negative for skin rash or lesions  Neurological: positive for unilateral weakness of rt arm and leg, neg for numbness or tingling,has rt leg pain  Muscular Skeletal: has rt leg  pain   Psych -mood stable    Objective:    Vitals:   BP-128/81,Pulse-85,Respi-18,Temp-97.6  -----------------------------------------------------------------  Exam:  GEN:   A & O x3, no apparent distress  EYES: No gross abnormalities.   ENT:ENT exam normal, no neck nodes or sinus tenderness  NECK: normal,  no carotid bruits  PULM: clear to auscultation bilaterally- no wheezes, rales or rhonchi, normal air movement, no respiratory distress  COR: Iregular rate & rhythm, 2/6 murmur and no gallops,no edema  ABD:  soft, non-tender, non-distended, normal bowel sounds, no masses or organomegaly  : no cva or flank tendernes  EXT:no cyanosis, clubbing or edema present  no calf tenderness, and warm to touch. NEURO: Motor- has minimal rt leg weakness and sensory grossly intact  PSYCH: mood stable  SKIN:  No skin lesions or rashes    -----------------------------------------------------------------  Diagnostic Data:   · All diagnostic data was reviewed    Assessment:        ICD-10-CM    1. Type 2 diabetes mellitus with stage 3b chronic kidney disease, without long-term current use of insulin ([facility])  E11.22     N18.32    2. Chronic combined systolic (congestive) and diastolic (congestive) heart failure ([facility])  I50.42    3. Chronic atrial fibrillation ([facility])  I48.20    4.  Essential hypertension  I10      Patient Active Problem List   Diagnosis Code    Hypertension I10    Hyperlipidemia E78.5    Acute on chronic systolic CHF (congestive heart failure) ([facility]) I50.23    Hypertensive urgency I16.0    Idiopathic cardiomyopathy ([facility]) I42.8    Hypertensive emergency I16.1    Chronic combined systolic and diastolic HF (heart failure), NYHA class 2 ([facility]) I50.42    Acute on chronic combined systolic and diastolic CHF, NYHA class 4 ([facility]) I50.43    Hypoxia R09.02    Moderate mitral regurgitation by prior echocardiogram I34.0    Morbid obesity ([facility]) E66.01    CKD (chronic kidney disease) stage 3, GFR 30-59 ml/min ([facility]) N18.30    Physical deconditioning R53.81    TIA (transient ischemic attack) G45.9    Old lacunar stroke without late effect Z86.73    Dysarthria R47.1    Stroke determined by clinical assessment ([facility] I63.9    Aphasia R47.01    Type 2 diabetes mellitus, without long-term current use of insulin ([facility]) E11.9    Uncontrolled type 2 diabetes mellitus with hyperglycemia ([facility]) E11.65    Acute ischemic stroke ([facility]) I63.9    PAF (paroxysmal atrial fibrillation) ([facility]) I48.0    Encounter for current long-term use of anticoagulants Z79.01    Hemiplegia and hemiparesis following cerebral infarction affecting right dominant side (HCC) I69.351    Atrial fibrillation with RVR (HCC) I48.91    Unable to care for self Z78.9         Plan:       Monitor afib,blood sugar and bp  Cbc,cmp.lipid,tsh,hba1c  Monitor for side effects of apixaben  Continue current medications  Prevent pressure sore  Prevent falls    Electronically signed by Grazyna Stoner MD on 4/6/2022 at 4:15 PM

## 2022-04-06 PROCEDURE — 99308 SBSQ NF CARE LOW MDM 20: CPT | Performed by: FAMILY MEDICINE

## 2022-04-07 ENCOUNTER — HOSPITAL ENCOUNTER (OUTPATIENT)
Age: 72
Setting detail: SPECIMEN
Discharge: HOME | End: 2022-04-07
Payer: MEDICARE

## 2022-04-07 LAB
ALBUMIN SERPL-MCNC: 3.8 G/DL (ref 3.5–5.2)
ALBUMIN/GLOBULIN RATIO: 1.7 (ref 1–2.5)
ALP BLD-CCNC: 82 U/L (ref 35–104)
ALT SERPL-CCNC: 21 U/L (ref 5–33)
ANION GAP SERPL CALCULATED.3IONS-SCNC: 8 MMOL/L (ref 9–17)
AST SERPL-CCNC: 17 U/L
BILIRUB SERPL-MCNC: 0.28 MG/DL (ref 0.3–1.2)
BUN BLDV-MCNC: 38 MG/DL (ref 8–23)
BUN/CREAT BLD: 27 (ref 9–20)
CALCIUM SERPL-MCNC: 9.9 MG/DL (ref 8.6–10.4)
CHLORIDE BLD-SCNC: 104 MMOL/L (ref 98–107)
CHOLESTEROL/HDL RATIO: 2.4
CHOLESTEROL: 102 MG/DL
CO2: 26 MMOL/L (ref 20–31)
CREAT SERPL-MCNC: 1.39 MG/DL (ref 0.5–0.9)
ESTIMATED AVERAGE GLUCOSE: 154 MG/DL
GFR AFRICAN AMERICAN: 46 ML/MIN
GFR NON-AFRICAN AMERICAN: 38 ML/MIN
GFR SERPL CREATININE-BSD FRML MDRD: ABNORMAL ML/MIN/{1.73_M2}
GFR SERPL CREATININE-BSD FRML MDRD: ABNORMAL ML/MIN/{1.73_M2}
GLUCOSE BLD-MCNC: 119 MG/DL (ref 70–99)
HBA1C MFR BLD: 7 % (ref 4–6)
HCT VFR BLD CALC: 35.8 % (ref 36.3–47.1)
HDLC SERPL-MCNC: 43 MG/DL
HEMOGLOBIN: 11.5 G/DL (ref 11.9–15.1)
LDL CHOLESTEROL: 47 MG/DL (ref 0–130)
MCH RBC QN AUTO: 29.9 PG (ref 25.2–33.5)
MCHC RBC AUTO-ENTMCNC: 32.1 G/DL (ref 28.4–34.8)
MCV RBC AUTO: 93.2 FL (ref 82.6–102.9)
NRBC AUTOMATED: 0 PER 100 WBC
PDW BLD-RTO: 14.5 % (ref 11.8–14.4)
PLATELET # BLD: 145 K/UL (ref 138–453)
PMV BLD AUTO: 11.9 FL (ref 8.1–13.5)
POTASSIUM SERPL-SCNC: 4.4 MMOL/L (ref 3.7–5.3)
RBC # BLD: 3.84 M/UL (ref 3.95–5.11)
SODIUM BLD-SCNC: 138 MMOL/L (ref 135–144)
TOTAL PROTEIN: 6.1 G/DL (ref 6.4–8.3)
TRIGL SERPL-MCNC: 62 MG/DL
TSH SERPL DL<=0.05 MIU/L-ACNC: 1.45 UIU/ML (ref 0.3–5)
WBC # BLD: 5.1 K/UL (ref 3.5–11.3)

## 2022-04-07 PROCEDURE — 36415 COLL VENOUS BLD VENIPUNCTURE: CPT

## 2022-04-07 PROCEDURE — 84443 ASSAY THYROID STIM HORMONE: CPT

## 2022-04-07 PROCEDURE — 83036 HEMOGLOBIN GLYCOSYLATED A1C: CPT

## 2022-04-07 PROCEDURE — 80053 COMPREHEN METABOLIC PANEL: CPT

## 2022-04-07 PROCEDURE — P9603 ONE-WAY ALLOW PRORATED MILES: HCPCS

## 2022-04-07 PROCEDURE — 85027 COMPLETE CBC AUTOMATED: CPT

## 2022-04-07 PROCEDURE — 80061 LIPID PANEL: CPT

## 2022-04-25 ENCOUNTER — OFFICE VISIT (OUTPATIENT)
Dept: CARDIOLOGY | Age: 72
End: 2022-04-25
Payer: MEDICARE

## 2022-04-25 VITALS
WEIGHT: 211.8 LBS | DIASTOLIC BLOOD PRESSURE: 64 MMHG | HEIGHT: 62 IN | SYSTOLIC BLOOD PRESSURE: 96 MMHG | HEART RATE: 88 BPM | RESPIRATION RATE: 18 BRPM | BODY MASS INDEX: 38.98 KG/M2 | OXYGEN SATURATION: 95 %

## 2022-04-25 DIAGNOSIS — I34.0 SEVERE MITRAL REGURGITATION BY PRIOR ECHOCARDIOGRAM: ICD-10-CM

## 2022-04-25 DIAGNOSIS — G45.9 TIA (TRANSIENT ISCHEMIC ATTACK): Primary | ICD-10-CM

## 2022-04-25 DIAGNOSIS — I50.42 CHRONIC COMBINED SYSTOLIC AND DIASTOLIC HF (HEART FAILURE), NYHA CLASS 2 (HCC): ICD-10-CM

## 2022-04-25 DIAGNOSIS — E78.2 MIXED HYPERLIPIDEMIA: ICD-10-CM

## 2022-04-25 DIAGNOSIS — I10 ESSENTIAL HYPERTENSION: ICD-10-CM

## 2022-04-25 PROCEDURE — 1036F TOBACCO NON-USER: CPT | Performed by: FAMILY MEDICINE

## 2022-04-25 PROCEDURE — 99214 OFFICE O/P EST MOD 30 MIN: CPT | Performed by: FAMILY MEDICINE

## 2022-04-25 PROCEDURE — G8417 CALC BMI ABV UP PARAM F/U: HCPCS | Performed by: FAMILY MEDICINE

## 2022-04-25 PROCEDURE — G8427 DOCREV CUR MEDS BY ELIG CLIN: HCPCS | Performed by: FAMILY MEDICINE

## 2022-04-25 PROCEDURE — 1123F ACP DISCUSS/DSCN MKR DOCD: CPT | Performed by: FAMILY MEDICINE

## 2022-04-25 PROCEDURE — 93005 ELECTROCARDIOGRAM TRACING: CPT | Performed by: FAMILY MEDICINE

## 2022-04-25 PROCEDURE — 1090F PRES/ABSN URINE INCON ASSESS: CPT | Performed by: FAMILY MEDICINE

## 2022-04-25 PROCEDURE — 93010 ELECTROCARDIOGRAM REPORT: CPT | Performed by: FAMILY MEDICINE

## 2022-04-25 PROCEDURE — 3017F COLORECTAL CA SCREEN DOC REV: CPT | Performed by: FAMILY MEDICINE

## 2022-04-25 PROCEDURE — G8400 PT W/DXA NO RESULTS DOC: HCPCS | Performed by: FAMILY MEDICINE

## 2022-04-25 PROCEDURE — 4040F PNEUMOC VAC/ADMIN/RCVD: CPT | Performed by: FAMILY MEDICINE

## 2022-04-25 NOTE — PROGRESS NOTES
Mercedez Roth am scribing for and in the presence of Carlotta Hassan MD, MS, F.A.C.C..    Patient: Marcelo Bah  : 1950  Date of Visit: 2022    REASON FOR VISIT / CONSULTATION: 6 Month Follow-Up (HX:PAF,CHF Pt is here for 6 month follow up she states she is doing ok SOB with exertion. She thinks she feels palp at times. Denies:CP, lightheaded/dizziness)      Dear Zenaida Del Valle MD,    HPI: Ms. Misty Lopez is a 79 y.o. female who was admitted to the hospital with worsening shortness of breath. This has gotten worse over the past three months. She has a cardiac history of abnormal echocardiogram which showed that her ejection fraction was reduced to about 40-45%. And a heart catheterization that was relatively unremarkable. Fortunately her repeat echocardiogram in 2018 and in 2019 showed her ejection fraction increased from 45-50% to 55% as well as showing only trivial mitral regurgitation. She had an ECHO on 6/3/2019 that showed EF of 50-55%. LV wall thickness is mildly increased. LA is mildly dilated (29-33) with a left atrial volume index of 31 m1/m2. mild diastolic dysfunction. She came to the ER on 2019 for slurred speech. She was than taken to [facility].  and found to have right sided weakness and was found to have a stroke at that time. She continued to have mild to moderate right sided weakness. Since the last time I saw Ms. Ragland she reports doing well. She was admitted to hospital in November for A-Fib. She states she did have one fall around ana when getting out of bed. She is able to walk down meraz to eat her meals she is slow. Weakness in her right legs limits her from doing more. She denied any chest pain, abdominal pain, bleeding problems, lightheaded/dizziness, or problems with her medications or any other concerns at this time.       Bleeding Risks: Ms. Misty Lopez denies any current or recent bleeding problems including a history of a GI bleed, ulcers, recent or upcoming surgeries, blood in her stool or black tarry stools or blood in her urine. Past Medical History:   Diagnosis Date    Cerebral artery occlusion with cerebral infarction ([facility] [address]     CHF (congestive heart failure) (HCC)     Diabetes mellitus ([facility] [address]     H/O cardiac catheterization 08/04/2017    LMCA: Mild irregularites 10-20%. LAD: Mild irregularites 10-20%. LCx: Mild irregularites 10-20%. RCA: Mild irregularites 10-20%. LV function assessed as : Abnormal. EF of 40%.  H/O echocardiogram 12/18/2018    EF 55%. Mildly increased LV wall thickness. The left atrium appears moderately to severely dilated. Moderate to severe mitral regurg. Moderate pulmonary HTN with an estimated RV systolic pressure of 30BTHT. Moderate tricuspid regurg. Evidnece fo moderate diastolic dysfunction is seen.  History of echocardiogram 01/17/2019    EF 55%. LV wall thickness moderately increased. LA is mildly dilated w/ LA volume index of 30. trivial mitral regurg. Evidence of moderate (grade II) diastolic dysfunction seen.  History of echocardiogram 06/03/2019    EF of 50-55%. LV wall thickness is mildly increased. LA is mildly dilated (29-33) with a left atrial volume index of 31 m1/m2. mild diastolic dysfunction.  Hyperlipidemia     Hypertension        CURRENT ALLERGIES: Patient has no known allergies. REVIEW OF SYSTEMS: 14 systems were reviewed. Pertinent positives and negatives as above, all else negative. Past Surgical History:   Procedure Laterality Date    CARDIAC CATHETERIZATION Left 08/04/2017    right radial, MHT Dr. Richard Quiet      Social History:  Social History     Tobacco Use    Smoking status: Never Smoker    Smokeless tobacco: Never Used   Vaping Use    Vaping Use: Never used   Substance Use Topics    Alcohol use:  Yes     Alcohol/week: 1.0 standard drink     Types: 1 Glasses of wine per week    Drug use: No        CURRENT MEDICATIONS:  Outpatient Medications Marked as Taking for the 4/25/22 encounter (Office Visit) with Bharathi Talamantes MD   Medication Sig Dispense Refill    acetaminophen (TYLENOL) 325 MG tablet Take 650 mg by mouth every 4 hours as needed for Pain      magnesium hydroxide (MILK OF MAGNESIA) 400 MG/5ML suspension Take by mouth daily as needed for Constipation      bisacodyl (DULCOLAX) 10 MG suppository Place 10 mg rectally daily      Sodium Phosphates (FLEET) 7-19 GM/118ML Place 1 enema rectally once as needed      hydrOXYzine (ATARAX) 25 MG tablet Take 25 mg by mouth every 8 hours as needed for Itching      ketorolac (TORADOL) 10 MG tablet Take 10 mg by mouth 2 times daily as needed for Pain      amiodarone (CORDARONE) 200 MG tablet Take 1 tablet by mouth 2 times daily      sertraline (ZOLOFT) 25 MG tablet Take 1 tablet by mouth daily 30 tablet 3    metoprolol succinate (TOPROL XL) 200 MG extended release tablet Take 1 tablet by mouth daily 30 tablet 3    furosemide (LASIX) 40 MG tablet Take 1 tablet by mouth daily 60 tablet 3    gabapentin (NEURONTIN) 300 MG capsule Take 1 capsule by mouth 3 times daily for 30 days. (Patient taking differently: Take 400 mg by mouth 3 times daily. ) 90 capsule 3    potassium chloride (KLOR-CON M) 20 MEQ extended release tablet Take 1 tablet by mouth daily 30 tablet 0    losartan-hydroCHLOROthiazide (HYZAAR) 100-25 MG per tablet Take 1 tablet by mouth daily 90 tablet 3    atorvastatin (LIPITOR) 80 MG tablet TAKE ONE TABLET BY MOUTH ONCE NIGHTLY 90 tablet 3    apixaban (ELIQUIS) 5 MG TABS tablet Take 1 tablet by mouth 2 times daily 180 tablet 3    famotidine (PEPCID) 20 MG tablet Take 20 mg by mouth 2 times daily      amLODIPine (NORVASC) 10 MG tablet Take 1 tablet by mouth every evening 90 tablet 3    miconazole (MICOTIN) 2 % powder Apply topically 2 times daily.  45 g 1    metFORMIN (GLUCOPHAGE) 500 MG tablet Take 500 mg by mouth 2 times daily (with meals)         FAMILY HISTORY: family history includes COPD in her sister; Coronary Art Dis in her brother and father. PHYSICAL EXAM:   BP 96/64 (Site: Left Upper Arm, Position: Sitting, Cuff Size: Large Adult)   Pulse 88   Resp 18   Ht 5' 2\" (1.575 m)   Wt 211 lb 12.8 oz (96.1 kg)   SpO2 95%   BMI 38.74 kg/m²  Body mass index is 38.74 kg/m². Constitutional: She is oriented to person, place, and time. She appears well-developed and well-nourished. In no acute distress. HEENT: Normocephalic and atraumatic. No JVD present. Carotid bruit is not present. No mass and no thyromegaly present. No lymphadenopathy present. Cardiovascular: Normal rate, irregularly irregular rhythm, normal heart sounds. Exam reveals no gallop and no friction rubs. 3/6 systolic murmur, 5th intercostal space on the LEFT in the mid-clavicular line (cardiac apex). Pulmonary/Chest: Effort normal and breath sounds normal. No respiratory distress. She has no wheezes, rhonchi or rales. Abdominal: Soft, non-tender. Bowel sounds and aorta are normal. She exhibits no organomegaly, mass or bruit. Extremities: Trace. No cyanosis or clubbing. 2+ radial and carotid pulses. Distal extremity pulses: 2+ bilaterally. Compression stockings in place. Neurological: She is alert and oriented to person, place, and time. No evidence of gross cranial nerve deficit. Coordination appeared normal.   Skin: Skin is warm and dry. There is no rash or diaphoresis. Reduced hand grasps on right. Psychiatric: She has a normal mood and affect.  Her speech is normal and behavior is normal.      MOST RECENT LABS ON RECORD:   Lab Results   Component Value Date    WBC 5.1 04/07/2022    HGB 11.5 (L) 04/07/2022    HCT 35.8 (L) 04/07/2022     04/07/2022    CHOL 102 04/07/2022    TRIG 62 04/07/2022    HDL 43 04/07/2022    ALT 21 04/07/2022    AST 17 04/07/2022     04/07/2022    K 4.4 04/07/2022     04/07/2022    CREATININE 1.39 (H) 04/07/2022    BUN 38 (H) 04/07/2022    CO2 26 04/07/2022    TSH 1.45 04/07/2022    INR 1.0 08/23/2020    LABA1C 7.0 (H) 04/07/2022       ASSESSMENT:  1. TIA (transient ischemic attack)    2. Chronic combined systolic and diastolic HF (heart failure), NYHA class 2 ([facility] [address]    3. Essential hypertension    4. Severe mitral regurgitation by prior echocardiogram    5. Mixed hyperlipidemia       PLAN:  Chronic Atrial Fibrillation: has failed rhythm control so will switch to Rate Control  Beta Blocker: Continue metoprolol succinate (Toprol XL) 200 mg once daily. Calcium Channel Blocker: Continue amlodipine (Norvasc)  10 mg daily. Rhythm Control Agent: Stop Amiodarone (Cordarone)     Stroke Risk: Her CHADS2-VASc score is 7/9 (9.6% stroke risk)  Anticoagulation: Continue Apixaban (Eliquis) 5 mg every 12 hours. I also reminded her to watch for signs of blood in her stool or black tarry stools and stop the medication immediately if this develops as this could be life threatening. Additional Testing: None   [id number] Score for Atrial Fibrillation Stroke Risk   Risk   Factors  Component Value   C CHF Yes 1   H HTN Yes 1   A2 Age >= 76 No,  (77 y.o.) 0   D DM Yes 1   S2 Prior Stroke/TIA Yes 2   V Vascular Disease No 0   A Age 74-69 Yes,  (77 y.o.) 1   Sc Sex female 1    [id number]  Score  7   Score last updated 4/25/22 1:50 AM EDT    Click here for a link to the UpToDate guideline \"Atrial Fibrillation: Anticoagulation therapy to prevent embolization    Disclaimer: Risk Score calculation is dependent on accuracy of patient problem list and past encounter diagnosis.  History of TIA/Cryptogenic stroke: Still with some residual symptoms   Antiplatelet Agent: Continue Aspirin 81 mg daily.  Beta Blocker: Continue Metoprolol succinate (Toprol XL) 200 mg daily.  Anti-anginal medications: Continue amlodipine (Norvasc) 10 mg once daily.  Cholesterol Reduction Therapy: Continue Atorvastatin (Lipitor) 80 mg daily.       Additional Testing List: None     Chronic Systolic and Diastolic Heart Failure: EF of 50-55% on 6/3/19  Beta Blocker: Continue metoprolol succinate (Toprol XL) 200 mg  once daily. ACE Inibitor/ARB: Continue losartan/HCTZ (Hyzaar) 100/ 25 mg once daily. Nonpharmacologic management of Heart Failure: I told her to continue wearing lower extremity compression stockings and I advised her to try and keep her legs up whenever possible and to limit salt in her diet. Essential Hypertension: Controlled  · ACE Inibitor/ARB: Continue losartan/HCTZ (Hyzaar) 100/ 25 mg once daily. · Beta Blocker: Continue metoprolol succinate (Toprol XL)       · Calcium Channel Blocker: Continue amlodipine (Norvasc) 10 mg once daily. · History of Severe Mitral Regurgitation: probably just functional as her echo on 6/19 showed only trivial MR. · Beta Blocker: Continue Metoprolol succinate (Toprol XL) 200 mg daily. · ACE Inibitor/ARB: Continue losartan/HCTZ (Hyzaar) 100/25 mg every morning. · Hyperlipidemia: Mixed, LDL done on 12/2019 was 104 mg/dL   · Cholesterol Reduction Therapy: Continue Atorvastatin (Lipitor) 80 mg daily. Finally, I recommended that she continue her other medications and follow up with you as previously scheduled. FOLLOW UP:   I told Ms. Ragland to call my office if she had any problems, but otherwise told her to Return in about 6 weeks (around 6/6/2022). However, I would be happy to see her sooner should the need arise. However, I would be happy to see her sooner should the need arise. Once again, thank you for allowing me to participate in this patients care. Please do not hesitate to contact me could I be of further assistance. Sincerely,  Ezequiel Pan. Jemal VILLALOBOS, MS, F.A.C.C. [facility]   [address]  [address], [place], [address] [place] [address]  Phone: [phone], Fax: [phone]      I believe that the risk of significant morbidity and mortality related to the patient's current medical conditions are: Intermediate.       The documentation recorded by the scribe, accurately and completely reflects the services I personally performed and the decisions made by me. Carlotta Hassan MD, MS, F.A.C.C.  April 25, 2022

## 2022-04-25 NOTE — PATIENT INSTRUCTIONS
SURVEY:    You may be receiving a survey from [employer] regarding your visit today. Please complete the survey to enable us to provide the highest quality of care to you and your family. If you cannot score us a very good on any question, please call the office to discuss how we could have made your experience a very good one. Thank you.

## 2022-05-03 ENCOUNTER — OUTSIDE SERVICES (OUTPATIENT)
Dept: PRIMARY CARE CLINIC | Age: 72
End: 2022-05-03
Payer: MEDICARE

## 2022-05-03 DIAGNOSIS — I48.20 CHRONIC ATRIAL FIBRILLATION (HCC): ICD-10-CM

## 2022-05-03 DIAGNOSIS — N18.32 TYPE 2 DIABETES MELLITUS WITH STAGE 3B CHRONIC KIDNEY DISEASE, WITHOUT LONG-TERM CURRENT USE OF INSULIN (HCC): Primary | ICD-10-CM

## 2022-05-03 DIAGNOSIS — I50.42 CHRONIC COMBINED SYSTOLIC (CONGESTIVE) AND DIASTOLIC (CONGESTIVE) HEART FAILURE (HCC): ICD-10-CM

## 2022-05-03 DIAGNOSIS — I10 ESSENTIAL HYPERTENSION: ICD-10-CM

## 2022-05-03 DIAGNOSIS — E11.22 TYPE 2 DIABETES MELLITUS WITH STAGE 3B CHRONIC KIDNEY DISEASE, WITHOUT LONG-TERM CURRENT USE OF INSULIN (HCC): Primary | ICD-10-CM

## 2022-05-03 NOTE — PROGRESS NOTES
Patient:  Ashanti Camargo, 1950  I saw this patient on  05/04/2022, at [facility]. She continues to have rt leg pain, ambulates with walker  Blood sugars are better        Reason for Visit:      ICD-10-CM    1. Type 2 diabetes mellitus with stage 3b chronic kidney disease, without long-term current use of insulin ([facility])  E11.22     N18.32    2. Chronic combined systolic (congestive) and diastolic (congestive) heart failure ([facility])  I50.42    3. Chronic atrial fibrillation ([facility])  I48.20    4. Essential hypertension  I10        Changes since last visit:    Blood sugars well controlled,ambulating better  1. Fall:  none  2. Behavioral Change: none  3. Pain Control: has chronic rt leg pain  4. Mobility: improving  5. Pressure Sore:  none    Current Outpatient Medications   Medication Sig Dispense Refill    acetaminophen (TYLENOL) 325 MG tablet Take 650 mg by mouth every 4 hours as needed for Pain      magnesium hydroxide (MILK OF MAGNESIA) 400 MG/5ML suspension Take by mouth daily as needed for Constipation      bisacodyl (DULCOLAX) 10 MG suppository Place 10 mg rectally daily      Sodium Phosphates (FLEET) 7-19 GM/118ML Place 1 enema rectally once as needed      hydrOXYzine (ATARAX) 25 MG tablet Take 25 mg by mouth every 8 hours as needed for Itching      ketorolac (TORADOL) 10 MG tablet Take 10 mg by mouth 2 times daily as needed for Pain      sertraline (ZOLOFT) 25 MG tablet Take 1 tablet by mouth daily 30 tablet 3    metoprolol succinate (TOPROL XL) 200 MG extended release tablet Take 1 tablet by mouth daily 30 tablet 3    furosemide (LASIX) 40 MG tablet Take 1 tablet by mouth daily 60 tablet 3    gabapentin (NEURONTIN) 300 MG capsule Take 1 capsule by mouth 3 times daily for 30 days.  (Patient taking differently: Take 400 mg by mouth 3 times daily. ) 90 capsule 3    potassium chloride (KLOR-CON M) 20 MEQ extended release tablet Take 1 tablet by mouth daily 30 tablet 0    losartan-hydroCHLOROthiazide (HYZAAR) 100-25 MG per tablet Take 1 tablet by mouth daily 90 tablet 3    atorvastatin (LIPITOR) 80 MG tablet TAKE ONE TABLET BY MOUTH ONCE NIGHTLY 90 tablet 3    apixaban (ELIQUIS) 5 MG TABS tablet Take 1 tablet by mouth 2 times daily 180 tablet 3    famotidine (PEPCID) 20 MG tablet Take 20 mg by mouth 2 times daily      amLODIPine (NORVASC) 10 MG tablet Take 1 tablet by mouth every evening 90 tablet 3    miconazole (MICOTIN) 2 % powder Apply topically 2 times daily. 45 g 1    metFORMIN (GLUCOPHAGE) 500 MG tablet Take 500 mg by mouth 2 times daily (with meals)       No current facility-administered medications for this visit. Allergies:  Patient has no known allergies. Past Medical History:    Past Medical History:   Diagnosis Date    Cerebral artery occlusion with cerebral infarction ([facility] [address]     CHF (congestive heart failure) (HCC)     Diabetes mellitus ([facility] [address]     H/O cardiac catheterization 08/04/2017    LMCA: Mild irregularites 10-20%. LAD: Mild irregularites 10-20%. LCx: Mild irregularites 10-20%. RCA: Mild irregularites 10-20%. LV function assessed as : Abnormal. EF of 40%.  H/O echocardiogram 12/18/2018    EF 55%. Mildly increased LV wall thickness. The left atrium appears moderately to severely dilated. Moderate to severe mitral regurg. Moderate pulmonary HTN with an estimated RV systolic pressure of 80AMPZ. Moderate tricuspid regurg. Evidnece fo moderate diastolic dysfunction is seen.  History of echocardiogram 01/17/2019    EF 55%. LV wall thickness moderately increased. LA is mildly dilated w/ LA volume index of 30. trivial mitral regurg. Evidence of moderate (grade II) diastolic dysfunction seen.  History of echocardiogram 06/03/2019    EF of 50-55%. LV wall thickness is mildly increased. LA is mildly dilated (29-33) with a left atrial volume index of 31 m1/m2. mild diastolic dysfunction.     Hyperlipidemia     Hypertension        Past Surgical History:    Past Surgical History:   Procedure Laterality Date    CARDIAC CATHETERIZATION Left 2017    right radial, MHT Dr. Daniel Aguero         Social History:   Social History     Tobacco Use    Smoking status: Never Smoker    Smokeless tobacco: Never Used   Substance Use Topics    Alcohol use: Yes     Alcohol/week: 1.0 standard drink     Types: 1 Glasses of wine per week       Family History:   Family History   Problem Relation Age of Onset    Coronary Art Dis Father          from [place]   Floydene Ada COPD Sister         O2 dep    Coronary Art Dis Brother         2 brothers  from [place]           Review of Systems:  Constitutional: negative for fevers or chills, has weakness  Eyes: negative for visual disturbance   ENT: negative for sore throat or nasal congestion,neg for dysphagia  Respiratory: neg for shortness of breath , cough  Cardiovascular: neg for for chest pain , palpitations,pnd,neg for leg edema  Gastrointestinal: neg for abd pain, nausea, vomiting, diarrhea or constipation,no monalisa,no blood in stool  Genitourinary: negative for dysuria, urgency,hematuria or frequency  Integument/breast: negative for skin rash or lesions  Neurological: positive for unilateral weakness of rt arm and leg, neg for numbness or tingling,has rt leg pain  Muscular Skeletal: has rt leg  pain   Psych -mood stable    Objective:    Vitals:   BP-128/81,Pulse-85,Respi-18,Temp-97.9  -----------------------------------------------------------------  Exam:  GEN:   A & O x3, no apparent distress  EYES: No gross abnormalities.   ENT:ENT exam normal, no neck nodes or sinus tenderness  NECK: normal,  no carotid bruits  PULM: clear to auscultation bilaterally- no wheezes, rales or rhonchi, normal air movement, no respiratory distress  COR: Iregular rate & rhythm, 2/6 murmur and no gallops,no edema  ABD:  soft, non-tender, non-distended, normal bowel sounds, no masses or organomegaly  : no cva or flank tendernes  EXT:no cyanosis, clubbing or edema present  no calf tenderness, and warm to touch. NEURO: Motor- has minimal rt leg weakness and sensory grossly intact  PSYCH: mood stable  SKIN:  No skin lesions or rashes    -----------------------------------------------------------------  Diagnostic Data:   · All diagnostic data was reviewed    Assessment:        ICD-10-CM    1. Type 2 diabetes mellitus with stage 3b chronic kidney disease, without long-term current use of insulin ([facility])  E11.22     N18.32    2. Chronic combined systolic (congestive) and diastolic (congestive) heart failure ([facility])  I50.42    3. Chronic atrial fibrillation ([facility])  I48.20    4.  Essential hypertension  I10      Patient Active Problem List   Diagnosis Code    Hypertension I10    Hyperlipidemia E78.5    Acute on chronic systolic CHF (congestive heart failure) ([facility]) I50.23    Hypertensive urgency I16.0    Idiopathic cardiomyopathy ([facility]) I42.8    Hypertensive emergency I16.1    Chronic combined systolic and diastolic HF (heart failure), NYHA class 2 ([facility]) I50.42    Acute on chronic combined systolic and diastolic CHF, NYHA class 4 ([facility]) I50.43    Hypoxia R09.02    Moderate mitral regurgitation by prior echocardiogram I34.0    Morbid obesity ([facility]) E66.01    CKD (chronic kidney disease) stage 3, GFR 30-59 ml/min ([facility]) N18.30    Physical deconditioning R53.81    TIA (transient ischemic attack) G45.9    Old lacunar stroke without late effect Z86.73    Dysarthria R47.1    Stroke determined by clinical assessment ([facility] [address] I63.9    Aphasia R47.01    Type 2 diabetes mellitus, without long-term current use of insulin ([facility]) E11.9    Uncontrolled type 2 diabetes mellitus with hyperglycemia ([facility]) E11.65    Acute ischemic stroke ([facility]) I63.9    PAF (paroxysmal atrial fibrillation) ([facility]) I48.0    Encounter for current long-term use of anticoagulants Z79.01    Hemiplegia and hemiparesis following cerebral infarction affecting right dominant side ([facility] [address] I69.351    Atrial fibrillation with RVR (HCC) I48.91    Unable to care for self Z78.9         Plan:       Monitor afib,blood sugar and bp  Monitor for side effects of apixaben  Continue current medications  Prevent pressure sore  Prevent falls    Electronically signed by Brian Harris MD on 5/5/2022 at 2:20 PM

## 2022-05-04 PROCEDURE — 99308 SBSQ NF CARE LOW MDM 20: CPT | Performed by: FAMILY MEDICINE

## 2022-05-17 ENCOUNTER — HOSPITAL ENCOUNTER (OUTPATIENT)
Dept: CT IMAGING | Age: 72
Discharge: HOME | End: 2022-05-19
Payer: MEDICARE

## 2022-05-17 DIAGNOSIS — N28.1 CYST OF RIGHT KIDNEY: ICD-10-CM

## 2022-05-17 PROCEDURE — 74150 CT ABDOMEN W/O CONTRAST: CPT

## 2022-06-01 ENCOUNTER — OUTSIDE SERVICES (OUTPATIENT)
Dept: PRIMARY CARE CLINIC | Age: 72
End: 2022-06-01
Payer: MEDICARE

## 2022-06-01 DIAGNOSIS — I10 ESSENTIAL HYPERTENSION: ICD-10-CM

## 2022-06-01 DIAGNOSIS — N18.32 TYPE 2 DIABETES MELLITUS WITH STAGE 3B CHRONIC KIDNEY DISEASE, WITHOUT LONG-TERM CURRENT USE OF INSULIN (HCC): Primary | ICD-10-CM

## 2022-06-01 DIAGNOSIS — E11.22 TYPE 2 DIABETES MELLITUS WITH STAGE 3B CHRONIC KIDNEY DISEASE, WITHOUT LONG-TERM CURRENT USE OF INSULIN (HCC): Primary | ICD-10-CM

## 2022-06-01 DIAGNOSIS — I50.42 CHRONIC COMBINED SYSTOLIC (CONGESTIVE) AND DIASTOLIC (CONGESTIVE) HEART FAILURE (HCC): ICD-10-CM

## 2022-06-01 DIAGNOSIS — I48.20 CHRONIC ATRIAL FIBRILLATION (HCC): ICD-10-CM

## 2022-06-01 PROCEDURE — 99308 SBSQ NF CARE LOW MDM 20: CPT | Performed by: FAMILY MEDICINE

## 2022-06-01 NOTE — PROGRESS NOTES
Patient:  Reese Cruz, 1950  I saw this patient on  06/01/2022, at [facility]. Blood sugars fluctuate  She continues to have rt leg pain, ambulates with walker          Reason for Visit:      ICD-10-CM    1. Type 2 diabetes mellitus with stage 3b chronic kidney disease, without long-term current use of insulin ([facility])  E11.22     N18.32    2. Chronic combined systolic (congestive) and diastolic (congestive) heart failure ([facility])  I50.42    3. Chronic atrial fibrillation ([facility])  I48.20    4. Essential hypertension  I10        Changes since last visit:    Blood sugars fluctuate,ambulating better  1. Fall:  none  2. Behavioral Change: none  3. Pain Control: has chronic rt leg pain  4. Mobility: improving  5. Pressure Sore:  none    Current Outpatient Medications   Medication Sig Dispense Refill    acetaminophen (TYLENOL) 325 MG tablet Take 650 mg by mouth every 4 hours as needed for Pain      magnesium hydroxide (MILK OF MAGNESIA) 400 MG/5ML suspension Take by mouth daily as needed for Constipation      bisacodyl (DULCOLAX) 10 MG suppository Place 10 mg rectally daily      Sodium Phosphates (FLEET) 7-19 GM/118ML Place 1 enema rectally once as needed      hydrOXYzine (ATARAX) 25 MG tablet Take 25 mg by mouth every 8 hours as needed for Itching      ketorolac (TORADOL) 10 MG tablet Take 10 mg by mouth 2 times daily as needed for Pain      sertraline (ZOLOFT) 25 MG tablet Take 1 tablet by mouth daily 30 tablet 3    metoprolol succinate (TOPROL XL) 200 MG extended release tablet Take 1 tablet by mouth daily 30 tablet 3    furosemide (LASIX) 40 MG tablet Take 1 tablet by mouth daily 60 tablet 3    gabapentin (NEURONTIN) 300 MG capsule Take 1 capsule by mouth 3 times daily for 30 days.  (Patient taking differently: Take 400 mg by mouth 3 times daily. ) 90 capsule 3    potassium chloride (KLOR-CON M) 20 MEQ extended release tablet Take 1 tablet by mouth daily 30 tablet 0    losartan-hydroCHLOROthiazide (HYZAAR) 100-25 MG per tablet Take 1 tablet by mouth daily 90 tablet 3    atorvastatin (LIPITOR) 80 MG tablet TAKE ONE TABLET BY MOUTH ONCE NIGHTLY 90 tablet 3    apixaban (ELIQUIS) 5 MG TABS tablet Take 1 tablet by mouth 2 times daily 180 tablet 3    famotidine (PEPCID) 20 MG tablet Take 20 mg by mouth 2 times daily      amLODIPine (NORVASC) 10 MG tablet Take 1 tablet by mouth every evening 90 tablet 3    miconazole (MICOTIN) 2 % powder Apply topically 2 times daily. 45 g 1    metFORMIN (GLUCOPHAGE) 500 MG tablet Take 500 mg by mouth 2 times daily (with meals)       No current facility-administered medications for this visit. Allergies:  Patient has no known allergies. Past Medical History:    Past Medical History:   Diagnosis Date    Cerebral artery occlusion with cerebral infarction ([facility]     CHF (congestive heart failure) (HCC)     Diabetes mellitus ([facility]     H/O cardiac catheterization 08/04/2017    LMCA: Mild irregularites 10-20%. LAD: Mild irregularites 10-20%. LCx: Mild irregularites 10-20%. RCA: Mild irregularites 10-20%. LV function assessed as : Abnormal. EF of 40%.  H/O echocardiogram 12/18/2018    EF 55%. Mildly increased LV wall thickness. The left atrium appears moderately to severely dilated. Moderate to severe mitral regurg. Moderate pulmonary HTN with an estimated RV systolic pressure of [id number]. Moderate tricuspid regurg. Evidnece fo moderate diastolic dysfunction is seen.  History of echocardiogram 01/17/2019    EF 55%. LV wall thickness moderately increased. LA is mildly dilated w/ LA volume index of 30. trivial mitral regurg. Evidence of moderate (grade II) diastolic dysfunction seen.  History of echocardiogram 06/03/2019    EF of 50-55%. LV wall thickness is mildly increased. LA is mildly dilated (29-33) with a left atrial volume index of 31 m1/m2. mild diastolic dysfunction.     Hyperlipidemia     Hypertension        Past Surgical History:    Past Surgical History:   Procedure Laterality Date    CARDIAC CATHETERIZATION Left 2017    right radial, MHT Dr. Mica Bills         Social History:   Social History     Tobacco Use    Smoking status: Never Smoker    Smokeless tobacco: Never Used   Substance Use Topics    Alcohol use: Yes     Alcohol/week: 1.0 standard drink     Types: 1 Glasses of wine per week       Family History:   Family History   Problem Relation Age of Onset    Coronary Art Dis Father          from [place]   Farris COPD Sister         O2 dep    Coronary Art Dis Brother         2 brothers  from [place]           Review of Systems:  Constitutional: negative for fevers or chills, has weakness  Eyes: negative for visual disturbance   ENT: negative for sore throat or nasal congestion,neg for dysphagia  Respiratory: neg for shortness of breath , cough  Cardiovascular: neg for for chest pain , palpitations,pnd,neg for leg edema  Gastrointestinal: neg for abd pain, nausea, vomiting, diarrhea or constipation,no monalisa,no blood in stool  Genitourinary: negative for dysuria, urgency,hematuria or frequency  Integument/breast: negative for skin rash or lesions  Neurological: positive for unilateral weakness of rt arm and leg, neg for numbness or tingling,has rt leg pain  Muscular Skeletal: has rt leg  pain   Psych -mood stable    Objective:    Vitals:   BP-128/81,Pulse-85,Respi-18,Temp-97.9  -----------------------------------------------------------------  Exam:  GEN:   A & O x3, no apparent distress  EYES: No gross abnormalities.   ENT:ENT exam normal, no neck nodes or sinus tenderness  NECK: normal,  no carotid bruits  PULM: clear to auscultation bilaterally- no wheezes, rales or rhonchi, normal air movement, no respiratory distress  COR: Iregular rate & rhythm, 2/6 murmur and no gallops,no edema  ABD:  soft, non-tender, non-distended, normal bowel sounds, no masses or organomegaly  : no cva or flank tendernes  EXT:no cyanosis, clubbing or edema present  no calf tenderness, and warm to touch. NEURO: Motor- has minimal rt leg weakness and sensory grossly intact  PSYCH: mood stable  SKIN:  No skin lesions or rashes    -----------------------------------------------------------------  Diagnostic Data:   · All diagnostic data was reviewed    Assessment:        ICD-10-CM    1. Type 2 diabetes mellitus with stage 3b chronic kidney disease, without long-term current use of insulin ([facility])  E11.22     N18.32    2. Chronic combined systolic (congestive) and diastolic (congestive) heart failure ([facility])  I50.42    3. Chronic atrial fibrillation ([facility])  I48.20    4.  Essential hypertension  I10      Patient Active Problem List   Diagnosis Code    Hypertension I10    Hyperlipidemia E78.5    Acute on chronic systolic CHF (congestive heart failure) ([facility]) I50.23    Hypertensive urgency I16.0    Idiopathic cardiomyopathy ([facility]) I42.8    Hypertensive emergency I16.1    Chronic combined systolic and diastolic HF (heart failure), NYHA class 2 ([facility]) I50.42    Acute on chronic combined systolic and diastolic CHF, NYHA class 4 ([facility]) I50.43    Hypoxia R09.02    Moderate mitral regurgitation by prior echocardiogram I34.0    Morbid obesity ([facility]) E66.01    CKD (chronic kidney disease) stage 3, GFR 30-59 ml/min ([facility]) N18.30    Physical deconditioning R53.81    TIA (transient ischemic attack) G45.9    Old lacunar stroke without late effect Z86.73    Dysarthria R47.1    Stroke determined by clinical assessment ([facility] I63.9    Aphasia R47.01    Type 2 diabetes mellitus, without long-term current use of insulin ([facility]) E11.9    Uncontrolled type 2 diabetes mellitus with hyperglycemia ([facility]) E11.65    Acute ischemic stroke ([facility]) I63.9    PAF (paroxysmal atrial fibrillation) ([facility]) I48.0    Encounter for current long-term use of anticoagulants Z79.01    Hemiplegia and hemiparesis following cerebral infarction affecting right dominant side ([facility] I69.351    Atrial fibrillation with RVR (HCC) I48.91    Unable to care for self Z78.9         Plan:       Monitor afib,blood sugar and bp  Monitor for side effects of apixaben  Continue current medications  Prevent pressure sore  Prevent falls    Electronically signed by Claudia Bay MD on 6/2/2022 at 8:10 AM

## 2022-06-08 ENCOUNTER — NURSE ONLY (OUTPATIENT)
Dept: CARDIOLOGY | Age: 72
End: 2022-06-08
Payer: MEDICARE

## 2022-06-08 DIAGNOSIS — I63.9 CRYPTOGENIC STROKE (HCC): ICD-10-CM

## 2022-06-08 DIAGNOSIS — Z45.09 ENCOUNTER FOR LOOP RECORDER CHECK: Primary | ICD-10-CM

## 2022-06-08 PROCEDURE — 93298 REM INTERROG DEV EVAL SCRMS: CPT | Performed by: FAMILY MEDICINE

## 2022-06-09 ENCOUNTER — OFFICE VISIT (OUTPATIENT)
Dept: CARDIOLOGY | Age: 72
End: 2022-06-09
Payer: MEDICARE

## 2022-06-09 VITALS
DIASTOLIC BLOOD PRESSURE: 68 MMHG | HEIGHT: 62 IN | HEART RATE: 58 BPM | WEIGHT: 212 LBS | RESPIRATION RATE: 18 BRPM | OXYGEN SATURATION: 94 % | BODY MASS INDEX: 39.01 KG/M2 | SYSTOLIC BLOOD PRESSURE: 105 MMHG

## 2022-06-09 DIAGNOSIS — I10 ESSENTIAL HYPERTENSION: ICD-10-CM

## 2022-06-09 DIAGNOSIS — I34.0 SEVERE MITRAL REGURGITATION BY PRIOR ECHOCARDIOGRAM: ICD-10-CM

## 2022-06-09 DIAGNOSIS — I50.42 CHRONIC COMBINED SYSTOLIC AND DIASTOLIC HF (HEART FAILURE), NYHA CLASS 2 (HCC): ICD-10-CM

## 2022-06-09 DIAGNOSIS — E78.2 MIXED HYPERLIPIDEMIA: ICD-10-CM

## 2022-06-09 DIAGNOSIS — I48.20 CHRONIC ATRIAL FIBRILLATION (HCC): ICD-10-CM

## 2022-06-09 DIAGNOSIS — R07.9 CHEST PAIN, UNSPECIFIED TYPE: ICD-10-CM

## 2022-06-09 DIAGNOSIS — G45.9 TIA (TRANSIENT ISCHEMIC ATTACK): ICD-10-CM

## 2022-06-09 PROCEDURE — 1124F ACP DISCUSS-NO DSCNMKR DOCD: CPT | Performed by: PHYSICIAN ASSISTANT

## 2022-06-09 PROCEDURE — G8417 CALC BMI ABV UP PARAM F/U: HCPCS | Performed by: PHYSICIAN ASSISTANT

## 2022-06-09 PROCEDURE — 99214 OFFICE O/P EST MOD 30 MIN: CPT | Performed by: PHYSICIAN ASSISTANT

## 2022-06-09 PROCEDURE — 99211 OFF/OP EST MAY X REQ PHY/QHP: CPT | Performed by: PHYSICIAN ASSISTANT

## 2022-06-09 PROCEDURE — 1090F PRES/ABSN URINE INCON ASSESS: CPT | Performed by: PHYSICIAN ASSISTANT

## 2022-06-09 PROCEDURE — G8400 PT W/DXA NO RESULTS DOC: HCPCS | Performed by: PHYSICIAN ASSISTANT

## 2022-06-09 PROCEDURE — G8427 DOCREV CUR MEDS BY ELIG CLIN: HCPCS | Performed by: PHYSICIAN ASSISTANT

## 2022-06-09 PROCEDURE — 1036F TOBACCO NON-USER: CPT | Performed by: PHYSICIAN ASSISTANT

## 2022-06-09 PROCEDURE — 3017F COLORECTAL CA SCREEN DOC REV: CPT | Performed by: PHYSICIAN ASSISTANT

## 2022-06-09 NOTE — PATIENT INSTRUCTIONS
SURVEY:    You may be receiving a survey from [employer] regarding your visit today. Please complete the survey to enable us to provide the highest quality of care to you and your family. If you cannot score us a very good on any question, please call the office to discuss how we could have made your experience a very good one. Thank you.

## 2022-06-09 NOTE — PROGRESS NOTES
Alannah Notice RMA, am scribing for and in the presence of Andreina Cai PA-C. Patient: Aiyana Pérez  : 1950  Date of Visit: 2022    REASON FOR VISIT / CONSULTATION: Follow-up (HX: TIA, chronic combined systalic and diastolic heart failure, HTN, HLD, severe mitral regurgutation. pt is here today for a 6 week f/u SOB. pt states she is doing well just states she gets more tired easily. SOB once in a while with exertion, palpitations off and on. denies light headed/dizziness, and CP.)      Dear [address], [place],    HPI: Ms. Thom Duong is a 79 y.o. female who was admitted to the hospital with worsening shortness of breath. This has gotten worse over the past three months. She has a cardiac history of abnormal echocardiogram which showed that her ejection fraction was reduced to about 40-45%. And a heart catheterization that was relatively unremarkable. Fortunately her repeat echocardiogram in 2018 and in 2019 showed her ejection fraction increased from 45-50% to 55% as well as showing only trivial mitral regurgitation. She had an ECHO on 6/3/2019 that showed EF of 50-55%. LV wall thickness is mildly increased. LA is mildly dilated (29-33) with a left atrial volume index of 31 m1/m2. mild diastolic dysfunction. She came to the ER on 2019 for slurred speech. She was than taken to [facility].  and found to have right sided weakness and was found to have a stroke at that time. She continued to have mild to moderate right sided weakness. Ms. Thom Duong is here today for a 6 week follow up after stopping her amiodarone. She states she is doing okay today. She does report she has been feeling fatigued more easily recently. She has shortness of breath with exertion but is not worsening. She can walk around and is able to walk to the dining area, but will get short of breath. She does have some light headed/dizziness off and on, this is staying stable. She does have off and on chest pain.  She reports the chest pain is occurring weekly. She reports it is tight in nature, lasting several minutes at a time. She reports it is worse after eating and improves with rest. She denies any radiation of the pain. She denies blood in her urine or stool. She denied any abdominal pain, bleeding problems, or problems with her medications or any other concerns at this time. Bleeding Risks: Ms. Crescencio Donald denies any current or recent bleeding problems including a history of a GI bleed, ulcers, recent or upcoming surgeries, blood in her stool or black tarry stools or blood in her urine. Past Medical History:   Diagnosis Date    Cerebral artery occlusion with cerebral infarction ([facility]     CHF (congestive heart failure) (HCC)     Diabetes mellitus ([facility]     H/O cardiac catheterization 08/04/2017    LMCA: Mild irregularites 10-20%. LAD: Mild irregularites 10-20%. LCx: Mild irregularites 10-20%. RCA: Mild irregularites 10-20%. LV function assessed as : Abnormal. EF of 40%.  H/O echocardiogram 12/18/2018    EF 55%. Mildly increased LV wall thickness. The left atrium appears moderately to severely dilated. Moderate to severe mitral regurg. Moderate pulmonary HTN with an estimated RV systolic pressure of [id number]. Moderate tricuspid regurg. Evidnece fo moderate diastolic dysfunction is seen.  History of echocardiogram 01/17/2019    EF 55%. LV wall thickness moderately increased. LA is mildly dilated w/ LA volume index of 30. trivial mitral regurg. Evidence of moderate (grade II) diastolic dysfunction seen.  History of echocardiogram 06/03/2019    EF of 50-55%. LV wall thickness is mildly increased. LA is mildly dilated (29-33) with a left atrial volume index of 31 m1/m2. mild diastolic dysfunction.  Hyperlipidemia     Hypertension        CURRENT ALLERGIES: Patient has no known allergies. REVIEW OF SYSTEMS: 14 systems were reviewed. Pertinent positives and negatives as above, all else negative.      Past Surgical History:   Procedure Laterality Date    CARDIAC CATHETERIZATION Left 08/04/2017    right radial, MHT Dr. Shea Males      Social History:  Social History     Tobacco Use    Smoking status: Never Smoker    Smokeless tobacco: Never Used   Vaping Use    Vaping Use: Never used   Substance Use Topics    Alcohol use: Yes     Alcohol/week: 1.0 standard drink     Types: 1 Glasses of wine per week    Drug use: No        CURRENT MEDICATIONS:  Outpatient Medications Marked as Taking for the 6/9/22 encounter (Office Visit) with Juno Nice PA-C   Medication Sig Dispense Refill    carboxymethylcellulose 1 % ophthalmic solution 1 drop 3 times daily      acetaminophen (TYLENOL) 325 MG tablet Take 650 mg by mouth every 4 hours as needed for Pain      sertraline (ZOLOFT) 25 MG tablet Take 1 tablet by mouth daily 30 tablet 3    metoprolol succinate (TOPROL XL) 200 MG extended release tablet Take 1 tablet by mouth daily 30 tablet 3    furosemide (LASIX) 40 MG tablet Take 1 tablet by mouth daily 60 tablet 3    gabapentin (NEURONTIN) 300 MG capsule Take 1 capsule by mouth 3 times daily for 30 days. (Patient taking differently: Take 400 mg by mouth 3 times daily. ) 90 capsule 3    losartan-hydroCHLOROthiazide (HYZAAR) 100-25 MG per tablet Take 1 tablet by mouth daily 90 tablet 3    atorvastatin (LIPITOR) 80 MG tablet TAKE ONE TABLET BY MOUTH ONCE NIGHTLY 90 tablet 3    apixaban (ELIQUIS) 5 MG TABS tablet Take 1 tablet by mouth 2 times daily 180 tablet 3    famotidine (PEPCID) 20 MG tablet Take 20 mg by mouth daily       amLODIPine (NORVASC) 10 MG tablet Take 1 tablet by mouth every evening 90 tablet 3    miconazole (MICOTIN) 2 % powder Apply topically 2 times daily. 45 g 1    metFORMIN (GLUCOPHAGE) 500 MG tablet Take 500 mg by mouth 2 times daily (with meals)         FAMILY HISTORY: family history includes COPD in her sister; Coronary Art Dis in her brother and father.      PHYSICAL EXAM: /68 (Site: Left Upper Arm, Position: Sitting, Cuff Size: Medium Adult)   Pulse 58   Resp 18   Ht 5' 2\" (1.575 m)   Wt 212 lb (96.2 kg)   SpO2 94%   BMI 38.78 kg/m²  Body mass index is 38.78 kg/m². Constitutional: She is oriented to person, place, and time. She appears well-developed and well-nourished. In no acute distress. HEENT: Normocephalic and atraumatic. No JVD present. Carotid bruit is not present. No mass and no thyromegaly present. No lymphadenopathy present. Cardiovascular: Normal rate, irregularly irregular rhythm, normal heart sounds. Exam reveals no gallop and no friction rubs. 3/6 systolic murmur, 5th intercostal space on the LEFT in the mid-clavicular line (cardiac apex). Pulmonary/Chest: Effort normal and breath sounds normal. No respiratory distress. She has no wheezes, rhonchi or rales. Abdominal: Soft, non-tender. Bowel sounds and aorta are normal. She exhibits no organomegaly, mass or bruit. Extremities: Trace. No cyanosis or clubbing. 2+ radial and carotid pulses. Distal extremity pulses: 2+ bilaterally. Neurological: She is alert and oriented to person, place, and time. No evidence of gross cranial nerve deficit. Coordination appeared normal.   Skin: Skin is warm and dry. There is no rash or diaphoresis. Reduced hand grasps on right. Psychiatric: She has a normal mood and affect. Her speech is normal and behavior is normal.      MOST RECENT LABS ON RECORD:   Lab Results   Component Value Date    WBC 5.1 04/07/2022    HGB 11.5 (L) 04/07/2022    HCT 35.8 (L) 04/07/2022     04/07/2022    CHOL 102 04/07/2022    TRIG 62 04/07/2022    HDL 43 04/07/2022    ALT 21 04/07/2022    AST 17 04/07/2022     04/07/2022    K 4.4 04/07/2022     04/07/2022    CREATININE 1.39 (H) 04/07/2022    BUN 38 (H) 04/07/2022    CO2 26 04/07/2022    TSH 1.45 04/07/2022    INR 1.0 08/23/2020    LABA1C 7.0 (H) 04/07/2022       ASSESSMENT:  1.  Chronic combined systolic and diastolic HF (heart failure), NYHA class 2 ([facility]    2. TIA (transient ischemic attack)    3. Essential hypertension    4. Severe mitral regurgitation by prior echocardiogram    5. Mixed hyperlipidemia       PLAN:  Chronic Atrial Fibrillation: has failed rhythm control so will switch to Rate Control  Beta Blocker: Continue metoprolol succinate (Toprol XL) 200 mg once daily. Calcium Channel Blocker: Continue amlodipine (Norvasc)  10 mg daily. Rhythm Control Agent: Not indicated   Stroke Risk: Her CHADS2-VASc score is 7/9 (9.6% stroke risk)  Anticoagulation: Continue Apixaban (Eliquis) 5 mg every 12 hours. I also reminded her to watch for signs of blood in her stool or black tarry stools and stop the medication immediately if this develops as this could be life threatening. Additional Testing: None   [id number] Score for Atrial Fibrillation Stroke Risk   Risk   Factors  Component Value   C CHF Yes 1   H HTN Yes 1   A2 Age >= 76 No,  (77 y.o.) 0   D DM Yes 1   S2 Prior Stroke/TIA Yes 2   V Vascular Disease No 0   A Age 74-69 Yes,  (77 y.o.) 1   Sc Sex female 1    [id number]  Score  7   Score last updated 0/28/93 4:70 AM EDT  Click here for a link to the UpToDate guideline \"Atrial Fibrillation: Anticoagulation therapy to prevent embolization  Disclaimer: Risk Score calculation is dependent on accuracy of patient problem list and past encounter diagnosis.  History of TIA/Cryptogenic stroke: Still with some residual symptoms   Antiplatelet Agent: Continue Aspirin 81 mg daily.  Beta Blocker: Continue Metoprolol succinate (Toprol XL) 200 mg daily.  Anti-anginal medications: Continue amlodipine (Norvasc) 10 mg once daily.  Cholesterol Reduction Therapy: Continue Atorvastatin (Lipitor) 80 mg daily.  Additional Testing List: None     Chronic Systolic and Diastolic Heart Failure: EF of 50-55% on 6/3/19  Beta Blocker: Continue metoprolol succinate (Toprol XL) 200 mg  once daily.     ACE Inibitor/ARB: Continue losartan/HCTZ (Hyzaar) 100/ 25 mg once daily. Nonpharmacologic management of Heart Failure: I told her to continue wearing lower extremity compression stockings and I advised her to try and keep her legs up whenever possible and to limit salt in her diet. Essential Hypertension: Controlled  · ACE Inibitor/ARB: Continue losartan/HCTZ (Hyzaar) 100/ 25 mg once daily. · Beta Blocker: Continue metoprolol succinate (Toprol XL)       · Calcium Channel Blocker: Continue amlodipine (Norvasc) 10 mg once daily. · History of Severe Mitral Regurgitation: probably just functional as her echo on 6/19 showed only trivial MR. · Beta Blocker: Continue Metoprolol succinate (Toprol XL) 200 mg daily. · ACE Inibitor/ARB: Continue losartan/HCTZ (Hyzaar) 100/25 mg every morning. · Hyperlipidemia: Mixed, LDL done on 12/2019 was 104 mg/dL   · Cholesterol Reduction Therapy: Continue Atorvastatin (Lipitor) 80 mg daily.  Most likely non cardiac chest pain, possibly GERD:  o Despite the patient's intermittent chest discomfort, I believe these symptoms are relatively atypical in nature and therefore likely noncardiac. Given their relatively normal stress test, I honestly do not think that further workup for a cardiac source of their symptoms is likely indicated now and would suggest that a workup for an alternative cause of their symptoms be considered if clinically indicated. Having said this, I did reiterate the reality that no test is 100% sensitive and therefore if symptoms persist, worsen and/or clinical suspicion for significant ischemic coronary artery disease remains high, further testing including the possibility of cardiac catheterization with coronary angiography may be appropriate to reconsider. o Continue famotidine (Pepcid)   o I did discuss with her possibly considering a stress test due to her symptoms.   o She states her chest pain is not related to exertion, she does have her chest pain after eating.   o She had an unremarkable heart catheterization in 2017. o I did ask her to go to the ER with any worsening chest pain, pressure, or shortness of breath.  o We did have a long discussion about the risk versus benefits of proceeding with a stress test, however she declined a stress test at this time. Finally, I recommended that she continue her other medications and follow up with you as previously scheduled. FOLLOW UP:   I told Ms. Ragland to call my office if she had any problems, but otherwise told her to Return in about 6 months (around 12/9/2022). However, I would be happy to see her sooner should the need arise. However, I would be happy to see her sooner should the need arise. Once again, thank you for allowing me to participate in this patients care. Please do not hesitate to contact me could I be of further assistance. Sincerely,  Amos Sorto  [facility]iology Specialist  [address] [facility], [address] [place] [address]  Phone: [phone], Fax: [phone]      I believe that the risk of significant morbidity and mortality related to the patient's current medical conditions are: Intermediate. Approximately 35 minutes were spent during prep work, discussion and exam of the patient, and follow up documentation and all of their questions were answered. The documentation recorded by the scribe, accurately and completely reflects the services I personally performed and the decisions made by me.  Juno Nice PA-C June 9, 2022

## 2022-07-05 ENCOUNTER — OUTSIDE SERVICES (OUTPATIENT)
Dept: PRIMARY CARE CLINIC | Age: 72
End: 2022-07-05
Payer: MEDICARE

## 2022-07-05 DIAGNOSIS — I50.42 CHRONIC COMBINED SYSTOLIC (CONGESTIVE) AND DIASTOLIC (CONGESTIVE) HEART FAILURE (HCC): ICD-10-CM

## 2022-07-05 DIAGNOSIS — I10 ESSENTIAL HYPERTENSION: ICD-10-CM

## 2022-07-05 DIAGNOSIS — I69.351 HEMIPLEGIA AND HEMIPARESIS FOLLOWING CEREBRAL INFARCTION AFFECTING RIGHT DOMINANT SIDE (HCC): ICD-10-CM

## 2022-07-05 DIAGNOSIS — I48.20 CHRONIC ATRIAL FIBRILLATION (HCC): ICD-10-CM

## 2022-07-05 DIAGNOSIS — E11.22 TYPE 2 DIABETES MELLITUS WITH STAGE 3B CHRONIC KIDNEY DISEASE, WITHOUT LONG-TERM CURRENT USE OF INSULIN (HCC): Primary | ICD-10-CM

## 2022-07-05 DIAGNOSIS — N18.32 TYPE 2 DIABETES MELLITUS WITH STAGE 3B CHRONIC KIDNEY DISEASE, WITHOUT LONG-TERM CURRENT USE OF INSULIN (HCC): Primary | ICD-10-CM

## 2022-07-05 NOTE — PROGRESS NOTES
Patient:  Maryam Johnson, 1950  I saw this patient on  07/06/2022, at [facility]. Blood sugars better  She continues to have rt leg pain, ambulates with walker          Reason for Visit:      ICD-10-CM    1. Type 2 diabetes mellitus with stage 3b chronic kidney disease, without long-term current use of insulin ([facility])  E11.22     N18.32    2. Chronic combined systolic (congestive) and diastolic (congestive) heart failure ([facility])  I50.42    3. Chronic atrial fibrillation ([facility])  I48.20    4. Essential hypertension  I10    5. Hemiplegia and hemiparesis following cerebral infarction affecting right dominant side ([facility])  I69.351        Changes since last visit:    Blood sugars better,ambulating better  1. Fall:  none  2. Behavioral Change: none  3. Pain Control: has chronic rt leg pain  4. Mobility: improving  5. Pressure Sore:  none    Current Outpatient Medications   Medication Sig Dispense Refill    carboxymethylcellulose 1 % ophthalmic solution 1 drop 3 times daily      acetaminophen (TYLENOL) 325 MG tablet Take 650 mg by mouth every 4 hours as needed for Pain      sertraline (ZOLOFT) 25 MG tablet Take 1 tablet by mouth daily 30 tablet 3    metoprolol succinate (TOPROL XL) 200 MG extended release tablet Take 1 tablet by mouth daily 30 tablet 3    furosemide (LASIX) 40 MG tablet Take 1 tablet by mouth daily 60 tablet 3    gabapentin (NEURONTIN) 300 MG capsule Take 1 capsule by mouth 3 times daily for 30 days.  (Patient taking differently: Take 400 mg by mouth 3 times daily. ) 90 capsule 3    potassium chloride (KLOR-CON M) 20 MEQ extended release tablet Take 1 tablet by mouth daily (Patient not taking: Reported on 6/9/2022) 30 tablet 0    losartan-hydroCHLOROthiazide (HYZAAR) 100-25 MG per tablet Take 1 tablet by mouth daily 90 tablet 3    atorvastatin (LIPITOR) 80 MG tablet TAKE ONE TABLET BY MOUTH ONCE NIGHTLY 90 tablet 3    apixaban (ELIQUIS) 5 MG TABS tablet Take 1 tablet by mouth 2 times daily 180 tablet 3    famotidine (PEPCID) 20 MG tablet Take 20 mg by mouth daily       amLODIPine (NORVASC) 10 MG tablet Take 1 tablet by mouth every evening 90 tablet 3    miconazole (MICOTIN) 2 % powder Apply topically 2 times daily. 45 g 1    metFORMIN (GLUCOPHAGE) 500 MG tablet Take 500 mg by mouth 2 times daily (with meals)       No current facility-administered medications for this visit. Allergies:  Patient has no known allergies. Past Medical History:    Past Medical History:   Diagnosis Date    Cerebral artery occlusion with cerebral infarction [facility]     CHF (congestive heart failure) (HCC)     Diabetes mellitus ([facility]     H/O cardiac catheterization 08/04/2017    LMCA: Mild irregularites 10-20%. LAD: Mild irregularites 10-20%. LCx: Mild irregularites 10-20%. RCA: Mild irregularites 10-20%. LV function assessed as : Abnormal. EF of 40%.  H/O echocardiogram 12/18/2018    EF 55%. Mildly increased LV wall thickness. The left atrium appears moderately to severely dilated. Moderate to severe mitral regurg. Moderate pulmonary HTN with an estimated RV systolic pressure of 53CHNG. Moderate tricuspid regurg. Evidnece fo moderate diastolic dysfunction is seen.  History of echocardiogram 01/17/2019    EF 55%. LV wall thickness moderately increased. LA is mildly dilated w/ LA volume index of 30. trivial mitral regurg. Evidence of moderate (grade II) diastolic dysfunction seen.  History of echocardiogram 06/03/2019    EF of 50-55%. LV wall thickness is mildly increased. LA is mildly dilated (29-33) with a left atrial volume index of 31 m1/m2. mild diastolic dysfunction.     Hyperlipidemia     Hypertension        Past Surgical History:    Past Surgical History:   Procedure Laterality Date    CARDIAC CATHETERIZATION Left 08/04/2017    right radial, MHT Dr. Pauline Ojeda         Social History:   Social History     Tobacco Use    Smoking status: Never Smoker    Smokeless tobacco: Never Used   Substance Use Topics    Alcohol use: Yes     Alcohol/week: 1.0 standard drink     Types: 1 Glasses of wine per week       Family History:   Family History   Problem Relation Age of Onset    Coronary Art Dis Father          from [place]   Farris COPD Sister         O2 dep    Coronary Art Dis Brother         2 brothers  from [place]           Review of Systems:  Constitutional: negative for fevers or chills, has weakness  Eyes: negative for visual disturbance   ENT: negative for sore throat or nasal congestion,neg for dysphagia  Respiratory: neg for shortness of breath , cough  Cardiovascular: neg for for chest pain , palpitations,pnd,neg for leg edema  Gastrointestinal: neg for abd pain, nausea, vomiting, diarrhea or constipation,no monalisa,no blood in stool  Genitourinary: negative for dysuria, urgency,hematuria or frequency  Integument/breast: negative for skin rash or lesions  Neurological: positive for unilateral weakness of rt arm and leg, neg for numbness or tingling,has rt leg pain  Muscular Skeletal: has rt leg  pain   Psych -mood stable    Objective:    Vitals:   BP-128/81,Pulse-85,Respi-18,Temp-97.2  -----------------------------------------------------------------  Exam:  GEN:   A & O x3, no apparent distress  EYES: No gross abnormalities. ENT:ENT exam normal, no neck nodes or sinus tenderness  NECK: normal,  no carotid bruits  PULM: clear to auscultation bilaterally- no wheezes, rales or rhonchi, normal air movement, no respiratory distress  COR: Iregular rate & rhythm, 2/6 murmur and no gallops,no edema  ABD:  soft, non-tender, non-distended, normal bowel sounds, no masses or organomegaly  : no cva or flank tendernes  EXT:no cyanosis, clubbing or edema present  no calf tenderness, and warm to touch.    NEURO: Motor- has minimal rt leg weakness and sensory grossly intact  PSYCH: mood stable  SKIN:  No skin lesions or ángel    -----------------------------------------------------------------  Diagnostic Data:   · All diagnostic data was reviewed    Assessment:        ICD-10-CM    1. Type 2 diabetes mellitus with stage 3b chronic kidney disease, without long-term current use of insulin ([facility])  E11.22     N18.32    2. Chronic combined systolic (congestive) and diastolic (congestive) heart failure ([facility])  I50.42    3. Chronic atrial fibrillation ([facility])  I48.20    4. Essential hypertension  I10    5.  Hemiplegia and hemiparesis following cerebral infarction affecting right dominant side [facility])  I69.351      Patient Active Problem List   Diagnosis Code    Hypertension I10    Hyperlipidemia E78.5    Acute on chronic systolic CHF (congestive heart failure) ([facility]) I50.23    Hypertensive urgency I16.0    Idiopathic cardiomyopathy ([facility]) I42.8    Hypertensive emergency I16.1    Chronic combined systolic and diastolic HF (heart failure), NYHA class 2 ([facility]) I50.42    Acute on chronic combined systolic and diastolic CHF, NYHA class 4 ([facility]) I50.43    Hypoxia R09.02    Moderate mitral regurgitation by prior echocardiogram I34.0    Morbid obesity ([facility]) E66.01    CKD (chronic kidney disease) stage 3, GFR 30-59 ml/min ([facility]) N18.30    Physical deconditioning R53.81    TIA (transient ischemic attack) G45.9    Old lacunar stroke without late effect Z86.73    Dysarthria R47.1    Stroke determined by clinical assessment ([facility] [address] I63.9    Aphasia R47.01    Type 2 diabetes mellitus, without long-term current use of insulin ([facility]) E11.9    Uncontrolled type 2 diabetes mellitus with hyperglycemia ([facility]) E11.65    Acute ischemic stroke ([facility]) I63.9    PAF (paroxysmal atrial fibrillation) ([facility]) I48.0    Encounter for current long-term use of anticoagulants Z79.01    Hemiplegia and hemiparesis following cerebral infarction affecting right dominant side ([facility]) I69.351    Atrial fibrillation with RVR ([facility]) I48.91    Unable to care for self Z78.9 Plan:       Monitor afib,blood sugar and bp  Monitor for side effects of apixaben  Continue current medications  Prevent pressure sore  Prevent falls    Electronically signed by Joyce Briceño MD on 7/6/2022 at 1:30 PM

## 2022-07-06 PROCEDURE — 99308 SBSQ NF CARE LOW MDM 20: CPT | Performed by: FAMILY MEDICINE

## 2022-07-07 RX ORDER — SODIUM PHOSPHATE, DIBASIC AND SODIUM PHOSPHATE, MONOBASIC 7; 19 G/133ML; G/133ML
1 ENEMA RECTAL
COMMUNITY

## 2022-07-07 RX ORDER — BISACODYL 10 MG
10 SUPPOSITORY, RECTAL RECTAL DAILY
COMMUNITY

## 2022-07-14 ENCOUNTER — HOSPITAL ENCOUNTER (OUTPATIENT)
Age: 72
Setting detail: SPECIMEN
Discharge: HOME | End: 2022-07-14
Payer: MEDICARE

## 2022-07-14 LAB
ESTIMATED AVERAGE GLUCOSE: 151 MG/DL
HBA1C MFR BLD: 6.9 % (ref 4–6)

## 2022-07-14 PROCEDURE — 36415 COLL VENOUS BLD VENIPUNCTURE: CPT

## 2022-07-14 PROCEDURE — 83036 HEMOGLOBIN GLYCOSYLATED A1C: CPT

## 2022-07-14 PROCEDURE — P9604 ONE-WAY ALLOW PRORATED TRIP: HCPCS

## 2022-08-03 ENCOUNTER — OUTSIDE SERVICES (OUTPATIENT)
Dept: PRIMARY CARE CLINIC | Age: 72
End: 2022-08-03
Payer: MEDICARE

## 2022-08-03 DIAGNOSIS — I48.20 CHRONIC ATRIAL FIBRILLATION (HCC): ICD-10-CM

## 2022-08-03 DIAGNOSIS — E11.22 TYPE 2 DIABETES MELLITUS WITH STAGE 3B CHRONIC KIDNEY DISEASE, WITHOUT LONG-TERM CURRENT USE OF INSULIN (HCC): Primary | ICD-10-CM

## 2022-08-03 DIAGNOSIS — I50.42 CHRONIC COMBINED SYSTOLIC (CONGESTIVE) AND DIASTOLIC (CONGESTIVE) HEART FAILURE (HCC): ICD-10-CM

## 2022-08-03 DIAGNOSIS — N18.32 TYPE 2 DIABETES MELLITUS WITH STAGE 3B CHRONIC KIDNEY DISEASE, WITHOUT LONG-TERM CURRENT USE OF INSULIN (HCC): Primary | ICD-10-CM

## 2022-08-03 DIAGNOSIS — I10 ESSENTIAL HYPERTENSION: ICD-10-CM

## 2022-08-03 PROCEDURE — 99308 SBSQ NF CARE LOW MDM 20: CPT | Performed by: FAMILY MEDICINE

## 2022-08-03 NOTE — PROGRESS NOTES
Patient:  Monik Duran, 1950  I saw this patient on  008/03/2022, at [facility]. Blood sugars better,last hba1c 6.9,no hypoglycemia  She continues to have rt leg pain, ambulates with walker          Reason for Visit:      ICD-10-CM    1. Type 2 diabetes mellitus with stage 3b chronic kidney disease, without long-term current use of insulin ([facility])  E11.22     N18.32       2. Chronic combined systolic (congestive) and diastolic (congestive) heart failure ([facility])  I50.42       3. Chronic atrial fibrillation ([facility])  I48.20       4. Essential hypertension  I10             Changes since last visit:    Blood sugars better,ambulating better  1. Fall:  none  2. Behavioral Change: none  3. Pain Control: has chronic rt leg pain  4. Mobility: improving  5. Pressure Sore:  none    Current Outpatient Medications   Medication Sig Dispense Refill    magnesium hydroxide (MILK OF MAGNESIA) 400 MG/5ML suspension Take by mouth daily as needed for Constipation      bisacodyl (DULCOLAX) 10 MG suppository Place 10 mg rectally daily      Sodium Phosphates (FLEET) 7-19 GM/118ML Place 1 enema rectally once as needed      carboxymethylcellulose 1 % ophthalmic solution 1 drop 3 times daily      acetaminophen (TYLENOL) 325 MG tablet Take 650 mg by mouth every 4 hours as needed for Pain      sertraline (ZOLOFT) 25 MG tablet Take 1 tablet by mouth daily 30 tablet 3    metoprolol succinate (TOPROL XL) 200 MG extended release tablet Take 1 tablet by mouth daily 30 tablet 3    furosemide (LASIX) 40 MG tablet Take 1 tablet by mouth daily 60 tablet 3    gabapentin (NEURONTIN) 300 MG capsule Take 1 capsule by mouth 3 times daily for 30 days.  (Patient taking differently: Take 400 mg by mouth 3 times daily. ) 90 capsule 3    potassium chloride (KLOR-CON M) 20 MEQ extended release tablet Take 1 tablet by mouth daily 30 tablet 0    losartan-hydroCHLOROthiazide (HYZAAR) 100-25 MG per tablet Take 1 tablet by mouth daily 90 tablet 3 atorvastatin (LIPITOR) 80 MG tablet TAKE ONE TABLET BY MOUTH ONCE NIGHTLY 90 tablet 3    apixaban (ELIQUIS) 5 MG TABS tablet Take 1 tablet by mouth 2 times daily 180 tablet 3    famotidine (PEPCID) 20 MG tablet Take 20 mg by mouth daily       amLODIPine (NORVASC) 10 MG tablet Take 1 tablet by mouth every evening 90 tablet 3    miconazole (MICOTIN) 2 % powder Apply topically 2 times daily. 45 g 1    metFORMIN (GLUCOPHAGE) 500 MG tablet Take 500 mg by mouth 2 times daily (with meals)       No current facility-administered medications for this visit. Allergies:  Patient has no known allergies. Past Medical History:    Past Medical History:   Diagnosis Date    Cerebral artery occlusion with cerebral infarction [facility])     CHF (congestive heart failure) ([facility])     Diabetes mellitus ([facility]     H/O cardiac catheterization 08/04/2017    LMCA: Mild irregularites 10-20%. LAD: Mild irregularites 10-20%. LCx: Mild irregularites 10-20%. RCA: Mild irregularites 10-20%. LV function assessed as : Abnormal. EF of 40%. H/O echocardiogram 12/18/2018    EF 55%. Mildly increased LV wall thickness. The left atrium appears moderately to severely dilated. Moderate to severe mitral regurg. Moderate pulmonary HTN with an estimated RV systolic pressure of 46HWON. Moderate tricuspid regurg. Evidnece fo moderate diastolic dysfunction is seen. History of echocardiogram 01/17/2019    EF 55%. LV wall thickness moderately increased. LA is mildly dilated w/ LA volume index of 30. trivial mitral regurg. Evidence of moderate (grade II) diastolic dysfunction seen. History of echocardiogram 06/03/2019    EF of 50-55%. LV wall thickness is mildly increased. LA is mildly dilated (29-33) with a left atrial volume index of 31 m1/m2. mild diastolic dysfunction.     Hyperlipidemia     Hypertension        Past Surgical History:    Past Surgical History:   Procedure Laterality Date    CARDIAC CATHETERIZATION Left 08/04/2017    right radial, T Dr. Kamille Britt History:   Social History     Tobacco Use    Smoking status: Never    Smokeless tobacco: Never   Substance Use Topics    Alcohol use: Yes     Alcohol/week: 1.0 standard drink     Types: 1 Glasses of wine per week       Family History:   Family History   Problem Relation Age of Onset    Coronary Art Dis Father          from [place]    COPD Sister         O2 dep    Coronary Art Dis Brother         2 brothers  from [place]           Review of Systems:  Constitutional: negative for fevers or chills, has weakness  Eyes: negative for visual disturbance   ENT: negative for sore throat or nasal congestion,neg for dysphagia  Respiratory: neg for shortness of breath , cough  Cardiovascular: neg for for chest pain , palpitations,pnd,neg for leg edema  Gastrointestinal: neg for abd pain, nausea, vomiting, diarrhea or constipation,no monalisa,no blood in stool  Genitourinary: negative for dysuria, urgency,hematuria or frequency  Integument/breast: negative for skin rash or lesions  Neurological: positive for unilateral weakness of rt arm and leg, neg for numbness or tingling,has rt leg pain  Muscular Skeletal: has rt leg  pain   Psych -mood stable    Objective:    Vitals:   BP-128/81,Pulse-85,Respi-18,Temp-97.2  -----------------------------------------------------------------  Exam:  GEN:   A & O x3, no apparent distress  EYES: No gross abnormalities. ENT:ENT exam normal, no neck nodes or sinus tenderness  NECK: normal,  no carotid bruits  PULM: clear to auscultation bilaterally- no wheezes, rales or rhonchi, normal air movement, no respiratory distress  COR: Iregular rate & rhythm, 2/6 murmur and no gallops,no edema  ABD:  soft, non-tender, non-distended, normal bowel sounds, no masses or organomegaly  : no cva or flank tendernes  EXT:no cyanosis, clubbing or edema present  no calf tenderness, and warm to touch.    NEURO: Motor- has minimal rt leg weakness and sensory for side effects of apixaben  Continue current medications  Prevent pressure sore  Prevent falls    Electronically signed by Viridiana Alexander MD on 8/3/2022 at 3:49 PM

## 2022-08-30 PROCEDURE — 93298 REM INTERROG DEV EVAL SCRMS: CPT | Performed by: FAMILY MEDICINE

## 2022-09-06 ENCOUNTER — NURSE ONLY (OUTPATIENT)
Dept: CARDIOLOGY | Age: 72
End: 2022-09-06
Payer: MEDICARE

## 2022-09-06 DIAGNOSIS — Z45.09 ENCOUNTER FOR LOOP RECORDER CHECK: Primary | ICD-10-CM

## 2022-09-06 DIAGNOSIS — I63.9 CRYPTOGENIC STROKE (HCC): ICD-10-CM

## 2022-09-07 ENCOUNTER — OUTSIDE SERVICES (OUTPATIENT)
Dept: PRIMARY CARE CLINIC | Age: 72
End: 2022-09-07
Payer: MEDICARE

## 2022-09-07 DIAGNOSIS — I10 ESSENTIAL HYPERTENSION: ICD-10-CM

## 2022-09-07 DIAGNOSIS — I48.20 CHRONIC ATRIAL FIBRILLATION (HCC): ICD-10-CM

## 2022-09-07 DIAGNOSIS — I25.119 CHEST PAIN DUE TO CAD (HCC): Primary | ICD-10-CM

## 2022-09-07 DIAGNOSIS — E11.22 TYPE 2 DIABETES MELLITUS WITH STAGE 3B CHRONIC KIDNEY DISEASE, WITHOUT LONG-TERM CURRENT USE OF INSULIN (HCC): ICD-10-CM

## 2022-09-07 DIAGNOSIS — I50.42 CHRONIC COMBINED SYSTOLIC (CONGESTIVE) AND DIASTOLIC (CONGESTIVE) HEART FAILURE (HCC): ICD-10-CM

## 2022-09-07 DIAGNOSIS — N18.32 TYPE 2 DIABETES MELLITUS WITH STAGE 3B CHRONIC KIDNEY DISEASE, WITHOUT LONG-TERM CURRENT USE OF INSULIN (HCC): ICD-10-CM

## 2022-09-07 PROCEDURE — 99308 SBSQ NF CARE LOW MDM 20: CPT | Performed by: FAMILY MEDICINE

## 2022-09-07 NOTE — PROGRESS NOTES
Patient:  Rodrigo Reynolds, 1950  I saw this patient on  09/07/2022, at [facility]. Has lt sided chest pain off and on,no associaterd diaphoresis,shortness of breath,resolved on its own  Blood sugars better,last hba1c 6.9,no hypoglycemia  She continues to have rt leg pain, ambulates with walker          Reason for Visit:      ICD-10-CM    1. Chest pain due to CAD ([facility] [address]  I25.119       2. Type 2 diabetes mellitus with stage 3b chronic kidney disease, without long-term current use of insulin ([facility])  E11.22     N18.32       3. Chronic combined systolic (congestive) and diastolic (congestive) heart failure ([facility])  I50.42       4. Chronic atrial fibrillation ([facility])  I48.20       5. Essential hypertension  I10               Changes since last visit:    Has been having lt sided chest pain  Blood sugars better,ambulating better  1. Fall:  none  2. Behavioral Change: none  3. Pain Control: has chronic rt leg pain  4. Mobility: improving  5. Pressure Sore:  none    Current Outpatient Medications   Medication Sig Dispense Refill    magnesium hydroxide (MILK OF MAGNESIA) 400 MG/5ML suspension Take by mouth daily as needed for Constipation      bisacodyl (DULCOLAX) 10 MG suppository Place 10 mg rectally daily      Sodium Phosphates (FLEET) 7-19 GM/118ML Place 1 enema rectally once as needed      carboxymethylcellulose 1 % ophthalmic solution 1 drop 3 times daily      acetaminophen (TYLENOL) 325 MG tablet Take 650 mg by mouth every 4 hours as needed for Pain      sertraline (ZOLOFT) 25 MG tablet Take 1 tablet by mouth daily 30 tablet 3    metoprolol succinate (TOPROL XL) 200 MG extended release tablet Take 1 tablet by mouth daily 30 tablet 3    furosemide (LASIX) 40 MG tablet Take 1 tablet by mouth daily 60 tablet 3    gabapentin (NEURONTIN) 300 MG capsule Take 1 capsule by mouth 3 times daily for 30 days.  (Patient taking differently: Take 400 mg by mouth 3 times daily. ) 90 capsule 3    potassium chloride dysfunction. Hyperlipidemia     Hypertension        Past Surgical History:    Past Surgical History:   Procedure Laterality Date    CARDIAC CATHETERIZATION Left 2017    right radial, MHT Dr. Temi Jain         Social History:   Social History     Tobacco Use    Smoking status: Never    Smokeless tobacco: Never   Substance Use Topics    Alcohol use: Yes     Alcohol/week: 1.0 standard drink     Types: 1 Glasses of wine per week       Family History:   Family History   Problem Relation Age of Onset    Coronary Art Dis Father          from [place]    COPD Sister         O2 dep    Coronary Art Dis Brother         2 brothers  from [place]           Review of Systems:  Constitutional: negative for fevers or chills, has weakness  Eyes: negative for visual disturbance   ENT: negative for sore throat or nasal congestion,neg for dysphagia  Respiratory: neg for shortness of breath , cough  Cardiovascular: neg for for chest pain , palpitations,pnd,neg for leg edema  Gastrointestinal: neg for abd pain, nausea, vomiting, diarrhea or constipation,no monalisa,no blood in stool  Genitourinary: negative for dysuria, urgency,hematuria or frequency  Integument/breast: negative for skin rash or lesions  Neurological: positive for unilateral weakness of rt arm and leg, neg for numbness or tingling,has rt leg pain  Muscular Skeletal: has rt leg  pain   Psych -mood stable    Objective:    Vitals:   BP-134/64,Pulse-76,Respi-18,Temp-97.6 SpO2- 96% Wt- 211.6  -----------------------------------------------------------------  Exam:  GEN:   A & O x3, no apparent distress  EYES: No gross abnormalities.   ENT:ENT exam normal, no neck nodes or sinus tenderness  NECK: normal,  no carotid bruits  PULM: clear to auscultation bilaterally- no wheezes, rales or rhonchi, normal air movement, no respiratory distress  COR: Iregular rate & rhythm, 2/6 murmur and no gallops,no edema  ABD:  soft, non-tender, non-distended, normal anticoagulants Z79.01    Hemiplegia and hemiparesis following cerebral infarction affecting right dominant side (HCC) I69.351    Atrial fibrillation with RVR ([address] I48.91    Unable to care for self Z78.9         Plan:     Lexiscan stress test  Monitor afib,blood sugar and bp  Monitor for side effects of apixaben  Continue current medications  Prevent pressure sore  Prevent falls    Electronically signed by Natasha Thibodeaux MD on 9/7/2022 at 5:09 PM

## 2022-09-13 ENCOUNTER — TELEPHONE (OUTPATIENT)
Dept: PRIMARY CARE CLINIC | Age: 72
End: 2022-09-13

## 2022-09-13 NOTE — TELEPHONE ENCOUNTER
Call received from [facility] at [facility], stress test needs ordered in Epic so that it can be scheduled. What stress test and diagnosis?

## 2022-09-20 ENCOUNTER — HOSPITAL ENCOUNTER (OUTPATIENT)
Dept: NON INVASIVE DIAGNOSTICS | Age: 72
Discharge: HOME | End: 2022-09-20
Payer: MEDICARE

## 2022-09-20 DIAGNOSIS — I25.119 CHEST PAIN DUE TO CAD (HCC): ICD-10-CM

## 2022-09-20 PROCEDURE — 6360000002 HC RX W HCPCS: Performed by: FAMILY MEDICINE

## 2022-09-20 PROCEDURE — 93017 CV STRESS TEST TRACING ONLY: CPT

## 2022-09-20 PROCEDURE — 78452 HT MUSCLE IMAGE SPECT MULT: CPT

## 2022-09-20 PROCEDURE — A9500 TC99M SESTAMIBI: HCPCS | Performed by: FAMILY MEDICINE

## 2022-09-20 PROCEDURE — 3430000000 HC RX DIAGNOSTIC RADIOPHARMACEUTICAL: Performed by: FAMILY MEDICINE

## 2022-09-20 RX ADMIN — REGADENOSON 0.4 MG: 0.08 INJECTION, SOLUTION INTRAVENOUS at 10:06

## 2022-09-20 RX ADMIN — Medication 10 MILLICURIE: at 10:07

## 2022-09-20 RX ADMIN — Medication 30 MILLICURIE: at 10:07

## 2022-09-20 NOTE — PROCEDURES
[address], [place] [address]                              CARDIAC STRESS TEST    PATIENT NAME: Rosie Romeor                    :        1950  MED REC NO:   [id number]                              ROOM:  ACCOUNT NO:   [de-identified]                           ADMIT DATE: 2022  PROVIDER:     Chris Roman MD    CARDIOVASCULAR DIAGNOSTIC DEPARTMENT    DATE OF STUDY:  2022    ORDERING PROVIDER:  Addison Hicks MD    PRIMARY CARE PROVIDER:  Addison Hicks MD    INTERPRETING PHYSICIAN:  Chris Roman MD    PHARMACOLOGIC MYOCARDIAL PERFUSION STRESS TESTING    Rest/stress single isotope SPECT imaging with exercise stress and gated  SPECT imaging. INDICATIONS:  Assessment of recent chest pain. CLINICAL HISTORY:  The patient is a 72-year-old woman. Other previous history includes cerebrovascular accident. Symptoms just prior to testing include shortness of breath. Relevant medications:  Metoprolol (Toprol), amlodipine (Norvasc). PROCEDURE:  The heart rate was 104 at baseline and henry to 116 beats per  minute during the regadenoson infusion. The rest blood pressure was  132/60 mm/Hg and decreased to 100/60 mm/Hg. The patient did complain of  shortness of breath following infusion. Pharmacologic stress testing was performed with regadenoson at a dose of  0.4 mg. Additionally, low level exercise using hand compressions was  performed along with vasodilator infusion. MYOCARDIAL PERFUSION IMAGING:  Imaging was performed at rest 30-45  minutes following the injection of 10 mCi of sestamibi. Approximately  10 seconds after Lexiscan injection, the patient was injected with 30  mCi of sestamibi. Gating post-stress tomographic imaging was performed  30-45 minutes after stress.     STRESS ECG RESULTS:  The resting electrocardiogram demonstrated atrial  fibrillation without significant ST-segment abnormalities that may  impair accurate ECG detection of stress induced cardiac ischemia. During vasodilator infusion and during recovery, the patient developed:    Downsloping ST segment changes in leads II, III, aVF, V4, V5 and V6,  which did not meet diagnostic criteria for myocardial ischemia with no  premature ventricular contractions (PVCs). NUCLEAR IMAGING RESULTS:  The overall quality of the study is fair. Mild attenuation artifact was seen. There is no evidence of abnormal  lung uptake. Additionally, the right ventricle appears normal.  The  left ventricular cavity is noted to be enlarged in size on the stress  images. There is no evidence of transient ischemic dilatation (TID) of  the left ventricle. Gated SPECT imaging demonstrates hypokinesis of the lateral,  inferolateral and inferior region(s). The calculated left ventricular  ejection fraction was 34%. The rest images demonstrated a moderate perfusion abnormality of  moderate intensity in the lateral, inferolateral and inferior region(s). On stress imaging, a moderate perfusion abnormality of moderate  intensity in the lateral, inferolateral and inferior region(s). IMPRESSION:  1. Abnormal myocardial perfusion study. There is a moderate perfusion  defect of moderate intensity in the lateral, inferolateral and inferior  region(s), which is most consistent with an old myocardial infarction  with luis-infarct ischemia. 2.  Global left ventricular systolic function was abnormal with an EF of  34% with regional wall motion abnormalities. 3.  No significant electrocardiographic evidence of myocardial ischemia  during EKG monitoring without significant ventricular arrhythmias. Depending on the patient's symptoms and level of clinical suspicion,  aggressive medical management versus additional testing by coronary  angiography may be indicated.     The sensitivity for detecting ischemia on this test may have been  reduced due to the patient being on a beta blocker and a calcium channel  blocker.         Pedrito Rivero MD    D: 09/20/2022 15:57:44       T: 09/20/2022 15:58:55     STONE/[email or web address]_HERB  Job#: [id number]     Doc#: Unknown    CC:  Kristy Walton

## 2022-09-28 ENCOUNTER — OFFICE VISIT (OUTPATIENT)
Dept: CARDIOLOGY | Age: 72
End: 2022-09-28
Payer: MEDICARE

## 2022-09-28 VITALS
HEIGHT: 62 IN | HEART RATE: 85 BPM | DIASTOLIC BLOOD PRESSURE: 69 MMHG | SYSTOLIC BLOOD PRESSURE: 99 MMHG | OXYGEN SATURATION: 95 % | RESPIRATION RATE: 18 BRPM | BODY MASS INDEX: 37.87 KG/M2 | WEIGHT: 205.8 LBS

## 2022-09-28 DIAGNOSIS — E78.2 MIXED HYPERLIPIDEMIA: ICD-10-CM

## 2022-09-28 DIAGNOSIS — I10 ESSENTIAL HYPERTENSION: ICD-10-CM

## 2022-09-28 DIAGNOSIS — I25.10 CORONARY ARTERY DISEASE INVOLVING NATIVE CORONARY ARTERY OF NATIVE HEART WITHOUT ANGINA PECTORIS: ICD-10-CM

## 2022-09-28 DIAGNOSIS — I63.9 CRYPTOGENIC STROKE (HCC): ICD-10-CM

## 2022-09-28 DIAGNOSIS — I48.0 PAF (PAROXYSMAL ATRIAL FIBRILLATION) (HCC): ICD-10-CM

## 2022-09-28 DIAGNOSIS — I10 PRIMARY HYPERTENSION: ICD-10-CM

## 2022-09-28 DIAGNOSIS — I50.42 CHRONIC COMBINED SYSTOLIC AND DIASTOLIC HF (HEART FAILURE), NYHA CLASS 2 (HCC): ICD-10-CM

## 2022-09-28 DIAGNOSIS — I48.20 CHRONIC ATRIAL FIBRILLATION (HCC): ICD-10-CM

## 2022-09-28 DIAGNOSIS — G45.9 TIA (TRANSIENT ISCHEMIC ATTACK): ICD-10-CM

## 2022-09-28 DIAGNOSIS — R94.39 ABNORMAL STRESS TEST: Primary | ICD-10-CM

## 2022-09-28 PROCEDURE — 99211 OFF/OP EST MAY X REQ PHY/QHP: CPT | Performed by: FAMILY MEDICINE

## 2022-09-28 PROCEDURE — 1124F ACP DISCUSS-NO DSCNMKR DOCD: CPT | Performed by: FAMILY MEDICINE

## 2022-09-28 PROCEDURE — 1090F PRES/ABSN URINE INCON ASSESS: CPT | Performed by: FAMILY MEDICINE

## 2022-09-28 PROCEDURE — 1036F TOBACCO NON-USER: CPT | Performed by: FAMILY MEDICINE

## 2022-09-28 PROCEDURE — G8417 CALC BMI ABV UP PARAM F/U: HCPCS | Performed by: FAMILY MEDICINE

## 2022-09-28 PROCEDURE — 3017F COLORECTAL CA SCREEN DOC REV: CPT | Performed by: FAMILY MEDICINE

## 2022-09-28 PROCEDURE — G8427 DOCREV CUR MEDS BY ELIG CLIN: HCPCS | Performed by: FAMILY MEDICINE

## 2022-09-28 PROCEDURE — 99215 OFFICE O/P EST HI 40 MIN: CPT | Performed by: FAMILY MEDICINE

## 2022-09-28 PROCEDURE — G8400 PT W/DXA NO RESULTS DOC: HCPCS | Performed by: FAMILY MEDICINE

## 2022-09-28 RX ORDER — AMLODIPINE BESYLATE 5 MG/1
5 TABLET ORAL EVERY EVENING
Qty: 90 TABLET | Refills: 3 | Status: SHIPPED | OUTPATIENT
Start: 2022-09-28

## 2022-09-28 NOTE — PROGRESS NOTES
Lisette Barrientos am scribing for and in the presence of Vj Moreno MD, MS, F.A.C.C..      Patient: Thom Clifford  : 1950  Date of Visit: 2022    REASON FOR VISIT / CONSULTATION: Follow-up (HX:Ch AFib, TIA, CHF,HTN, severe mitral regurg Pt is here for abn stress she is not feeling well unable to walk, a lot of anxiety,palp,headaches and sob, lightheaded/dizziness Denies:CP, )    Dear Matias Dalal MD,    HPI: Ms. Brendon Barnett is a 79 y.o. female who was admitted to the hospital with worsening shortness of breath. This has gotten worse over the past three months. She has a cardiac history of abnormal echocardiogram which showed that her ejection fraction was reduced to about 40-45%. And a heart catheterization that was relatively unremarkable. Fortunately her repeat echocardiogram in 2018 and in 2019 showed her ejection fraction increased from 45-50% to 55% as well as showing only trivial mitral regurgitation. She had an ECHO on 6/3/2019 that showed EF of 50-55%. LV wall thickness is mildly increased. LA is mildly dilated (29-33) with a left atrial volume index of 31 m1/m2. mild diastolic dysfunction. She came to the ER on 2019 for slurred speech. She was than taken to [place]. V and found to have right sided weakness and was found to have a stroke at that time. She continued to have mild to moderate right sided weakness. She had abnormal stress test on 2022 There is a moderate perfusion defect of moderate intensity in the lateral, inferolateral and inferior  region(s), which is most consistent with an old myocardial infarction with luis-infarct ischemia. Ms. Brendon Barnett is here today for a follow up to go over results of stress test. She states she does not feel well and has multiple complaints such as stomach pain, shortness of breath, lightheaded/dizziness, weakness, and headaches. She denies blood in her urine or stool.  She denied any abdominal pain, bleeding problems, or problems with her medications or any other concerns at this time. Bleeding Risks: Ms. Jeffrey Hawley denies any current or recent bleeding problems including a history of a GI bleed, ulcers, recent or upcoming surgeries, blood in her stool or black tarry stools or blood in her urine. Past Medical History:   Diagnosis Date    Cerebral artery occlusion with cerebral infarction [facility])     CHF (congestive heart failure) ([facility] [address]     Diabetes mellitus ([address]     H/O cardiac catheterization 08/04/2017    LMCA: Mild irregularites 10-20%. LAD: Mild irregularites 10-20%. LCx: Mild irregularites 10-20%. RCA: Mild irregularites 10-20%. LV function assessed as : Abnormal. EF of 40%. H/O echocardiogram 12/18/2018    EF 55%. Mildly increased LV wall thickness. The left atrium appears moderately to severely dilated. Moderate to severe mitral regurg. Moderate pulmonary HTN with an estimated RV systolic pressure of 73OONT. Moderate tricuspid regurg. Evidnece fo moderate diastolic dysfunction is seen. History of echocardiogram 01/17/2019    EF 55%. LV wall thickness moderately increased. LA is mildly dilated w/ LA volume index of 30. trivial mitral regurg. Evidence of moderate (grade II) diastolic dysfunction seen. History of echocardiogram 06/03/2019    EF of 50-55%. LV wall thickness is mildly increased. LA is mildly dilated (29-33) with a left atrial volume index of 31 m1/m2. mild diastolic dysfunction. Hyperlipidemia     Hypertension      CURRENT ALLERGIES: Patient has no known allergies. REVIEW OF SYSTEMS: 14 systems were reviewed. Pertinent positives and negatives as above, all else negative.      Past Surgical History:   Procedure Laterality Date    CARDIAC CATHETERIZATION Left 08/04/2017    right radial, T Dr. Jimmie Bloch      Social History:  Social History     Tobacco Use    Smoking status: Never    Smokeless tobacco: Never   Vaping Use    Vaping Use: Never used   Substance Use Topics    Alcohol use: Yes     Alcohol/week: 1.0 standard drink     Types: 1 Glasses of wine per week    Drug use: No        CURRENT MEDICATIONS:  Outpatient Medications Marked as Taking for the 9/28/22 encounter (Office Visit) with Audrey Evans MD   Medication Sig Dispense Refill    magnesium hydroxide (MILK OF MAGNESIA) 400 MG/5ML suspension Take by mouth daily as needed for Constipation      bisacodyl (DULCOLAX) 10 MG suppository Place 10 mg rectally daily      Sodium Phosphates (FLEET) 7-19 GM/118ML Place 1 enema rectally once as needed      carboxymethylcellulose 1 % ophthalmic solution 1 drop 3 times daily      acetaminophen (TYLENOL) 325 MG tablet Take 650 mg by mouth every 4 hours as needed for Pain      sertraline (ZOLOFT) 25 MG tablet Take 1 tablet by mouth daily 30 tablet 3    metoprolol succinate (TOPROL XL) 200 MG extended release tablet Take 1 tablet by mouth daily 30 tablet 3    furosemide (LASIX) 40 MG tablet Take 1 tablet by mouth daily 60 tablet 3    gabapentin (NEURONTIN) 300 MG capsule Take 1 capsule by mouth 3 times daily for 30 days. (Patient taking differently: Take 400 mg by mouth 3 times daily.) 90 capsule 3    potassium chloride (KLOR-CON M) 20 MEQ extended release tablet Take 1 tablet by mouth daily 30 tablet 0    losartan-hydroCHLOROthiazide (HYZAAR) 100-25 MG per tablet Take 1 tablet by mouth daily 90 tablet 3    atorvastatin (LIPITOR) 80 MG tablet TAKE ONE TABLET BY MOUTH ONCE NIGHTLY 90 tablet 3    apixaban (ELIQUIS) 5 MG TABS tablet Take 1 tablet by mouth 2 times daily 180 tablet 3    famotidine (PEPCID) 20 MG tablet Take 20 mg by mouth daily       amLODIPine (NORVASC) 10 MG tablet Take 1 tablet by mouth every evening 90 tablet 3    miconazole (MICOTIN) 2 % powder Apply topically 2 times daily. 45 g 1    metFORMIN (GLUCOPHAGE) 500 MG tablet Take 500 mg by mouth 2 times daily (with meals)       FAMILY HISTORY: family history includes COPD in her sister; Coronary Art Dis in her brother and father. PHYSICAL EXAM:   BP 99/69 (Site: Left Upper Arm, Position: Sitting, Cuff Size: Large Adult)   Pulse 85   Resp 18   Ht 5' 2\" (1.575 m)   Wt 205 lb 12.8 oz (93.4 kg)   SpO2 95%   BMI 37.64 kg/m²  Body mass index is 37.64 kg/m². Constitutional: She is oriented to person, place, and time. She appears well-developed and well-nourished. In no acute distress. HEENT: Normocephalic and atraumatic. No JVD present. Carotid bruit is not present. No mass and no thyromegaly present. No lymphadenopathy present. Cardiovascular: Normal rate, irregularly irregular rhythm, normal heart sounds. Exam reveals no gallop and no friction rubs. 2/6 systolic murmur, 5th intercostal space on the LEFT in the mid-clavicular line (cardiac apex). Pulmonary/Chest: Effort normal and breath sounds normal. No respiratory distress. She has no wheezes, rhonchi or rales. Abdominal: Soft, non-tender. Bowel sounds and aorta are normal. She exhibits no organomegaly, mass or bruit. Extremities: Trace. No cyanosis or clubbing. 2+ radial and carotid pulses. Distal extremity pulses: 2+ bilaterally. Neurological: She is alert and oriented to person, place, and time. No evidence of gross cranial nerve deficit. Coordination appeared normal.   Skin: Skin is warm and dry. There is no rash or diaphoresis. Psychiatric: She has a normal mood and affect. Her speech is normal and behavior is normal.      MOST RECENT LABS ON RECORD:   Lab Results   Component Value Date    WBC 5.1 04/07/2022    HGB 11.5 (L) 04/07/2022    HCT 35.8 (L) 04/07/2022     04/07/2022    CHOL 102 04/07/2022    TRIG 62 04/07/2022    HDL 43 04/07/2022    ALT 21 04/07/2022    AST 17 04/07/2022     04/07/2022    K 4.4 04/07/2022     04/07/2022    CREATININE 1.39 (H) 04/07/2022    BUN 38 (H) 04/07/2022    CO2 26 04/07/2022    TSH 1.45 04/07/2022    INR 1.0 08/23/2020    LABA1C 6.9 (H) 07/14/2022     ASSESSMENT:  1. Abnormal stress test    2.  PAF (paroxysmal atrial fibrillation) ([facility]    3. Chronic combined systolic and diastolic HF (heart failure), NYHA class 2 ([facility]    4. Coronary artery disease involving native coronary artery of native heart without angina pectoris    5. Cryptogenic stroke ([facility]    6. Essential hypertension    7. Mixed hyperlipidemia    8. Chronic atrial fibrillation (HCC)    9. TIA (transient ischemic attack)    10. Primary hypertension      PLAN:  Abnormal Stress Test:   For these reasons, I recommended that the patient consider undergoing a cardiac catheterization with coronary angiography to assess their coronary anatomy and facilitate better treatment recommendations. I discussed the risks, benefits, and alternatives to the procedure including the risk of bleeding, contrast induced allergy and/or kidney damage, arrythmia, stroke, heart attack, death, or the need for further procedures. I also discussed the fact that although treatment with simple medical management is a potential treatment option in place of cardiac catheterization, I expressed my opinion that cardiac catheterization in order to define their coronary anatomy and rule out severe 3 vessel or left main coronary artery disease would significant help guide the most appropriate treatment strategy ranging from no treatment, to medications, stents, to even coronary bypass surgery. The patient verbalized understanding of the risks benefits and alternatives and stated that they would like to undergo the procedure. We will plan to schedule the procedure here at [facility] on 10/6/2022. Risk factors: dyslipidemia, diabetes mellitus, obesity, sedentary life style, hypertension     Medical Management for suspected Coronary artery disease and myocardial ischemia:  Antiplatelet Agent: Continue Aspirin 81 mg daily. Beta Blocker: Continue Metoprolol succinate (Toprol XL) 200 mg daily. Anti-anginal medications: DECREASE to amlodipine (Norvasc) 5 mg once daily.   Cholesterol Reduction Therapy: Continue Atorvastatin (Lipitor) 80 mg daily. Additional Testing List: I took the liberty of ordering a BMP for today to assess their potassium and renal function. I told them that they could get their lab work performed at the location of their choosing, unfortunately, if the lab work was not performed at a [facility]) facility I could not guarantee my ability to follow up with them on their results. and  I took the liberty of ordering a CBC. I told them that they could get their lab work performed at the location of their choosing, unfortunately, if the lab work was not performed at a [facility]) facility I could not guarantee my ability to follow up with them on their results. and I ordered a echocardiogram to better assess for the etiology of this problem and to help guide future management      Chronic Atrial Fibrillation: has failed rhythm control so will switch to Rate Control  Beta Blocker: Continue metoprolol succinate (Toprol XL) 200 mg once daily. Calcium Channel Blocker: Decrease amlodipine (Norvasc)  5 mg daily. Rhythm Control Agent: Not indicated   Stroke Risk: Her CHADS2-VASc score is 7/9 (9.6% stroke risk)  Anticoagulation: Continue Apixaban (Eliquis) 5 mg every 12 hours. I also reminded her to watch for signs of blood in her stool or black tarry stools and stop the medication immediately if this develops as this could be life threatening.   Additional Testing: None   [id number] Score for Atrial Fibrillation Stroke Risk   Risk   Factors  Component Value   C CHF Yes 1   H HTN Yes 1   A2 Age >= 76 No,  (77 y.o.) 0   D DM Yes 1   S2 Prior Stroke/TIA Yes 2   V Vascular Disease No 0   A Age 74-69 Yes,  (77 y.o.) 1   Sc Sex female 1    [id number]  Score  7   Score last updated 8/46/10 1:17 AM EDT  Click here for a link to the UpToDate guideline \"Atrial Fibrillation: Anticoagulation therapy to prevent embolization  Disclaimer: Risk Score calculation is dependent on accuracy of patient problem list and past encounter diagnosis. History of TIA/Cryptogenic stroke: Still with some residual symptoms  Antiplatelet Agent: Continue Aspirin 81 mg daily. Beta Blocker: Continue Metoprolol succinate (Toprol XL) 200 mg daily. Anti-anginal medications: DECREASE to amlodipine (Norvasc) 5 mg once daily. Cholesterol Reduction Therapy: Continue Atorvastatin (Lipitor) 80 mg daily. Additional Testing List: None     Chronic Systolic and Diastolic Heart Failure: EF of 50-55% on 6/3/19  Beta Blocker: Continue metoprolol succinate (Toprol XL) 200 mg  once daily. ACE Inibitor/ARB: Continue losartan/HCTZ (Hyzaar) 100/ 25 mg once daily. Nonpharmacologic management of Heart Failure: I told her to continue wearing lower extremity compression stockings and I advised her to try and keep her legs up whenever possible and to limit salt in her diet. Additional Testing: I Ordered an Echocardiogram to assess Ms. Ragland's ejection fraction and to look for significant valvular heart disease as a source of Ms. Ragland symptoms    Essential Hypertension: Controlled and actually a bit hypotensive today  ACE Inibitor/ARB: Continue losartan/HCTZ (Hyzaar) 100/ 25 mg once daily. Beta Blocker: Continue metoprolol succinate (Toprol XL)       Calcium Channel Blocker: DECREASE to amlodipine (Norvasc) 5 mg once daily. History of Severe Mitral Regurgitation:  probably just functional as her echo on 6/19 showed only trivial MR. Beta Blocker: Continue Metoprolol succinate (Toprol XL) 200 mg daily. ACE Inibitor/ARB: Continue losartan/HCTZ (Hyzaar) 100/25 mg every morning. Hyperlipidemia: Mixed, LDL done on 4/7/2022 was 47 mg/dL   Cholesterol Reduction Therapy: Continue Atorvastatin (Lipitor) 80 mg daily. Finally, I recommended that she continue her other medications and follow up with you as previously scheduled. FOLLOW UP:   I told Ms. Ragland to call my office if she had any problems, but otherwise told her to Return in about 2 months (around 12/12/2022). However, I would be happy to see her sooner should the need arise. However, I would be happy to see her sooner should the need arise. Once again, thank you for allowing me to participate in this patients care. Please do not hesitate to contact me could I be of further assistance. Sincerely,  Rebeca Cruz. Jemal VILLALOBOS, MS, F.A.C.C. [facility], [address] [place] [address]  Phone: [phone], Fax: [phone]       I believe that the risk of significant morbidity and mortality related to the patient's current medical conditions are: intermediate-high. The documentation recorded by the scribe, accurately and completely reflects the services I personally performed and the decisions made by me. Tylor Rai MD, MS, F.A.C.C.  September 28, 2022

## 2022-09-28 NOTE — PATIENT INSTRUCTIONS
SURVEY:    You may be receiving a survey from [employer] regarding your visit today. Please complete the survey to enable us to provide the highest quality of care to you and your family. If you cannot score us a very good on any question, please call the office to discuss how we could have made your experience a very good one. Thank you.

## 2022-10-06 ENCOUNTER — HOSPITAL ENCOUNTER (OUTPATIENT)
Dept: CARDIAC CATH/INVASIVE PROCEDURES | Age: 72
Discharge: HOME | End: 2022-10-06
Attending: FAMILY MEDICINE | Admitting: FAMILY MEDICINE
Payer: MEDICARE

## 2022-10-06 VITALS
BODY MASS INDEX: 37.73 KG/M2 | TEMPERATURE: 98 F | RESPIRATION RATE: 20 BRPM | DIASTOLIC BLOOD PRESSURE: 71 MMHG | SYSTOLIC BLOOD PRESSURE: 96 MMHG | HEART RATE: 83 BPM | OXYGEN SATURATION: 93 % | WEIGHT: 205 LBS | HEIGHT: 62 IN

## 2022-10-06 PROBLEM — R94.39 ABNORMAL RESULT OF OTHER CARDIOVASCULAR FUNCTION STUDY: Status: ACTIVE | Noted: 2022-10-06

## 2022-10-06 LAB
ANION GAP SERPL CALCULATED.3IONS-SCNC: 9 MMOL/L (ref 9–17)
BUN BLDV-MCNC: 27 MG/DL (ref 8–23)
BUN/CREAT BLD: 20 (ref 9–20)
CALCIUM SERPL-MCNC: 11.1 MG/DL (ref 8.6–10.4)
CHLORIDE BLD-SCNC: 104 MMOL/L (ref 98–107)
CO2: 26 MMOL/L (ref 20–31)
CREAT SERPL-MCNC: 1.34 MG/DL (ref 0.5–0.9)
GFR SERPL CREATININE-BSD FRML MDRD: 43 ML/MIN/1.73M2
GLUCOSE BLD-MCNC: 141 MG/DL (ref 70–99)
HCT VFR BLD CALC: 40 % (ref 36.3–47.1)
HEMOGLOBIN: 12.9 G/DL (ref 11.9–15.1)
MCH RBC QN AUTO: 30.1 PG (ref 25.2–33.5)
MCHC RBC AUTO-ENTMCNC: 32.3 G/DL (ref 28.4–34.8)
MCV RBC AUTO: 93.2 FL (ref 82.6–102.9)
NRBC AUTOMATED: 0 PER 100 WBC
PDW BLD-RTO: 13.8 % (ref 11.8–14.4)
PLATELET # BLD: 192 K/UL (ref 138–453)
PMV BLD AUTO: 11.2 FL (ref 8.1–13.5)
POTASSIUM SERPL-SCNC: 4.3 MMOL/L (ref 3.7–5.3)
RBC # BLD: 4.29 M/UL (ref 3.95–5.11)
SODIUM BLD-SCNC: 139 MMOL/L (ref 135–144)
WBC # BLD: 5.3 K/UL (ref 3.5–11.3)

## 2022-10-06 PROCEDURE — 2709999900 HC NON-CHARGEABLE SUPPLY

## 2022-10-06 PROCEDURE — 36415 COLL VENOUS BLD VENIPUNCTURE: CPT

## 2022-10-06 PROCEDURE — 6360000004 HC RX CONTRAST MEDICATION: Performed by: FAMILY MEDICINE

## 2022-10-06 PROCEDURE — 93458 L HRT ARTERY/VENTRICLE ANGIO: CPT | Performed by: FAMILY MEDICINE

## 2022-10-06 PROCEDURE — C1894 INTRO/SHEATH, NON-LASER: HCPCS

## 2022-10-06 PROCEDURE — 85027 COMPLETE CBC AUTOMATED: CPT

## 2022-10-06 PROCEDURE — 93458 L HRT ARTERY/VENTRICLE ANGIO: CPT

## 2022-10-06 PROCEDURE — C1769 GUIDE WIRE: HCPCS

## 2022-10-06 PROCEDURE — 6360000002 HC RX W HCPCS

## 2022-10-06 PROCEDURE — C1887 CATHETER, GUIDING: HCPCS

## 2022-10-06 PROCEDURE — 80048 BASIC METABOLIC PNL TOTAL CA: CPT

## 2022-10-06 PROCEDURE — 2500000003 HC RX 250 WO HCPCS

## 2022-10-06 RX ORDER — SODIUM CHLORIDE 9 MG/ML
INJECTION, SOLUTION INTRAVENOUS CONTINUOUS
Status: DISCONTINUED | OUTPATIENT
Start: 2022-10-06 | End: 2022-10-06 | Stop reason: HOSPADM

## 2022-10-06 RX ORDER — SODIUM CHLORIDE 0.9 % (FLUSH) 0.9 %
5-40 SYRINGE (ML) INJECTION EVERY 12 HOURS SCHEDULED
Status: DISCONTINUED | OUTPATIENT
Start: 2022-10-06 | End: 2022-10-06 | Stop reason: HOSPADM

## 2022-10-06 RX ORDER — DIPHENHYDRAMINE HCL 50 MG
50 CAPSULE ORAL ONCE
Status: DISCONTINUED | OUTPATIENT
Start: 2022-10-06 | End: 2022-10-06 | Stop reason: HOSPADM

## 2022-10-06 RX ORDER — NITROGLYCERIN 0.4 MG/1
0.4 TABLET SUBLINGUAL EVERY 5 MIN PRN
Status: DISCONTINUED | OUTPATIENT
Start: 2022-10-06 | End: 2022-10-06 | Stop reason: HOSPADM

## 2022-10-06 RX ORDER — SODIUM CHLORIDE 9 MG/ML
INJECTION, SOLUTION INTRAVENOUS PRN
Status: DISCONTINUED | OUTPATIENT
Start: 2022-10-06 | End: 2022-10-06 | Stop reason: HOSPADM

## 2022-10-06 RX ORDER — ACETAMINOPHEN 325 MG/1
650 TABLET ORAL EVERY 4 HOURS PRN
Status: DISCONTINUED | OUTPATIENT
Start: 2022-10-06 | End: 2022-10-06 | Stop reason: HOSPADM

## 2022-10-06 RX ORDER — SODIUM CHLORIDE 0.9 % (FLUSH) 0.9 %
5-40 SYRINGE (ML) INJECTION PRN
Status: DISCONTINUED | OUTPATIENT
Start: 2022-10-06 | End: 2022-10-06 | Stop reason: HOSPADM

## 2022-10-06 RX ADMIN — IOPAMIDOL 50 ML: 755 INJECTION, SOLUTION INTRAVENOUS at 11:34

## 2022-10-06 NOTE — PROGRESS NOTES
Patient discharged to home with [facility] staff per w/c. Patient ambulatory with walker and has all belongings.

## 2022-10-06 NOTE — PROGRESS NOTES
Inpatients must meet criteria 1 through 7.   1. Minimum 30 minutes after last dose of sedative medication, minimum 120 minutes after last dose of reversal agent. Yes   2. Systolic BP stable within 20 mmHg for 30 minutes & systolic BP between 90 & 735 or within 10 mmHg of baseline. Yes   3. Pulse between 60 and 100 or within 10 bpm of baseline. Yes   4. Spontaneous respiratory rate >/= 10 per minute. Yes   5. SaO2 >/= 95 or >/= baseline. Yes   6. Able to cough and swallow or return to baseline function. Yes   7. Alert and oriented or return to baseline mental status. Yes   8. Demonstrates controlled, coordinated movements, ambulates with steady gait, or return to baseline activity function. Yes   9. Minimal or no pain or nausea, or at a level tolerable and acceptable to patient. Yes   10. Takes and retains oral fluids as allowed. Yes   11. Procedural / perioperative site stable. Minimal or no bleeding. Yes   12. If GI endoscopy procedure, minimal or no abdominal distention or passing flatus. N/A   13. Written discharge instructions and emergency telephone number provided. Yes   14. Accompanied by a responsible adult. Yes   Adult patient discharged from facility without responsible person meets above criteria plus the following:   a) remains awake without stimulus for 30 minutes   b) oriented appropriate for age   c) all vital signs stable   d) no significant risk of losing protective reflexes   e) able to maintain pre-procedure mobility without assistance   f) no nausea or dizziness   g) transportation arrangements that do not require patient to operate motor Vehicle.    N/A

## 2022-10-06 NOTE — OP NOTE
-  Coronary Angiography Brief Post Operative Note:    Mild coronary artery disease without any significant focal stenosis. Normal left ventricular end diastolic pressure (LVEDP). Continue standard risk factor modification as clinically indicated and consider alternative etiologies of the patients symptoms.      Electronically signed by Claudene Mustache, MD on 10/6/2022 at 1:01 PM

## 2022-10-06 NOTE — DISCHARGE INSTRUCTIONS
Discharge Instructions for Cardiac Catheterization    A cardiac catheterization is a diagnostic test used to evaluate the health of the heart and its blood vessels. The test is done with a thin catheter carefully threaded into your heart from a leg or arm artery. Most likely, you will be allowed to go home the same day as the procedure. Steps to Take at Home:   Pain- apply ice to site 15-20 minutes every hour for the first 2 days. Showering is okay 24 hours after procedure. No soaking in a pool, hot tub, bath tub, or standing water for one week. Bleeding (outward or under the skin-hematoma)- apply firm pressure for 10-15 minutes or until the bleeding stops, then call your doctor. If unable to get bleeding stopped, call 911. Kidney damage- Call if you urinate less than normal, have swelling or feel puffy, and/or gain 2 or more pounds over night in the first week. If procedure was in ARM:  You were instructed to keep wrist straight and still for two hours after the procedure. The arm and hand may now be used for normal daily activities except, avoid using the heal of hand while getting up and down from furniture for the first few days. Keep affected arm elevated, hand higher than elbow, while pressure dressing in place to decrease swelling. 1)  Gauze and Elastoplast   Remove in 4 hours as follows:     TIME_5:45pm_______________  Remove 1 piece of tape at a time, waiting 15 -20 minutes between layers to monitor for bleeding. If dressing sticks, place wrist under cool running water to help loosen gauze from site then pat site dry. *** If hand feels numb, tingly, and/or cold- loosen first 1-2 layers of tape if dressing still in place. If no relief noticed, remove pressure dressing as per above instructions. Seek medical help if no relief or if dressing already off.   *** If hand of procedure site becomes numb, tingly, and/or cold, seek medical help.   Wash area with soap and water daily while leaving it open to air, no bandaids or ointment. Diet   Drink plenty of fluids after the test to flush the x-ray dye from your system. Return to your normal diet. No alcoholic beverages for 24 hours after the procedure. Physical Activity   The sedative will make you sleepy. Rest until the effects have worn off. Nausea and vomiting from the sedative is normal and usually does not last long. Ask your doctor when you will be able to return to work. Do not drive, operate machinery, do anything that requires attention to detail, or sign important papers for at least 24 hours or until your doctor says it is safe. Avoid heavy lifting, physically demanding activities, and sexual activity for 2-3 days. Lift nothing over 10 pounds (a gallon of milk is 8 pounds). Do not sit for long periods of time. Try to change positions frequently. Medications   Resume taking your normal medicines as advised. Use acetaminophen (Tylenol) for pain relief. (Avoid anti-inflammatory drugs such as- ibuprofen (Advil, Motrin), naproxen sodium (Aleve), Excedrin for a few days)  If you had to stop taking these medications before the procedure, ask your doctor when you can resume taking them:   Anti-inflammatory drugs   Blood thinners, such as warfarin (Coumadin)   If you are taking medicines, follow these general guidelines:   Take your medicine as directed. Do not change the amount or the schedule. Do not stop taking them without talking to your doctor. Do not share them. Know the side effects and report any to your doctor. Some drugs can be dangerous when mixed. Talk to a doctor or pharmacist if you are taking more than one drug. This includes over-the-counter medicine and herb or dietary supplements. Plan ahead for refills so you don't run out. Follow-up   The test results are available right after the procedure. At that point, the doctor will discuss the findings and suggest appropriate treatment options.  In some cases, the results can indicate an immediate need for surgery. Schedule a follow-up appointment as directed by your doctor. Call Your Doctor If Any of the Following Occurs   Signs of infection- including fever and chills   Redness, swelling, increasing pain, feels warm to touch, red streak forming from site, or any discharge from the procedure site.    Call 911 If Any of the Following Occurs   Drooping facial muscles   Changes in vision or speech   Difficulty walking or using your limbs   Change in sensation, including numbness, feeling cold, or change in color   Extreme sweating, nausea or vomiting   Dizziness or lightheadedness   Chest pain   Rapid, irregular heartbeat   Palpitations   Cough, shortness of breath, or difficulty breathing   Weakness or fainting   If you think you have an emergency, CALL 911

## 2022-10-07 ENCOUNTER — OUTSIDE SERVICES (OUTPATIENT)
Dept: PRIMARY CARE CLINIC | Age: 72
End: 2022-10-07
Payer: MEDICARE

## 2022-10-07 DIAGNOSIS — I50.42 CHRONIC COMBINED SYSTOLIC (CONGESTIVE) AND DIASTOLIC (CONGESTIVE) HEART FAILURE (HCC): ICD-10-CM

## 2022-10-07 DIAGNOSIS — I48.20 CHRONIC ATRIAL FIBRILLATION (HCC): ICD-10-CM

## 2022-10-07 DIAGNOSIS — E11.22 TYPE 2 DIABETES MELLITUS WITH STAGE 3B CHRONIC KIDNEY DISEASE, WITHOUT LONG-TERM CURRENT USE OF INSULIN (HCC): Primary | ICD-10-CM

## 2022-10-07 DIAGNOSIS — I10 ESSENTIAL HYPERTENSION: ICD-10-CM

## 2022-10-07 DIAGNOSIS — N18.32 TYPE 2 DIABETES MELLITUS WITH STAGE 3B CHRONIC KIDNEY DISEASE, WITHOUT LONG-TERM CURRENT USE OF INSULIN (HCC): Primary | ICD-10-CM

## 2022-10-07 NOTE — PROGRESS NOTES
Patient:  Corina Blair, 1950  I saw this patient on  10/19/2022, at [facility]. Had cath and advised medical treatment  Blood sugars better,last hba1c 6.9,no hypoglycemia  She continues to have rt leg pain, ambulates with walker          Reason for Visit:      ICD-10-CM    1. Type 2 diabetes mellitus with stage 3b chronic kidney disease, without long-term current use of insulin ([facility])  E11.22     N18.32       2. Chronic combined systolic (congestive) and diastolic (congestive) heart failure ([facility])  I50.42       3. Chronic atrial fibrillation ([facility])  I48.20       4. Essential hypertension  I10                 Changes since last visit:    No chest pain  Blood sugars better,ambulating better  1. Fall:  none  2. Behavioral Change: none  3. Pain Control: has chronic rt leg pain  4. Mobility: improving  5. Pressure Sore:  none    Current Outpatient Medications   Medication Sig Dispense Refill    amLODIPine (NORVASC) 5 MG tablet Take 1 tablet by mouth every evening 90 tablet 3    magnesium hydroxide (MILK OF MAGNESIA) 400 MG/5ML suspension Take by mouth daily as needed for Constipation      bisacodyl (DULCOLAX) 10 MG suppository Place 10 mg rectally daily      Sodium Phosphates (FLEET) 7-19 GM/118ML Place 1 enema rectally once as needed      carboxymethylcellulose 1 % ophthalmic solution 1 drop 3 times daily      acetaminophen (TYLENOL) 325 MG tablet Take 650 mg by mouth every 4 hours as needed for Pain      sertraline (ZOLOFT) 25 MG tablet Take 1 tablet by mouth daily 30 tablet 3    metoprolol succinate (TOPROL XL) 200 MG extended release tablet Take 1 tablet by mouth daily 30 tablet 3    furosemide (LASIX) 40 MG tablet Take 1 tablet by mouth daily 60 tablet 3    gabapentin (NEURONTIN) 300 MG capsule Take 1 capsule by mouth 3 times daily for 30 days.  90 capsule 3    potassium chloride (KLOR-CON M) 20 MEQ extended release tablet Take 1 tablet by mouth daily 30 tablet 0 losartan-hydroCHLOROthiazide (HYZAAR) 100-25 MG per tablet Take 1 tablet by mouth daily 90 tablet 3    atorvastatin (LIPITOR) 80 MG tablet TAKE ONE TABLET BY MOUTH ONCE NIGHTLY 90 tablet 3    apixaban (ELIQUIS) 5 MG TABS tablet Take 1 tablet by mouth 2 times daily 180 tablet 3    famotidine (PEPCID) 20 MG tablet Take 20 mg by mouth daily       miconazole (MICOTIN) 2 % powder Apply topically 2 times daily. 45 g 1    metFORMIN (GLUCOPHAGE) 500 MG tablet Take 500 mg by mouth 2 times daily (with meals)       No current facility-administered medications for this visit. Allergies:  Patient has no known allergies. Past Medical History:    Past Medical History:   Diagnosis Date    Adjustment disorder with depressed mood 06/15/2022    Arthritis     Atrial fibrillation, unspecified type ([facility] 11/20/2021    Cerebral artery occlusion with cerebral infarction [facility])     CHF (congestive heart failure) ([facility]     Chronic kidney disease 11/20/2021    Stage 3    Diabetes mellitus ([facility]     Dysarthria and anarthria 11/20/2021    Endothelial corneal dystrophy of both eyes 04/21/2022    H/O cardiac catheterization 08/04/2017    LMCA: Mild irregularites 10-20%. LAD: Mild irregularites 10-20%. LCx: Mild irregularites 10-20%. RCA: Mild irregularites 10-20%. LV function assessed as : Abnormal. EF of 40%. H/O echocardiogram 12/18/2018    EF 55%. Mildly increased LV wall thickness. The left atrium appears moderately to severely dilated. Moderate to severe mitral regurg. Moderate pulmonary HTN with an estimated RV systolic pressure of [id number]. Moderate tricuspid regurg. Evidnece fo moderate diastolic dysfunction is seen. Hearing loss 09/26/2022    History of echocardiogram 01/17/2019    EF 55%. LV wall thickness moderately increased. LA is mildly dilated w/ LA volume index of 30. trivial mitral regurg. Evidence of moderate (grade II) diastolic dysfunction seen. History of echocardiogram 06/03/2019    EF of 50-55%.  LV wall thickness is mildly increased. LA is mildly dilated (29-33) with a left atrial volume index of 31 m1/m2. mild diastolic dysfunction. Hyperlipidemia     Hypertension     Major depressive disorder 2022       Past Surgical History:    Past Surgical History:   Procedure Laterality Date    CARDIAC CATHETERIZATION Left 2017    right radial, [facility]HT Dr. Gilberto Schreiber         Social History:   Social History     Tobacco Use    Smoking status: Never    Smokeless tobacco: Never   Substance Use Topics    Alcohol use: Yes     Alcohol/week: 1.0 standard drink     Types: 1 Glasses of wine per week       Family History:   Family History   Problem Relation Age of Onset    Coronary Art Dis Father          from [place]    COPD Sister         O2 dep    Coronary Art Dis Brother         2 brothers  from [place]           Review of Systems:  Constitutional: negative for fevers or chills, has weakness  Eyes: negative for visual disturbance   ENT: negative for sore throat or nasal congestion,neg for dysphagia  Respiratory: neg for shortness of breath , cough  Cardiovascular: neg for for chest pain , palpitations,pnd,neg for leg edema  Gastrointestinal: neg for abd pain, nausea, vomiting, diarrhea or constipation,no monalisa,no blood in stool  Genitourinary: negative for dysuria, urgency,hematuria or frequency  Integument/breast: negative for skin rash or lesions  Neurological: positive for unilateral weakness of rt arm and leg, neg for numbness or tingling,has rt leg pain  Muscular Skeletal: has rt leg  pain   Psych -mood stable    Objective:    Vitals:   BP-142/78,Pulse-78,Respi-16,Temp-97.9  -----------------------------------------------------------------  Exam:  GEN:   A & O x3, no apparent distress  EYES: No gross abnormalities.   ENT:ENT exam normal, no neck nodes or sinus tenderness  NECK: normal,  no carotid bruits  PULM: clear to auscultation bilaterally- no wheezes, rales or rhonchi, normal air movement, no respiratory distress  COR: Iregular rate & rhythm, 2/6 murmur and no gallops,no edema  ABD:  soft, non-tender, non-distended, normal bowel sounds, no masses or organomegaly  : no cva or flank tendernes  EXT:no cyanosis, clubbing or edema present  no calf tenderness, and warm to touch. NEURO: Motor- has minimal rt leg weakness and sensory grossly intact  PSYCH: mood stable  SKIN:  No skin lesions or rashes    -----------------------------------------------------------------  Diagnostic Data:   All diagnostic data was reviewed    Assessment:        ICD-10-CM    1. Type 2 diabetes mellitus with stage 3b chronic kidney disease, without long-term current use of insulin ([facility])  E11.22     N18.32       2. Chronic combined systolic (congestive) and diastolic (congestive) heart failure ([facility])  I50.42       3. Chronic atrial fibrillation ([facility])  I48.20       4.  Essential hypertension  I10               Patient Active Problem List   Diagnosis Code    Hypertension I10    Hyperlipidemia E78.5    Acute on chronic systolic CHF (congestive heart failure) ([facility]) I50.23    Hypertensive urgency I16.0    Idiopathic cardiomyopathy ([facility]) I42.9    Hypertensive emergency I16.1    Chronic combined systolic and diastolic HF (heart failure), NYHA class 2 ([facility]) I50.42    Acute on chronic combined systolic and diastolic CHF, NYHA class 4 ([facility]) I50.43    Hypoxia R09.02    Moderate mitral regurgitation by prior echocardiogram I34.0    Morbid obesity ([facility]) E66.01    CKD (chronic kidney disease) stage 3, GFR 30-59 ml/min ([facility]) N18.30    Physical deconditioning R53.81    TIA (transient ischemic attack) G45.9    Old lacunar stroke without late effect Z86.73    Dysarthria R47.1    Stroke determined by clinical assessment ([facility] I63.9    Aphasia R47.01    Type 2 diabetes mellitus, without long-term current use of insulin ([facility]) E11.9    Uncontrolled type 2 diabetes mellitus with hyperglycemia ([facility]) E11.65    Acute ischemic stroke ([facility]) I63.9 PAF (paroxysmal atrial fibrillation) ([facility]) I48.0    Encounter for current long-term use of anticoagulants Z79.01    Hemiplegia and hemiparesis following cerebral infarction affecting right dominant side ([facility]) I69.351    Atrial fibrillation with RVR ([facility]) I48.91    Unable to care for self Z78.9    Abnormal result of other cardiovascular function study R94.39         Plan:       Monitor afib,blood sugar and bp  Monitor for side effects of apixaben  Continue current medications  Prevent pressure sore  Prevent falls    Electronically signed by Tana Ventura MD on 10/19/2022 at 5:39 PM

## 2022-10-19 ENCOUNTER — HOSPITAL ENCOUNTER (OUTPATIENT)
Age: 72
Setting detail: SPECIMEN
Discharge: HOME | End: 2022-10-19

## 2022-10-19 PROCEDURE — 99308 SBSQ NF CARE LOW MDM 20: CPT | Performed by: FAMILY MEDICINE

## 2022-10-20 ENCOUNTER — HOSPITAL ENCOUNTER (OUTPATIENT)
Age: 72
Setting detail: SPECIMEN
Discharge: HOME | End: 2022-10-20
Payer: MEDICARE

## 2022-10-20 LAB
ESTIMATED AVERAGE GLUCOSE: 157 MG/DL
HBA1C MFR BLD: 7.1 % (ref 4–6)

## 2022-10-20 PROCEDURE — 36415 COLL VENOUS BLD VENIPUNCTURE: CPT

## 2022-10-20 PROCEDURE — 83036 HEMOGLOBIN GLYCOSYLATED A1C: CPT

## 2022-10-20 PROCEDURE — P9604 ONE-WAY ALLOW PRORATED TRIP: HCPCS

## 2022-11-01 ENCOUNTER — HOSPITAL ENCOUNTER (OUTPATIENT)
Dept: NON INVASIVE DIAGNOSTICS | Age: 72
Discharge: HOME | End: 2022-11-01
Payer: MEDICARE

## 2022-11-01 DIAGNOSIS — I48.0 PAF (PAROXYSMAL ATRIAL FIBRILLATION) (HCC): ICD-10-CM

## 2022-11-01 DIAGNOSIS — I10 ESSENTIAL HYPERTENSION: ICD-10-CM

## 2022-11-01 DIAGNOSIS — R94.39 ABNORMAL STRESS TEST: ICD-10-CM

## 2022-11-01 DIAGNOSIS — I63.9 CRYPTOGENIC STROKE (HCC): ICD-10-CM

## 2022-11-01 DIAGNOSIS — I25.10 CORONARY ARTERY DISEASE INVOLVING NATIVE CORONARY ARTERY OF NATIVE HEART WITHOUT ANGINA PECTORIS: ICD-10-CM

## 2022-11-01 DIAGNOSIS — I10 PRIMARY HYPERTENSION: ICD-10-CM

## 2022-11-01 DIAGNOSIS — E78.2 MIXED HYPERLIPIDEMIA: ICD-10-CM

## 2022-11-01 DIAGNOSIS — I50.42 CHRONIC COMBINED SYSTOLIC AND DIASTOLIC HF (HEART FAILURE), NYHA CLASS 2 (HCC): ICD-10-CM

## 2022-11-01 DIAGNOSIS — I48.20 CHRONIC ATRIAL FIBRILLATION (HCC): ICD-10-CM

## 2022-11-01 DIAGNOSIS — G45.9 TIA (TRANSIENT ISCHEMIC ATTACK): ICD-10-CM

## 2022-11-01 LAB
LV EF: 45 %
LVEF MODALITY: NORMAL

## 2022-11-01 PROCEDURE — 93306 TTE W/DOPPLER COMPLETE: CPT

## 2022-11-02 ENCOUNTER — TELEPHONE (OUTPATIENT)
Dept: CARDIOLOGY | Age: 72
End: 2022-11-02

## 2022-11-02 NOTE — TELEPHONE ENCOUNTER
----- Message from Russel Hunt MD sent at 11/2/2022 12:03 AM EDT -----  Let Ms. Skylar Cortes know their test result was ok. Will discuss at next visit. Thanks.

## 2022-11-09 ENCOUNTER — OUTSIDE SERVICES (OUTPATIENT)
Dept: PRIMARY CARE CLINIC | Age: 72
End: 2022-11-09
Payer: MEDICARE

## 2022-11-09 DIAGNOSIS — I10 ESSENTIAL HYPERTENSION: ICD-10-CM

## 2022-11-09 DIAGNOSIS — E11.22 TYPE 2 DIABETES MELLITUS WITH STAGE 3B CHRONIC KIDNEY DISEASE, WITHOUT LONG-TERM CURRENT USE OF INSULIN (HCC): Primary | ICD-10-CM

## 2022-11-09 DIAGNOSIS — I50.42 CHRONIC COMBINED SYSTOLIC (CONGESTIVE) AND DIASTOLIC (CONGESTIVE) HEART FAILURE (HCC): ICD-10-CM

## 2022-11-09 DIAGNOSIS — N18.32 TYPE 2 DIABETES MELLITUS WITH STAGE 3B CHRONIC KIDNEY DISEASE, WITHOUT LONG-TERM CURRENT USE OF INSULIN (HCC): Primary | ICD-10-CM

## 2022-11-09 DIAGNOSIS — I48.20 CHRONIC ATRIAL FIBRILLATION (HCC): ICD-10-CM

## 2022-11-09 PROCEDURE — 99308 SBSQ NF CARE LOW MDM 20: CPT | Performed by: FAMILY MEDICINE

## 2022-11-09 NOTE — PROGRESS NOTES
Patient:  Hina Lynne, 1950  I saw this patient on  11/09/2022, at [facility]. Had cath and advised medical treatment  Blood sugars better,last hba1c 7.1,no hypoglycemia  She continues to have rt leg pain, ambulates with walker          Reason for Visit:      ICD-10-CM    1. Type 2 diabetes mellitus with stage 3b chronic kidney disease, without long-term current use of insulin ([facility])  E11.22     N18.32       2. Chronic combined systolic (congestive) and diastolic (congestive) heart failure ([facility])  I50.42       3. Chronic atrial fibrillation ([facility])  I48.20       4. Essential hypertension  I10                   Changes since last visit:    No chest pain  Blood sugars better,ambulating better  1. Fall:  none  2. Behavioral Change: none  3. Pain Control: has chronic rt leg pain  4. Mobility: improving  5. Pressure Sore:  none    Current Outpatient Medications   Medication Sig Dispense Refill    amLODIPine (NORVASC) 5 MG tablet Take 1 tablet by mouth every evening 90 tablet 3    magnesium hydroxide (MILK OF MAGNESIA) 400 MG/5ML suspension Take by mouth daily as needed for Constipation      bisacodyl (DULCOLAX) 10 MG suppository Place 10 mg rectally daily      Sodium Phosphates (FLEET) 7-19 GM/118ML Place 1 enema rectally once as needed      carboxymethylcellulose 1 % ophthalmic solution 1 drop 3 times daily      acetaminophen (TYLENOL) 325 MG tablet Take 650 mg by mouth every 4 hours as needed for Pain      sertraline (ZOLOFT) 25 MG tablet Take 1 tablet by mouth daily 30 tablet 3    metoprolol succinate (TOPROL XL) 200 MG extended release tablet Take 1 tablet by mouth daily 30 tablet 3    furosemide (LASIX) 40 MG tablet Take 1 tablet by mouth daily 60 tablet 3    gabapentin (NEURONTIN) 300 MG capsule Take 1 capsule by mouth 3 times daily for 30 days.  90 capsule 3    potassium chloride (KLOR-CON M) 20 MEQ extended release tablet Take 1 tablet by mouth daily 30 tablet 0 losartan-hydroCHLOROthiazide (HYZAAR) 100-25 MG per tablet Take 1 tablet by mouth daily 90 tablet 3    atorvastatin (LIPITOR) 80 MG tablet TAKE ONE TABLET BY MOUTH ONCE NIGHTLY 90 tablet 3    apixaban (ELIQUIS) 5 MG TABS tablet Take 1 tablet by mouth 2 times daily 180 tablet 3    famotidine (PEPCID) 20 MG tablet Take 20 mg by mouth daily       miconazole (MICOTIN) 2 % powder Apply topically 2 times daily. 45 g 1    metFORMIN (GLUCOPHAGE) 500 MG tablet Take 500 mg by mouth 2 times daily (with meals)       No current facility-administered medications for this visit. Allergies:  Patient has no known allergies. Past Medical History:    Past Medical History:   Diagnosis Date    Adjustment disorder with depressed mood 06/15/2022    Arthritis     Atrial fibrillation, unspecified type ([facility] 11/20/2021    Cerebral artery occlusion with cerebral infarction [facility])     CHF (congestive heart failure) ([facility]     Chronic kidney disease 11/20/2021    Stage 3    Diabetes mellitus ([facility]     Dysarthria and anarthria 11/20/2021    Endothelial corneal dystrophy of both eyes 04/21/2022    H/O cardiac catheterization 08/04/2017    LMCA: Mild irregularites 10-20%. LAD: Mild irregularites 10-20%. LCx: Mild irregularites 10-20%. RCA: Mild irregularites 10-20%. LV function assessed as : Abnormal. EF of 40%. H/O echocardiogram 12/18/2018    EF 55%. Mildly increased LV wall thickness. The left atrium appears moderately to severely dilated. Moderate to severe mitral regurg. Moderate pulmonary HTN with an estimated RV systolic pressure of [id number]. Moderate tricuspid regurg. Evidnece fo moderate diastolic dysfunction is seen. Hearing loss 09/26/2022    History of echocardiogram 01/17/2019    EF 55%. LV wall thickness moderately increased. LA is mildly dilated w/ LA volume index of 30. trivial mitral regurg. Evidence of moderate (grade II) diastolic dysfunction seen. History of echocardiogram 06/03/2019    EF of 50-55%.  LV wall thickness is mildly increased. LA is mildly dilated (29-33) with a left atrial volume index of 31 m1/m2. mild diastolic dysfunction. Hyperlipidemia     Hypertension     Major depressive disorder 2022       Past Surgical History:    Past Surgical History:   Procedure Laterality Date    CARDIAC CATHETERIZATION Left 2017    right radial, MHT Dr. Jovita Fleming         Social History:   Social History     Tobacco Use    Smoking status: Never    Smokeless tobacco: Never   Substance Use Topics    Alcohol use: Yes     Alcohol/week: 1.0 standard drink     Types: 1 Glasses of wine per week       Family History:   Family History   Problem Relation Age of Onset    Coronary Art Dis Father          from [place]    COPD Sister         O2 dep    Coronary Art Dis Brother         2 brothers  from [place]           Review of Systems:  Constitutional: negative for fevers or chills, has weakness  Eyes: negative for visual disturbance   ENT: negative for sore throat or nasal congestion,neg for dysphagia  Respiratory: neg for shortness of breath , cough  Cardiovascular: neg for for chest pain , palpitations,pnd,neg for leg edema  Gastrointestinal: neg for abd pain, nausea, vomiting, diarrhea or constipation,no monalisa,no blood in stool  Genitourinary: negative for dysuria, urgency,hematuria or frequency  Integument/breast: negative for skin rash or lesions  Neurological: positive for unilateral weakness of rt arm and leg, neg for numbness or tingling,has rt leg pain  Muscular Skeletal: has rt leg  pain   Psych -mood stable    Objective:    Vitals:   BP-142/78,Pulse-78,Respi-16,Temp-97.5  -----------------------------------------------------------------  Exam:  GEN:   A & O x3, no apparent distress  EYES: No gross abnormalities.   ENT:ENT exam normal, no neck nodes or sinus tenderness  NECK: normal,  no carotid bruits  PULM: clear to auscultation bilaterally- no wheezes, rales or rhonchi, normal air movement, no respiratory distress  COR: Iregular rate & rhythm, 2/6 murmur and no gallops,no edema  ABD:  soft, non-tender, non-distended, normal bowel sounds, no masses or organomegaly  : no cva or flank tendernes  EXT:no cyanosis, clubbing or edema present  no calf tenderness, and warm to touch. NEURO: Motor- has minimal rt leg weakness and sensory grossly intact  PSYCH: mood stable  SKIN:  No skin lesions or rashes    -----------------------------------------------------------------  Diagnostic Data:   All diagnostic data was reviewed    Assessment:        ICD-10-CM    1. Type 2 diabetes mellitus with stage 3b chronic kidney disease, without long-term current use of insulin ([facility])  E11.22     N18.32       2. Chronic combined systolic (congestive) and diastolic (congestive) heart failure ([facility])  I50.42       3. Chronic atrial fibrillation ([facility])  I48.20       4.  Essential hypertension  I10                 Patient Active Problem List   Diagnosis Code    Hypertension I10    Hyperlipidemia E78.5    Acute on chronic systolic CHF (congestive heart failure) ([facility]) I50.23    Hypertensive urgency I16.0    Idiopathic cardiomyopathy ([facility]) I42.9    Hypertensive emergency I16.1    Chronic combined systolic and diastolic HF (heart failure), NYHA class 2 ([facility]) I50.42    Acute on chronic combined systolic and diastolic CHF, NYHA class 4 ([facility]) I50.43    Hypoxia R09.02    Moderate mitral regurgitation by prior echocardiogram I34.0    Morbid obesity ([facility]) E66.01    CKD (chronic kidney disease) stage 3, GFR 30-59 ml/min ([facility]) N18.30    Physical deconditioning R53.81    TIA (transient ischemic attack) G45.9    Old lacunar stroke without late effect Z86.73    Dysarthria R47.1    Stroke determined by clinical assessment ([facility] I63.9    Aphasia R47.01    Type 2 diabetes mellitus, without long-term current use of insulin ([facility]) E11.9    Uncontrolled type 2 diabetes mellitus with hyperglycemia ([facility]) E11.65    Acute ischemic stroke ([facility] I63.9    PAF (paroxysmal atrial fibrillation) ([facility]) I48.0    Encounter for current long-term use of anticoagulants Z79.01    Hemiplegia and hemiparesis following cerebral infarction affecting right dominant side ([facility]) I69.351    Atrial fibrillation with RVR ([facility]) I48.91    Unable to care for self Z78.9    Abnormal result of other cardiovascular function study R94.39         Plan:       Monitor afib,blood sugar and bp  Monitor for side effects of apixaben  Continue current medications  Prevent pressure sore  Prevent falls    Electronically signed by Triston Chu MD on 11/10/2022 at 7:35 AM

## 2022-11-22 RX ORDER — GABAPENTIN 600 MG/1
TABLET ORAL
COMMUNITY
Start: 2022-10-26

## 2022-12-06 PROCEDURE — 93298 REM INTERROG DEV EVAL SCRMS: CPT | Performed by: FAMILY MEDICINE

## 2022-12-07 ENCOUNTER — OUTSIDE SERVICES (OUTPATIENT)
Dept: PRIMARY CARE CLINIC | Age: 72
End: 2022-12-07

## 2022-12-07 DIAGNOSIS — N18.32 TYPE 2 DIABETES MELLITUS WITH STAGE 3B CHRONIC KIDNEY DISEASE, WITHOUT LONG-TERM CURRENT USE OF INSULIN (HCC): Primary | ICD-10-CM

## 2022-12-07 DIAGNOSIS — I10 ESSENTIAL HYPERTENSION: ICD-10-CM

## 2022-12-07 DIAGNOSIS — E11.22 TYPE 2 DIABETES MELLITUS WITH STAGE 3B CHRONIC KIDNEY DISEASE, WITHOUT LONG-TERM CURRENT USE OF INSULIN (HCC): Primary | ICD-10-CM

## 2022-12-07 DIAGNOSIS — I50.42 CHRONIC COMBINED SYSTOLIC (CONGESTIVE) AND DIASTOLIC (CONGESTIVE) HEART FAILURE (HCC): ICD-10-CM

## 2022-12-07 DIAGNOSIS — I48.20 CHRONIC ATRIAL FIBRILLATION (HCC): ICD-10-CM

## 2022-12-07 NOTE — PROGRESS NOTES
Patient:  Jimbo Varghese, 1950  I saw this patient on  12/07/2022, at [facility]. Had cath and advised medical treatment  Blood sugars better,no hypoglycemia  She continues to have rt leg pain, ambulates with walker          Reason for Visit:      ICD-10-CM    1. Type 2 diabetes mellitus with stage 3b chronic kidney disease, without long-term current use of insulin ([facility])  E11.22     N18.32       2. Chronic combined systolic (congestive) and diastolic (congestive) heart failure ([facility])  I50.42       3. Essential hypertension  I10       4. Chronic atrial fibrillation ([facility])  I48.20                     Changes since last visit:    Blood sugars better,ambulating better  1. Fall:  none  2. Behavioral Change: none  3. Pain Control: has chronic rt leg pain  4. Mobility: improving  5.  Pressure Sore:  none    Current Outpatient Medications   Medication Sig Dispense Refill    gabapentin (NEURONTIN) 600 MG tablet       sertraline (ZOLOFT) 50 MG tablet       amLODIPine (NORVASC) 5 MG tablet Take 1 tablet by mouth every evening 90 tablet 3    magnesium hydroxide (MILK OF MAGNESIA) 400 MG/5ML suspension Take by mouth daily as needed for Constipation      bisacodyl (DULCOLAX) 10 MG suppository Place 10 mg rectally daily      Sodium Phosphates (FLEET) 7-19 GM/118ML Place 1 enema rectally once as needed      carboxymethylcellulose 1 % ophthalmic solution 1 drop 3 times daily      acetaminophen (TYLENOL) 325 MG tablet Take 650 mg by mouth every 4 hours as needed for Pain      metoprolol succinate (TOPROL XL) 200 MG extended release tablet Take 1 tablet by mouth daily 30 tablet 3    furosemide (LASIX) 40 MG tablet Take 1 tablet by mouth daily 60 tablet 3    potassium chloride (KLOR-CON M) 20 MEQ extended release tablet Take 1 tablet by mouth daily 30 tablet 0    losartan-hydroCHLOROthiazide (HYZAAR) 100-25 MG per tablet Take 1 tablet by mouth daily 90 tablet 3    atorvastatin (LIPITOR) 80 MG tablet TAKE ONE TABLET BY MOUTH ONCE NIGHTLY 90 tablet 3    apixaban (ELIQUIS) 5 MG TABS tablet Take 1 tablet by mouth 2 times daily 180 tablet 3    famotidine (PEPCID) 20 MG tablet Take 20 mg by mouth daily       miconazole (MICOTIN) 2 % powder Apply topically 2 times daily. 45 g 1    metFORMIN (GLUCOPHAGE) 500 MG tablet Take 500 mg by mouth 2 times daily (with meals)       No current facility-administered medications for this visit. Allergies:  Patient has no known allergies. Past Medical History:    Past Medical History:   Diagnosis Date    Adjustment disorder with depressed mood 06/15/2022    Arthritis     Atrial fibrillation, unspecified type ([facility] 11/20/2021    Cerebral artery occlusion with cerebral infarction [facility])     CHF (congestive heart failure) ([facility]     Chronic kidney disease 11/20/2021    Stage 3    Diabetes mellitus ([facility]     Dysarthria and anarthria 11/20/2021    Endothelial corneal dystrophy of both eyes 04/21/2022    H/O cardiac catheterization 08/04/2017    LMCA: Mild irregularites 10-20%. LAD: Mild irregularites 10-20%. LCx: Mild irregularites 10-20%. RCA: Mild irregularites 10-20%. LV function assessed as : Abnormal. EF of 40%. H/O echocardiogram 12/18/2018    EF 55%. Mildly increased LV wall thickness. The left atrium appears moderately to severely dilated. Moderate to severe mitral regurg. Moderate pulmonary HTN with an estimated RV systolic pressure of 66HABE. Moderate tricuspid regurg. Evidnece fo moderate diastolic dysfunction is seen. Hearing loss 09/26/2022    History of echocardiogram 01/17/2019    EF 55%. LV wall thickness moderately increased. LA is mildly dilated w/ LA volume index of 30. trivial mitral regurg. Evidence of moderate (grade II) diastolic dysfunction seen. History of echocardiogram 06/03/2019    EF of 50-55%. LV wall thickness is mildly increased. LA is mildly dilated (29-33) with a left atrial volume index of 31 m1/m2. mild diastolic dysfunction. Hyperlipidemia     Hypertension     Major depressive disorder 2022       Past Surgical History:    Past Surgical History:   Procedure Laterality Date    CARDIAC CATHETERIZATION Left 2017    right radial, [facility] Dr. Destiny Loya         Social History:   Social History     Tobacco Use    Smoking status: Never    Smokeless tobacco: Never   Substance Use Topics    Alcohol use: Yes     Alcohol/week: 1.0 standard drink     Types: 1 Glasses of wine per week       Family History:   Family History   Problem Relation Age of Onset    Coronary Art Dis Father          from [place]    COPD Sister         O2 dep    Coronary Art Dis Brother         2 brothers  from [place]           Review of Systems:  Constitutional: negative for fevers or chills, has weakness  Eyes: negative for visual disturbance   ENT: negative for sore throat or nasal congestion,neg for dysphagia  Respiratory: neg for shortness of breath , cough  Cardiovascular: neg for for chest pain , palpitations,pnd,neg for leg edema  Gastrointestinal: neg for abd pain, nausea, vomiting, diarrhea or constipation,no monalisa,no blood in stool  Genitourinary: negative for dysuria, urgency,hematuria or frequency  Integument/breast: negative for skin rash or lesions  Neurological: positive for unilateral weakness of rt arm and leg, neg for numbness or tingling,has chronic rt leg pain  Muscular Skeletal: has rt leg  pain   Psych -mood stable    Objective:    Vitals:   BP-146/87,Pulse-87,Respi-16,Temp-97.5  -----------------------------------------------------------------  Exam:  GEN:   A & O x3, no apparent distress  EYES: No gross abnormalities.   ENT:ENT exam normal, no neck nodes or sinus tenderness  NECK: normal,  no carotid bruits  PULM: clear to auscultation bilaterally- no wheezes, rales or rhonchi, normal air movement, no respiratory distress  COR: Iregular rate & rhythm, 2/6 murmur and no gallops,no edema  ABD:  soft, non-tender, non-distended, normal bowel sounds, no masses or organomegaly  : no cva or flank tendernes  EXT:no cyanosis, clubbing or edema present  no calf tenderness, and warm to touch. NEURO: Motor- has minimal rt leg weakness and sensory grossly intact  PSYCH: mood stable  SKIN:  No skin lesions or rashes    -----------------------------------------------------------------  Diagnostic Data:   All diagnostic data was reviewed    Assessment:        ICD-10-CM    1. Type 2 diabetes mellitus with stage 3b chronic kidney disease, without long-term current use of insulin ([facility])  E11.22     N18.32       2. Chronic combined systolic (congestive) and diastolic (congestive) heart failure ([facility])  I50.42       3. Essential hypertension  I10       4.  Chronic atrial fibrillation ([facility])  I48.20                   Patient Active Problem List   Diagnosis Code    Hypertension I10    Hyperlipidemia E78.5    Acute on chronic systolic CHF (congestive heart failure) ([facility]) I50.23    Hypertensive urgency I16.0    Idiopathic cardiomyopathy ([facility]) I42.9    Hypertensive emergency I16.1    Chronic combined systolic and diastolic HF (heart failure), NYHA class 2 ([facility]) I50.42    Acute on chronic combined systolic and diastolic CHF, NYHA class 4 ([facility]) I50.43    Hypoxia R09.02    Moderate mitral regurgitation by prior echocardiogram I34.0    Morbid obesity ([facility]) E66.01    CKD (chronic kidney disease) stage 3, GFR 30-59 ml/min ([facility]) N18.30    Physical deconditioning R53.81    TIA (transient ischemic attack) G45.9    Old lacunar stroke without late effect Z86.73    Dysarthria R47.1    Stroke determined by clinical assessment ([facility] I63.9    Aphasia R47.01    Type 2 diabetes mellitus, without long-term current use of insulin ([facility]) E11.9    Uncontrolled type 2 diabetes mellitus with hyperglycemia ([facility]) E11.65    Acute ischemic stroke ([facility]) I63.9    PAF (paroxysmal atrial fibrillation) ([facility] I48.0    Encounter for current long-term use of anticoagulants Z79.01    Hemiplegia and hemiparesis following cerebral infarction affecting right dominant side ([facility]) I69.351    Atrial fibrillation with RVR ([facility]) I48.91    Unable to care for self Z78.9    Abnormal result of other cardiovascular function study R94.39         Plan:       Monitor afib,blood sugar and bp  Monitor for side effects of apixaben  Continue current medications  Prevent pressure sore  Prevent falls    Electronically signed by Sarah Saleh MD on 12/11/2022 at 12:30 PM

## 2022-12-08 ENCOUNTER — NURSE ONLY (OUTPATIENT)
Dept: CARDIOLOGY | Age: 72
End: 2022-12-08
Payer: MEDICARE

## 2022-12-08 DIAGNOSIS — I63.9 CRYPTOGENIC STROKE (HCC): Primary | ICD-10-CM

## 2022-12-08 DIAGNOSIS — Z45.09 ENCOUNTER FOR LOOP RECORDER CHECK: ICD-10-CM

## 2022-12-12 ENCOUNTER — OFFICE VISIT (OUTPATIENT)
Dept: CARDIOLOGY | Age: 72
End: 2022-12-12
Payer: MEDICARE

## 2022-12-12 VITALS
HEART RATE: 90 BPM | OXYGEN SATURATION: 97 % | DIASTOLIC BLOOD PRESSURE: 75 MMHG | BODY MASS INDEX: 37.91 KG/M2 | WEIGHT: 206 LBS | HEIGHT: 62 IN | RESPIRATION RATE: 18 BRPM | SYSTOLIC BLOOD PRESSURE: 120 MMHG

## 2022-12-12 DIAGNOSIS — I34.0 MODERATE MITRAL REGURGITATION BY PRIOR ECHOCARDIOGRAM: ICD-10-CM

## 2022-12-12 DIAGNOSIS — E78.2 MIXED HYPERLIPIDEMIA: ICD-10-CM

## 2022-12-12 DIAGNOSIS — I48.0 PAF (PAROXYSMAL ATRIAL FIBRILLATION) (HCC): Primary | ICD-10-CM

## 2022-12-12 DIAGNOSIS — G45.9 TIA (TRANSIENT ISCHEMIC ATTACK): ICD-10-CM

## 2022-12-12 DIAGNOSIS — I25.10 MILD CAD: ICD-10-CM

## 2022-12-12 DIAGNOSIS — I10 ESSENTIAL HYPERTENSION: ICD-10-CM

## 2022-12-12 DIAGNOSIS — I63.9 CRYPTOGENIC STROKE (HCC): ICD-10-CM

## 2022-12-12 PROCEDURE — G8484 FLU IMMUNIZE NO ADMIN: HCPCS | Performed by: FAMILY MEDICINE

## 2022-12-12 PROCEDURE — 3078F DIAST BP <80 MM HG: CPT | Performed by: FAMILY MEDICINE

## 2022-12-12 PROCEDURE — G8417 CALC BMI ABV UP PARAM F/U: HCPCS | Performed by: FAMILY MEDICINE

## 2022-12-12 PROCEDURE — G8400 PT W/DXA NO RESULTS DOC: HCPCS | Performed by: FAMILY MEDICINE

## 2022-12-12 PROCEDURE — 1124F ACP DISCUSS-NO DSCNMKR DOCD: CPT | Performed by: FAMILY MEDICINE

## 2022-12-12 PROCEDURE — G8427 DOCREV CUR MEDS BY ELIG CLIN: HCPCS | Performed by: FAMILY MEDICINE

## 2022-12-12 PROCEDURE — 3074F SYST BP LT 130 MM HG: CPT | Performed by: FAMILY MEDICINE

## 2022-12-12 PROCEDURE — 3017F COLORECTAL CA SCREEN DOC REV: CPT | Performed by: FAMILY MEDICINE

## 2022-12-12 PROCEDURE — 1090F PRES/ABSN URINE INCON ASSESS: CPT | Performed by: FAMILY MEDICINE

## 2022-12-12 PROCEDURE — 1036F TOBACCO NON-USER: CPT | Performed by: FAMILY MEDICINE

## 2022-12-12 PROCEDURE — 99211 OFF/OP EST MAY X REQ PHY/QHP: CPT | Performed by: FAMILY MEDICINE

## 2022-12-12 PROCEDURE — 99214 OFFICE O/P EST MOD 30 MIN: CPT | Performed by: FAMILY MEDICINE

## 2022-12-12 RX ORDER — DILTIAZEM HYDROCHLORIDE 120 MG/1
120 CAPSULE, COATED, EXTENDED RELEASE ORAL DAILY
Qty: 90 CAPSULE | Refills: 3 | Status: SHIPPED | OUTPATIENT
Start: 2022-12-12

## 2022-12-12 RX ORDER — DILTIAZEM HYDROCHLORIDE 180 MG/1
180 CAPSULE, COATED, EXTENDED RELEASE ORAL DAILY
Qty: 30 CAPSULE | Refills: 3 | Status: CANCELLED | OUTPATIENT
Start: 2022-12-12

## 2022-12-12 NOTE — PATIENT INSTRUCTIONS
SURVEY:    You may be receiving a survey from [employer] regarding your visit today. Please complete the survey to enable us to provide the highest quality of care to you and your family. If you cannot score us a very good on any question, please call the office to discuss how we could have made your experience a very good one. Thank you.

## 2022-12-12 NOTE — PROGRESS NOTES
Edelmira Whitmore am scribing for and in the presence of Isac Nolasco MD, MS, F.A.C.C..      Patient: Deon Gutierrez  : 1950  Date of Visit: 2022    REASON FOR VISIT / CONSULTATION: Follow-up (HX: Abn stress, PAF, CHF, CAD, crptogenetic stroke, HTN, HLD, Chronic Afib, TIA. Pt is here for a 6 month follow up. Pt says she has been doing well. She gets short of breath at times a couple of times a day, she is trying to walk as much as she can. Some light headed/dizziness. She has palpitations when she can't breathe. Denies CP. )    Dear Yeni Becker MD,    HPI: Ms. Kimberly Stanton is a 79 y.o. female who was admitted to the hospital with worsening shortness of breath. This has gotten worse over the past three months. She has a cardiac history of abnormal echocardiogram which showed that her ejection fraction was reduced to about 40-45%. And a heart catheterization that was relatively unremarkable. Fortunately her repeat echocardiogram in 2018 and in 2019 showed her ejection fraction increased from 45-50% to 55% as well as showing only trivial mitral regurgitation. She had an ECHO on 6/3/2019 that showed EF of 50-55%. LV wall thickness is mildly increased. LA is mildly dilated (29-33) with a left atrial volume index of 31 m1/m2. mild diastolic dysfunction. She came to the ER on 2019 for slurred speech. She was than taken to [facility].  and found to have right sided weakness and was found to have a stroke at that time. She continued to have mild to moderate right sided weakness. She had abnormal stress test on 2022 There is a moderate perfusion defect of moderate intensity in the lateral, inferolateral and inferior  region(s), which is most consistent with an old myocardial infarction with luis-infarct ischemia. Ms. Kimberly Stanton is here today for a six month follow up. She says she is doing okay.  She says she does have episodes of shortness of breath a couple times of day and she gets palpitations with these episodes that mildly bother her. She can walk about 100 feet before she has to stop due to pain. Occasional light headed/dizziness. No blood in her urine or stools. She denies any chest pain, pressure, or tightness. She denied any abdominal pain, bleeding problems, or problems with her medications or any other concerns at this time. Bleeding Risks: Ms. Cornelio Milligan denies any current or recent bleeding problems including a history of a GI bleed, ulcers, recent or upcoming surgeries, blood in her stool or black tarry stools or blood in her urine. Past Medical History:   Diagnosis Date    Adjustment disorder with depressed mood 06/15/2022    Arthritis     Atrial fibrillation, unspecified type ([facility] 11/20/2021    Cerebral artery occlusion with cerebral infarction [facility])     CHF (congestive heart failure) ([facility]     Chronic kidney disease 11/20/2021    Stage 3    Diabetes mellitus ([facility]     Dysarthria and anarthria 11/20/2021    Endothelial corneal dystrophy of both eyes 04/21/2022    H/O cardiac catheterization 08/04/2017    LMCA: Mild irregularites 10-20%. LAD: Mild irregularites 10-20%. LCx: Mild irregularites 10-20%. RCA: Mild irregularites 10-20%. LV function assessed as : Abnormal. EF of 40%. H/O echocardiogram 12/18/2018    EF 55%. Mildly increased LV wall thickness. The left atrium appears moderately to severely dilated. Moderate to severe mitral regurg. Moderate pulmonary HTN with an estimated RV systolic pressure of 38GENR. Moderate tricuspid regurg. Evidnece fo moderate diastolic dysfunction is seen. Hearing loss 09/26/2022    History of echocardiogram 01/17/2019    EF 55%. LV wall thickness moderately increased. LA is mildly dilated w/ LA volume index of 30. trivial mitral regurg. Evidence of moderate (grade II) diastolic dysfunction seen. History of echocardiogram 06/03/2019    EF of 50-55%. LV wall thickness is mildly increased.  LA is mildly dilated (29-33) with a left atrial volume index of 31 m1/m2. mild diastolic dysfunction. Hyperlipidemia     Hypertension     Major depressive disorder 09/28/2022     CURRENT ALLERGIES: Patient has no known allergies. REVIEW OF SYSTEMS: 14 systems were reviewed. Pertinent positives and negatives as above, all else negative. Past Surgical History:   Procedure Laterality Date    CARDIAC CATHETERIZATION Left 08/04/2017    right radial, T Dr. Leatha Sheldon      Social History:  Social History     Tobacco Use    Smoking status: Never    Smokeless tobacco: Never   Vaping Use    Vaping Use: Never used   Substance Use Topics    Alcohol use:  Yes     Alcohol/week: 1.0 standard drink     Types: 1 Glasses of wine per week    Drug use: No        CURRENT MEDICATIONS:  Outpatient Medications Marked as Taking for the 12/12/22 encounter (Office Visit) with Martín Cr MD   Medication Sig Dispense Refill    gabapentin (NEURONTIN) 600 MG tablet       sertraline (ZOLOFT) 50 MG tablet       amLODIPine (NORVASC) 5 MG tablet Take 1 tablet by mouth every evening 90 tablet 3    magnesium hydroxide (MILK OF MAGNESIA) 400 MG/5ML suspension Take by mouth daily as needed for Constipation      bisacodyl (DULCOLAX) 10 MG suppository Place 10 mg rectally daily      Sodium Phosphates (FLEET) 7-19 GM/118ML Place 1 enema rectally once as needed      carboxymethylcellulose 1 % ophthalmic solution 1 drop 3 times daily      acetaminophen (TYLENOL) 325 MG tablet Take 650 mg by mouth every 4 hours as needed for Pain      metoprolol succinate (TOPROL XL) 200 MG extended release tablet Take 1 tablet by mouth daily 30 tablet 3    furosemide (LASIX) 40 MG tablet Take 1 tablet by mouth daily 60 tablet 3    potassium chloride (KLOR-CON M) 20 MEQ extended release tablet Take 1 tablet by mouth daily 30 tablet 0    losartan-hydroCHLOROthiazide (HYZAAR) 100-25 MG per tablet Take 1 tablet by mouth daily 90 tablet 3    atorvastatin (LIPITOR) 80 MG tablet TAKE ONE TABLET BY MOUTH ONCE NIGHTLY 90 tablet 3    apixaban (ELIQUIS) 5 MG TABS tablet Take 1 tablet by mouth 2 times daily 180 tablet 3    famotidine (PEPCID) 20 MG tablet Take 20 mg by mouth daily       miconazole (MICOTIN) 2 % powder Apply topically 2 times daily. 45 g 1    metFORMIN (GLUCOPHAGE) 500 MG tablet Take 500 mg by mouth 2 times daily (with meals)       FAMILY HISTORY: family history includes COPD in her sister; Coronary Art Dis in her brother and father. PHYSICAL EXAM:   /75 (Site: Left Upper Arm, Position: Sitting, Cuff Size: Large Adult)   Pulse 90   Resp 18   Ht 5' 2\" (1.575 m)   Wt 206 lb (93.4 kg)   SpO2 97%   BMI 37.68 kg/m²  Body mass index is 37.68 kg/m². Constitutional: She is oriented to person, place, and time. She appears well-developed and well-nourished. In no acute distress. HEENT: Normocephalic and atraumatic. No JVD present. Carotid bruit is not present. No mass and no thyromegaly present. No lymphadenopathy present. Cardiovascular: Tachycardic rate, irregularly irregular rhythm, normal heart sounds. Exam reveals no gallop and no friction rubs. 2/6 systolic murmur, 5th intercostal space on the LEFT in the mid-clavicular line (cardiac apex). Pulmonary/Chest: Effort normal and breath sounds normal. No respiratory distress. She has no wheezes, rhonchi or rales. Abdominal: Soft, non-tender. Bowel sounds and aorta are normal. She exhibits no organomegaly, mass or bruit. Extremities: Trace. No cyanosis or clubbing. 2+ radial and carotid pulses. Distal extremity pulses: 2+ bilaterally. Neurological: She is alert and oriented to person, place, and time. No evidence of gross cranial nerve deficit. Coordination appeared normal.   Skin: Skin is warm and dry. There is no rash or diaphoresis. Psychiatric: She has a normal mood and affect.  Her speech is normal and behavior is normal.      MOST RECENT LABS ON RECORD:   Lab Results   Component Value Date    WBC 5.3 10/06/2022    HGB 12.9 10/06/2022    HCT 40.0 10/06/2022     10/06/2022    CHOL 102 04/07/2022    TRIG 62 04/07/2022    HDL 43 04/07/2022    ALT 21 04/07/2022    AST 17 04/07/2022     10/06/2022    K 4.3 10/06/2022     10/06/2022    CREATININE 1.34 (H) 10/06/2022    BUN 27 (H) 10/06/2022    CO2 26 10/06/2022    TSH 1.45 04/07/2022    INR 1.0 08/23/2020    LABA1C 7.1 (H) 10/20/2022     ASSESSMENT:  1. PAF ([facility]oxysmal [facility] fibrillation) [facility]    2. Mild CAD    3. TIA (transient ischemic attack)    4. Cryptogenic stroke ([facility]    5. Essential hypertension    6. Moderate mitral regurgitation by prior echocardiogram    7. Mixed hyperlipidemia      PLAN:  Mild Coronary artery disease and myocardial ischemia on 10/6/22: EF is 45%   Antiplatelet Agent: Continue Aspirin 81 mg daily. Beta Blocker: Continue Metoprolol succinate (Toprol XL) 200 mg daily. Anti-anginal medications: STOP amlodipine (Norvasc) 5 mg once daily. and START diltiazem CD (Cardizem CD) 120 mg once daily. I also discussed the potential side effects of this medication including lightheadedness and dizziness and instructed them to stop the medication of this occurs and call our office if this occurs. Cholesterol Reduction Therapy: Continue Atorvastatin (Lipitor) 80 mg daily. Chronic Atrial Fibrillation: has failed rhythm control so will switch to Rate Control  Beta Blocker: Continue metoprolol succinate (Toprol XL) 200 mg once daily. Calcium Channel Blocker: Stop amlodipine and Start Diltiazem 120 mg daily. Rhythm Control Agent: Not indicated   Stroke Risk: Her CHADS2-VASc score is 7/9 (9.6% stroke risk)  Anticoagulation: Continue Apixaban (Eliquis) 5 mg every 12 hours. I also reminded her to watch for signs of blood in her stool or black tarry stools and stop the medication immediately if this develops as this could be life threatening.   Additional Testing: None   [id number] Score for Atrial Fibrillation Stroke Risk   Risk   Factors Component Value   C CHF Yes 1   H HTN Yes 1   A2 Age >= 76 No,  (71 y.o.) 0   D DM Yes 1   S2 Prior Stroke/TIA Yes 2   V Vascular Disease No 0   A Age 74-69 Yes,  (71 y.o.) 1   Sc Sex female 1    [id number]  Score  7   Score last updated 3/47/60 4:01 AM EDT  Click here for a link to the UpToDate guideline \"Atrial Fibrillation: Anticoagulation therapy to prevent embolization  Disclaimer: Risk Score calculation is dependent on accuracy of patient problem list and past encounter diagnosis. History of TIA/Cryptogenic stroke: Still with some residual symptoms  Antiplatelet Agent: Continue Aspirin 81 mg daily. Beta Blocker: Continue Metoprolol succinate (Toprol XL) 200 mg daily. Anti-anginal medications: STOP amlodipine (Norvasc) 5 mg once daily. and START diltiazem CD (Cardizem CD) 120 mg once daily. I also discussed the potential side effects of this medication including lightheadedness and dizziness and instructed them to stop the medication of this occurs and call our office if this occurs. Cholesterol Reduction Therapy: Continue Atorvastatin (Lipitor) 80 mg daily. Additional Testing List: None     Chronic Systolic and Diastolic Heart Failure: EF of 50-55% on 6/3/19  Beta Blocker: Continue metoprolol succinate (Toprol XL) 200 mg  once daily. ACE Inibitor/ARB: Continue losartan/HCTZ (Hyzaar) 100/ 25 mg once daily. Nonpharmacologic management of Heart Failure: I told her to continue wearing lower extremity compression stockings and I advised her to try and keep her legs up whenever possible and to limit salt in her diet. Additional Testing: None    Essential Hypertension: Controlled and actually a bit hypotensive today  ACE Inibitor/ARB: Continue losartan/HCTZ (Hyzaar) 100/ 25 mg once daily. Beta Blocker: Continue metoprolol succinate (Toprol XL)    100-25 mg daily. Calcium Channel Blocker: STOP amlodipine (Norvasc) and START diltiazem CD (Cardizem CD) 120 mg once daily.  I also discussed the potential side effects of this medication including lightheadedness and dizziness and instructed them to stop the medication of this occurs and call our office if this occurs. History of Severe Mitral Regurgitation:  probably just functional as her echo on 6/19 showed only trivial MR. Beta Blocker: Continue Metoprolol succinate (Toprol XL) 200 mg daily. ACE Inibitor/ARB: Continue losartan/HCTZ (Hyzaar) 100/25 mg every morning. Hyperlipidemia: Mixed, LDL done on 4/7/2022 was 47 mg/dL   Cholesterol Reduction Therapy: Continue Atorvastatin (Lipitor) 80 mg daily. Finally, I recommended that she continue her other medications and follow up with you as previously scheduled. FOLLOW UP:   I told Ms. Ragland to call my office if she had any problems, but otherwise told her to Return in about 6 months (around 6/12/2023). However, I would be happy to see her sooner should the need arise. However, I would be happy to see her sooner should the need arise. Once again, thank you for allowing me to participate in this patients care. Please do not hesitate to contact me could I be of further assistance. Sincerely,  Allen Valles. Jemal VILLALOBOS, MS, F.A.C.C. [facility], [address] [facility], [address]  Phone: [phone], Fax: [phone]       I believe that the risk of significant morbidity and mortality related to the patient's current medical conditions are: intermediate-high. The documentation recorded by the scribe, accurately and completely reflects the services I personally performed and the decisions made by me. Jamei Vaughn MD, MS, F.A.C.C.  December 12, 2022

## 2023-01-05 ENCOUNTER — HOSPITAL ENCOUNTER (OUTPATIENT)
Age: 73
Setting detail: SPECIMEN
Discharge: HOME | End: 2023-01-05
Payer: MEDICARE

## 2023-01-05 LAB
ESTIMATED AVERAGE GLUCOSE: 171 MG/DL
HBA1C MFR BLD: 7.6 % (ref 4–6)

## 2023-01-05 PROCEDURE — 36415 COLL VENOUS BLD VENIPUNCTURE: CPT

## 2023-01-05 PROCEDURE — P9603 ONE-WAY ALLOW PRORATED MILES: HCPCS

## 2023-01-05 PROCEDURE — 83036 HEMOGLOBIN GLYCOSYLATED A1C: CPT

## 2023-01-10 ENCOUNTER — OUTSIDE SERVICES (OUTPATIENT)
Dept: PRIMARY CARE CLINIC | Age: 73
End: 2023-01-10

## 2023-01-10 DIAGNOSIS — I25.119 CHEST PAIN DUE TO CAD (HCC): ICD-10-CM

## 2023-01-10 DIAGNOSIS — I50.42 CHRONIC COMBINED SYSTOLIC (CONGESTIVE) AND DIASTOLIC (CONGESTIVE) HEART FAILURE (HCC): ICD-10-CM

## 2023-01-10 DIAGNOSIS — I48.0 PAF (PAROXYSMAL ATRIAL FIBRILLATION) (HCC): ICD-10-CM

## 2023-01-10 DIAGNOSIS — I69.351 HEMIPLEGIA AND HEMIPARESIS FOLLOWING CEREBRAL INFARCTION AFFECTING RIGHT DOMINANT SIDE (HCC): ICD-10-CM

## 2023-01-10 DIAGNOSIS — E11.22 TYPE 2 DIABETES MELLITUS WITH STAGE 3B CHRONIC KIDNEY DISEASE, WITHOUT LONG-TERM CURRENT USE OF INSULIN (HCC): Primary | ICD-10-CM

## 2023-01-10 DIAGNOSIS — N18.32 TYPE 2 DIABETES MELLITUS WITH STAGE 3B CHRONIC KIDNEY DISEASE, WITHOUT LONG-TERM CURRENT USE OF INSULIN (HCC): Primary | ICD-10-CM

## 2023-01-10 NOTE — PROGRESS NOTES
Patient:  Deon Gutierrez, 1950  I saw this patient on  1/11/2023, at [facility]. Blood sugars better,no hypoglycemia  She continues to have rt leg pain, ambulates with walker  Has shortness of breath on exertion, cath was ok        Reason for Visit:      ICD-10-CM    1. Type 2 diabetes mellitus with stage 3b chronic kidney disease, without long-term current use of insulin ([facility])  E11.22     N18.32       2. Chest pain due to CAD ([facility]  I25.119       3. Hemiplegia and hemiparesis following cerebral infarction affecting right dominant side ([facility])  I69.351       4. Chronic combined systolic (congestive) and diastolic (congestive) heart failure ([facility])  I50.42       5. PAF (paroxysmal atrial fibrillation) ([facility])  I48.0                       Changes since last visit:    Blood sugars better,has some shortness of breath on exertion  1. Fall:  none  2. Behavioral Change: none  3. Pain Control: has chronic rt leg pain  4. Mobility: improving  5.  Pressure Sore:  none    Current Outpatient Medications   Medication Sig Dispense Refill    dilTIAZem (CARDIZEM CD) 120 MG extended release capsule Take 1 capsule by mouth daily 90 capsule 3    gabapentin (NEURONTIN) 600 MG tablet       sertraline (ZOLOFT) 50 MG tablet       magnesium hydroxide (MILK OF MAGNESIA) 400 MG/5ML suspension Take by mouth daily as needed for Constipation      bisacodyl (DULCOLAX) 10 MG suppository Place 10 mg rectally daily      Sodium Phosphates (FLEET) 7-19 GM/118ML Place 1 enema rectally once as needed      carboxymethylcellulose 1 % ophthalmic solution 1 drop 3 times daily      acetaminophen (TYLENOL) 325 MG tablet Take 650 mg by mouth every 4 hours as needed for Pain      metoprolol succinate (TOPROL XL) 200 MG extended release tablet Take 1 tablet by mouth daily 30 tablet 3    furosemide (LASIX) 40 MG tablet Take 1 tablet by mouth daily 60 tablet 3    potassium chloride (KLOR-CON M) 20 MEQ extended release tablet Take 1 tablet by mouth daily 30 tablet 0    losartan-hydroCHLOROthiazide (HYZAAR) 100-25 MG per tablet Take 1 tablet by mouth daily 90 tablet 3    atorvastatin (LIPITOR) 80 MG tablet TAKE ONE TABLET BY MOUTH ONCE NIGHTLY 90 tablet 3    apixaban (ELIQUIS) 5 MG TABS tablet Take 1 tablet by mouth 2 times daily 180 tablet 3    famotidine (PEPCID) 20 MG tablet Take 20 mg by mouth daily       miconazole (MICOTIN) 2 % powder Apply topically 2 times daily. 45 g 1    metFORMIN (GLUCOPHAGE) 500 MG tablet Take 500 mg by mouth 2 times daily (with meals)       No current facility-administered medications for this visit. Allergies:  Patient has no known allergies. Past Medical History:    Past Medical History:   Diagnosis Date    Adjustment disorder with depressed mood 06/15/2022    Arthritis     Atrial fibrillation, unspecified type ([facility] 11/20/2021    Cerebral artery occlusion with cerebral infarction [facility])     CHF (congestive heart failure) ([facility]     Chronic kidney disease 11/20/2021    Stage 3    Diabetes mellitus ([facility]     Dysarthria and anarthria 11/20/2021    Endothelial corneal dystrophy of both eyes 04/21/2022    H/O cardiac catheterization 08/04/2017    LMCA: Mild irregularites 10-20%. LAD: Mild irregularites 10-20%. LCx: Mild irregularites 10-20%. RCA: Mild irregularites 10-20%. LV function assessed as : Abnormal. EF of 40%. H/O echocardiogram 12/18/2018    EF 55%. Mildly increased LV wall thickness. The left atrium appears moderately to severely dilated. Moderate to severe mitral regurg. Moderate pulmonary HTN with an estimated RV systolic pressure of 22XFOM. Moderate tricuspid regurg. Evidnece fo moderate diastolic dysfunction is seen. Hearing loss 09/26/2022    History of echocardiogram 01/17/2019    EF 55%. LV wall thickness moderately increased. LA is mildly dilated w/ LA volume index of 30. trivial mitral regurg. Evidence of moderate (grade II) diastolic dysfunction seen.     History of echocardiogram 2019    EF of 50-55%. LV wall thickness is mildly increased. LA is mildly dilated (29-33) with a left atrial volume index of 31 m1/m2. mild diastolic dysfunction. Hyperlipidemia     Hypertension     Major depressive disorder 2022       Past Surgical History:    Past Surgical History:   Procedure Laterality Date    CARDIAC CATHETERIZATION Left 2017    right radial, [facility] Dr. Chayito New         Social History:   Social History     Tobacco Use    Smoking status: Never    Smokeless tobacco: Never   Substance Use Topics    Alcohol use: Yes     Alcohol/week: 1.0 standard drink     Types: 1 Glasses of wine per week       Family History:   Family History   Problem Relation Age of Onset    Coronary Art Dis Father          from [place]    COPD Sister         O2 dep    Coronary Art Dis Brother         2 brothers  from [place]           Review of Systems:  Constitutional: negative for fevers or chills, has weakness  Eyes: negative for visual disturbance   ENT: negative for sore throat or nasal congestion,neg for dysphagia  Respiratory: positive for shortness of breath on exertion,no  cough  Cardiovascular: neg for for chest pain , palpitations,pnd,neg for leg edema  Gastrointestinal: neg for abd pain, nausea, vomiting, diarrhea or constipation,no monalisa,no blood in stool  Genitourinary: negative for dysuria, urgency,hematuria or frequency  Integument/breast: negative for skin rash or lesions  Neurological: positive for unilateral weakness of rt arm and leg, neg for numbness or tingling,has chronic rt leg pain  Muscular Skeletal: has rt leg  pain   Psych -mood stable    Objective:    Vitals:   BP-114/54,Pulse-54,Respi-14,Temp-97.1  -----------------------------------------------------------------  Exam:  GEN:   A & O x3, no apparent distress  EYES: No gross abnormalities.   ENT:ENT exam normal, no neck nodes or sinus tenderness  NECK: normal,  no carotid bruits  PULM: clear to auscultation bilaterally- no wheezes, rales or rhonchi, normal air movement, no respiratory distress  COR: Iregular rate & rhythm,rate in 50's ,2/6 murmur and no gallops,no edema  ABD:  soft, non-tender, non-distended, normal bowel sounds, no masses or organomegaly  : no cva or flank tendernes  EXT:no cyanosis, clubbing or edema present  no calf tenderness, and warm to touch. NEURO: Motor- has minimal rt leg weakness and sensory grossly intact  PSYCH: mood stable  SKIN:  No skin lesions or rashes    -----------------------------------------------------------------  Diagnostic Data:   All diagnostic data was reviewed    Assessment:        ICD-10-CM    1. Type 2 diabetes mellitus with stage 3b chronic kidney disease, without long-term current use of insulin ([facility])  E11.22     N18.32       2. Chest pain due to CAD ([facility]  I25.119       3. Hemiplegia and hemiparesis following cerebral infarction affecting right dominant side ([facility])  I69.351       4. Chronic combined systolic (congestive) and diastolic (congestive) heart failure ([facility])  I50.42       5.  PAF (paroxysmal atrial fibrillation) ([facility])  I48.0         Patient Active Problem List   Diagnosis Code    Hypertension I10    Hyperlipidemia E78.5    Acute on chronic systolic CHF (congestive heart failure) ([facility]) I50.23    Hypertensive urgency I16.0    Idiopathic cardiomyopathy ([facility]) I42.9    Hypertensive emergency I16.1    Chronic combined systolic and diastolic HF (heart failure), NYHA class 2 ([facility]) I50.42    Acute on chronic combined systolic and diastolic CHF, NYHA class 4 ([facility]) I50.43    Hypoxia R09.02    Moderate mitral regurgitation by prior echocardiogram I34.0    Morbid obesity ([facility]) E66.01    CKD (chronic kidney disease) stage 3, GFR 30-59 ml/min ([facility]) N18.30    Physical deconditioning R53.81    TIA (transient ischemic attack) G45.9    Old lacunar stroke without late effect Z86.73    Dysarthria R47.1    Stroke determined by clinical assessment ([facility] I63.9    Aphasia R47.01 Type 2 diabetes mellitus, without long-term current use of insulin (HCC) E11.9    Uncontrolled type 2 diabetes mellitus with hyperglycemia ([facility]) E11.65    Acute ischemic stroke ([facility]) I63.9    PAF (paroxysmal atrial fibrillation) ([facility]) I48.0    Encounter for current long-term use of anticoagulants Z79.01    Hemiplegia and hemiparesis following cerebral infarction affecting right dominant side ([facility]) I69.351    Atrial fibrillation with RVR ([facility]) I48.91    Unable to care for self Z78.9    Abnormal result of other cardiovascular function study R94.39    Chest pain due to CAD ([facility] I25.119         Plan:       Monitor afib,blood sugar and bp  Monitor for side effects of apixaben  Continue current medications  Prevent pressure sore  Prevent falls    Electronically signed by Vishal Grijalva MD on 1/12/2023 at 10:11 AM

## 2023-01-12 PROBLEM — I25.119 CHEST PAIN DUE TO CAD (HCC): Status: ACTIVE | Noted: 2023-01-12

## 2023-02-06 ENCOUNTER — APPOINTMENT (OUTPATIENT)
Dept: GENERAL RADIOLOGY | Age: 73
DRG: 291 | End: 2023-02-06
Payer: MEDICARE

## 2023-02-06 ENCOUNTER — APPOINTMENT (OUTPATIENT)
Dept: CT IMAGING | Age: 73
DRG: 291 | End: 2023-02-06
Payer: MEDICARE

## 2023-02-06 ENCOUNTER — HOSPITAL ENCOUNTER (INPATIENT)
Age: 73
LOS: 2 days | Discharge: HOME | DRG: 291 | End: 2023-02-08
Attending: EMERGENCY MEDICINE | Admitting: INTERNAL MEDICINE
Payer: MEDICARE

## 2023-02-06 DIAGNOSIS — G44.89 OTHER HEADACHE SYNDROME: ICD-10-CM

## 2023-02-06 DIAGNOSIS — I50.20 SYSTOLIC CONGESTIVE HEART FAILURE, UNSPECIFIED HF CHRONICITY (HCC): Primary | ICD-10-CM

## 2023-02-06 DIAGNOSIS — I50.21 ACUTE SYSTOLIC CHF (CONGESTIVE HEART FAILURE), NYHA CLASS 3 (HCC): ICD-10-CM

## 2023-02-06 DIAGNOSIS — R06.02 SHORTNESS OF BREATH: ICD-10-CM

## 2023-02-06 LAB
ABSOLUTE EOS #: 0.04 K/UL (ref 0–0.44)
ABSOLUTE IMMATURE GRANULOCYTE: <0.03 K/UL (ref 0–0.3)
ABSOLUTE LYMPH #: 0.75 K/UL (ref 1.1–3.7)
ABSOLUTE MONO #: 0.64 K/UL (ref 0.1–1.2)
ALBUMIN SERPL-MCNC: 3.7 G/DL (ref 3.5–5.2)
ALBUMIN/GLOBULIN RATIO: 1.3 (ref 1–2.5)
ALP SERPL-CCNC: 98 U/L (ref 35–104)
ALT SERPL-CCNC: 12 U/L (ref 5–33)
ANION GAP SERPL CALCULATED.3IONS-SCNC: 8 MMOL/L (ref 9–17)
AST SERPL-CCNC: 12 U/L
BACTERIA: ABNORMAL
BASOPHILS # BLD: 0 % (ref 0–2)
BASOPHILS ABSOLUTE: <0.03 K/UL (ref 0–0.2)
BILIRUB SERPL-MCNC: 0.4 MG/DL (ref 0.3–1.2)
BILIRUBIN URINE: NEGATIVE
BNP SERPL-MCNC: 6875 PG/ML
BUN SERPL-MCNC: 28 MG/DL (ref 8–23)
BUN/CREAT BLD: 22 (ref 9–20)
CALCIUM SERPL-MCNC: 10.7 MG/DL (ref 8.6–10.4)
CHLORIDE SERPL-SCNC: 106 MMOL/L (ref 98–107)
CO2 SERPL-SCNC: 27 MMOL/L (ref 20–31)
COLOR: YELLOW
CREAT SERPL-MCNC: 1.29 MG/DL (ref 0.5–0.9)
EOSINOPHILS RELATIVE PERCENT: 1 % (ref 1–4)
EPITHELIAL CELLS UA: ABNORMAL /HPF (ref 0–25)
GFR SERPL CREATININE-BSD FRML MDRD: 45 ML/MIN/1.73M2
GLUCOSE SERPL-MCNC: 133 MG/DL (ref 70–99)
GLUCOSE UR STRIP.AUTO-MCNC: NEGATIVE MG/DL
HCT VFR BLD AUTO: 35.9 % (ref 36.3–47.1)
HGB BLD-MCNC: 11.3 G/DL (ref 11.9–15.1)
IMMATURE GRANULOCYTES: 0 %
KETONES UR STRIP.AUTO-MCNC: NEGATIVE MG/DL
LEUKOCYTE ESTERASE UR QL STRIP.AUTO: ABNORMAL
LYMPHOCYTES # BLD: 13 % (ref 24–43)
MCH RBC QN AUTO: 29.7 PG (ref 25.2–33.5)
MCHC RBC AUTO-ENTMCNC: 31.5 G/DL (ref 28.4–34.8)
MCV RBC AUTO: 94.5 FL (ref 82.6–102.9)
MONOCYTES # BLD: 12 % (ref 3–12)
NITRITE UR QL STRIP.AUTO: NEGATIVE
NRBC AUTOMATED: 0 PER 100 WBC
PDW BLD-RTO: 14.6 % (ref 11.8–14.4)
PLATELET # BLD AUTO: 142 K/UL (ref 138–453)
PMV BLD AUTO: 12.3 FL (ref 8.1–13.5)
POTASSIUM SERPL-SCNC: 5.2 MMOL/L (ref 3.7–5.3)
PROT SERPL-MCNC: 6.6 G/DL (ref 6.4–8.3)
PROT UR STRIP.AUTO-MCNC: 5.5 MG/DL (ref 5–9)
PROT UR STRIP.AUTO-MCNC: NEGATIVE MG/DL
RBC # BLD: 3.8 M/UL (ref 3.95–5.11)
RBC CLUMPS #/AREA URNS AUTO: ABNORMAL /HPF (ref 0–2)
SARS-COV-2 RDRP RESP QL NAA+PROBE: NOT DETECTED
SEG NEUTROPHILS: 74 % (ref 36–65)
SEGMENTED NEUTROPHILS ABSOLUTE COUNT: 4.13 K/UL (ref 1.5–8.1)
SODIUM SERPL-SCNC: 141 MMOL/L (ref 135–144)
SPECIFIC GRAVITY UA: 1.01 (ref 1.01–1.02)
SPECIMEN DESCRIPTION: NORMAL
TROPONIN I SERPL DL<=0.01 NG/ML-MCNC: 25 NG/L (ref 0–14)
TROPONIN I SERPL DL<=0.01 NG/ML-MCNC: 26 NG/L (ref 0–14)
TURBIDITY: CLEAR
URINE HGB: NEGATIVE
UROBILINOGEN, URINE: NORMAL
WBC # BLD AUTO: 5.6 K/UL (ref 3.5–11.3)
WBC UA: ABNORMAL /HPF (ref 0–5)

## 2023-02-06 PROCEDURE — 83880 ASSAY OF NATRIURETIC PEPTIDE: CPT

## 2023-02-06 PROCEDURE — 99285 EMERGENCY DEPT VISIT HI MDM: CPT

## 2023-02-06 PROCEDURE — 6360000002 HC RX W HCPCS: Performed by: EMERGENCY MEDICINE

## 2023-02-06 PROCEDURE — 87635 SARS-COV-2 COVID-19 AMP PRB: CPT

## 2023-02-06 PROCEDURE — 84484 ASSAY OF TROPONIN QUANT: CPT

## 2023-02-06 PROCEDURE — 85025 COMPLETE CBC W/AUTO DIFF WBC: CPT

## 2023-02-06 PROCEDURE — 2700000000 HC OXYGEN THERAPY PER DAY

## 2023-02-06 PROCEDURE — 71045 X-RAY EXAM CHEST 1 VIEW: CPT

## 2023-02-06 PROCEDURE — 80053 COMPREHEN METABOLIC PANEL: CPT

## 2023-02-06 PROCEDURE — 1200000000 HC SEMI PRIVATE

## 2023-02-06 PROCEDURE — 6370000000 HC RX 637 (ALT 250 FOR IP): Performed by: INTERNAL MEDICINE

## 2023-02-06 PROCEDURE — 2580000003 HC RX 258: Performed by: INTERNAL MEDICINE

## 2023-02-06 PROCEDURE — 36415 COLL VENOUS BLD VENIPUNCTURE: CPT

## 2023-02-06 PROCEDURE — 70450 CT HEAD/BRAIN W/O DYE: CPT

## 2023-02-06 PROCEDURE — 81001 URINALYSIS AUTO W/SCOPE: CPT

## 2023-02-06 PROCEDURE — C9803 HOPD COVID-19 SPEC COLLECT: HCPCS

## 2023-02-06 PROCEDURE — 96374 THER/PROPH/DIAG INJ IV PUSH: CPT

## 2023-02-06 PROCEDURE — 94761 N-INVAS EAR/PLS OXIMETRY MLT: CPT

## 2023-02-06 PROCEDURE — 93005 ELECTROCARDIOGRAM TRACING: CPT | Performed by: EMERGENCY MEDICINE

## 2023-02-06 RX ORDER — SERTRALINE HYDROCHLORIDE 100 MG/1
100 TABLET, FILM COATED ORAL NIGHTLY
Status: DISCONTINUED | OUTPATIENT
Start: 2023-02-07 | End: 2023-02-06

## 2023-02-06 RX ORDER — SODIUM CHLORIDE 0.9 % (FLUSH) 0.9 %
5-40 SYRINGE (ML) INJECTION EVERY 12 HOURS SCHEDULED
Status: DISCONTINUED | OUTPATIENT
Start: 2023-02-06 | End: 2023-02-08 | Stop reason: HOSPADM

## 2023-02-06 RX ORDER — SODIUM PHOSPHATE, DIBASIC AND SODIUM PHOSPHATE, MONOBASIC 7; 19 G/133ML; G/133ML
1 ENEMA RECTAL PRN
COMMUNITY

## 2023-02-06 RX ORDER — SODIUM CHLORIDE 9 MG/ML
INJECTION, SOLUTION INTRAVENOUS PRN
Status: DISCONTINUED | OUTPATIENT
Start: 2023-02-06 | End: 2023-02-08 | Stop reason: HOSPADM

## 2023-02-06 RX ORDER — POLYETHYLENE GLYCOL 3350 17 G/17G
17 POWDER, FOR SOLUTION ORAL DAILY PRN
Status: DISCONTINUED | OUTPATIENT
Start: 2023-02-06 | End: 2023-02-08 | Stop reason: HOSPADM

## 2023-02-06 RX ORDER — ONDANSETRON 2 MG/ML
4 INJECTION INTRAMUSCULAR; INTRAVENOUS EVERY 6 HOURS PRN
Status: DISCONTINUED | OUTPATIENT
Start: 2023-02-06 | End: 2023-02-08 | Stop reason: HOSPADM

## 2023-02-06 RX ORDER — FUROSEMIDE 10 MG/ML
20 INJECTION INTRAMUSCULAR; INTRAVENOUS ONCE
Status: COMPLETED | OUTPATIENT
Start: 2023-02-06 | End: 2023-02-06

## 2023-02-06 RX ORDER — FUROSEMIDE 10 MG/ML
40 INJECTION INTRAMUSCULAR; INTRAVENOUS DAILY
Status: DISCONTINUED | OUTPATIENT
Start: 2023-02-06 | End: 2023-02-08 | Stop reason: HOSPADM

## 2023-02-06 RX ORDER — GABAPENTIN 300 MG/1
600 CAPSULE ORAL 2 TIMES DAILY
Status: DISCONTINUED | OUTPATIENT
Start: 2023-02-06 | End: 2023-02-08 | Stop reason: HOSPADM

## 2023-02-06 RX ORDER — ATORVASTATIN CALCIUM 40 MG/1
80 TABLET, FILM COATED ORAL DAILY
Status: DISCONTINUED | OUTPATIENT
Start: 2023-02-06 | End: 2023-02-08 | Stop reason: HOSPADM

## 2023-02-06 RX ORDER — LOSARTAN POTASSIUM 50 MG/1
100 TABLET ORAL DAILY
Status: DISCONTINUED | OUTPATIENT
Start: 2023-02-07 | End: 2023-02-08 | Stop reason: HOSPADM

## 2023-02-06 RX ORDER — ACETAMINOPHEN 325 MG/1
650 TABLET ORAL EVERY 6 HOURS PRN
Status: DISCONTINUED | OUTPATIENT
Start: 2023-02-06 | End: 2023-02-08 | Stop reason: HOSPADM

## 2023-02-06 RX ORDER — POTASSIUM CHLORIDE 20 MEQ/1
20 TABLET, EXTENDED RELEASE ORAL DAILY
Status: DISCONTINUED | OUTPATIENT
Start: 2023-02-07 | End: 2023-02-08 | Stop reason: HOSPADM

## 2023-02-06 RX ORDER — SERTRALINE HYDROCHLORIDE 100 MG/1
100 TABLET, FILM COATED ORAL NIGHTLY
Status: DISCONTINUED | OUTPATIENT
Start: 2023-02-06 | End: 2023-02-08 | Stop reason: HOSPADM

## 2023-02-06 RX ORDER — SODIUM CHLORIDE 0.9 % (FLUSH) 0.9 %
10 SYRINGE (ML) INJECTION PRN
Status: DISCONTINUED | OUTPATIENT
Start: 2023-02-06 | End: 2023-02-08 | Stop reason: HOSPADM

## 2023-02-06 RX ORDER — POLYVINYL ALCOHOL 14 MG/ML
1 SOLUTION/ DROPS OPHTHALMIC PRN
Status: DISCONTINUED | OUTPATIENT
Start: 2023-02-06 | End: 2023-02-08 | Stop reason: HOSPADM

## 2023-02-06 RX ORDER — ACETAMINOPHEN 650 MG/1
650 SUPPOSITORY RECTAL EVERY 6 HOURS PRN
Status: DISCONTINUED | OUTPATIENT
Start: 2023-02-06 | End: 2023-02-08 | Stop reason: HOSPADM

## 2023-02-06 RX ORDER — METOPROLOL SUCCINATE 100 MG/1
200 TABLET, EXTENDED RELEASE ORAL DAILY
Status: DISCONTINUED | OUTPATIENT
Start: 2023-02-07 | End: 2023-02-08 | Stop reason: HOSPADM

## 2023-02-06 RX ORDER — LOSARTAN POTASSIUM AND HYDROCHLOROTHIAZIDE 25; 100 MG/1; MG/1
1 TABLET ORAL DAILY
Status: DISCONTINUED | OUTPATIENT
Start: 2023-02-06 | End: 2023-02-06 | Stop reason: CLARIF

## 2023-02-06 RX ORDER — ONDANSETRON 4 MG/1
4 TABLET, ORALLY DISINTEGRATING ORAL EVERY 8 HOURS PRN
Status: DISCONTINUED | OUTPATIENT
Start: 2023-02-06 | End: 2023-02-08 | Stop reason: HOSPADM

## 2023-02-06 RX ORDER — HYDROCHLOROTHIAZIDE 25 MG/1
25 TABLET ORAL DAILY
Status: DISCONTINUED | OUTPATIENT
Start: 2023-02-07 | End: 2023-02-08 | Stop reason: HOSPADM

## 2023-02-06 RX ORDER — DILTIAZEM HYDROCHLORIDE 120 MG/1
120 CAPSULE, COATED, EXTENDED RELEASE ORAL DAILY
Status: DISCONTINUED | OUTPATIENT
Start: 2023-02-06 | End: 2023-02-08 | Stop reason: HOSPADM

## 2023-02-06 RX ADMIN — SERTRALINE 100 MG: 100 TABLET, FILM COATED ORAL at 20:47

## 2023-02-06 RX ADMIN — APIXABAN 5 MG: 5 TABLET, FILM COATED ORAL at 20:47

## 2023-02-06 RX ADMIN — FUROSEMIDE 20 MG: 10 INJECTION, SOLUTION INTRAMUSCULAR; INTRAVENOUS at 15:45

## 2023-02-06 RX ADMIN — ATORVASTATIN CALCIUM 80 MG: 40 TABLET, FILM COATED ORAL at 19:11

## 2023-02-06 RX ADMIN — METFORMIN HYDROCHLORIDE 500 MG: 500 TABLET ORAL at 19:11

## 2023-02-06 RX ADMIN — GABAPENTIN 600 MG: 300 CAPSULE ORAL at 20:47

## 2023-02-06 RX ADMIN — SODIUM CHLORIDE, PRESERVATIVE FREE 10 ML: 5 INJECTION INTRAVENOUS at 20:49

## 2023-02-06 ASSESSMENT — PAIN - FUNCTIONAL ASSESSMENT: PAIN_FUNCTIONAL_ASSESSMENT: NONE - DENIES PAIN

## 2023-02-06 ASSESSMENT — PAIN SCALES - GENERAL: PAINLEVEL_OUTOF10: 0

## 2023-02-06 NOTE — ED PROVIDER NOTES
[facility] AT THE [facility] MED [facility]G  EMERGENCY DEPARTMENT ENCOUNTER      Pt Name: Car Villarreal  MRN: [id number]  [place] 1950  Date of evaluation: 2/6/2023  Provider: Ryan Sapp MD    CHIEF COMPLAINT       Chief Complaint   Patient presents with    Shortness of Breath     Patient states SOB and chest pain ongoing for the past few hours. Patient also complains of increased weakness over the past few days. HISTORY OF PRESENT ILLNESS   (Location/Symptom, Timing/Onset, Context/Setting, Quality, Duration, Modifying Factors, Severity)  Note limiting factors. Car Villarreal is a 81 y.o. female who presents to the emergency department     81year-old patient presents from nursing home with a history for feeling somewhat short of breath for approximately 24 hours in duration. Patient denies chest discomfort there is no history of syncopal episode. Patient did have a mild headache earlier today. Which had resolved. She is on Eliquis and she does have a history for atrial fibrillation. Denies fever chills, abdominal pain nausea vomiting or  symptomatology      Nursing Notes were reviewed. REVIEW OF SYSTEMS    (2-9 systems for level 4, 10 or more for level 5)     Review of Systems    Except as noted above the remainder of the review of systems was reviewed and negative. PAST MEDICAL HISTORY     Past Medical History:   Diagnosis Date    Adjustment disorder with depressed mood 06/15/2022    Arthritis     Atrial fibrillation, unspecified type ([facility] 11/20/2021    Cerebral artery occlusion with cerebral infarction [facility])     CHF (congestive heart failure) ([facility]     Chronic kidney disease 11/20/2021    Stage 3    Diabetes mellitus ([facility]     Dysarthria and anarthria 11/20/2021    Endothelial corneal dystrophy of both eyes 04/21/2022    H/O cardiac catheterization 08/04/2017    LMCA: Mild irregularites 10-20%. LAD: Mild irregularites 10-20%. LCx: Mild irregularites 10-20%. RCA: Mild irregularites 10-20%.  LV function assessed as : Abnormal. EF of 40%. H/O echocardiogram 12/18/2018    EF 55%. Mildly increased LV wall thickness. The left atrium appears moderately to severely dilated. Moderate to severe mitral regurg. Moderate pulmonary HTN with an estimated RV systolic pressure of 54CXDC. Moderate tricuspid regurg. Evidnece fo moderate diastolic dysfunction is seen. Hearing loss 09/26/2022    History of echocardiogram 01/17/2019    EF 55%. LV wall thickness moderately increased. LA is mildly dilated w/ LA volume index of 30. trivial mitral regurg. Evidence of moderate (grade II) diastolic dysfunction seen. History of echocardiogram 06/03/2019    EF of 50-55%. LV wall thickness is mildly increased. LA is mildly dilated (29-33) with a left atrial volume index of 31 m1/m2. mild diastolic dysfunction. Hyperlipidemia     Hypertension     Major depressive disorder 09/28/2022         SURGICAL HISTORY       Past Surgical History:   Procedure Laterality Date    CARDIAC CATHETERIZATION Left 08/04/2017    right radial, T Dr. Dale Schreiber       Current Discharge Medication List        CONTINUE these medications which have NOT CHANGED    Details   sodium phosphate (FLEET) 7-19 GM/118ML Place 1 enema rectally as needed (bowel protocol) Day 4 perform digital check for stool in rectum and administer Fleets Enema. Monitor and document results. Digital removal of stool may be performed if resident unable to expel. dilTIAZem (CARDIZEM CD) 120 MG extended release capsule Take 1 capsule by mouth daily  Qty: 90 capsule, Refills: 3      gabapentin (NEURONTIN) 600 MG tablet Take 600 mg by mouth 2 times daily.       sertraline (ZOLOFT) 100 MG tablet Take 100 mg by mouth at bedtime      magnesium hydroxide (MILK OF MAGNESIA) 400 MG/5ML suspension Take 30 mLs by mouth as needed for Constipation Give 1 ounce after two days of no recorded BM      bisacodyl (DULCOLAX) 10 MG suppository Place 10 mg rectally daily as needed for Constipation Give on day 3 if MOM not effective      carboxymethylcellulose 1 % ophthalmic solution Place 1 drop into both eyes 3 times daily      acetaminophen (TYLENOL) 325 MG tablet Take 650 mg by mouth every 4 hours as needed for Pain or Fever (temp>100.4F  Not to exceed 3200mg in 24 hours)      metoprolol succinate (TOPROL XL) 200 MG extended release tablet Take 1 tablet by mouth daily  Qty: 30 tablet, Refills: 3      furosemide (LASIX) 40 MG tablet Take 1 tablet by mouth daily  Qty: 60 tablet, Refills: 3      potassium chloride (KLOR-CON M) 20 MEQ extended release tablet Take 1 tablet by mouth daily  Qty: 30 tablet, Refills: 0      losartan-hydroCHLOROthiazide (HYZAAR) 100-25 MG per tablet Take 1 tablet by mouth daily  Qty: 90 tablet, Refills: 3      atorvastatin (LIPITOR) 80 MG tablet TAKE ONE TABLET BY MOUTH ONCE NIGHTLY  Qty: 90 tablet, Refills: 3    Associated Diagnoses: TIA (transient ischemic attack); Chronic combined systolic and diastolic HF (heart failure), NYHA class 2 ([address] Essential hypertension; Severe mitral regurgitation by prior echocardiogram; Mixed hyperlipidemia      apixaban (ELIQUIS) 5 MG TABS tablet Take 1 tablet by mouth 2 times daily  Qty: 180 tablet, Refills: 3    Associated Diagnoses: History of TIA (transient ischemic attack) and stroke; Chronic combined systolic and diastolic congestive heart failure ([address] Essential hypertension      famotidine (PEPCID) 20 MG tablet Take 20 mg by mouth at bedtime      miconazole (MICOTIN) 2 % powder Apply topically 2 times daily. Qty: 45 g, Refills: 1      metFORMIN (GLUCOPHAGE) 500 MG tablet Take 500 mg by mouth 2 times daily (with meals)             ALLERGIES     Patient has no known allergies.     FAMILY HISTORY       Family History   Problem Relation Age of Onset    Coronary Art Dis Father          from [place]    COPD Sister         O2 dep    Coronary Art Dis Brother         2 brothers  from [place]          SOCIAL HISTORY       Social History     Socioeconomic History    Marital status:      Spouse name: None    Number of children: None    Years of education: None    Highest education level: None   Tobacco Use    Smoking status: Never    Smokeless tobacco: Never   Vaping Use    Vaping Use: Never used   Substance and Sexual Activity    Drug use: No    Sexual activity: Not Currently       SCREENINGS        [place] Coma Scale  Eye Opening: Spontaneous  Best Verbal Response: Oriented  Best Motor Response: Obeys commands  [place] Coma Scale Score: 15               PHYSICAL EXAM    (up to 7 for level 4, 8 or more for level 5)     ED Triage Vitals [02/06/23 1323]   BP Temp Temp Source Heart Rate Resp SpO2 Height Weight   114/80 97.1 °F (36.2 °C) Tympanic 94 16 94 % -- --       Physical Exam  Constitutional:       Appearance: Normal appearance. HENT:      Head: Normocephalic and atraumatic. Nose: Nose normal.   Eyes:      Pupils: Pupils are equal, round, and reactive to light. Cardiovascular:      Rate and Rhythm: Normal rate. Rhythm irregular. Pulses: Normal pulses. Heart sounds: Normal heart sounds. Pulmonary:      Effort: Pulmonary effort is normal.      Comments: Decreased breath sounds bilateral bases  Abdominal:      General: Abdomen is flat. Palpations: Abdomen is soft. Musculoskeletal:         General: Normal range of motion. Cervical back: Normal range of motion. Skin:     General: Skin is warm. Capillary Refill: Capillary refill takes less than 2 seconds. Neurological:      General: No focal deficit present. Mental Status: She is alert.       Gait: Gait normal.       DIAGNOSTIC RESULTS     EKG: All EKG's are interpreted by the Emergency Department Physician who either signs or Co-signs this chart in the absence of a cardiologist.      RADIOLOGY:   Non-plain film images such as CT, Ultrasound and MRI are read by the radiologist. Plain radiographic images are visualized and preliminarily interpreted by the emergency physician with the below findings:      Interpretation per the Radiologist below, if available at the time of this note:    CT Head W/O Contrast   Final Result   No acute intracranial abnormality. Old left parietal and bilateral   cerebellar infarcts. Mild atrophy. XR CHEST PORTABLE   Final Result   Mild pulmonary edema probably related to decompensated CHF.          XR CHEST (2 VW)    (Results Pending)         ED BEDSIDE ULTRASOUND:   Performed by ED Physician - none    LABS:  Labs Reviewed   CBC WITH AUTO DIFFERENTIAL - Abnormal; Notable for the following components:       Result Value    RBC 3.80 (*)     Hemoglobin 11.3 (*)     Hematocrit 35.9 (*)     RDW 14.6 (*)     Seg Neutrophils 74 (*)     Lymphocytes 13 (*)     Absolute Lymph # 0.75 (*)     All other components within normal limits   TROPONIN - Abnormal; Notable for the following components:    Troponin, High Sensitivity 26 (*)     All other components within normal limits   BRAIN NATRIURETIC PEPTIDE - Abnormal; Notable for the following components:    Pro-BNP 6,875 (*)     All other components within normal limits   COMPREHENSIVE METABOLIC PANEL - Abnormal; Notable for the following components:    Glucose 133 (*)     BUN 28 (*)     Creatinine 1.29 (*)     Est, Glom Filt Rate 45 (*)     Bun/Cre Ratio 22 (*)     Calcium 10.7 (*)     Anion Gap 8 (*)     All other components within normal limits   URINALYSIS WITH REFLEX TO CULTURE - Abnormal; Notable for the following components:    Leukocyte Esterase, Urine MODERATE (*)     All other components within normal limits   MICROSCOPIC URINALYSIS - Abnormal; Notable for the following components:    Bacteria, UA 2+ (*)     All other components within normal limits   TROPONIN - Abnormal; Notable for the following components:    Troponin, High Sensitivity 25 (*)     All other components within normal limits   COMPREHENSIVE METABOLIC PANEL W/ REFLEX TO MG FOR LOW K - Abnormal; Notable for the following components:    Glucose 160 (*)     BUN 29 (*)     Creatinine 1.21 (*)     Est, Glom Filt Rate 48 (*)     Bun/Cre Ratio 24 (*)     Calcium 10.7 (*)     Anion Gap 8 (*)     Total Protein 6.2 (*)     All other components within normal limits   CBC WITH AUTO DIFFERENTIAL - Abnormal; Notable for the following components:    RBC 3.71 (*)     Hemoglobin 11.2 (*)     Hematocrit 34.6 (*)     RDW 14.7 (*)     Platelets 577 (*)     Seg Neutrophils 76 (*)     Lymphocytes 11 (*)     Absolute Lymph # 0.71 (*)     All other components within normal limits   COVID-19, RAPID       All other labs were within normal range or not returned as of this dictation.     EMERGENCY DEPARTMENT COURSE and DIFFERENTIAL DIAGNOSIS/MDM:   Vitals:    Vitals:    02/07/23 0345 02/07/23 0713 02/07/23 0830 02/07/23 0855   BP:  (!) 142/86  138/84   Pulse:  (!) 104  98   Resp:  22     Temp:  98.2 °F (36.8 °C)     TempSrc:  Temporal     SpO2:  (S) 92%     Weight: 203 lb (92.1 kg)      Height:   5' 4\" (1.626 m)        1)  Number and Complexity of Problems  Problem List This Visit: Shortness of breath, headache    Differential Diagnosis: Congestive heart failure, ACS syndrome, reactive airway disease, pneumonia, intracerebral bleed    Diagnoses Considered but Do Not Suspect: Pulmonary embolism (the patient taking Eliquis and diagnosis of congestive heart failure made (    Pertinent Comorbid Conditions: Atrial fibrillation    2)  Data Reviewed  My EKG interpretation: EKG performed at 1339-ventricular rate is 73 atrial fibrillation QRS duration 0.098, QTc corrected 0.471 nonspecific ST changes noted primarily laterally no ST segment elevation    Decision Rules/Scores utilized: N/A    Tests considered but not ordered and why: CT chest at this time I do not feel is indicated as patient is on Eliquis and has a working diagnosis of congestive heart failure    External Documents Reviewed:      Imaging that is independently reviewed and interpreted by me as: Chest x-ray reviewed by myself and as interpreted by the radiologist as pulmonary edema mild    See more data below for the lab and radiology tests and orders. 3)  Treatment and Disposition    Patient repeat assessment: Patient remains hemodynamically stable nontoxic Pulsoxymeter is notably 92% and the patient was treated with Lasix 20 mg IV    Disposition discussion with patient/family: Patient is also in agreement with admission    Case discussed with consulting clinician: Consultation undertaken with Dr. Vidya Dale patient's healthcare provider agreeable with admission    Social determinants of health impacting treatment or disposition: Patient presents from [facility] care facility    Shared Decision Making: Shared decision making with patient and her physician Dr. Vidya Dale    Code Status Discussion: Full code      MDM     Amount and/or Complexity of Data Reviewed  Decide to obtain previous medical records or to obtain history from someone other than the patient: yes          REASSESSMENT       ED Course as of 02/07/23 1111   e Feb 07, 2023   1105 CT Head W/O Contrast  CT brain no acute processes radiologist read [RS]   1105 XR CHEST PORTABLE  Chest x-ray mild pulmonary edema neurologist read [RS]      ED Course User Index  [RS] Vivi Aguilar MD         CRITICAL CARE TIME   Total Critical Care time was minutes, excluding separately reportable procedures. There was a high probability of clinically significant/life threatening deterioration in the patient's condition which required my urgent intervention. CONSULTS:  As documented in MDM    PROCEDURES:  Unless otherwise noted below, none     Procedures    FINAL IMPRESSION      1. Systolic congestive heart failure, unspecified HF chronicity ([address]    2.  Shortness of breath    3. Other headache syndrome          DISPOSITION/PLAN   DISPOSITION Admitted 02/06/2023 04:17:30 PM      PATIENT REFERRED TO:  YANY RUSSO [address]        DISCHARGE MEDICATIONS:  Current Discharge Medication List        Controlled Substances Monitoring:     No flowsheet data found.     (Please note that portions of this note were completed with a voice recognition program.  Efforts were made to edit the dictations but occasionally words are mis-transcribed.)    Suzi Sigala MD (electronically signed)  Attending Emergency Physician            Suzi Sigala MD  02/07/23 8907

## 2023-02-06 NOTE — ED NOTES
Called Dr Rosy Gaspar via cell for 2nd time and left message to call the ER     Staci Vines  02/06/23 1600

## 2023-02-07 LAB
ABSOLUTE EOS #: 0.03 K/UL (ref 0–0.44)
ABSOLUTE IMMATURE GRANULOCYTE: <0.03 K/UL (ref 0–0.3)
ABSOLUTE LYMPH #: 0.71 K/UL (ref 1.1–3.7)
ABSOLUTE MONO #: 0.77 K/UL (ref 0.1–1.2)
ALBUMIN SERPL-MCNC: 3.8 G/DL (ref 3.5–5.2)
ALBUMIN/GLOBULIN RATIO: 1.6 (ref 1–2.5)
ALP SERPL-CCNC: 98 U/L (ref 35–104)
ALT SERPL-CCNC: 10 U/L (ref 5–33)
ANION GAP SERPL CALCULATED.3IONS-SCNC: 8 MMOL/L (ref 9–17)
AST SERPL-CCNC: 11 U/L
BASOPHILS # BLD: 0 % (ref 0–2)
BASOPHILS ABSOLUTE: <0.03 K/UL (ref 0–0.2)
BILIRUB SERPL-MCNC: 0.5 MG/DL (ref 0.3–1.2)
BUN SERPL-MCNC: 29 MG/DL (ref 8–23)
BUN/CREAT BLD: 24 (ref 9–20)
CALCIUM SERPL-MCNC: 10.7 MG/DL (ref 8.6–10.4)
CHLORIDE SERPL-SCNC: 104 MMOL/L (ref 98–107)
CO2 SERPL-SCNC: 28 MMOL/L (ref 20–31)
CREAT SERPL-MCNC: 1.21 MG/DL (ref 0.5–0.9)
EKG ATRIAL RATE: 108 BPM
EKG Q-T INTERVAL: 428 MS
EKG QRS DURATION: 98 MS
EKG QTC CALCULATION (BAZETT): 471 MS
EKG R AXIS: -9 DEGREES
EKG T AXIS: 136 DEGREES
EKG VENTRICULAR RATE: 73 BPM
EOSINOPHILS RELATIVE PERCENT: 1 % (ref 1–4)
GFR SERPL CREATININE-BSD FRML MDRD: 48 ML/MIN/1.73M2
GLUCOSE SERPL-MCNC: 160 MG/DL (ref 70–99)
HCT VFR BLD AUTO: 34.6 % (ref 36.3–47.1)
HGB BLD-MCNC: 11.2 G/DL (ref 11.9–15.1)
IMMATURE GRANULOCYTES: 0 %
LYMPHOCYTES # BLD: 11 % (ref 24–43)
MCH RBC QN AUTO: 30.2 PG (ref 25.2–33.5)
MCHC RBC AUTO-ENTMCNC: 32.4 G/DL (ref 28.4–34.8)
MCV RBC AUTO: 93.3 FL (ref 82.6–102.9)
MONOCYTES # BLD: 12 % (ref 3–12)
NRBC AUTOMATED: 0 PER 100 WBC
PDW BLD-RTO: 14.7 % (ref 11.8–14.4)
PLATELET # BLD AUTO: 131 K/UL (ref 138–453)
PMV BLD AUTO: 12.1 FL (ref 8.1–13.5)
POTASSIUM SERPL-SCNC: 4.6 MMOL/L (ref 3.7–5.3)
PROT SERPL-MCNC: 6.2 G/DL (ref 6.4–8.3)
RBC # BLD: 3.71 M/UL (ref 3.95–5.11)
SEG NEUTROPHILS: 76 % (ref 36–65)
SEGMENTED NEUTROPHILS ABSOLUTE COUNT: 4.97 K/UL (ref 1.5–8.1)
SODIUM SERPL-SCNC: 140 MMOL/L (ref 135–144)
WBC # BLD AUTO: 6.5 K/UL (ref 3.5–11.3)

## 2023-02-07 PROCEDURE — 94761 N-INVAS EAR/PLS OXIMETRY MLT: CPT

## 2023-02-07 PROCEDURE — 2580000003 HC RX 258: Performed by: INTERNAL MEDICINE

## 2023-02-07 PROCEDURE — 1200000000 HC SEMI PRIVATE

## 2023-02-07 PROCEDURE — 85025 COMPLETE CBC W/AUTO DIFF WBC: CPT

## 2023-02-07 PROCEDURE — 2700000000 HC OXYGEN THERAPY PER DAY

## 2023-02-07 PROCEDURE — 36415 COLL VENOUS BLD VENIPUNCTURE: CPT

## 2023-02-07 PROCEDURE — 80053 COMPREHEN METABOLIC PANEL: CPT

## 2023-02-07 PROCEDURE — 6370000000 HC RX 637 (ALT 250 FOR IP): Performed by: INTERNAL MEDICINE

## 2023-02-07 PROCEDURE — 6360000002 HC RX W HCPCS: Performed by: INTERNAL MEDICINE

## 2023-02-07 PROCEDURE — 93010 ELECTROCARDIOGRAM REPORT: CPT | Performed by: INTERNAL MEDICINE

## 2023-02-07 RX ADMIN — LOSARTAN POTASSIUM 100 MG: 50 TABLET, FILM COATED ORAL at 08:55

## 2023-02-07 RX ADMIN — METOPROLOL SUCCINATE 200 MG: 100 TABLET, EXTENDED RELEASE ORAL at 08:55

## 2023-02-07 RX ADMIN — METFORMIN HYDROCHLORIDE 500 MG: 500 TABLET ORAL at 08:55

## 2023-02-07 RX ADMIN — ATORVASTATIN CALCIUM 80 MG: 40 TABLET, FILM COATED ORAL at 08:55

## 2023-02-07 RX ADMIN — GABAPENTIN 600 MG: 300 CAPSULE ORAL at 19:56

## 2023-02-07 RX ADMIN — SODIUM CHLORIDE, PRESERVATIVE FREE 10 ML: 5 INJECTION INTRAVENOUS at 19:57

## 2023-02-07 RX ADMIN — HYDROCHLOROTHIAZIDE 25 MG: 25 TABLET ORAL at 08:55

## 2023-02-07 RX ADMIN — GABAPENTIN 600 MG: 300 CAPSULE ORAL at 08:55

## 2023-02-07 RX ADMIN — APIXABAN 5 MG: 5 TABLET, FILM COATED ORAL at 08:55

## 2023-02-07 RX ADMIN — POTASSIUM CHLORIDE 20 MEQ: 1500 TABLET, EXTENDED RELEASE ORAL at 08:55

## 2023-02-07 RX ADMIN — SERTRALINE 100 MG: 100 TABLET, FILM COATED ORAL at 19:56

## 2023-02-07 RX ADMIN — APIXABAN 5 MG: 5 TABLET, FILM COATED ORAL at 19:57

## 2023-02-07 RX ADMIN — METFORMIN HYDROCHLORIDE 500 MG: 500 TABLET ORAL at 16:51

## 2023-02-07 RX ADMIN — SODIUM CHLORIDE, PRESERVATIVE FREE 10 ML: 5 INJECTION INTRAVENOUS at 08:55

## 2023-02-07 RX ADMIN — FUROSEMIDE 40 MG: 10 INJECTION, SOLUTION INTRAMUSCULAR; INTRAVENOUS at 08:55

## 2023-02-07 RX ADMIN — DILTIAZEM HYDROCHLORIDE 120 MG: 120 CAPSULE, COATED, EXTENDED RELEASE ORAL at 08:55

## 2023-02-07 NOTE — PROGRESS NOTES
Pt ambulated to bathroom and back to bed with walker, tolerated fairly well. VS obtained and assessment done as charted. Pt is A&Ox4 at this time. SPO2 94% on 2 LPM nasal cannula. Pt denies any pain. Scheduled medications administered. Pt positioned per comfort. Pt denies any other needs at this time and has call light within reach.

## 2023-02-07 NOTE — PROGRESS NOTES
Comprehensive Nutrition Assessment    Type and Reason for Visit:  Initial    Nutrition Recommendations/Plan:   Modify diet to include 2 gm sodium. Malnutrition Assessment:  Malnutrition Status:  No malnutrition (02/07/23 0830)    Context:  Chronic Illness     Findings of the 6 clinical characteristics of malnutrition:  Energy Intake:  No significant decrease in energy intake  Weight Loss:  No significant weight loss     Body Fat Loss:  No significant body fat loss     Muscle Mass Loss:  No significant muscle mass loss    Fluid Accumulation:  Mild Extremities (trace non pitting BLE)   Strength:  Not Performed    Nutrition Assessment:    Altered nutrition related labs r/t cardiac/endocrine dysfunction aeb BNP 6,875 and A1c 7.6/, glucose 160. Recommend diet modification to 2 gm sodium diet. 1.8% weight loss since admission with diuretic therapy. Pt states she lives at [facility]. She deined any weight or PO changes. Declined any diet education needs. Nutrition Related Findings:    appears well nourished Wound Type: None       Current Nutrition Intake & Therapies:    Average Meal Intake: Unable to assess  Average Supplements Intake: None Ordered  ADULT DIET; Regular; 4 carb choices (60 gm/meal); No Added Salt (3-4 gm)    Anthropometric Measures:  Height: 5' 4\" (162.6 cm)  Ideal Body Weight (IBW): 120 lbs (55 kg)    Admission Body Weight: 206 lb 10 oz (93.7 kg)  Current Body Weight: 203 lb (92.1 kg), 169.2 % IBW. Weight Source: Bed Scale  Current BMI (kg/m2): 34.8  Usual Body Weight: 205 lb (93 kg)  % Weight Change (Calculated): -1  Weight Adjustment For: No Adjustment                 BMI Categories: Obese Class 1 (BMI 30.0-34. 9)    Estimated Daily Nutrient Needs:  Energy Requirements Based On: Kcal/kg  Weight Used for Energy Requirements: Current  Energy (kcal/day): 5436-8273 (15-18/kg)  Weight Used for Protein Requirements: Ideal  Protein (g/day): 71-82g (1.3-1.5g/kg)  Method Used for Fluid Requirements: 1 ml/kcal  Fluid (ml/day): 1700 ml    Nutrition Diagnosis:   Altered nutrition-related lab values related to cardiac dysfunction, endocrine dysfuntion as evidenced by lab values    Nutrition Interventions:   Food and/or Nutrient Delivery: Modify Current Diet  Nutrition Education/Counseling: Education declined  Coordination of Nutrition Care: Continue to monitor while inpatient  Plan of Care discussed with: Patient    Goals:     Goals: Meet at least 75% of estimated needs     Recent Labs     02/06/23  1359 02/07/23  0616    140   K 5.2 4.6    104   CO2 27 28   BUN 28* 29*   CREATININE 1.29* 1.21*   GLUCOSE 133* 160*   ALT 12 10   ALKPHOS 98 98      Lab Results   Component Value Date/Time    LABALBU 3.8 02/07/2023 06:16 AM     Lab Results   Component Value Date/Time    LABA1C 7.6 01/05/2023 06:10 AM   No results for input(s): POCGLU in the last 72 hours. Recent Labs     02/06/23  1359 02/07/23  0616    140   K 5.2 4.6    104   CO2 27 28   BUN 28* 29*   CREATININE 1.29* 1.21*   GLUCOSE 133* 160*   ALT 12 10   ALKPHOS 98 98      Lab Results   Component Value Date/Time    LABALBU 3.8 02/07/2023 06:16 AM       Lab Results   Component Value Date/Time    TRIG 62 04/07/2022 06:30 AM    HDL 43 04/07/2022 06:30 AM      Nutrition Monitoring and Evaluation:   Behavioral-Environmental Outcomes: None Identified  Food/Nutrient Intake Outcomes: Food and Nutrient Intake  Physical Signs/Symptoms Outcomes: Biochemical Data, Weight, Fluid Status or Edema    Discharge Planning:     Too soon to determine     Zoraida Gonzalez, [address] [place] [address],   Contact: [phone]

## 2023-02-07 NOTE — PROGRESS NOTES
Entered patient's room for afternoon vital signs and head to toe reassessment. Patient resting in the bed at this time. A&O x4, calm, and cooperative, but very tired. Patient denies pain at this time. Vital signs and head to toe reassessment completed at this time, see flowsheets for more details. Patient denies no more needs at this time. Call light within reach. Bed alarm on. Bed wheels locked. Bed in lowest position. Family at the bedside.

## 2023-02-07 NOTE — ACP (ADVANCE CARE PLANNING)
Advance Care Planning     Advance Care Planning Activator (Inpatient)  Conversation Note      Date of ACP Conversation: 2/7/2023     Conversation Conducted with: Patient with Decision Making Capacity    ACP Activator: Liliya Mcdonough RN        Health Care Decision Maker:     Current Designated Health Care Decision Maker:     Primary Decision Maker: Bronwyn Kaminski Child - [phone]    Secondary Decision Maker: Yesi Gaspar - Brother/Sister - [phone]    Care Preferences    Ventilation: \"If you were in your present state of health and suddenly became very ill and were unable to breathe on your own, what would your preference be about the use of a ventilator (breathing machine) if it were available to you? \"      Would the patient desire the use of ventilator (breathing machine)?: no    \"If your health worsens and it becomes clear that your chance of recovery is unlikely, what would your preference be about the use of a ventilator (breathing machine) if it were available to you? \"     Would the patient desire the use of ventilator (breathing machine)?: No      Resuscitation  \"CPR works best to restart the heart when there is a sudden event, like a heart attack, in someone who is otherwise healthy. Unfortunately, CPR does not typically restart the heart for people who have serious health conditions or who are very sick. \"    \"In the event your heart stopped as a result of an underlying serious health condition, would you want attempts to be made to restart your heart (answer \"yes\" for attempt to resuscitate) or would you prefer a natural death (answer \"no\" for do not attempt to resuscitate)? \" no       [x] Yes   [] No   Educated Patient / Chiqui Martin regarding differences between Advance Directives and portable DNR orders.     Length of ACP Conversation in minutes: 15     Conversation Outcomes:  [x] ACP discussion completed  [x] Existing advance directive reviewed with patient; no changes to patient's previously recorded wishes  [] New Advance Directive completed  [] Portable Do Not Rescitate prepared for Provider review and signature  [] POLST/POST/MOLST/MOST prepared for Provider review and signature      Follow-up plan:    [] Schedule follow-up conversation to continue planning  [x] Referred individual to Provider for additional questions/concerns   [] Advised patient/agent/surrogate to review completed ACP document and update if needed with changes in condition, patient preferences or care setting    [x] This note routed to one or more involved healthcare providers    [address] [place] [address] and [place] Nurse Coordinator  2/7/2023 1:30 PM

## 2023-02-07 NOTE — CONSULTS
Palliative Care Inpatient Consult    NAME:  Luis Allison [facility] RECORD NUMBER:  [id number]  AGE: 81 y.o. GENDER: female  : 1950  TODAY'S DATE:  2023    Reason for Consult:  code status verification    History of Present Illness     The patient is a 81 y.o. Non- / non  female who presents with Shortness of Breath (Patient states SOB and chest pain ongoing for the past few hours. Patient also complains of increased weakness over the past few days.)      Referred to Palliative Care by  [x] Physician   [] Nursing  [] Family Request   [] Other:      She was admitted to the med/surg service for Acute systolic CHF (congestive heart failure), NYHA class 3 ([facility] [address] [I50.21]. The patient has a complicated medical history and has been hospitalized since 2023  1:17 PM.    Active Hospital Problems    Diagnosis Date Noted    Acute systolic CHF (congestive heart failure), NYHA class 3 ([facility]) [I50.21] 2023     Priority: Medium       Data         /84   Pulse 98   Temp 98.2 °F (36.8 °C) (Temporal)   Resp 22   Ht 5' 4\" (1.626 m)   Wt 203 lb (92.1 kg)   SpO2 (S) 92%   BMI 34.84 kg/m²     Wt Readings from Last 3 Encounters:   23 203 lb (92.1 kg)   22 206 lb (93.4 kg)   10/06/22 205 lb (93 kg)        Code Status: DNR-CCA     ADVANCED CARE PLANNING:  Patient has capacity for medical decisions: yes  Health Care Power of : yes  Living Will: yes     Personal, Social, and Family History  Marital Status:   Living situation:alone  Importance of yusef/Anabaptist/spiritual beliefs: [] Very [] Somewhat [] Not   Psychological Distress: mild  Does patient understand diagnosis/treatment? yes  Does caregiver understand diagnosis/treatment? not asked    Assessment        Symptom management/ pain control   Pain Assessment:  Pain is controlled without any medications.   Anxiety:  none  Dyspnea:  acute dyspnea and chronic dyspnea  Fatigue:  exercise intolerance  Other:    Palliative Performance Scale:     ___100% Full ambulation; normal activity and work; no evidence of disease; able to do own self care; normal intake; fully conscious  ___90% Full ambulation; normal activity and work; some evidence of disease; able to do own self care; normal intake; fully conscious  ___80% Full ambulation; normal activity with effort; some evidence of disease; able to do own self care; normal or reduced intake; fully conscious  ___70% Ambulation reduced; unable to perform normal job/work; significant disease; able to do own self care; normal or reduced intake; fully conscious  __x_60%  Ambulation reduced; cannot do hobbies/housework; significant disease; occasional assist; intake normal or reduced; fully conscious/some confusion  ___50%  Mainly sit/lie; can't do any work; extensive disease; considerable assist; intake normal or reduced; fully conscious/some confusion  ___40%  Mainly in bed; extensive disease; mainly assist; intake normal or reduced; fully conscious/ some confusion   ___30%  Bed bound; extensive disease; total care; intake reduced; fully conscious/some confusion  ___20%  Bed bound; extensive disease; total care; intake minimal; drowsy/coma  ___10%  Bed bound; extensive disease; total care; mouth care only; drowsy/coma  ___0       Death     Readmission Risk Score: 15%    Plan      Palliative Interaction: I see patient for palliative care consult. Pt is alert and oriented for our conversation. I introduce myself and explain my role in her care. Pt confirms that she has [address] and verifies it reflects her wishes. I confirm her previous code status as a DNR-CCA from document found in [address] [place] [address] Rd, she tells me that it reflects her wishes and want she wants to be. I change her from a full code to a DNR-CCA. Pt denies any other needs at this time.     Education/support to patient  Continue with current plan of care  Code status clarified: [address] [place] [address] Problem/Diagnosis:  Acute systolic CHF (congestive heart failure), NYHA class 3 ([facility])    Goals of care evaluation   The patient goals of care are improve or maintain function/quality of life and preserve independence/autonomy/control   Goals of care discussed with:    [x] Patient independently    [] Patient and Family    [] Family or Healthcare DPOA independently    [] Unable to discuss with patient, family/DPOA not present    Code Status  DNR-CCA     Palliative Care will continue to follow Ms. Ragland's care as needed. Thank you for allowing Palliative Care to participate in the care of Ms. Ragland .      Electronically signed by   Jessica Frias RN  Palliative Care Team  on 2/7/2023 at 12:38 PM    Palliative care office: [phone]

## 2023-02-07 NOTE — PROGRESS NOTES
Once patient returned from bathroom and was positioned in bed per comfort, VS rechecked and pt reassessed at this time. Pt remains A&Ox4. Pt denies any pain at this time. Pt does have dyspnea noted with ambulation but recovers. SPO2 92% on 2 LPM nasal cannula. Assessment unchanged from previous. Pt provided with snack and denies any other needs at this time. Call light and bedside table are within reach, will monitor.

## 2023-02-07 NOTE — H&P
Cierra Coffey M.D. Internal Medicine History and Phyisical    Patient: Jerry Rivera  Date of Admission: 2/6/2023  1:17 PM  Date of Evaluation: 2/7/2023      Patient:  Jerry Rivera  MRN: [id number]    Chief Complaint:    Chief Complaint   Patient presents with    Shortness of Breath     Patient states SOB and chest pain ongoing for the past few hours. Patient also complains of increased weakness over the past few days. History Obtained From:  patient, electronic medical record    PCP: Jacquelin Forrester MD    History of Present Illness: The patient is a 81 y.o. female who presents with short of breath proxy 24 hours in duration. She does not really admit to any chest discomfort. There is no history of syncopal episode. Patient did have a mild headache earlier today. She is on Eliquis and she does have a history for atrial fibrillation. 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 LAD: Mild irregularites 10-20%. LCx: Mild irregularites 10-20%. RCA: Mild irregularites 10-20%. LV function assessed as : Abnormal. EF of 40%. H/O echocardiogram 12/18/2018    EF 55%. Mildly increased LV wall thickness. The left atrium appears moderately to severely dilated. Moderate to severe mitral regurg. Moderate pulmonary HTN with an estimated RV systolic pressure of 50PPIV. Moderate tricuspid regurg. Evidnece fo moderate diastolic dysfunction is seen. Hearing loss 09/26/2022    History of echocardiogram 01/17/2019    EF 55%. LV wall thickness moderately increased. LA is mildly dilated w/ LA volume index of 30. trivial mitral regurg. Evidence of moderate (grade II) diastolic dysfunction seen. History of echocardiogram 2019    EF of 50-55%. LV wall thickness is mildly increased. LA is mildly dilated (29-33) with a left atrial volume index of 31 m1/m2. mild diastolic dysfunction. Hyperlipidemia     Hypertension     Major depressive disorder 2022       Past Surgical History:        Procedure Laterality Date    CARDIAC CATHETERIZATION Left 2017    right radial, MHT Dr. Diogo Diop         Family History:       Problem Relation Age of Onset    Coronary Art Dis Father          from [place]    COPD Sister         O2 dep    Coronary Art Dis Brother         2 brothers  from [place]       Social History:   TOBACCO:   reports that she has never smoked. She has never used smokeless tobacco.  ETOH:   has no history on file for alcohol use. Allergies:  Patient has no known allergies. Medications Prior to Admission:    Prior to Admission medications    Medication Sig Start Date End Date Taking? Authorizing Provider   sodium phosphate (FLEET) 7-19 GM/118ML Place 1 enema rectally as needed (bowel protocol) Day 4 perform digital check for stool in rectum and administer Fleets Enema. Monitor and document results. Digital removal of stool may be performed if resident unable to expel. Yes Historical Provider, MD   dilTIAZem (CARDIZEM CD) 120 MG extended release capsule Take 1 capsule by mouth daily 22   Rosy Baltazar MD   gabapentin (NEURONTIN) 600 MG tablet Take 600 mg by mouth 2 times daily.  10/26/22   Historical Provider, MD   sertraline (ZOLOFT) 100 MG tablet Take 100 mg by mouth at bedtime 10/16/22   Historical Provider, MD   magnesium hydroxide (MILK OF MAGNESIA) 400 MG/5ML suspension Take 30 mLs by mouth as needed for Constipation Give 1 ounce after two days of no recorded BM    Historical Provider, MD   bisacodyl (DULCOLAX) 10 MG suppository Place 10 mg rectally daily as needed for Constipation Give on day 3 if MOM not effective    Historical Provider, MD   carboxymethylcellulose 1 % ophthalmic solution Place 1 drop into both eyes 3 times daily    Historical Provider, MD   acetaminophen (TYLENOL) 325 MG tablet Take 650 mg by mouth every 4 hours as needed for Pain or Fever (temp>100.4F  Not to exceed 3200mg in 24 hours)    Historical Provider, MD   metoprolol succinate (TOPROL XL) 200 MG extended release tablet Take 1 tablet by mouth daily 11/21/21   Chuck Miller MD   furosemide (LASIX) 40 MG tablet Take 1 tablet by mouth daily 11/21/21   Chuck Miller MD   potassium chloride (KLOR-CON M) 20 MEQ extended release tablet Take 1 tablet by mouth daily 11/20/21   Chuck Miller MD   losartan-hydroCHLOROthiazide [facility]) 100-25 MG per tablet Take 1 tablet by mouth daily 8/23/21   Rajat Hawk MD   atorvastatin (LIPITOR) 80 MG tablet TAKE ONE TABLET BY MOUTH ONCE NIGHTLY 7/21/21   Rajat Hawk MD   apixaban (ELIQUIS) 5 MG TABS tablet Take 1 tablet by mouth 2 times daily 3/8/21   Rajat Hawk MD   famotidine (PEPCID) 20 MG tablet Take 20 mg by mouth at bedtime    Historical Provider, MD   miconazole (MICOTIN) 2 % powder Apply topically 2 times daily. Patient taking differently: Apply topically 2 times daily as needed Apply topically to reddened areas 9/18/20   Amado Cole MD   metFORMIN (GLUCOPHAGE) 500 MG tablet Take 500 mg by mouth 2 times daily (with meals)    Historical Provider, MD       Review of Systems:  Constitutional:negative  for fevers, and negative for chills.   Respiratory: positive for shortness of breath, negative for cough, and negative for wheezing  Cardiovascular: negative for chest pain, negative for palpitations, and negative for syncope  Gastrointestinal: negative for abdominal pain, negative for nausea,negative for vomiting, negative for diarrhea, negative for constipation, and negative for hematochezia or melena  Genitourinary: negative for dysuria, negative for urinary urgency, negative for urinary frequency, and negative for hematuria  Neurological: negative for unilateral weakness, numbness or tingling. All other systems were reviewed with the patient and are negative except as stated above    Physical Exam:    Vitals:   Temp: 98.2 °F (36.8 °C)  BP: (!) 142/86  Resp: 22  Heart Rate: (!) 104  SpO2: (S) 92 %  24HR INTAKE/OUTPUT:    Intake/Output Summary (Last 24 hours) at 2/7/2023 0769  Last data filed at 2/7/2023 0359  Gross per 24 hour   Intake 300 ml   Output 1000 ml   Net -700 ml       Exam:  GEN:  alert and oriented to person, place and time and alert & appropriate appearance  EYES: No gross abnormalities.  and normal eyes, non icteric  NECK: supple with no lymphadenopathy,  no bruits noted  PULM: Clear with rales  COR: regular with rate and rhythm  ABD:  soft & NT, No distension  EXT:   no edema noted  NEURO: follows commands, ALVARES, no deficits  SKIN:  negative  -----------------------------------------------------------------  Diagnostic Data:     Complete Blood Count:   Recent Labs     02/06/23  1359 02/07/23  0616   WBC 5.6 6.5   RBC 3.80* 3.71*   HGB 11.3* 11.2*   HCT 35.9* 34.6*   MCV 94.5 93.3   RDW 14.6* 14.7*    131*      Recent Labs     02/06/23  1359 02/07/23  0616   SEGS 74* 76*   NEUTROABS 4.13 4.97   LYMPHOPCT 13* 11*   LYMPHSABS 0.75* 0.71*   MONOPCT 12 12   EOSRELPCT 1 1   BASOPCT 0 0   IMMGRAN 0 0       Recent Blood Glucose:   Recent Labs     02/06/23  1359 02/07/23  0616   GLUCOSE 133* 160*        Comprehensive Metabolic Profile:   Recent Labs     02/06/23  1359 02/07/23  0616   BUN 28* 29*   CREATININE 1.29* 1.21*    140   K 5.2 4.6    104   CALCIUM 10.7* 10.7*   ANIONGAP 8* 8*   CO2 27 28   PROT 6.6 6.2*   LABALBU 3.7 3.8   BILITOT 0.4 0.5   ALKPHOS 98 98   AST 12 11   ALT 12 10        UA:   Lab Results   Component Value Date COLORU Yellow 02/06/2023    SPECGRAV 1.015 02/06/2023    WBCUA 0 TO 2 02/06/2023    RBCUA 0 TO 2 02/06/2023    EPITHUA 0 TO 2 02/06/2023    LEUKOCYTESUR MODERATE (A) 02/06/2023    NITRU NEGATIVE 02/06/2023    GLUCOSEU NEGATIVE 02/06/2023    KETUA NEGATIVE 02/06/2023    PROTEINU NEGATIVE 02/06/2023    HGBUR NEGATIVE 02/06/2023    CASTUA NOT REPORTED 06/21/2021    CRYSTUA NOT REPORTED 06/21/2021    BACTERIA 2+ (A) 02/06/2023    YEAST NOT REPORTED 06/21/2021       Lactic Acid:   Lab Results   Component Value Date/Time    LACTA 1.8 06/21/2021 04:45 PM    LACTA 1.5 12/17/2018 05:17 PM        D-Dimer:  No results found for: DDIMER    PT/INR:  Lab Results   Component Value Date/Time    PROTIME 13.4 08/23/2020 03:38 PM    INR 1.0 08/23/2020 03:38 PM       High Sensitivity Troponin:  Recent Labs     02/06/23  1359 02/06/23  1542   TROPHS 26* 25*       ABGs:   Lab Results   Component Value Date/Time    FIO2 NOT REPORTED 12/17/2018 05:16 PM       EKG reviewed: my interpretation is: atrial fibrillation, rate controlled    CT Head W/O Contrast   Final Result   No acute intracranial abnormality. Old left parietal and bilateral   cerebellar infarcts. Mild atrophy. XR CHEST PORTABLE   Final Result   Mild pulmonary edema probably related to decompensated CHF. Assessment:    Acute systolic CHF (congestive heart failure), NYHA class 3 (HCC)    Principal Problem:    Acute systolic CHF (congestive heart failure), NYHA class 3 (HCC)  Resolved Problems:    * No resolved hospital problems.  *      Patient Active Problem List    Diagnosis Date Noted    Abnormal result of other cardiovascular function study 10/06/2022    Encounter for current long-term use of anticoagulants 11/06/2020    Stroke determined by clinical assessment ([address] 07/20/2020    Physical deconditioning 06/12/2019    Moderate mitral regurgitation by prior echocardiogram     Acute systolic CHF (congestive heart failure), NYHA class 3 ([address] 02/06/2023 [facility] to [facility] 01/12/2023    Unable to care for self 11/17/2021    Atrial fibrillation with RVR ([facility] 11/16/2021    Hemiplegia and hemiparesis following cerebral infarction affecting right dominant side ([facility] 04/08/2021    PAF (paroxysmal atrial fibrillation) ([facility] 09/25/2020    Acute ischemic stroke ([facility] 08/26/2020    Uncontrolled type 2 diabetes mellitus with hyperglycemia ([facility]     Type 2 diabetes mellitus, without long-term current use of insulin ([facility] 08/25/2020    Aphasia     Old lacunar stroke without late effect     Dysarthria     TIA (transient ischemic attack) 12/22/2019    Morbid obesity ([facility] 12/20/2018    CKD (chronic kidney disease) stage 3, GFR 30-59 ml/min ([facility] 12/20/2018    Hypoxia 12/18/2018    Acute on chronic combined systolic and diastolic CHF, NYHA class 4 ([facility] 12/17/2018    Chronic combined systolic and diastolic HF (heart failure), NYHA class 2 ([facility] 08/28/2017    Acute on chronic systolic CHF (congestive heart failure) ([facility] 08/01/2017    Hypertensive urgency 08/01/2017    Hypertensive emergency 08/01/2017    Hypertension     Hyperlipidemia     Idiopathic cardiomyopathy ([facility]        Plan: This patient requires inpatient admission because of sytolic CHF  Factors affecting the medical complexity of this patient include Principal Problem:    Acute systolic CHF (congestive heart failure), NYHA class 3 (HCC)  Resolved Problems:    * No resolved hospital problems.  *    Estimated length of stay is 2 or 3 days  Lasix IV  Medication Monitoring / High Risk Medications: none     Primary Problem(s): Acute systolic CHF (congestive heart failure), NYHA class 3 (HCC)  Differential diagnoses: none  Condition is a chronic illness with exacerbation, progression or side effects of treatment  Condition is improving  Treatment plan: Continue current treatment  Imaging: no further imaging studies ordered today / xray morning  Medications: Continue lasix IV  Medication Monitoring / High Risk Medications: none     [facility] Medication Reconciliation documentation:  [x] I have utilized all available immediate resources to obtain, update, or review the patient's current medications (including all prescriptions, over-the-counter products, herbals, cannabinoid products and bitamin/mineral/dietary/nutritional supplements. Parth 'yes\", [place]     []  The patient is not eligible for medication reconciliation; the patient is in an emergent medical situation where delaying treatment would jeopardize the patient's health    []  I did NOT confirm, update or review the patient's current list of medications today. [DOES NOT SATISFY [facility] PERFORMANCE]        [facility] Advanced Care Planning documentation: (check appropriate boxes [x]  )  [x] I have confirmed that the patient's Advance Care Plan is present, Code Status is documented, or surrogate decision maker is listed in the patient's medical record  [If \"yes\", STOP HERE]     [] The patient's Advance Care Plan is NOT present because:    []  I confirmed today that the patient does not wish or was not able to name a   surrogate decision maker or provide and advance care plan.    [] Hospice care is currently being provided or has been provided within the   calendar year. []  I did NOT confirm today the presence of an [address] or surrogate   decision maker documented within the patient's medical record. [DOES NOT SATISFY [facility] PERFORMANCE]    CORE MEASURES  Core measures including DVT prophylaxis, Code Status, Nutrition, Therapy Options, chart reviewed and advance directives reviewed at this visit.     Pete Correia MD, MD  2/7/2023, 7:26 AM

## 2023-02-07 NOTE — PROGRESS NOTES
Pt up in chair watching television. VS obtained and pt assessed at this time. Pt remains A&Ox4. Pt had removed nasal cannula out of nose prior to writer entering room. When checked, SPO2 92%. Writer informed patient she would be spot checked to ensure she maintained SPO2 above 90%. Legs elevated in chair and blanket provided. Pt denies any pain or needs at this time. Call light and bedside table are within reach.

## 2023-02-07 NOTE — PROGRESS NOTES
Met with Patient and her daughter this p.m. to discuss discharge plans. Patient is a 81year old , white female, admitted with a diagnosis of CHF. Patient is alert and oriented, polite and cooperative with this assessment. States that she is from [facility] and plans to return to [facility] when she is deemed medically stable for discharge. Patient has resided at [facility] for 3 years. She uses a walker and a wheelchair to assist with ambulation. Patient has support of family. Facility provides daily care and assistance with medication administration and transportation needs. PCP is Dr. Julia Pickard. Patient has Medicare and Medicaid insurance coverage and her prescriptions are well covered with these plans. Spoke with Tejas Foreman in Admissions at [facility] and informed of Patient plan to return when stable. Patient has a '[facility]' order in place and does have medical directives also on file. LSW to assist with discharge placement details as appropriate and will monitor and assist with other needs as they present.     Lionel. Isaias Couch[address]  2/7/2023

## 2023-02-07 NOTE — PROGRESS NOTES
Entered patient's room for morning vital signs and head to toe assessment. Patient resting in the bed at this time. A&O x4, calm, and cooperative. Patient has delayed responses from her past stroke. Patient stated she is normally has weakness on her R side from the stroke. Patient denies pain at this time. Vital signs and head to toe assessment completed at this time, see flowsheets for more details. Patient denies no more needs at this time. Call light within reach. Bed alarm on. Bed wheels locked. Bed in lowest position.

## 2023-02-07 NOTE — FLOWSHEET NOTE
Upon entering room patient was on 1L NC O2. Attempt to ween patient to room air at this time, but patient was unable to maintain an SpO2 of 90% or greater. Patient continues to stay on 1L NC O2 at this time and will attempt to ween at another time.

## 2023-02-08 ENCOUNTER — APPOINTMENT (OUTPATIENT)
Dept: GENERAL RADIOLOGY | Age: 73
DRG: 291 | End: 2023-02-08
Payer: MEDICARE

## 2023-02-08 VITALS
TEMPERATURE: 97.5 F | HEIGHT: 64 IN | HEART RATE: 111 BPM | RESPIRATION RATE: 18 BRPM | OXYGEN SATURATION: 92 % | DIASTOLIC BLOOD PRESSURE: 81 MMHG | BODY MASS INDEX: 34.19 KG/M2 | WEIGHT: 200.3 LBS | SYSTOLIC BLOOD PRESSURE: 130 MMHG

## 2023-02-08 LAB
ABSOLUTE EOS #: 0.05 K/UL (ref 0–0.44)
ABSOLUTE IMMATURE GRANULOCYTE: 0 K/UL (ref 0–0.3)
ABSOLUTE LYMPH #: 0.78 K/UL (ref 1.1–3.7)
ABSOLUTE MONO #: 0.64 K/UL (ref 0.1–1.2)
ALBUMIN SERPL-MCNC: 3.6 G/DL (ref 3.5–5.2)
ALBUMIN/GLOBULIN RATIO: 1.6 (ref 1–2.5)
ALP SERPL-CCNC: 92 U/L (ref 35–104)
ALT SERPL-CCNC: 9 U/L (ref 5–33)
ANION GAP SERPL CALCULATED.3IONS-SCNC: 10 MMOL/L (ref 9–17)
AST SERPL-CCNC: 11 U/L
BASOPHILS # BLD: 0 % (ref 0–2)
BASOPHILS ABSOLUTE: 0 K/UL (ref 0–0.2)
BILIRUB SERPL-MCNC: 0.6 MG/DL (ref 0.3–1.2)
BUN SERPL-MCNC: 28 MG/DL (ref 8–23)
BUN/CREAT BLD: 24 (ref 9–20)
CALCIUM SERPL-MCNC: 10.4 MG/DL (ref 8.6–10.4)
CHLORIDE SERPL-SCNC: 103 MMOL/L (ref 98–107)
CO2 SERPL-SCNC: 27 MMOL/L (ref 20–31)
CREAT SERPL-MCNC: 1.15 MG/DL (ref 0.5–0.9)
EOSINOPHILS RELATIVE PERCENT: 1 % (ref 1–4)
GFR SERPL CREATININE-BSD FRML MDRD: 51 ML/MIN/1.73M2
GLUCOSE SERPL-MCNC: 161 MG/DL (ref 70–99)
HCT VFR BLD AUTO: 34.4 % (ref 36.3–47.1)
HGB BLD-MCNC: 10.8 G/DL (ref 11.9–15.1)
IMMATURE GRANULOCYTES: 0 %
LYMPHOCYTES # BLD: 16 % (ref 24–43)
MCH RBC QN AUTO: 28.7 PG (ref 25.2–33.5)
MCHC RBC AUTO-ENTMCNC: 31.4 G/DL (ref 28.4–34.8)
MCV RBC AUTO: 91.5 FL (ref 82.6–102.9)
MONOCYTES # BLD: 13 % (ref 3–12)
MORPHOLOGY: ABNORMAL
NRBC AUTOMATED: 0 PER 100 WBC
PDW BLD-RTO: 14.7 % (ref 11.8–14.4)
PLATELET # BLD AUTO: ABNORMAL K/UL (ref 138–453)
PLATELET, FLUORESCENCE: 132 K/UL (ref 138–453)
PLATELET, IMMATURE FRACTION: 5 % (ref 1.1–10.3)
POTASSIUM SERPL-SCNC: 4.1 MMOL/L (ref 3.7–5.3)
PROT SERPL-MCNC: 5.9 G/DL (ref 6.4–8.3)
RBC # BLD: 3.76 M/UL (ref 3.95–5.11)
SARS-COV-2 RDRP RESP QL NAA+PROBE: NOT DETECTED
SEG NEUTROPHILS: 70 % (ref 36–65)
SEGMENTED NEUTROPHILS ABSOLUTE COUNT: 3.43 K/UL (ref 1.5–8.1)
SODIUM SERPL-SCNC: 140 MMOL/L (ref 135–144)
SPECIMEN DESCRIPTION: NORMAL
WBC # BLD AUTO: 4.9 K/UL (ref 3.5–11.3)

## 2023-02-08 PROCEDURE — 85055 RETICULATED PLATELET ASSAY: CPT

## 2023-02-08 PROCEDURE — 6370000000 HC RX 637 (ALT 250 FOR IP): Performed by: INTERNAL MEDICINE

## 2023-02-08 PROCEDURE — 2580000003 HC RX 258: Performed by: INTERNAL MEDICINE

## 2023-02-08 PROCEDURE — 36415 COLL VENOUS BLD VENIPUNCTURE: CPT

## 2023-02-08 PROCEDURE — C9803 HOPD COVID-19 SPEC COLLECT: HCPCS

## 2023-02-08 PROCEDURE — 71046 X-RAY EXAM CHEST 2 VIEWS: CPT

## 2023-02-08 PROCEDURE — 87635 SARS-COV-2 COVID-19 AMP PRB: CPT

## 2023-02-08 PROCEDURE — 94761 N-INVAS EAR/PLS OXIMETRY MLT: CPT

## 2023-02-08 PROCEDURE — 85025 COMPLETE CBC W/AUTO DIFF WBC: CPT

## 2023-02-08 PROCEDURE — 80053 COMPREHEN METABOLIC PANEL: CPT

## 2023-02-08 PROCEDURE — 6360000002 HC RX W HCPCS: Performed by: INTERNAL MEDICINE

## 2023-02-08 PROCEDURE — 2700000000 HC OXYGEN THERAPY PER DAY

## 2023-02-08 RX ADMIN — METFORMIN HYDROCHLORIDE 500 MG: 500 TABLET ORAL at 09:25

## 2023-02-08 RX ADMIN — GABAPENTIN 600 MG: 300 CAPSULE ORAL at 09:25

## 2023-02-08 RX ADMIN — FUROSEMIDE 40 MG: 10 INJECTION, SOLUTION INTRAMUSCULAR; INTRAVENOUS at 09:24

## 2023-02-08 RX ADMIN — ATORVASTATIN CALCIUM 80 MG: 40 TABLET, FILM COATED ORAL at 09:25

## 2023-02-08 RX ADMIN — HYDROCHLOROTHIAZIDE 25 MG: 25 TABLET ORAL at 09:24

## 2023-02-08 RX ADMIN — LOSARTAN POTASSIUM 100 MG: 50 TABLET, FILM COATED ORAL at 09:25

## 2023-02-08 RX ADMIN — POTASSIUM CHLORIDE 20 MEQ: 1500 TABLET, EXTENDED RELEASE ORAL at 09:25

## 2023-02-08 RX ADMIN — SODIUM CHLORIDE, PRESERVATIVE FREE 10 ML: 5 INJECTION INTRAVENOUS at 09:26

## 2023-02-08 RX ADMIN — APIXABAN 5 MG: 5 TABLET, FILM COATED ORAL at 09:25

## 2023-02-08 RX ADMIN — DILTIAZEM HYDROCHLORIDE 120 MG: 120 CAPSULE, COATED, EXTENDED RELEASE ORAL at 09:25

## 2023-02-08 RX ADMIN — METOPROLOL SUCCINATE 200 MG: 100 TABLET, EXTENDED RELEASE ORAL at 09:24

## 2023-02-08 NOTE — DISCHARGE INSTR - COC
Continuity of Care Form    Patient Name: Aristeo Dee   :  1950  MRN:  [id number]    Admit date:  2023  Discharge date:  2023    Code Status Order: DNR-CCA   Advance Directives:     Admitting Physician:  Claudia Monaco MD  PCP: Claudia Monaco MD    Discharging Nurse: [facility] Unit/Room#: 0111/6605-26  Discharging Unit Phone Number: [phone]    Emergency Contact:   Extended Emergency Contact Information  Primary Emergency Contact: Karma [address] Phone: [phone]  Work Phone: [phone]  Mobile Phone: [phone]  Relation: Brother/Sister  Hearing or visual needs: None  Other needs: None  Preferred language: English   needed? No  Secondary Emergency Contact: Dodie Miranda   [address] Phone: [phone]  Relation: Child  Hearing or visual needs: None  Other needs: None  Preferred language: English   needed?  No    Past Surgical History:  Past Surgical History:   Procedure Laterality Date    CARDIAC CATHETERIZATION Left 2017    right radial, MHT Dr. Kristy Diggs         Immunization History:   Immunization History   Administered Date(s) Administered    COVID-19, MODERNA BLUE border, Primary or Immunocompromised, (age 12y+), IM, 100 mcg/0.5mL 2021, 2021    Influenza, Quadv, 6 mo and older, IM, PF (Flulaval, Fluarix) 2018    Pneumococcal Conjugate 13-valent (Norina Bussing) 2013       Active Problems:  Patient Active Problem List   Diagnosis Code    Hypertension I10    Hyperlipidemia E78.5    Acute on chronic systolic CHF (congestive heart failure) ([facility] I50.23    Hypertensive urgency I16.0    Idiopathic cardiomyopathy (HCC) I42.9    Hypertensive emergency I16.1    Chronic combined systolic and diastolic HF (heart failure), NYHA class 2 (HCC) I50.42    Acute on chronic combined systolic and diastolic CHF, NYHA class 4 (HCC) I50.43    Hypoxia R09.02 Moderate mitral regurgitation by prior echocardiogram I34.0    Morbid obesity ([facility]) E66.01    CKD (chronic kidney disease) stage 3, GFR 30-59 ml/min ([facility]) N18.30    Physical deconditioning R53.81    TIA (transient ischemic attack) G45.9    Old lacunar stroke without late effect Z86.73    Dysarthria R47.1    Stroke determined by clinical assessment ([facility] I63.9    Aphasia R47.01    Type 2 diabetes mellitus, without long-term current use of insulin ([facility]) E11.9    Uncontrolled type 2 diabetes mellitus with hyperglycemia ([facility]) E11.65    Acute ischemic stroke ([facility]) I63.9    PAF (paroxysmal atrial fibrillation) ([facility]) I48.0    Encounter for current long-term use of anticoagulants Z79.01    Hemiplegia and hemiparesis following cerebral infarction affecting right dominant side ([facility]) I69.351    Atrial fibrillation with RVR ([facility]) I48.91    Unable to care for self Z78.9    Abnormal result of other cardiovascular function study R94.39    Chest pain due to CAD ([facility]) S59.772    Acute systolic CHF (congestive heart failure), NYHA class 3 ([facility]) I50.21       Isolation/Infection:   Isolation            No Isolation          Patient Infection Status       Infection Onset Added Last Indicated Last Indicated By Review Planned Expiration Resolved Resolved By    None active    Resolved    COVID-19 (Rule Out) 02/06/23 02/06/23 02/06/23 COVID-19, Rapid (Ordered)   02/06/23 Rule-Out Test Resulted    COVID-19 (Rule Out) 11/16/21 11/16/21 11/16/21 COVID-19, Rapid (Ordered)   11/16/21 Rule-Out Test Resulted    COVID-19 (Rule Out) 09/17/20 09/17/20 09/17/20 COVID-19 (Ordered)   09/18/20 Rule-Out Test Resulted    COVID-19 (Rule Out) 08/24/20 08/24/20 08/24/20 COVID-19 (Ordered)   08/24/20 Rule-Out Test Resulted            Nurse Assessment:  Last Vital Signs: /81   Pulse (!) 111   Temp 97.5 °F (36.4 °C) (Temporal)   Resp 18   Ht 5' 4\" (1.626 m)   Wt 200 lb 4.8 oz (90.9 kg)   SpO2 (!) 88%   BMI 34.38 kg/m²     Last documented pain score (0-10 scale): Pain Level: 0  Last Weight:   Wt Readings from Last 1 Encounters:   02/08/23 200 lb 4.8 oz (90.9 kg)     Mental Status:  oriented and alert    IV Access:  - None    Nursing Mobility/ADLs:  Walking   Assisted  Transfer  Assisted  Bathing  Assisted  Dressing  Assisted  Toileting  Assisted  Feeding  Independent  Med Admin  Dependent  Med Delivery   whole    Wound Care Documentation and Therapy:  Incision 08/04/17 Wrist Right (Active)   Number of days: 2013        Elimination:  Continence: Bowel: Yes  Bladder: Yes  Urinary Catheter: None   Colostomy/Ileostomy/Ileal Conduit: No       Date of Last BM: 2/8/2023    Intake/Output Summary (Last 24 hours) at 2/8/2023 0907  Last data filed at 2/8/2023 0848  Gross per 24 hour   Intake 1380 ml   Output 500 ml   Net 880 ml     I/O last 3 completed shifts: In: 1320 [P.O.:1320]  Out: 950 [Urine:950]    Safety Concerns: At Risk for Falls    Impairments/Disabilities:      None    Nutrition Therapy:  Current Nutrition Therapy:   - Oral Diet:  General    Routes of Feeding: Oral  Liquids: Thin Liquids  Daily Fluid Restriction: no  Last Modified Barium Swallow with Video (Video Swallowing Test): not done    Treatments at the Time of Hospital Discharge:   Respiratory Treatments: Oxygen  Oxygen Therapy:  is on oxygen at 1 L/min per nasal cannula.   Ventilator:    - No ventilator support    Rehab Therapies: Physical Therapy and Occupational Therapy  Weight Bearing Status/Restrictions: No weight bearing restrictions  Other Medical Equipment (for information only, NOT a DME order):  wheelchair and walker  Other Treatments: n/a    Patient's personal belongings (please select all that are sent with patient):  None    RN SIGNATURE:  Electronically signed by Nell Morales RN on 2/8/23 at 8:51 AM EST    CASE MANAGEMENT/SOCIAL WORK SECTION    Inpatient Status Date: 2/6/23    Readmission Risk Assessment Score:  Readmission Risk              Risk of Unplanned Readmission:  15           Discharging to Facility/ Agency   Name: [facility]  Phone:[phone]  Fax:[phone]    Dialysis Facility (if applicable)   Name:  Address:  Dialysis Schedule:  Phone:  Fax:    / signature: Electronically signed by PAWAN Jean on 2/8/23 at 9:07 AM EST    PHYSICIAN SECTION    Prognosis: Fair    Condition at Discharge: Stable    Rehab Potential (if transferring to Rehab): Fair    Recommended Labs or Other Treatments After Discharge: cbc w/ diff, cmp in 1 week    Physician Certification: I certify the above information and transfer of Darinel Jimenes  is necessary for the continuing treatment of the diagnosis listed and that she requires Tony Francesco for greater 30 days.      Update Admission H&P: No change in H&P    PHYSICIAN SIGNATURE:  Electronically signed by SUE Dennis CNP on 2/8/23 at 8:43 AM EST

## 2023-02-08 NOTE — PROGRESS NOTES
Physician Progress Note      Rai Padron  [facility] #:                  [id number]  :                       1950  ADMIT DATE:       2023 1:17 PM  Lisette Carrasquillo DATE:        2023 12:32 PM  RESPONDING  PROVIDER #:        Augusta Harvey MD          QUERY TEXT:          Pt admitted with acute systolic CHF. Pt noted to have  history of   hypertension, CAD, cardiomyopathy.  echocardiogram showed  mild to   moderate mitral regurgitation and moderate tricuspid regurgitation    If possible, please document in progress notes and discharge summary the   etiology of CHF, if able to be determined. The medical record reflects the following:  Risk Factors:  DM, atrial fibrillation,  history of hypertension, CAD,   cardiomyopathy. Clinical Indicators: SOB, rales,  admission Pro-BNP 6875;  22 echo: EF   45%, mild global hyperkinesis, mild to moderate mitral regurgitation, moderate   tricuspid regurgitation, diastolic function cannot be assessed due to atrial   fibrillation  Treatment: IV Lasix,  hydrochlorothiazide, losartan, metoprolol succinate    Sahra Capps, MSN, RN, CCDS, [address] [place] [address]   [phone]  . Options provided:  -- CHF due to Hypertensive Heart Disease  -- CHF due to Hypertensive Heart Disease and CAD  -- CHF not due to Hypertension but due to CAD  -- CHF due to Hypertensive Heart Disease and cardiomyopathy  -- CHF not due to Hypertension but due to cardiomyopathy  -- CHF due to Hypertensive Heart Disease and Valvular Heart Disease  -- CHF not due to Hypertension but due to valvular heart disease  -- CHF due to HTN, CAD, cardiomyopathy  and valvular disease  -- Other - I will add my own diagnosis  -- Disagree - Not applicable / Not valid  -- Disagree - Clinically unable to determine / Unknown  -- Refer to Clinical Documentation Reviewer    PROVIDER RESPONSE TEXT:    This patient has CHF due to hypertensive heart disease.     Query created by: Alexy Lamb on 2/7/2023 3:01 PM      Electronically signed by:  Pat Heredia MD 2/8/2023 2:28 PM

## 2023-02-08 NOTE — DISCHARGE SUMMARY
Discharge Summary    Chastity Sanchez  :  1950  MRN:  [id number]    Admit date:  2023      Discharge date: 2023     Admitting Physician:  Jen Suero MD    Discharge Diagnoses:    Principal Problem:    Acute systolic CHF (congestive heart failure), NYHA class 3 ([facility] [address] [address]  Resolved Problems:    * No resolved hospital problems. *      Hospital Course:   Chastity Sanchez is a 81 y.o. female admitted with systolic CHF. She presented to the emergency room from [facility] with complaints of shortness of breath. Symptom onset few hours. Complained of increased weakness. Patient does wear oxygen at 2 L per nasal cannula as baseline. She is chronically on Eliquis for atrial fibrillation. She complained of mild headache initially but is resolved. Patient was evaluated and admitted. She was placed on IV Lasix. PT and OT were consulted. Radiology studies were negative except chest x-ray showed mild pulmonary edema. Patient's symptoms are resolved today. Hemodynamically she is stable. She will be discharged today back to [facility] with a CBC and CMP in 1 week. Consultants:  none    Procedures: none    Complications: none    Discharge Condition: fair    Exam:  GEN:    Awake, alert and oriented x3. EYES:   EOMI, pupils equal   NECK: Supple. No lymphadenopathy. No carotid bruit  CVS:     regular rate and rhythm, no audible murmur  PULM:  CTA, no wheezes, rales or rhonchi, no acute respiratory distress  ABD:     Bowels sounds normal.  Abdomen is soft. No distention. no tenderness to palpation. EXT:     no edema bilaterally . No calf tenderness. NEURO: Moves all extremities. Motor and sensory are grossly intact  SKIN:    No rashes. No skin lesions.      Significant Diagnostic Studies:   Lab Results   Component Value Date    WBC 4.9 2023    HGB 10.8 (L) 2023    PLT See Reflexed IPF Result 2023       Lab Results   Component Value Date    BUN 28 (H) 02/08/2023    CREATININE 1.15 (H) 02/08/2023     02/08/2023    K 4.1 02/08/2023    CALCIUM 10.4 02/08/2023     02/08/2023    CO2 27 02/08/2023    LABGLOM 51 (L) 02/08/2023       Lab Results   Component Value Date    WBCUA 0 TO 2 02/06/2023    RBCUA 0 TO 2 02/06/2023    EPITHUA 0 TO 2 02/06/2023    LEUKOCYTESUR MODERATE (A) 02/06/2023    SPECGRAV 1.015 02/06/2023    GLUCOSEU NEGATIVE 02/06/2023    KETUA NEGATIVE 02/06/2023    PROTEINU NEGATIVE 02/06/2023    HGBUR NEGATIVE 02/06/2023    CASTUA NOT REPORTED 06/21/2021    CRYSTUA NOT REPORTED 06/21/2021    BACTERIA 2+ (A) 02/06/2023    YEAST NOT REPORTED 06/21/2021       CT Head W/O Contrast    Result Date: 2/6/2023  EXAMINATION: CT OF THE HEAD WITHOUT CONTRAST  2/6/2023 2:38 pm TECHNIQUE: CT of the head was performed without the administration of intravenous contrast. Automated exposure control, iterative reconstruction, and/or weight based adjustment of the mA/kV was utilized to reduce the radiation dose to as low as reasonably achievable. COMPARISON: August 31, 2020 HISTORY: ORDERING SYSTEM PROVIDED HISTORY: Headache, on Eliquis TECHNOLOGIST PROVIDED HISTORY: Headache, on Eliquis Decision Support Exception - unselect if not a suspected or confirmed emergency medical condition->Emergency Medical Condition (MA) FINDINGS: BRAIN/VENTRICLES: There is no acute intracranial hemorrhage, mass effect or midline shift. No abnormal extra-axial fluid collection. The gray-white differentiation is maintained without evidence of an acute infarct. There is no evidence of hydrocephalus. Redemonstration of old left parietal infarct. Mild atrophy. Redemonstration of old bilateral cerebellar infarcts. ORBITS: The visualized portion of the orbits demonstrate no acute abnormality. SINUSES: The visualized paranasal sinuses and mastoid air cells demonstrate no acute abnormality. SOFT TISSUES/SKULL:  No acute abnormality of the visualized skull or soft tissues.      No acute intracranial abnormality. Old left parietal and bilateral cerebellar infarcts. Mild atrophy. XR CHEST PORTABLE    Result Date: 2/6/2023  EXAMINATION: ONE XRAY VIEW OF THE CHEST 2/6/2023 1:52 pm COMPARISON: November 16, 2021 HISTORY: ORDERING SYSTEM PROVIDED HISTORY: SOB, chest pain TECHNOLOGIST PROVIDED HISTORY: SOB, chest pain FINDINGS: Stable cardiac silhouette enlargement. Diffuse interstitial prominence consistent with mild pulmonary edema. No focal lung consolidation or infiltrate. No sizable pleural effusion. No pneumothorax. Mild pulmonary edema probably related to decompensated CHF.        Assessment and Plan:  Patient Active Problem List    Diagnosis Date Noted    Abnormal result of other cardiovascular function study 10/06/2022    Encounter for current long-term use of anticoagulants 11/06/2020    Stroke determined by clinical assessment ([address] 07/20/2020    Physical deconditioning 06/12/2019    Moderate mitral regurgitation by prior echocardiogram     Acute systolic CHF (congestive heart failure), NYHA class 3 ([address] 02/06/2023    Chest pain due to CAD ([address] 01/12/2023    Unable to care for self 11/17/2021    Atrial fibrillation with RVR ([address] 11/16/2021    Hemiplegia and hemiparesis following cerebral infarction affecting right dominant side ([address] 04/08/2021    PAF (paroxysmal atrial fibrillation) ([address] 09/25/2020    Acute ischemic stroke ([address] 08/26/2020    Uncontrolled type 2 diabetes mellitus with hyperglycemia ([address]     Type 2 diabetes mellitus, without long-term current use of insulin ([address] 08/25/2020    Aphasia     Old lacunar stroke without late effect     Dysarthria     TIA (transient ischemic attack) 12/22/2019    Morbid obesity ([address] 12/20/2018    CKD (chronic kidney disease) stage 3, GFR 30-59 ml/min ([facility] 12/20/2018    Hypoxia 12/18/2018    Acute on chronic combined systolic and diastolic CHF, NYHA class 4 ([address] 12/17/2018    Chronic combined systolic and diastolic HF (heart failure), [facility] 2 [facility] 08/28/2017    Acute on chronic systolic CHF (congestive heart failure) ([facility] 08/01/2017    Hypertensive urgency 08/01/2017    Hypertensive emergency 08/01/2017    Hypertension     Hyperlipidemia     Idiopathic cardiomyopathy ([facility]         Discharge Medications:         Medication List        CHANGE how you take these medications      miconazole 2 % powder  Commonly known as: MICOTIN  Apply topically 2 times daily. What changed:   how to take this  when to take this  reasons to take this  additional instructions     sodium phosphate 7-19 GM/118ML  What changed: Another medication with the same name was removed. Continue taking this medication, and follow the directions you see here.             CONTINUE taking these medications      acetaminophen 325 MG tablet  Commonly known as: TYLENOL     apixaban 5 MG Tabs tablet  Commonly known as: Eliquis  Take 1 tablet by mouth 2 times daily     atorvastatin 80 MG tablet  Commonly known as: LIPITOR  TAKE ONE TABLET BY MOUTH ONCE NIGHTLY     bisacodyl 10 MG suppository  Commonly known as: DULCOLAX     carboxymethylcellulose 1 % ophthalmic solution     dilTIAZem 120 MG extended release capsule  Commonly known as: CARDIZEM CD  Take 1 capsule by mouth daily     famotidine 20 MG tablet  Commonly known as: PEPCID     furosemide 40 MG tablet  Commonly known as: LASIX  Take 1 tablet by mouth daily     gabapentin 600 MG tablet  Commonly known as: NEURONTIN     losartan-hydroCHLOROthiazide 100-25 MG per tablet  Commonly known as: HYZAAR  Take 1 tablet by mouth daily     magnesium hydroxide 400 MG/5ML suspension  Commonly known as: MILK OF MAGNESIA     metFORMIN 500 MG tablet  Commonly known as: GLUCOPHAGE     metoprolol succinate 200 MG extended release tablet  Commonly known as: TOPROL XL  Take 1 tablet by mouth daily     potassium chloride 20 MEQ extended release tablet  Commonly known as: KLOR-CON M  Take 1 tablet by mouth daily     sertraline 100 MG tablet  Commonly known as: ZOLOFT              Patient Instructions:    Activity: activity as tolerated  Diet: cardiac diet  Wound Care: none needed  Other: CBC with differential, CMP in 1 week    Disposition:   DC to [facility]    Follow up:  Patient will be followed by Pete Correia MD in 1-2 weeks    CORE MEASURES on Discharge (if applicable)  ACE/ARB in CHF: Yes  Statin in [place]: NA  ASA in [place]: NA  Statin in CVA: NA  Antiplatelet in CVA: NA    Total time spent on discharge services: 40 minutes    Including the following activities:  Evaluation and Management of patient  Discussion with patient and/or surrogate about current care plan  Coordination with Case Management and/or   Coordination of care with Consultants (if applicable)   Coordination of care with Receiving Facility Physician (if applicable)  Completion of DME forms (if applicable)  Preparation of Discharge Summary  Preparation of Medication Reconciliation  Preparation of Discharge Prescriptions    Signed:  SUE Gutierrez CNP, SUE, NP-C  2/8/2023, 8:43 AM

## 2023-02-08 NOTE — PROGRESS NOTES
Assessment and vitals obtained at this as charted. Pt is A & O x4 and denies pain. SpO2 is 88% on room air. Writer placed pt on 1L O2 NC and rechecked SpO2 and she is now at 92%. Lungs are clear/diminished throughout. BLE have +2 pitting edema. Pt is resting in bed at this time. Pt denies any further needs. Call light within reach, care ongoing.

## 2023-02-08 NOTE — PROGRESS NOTES
Patient is discharge back to [facility] today. She will go by Cuong Schmitt @ 12:15 PM .  Discharge paper work done and fax to SNF.     PAWAN John

## 2023-02-08 NOTE — PROGRESS NOTES
VS rechecked and pt reassessed at this time. Pt remains A&Ox4. Pt denies any pain. SPO2 92% on 1 LPM nasal cannula. Assessment unchanged from previous. Pt denies any current needs and has call light within reach, will monitor.

## 2023-02-08 NOTE — PROGRESS NOTES
Pt is sitting up in bed. Medications administered as ordered. Pt states she is a little more short of breath since ambulating from bathroom. SPO2 89-90% on room air. Nasal cannula applied at 1 LPM and improved to 93%. Pt denies any other needs at this time and has call light within reach.

## 2023-02-08 NOTE — PROGRESS NOTES
CHF--[facility] -Progress Note    SUBJECTIVE:    Patient seen for f/u of Acute systolic CHF (congestive heart failure), NYHA class 3 ([address] She resting in bed alert and no distress. No complaints     ROS:   Constitutional: negative  for fevers, and negative for chills. Respiratory: negative for shortness of breath, negative for cough, and negative for wheezing  Cardiovascular: negative for chest pain, and negative for palpitations  Gastrointestinal: negative for abdominal pain, negative for nausea,negative for vomiting, negative for diarrhea, and negative for constipation     All other systems were reviewed with the patient and are negative unless otherwise stated in HPI      OBJECTIVE:        VITAL SIGNS:  Patient Vitals for the past 8 hrs:   BP Temp Temp src Pulse Resp SpO2 Weight   23 0536 -- -- -- -- -- -- 200 lb 4.8 oz (90.9 kg)   23 0306 90/70 96.8 °F (36 °C) Temporal 100 18 92 % --         Temp: 96.8 °F (36 °C)  Temp range:    Temp  Av.7 °F (36.5 °C)  Min: 96.8 °F (36 °C)  Max: 98.5 °F (36.9 °C)    BP: 90/70  BP Range:      Systolic (06OTV), HXA:358 , Min:90 , JAY:322      Diastolic (19AGZ), LCE:96, Min:70, Max:86    Heart Rate: 100  Pulse Range:    Pulse  Av.4  Min: 98  Max: 112    Resp: 18  Resp Range:   Resp  Av  Min: 18  Max: 22    SpO2: 92 % on room air  SpO2 range:   SpO2  Av.5 %  Min: 89 %  Max: 93 %      PaO2/FiO2 RATIO:  No results for input(s): POCPO2 in the last 72 hours. Exam:    GEN:    Awake, alert and oriented x3. EYES:  EOMI, pupils equal   NECK: Supple. No lymphadenopathy. No carotid bruit  CVS:    regular rate and rhythm, no audible murmur  PULM:  CTA, no wheezes, rales or rhonchi, no acute respiratory distress  ABD:    Bowels sounds normal.  Abdomen is soft. No distention. no tenderness to palpation. EXT:   no edema bilaterally . No calf tenderness. NEURO: Moves all extremities. Motor and sensory are grossly intact  SKIN:  No rashes.   No skin lesions. Diagnostic Data:      Complete Blood Count:   Recent Labs     02/06/23  1359 02/07/23  0616 02/08/23  0610   WBC 5.6 6.5 4.9   RBC 3.80* 3.71* 3.76*   HGB 11.3* 11.2* 10.8*   HCT 35.9* 34.6* 34.4*   MCV 94.5 93.3 91.5   MCH 29.7 30.2 28.7   MCHC 31.5 32.4 31.4   RDW 14.6* 14.7* 14.7*    131* See Reflexed IPF Result   MPV 12.3 12.1  --         Last 3 Blood Glucose:   Recent Labs     02/06/23  1359 02/07/23  0616 02/08/23  0610   GLUCOSE 133* 160* 161*        Comprehensive Metabolic Profile:   Recent Labs     02/06/23  1359 02/07/23  0616 02/08/23  0610    140 140   K 5.2 4.6 4.1    104 103   CO2 27 28 27   BUN 28* 29* 28*   CREATININE 1.29* 1.21* 1.15*   GLUCOSE 133* 160* 161*   CALCIUM 10.7* 10.7* 10.4   PROT 6.6 6.2* 5.9*   LABALBU 3.7 3.8 3.6   BILITOT 0.4 0.5 0.6   ALKPHOS 98 98 92   AST 12 11 11   ALT 12 10 9        Urinalysis:   Lab Results   Component Value Date/Time    NITRU NEGATIVE 02/06/2023 02:27 PM    COLORU Yellow 02/06/2023 02:27 PM    PHUR 5.5 02/06/2023 02:27 PM    WBCUA 0 TO 2 02/06/2023 02:27 PM    RBCUA 0 TO 2 02/06/2023 02:27 PM    MUCUS NOT REPORTED 06/21/2021 05:00 PM    TRICHOMONAS NOT REPORTED 06/21/2021 05:00 PM    YEAST NOT REPORTED 06/21/2021 05:00 PM    BACTERIA 2+ 02/06/2023 02:27 PM    SPECGRAV 1.015 02/06/2023 02:27 PM    LEUKOCYTESUR MODERATE 02/06/2023 02:27 PM    UROBILINOGEN Normal 02/06/2023 02:27 PM    BILIRUBINUR NEGATIVE 02/06/2023 02:27 PM    GLUCOSEU NEGATIVE 02/06/2023 02:27 PM    [place] Peat NEGATIVE 02/06/2023 02:27 PM    AMORPHOUS NOT REPORTED 06/21/2021 05:00 PM       HgBA1c:    Lab Results   Component Value Date/Time    LABA1C 7.6 01/05/2023 06:10 AM       Lactic Acid:   Lab Results   Component Value Date/Time    LACTA 1.8 06/21/2021 04:45 PM    LACTA 1.5 12/17/2018 05:17 PM        Troponin: No results for input(s): TROPONINI in the last 72 hours. CRP:  No results for input(s): CRP in the last 72 hours.       Radiology/Imaging:  CT Head W/O Contrast   Final Result   No acute intracranial abnormality. Old left parietal and bilateral   cerebellar infarcts. Mild atrophy. XR CHEST PORTABLE   Final Result   Mild pulmonary edema probably related to decompensated CHF. XR CHEST (2 VW)    (Results Pending)         ASSESSMENT / PLAN:  Primary Problem(s): Acute systolic CHF (congestive heart failure), NYHA class 3 (HCC)  Differential diagnoses: None  Condition is a chronic illness with exacerbation, progression or side effects of treatment  Condition is improving  Treatment plan: Continue current treatment  Imaging: no further imaging studies ordered today  Medications:   Continue IV Lasix  Continue losartan/HCTZ, Toprol-XL  Medication Monitoring / High Risk Medications: none   Nutrition status:   obesity, non-morbid  Dietician consult initiated    Hospital Prophylaxis:   DVT: Eliquis   Stress Ulcer:  None      MIPS Heart failure documentation  [] The patient has a history of heart transplant or Left Ventricular Assist Device (LVAD). [If yes, STOP HERE] - MIPS PERFORMANCE EXCLUSION    [] The patient has a current or prior documentation of left ventricular ejection fraction (LVEF) less than or equal to 40%, or moderate or severely depressed left ventricular systolic function [if \"no\", STOP HERE]  EF-45%    ACE / ARB:  [x] The patient was prescribed or is already taking and ACE or ARB  [] Reason why ACE or ARB was not prescirbed / taking:  N/A    BETA-BLOCKERS:  [x] The patient was prescribed or is already taking a Beta-blocker  [] Reason why Beta-blocker not prescribed / taking:  N/A  Disposition:  Shared decision making:  All test results, treatment options and disposition options were discussed with the patient today  Social determinants of health that may impact management: none  Code status: DNR-CCA   Disposition: Discharge plan is [facility] today    [address] documentation:  [x] I have confirmed that the patient's Advance Care Plan is present, Code Status is documented, or surrogate decision maker is listed in the patient's medical record  [If \"yes\", STOP HERE]     [] The patient's Advance Care Plan is NOT present because:    []  I confirmed today that the patient does not wish or was not able to name a   surrogate decision maker or provide and advance care plan.    [] Hospice care is currently being provided or has been provided within the   calendar year. []  I did NOT confirm today the presence of an [address] or surrogate   decision maker documented within the patient's medical record.    [DOES NOT SATISFY [id number] [facility], SUE - CNP , SUE, NP-C  [facility]        2/8/2023, 6:55 AM

## 2023-02-09 ENCOUNTER — CARE COORDINATION (OUTPATIENT)
Dept: CARE COORDINATION | Age: 73
End: 2023-02-09

## 2023-02-09 NOTE — CARE COORDINATION
Email sent to [facility] at [facility].     Will add patient to [id number] list.    Tameka Ross RN [id number] [facility] Care Coordinator [phone]

## 2023-02-15 ENCOUNTER — HOSPITAL ENCOUNTER (OUTPATIENT)
Age: 73
Setting detail: SPECIMEN
Discharge: HOME | End: 2023-02-15

## 2023-02-15 LAB
ABSOLUTE EOS #: 0.08 K/UL (ref 0–0.44)
ABSOLUTE IMMATURE GRANULOCYTE: <0.03 K/UL (ref 0–0.3)
ABSOLUTE LYMPH #: 1.18 K/UL (ref 1.1–3.7)
ABSOLUTE MONO #: 0.61 K/UL (ref 0.1–1.2)
ALBUMIN SERPL-MCNC: 3.8 G/DL (ref 3.5–5.2)
ALBUMIN/GLOBULIN RATIO: 1.4 (ref 1–2.5)
ALP SERPL-CCNC: 91 U/L (ref 35–104)
ALT SERPL-CCNC: 11 U/L (ref 5–33)
ANION GAP SERPL CALCULATED.3IONS-SCNC: 11 MMOL/L (ref 9–17)
AST SERPL-CCNC: 12 U/L
BASOPHILS # BLD: 1 % (ref 0–2)
BASOPHILS ABSOLUTE: 0.04 K/UL (ref 0–0.2)
BILIRUB SERPL-MCNC: 0.3 MG/DL (ref 0.3–1.2)
BUN SERPL-MCNC: 38 MG/DL (ref 8–23)
BUN/CREAT BLD: 23 (ref 9–20)
CALCIUM SERPL-MCNC: 10.3 MG/DL (ref 8.6–10.4)
CHLORIDE SERPL-SCNC: 105 MMOL/L (ref 98–107)
CO2 SERPL-SCNC: 25 MMOL/L (ref 20–31)
CREAT SERPL-MCNC: 1.63 MG/DL (ref 0.5–0.9)
EOSINOPHILS RELATIVE PERCENT: 1 % (ref 1–4)
GFR SERPL CREATININE-BSD FRML MDRD: 34 ML/MIN/1.73M2
GLUCOSE SERPL-MCNC: 144 MG/DL (ref 70–99)
HCT VFR BLD AUTO: 35.2 % (ref 36.3–47.1)
HGB BLD-MCNC: 11.1 G/DL (ref 11.9–15.1)
IMMATURE GRANULOCYTES: 0 %
LYMPHOCYTES # BLD: 20 % (ref 24–43)
MCH RBC QN AUTO: 29.4 PG (ref 25.2–33.5)
MCHC RBC AUTO-ENTMCNC: 31.5 G/DL (ref 28.4–34.8)
MCV RBC AUTO: 93.4 FL (ref 82.6–102.9)
MONOCYTES # BLD: 10 % (ref 3–12)
NRBC AUTOMATED: 0 PER 100 WBC
PDW BLD-RTO: 14.8 % (ref 11.8–14.4)
PLATELET # BLD AUTO: 171 K/UL (ref 138–453)
PMV BLD AUTO: 12.1 FL (ref 8.1–13.5)
POTASSIUM SERPL-SCNC: 4.4 MMOL/L (ref 3.7–5.3)
PROT SERPL-MCNC: 6.6 G/DL (ref 6.4–8.3)
RBC # BLD: 3.77 M/UL (ref 3.95–5.11)
SEG NEUTROPHILS: 68 % (ref 36–65)
SEGMENTED NEUTROPHILS ABSOLUTE COUNT: 3.99 K/UL (ref 1.5–8.1)
SODIUM SERPL-SCNC: 141 MMOL/L (ref 135–144)
WBC # BLD AUTO: 5.9 K/UL (ref 3.5–11.3)

## 2023-02-15 PROCEDURE — 80053 COMPREHEN METABOLIC PANEL: CPT

## 2023-02-15 PROCEDURE — 36415 COLL VENOUS BLD VENIPUNCTURE: CPT

## 2023-02-15 PROCEDURE — 85025 COMPLETE CBC W/AUTO DIFF WBC: CPT

## 2023-02-15 PROCEDURE — P9603 ONE-WAY ALLOW PRORATED MILES: HCPCS

## 2023-02-17 ENCOUNTER — CARE COORDINATION (OUTPATIENT)
Dept: CARE COORDINATION | Age: 73
End: 2023-02-17

## 2023-02-17 NOTE — CARE COORDINATION
[facility] Update Call    2023    Patient: Rodrigo Reynolds Patient : 1950   MRN: <[id number]>  Reason for Admission:  CHF  Discharge Date: 23 RARS: Readmission Risk Score: 14.5     Patient is long term resident at [facility] who is currently skilled. Need therapy notes. Care Transitions Post Acute Facility Update    Care Transitions Interventions  Post Acute Facility: [facility] Facility Update  Reported Nursing Issues: Vitals 2023 09:22    BP: 142/76 mmHg Temp:98 °F Pulse:64 bpm Weight:202 Lbs Resp:16 Breaths/min BS:148 mg/dL Pain:0 96% on O2       ADLs: Minimal Assistance   Bed Mobility: Moderate Assistance   Transfer Assistance: Moderate Assistance   Ambulation Assistance:  Moderate Assistance   How far (in feet) is the patient ambulating?: 10   Does patient use an assistive device?: Yes   Assistive Devices: Kaushik Pulido wheelchair          Raisa Contreras RN [facility] [facility] Acute Care Manager [phone]

## 2023-02-21 ENCOUNTER — CARE COORDINATION (OUTPATIENT)
Dept: CARE COORDINATION | Age: 73
End: 2023-02-21

## 2023-02-27 NOTE — CARE COORDINATION
[facility] Update Call    2023    Patient: Ada Juarez Patient : 1950   MRN: <[id number]>  Reason for Admission:  CHF  Discharge Date: 23 RARS: Readmission Risk Score: 14.5     Patient is long term resident at [facility] who is currently skilled. Need therapy notes. Care Transitions Post Acute Facility Update    Care Transitions Interventions  Post Acute Facility: [facility]  Post Acute Facility Update  Reported Nursing Issues: Current Vitals     BP: 142/76 mmHg Temp:98 °F Pulse:64 bpm Weight:206.8 Lbs Resp:16 Breaths/min BS:148 mg/dL O2:96 % Pain:0     Does patient use an assistive device?: Yes (Comment: Rollator)   Assistive Devices: Pt completed seated exercises consisting of bilateral marches, LAQ, hip abd, hip add, heel raises and toe raises for 10 reps x 2 with 2# to increase strength needed for functional mobility and safety. Rest breaks between sets to avoid fatigue. Pt completed dynamic standing activity to increase safety and improve balance and stability when completing functional tasks or ADLs. Pt stood at [facility] with one UE hold CGA while reaching for and placing pegs into board. No  LOB noted with pt demoing fair -balance. Pain - exhibited by patient.; Behaviors Exhibited: R foot pain, unable to rate or describe; Does pain limit patient's functional activities? = Yes; Pain limits the following functional activities[de-identified] mobility    Patient completed functional mobility in room with walker with min verbal cues to remove O2 nasal cannula. Patient completed functional reaching using right and left hand to sort cards and complete sorting task with min verbal  cues to attend patient dropped 6 cards.            Bruno Garcia RN Post Acute Care Manager [phone]

## 2023-02-28 ENCOUNTER — CARE COORDINATION (OUTPATIENT)
Dept: CARE COORDINATION | Age: 73
End: 2023-02-28

## 2023-02-28 NOTE — CARE COORDINATION
[facility] Update Call    2023    Patient: Eliu Hilliard Patient : 1950   MRN: <[id number]>  Reason for Admission:  CHF  Discharge Date: 23 RARS: Readmission Risk Score: 14.5     Therapy is reporting patient is back to baseline, she will be long term again 3/10. Care Transitions Post Acute Facility Update    Care Transitions Interventions  Post Acute Facility: [facility]  Post Acute Facility Update  Reported Nursing Issues: Current Vitals   BP: 132/82 mmHg  Temp:97.2 °F  Pulse:87 bpm  Weight:201 Lbs  Resp:16 Breaths/min  BS:128 mg/dL O2:95 %  2023 14:55  Pain:0     Ambulation Assistance: Contact Guard Assist - Hands on patient for balance   How far (in feet) is the patient ambulating?: 50   Does patient use an assistive device?: Yes (Comment: FWW)   Assistive Devices: Precautions / Contraindications: 2L Supplemental O2 per nc, continuous  Fall Risk  FWW  AFO R LE  Pt completed seated exercises consisting of bilateral marches, LAQ, HS curls(GTB), hip abd, hip add, heel  raises and toe raises for 15 reps x 2 with 2.5# to increase strength needed for functional mobility, endurance and  ambulation. Rest break between sets to aovid fatigue. Gait training with FWW with close w/c f/u for anticipated rest breaks to increase safety and independence with  gait. Pt ambulated 24ft, 68ft and 50ft CGA with seated rest breaks needed d/t pain in RLE and feeling SOB. O2 was  then donned to relieve SOB. Pain - General Pain = Present, assessment indicated; Pain Assessment Method = Pain level determined based upon behaviors  exhibited by patient.; Behaviors Exhibited: R side pain and RLE pain, unable to rate or describe;  Does pain limit  patient's functional activities? = Yes; Pain limits the following functional activities[de-identified] mobility    Precautions / Contraindications: fall risk  Therapeutic resistance exercises B UE with use of 2# weight to increase strength and ROM with skilled  intervention focused on improving (I) with all self-care performance tasks, patient completed shoulder  flexion/extension, ab/ad, chest press, elbow flexion/extension, supination/pronation, wrist flexion/extension for 15 reps  x 2 sets. Pt demo [facility] task retrieveing items from puddy, pinch and  with fair tolerance to increase dexterity and  [facility] for ease with self care tasks.   Pain - General Pain = No pain present, per patient verbal and nonverbal communication   Barriers to Discharge: SOB , Endurance          Argenis Adams RN Post Acute Care Manager [phone]

## 2023-03-01 ENCOUNTER — OFFICE VISIT (OUTPATIENT)
Dept: CARDIOLOGY | Age: 73
End: 2023-03-01
Payer: MEDICARE

## 2023-03-01 VITALS
DIASTOLIC BLOOD PRESSURE: 88 MMHG | OXYGEN SATURATION: 94 % | HEART RATE: 73 BPM | RESPIRATION RATE: 19 BRPM | SYSTOLIC BLOOD PRESSURE: 121 MMHG | BODY MASS INDEX: 34.91 KG/M2 | WEIGHT: 203.4 LBS

## 2023-03-01 DIAGNOSIS — I10 ESSENTIAL HYPERTENSION: ICD-10-CM

## 2023-03-01 DIAGNOSIS — I34.0 SEVERE MITRAL REGURGITATION: ICD-10-CM

## 2023-03-01 DIAGNOSIS — I63.9 CRYPTOGENIC STROKE (HCC): ICD-10-CM

## 2023-03-01 DIAGNOSIS — I25.10 MILD CAD: ICD-10-CM

## 2023-03-01 DIAGNOSIS — E78.2 MIXED HYPERLIPIDEMIA: ICD-10-CM

## 2023-03-01 DIAGNOSIS — G47.33 OSA (OBSTRUCTIVE SLEEP APNEA): ICD-10-CM

## 2023-03-01 DIAGNOSIS — I48.20 CHRONIC A-FIB (HCC): Primary | ICD-10-CM

## 2023-03-01 DIAGNOSIS — I50.42 CHRONIC COMBINED SYSTOLIC AND DIASTOLIC HF (HEART FAILURE), NYHA CLASS 2 (HCC): ICD-10-CM

## 2023-03-01 DIAGNOSIS — Z95.818 IMPLANTABLE LOOP RECORDER PRESENT: ICD-10-CM

## 2023-03-01 PROCEDURE — 1111F DSCHRG MED/CURRENT MED MERGE: CPT | Performed by: PHYSICIAN ASSISTANT

## 2023-03-01 PROCEDURE — 3079F DIAST BP 80-89 MM HG: CPT | Performed by: PHYSICIAN ASSISTANT

## 2023-03-01 PROCEDURE — 99214 OFFICE O/P EST MOD 30 MIN: CPT | Performed by: PHYSICIAN ASSISTANT

## 2023-03-01 PROCEDURE — G8484 FLU IMMUNIZE NO ADMIN: HCPCS | Performed by: PHYSICIAN ASSISTANT

## 2023-03-01 PROCEDURE — G8427 DOCREV CUR MEDS BY ELIG CLIN: HCPCS | Performed by: PHYSICIAN ASSISTANT

## 2023-03-01 PROCEDURE — 3017F COLORECTAL CA SCREEN DOC REV: CPT | Performed by: PHYSICIAN ASSISTANT

## 2023-03-01 PROCEDURE — 1123F ACP DISCUSS/DSCN MKR DOCD: CPT | Performed by: PHYSICIAN ASSISTANT

## 2023-03-01 PROCEDURE — G8400 PT W/DXA NO RESULTS DOC: HCPCS | Performed by: PHYSICIAN ASSISTANT

## 2023-03-01 PROCEDURE — 1036F TOBACCO NON-USER: CPT | Performed by: PHYSICIAN ASSISTANT

## 2023-03-01 PROCEDURE — G8417 CALC BMI ABV UP PARAM F/U: HCPCS | Performed by: PHYSICIAN ASSISTANT

## 2023-03-01 PROCEDURE — 3074F SYST BP LT 130 MM HG: CPT | Performed by: PHYSICIAN ASSISTANT

## 2023-03-01 PROCEDURE — 1090F PRES/ABSN URINE INCON ASSESS: CPT | Performed by: PHYSICIAN ASSISTANT

## 2023-03-01 RX ORDER — DILTIAZEM HYDROCHLORIDE 180 MG/1
180 CAPSULE, COATED, EXTENDED RELEASE ORAL DAILY
Qty: 90 CAPSULE | Refills: 3 | Status: SHIPPED | OUTPATIENT
Start: 2023-03-01

## 2023-03-01 NOTE — PROGRESS NOTES
Marley Brown am scribing for and in the presence of Lelo Bennett. Patient: Gwen Cantu  : 1950  Date of Visit: 2023    REASON FOR VISIT / CONSULTATION: Follow-up (Patient states that she is unaware of any alerts on a device. Patient was in ED  for SOB. Dx with CHF. Patient states that she regularly has chest pain and SOB, intermittent dizziness. )    Dear Beryle Dine, MD,    HPI: Ms. Ale Tapia is a 79 y.o. female who has a cardiac history of abnormal echocardiogram which showed that her ejection fraction was reduced to about 40-45%. And a heart catheterization that was relatively unremarkable. Fortunately her repeat echocardiogram in 2018 and in 2019 showed her ejection fraction increased from 45-50% to 55% as well as showing only trivial mitral regurgitation. She had an ECHO on 6/3/2019 that showed EF of 50-55%. LV wall thickness is mildly increased. LA is mildly dilated (29-33) with a left atrial volume index of 31 m1/m2. mild diastolic dysfunction. She came to the ER on 2019 for slurred speech. She was than taken to [facility].  and found to have right sided weakness and was found to have a stroke at that time. She continued to have mild to moderate right sided weakness. She had abnormal stress test on 2022 There is a moderate perfusion defect of moderate intensity in the lateral, inferolateral and inferior region(s), which is most consistent with an old myocardial infarction with luis-infarct ischemia. Heart Cath done on 10/6/2022 Mild coronary artery disease without any significant focal stenosis. Normal left ventricular end diastolic pressure (LVEDP). Continue standard risk factor modification as clinically indicated and consider alternative etiologies of the patients symptoms.      Echo done on 2022 Global left ventricular systolic is difficult to assess due to beat to beat variability due to atrial fibrillation but appears to be mildly reduced with an estimated ejection fraction of 45%. The left ventricular cavity size is within normal limits and the left ventricular wall thickness is moderately increased. Mild global hypokinesis with no segmental abnormalities. The left atrium is severely dilated (>40) with a left atrial volume index of 69 ml/m2. Mitral annular calcification is seen. Mild to moderate mitral regurgitation. Moderate tricuspid regurgitation. Moderate pulmonary hypertension with an estimated right ventricular systolic pressure of 43 mmHg. Diastolic function can not be accurately assessed due to atrial fibrillation. Compared to the previous study of 11/17/2021, no significant change was seen. She did come to the [facility] on 2/9/2023 due to shortness of breath. Ms. Hector Prieto is here today for her hospital follow up as above. Also she has had multiple reports come over via her [employer] device and she is here to review this as well. Her breathing is better however she still needs oxygen at times. She has had some lightheaded and dizziness however no falls or near falls. No chest pain, pressure or tightness. She can feel heart palpitations at times as well. She does get weighed daily at the nursing home and at this time her weight is stable. Last visit in our office on 12/13/2022 she was 206 lbs and today she weighted 203 lbs. She is not sure if she has sleep apnea or not. She does snore at times, she uses nasal cannula oxygen as needed. She does have daytime fatigue. She also denied any cough, fever or chills. No abdominal pain, bleeding problems, or problems with her medications or any other concerns at this time. Bleeding Risks: Ms. Hector Prieto denies any current or recent bleeding problems including a history of a GI bleed, ulcers, recent or upcoming surgeries, blood in her stool or black tarry stools or blood in her urine.      Past Medical History:   Diagnosis Date    Adjustment disorder with depressed mood 06/15/2022 Arthritis     Atrial fibrillation, unspecified type ([facility] 11/20/2021    Cerebral artery occlusion with cerebral infarction [facility])     CHF (congestive heart failure) ([facility]     Chronic kidney disease 11/20/2021    Stage 3    Diabetes mellitus ([facility]     Dysarthria and anarthria 11/20/2021    Endothelial corneal dystrophy of both eyes 04/21/2022    H/O cardiac catheterization 08/04/2017    LMCA: Mild irregularites 10-20%. LAD: Mild irregularites 10-20%. LCx: Mild irregularites 10-20%. RCA: Mild irregularites 10-20%. LV function assessed as : Abnormal. EF of 40%. H/O echocardiogram 12/18/2018    EF 55%. Mildly increased LV wall thickness. The left atrium appears moderately to severely dilated. Moderate to severe mitral regurg. Moderate pulmonary HTN with an estimated RV systolic pressure of 39YKEE. Moderate tricuspid regurg. Evidnece fo moderate diastolic dysfunction is seen. Hearing loss 09/26/2022    History of echocardiogram 01/17/2019    EF 55%. LV wall thickness moderately increased. LA is mildly dilated w/ LA volume index of 30. trivial mitral regurg. Evidence of moderate (grade II) diastolic dysfunction seen. History of echocardiogram 06/03/2019    EF of 50-55%. LV wall thickness is mildly increased. LA is mildly dilated (29-33) with a left atrial volume index of 31 m1/m2. mild diastolic dysfunction. Hyperlipidemia     Hypertension     Major depressive disorder 09/28/2022     CURRENT ALLERGIES: Patient has no known allergies. REVIEW OF SYSTEMS: 14 systems were reviewed. Pertinent positives and negatives as above, all else negative.      Past Surgical History:   Procedure Laterality Date    CARDIAC CATHETERIZATION Left 08/04/2017    right radial, [facility] Dr. Kristy Diggs      Social History:  Social History     Tobacco Use    Smoking status: Never    Smokeless tobacco: Never   Vaping Use    Vaping Use: Never used   Substance Use Topics    Drug use: No        CURRENT MEDICATIONS:  Outpatient Medications Marked as Taking for the 3/1/23 encounter (Office Visit) with Augusta Ng PA-C   Medication Sig Dispense Refill    dilTIAZem (CARDIZEM CD) 180 MG extended release capsule Take 1 capsule by mouth daily 90 capsule 3    sodium phosphate (FLEET) 7-19 GM/118ML Place 1 enema rectally as needed (bowel protocol) Day 4 perform digital check for stool in rectum and administer Fleets Enema. Monitor and document results. Digital removal of stool may be performed if resident unable to expel.      gabapentin (NEURONTIN) 600 MG tablet Take 600 mg by mouth daily. sertraline (ZOLOFT) 100 MG tablet Take 100 mg by mouth at bedtime      magnesium hydroxide (MILK OF MAGNESIA) 400 MG/5ML suspension Take 30 mLs by mouth as needed for Constipation Give 1 ounce after two days of no recorded BM      bisacodyl (DULCOLAX) 10 MG suppository Place 10 mg rectally daily as needed for Constipation Give on day 3 if MOM not effective      carboxymethylcellulose 1 % ophthalmic solution Place 1 drop into both eyes 3 times daily      acetaminophen (TYLENOL) 325 MG tablet Take 650 mg by mouth every 4 hours as needed for Pain or Fever (temp>100.4F  Not to exceed 3200mg in 24 hours)      metoprolol succinate (TOPROL XL) 200 MG extended release tablet Take 1 tablet by mouth daily 30 tablet 3    furosemide (LASIX) 40 MG tablet Take 1 tablet by mouth daily 60 tablet 3    potassium chloride (KLOR-CON M) 20 MEQ extended release tablet Take 1 tablet by mouth daily 30 tablet 0    losartan-hydroCHLOROthiazide (HYZAAR) 100-25 MG per tablet Take 1 tablet by mouth daily 90 tablet 3    atorvastatin (LIPITOR) 80 MG tablet TAKE ONE TABLET BY MOUTH ONCE NIGHTLY 90 tablet 3    apixaban (ELIQUIS) 5 MG TABS tablet Take 1 tablet by mouth 2 times daily 180 tablet 3    famotidine (PEPCID) 20 MG tablet Take 20 mg by mouth at bedtime      miconazole (MICOTIN) 2 % powder Apply topically 2 times daily.  (Patient taking differently: Apply topically 2 times daily as needed Apply topically to reddened areas) 45 g 1    metFORMIN (GLUCOPHAGE) 500 MG tablet Take 500 mg by mouth 2 times daily (with meals)       FAMILY HISTORY: family history includes COPD in her sister; Coronary Art Dis in her brother and father. PHYSICAL EXAM:   /88   Pulse 73   Resp 19   Wt 203 lb 6.4 oz (92.3 kg)   SpO2 94%   BMI 34.91 kg/m²  Body mass index is 34.91 kg/m². Constitutional: She is oriented to person, place, and time. She appears well-developed and well-nourished. In no acute distress. HEENT: Normocephalic and atraumatic. No JVD present. Carotid bruit is not present. No mass and no thyromegaly present. No lymphadenopathy present. Cardiovascular: Normal rate, irregularly irregular rhythm, normal heart sounds. Exam reveals no gallop and no friction rubs. 2/6 systolic murmur, 5th intercostal space on the LEFT in the mid-clavicular line (cardiac apex). Pulmonary/Chest: Effort normal and breath sounds normal. No respiratory distress. She has no wheezes, rhonchi or rales. Abdominal: Soft, non-tender. Bowel sounds and aorta are normal. She exhibits no organomegaly, mass or bruit. Extremities: Trace. No cyanosis or clubbing. 2+ radial and carotid pulses. Distal extremity pulses: 2+ bilaterally. Neurological: She is alert and oriented to person, place, and time. No evidence of gross cranial nerve deficit. Coordination appeared normal.   Skin: Skin is warm and dry. There is no rash or diaphoresis. Psychiatric: She has a normal mood and affect.  Her speech is normal and behavior is normal.      MOST RECENT LABS ON RECORD:   Lab Results   Component Value Date    WBC 5.9 02/15/2023    HGB 11.1 (L) 02/15/2023    HCT 35.2 (L) 02/15/2023     02/15/2023    CHOL 102 04/07/2022    TRIG 62 04/07/2022    HDL 43 04/07/2022    ALT 11 02/15/2023    AST 12 02/15/2023     02/15/2023    K 4.4 02/15/2023     02/15/2023    CREATININE 1.63 (H) 02/15/2023    BUN 38 (H) 02/15/2023    CO2 25 02/15/2023    TSH 1.45 04/07/2022    INR 1.0 08/23/2020    LABA1C 7.6 (H) 01/05/2023     ASSESSMENT:  1. Chronic a-fib ([facility]    2. PAULINA (obstructive sleep apnea)    3. Chronic combined systolic and diastolic HF (heart failure), NYHA class 2 ([facility]    4. Mild CAD    5. Cryptogenic stroke ([facility]    6. Implantable loop recorder present    7. Severe mitral regurgitation    8. Essential hypertension    9. Mixed hyperlipidemia      PLAN:  Chronic Atrial Fibrillation: Rate Control We have had several alerts via her LINQ/ Loop with alerts of atrial fibrillation and short pauses. Her heart rate has been elevated and also she has had some lightheadedness and heart palpitations. No falls or near falls or loss of consciousness. We as below did make some medication changes at this time to better manage this. Also she is denied any known history of obstructive sleep apnea however with her history of atrial fibrillation and her device showing pauses we will get her tested as below. Beta Blocker: Continue metoprolol succinate (Toprol XL) 200 mg once daily. Calcium Channel Blocker: INCREASE to diltiazem CD (Cardizem CD) 180 mg once daily. I also discussed the potential side effects of this medication including lightheadedness and dizziness and instructed them to stop the medication of this occurs and call our office if this occurs. [id number] Score for Atrial Fibrillation Stroke Risk   Risk   Factors  Component Value   C CHF Yes 1   H HTN Yes 1   A2 Age >= 76 No,  (71 y.o.) 0   D DM Yes 1   S2 Prior Stroke/TIA Yes 2   V Vascular Disease No 0   A Age 74-69 Yes,  (71 y.o.) 1   Sc Sex female 1    [id number]  Score  7   Score last updated 4/0/58 0:50 AM EST  Click here for a link to the UpToDate guideline \"Atrial Fibrillation: Anticoagulation therapy to prevent embolization  Disclaimer: Risk Score calculation is dependent on accuracy of patient problem list and past encounter diagnosis.   Stroke Risk: Her CHADS2-VASc score is 7/9 (9.6% stroke risk)  Anticoagulation: Continue Apixaban (Eliquis) 5 mg every 12 hours. I also reminded her to watch for signs of blood in her stool or black tarry stools and stop the medication immediately if this develops as this could be life threatening. Because of her atrial fibrillation, I also would also recommend that she continue with anticoagulation to decrease her risk of stoke but also reminded her to watch for signs of blood in her stool or black tarry stools and stop the medication immediately if this develops as this could be life threatening. I took the liberty of ordering a BMP for today to assess their potassium and renal function. I told them that they could get their lab work performed at the location of their choosing, unfortunately, if the lab work was not performed at a [facility]) facility I could not guarantee my ability to follow up with them on their results. and  I took the liberty of ordering a CBC. I told them that they could get their lab work performed at the location of their choosing, unfortunately, if the lab work was not performed at a [facility]) facility I could not guarantee my ability to follow up with them on their results. I would like them to monitor her heart rate and blood pressure over the next week and fax the results. Suspected Obstructive Sleep Apnea: Given Ms. Ragland's symptoms of daytime tiredness and not waking up rested in the morning, I advised her to consider undergoing a sleep apnea screening which she agreed to do. Therefore I ordered a Apnea link home screening monitor for her. Chronic combined heart failure: [place] Heart Association Class: IIa (Mild symptoms only in normal activities). Weight is stable. Working with therapy and shortness of breath has improved since her hospital admission. Beta Blocker: Continue Metoprolol succinate (Toprol XL) 200 mg daily.    Diuretics: Continue furosemide (Lasix) 40 mg every morning. Heart failure counseling: I advised them to try and keep their legs up whenever possible and to limit salt in their diet. Patient Education/Instructions: I did review the signs and symptoms to watch for including shortness of breath, weight gain, lightheadedness/dizziness. I asked her to call the office if she were to develop persistent or worsening shortness of breath or weight gain of more than 3 pounds in 1-7 days. Ms. Patel Mckee verbalized understanding. I would like the nursing home continue doing daily weights and call us if this occurs. Repeat labs ordered as above. Mild Coronary artery disease and myocardial ischemia on 10/6/22:   Beta Blocker: Continue Metoprolol succinate (Toprol XL) 200 mg daily. Anti-anginal medications: INCREASE to diltiazem CD (Cardizem CD) 180 mg once daily. Cholesterol Reduction Therapy: Continue Atorvastatin (Lipitor) 80 mg daily. Additional counseling: I advised them to call our office or go to the emergency room if they developed worsening or persistent chest pain or increased shortness of breath as this could be life threatening. History of TIA/Cryptogenic stroke: Still with some residual symptoms, working with PT and reports she has been getting a little stronger. Antiplatelet Agent: Continue Aspirin 81 mg daily. Beta Blocker: Continue Metoprolol succinate (Toprol XL) 200 mg daily. Anti-anginal medications: INCREASE to diltiazem CD (Cardizem CD) 180 mg once daily. Cholesterol Reduction Therapy: Continue Atorvastatin (Lipitor) 80 mg daily. Implantable Loop Recorder:  Indication for Device Placement: Cryptogenic stroke  Interrogation Findings: I reviewed the results of their most recent home interrogation from 2/28/2023. History of Severe Mitral Regurgitation:  probably just functional as her echo on 6/19 showed only trivial MR. Echo 9/2022 Mild to moderate mitral regurgitation.   Beta Blocker: Continue Metoprolol succinate (Toprol XL) 200 mg daily. Essential Hypertension: Controlled  Beta Blocker: Continue Metoprolol succinate (Toprol XL) 200 mg daily. Calcium Channel Blocker: INCREASE to diltiazem CD (Cardizem CD) 180 mg once daily. Diuretics: Continue furosemide (Lasix) 40 mg every morning. Continue to monitor BP as above. Hyperlipidemia: Mixed, LDL done on 4/7/2022 was 47 mg/dL   Cholesterol Reduction Therapy: Continue Atorvastatin (Lipitor) 80 mg daily. Will get repeat lipid panel. Currently using Nasal cannula O2 as needed  Continue using PRN, O2 was 94% on room air today. Continue follow up with Kaylah Johns MD.    Finally, I recommended that she continue her other medications and follow up with you as previously scheduled. FOLLOW UP:   I told Ms. Ragland to call my office if she had any problems, but otherwise told her to No follow-ups on file. However, I would be happy to see her sooner should the need arise. However, I would be happy to see her sooner should the need arise. Once again, thank you for allowing me to participate in this patients care. Please do not hesitate to contact me could I be of further assistance. Sincerely,  [facility]) [facility], [address] [facility], [address]  Phone: [phone], Fax: [phone]       I believe that the risk of significant morbidity and mortality related to the patient's current medical conditions are: Intermediate. Approximately 30 minutes were spent during prep work, discussion and exam of the patient, and follow up documentation and all of their questions were answered. The documentation recorded by the scribe, accurately and completely reflects the services I personally performed and the decisions made by me.  Yamilka Chau PA-C March 1, 2023

## 2023-03-01 NOTE — PATIENT INSTRUCTIONS
SURVEY:    You may be receiving a survey from [employer] regarding your visit today. Please complete the survey to enable us to provide the highest quality of care to you and your family. If you cannot score us a very good on any question, please call the office to discuss how we could have made your experience a very good one. Thank you.

## 2023-03-01 NOTE — PROGRESS NOTES
Patient: Jamas Cooks  : 1950  Date of Visit: 2023    REASON FOR VISIT / CONSULTATION: Follow-up (Patient states that she is unaware of any alerts on a device. Patient was in ED  for SOB. Dx with CHF. Patient states that she regularly has chest pain and SOB, intermittent dizziness. )    Dear Roz Galdamez MD,    HPI: Ms. Patel Mckee is a 79 y.o. female who was admitted to the hospital with worsening shortness of breath. This has gotten worse over the past three months. She has a cardiac history of abnormal echocardiogram which showed that her ejection fraction was reduced to about 40-45%. And a heart catheterization that was relatively unremarkable. Fortunately her repeat echocardiogram in 2018 and in 2019 showed her ejection fraction increased from 45-50% to 55% as well as showing only trivial mitral regurgitation. She had an ECHO on 6/3/2019 that showed EF of 50-55%. LV wall thickness is mildly increased. LA is mildly dilated (29-33) with a left atrial volume index of 31 m1/m2. mild diastolic dysfunction. She came to the ER on 2019 for slurred speech. She was than taken to [facility].  and found to have right sided weakness and was found to have a stroke at that time. She continued to have mild to moderate right sided weakness. She had abnormal stress test on 2022 There is a moderate perfusion defect of moderate intensity in the lateral, inferolateral and inferior region(s), which is most consistent with an old myocardial infarction with luis-infarct ischemia. Ms. Patel Mckee is here today for a six month follow up. She says she is doing okay. She says she does have episodes of shortness of breath a couple times of day and she gets palpitations with these episodes that mildly bother her. She can walk about 100 feet before she has to stop due to pain. Occasional light headed/dizziness. No blood in her urine or stools. She denies any chest pain, pressure, or tightness.  She denied any abdominal pain, bleeding problems, or problems with her medications or any other concerns at this time. Bleeding Risks: Ms. Rowena Irvin denies any current or recent bleeding problems including a history of a GI bleed, ulcers, recent or upcoming surgeries, blood in her stool or black tarry stools or blood in her urine. Past Medical History:   Diagnosis Date    Adjustment disorder with depressed mood 06/15/2022    Arthritis     Atrial fibrillation, unspecified type ([facility] 11/20/2021    Cerebral artery occlusion with cerebral infarction [facility])     CHF (congestive heart failure) ([facility]     Chronic kidney disease 11/20/2021    Stage 3    Diabetes mellitus ([facility]     Dysarthria and anarthria 11/20/2021    Endothelial corneal dystrophy of both eyes 04/21/2022    H/O cardiac catheterization 08/04/2017    LMCA: Mild irregularites 10-20%. LAD: Mild irregularites 10-20%. LCx: Mild irregularites 10-20%. RCA: Mild irregularites 10-20%. LV function assessed as : Abnormal. EF of 40%. H/O echocardiogram 12/18/2018    EF 55%. Mildly increased LV wall thickness. The left atrium appears moderately to severely dilated. Moderate to severe mitral regurg. Moderate pulmonary HTN with an estimated RV systolic pressure of 51LGID. Moderate tricuspid regurg. Evidnece fo moderate diastolic dysfunction is seen. Hearing loss 09/26/2022    History of echocardiogram 01/17/2019    EF 55%. LV wall thickness moderately increased. LA is mildly dilated w/ LA volume index of 30. trivial mitral regurg. Evidence of moderate (grade II) diastolic dysfunction seen. History of echocardiogram 06/03/2019    EF of 50-55%. LV wall thickness is mildly increased. LA is mildly dilated (29-33) with a left atrial volume index of 31 m1/m2. mild diastolic dysfunction. Hyperlipidemia     Hypertension     Major depressive disorder 09/28/2022     CURRENT ALLERGIES: Patient has no known allergies. REVIEW OF SYSTEMS: 14 systems were reviewed.  Pertinent positives and negatives as above, all else negative. Past Surgical History:   Procedure Laterality Date    CARDIAC CATHETERIZATION Left 08/04/2017    right radial, T Dr. Bruce Stringer      Social History:  Social History     Tobacco Use    Smoking status: Never    Smokeless tobacco: Never   Vaping Use    Vaping Use: Never used   Substance Use Topics    Drug use: No        CURRENT MEDICATIONS:  Outpatient Medications Marked as Taking for the 3/1/23 encounter (Office Visit) with Jamal Moraes PA-C   Medication Sig Dispense Refill    sodium phosphate (FLEET) 7-19 GM/118ML Place 1 enema rectally as needed (bowel protocol) Day 4 perform digital check for stool in rectum and administer Fleets Enema. Monitor and document results. Digital removal of stool may be performed if resident unable to expel. dilTIAZem (CARDIZEM CD) 120 MG extended release capsule Take 1 capsule by mouth daily 90 capsule 3    gabapentin (NEURONTIN) 600 MG tablet Take 600 mg by mouth daily.       sertraline (ZOLOFT) 100 MG tablet Take 100 mg by mouth at bedtime      magnesium hydroxide (MILK OF MAGNESIA) 400 MG/5ML suspension Take 30 mLs by mouth as needed for Constipation Give 1 ounce after two days of no recorded BM      bisacodyl (DULCOLAX) 10 MG suppository Place 10 mg rectally daily as needed for Constipation Give on day 3 if MOM not effective      carboxymethylcellulose 1 % ophthalmic solution Place 1 drop into both eyes 3 times daily      acetaminophen (TYLENOL) 325 MG tablet Take 650 mg by mouth every 4 hours as needed for Pain or Fever (temp>100.4F  Not to exceed 3200mg in 24 hours)      metoprolol succinate (TOPROL XL) 200 MG extended release tablet Take 1 tablet by mouth daily 30 tablet 3    furosemide (LASIX) 40 MG tablet Take 1 tablet by mouth daily 60 tablet 3    potassium chloride (KLOR-CON M) 20 MEQ extended release tablet Take 1 tablet by mouth daily 30 tablet 0    losartan-hydroCHLOROthiazide (HYZAAR) 100-25 MG per tablet Take 1 tablet by mouth daily 90 tablet 3    atorvastatin (LIPITOR) 80 MG tablet TAKE ONE TABLET BY MOUTH ONCE NIGHTLY 90 tablet 3    apixaban (ELIQUIS) 5 MG TABS tablet Take 1 tablet by mouth 2 times daily 180 tablet 3    famotidine (PEPCID) 20 MG tablet Take 20 mg by mouth at bedtime      miconazole (MICOTIN) 2 % powder Apply topically 2 times daily. (Patient taking differently: Apply topically 2 times daily as needed Apply topically to reddened areas) 45 g 1    metFORMIN (GLUCOPHAGE) 500 MG tablet Take 500 mg by mouth 2 times daily (with meals)       FAMILY HISTORY: family history includes COPD in her sister; Coronary Art Dis in her brother and father. PHYSICAL EXAM:   /88   Pulse 73   Resp 19   Wt 203 lb 6.4 oz (92.3 kg)   SpO2 94%   BMI 34.91 kg/m²  Body mass index is 34.91 kg/m². Constitutional: She is oriented to person, place, and time. She appears well-developed and well-nourished. In no acute distress. HEENT: Normocephalic and atraumatic. No JVD present. Carotid bruit is not present. No mass and no thyromegaly present. No lymphadenopathy present. Cardiovascular: Tachycardic rate, irregularly irregular rhythm, normal heart sounds. Exam reveals no gallop and no friction rubs. 2/6 systolic murmur, 5th intercostal space on the LEFT in the mid-clavicular line (cardiac apex). Pulmonary/Chest: Effort normal and breath sounds normal. No respiratory distress. She has no wheezes, rhonchi or rales. Abdominal: Soft, non-tender. Bowel sounds and aorta are normal. She exhibits no organomegaly, mass or bruit. Extremities: Trace. No cyanosis or clubbing. 2+ radial and carotid pulses. Distal extremity pulses: 2+ bilaterally. Neurological: She is alert and oriented to person, place, and time. No evidence of gross cranial nerve deficit. Coordination appeared normal.   Skin: Skin is warm and dry. There is no rash or diaphoresis. Psychiatric: She has a normal mood and affect.  Her speech is normal and behavior is normal.      MOST RECENT LABS ON RECORD:   Lab Results   Component Value Date    WBC 5.9 02/15/2023    HGB 11.1 (L) 02/15/2023    HCT 35.2 (L) 02/15/2023     02/15/2023    CHOL 102 04/07/2022    TRIG 62 04/07/2022    HDL 43 04/07/2022    ALT 11 02/15/2023    AST 12 02/15/2023     02/15/2023    K 4.4 02/15/2023     02/15/2023    CREATININE 1.63 (H) 02/15/2023    BUN 38 (H) 02/15/2023    CO2 25 02/15/2023    TSH 1.45 04/07/2022    INR 1.0 08/23/2020    LABA1C 7.6 (H) 01/05/2023     ASSESSMENT:  No diagnosis found. PLAN:  Mild Coronary artery disease and myocardial ischemia on 10/6/22: EF is 45%   Antiplatelet Agent: Continue Aspirin 81 mg daily. Beta Blocker: Continue Metoprolol succinate (Toprol XL) 200 mg daily. Anti-anginal medications: STOP amlodipine (Norvasc) 5 mg once daily. and START diltiazem CD (Cardizem CD) 120 mg once daily. I also discussed the potential side effects of this medication including lightheadedness and dizziness and instructed them to stop the medication of this occurs and call our office if this occurs. Cholesterol Reduction Therapy: Continue Atorvastatin (Lipitor) 80 mg daily. Chronic Atrial Fibrillation: has failed rhythm control so will switch to Rate Control  Beta Blocker: Continue metoprolol succinate (Toprol XL) 200 mg once daily. Calcium Channel Blocker: Stop amlodipine and Start Diltiazem 120 mg daily. Rhythm Control Agent: Not indicated   Stroke Risk: Her CHADS2-VASc score is 7/9 (9.6% stroke risk)  Anticoagulation: Continue Apixaban (Eliquis) 5 mg every 12 hours. I also reminded her to watch for signs of blood in her stool or black tarry stools and stop the medication immediately if this develops as this could be life threatening.   Additional Testing: None   [id number] Score for Atrial Fibrillation Stroke Risk   Risk   Factors  Component Value   C CHF Yes 1   H HTN Yes 1   A2 Age >= 75 No,  (81 y.o.) 0   D DM Yes 1 S2 Prior Stroke/TIA Yes 2   V Vascular Disease No 0   A Age 74-69 Yes,  (71 y.o.) 1   Sc Sex female 1    [id number]  Score  7   Score last updated 7/92/03 4:21 AM EDT  Click here for a link to the UpToDate guideline \"Atrial Fibrillation: Anticoagulation therapy to prevent embolization  Disclaimer: Risk Score calculation is dependent on accuracy of patient problem list and past encounter diagnosis. History of TIA/Cryptogenic stroke: Still with some residual symptoms  Antiplatelet Agent: Continue Aspirin 81 mg daily. Beta Blocker: Continue Metoprolol succinate (Toprol XL) 200 mg daily. Anti-anginal medications: STOP amlodipine (Norvasc) 5 mg once daily. and START diltiazem CD (Cardizem CD) 120 mg once daily. I also discussed the potential side effects of this medication including lightheadedness and dizziness and instructed them to stop the medication of this occurs and call our office if this occurs. Cholesterol Reduction Therapy: Continue Atorvastatin (Lipitor) 80 mg daily. Additional Testing List: None     Chronic Systolic and Diastolic Heart Failure: EF of 50-55% on 6/3/19  Beta Blocker: Continue metoprolol succinate (Toprol XL) 200 mg  once daily. ACE Inibitor/ARB: Continue losartan/HCTZ (Hyzaar) 100/ 25 mg once daily. Nonpharmacologic management of Heart Failure: I told her to continue wearing lower extremity compression stockings and I advised her to try and keep her legs up whenever possible and to limit salt in her diet. Additional Testing: None    Essential Hypertension: Controlled and actually a bit hypotensive today  ACE Inibitor/ARB: Continue losartan/HCTZ (Hyzaar) 100/ 25 mg once daily. Beta Blocker: Continue metoprolol succinate (Toprol XL)    100-25 mg daily. Calcium Channel Blocker: STOP amlodipine (Norvasc) and START diltiazem CD (Cardizem CD) 120 mg once daily.  I also discussed the potential side effects of this medication including lightheadedness and dizziness and instructed them to stop the medication of this occurs and call our office if this occurs. History of Severe Mitral Regurgitation:  probably just functional as her echo on 6/19 showed only trivial MR. Beta Blocker: Continue Metoprolol succinate (Toprol XL) 200 mg daily. ACE Inibitor/ARB: Continue losartan/HCTZ (Hyzaar) 100/25 mg every morning. Hyperlipidemia: Mixed, LDL done on 4/7/2022 was 47 mg/dL   Cholesterol Reduction Therapy: Continue Atorvastatin (Lipitor) 80 mg daily. Finally, I recommended that she continue her other medications and follow up with you as previously scheduled. FOLLOW UP:   I told Ms. Ragland to call my office if she had any problems, but otherwise told her to No follow-ups on file. However, I would be happy to see her sooner should the need arise. However, I would be happy to see her sooner should the need arise. Once again, thank you for allowing me to participate in this patients care. Please do not hesitate to contact me could I be of further assistance. Sincerely,  Bishop Joaquin MD, MS, F.A.C.C. [facility]) [facility], [address] [place] [address]  Phone: [phone], Fax: [phone]       I believe that the risk of significant morbidity and mortality related to the patient's current medical conditions are: intermediate-high. The documentation recorded by the scribe, accurately and completely reflects the services I personally performed and the decisions made by me. Tom Escobar MD, MS, F.A.C.C.  March 1, 2023

## 2023-03-07 ENCOUNTER — CARE COORDINATION (OUTPATIENT)
Dept: CARE COORDINATION | Age: 73
End: 2023-03-07

## 2023-03-07 PROCEDURE — 93298 REM INTERROG DEV EVAL SCRMS: CPT | Performed by: FAMILY MEDICINE

## 2023-03-07 NOTE — CARE COORDINATION
[facility] Update Call    3/7/2023    Patient: Sally Arias Patient : 1950   MRN: <[id number]>  Reason for Admission:  CHF  Discharge Date: 23 RARS: Readmission Risk Score: 14.5     Therapy is reporting patient is back to baseline, she will be long term again 3/10. Care Transitions Post Acute Facility Update    Care Transitions Interventions  Post Acute Facility: [facility]  Post Acute Facility Update  Reported Nursing Issues: Current Vitals   BP: 144/80 mmHg  Temp:97.4 °F  Pulse:72 bpm  Weight:201.6 Lbs  Resp:16 Breaths/min  BS:171 mg/dL  O2:94 %  Pain:0    ADLs: Stand by Assist - Presence and Cueing   Bed Mobility: Minimal Assistance   Transfer Assistance: Minimal Assistance   Ambulation Assistance: Contact Guard Assist - Hands on patient for balance   How far (in feet) is the patient ambulating?: 10   Does patient use an assistive device?: Yes (Comment: FWW)   Assistive Devices: Precautions / Contraindications: 2L Supplemental O2 per nc, continuous  Fall Risk  FWW  AFO R LE  Pt completed seated exercises consisting of bilateral marches, LAQ, HS curls(RTB), hip abd, hip add, heel  raises and toe raises for 15 reps x 2 with 2.5# to increase strength needed for functional mobility and endurance. Rest  break between sets to avoid fatigue. Upon arrival to pts bedroom, pt in bathroom getting off toilet req assistance to don LE dressing. Therapist  noted pants were dirty had pt ambulate to EOB to doff dirty pants. Pt req assistance to don new pair of pants and  shoes. Sit<>stand from EOB to FWW SBA. Pt ambulated to bathroom ~10ft to wash hands with FWW and stood at  sink with heavy lean onto sink to complete hand hyigene. Stand pivot transfer to w/c CGA.  Pt then rode cubii bike  pedal for 6min x L4 to increase endurance and functional activity tolerance     OT: patient completed [facility] task utiliizing both hands to retreive and hold cards and place without dropping  onthis date with fair use of right hand  Standby for tolieitng with goodsafety on this date to complete all task. Anticipated discharge services: Resident continues to receive Medicare Skilled level of care for PT/OT for strengthening with transfers and ambulation after recent hospitalization for acute on chronic congestive heart failure. Assist of 1 for tranfers and ambulation for short distances. To remain long term after therapy complete.           Ana Ordonez RN Post Acute Care Manager [phone]

## 2023-03-09 ENCOUNTER — NURSE ONLY (OUTPATIENT)
Dept: CARDIOLOGY | Age: 73
End: 2023-03-09
Payer: MEDICARE

## 2023-03-09 DIAGNOSIS — Z45.09 ENCOUNTER FOR LOOP RECORDER CHECK: ICD-10-CM

## 2023-03-09 DIAGNOSIS — I63.9 CRYPTOGENIC STROKE (HCC): Primary | ICD-10-CM

## 2023-03-28 DIAGNOSIS — I25.10 MILD CAD: ICD-10-CM

## 2023-03-28 DIAGNOSIS — I63.9 CRYPTOGENIC STROKE (HCC): ICD-10-CM

## 2023-03-28 DIAGNOSIS — I50.42 CHRONIC COMBINED SYSTOLIC AND DIASTOLIC HF (HEART FAILURE), NYHA CLASS 2 (HCC): ICD-10-CM

## 2023-03-28 RX ORDER — DILTIAZEM HYDROCHLORIDE 240 MG/1
240 CAPSULE, COATED, EXTENDED RELEASE ORAL DAILY
Qty: 90 CAPSULE | Refills: 3 | Status: CANCELLED
Start: 2023-03-28

## 2023-03-29 ENCOUNTER — HOSPITAL ENCOUNTER (OUTPATIENT)
Dept: SLEEP CENTER | Age: 73
Discharge: HOME | End: 2023-03-29
Payer: MEDICARE

## 2023-03-29 DIAGNOSIS — G47.33 OSA (OBSTRUCTIVE SLEEP APNEA): ICD-10-CM

## 2023-03-29 DIAGNOSIS — I50.42 CHRONIC COMBINED SYSTOLIC AND DIASTOLIC HF (HEART FAILURE), NYHA CLASS 2 (HCC): ICD-10-CM

## 2023-03-29 DIAGNOSIS — I25.10 MILD CAD: ICD-10-CM

## 2023-03-29 DIAGNOSIS — I63.9 CRYPTOGENIC STROKE (HCC): ICD-10-CM

## 2023-03-29 PROCEDURE — 95806 SLEEP STUDY UNATT&RESP EFFT: CPT

## 2023-04-04 ENCOUNTER — TELEPHONE (OUTPATIENT)
Dept: CARDIOLOGY | Age: 73
End: 2023-04-04

## 2023-04-04 LAB — STATUS: NORMAL

## 2023-04-04 NOTE — TELEPHONE ENCOUNTER
----- Message from Denny Dubose PA-C sent at 4/4/2023  2:26 PM EDT -----  Please notify patient that their test was not significant for sleep apnea. Please continue current treatment and follow up.

## 2023-04-06 ENCOUNTER — HOSPITAL ENCOUNTER (OUTPATIENT)
Age: 73
Setting detail: SPECIMEN
Discharge: HOME | End: 2023-04-06
Payer: MEDICARE

## 2023-04-06 LAB
ALBUMIN SERPL-MCNC: 3.7 G/DL (ref 3.5–5.2)
ALBUMIN/GLOBULIN RATIO: 1.4 (ref 1–2.5)
ALP SERPL-CCNC: 72 U/L (ref 35–104)
ALT SERPL-CCNC: 10 U/L (ref 5–33)
ANION GAP SERPL CALCULATED.3IONS-SCNC: 5 MMOL/L (ref 9–17)
AST SERPL-CCNC: 14 U/L
BILIRUB SERPL-MCNC: 0.5 MG/DL (ref 0.3–1.2)
BUN SERPL-MCNC: 36 MG/DL (ref 8–23)
BUN/CREAT BLD: 23 (ref 9–20)
CALCIUM SERPL-MCNC: 11 MG/DL (ref 8.6–10.4)
CHLORIDE SERPL-SCNC: 109 MMOL/L (ref 98–107)
CHOLEST SERPL-MCNC: 75 MG/DL
CHOLESTEROL/HDL RATIO: 2.3
CO2 SERPL-SCNC: 29 MMOL/L (ref 20–31)
CREAT SERPL-MCNC: 1.54 MG/DL (ref 0.5–0.9)
EST. AVERAGE GLUCOSE BLD GHB EST-MCNC: 151 MG/DL
GFR SERPL CREATININE-BSD FRML MDRD: 36 ML/MIN/1.73M2
GLUCOSE SERPL-MCNC: 129 MG/DL (ref 70–99)
HBA1C MFR BLD: 6.9 % (ref 4–6)
HCT VFR BLD AUTO: 32.5 % (ref 36.3–47.1)
HDLC SERPL-MCNC: 33 MG/DL
HGB BLD-MCNC: 9.9 G/DL (ref 11.9–15.1)
LDLC SERPL CALC-MCNC: 27 MG/DL (ref 0–130)
MCH RBC QN AUTO: 29.3 PG (ref 25.2–33.5)
MCHC RBC AUTO-ENTMCNC: 30.5 G/DL (ref 28.4–34.8)
MCV RBC AUTO: 96.2 FL (ref 82.6–102.9)
NRBC AUTOMATED: 0 PER 100 WBC
PDW BLD-RTO: 16.3 % (ref 11.8–14.4)
PLATELET # BLD AUTO: 139 K/UL (ref 138–453)
PMV BLD AUTO: 12.7 FL (ref 8.1–13.5)
POTASSIUM SERPL-SCNC: 4.8 MMOL/L (ref 3.7–5.3)
PROT SERPL-MCNC: 6.3 G/DL (ref 6.4–8.3)
RBC # BLD: 3.38 M/UL (ref 3.95–5.11)
SODIUM SERPL-SCNC: 143 MMOL/L (ref 135–144)
TRIGL SERPL-MCNC: 75 MG/DL
TSH SERPL-ACNC: 0.69 UIU/ML (ref 0.3–5)
WBC # BLD AUTO: 4.9 K/UL (ref 3.5–11.3)

## 2023-04-06 PROCEDURE — 83036 HEMOGLOBIN GLYCOSYLATED A1C: CPT

## 2023-04-06 PROCEDURE — 80053 COMPREHEN METABOLIC PANEL: CPT

## 2023-04-06 PROCEDURE — 80061 LIPID PANEL: CPT

## 2023-04-06 PROCEDURE — 36415 COLL VENOUS BLD VENIPUNCTURE: CPT

## 2023-04-06 PROCEDURE — 85027 COMPLETE CBC AUTOMATED: CPT

## 2023-04-06 PROCEDURE — 84443 ASSAY THYROID STIM HORMONE: CPT

## 2023-04-06 PROCEDURE — P9603 ONE-WAY ALLOW PRORATED MILES: HCPCS

## 2023-05-17 ENCOUNTER — OFFICE VISIT (OUTPATIENT)
Dept: CARDIOLOGY | Age: 73
End: 2023-05-17
Payer: MEDICARE

## 2023-05-17 VITALS
HEART RATE: 56 BPM | HEIGHT: 64 IN | SYSTOLIC BLOOD PRESSURE: 98 MMHG | BODY MASS INDEX: 32.54 KG/M2 | WEIGHT: 190.6 LBS | OXYGEN SATURATION: 92 % | RESPIRATION RATE: 18 BRPM | DIASTOLIC BLOOD PRESSURE: 65 MMHG

## 2023-05-17 DIAGNOSIS — I63.9 CRYPTOGENIC STROKE (HCC): ICD-10-CM

## 2023-05-17 DIAGNOSIS — I34.0 SEVERE MITRAL REGURGITATION BY PRIOR ECHOCARDIOGRAM: ICD-10-CM

## 2023-05-17 DIAGNOSIS — Z95.818 IMPLANTABLE LOOP RECORDER PRESENT: ICD-10-CM

## 2023-05-17 DIAGNOSIS — I10 ESSENTIAL HYPERTENSION: ICD-10-CM

## 2023-05-17 DIAGNOSIS — I48.20 CHRONIC A-FIB (HCC): ICD-10-CM

## 2023-05-17 DIAGNOSIS — I25.10 MILD CAD: ICD-10-CM

## 2023-05-17 DIAGNOSIS — I50.42 CHRONIC COMBINED SYSTOLIC AND DIASTOLIC HF (HEART FAILURE), NYHA CLASS 2 (HCC): ICD-10-CM

## 2023-05-17 DIAGNOSIS — E78.2 MIXED HYPERLIPIDEMIA: ICD-10-CM

## 2023-05-17 DIAGNOSIS — Z79.01 ENCOUNTER FOR CURRENT LONG-TERM USE OF ANTICOAGULANTS: ICD-10-CM

## 2023-05-17 DIAGNOSIS — G45.9 TIA (TRANSIENT ISCHEMIC ATTACK): ICD-10-CM

## 2023-05-17 PROCEDURE — 99214 OFFICE O/P EST MOD 30 MIN: CPT | Performed by: PHYSICIAN ASSISTANT

## 2023-05-17 PROCEDURE — 1036F TOBACCO NON-USER: CPT | Performed by: PHYSICIAN ASSISTANT

## 2023-05-17 PROCEDURE — 99211 OFF/OP EST MAY X REQ PHY/QHP: CPT | Performed by: PHYSICIAN ASSISTANT

## 2023-05-17 PROCEDURE — 1090F PRES/ABSN URINE INCON ASSESS: CPT | Performed by: PHYSICIAN ASSISTANT

## 2023-05-17 PROCEDURE — 3074F SYST BP LT 130 MM HG: CPT | Performed by: PHYSICIAN ASSISTANT

## 2023-05-17 PROCEDURE — G8400 PT W/DXA NO RESULTS DOC: HCPCS | Performed by: PHYSICIAN ASSISTANT

## 2023-05-17 PROCEDURE — G8427 DOCREV CUR MEDS BY ELIG CLIN: HCPCS | Performed by: PHYSICIAN ASSISTANT

## 2023-05-17 PROCEDURE — 1123F ACP DISCUSS/DSCN MKR DOCD: CPT | Performed by: PHYSICIAN ASSISTANT

## 2023-05-17 PROCEDURE — G8417 CALC BMI ABV UP PARAM F/U: HCPCS | Performed by: PHYSICIAN ASSISTANT

## 2023-05-17 PROCEDURE — 3017F COLORECTAL CA SCREEN DOC REV: CPT | Performed by: PHYSICIAN ASSISTANT

## 2023-05-17 PROCEDURE — 3078F DIAST BP <80 MM HG: CPT | Performed by: PHYSICIAN ASSISTANT

## 2023-05-17 NOTE — PATIENT INSTRUCTIONS
SURVEY:    You may be receiving a survey from [facility] regarding your visit today. Please complete the survey to enable us to provide the highest quality of care to you and your family. If you cannot score us a very good on any question, please call the office to discuss how we could have made your experience a very good one. Thank you.

## 2023-05-17 NOTE — PROGRESS NOTES
develops as this could be life threatening. Because of her atrial fibrillation, I also would also recommend that she continue with anticoagulation to decrease her risk of stoke but also reminded her to watch for signs of blood in her stool or black tarry stools and stop the medication immediately if this develops as this could be life threatening. I reviewed her blood pressure log from [facility]. Suspected Obstructive Sleep Apnea: No significant PAULINA on home sleep study. Chronic combined heart failure: New York Heart Association Class: IIa (Mild symptoms only in normal activities). Weight is stable. Working with therapy and shortness of breath has improved since her hospital admission. Beta Blocker: Continue Metoprolol succinate (Toprol XL) 200 mg daily. Diuretics: Continue furosemide (Lasix) 40 mg every morning. Heart failure counseling: I advised them to try and keep their legs up whenever possible and to limit salt in their diet. Patient Education/Instructions: I did review the signs and symptoms to watch for including shortness of breath, weight gain, lightheadedness/dizziness. I asked her to call the office if she were to develop persistent or worsening shortness of breath or weight gain of more than 3 pounds in 1-7 days. Ms. Eduardo Wren verbalized understanding. I would like the nursing home continue doing daily weights and call us if this occurs. Mild Coronary artery disease and myocardial ischemia on 10/6/22:   Beta Blocker: Continue Metoprolol succinate (Toprol XL) 200 mg daily. Anti-anginal medications: Continue diltiazem CD (Cardizem CD) 180 mg once daily. Cholesterol Reduction Therapy: Continue Atorvastatin (Lipitor) 80 mg daily. Additional counseling: I advised them to call our office or go to the emergency room if they developed worsening or persistent chest pain or increased shortness of breath as this could be life threatening.     History of TIA/Cryptogenic stroke: Still with some residual

## 2023-05-22 ENCOUNTER — HOSPITAL ENCOUNTER (OUTPATIENT)
Age: 73
Setting detail: SPECIMEN
Discharge: HOME | End: 2023-05-22

## 2023-05-22 LAB
ANION GAP SERPL CALCULATED.3IONS-SCNC: 5 MMOL/L (ref 9–17)
BUN SERPL-MCNC: 35 MG/DL (ref 8–23)
BUN/CREAT SERPL: 24 (ref 9–20)
CALCIUM SERPL-MCNC: 10.4 MG/DL (ref 8.6–10.4)
CHLORIDE SERPL-SCNC: 101 MMOL/L (ref 98–107)
CO2 SERPL-SCNC: 34 MMOL/L (ref 20–31)
CREAT SERPL-MCNC: 1.43 MG/DL (ref 0.5–0.9)
GFR SERPL CREATININE-BSD FRML MDRD: 39 ML/MIN/1.73M2
GLUCOSE SERPL-MCNC: 220 MG/DL (ref 70–99)
POTASSIUM SERPL-SCNC: 4.4 MMOL/L (ref 3.7–5.3)
SODIUM SERPL-SCNC: 140 MMOL/L (ref 135–144)

## 2023-05-22 PROCEDURE — 80048 BASIC METABOLIC PNL TOTAL CA: CPT

## 2023-05-22 PROCEDURE — 36415 COLL VENOUS BLD VENIPUNCTURE: CPT

## 2023-05-22 PROCEDURE — P9603 ONE-WAY ALLOW PRORATED MILES: HCPCS

## 2023-05-30 ENCOUNTER — HOSPITAL ENCOUNTER (OUTPATIENT)
Age: 73
Setting detail: SPECIMEN
Discharge: HOME | End: 2023-05-30

## 2023-05-30 LAB
ERYTHROCYTE [DISTWIDTH] IN BLOOD BY AUTOMATED COUNT: 15 % (ref 11.8–14.4)
EST. AVERAGE GLUCOSE BLD GHB EST-MCNC: 203 MG/DL
HBA1C MFR BLD: 8.7 % (ref 4–6)
HCT VFR BLD AUTO: 33.2 % (ref 36.3–47.1)
HGB BLD-MCNC: 10 G/DL (ref 11.9–15.1)
MCH RBC QN AUTO: 27.9 PG (ref 25.2–33.5)
MCHC RBC AUTO-ENTMCNC: 30.1 G/DL (ref 28.4–34.8)
MCV RBC AUTO: 92.5 FL (ref 82.6–102.9)
NRBC AUTOMATED: 0 PER 100 WBC
PLATELET # BLD AUTO: 130 K/UL (ref 138–453)
PMV BLD AUTO: 12.9 FL (ref 8.1–13.5)
RBC # BLD AUTO: 3.59 M/UL (ref 3.95–5.11)
WBC OTHER # BLD: 6.2 K/UL (ref 3.5–11.3)

## 2023-05-30 PROCEDURE — P9603 ONE-WAY ALLOW PRORATED MILES: HCPCS

## 2023-05-30 PROCEDURE — 85027 COMPLETE CBC AUTOMATED: CPT

## 2023-05-30 PROCEDURE — 83036 HEMOGLOBIN GLYCOSYLATED A1C: CPT

## 2023-05-30 PROCEDURE — 36415 COLL VENOUS BLD VENIPUNCTURE: CPT

## 2023-06-02 ENCOUNTER — APPOINTMENT (OUTPATIENT)
Dept: GENERAL RADIOLOGY | Age: 73
DRG: 291 | End: 2023-06-02
Payer: MEDICARE

## 2023-06-02 ENCOUNTER — HOSPITAL ENCOUNTER (INPATIENT)
Age: 73
LOS: 4 days | Discharge: ANOTHER HOSPITAL | DRG: 291 | End: 2023-06-06
Attending: EMERGENCY MEDICINE | Admitting: INTERNAL MEDICINE
Payer: MEDICARE

## 2023-06-02 ENCOUNTER — APPOINTMENT (OUTPATIENT)
Dept: NON INVASIVE DIAGNOSTICS | Age: 73
DRG: 291 | End: 2023-06-02
Payer: MEDICARE

## 2023-06-02 DIAGNOSIS — N39.0 ACUTE UTI: ICD-10-CM

## 2023-06-02 DIAGNOSIS — I48.91 ATRIAL FIBRILLATION WITH RVR (HCC): ICD-10-CM

## 2023-06-02 DIAGNOSIS — I50.23 ACUTE ON CHRONIC SYSTOLIC CHF (CONGESTIVE HEART FAILURE) (HCC): Primary | ICD-10-CM

## 2023-06-02 LAB
ALLEN TEST: ABNORMAL
ANION GAP SERPL CALCULATED.3IONS-SCNC: 11 MMOL/L (ref 9–17)
BACTERIA URNS QL MICRO: ABNORMAL
BASOPHILS # BLD: 0 K/UL (ref 0–0.2)
BASOPHILS NFR BLD: 0 % (ref 0–2)
BILIRUB UR QL STRIP: NEGATIVE
BNP SERPL-MCNC: ABNORMAL PG/ML
BUN SERPL-MCNC: 30 MG/DL (ref 8–23)
BUN/CREAT SERPL: 24 (ref 9–20)
CALCIUM SERPL-MCNC: 11.1 MG/DL (ref 8.6–10.4)
CHLORIDE SERPL-SCNC: 97 MMOL/L (ref 98–107)
CLARITY UR: CLEAR
CO2 SERPL-SCNC: 31 MMOL/L (ref 20–31)
COLOR UR: YELLOW
CREAT SERPL-MCNC: 1.26 MG/DL (ref 0.5–0.9)
EKG ATRIAL RATE: 166 BPM
EKG Q-T INTERVAL: 346 MS
EKG QRS DURATION: 100 MS
EKG QTC CALCULATION (BAZETT): 499 MS
EKG R AXIS: 10 DEGREES
EKG T AXIS: -171 DEGREES
EKG VENTRICULAR RATE: 125 BPM
EOSINOPHIL # BLD: 0 K/UL (ref 0–0.44)
EOSINOPHILS RELATIVE PERCENT: 0 % (ref 1–4)
EPI CELLS #/AREA URNS HPF: ABNORMAL /HPF (ref 0–25)
ERYTHROCYTE [DISTWIDTH] IN BLOOD BY AUTOMATED COUNT: 15.4 % (ref 11.8–14.4)
GFR SERPL CREATININE-BSD FRML MDRD: 46 ML/MIN/1.73M2
GLUCOSE SERPL-MCNC: 331 MG/DL (ref 70–99)
GLUCOSE UR STRIP-MCNC: ABNORMAL MG/DL
HCO3 ARTERIAL: 34.4 MMOL/L (ref 22–26)
HCT VFR BLD AUTO: 37.3 % (ref 36.3–47.1)
HGB BLD-MCNC: 11.2 G/DL (ref 11.9–15.1)
HGB UR QL STRIP.AUTO: ABNORMAL
IMM GRANULOCYTES # BLD AUTO: 0 K/UL (ref 0–0.3)
IMM GRANULOCYTES NFR BLD: 0 %
INR PPP: 1.4
KETONES UR STRIP-MCNC: NEGATIVE MG/DL
LACTATE BLDV-SCNC: 1.9 MMOL/L (ref 0.5–2.2)
LEUKOCYTE ESTERASE UR QL STRIP: ABNORMAL
LV EF: 18 %
LVEF MODALITY: NORMAL
LYMPHOCYTES # BLD: 8 % (ref 24–43)
LYMPHOCYTES NFR BLD: 0.65 K/UL (ref 1.1–3.7)
MAGNESIUM SERPL-MCNC: 2 MG/DL (ref 1.6–2.6)
MCH RBC QN AUTO: 28.1 PG (ref 25.2–33.5)
MCHC RBC AUTO-ENTMCNC: 30 G/DL (ref 28.4–34.8)
MCV RBC AUTO: 93.7 FL (ref 82.6–102.9)
MONOCYTES NFR BLD: 0.49 K/UL (ref 0.1–1.2)
MONOCYTES NFR BLD: 6 % (ref 3–12)
MORPHOLOGY: NORMAL
NEUTROPHILS NFR BLD: 86 % (ref 36–65)
NEUTS SEG NFR BLD: 6.96 K/UL (ref 1.5–8.1)
NITRITE UR QL STRIP: POSITIVE
NRBC AUTOMATED: 0 PER 100 WBC
O2 DEVICE/FLOW/%: ABNORMAL
O2 SAT, ARTERIAL: 92.5 % (ref 95–98)
PATIENT TEMP: 37
PCO2 ARTERIAL: 56.2 MMHG (ref 35–45)
PH ARTERIAL: 7.41 (ref 7.35–7.45)
PH UR STRIP: 6 [PH] (ref 5–9)
PLATELET # BLD AUTO: ABNORMAL K/UL (ref 138–453)
PLATELET, FLUORESCENCE: 199 K/UL (ref 138–453)
PLATELETS.RETICULATED NFR BLD AUTO: 13 % (ref 1.1–10.3)
PO2 ARTERIAL: 65.3 MMHG (ref 80–100)
POSITIVE BASE EXCESS, ART: 7.7 MMOL/L (ref 0–2)
POTASSIUM SERPL-SCNC: 4.5 MMOL/L (ref 3.7–5.3)
PROT UR STRIP-MCNC: NEGATIVE MG/DL
PROTHROMBIN TIME: 17.7 SEC (ref 11.9–14.8)
PT. POSITION: ABNORMAL
RBC # BLD AUTO: 3.98 M/UL (ref 3.95–5.11)
RBC #/AREA URNS HPF: ABNORMAL /HPF (ref 0–2)
SAMPLE SITE: ABNORMAL
SODIUM SERPL-SCNC: 139 MMOL/L (ref 135–144)
SP GR UR STRIP: 1.01 (ref 1.01–1.02)
TROPONIN I SERPL HS-MCNC: 32 NG/L (ref 0–14)
TROPONIN I SERPL HS-MCNC: 35 NG/L (ref 0–14)
TROPONIN I SERPL HS-MCNC: 38 NG/L (ref 0–14)
TSH SERPL-MCNC: 0.98 UIU/ML (ref 0.3–5)
UROBILINOGEN UR STRIP-ACNC: NORMAL
WBC #/AREA URNS HPF: ABNORMAL /HPF (ref 0–5)
WBC OTHER # BLD: 8.1 K/UL (ref 3.5–11.3)

## 2023-06-02 PROCEDURE — 93010 ELECTROCARDIOGRAM REPORT: CPT | Performed by: INTERNAL MEDICINE

## 2023-06-02 PROCEDURE — 80061 LIPID PANEL: CPT

## 2023-06-02 PROCEDURE — 99223 1ST HOSP IP/OBS HIGH 75: CPT | Performed by: INTERNAL MEDICINE

## 2023-06-02 PROCEDURE — 85610 PROTHROMBIN TIME: CPT

## 2023-06-02 PROCEDURE — 84484 ASSAY OF TROPONIN QUANT: CPT

## 2023-06-02 PROCEDURE — 2500000003 HC RX 250 WO HCPCS: Performed by: INTERNAL MEDICINE

## 2023-06-02 PROCEDURE — 71045 X-RAY EXAM CHEST 1 VIEW: CPT

## 2023-06-02 PROCEDURE — 87040 BLOOD CULTURE FOR BACTERIA: CPT

## 2023-06-02 PROCEDURE — 6370000000 HC RX 637 (ALT 250 FOR IP): Performed by: NURSE PRACTITIONER

## 2023-06-02 PROCEDURE — 82805 BLOOD GASES W/O2 SATURATION: CPT

## 2023-06-02 PROCEDURE — 84439 ASSAY OF FREE THYROXINE: CPT

## 2023-06-02 PROCEDURE — 83735 ASSAY OF MAGNESIUM: CPT

## 2023-06-02 PROCEDURE — 80048 BASIC METABOLIC PNL TOTAL CA: CPT

## 2023-06-02 PROCEDURE — 51701 INSERT BLADDER CATHETER: CPT

## 2023-06-02 PROCEDURE — 96374 THER/PROPH/DIAG INJ IV PUSH: CPT

## 2023-06-02 PROCEDURE — 84443 ASSAY THYROID STIM HORMONE: CPT

## 2023-06-02 PROCEDURE — 2580000003 HC RX 258: Performed by: EMERGENCY MEDICINE

## 2023-06-02 PROCEDURE — 94761 N-INVAS EAR/PLS OXIMETRY MLT: CPT

## 2023-06-02 PROCEDURE — 2700000000 HC OXYGEN THERAPY PER DAY

## 2023-06-02 PROCEDURE — 83880 ASSAY OF NATRIURETIC PEPTIDE: CPT

## 2023-06-02 PROCEDURE — 87186 SC STD MICRODIL/AGAR DIL: CPT

## 2023-06-02 PROCEDURE — 93005 ELECTROCARDIOGRAM TRACING: CPT | Performed by: EMERGENCY MEDICINE

## 2023-06-02 PROCEDURE — 36415 COLL VENOUS BLD VENIPUNCTURE: CPT

## 2023-06-02 PROCEDURE — 6360000002 HC RX W HCPCS: Performed by: EMERGENCY MEDICINE

## 2023-06-02 PROCEDURE — 6370000000 HC RX 637 (ALT 250 FOR IP): Performed by: EMERGENCY MEDICINE

## 2023-06-02 PROCEDURE — 99285 EMERGENCY DEPT VISIT HI MDM: CPT

## 2023-06-02 PROCEDURE — 81001 URINALYSIS AUTO W/SCOPE: CPT

## 2023-06-02 PROCEDURE — 2580000003 HC RX 258: Performed by: NURSE PRACTITIONER

## 2023-06-02 PROCEDURE — 87088 URINE BACTERIA CULTURE: CPT

## 2023-06-02 PROCEDURE — 85055 RETICULATED PLATELET ASSAY: CPT

## 2023-06-02 PROCEDURE — 2000000000 HC ICU R&B

## 2023-06-02 PROCEDURE — 6370000000 HC RX 637 (ALT 250 FOR IP): Performed by: INTERNAL MEDICINE

## 2023-06-02 PROCEDURE — 6360000002 HC RX W HCPCS: Performed by: NURSE PRACTITIONER

## 2023-06-02 PROCEDURE — 37799 UNLISTED PX VASCULAR SURGERY: CPT

## 2023-06-02 PROCEDURE — 36600 WITHDRAWAL OF ARTERIAL BLOOD: CPT

## 2023-06-02 PROCEDURE — 87086 URINE CULTURE/COLONY COUNT: CPT

## 2023-06-02 PROCEDURE — 93306 TTE W/DOPPLER COMPLETE: CPT

## 2023-06-02 PROCEDURE — 85027 COMPLETE CBC AUTOMATED: CPT

## 2023-06-02 PROCEDURE — 83605 ASSAY OF LACTIC ACID: CPT

## 2023-06-02 RX ORDER — MAGNESIUM SULFATE IN WATER 40 MG/ML
2000 INJECTION, SOLUTION INTRAVENOUS PRN
Status: DISCONTINUED | OUTPATIENT
Start: 2023-06-02 | End: 2023-06-06 | Stop reason: HOSPADM

## 2023-06-02 RX ORDER — DILTIAZEM HYDROCHLORIDE 180 MG/1
180 CAPSULE, COATED, EXTENDED RELEASE ORAL DAILY
Status: DISCONTINUED | OUTPATIENT
Start: 2023-06-03 | End: 2023-06-02

## 2023-06-02 RX ORDER — SPIRONOLACTONE 25 MG/1
25 TABLET ORAL DAILY
Status: DISCONTINUED | OUTPATIENT
Start: 2023-06-03 | End: 2023-06-06 | Stop reason: HOSPADM

## 2023-06-02 RX ORDER — POLYETHYLENE GLYCOL 3350 17 G/17G
17 POWDER, FOR SOLUTION ORAL DAILY PRN
Status: DISCONTINUED | OUTPATIENT
Start: 2023-06-02 | End: 2023-06-06 | Stop reason: HOSPADM

## 2023-06-02 RX ORDER — ACETAMINOPHEN 650 MG/1
650 SUPPOSITORY RECTAL EVERY 6 HOURS PRN
Status: DISCONTINUED | OUTPATIENT
Start: 2023-06-02 | End: 2023-06-06 | Stop reason: HOSPADM

## 2023-06-02 RX ORDER — SODIUM CHLORIDE 0.9 % (FLUSH) 0.9 %
10 SYRINGE (ML) INJECTION EVERY 12 HOURS SCHEDULED
Status: DISCONTINUED | OUTPATIENT
Start: 2023-06-02 | End: 2023-06-06 | Stop reason: HOSPADM

## 2023-06-02 RX ORDER — BUMETANIDE 0.25 MG/ML
1 INJECTION INTRAMUSCULAR; INTRAVENOUS ONCE
Status: COMPLETED | OUTPATIENT
Start: 2023-06-02 | End: 2023-06-02

## 2023-06-02 RX ORDER — METOPROLOL TARTRATE 50 MG/1
50 TABLET, FILM COATED ORAL ONCE
Status: COMPLETED | OUTPATIENT
Start: 2023-06-02 | End: 2023-06-02

## 2023-06-02 RX ORDER — POTASSIUM CHLORIDE 20 MEQ/1
20 TABLET, EXTENDED RELEASE ORAL DAILY
Status: DISCONTINUED | OUTPATIENT
Start: 2023-06-03 | End: 2023-06-02

## 2023-06-02 RX ORDER — FUROSEMIDE 10 MG/ML
40 INJECTION INTRAMUSCULAR; INTRAVENOUS 2 TIMES DAILY
Status: DISCONTINUED | OUTPATIENT
Start: 2023-06-02 | End: 2023-06-02

## 2023-06-02 RX ORDER — ACETAMINOPHEN 325 MG/1
650 TABLET ORAL EVERY 6 HOURS PRN
Status: DISCONTINUED | OUTPATIENT
Start: 2023-06-02 | End: 2023-06-06 | Stop reason: HOSPADM

## 2023-06-02 RX ORDER — METOPROLOL SUCCINATE 100 MG/1
200 TABLET, EXTENDED RELEASE ORAL DAILY
Status: DISCONTINUED | OUTPATIENT
Start: 2023-06-03 | End: 2023-06-06 | Stop reason: HOSPADM

## 2023-06-02 RX ORDER — FUROSEMIDE 10 MG/ML
40 INJECTION INTRAMUSCULAR; INTRAVENOUS ONCE
Status: COMPLETED | OUTPATIENT
Start: 2023-06-02 | End: 2023-06-02

## 2023-06-02 RX ORDER — SERTRALINE HYDROCHLORIDE 100 MG/1
100 TABLET, FILM COATED ORAL NIGHTLY
Status: DISCONTINUED | OUTPATIENT
Start: 2023-06-02 | End: 2023-06-06 | Stop reason: HOSPADM

## 2023-06-02 RX ORDER — SODIUM CHLORIDE 0.9 % (FLUSH) 0.9 %
10 SYRINGE (ML) INJECTION PRN
Status: DISCONTINUED | OUTPATIENT
Start: 2023-06-02 | End: 2023-06-06 | Stop reason: HOSPADM

## 2023-06-02 RX ORDER — POTASSIUM CHLORIDE 7.45 MG/ML
10 INJECTION INTRAVENOUS PRN
Status: DISCONTINUED | OUTPATIENT
Start: 2023-06-02 | End: 2023-06-06 | Stop reason: HOSPADM

## 2023-06-02 RX ORDER — DIGOXIN 125 MCG
125 TABLET ORAL DAILY
Status: DISCONTINUED | OUTPATIENT
Start: 2023-06-02 | End: 2023-06-05

## 2023-06-02 RX ORDER — GABAPENTIN 300 MG/1
600 CAPSULE ORAL DAILY
Status: DISCONTINUED | OUTPATIENT
Start: 2023-06-03 | End: 2023-06-06 | Stop reason: HOSPADM

## 2023-06-02 RX ORDER — SODIUM CHLORIDE 9 MG/ML
INJECTION, SOLUTION INTRAVENOUS PRN
Status: DISCONTINUED | OUTPATIENT
Start: 2023-06-02 | End: 2023-06-06 | Stop reason: HOSPADM

## 2023-06-02 RX ORDER — FAMOTIDINE 20 MG/1
20 TABLET, FILM COATED ORAL NIGHTLY
Status: DISCONTINUED | OUTPATIENT
Start: 2023-06-02 | End: 2023-06-06 | Stop reason: HOSPADM

## 2023-06-02 RX ORDER — ONDANSETRON 2 MG/ML
4 INJECTION INTRAMUSCULAR; INTRAVENOUS EVERY 6 HOURS PRN
Status: DISCONTINUED | OUTPATIENT
Start: 2023-06-02 | End: 2023-06-06 | Stop reason: HOSPADM

## 2023-06-02 RX ORDER — PROMETHAZINE HYDROCHLORIDE 25 MG/1
12.5 TABLET ORAL EVERY 6 HOURS PRN
Status: DISCONTINUED | OUTPATIENT
Start: 2023-06-02 | End: 2023-06-06 | Stop reason: HOSPADM

## 2023-06-02 RX ORDER — LOSARTAN POTASSIUM AND HYDROCHLOROTHIAZIDE 12.5; 5 MG/1; MG/1
2 TABLET ORAL DAILY
Status: DISCONTINUED | OUTPATIENT
Start: 2023-06-03 | End: 2023-06-02

## 2023-06-02 RX ADMIN — FUROSEMIDE 40 MG: 10 INJECTION, SOLUTION INTRAMUSCULAR; INTRAVENOUS at 18:16

## 2023-06-02 RX ADMIN — FUROSEMIDE 40 MG: 10 INJECTION, SOLUTION INTRAMUSCULAR; INTRAVENOUS at 10:56

## 2023-06-02 RX ADMIN — APIXABAN 5 MG: 5 TABLET, FILM COATED ORAL at 19:40

## 2023-06-02 RX ADMIN — SERTRALINE 100 MG: 100 TABLET, FILM COATED ORAL at 19:40

## 2023-06-02 RX ADMIN — DIGOXIN 125 MCG: 125 TABLET ORAL at 23:20

## 2023-06-02 RX ADMIN — ONDANSETRON 4 MG: 2 INJECTION INTRAMUSCULAR; INTRAVENOUS at 19:09

## 2023-06-02 RX ADMIN — CEFTRIAXONE SODIUM 1000 MG: 1 INJECTION, POWDER, FOR SOLUTION INTRAMUSCULAR; INTRAVENOUS at 11:58

## 2023-06-02 RX ADMIN — FAMOTIDINE 20 MG: 20 TABLET ORAL at 19:40

## 2023-06-02 RX ADMIN — SODIUM CHLORIDE, PRESERVATIVE FREE 10 ML: 5 INJECTION INTRAVENOUS at 19:09

## 2023-06-02 RX ADMIN — METOPROLOL TARTRATE 50 MG: 50 TABLET, FILM COATED ORAL at 11:55

## 2023-06-02 RX ADMIN — SODIUM CHLORIDE, PRESERVATIVE FREE 10 ML: 5 INJECTION INTRAVENOUS at 23:19

## 2023-06-02 RX ADMIN — BUMETANIDE 1 MG: 0.25 INJECTION INTRAMUSCULAR; INTRAVENOUS at 23:19

## 2023-06-02 RX ADMIN — SODIUM CHLORIDE, PRESERVATIVE FREE 10 ML: 5 INJECTION INTRAVENOUS at 19:39

## 2023-06-02 ASSESSMENT — PAIN SCALES - PAIN ASSESSMENT IN ADVANCED DEMENTIA (PAINAD)
FACIALEXPRESSION: 0
BREATHING: 1
BODYLANGUAGE: 1
CONSOLABILITY: 1
TOTALSCORE: 4
NEGVOCALIZATION: 1

## 2023-06-02 ASSESSMENT — ENCOUNTER SYMPTOMS
BACK PAIN: 0
SORE THROAT: 0
ABDOMINAL DISTENTION: 0
COUGH: 1
SHORTNESS OF BREATH: 1

## 2023-06-02 ASSESSMENT — PAIN - FUNCTIONAL ASSESSMENT: PAIN_FUNCTIONAL_ASSESSMENT: PAIN ASSESSMENT IN ADVANCED DEMENTIA (PAINAD)

## 2023-06-03 LAB
ALBUMIN SERPL-MCNC: 3.4 G/DL (ref 3.5–5.2)
ALBUMIN/GLOB SERPL: 0.9 {RATIO} (ref 1–2.5)
ALP SERPL-CCNC: 84 U/L (ref 35–104)
ALT SERPL-CCNC: 12 U/L (ref 5–33)
ANION GAP SERPL CALCULATED.3IONS-SCNC: 10 MMOL/L (ref 9–17)
AST SERPL-CCNC: 13 U/L
BASOPHILS # BLD: <0.03 K/UL (ref 0–0.2)
BASOPHILS NFR BLD: 0 % (ref 0–2)
BILIRUB SERPL-MCNC: 0.7 MG/DL (ref 0.3–1.2)
BUN SERPL-MCNC: 29 MG/DL (ref 8–23)
BUN/CREAT SERPL: 24 (ref 9–20)
CALCIUM SERPL-MCNC: 11 MG/DL (ref 8.6–10.4)
CHLORIDE SERPL-SCNC: 97 MMOL/L (ref 98–107)
CHOLEST SERPL-MCNC: 93 MG/DL
CHOLESTEROL/HDL RATIO: 2.7
CO2 SERPL-SCNC: 35 MMOL/L (ref 20–31)
CREAT SERPL-MCNC: 1.22 MG/DL (ref 0.5–0.9)
EOSINOPHIL # BLD: 0.03 K/UL (ref 0–0.44)
EOSINOPHILS RELATIVE PERCENT: 0 % (ref 1–4)
ERYTHROCYTE [DISTWIDTH] IN BLOOD BY AUTOMATED COUNT: 15.6 % (ref 11.8–14.4)
GFR SERPL CREATININE-BSD FRML MDRD: 48 ML/MIN/1.73M2
GLUCOSE SERPL-MCNC: 194 MG/DL (ref 70–99)
HCT VFR BLD AUTO: 34.8 % (ref 36.3–47.1)
HDLC SERPL-MCNC: 35 MG/DL
HGB BLD-MCNC: 10.5 G/DL (ref 11.9–15.1)
IMM GRANULOCYTES # BLD AUTO: 0.05 K/UL (ref 0–0.3)
IMM GRANULOCYTES NFR BLD: 1 %
LDLC SERPL CALC-MCNC: 36 MG/DL (ref 0–130)
LYMPHOCYTES # BLD: 8 % (ref 24–43)
LYMPHOCYTES NFR BLD: 0.69 K/UL (ref 1.1–3.7)
MAGNESIUM SERPL-MCNC: 1.8 MG/DL (ref 1.6–2.6)
MCH RBC QN AUTO: 28.1 PG (ref 25.2–33.5)
MCHC RBC AUTO-ENTMCNC: 30.2 G/DL (ref 28.4–34.8)
MCV RBC AUTO: 93 FL (ref 82.6–102.9)
MICROORGANISM SPEC CULT: ABNORMAL
MONOCYTES NFR BLD: 0.77 K/UL (ref 0.1–1.2)
MONOCYTES NFR BLD: 9 % (ref 3–12)
NEUTROPHILS NFR BLD: 82 % (ref 36–65)
NEUTS SEG NFR BLD: 6.61 K/UL (ref 1.5–8.1)
NRBC AUTOMATED: 0.2 PER 100 WBC
PLATELET # BLD AUTO: 203 K/UL (ref 138–453)
PMV BLD AUTO: 13 FL (ref 8.1–13.5)
POTASSIUM SERPL-SCNC: 4 MMOL/L (ref 3.7–5.3)
PROT SERPL-MCNC: 7 G/DL (ref 6.4–8.3)
RBC # BLD AUTO: 3.74 M/UL (ref 3.95–5.11)
SODIUM SERPL-SCNC: 142 MMOL/L (ref 135–144)
SPECIMEN DESCRIPTION: ABNORMAL
T4 FREE SERPL-MCNC: 1.3 NG/DL (ref 0.9–1.7)
TRIGL SERPL-MCNC: 110 MG/DL
WBC OTHER # BLD: 8.2 K/UL (ref 3.5–11.3)

## 2023-06-03 PROCEDURE — 36415 COLL VENOUS BLD VENIPUNCTURE: CPT

## 2023-06-03 PROCEDURE — 83735 ASSAY OF MAGNESIUM: CPT

## 2023-06-03 PROCEDURE — 2700000000 HC OXYGEN THERAPY PER DAY

## 2023-06-03 PROCEDURE — 6360000002 HC RX W HCPCS: Performed by: NURSE PRACTITIONER

## 2023-06-03 PROCEDURE — 2000000000 HC ICU R&B

## 2023-06-03 PROCEDURE — 94761 N-INVAS EAR/PLS OXIMETRY MLT: CPT

## 2023-06-03 PROCEDURE — 6370000000 HC RX 637 (ALT 250 FOR IP): Performed by: NURSE PRACTITIONER

## 2023-06-03 PROCEDURE — 6370000000 HC RX 637 (ALT 250 FOR IP): Performed by: INTERNAL MEDICINE

## 2023-06-03 PROCEDURE — 2580000003 HC RX 258: Performed by: NURSE PRACTITIONER

## 2023-06-03 PROCEDURE — 80053 COMPREHEN METABOLIC PANEL: CPT

## 2023-06-03 PROCEDURE — 85027 COMPLETE CBC AUTOMATED: CPT

## 2023-06-03 PROCEDURE — 83036 HEMOGLOBIN GLYCOSYLATED A1C: CPT

## 2023-06-03 RX ORDER — BUMETANIDE 1 MG/1
1 TABLET ORAL 2 TIMES DAILY
Status: DISCONTINUED | OUTPATIENT
Start: 2023-06-03 | End: 2023-06-06 | Stop reason: HOSPADM

## 2023-06-03 RX ADMIN — SODIUM CHLORIDE, PRESERVATIVE FREE 10 ML: 5 INJECTION INTRAVENOUS at 08:12

## 2023-06-03 RX ADMIN — ONDANSETRON 4 MG: 2 INJECTION INTRAMUSCULAR; INTRAVENOUS at 08:25

## 2023-06-03 RX ADMIN — APIXABAN 5 MG: 5 TABLET, FILM COATED ORAL at 20:24

## 2023-06-03 RX ADMIN — GABAPENTIN 600 MG: 300 CAPSULE ORAL at 08:12

## 2023-06-03 RX ADMIN — METOPROLOL SUCCINATE 200 MG: 100 TABLET, EXTENDED RELEASE ORAL at 08:12

## 2023-06-03 RX ADMIN — BUMETANIDE 1 MG: 1 TABLET ORAL at 20:24

## 2023-06-03 RX ADMIN — CEFTRIAXONE SODIUM 1000 MG: 1 INJECTION, POWDER, FOR SOLUTION INTRAMUSCULAR; INTRAVENOUS at 08:17

## 2023-06-03 RX ADMIN — SPIRONOLACTONE 25 MG: 25 TABLET, FILM COATED ORAL at 08:12

## 2023-06-03 RX ADMIN — SERTRALINE 100 MG: 100 TABLET, FILM COATED ORAL at 20:24

## 2023-06-03 RX ADMIN — BUMETANIDE 1 MG: 1 TABLET ORAL at 13:16

## 2023-06-03 RX ADMIN — SACUBITRIL AND VALSARTAN 1 TABLET: 24; 26 TABLET, FILM COATED ORAL at 20:24

## 2023-06-03 RX ADMIN — SODIUM CHLORIDE, PRESERVATIVE FREE 10 ML: 5 INJECTION INTRAVENOUS at 20:26

## 2023-06-03 RX ADMIN — FAMOTIDINE 20 MG: 20 TABLET ORAL at 20:24

## 2023-06-03 RX ADMIN — APIXABAN 5 MG: 5 TABLET, FILM COATED ORAL at 08:12

## 2023-06-03 RX ADMIN — DIGOXIN 125 MCG: 125 TABLET ORAL at 08:12

## 2023-06-03 ASSESSMENT — PAIN SCALES - PAIN ASSESSMENT IN ADVANCED DEMENTIA (PAINAD)
BREATHING: 1
BODYLANGUAGE: 0
TOTALSCORE: 2
NEGVOCALIZATION: 1
BODYLANGUAGE: 0
CONSOLABILITY: 0
CONSOLABILITY: 0
FACIALEXPRESSION: 0
FACIALEXPRESSION: 0
TOTALSCORE: 2
NEGVOCALIZATION: 1
BREATHING: 1

## 2023-06-04 LAB
ALBUMIN SERPL-MCNC: 3.3 G/DL (ref 3.5–5.2)
ALBUMIN/GLOB SERPL: 0.9 {RATIO} (ref 1–2.5)
ALP SERPL-CCNC: 84 U/L (ref 35–104)
ALT SERPL-CCNC: 11 U/L (ref 5–33)
ANION GAP SERPL CALCULATED.3IONS-SCNC: 9 MMOL/L (ref 9–17)
AST SERPL-CCNC: 12 U/L
BASOPHILS # BLD: 0.03 K/UL (ref 0–0.2)
BASOPHILS NFR BLD: 0 % (ref 0–2)
BILIRUB SERPL-MCNC: 0.6 MG/DL (ref 0.3–1.2)
BNP SERPL-MCNC: ABNORMAL PG/ML
BUN SERPL-MCNC: 29 MG/DL (ref 8–23)
BUN/CREAT SERPL: 25 (ref 9–20)
CALCIUM SERPL-MCNC: 10.6 MG/DL (ref 8.6–10.4)
CHLORIDE SERPL-SCNC: 96 MMOL/L (ref 98–107)
CO2 SERPL-SCNC: 37 MMOL/L (ref 20–31)
CREAT SERPL-MCNC: 1.18 MG/DL (ref 0.5–0.9)
EOSINOPHIL # BLD: 0.03 K/UL (ref 0–0.44)
EOSINOPHILS RELATIVE PERCENT: 0 % (ref 1–4)
ERYTHROCYTE [DISTWIDTH] IN BLOOD BY AUTOMATED COUNT: 15.7 % (ref 11.8–14.4)
EST. AVERAGE GLUCOSE BLD GHB EST-MCNC: 206 MG/DL
GFR SERPL CREATININE-BSD FRML MDRD: 50 ML/MIN/1.73M2
GLUCOSE SERPL-MCNC: 202 MG/DL (ref 70–99)
HBA1C MFR BLD: 8.8 % (ref 4–6)
HCT VFR BLD AUTO: 35.8 % (ref 36.3–47.1)
HGB BLD-MCNC: 10.8 G/DL (ref 11.9–15.1)
IMM GRANULOCYTES # BLD AUTO: 0.05 K/UL (ref 0–0.3)
IMM GRANULOCYTES NFR BLD: 1 %
LYMPHOCYTES # BLD: 9 % (ref 24–43)
LYMPHOCYTES NFR BLD: 0.77 K/UL (ref 1.1–3.7)
MAGNESIUM SERPL-MCNC: 1.7 MG/DL (ref 1.6–2.6)
MCH RBC QN AUTO: 28 PG (ref 25.2–33.5)
MCHC RBC AUTO-ENTMCNC: 30.2 G/DL (ref 28.4–34.8)
MCV RBC AUTO: 92.7 FL (ref 82.6–102.9)
MONOCYTES NFR BLD: 0.79 K/UL (ref 0.1–1.2)
MONOCYTES NFR BLD: 9 % (ref 3–12)
NEUTROPHILS NFR BLD: 81 % (ref 36–65)
NEUTS SEG NFR BLD: 7.35 K/UL (ref 1.5–8.1)
NRBC AUTOMATED: 0.6 PER 100 WBC
PLATELET # BLD AUTO: 254 K/UL (ref 138–453)
PMV BLD AUTO: 12.8 FL (ref 8.1–13.5)
POTASSIUM SERPL-SCNC: 3.9 MMOL/L (ref 3.7–5.3)
PROT SERPL-MCNC: 7 G/DL (ref 6.4–8.3)
RBC # BLD AUTO: 3.86 M/UL (ref 3.95–5.11)
SODIUM SERPL-SCNC: 142 MMOL/L (ref 135–144)
WBC OTHER # BLD: 9 K/UL (ref 3.5–11.3)

## 2023-06-04 PROCEDURE — 36415 COLL VENOUS BLD VENIPUNCTURE: CPT

## 2023-06-04 PROCEDURE — 2700000000 HC OXYGEN THERAPY PER DAY

## 2023-06-04 PROCEDURE — 83735 ASSAY OF MAGNESIUM: CPT

## 2023-06-04 PROCEDURE — 6370000000 HC RX 637 (ALT 250 FOR IP): Performed by: INTERNAL MEDICINE

## 2023-06-04 PROCEDURE — 83880 ASSAY OF NATRIURETIC PEPTIDE: CPT

## 2023-06-04 PROCEDURE — 2580000003 HC RX 258: Performed by: NURSE PRACTITIONER

## 2023-06-04 PROCEDURE — 94761 N-INVAS EAR/PLS OXIMETRY MLT: CPT

## 2023-06-04 PROCEDURE — 85027 COMPLETE CBC AUTOMATED: CPT

## 2023-06-04 PROCEDURE — 6360000002 HC RX W HCPCS: Performed by: NURSE PRACTITIONER

## 2023-06-04 PROCEDURE — 80053 COMPREHEN METABOLIC PANEL: CPT

## 2023-06-04 PROCEDURE — 6370000000 HC RX 637 (ALT 250 FOR IP): Performed by: NURSE PRACTITIONER

## 2023-06-04 PROCEDURE — 2000000000 HC ICU R&B

## 2023-06-04 RX ADMIN — SERTRALINE 100 MG: 100 TABLET, FILM COATED ORAL at 20:57

## 2023-06-04 RX ADMIN — SACUBITRIL AND VALSARTAN 1 TABLET: 24; 26 TABLET, FILM COATED ORAL at 08:28

## 2023-06-04 RX ADMIN — APIXABAN 5 MG: 5 TABLET, FILM COATED ORAL at 20:57

## 2023-06-04 RX ADMIN — SODIUM CHLORIDE, PRESERVATIVE FREE 10 ML: 5 INJECTION INTRAVENOUS at 08:32

## 2023-06-04 RX ADMIN — APIXABAN 5 MG: 5 TABLET, FILM COATED ORAL at 08:29

## 2023-06-04 RX ADMIN — SODIUM CHLORIDE, PRESERVATIVE FREE 10 ML: 5 INJECTION INTRAVENOUS at 20:57

## 2023-06-04 RX ADMIN — GABAPENTIN 600 MG: 300 CAPSULE ORAL at 08:29

## 2023-06-04 RX ADMIN — CEFTRIAXONE SODIUM 1000 MG: 1 INJECTION, POWDER, FOR SOLUTION INTRAMUSCULAR; INTRAVENOUS at 08:32

## 2023-06-04 RX ADMIN — BUMETANIDE 1 MG: 1 TABLET ORAL at 20:57

## 2023-06-04 RX ADMIN — DIGOXIN 125 MCG: 125 TABLET ORAL at 08:29

## 2023-06-04 RX ADMIN — FAMOTIDINE 20 MG: 20 TABLET ORAL at 20:57

## 2023-06-04 RX ADMIN — SPIRONOLACTONE 25 MG: 25 TABLET, FILM COATED ORAL at 08:28

## 2023-06-04 RX ADMIN — BUMETANIDE 1 MG: 1 TABLET ORAL at 08:28

## 2023-06-04 RX ADMIN — SACUBITRIL AND VALSARTAN 1 TABLET: 24; 26 TABLET, FILM COATED ORAL at 20:57

## 2023-06-04 RX ADMIN — METOPROLOL SUCCINATE 200 MG: 100 TABLET, EXTENDED RELEASE ORAL at 08:29

## 2023-06-05 LAB
ALBUMIN SERPL-MCNC: 3.1 G/DL (ref 3.5–5.2)
ALBUMIN/GLOB SERPL: 0.9 {RATIO} (ref 1–2.5)
ALP SERPL-CCNC: 80 U/L (ref 35–104)
ALT SERPL-CCNC: 10 U/L (ref 5–33)
ANION GAP SERPL CALCULATED.3IONS-SCNC: 5 MMOL/L (ref 9–17)
AST SERPL-CCNC: 14 U/L
BASOPHILS # BLD: <0.03 K/UL (ref 0–0.2)
BASOPHILS NFR BLD: 0 % (ref 0–2)
BILIRUB SERPL-MCNC: 0.5 MG/DL (ref 0.3–1.2)
BUN SERPL-MCNC: 30 MG/DL (ref 8–23)
BUN/CREAT SERPL: 27 (ref 9–20)
CALCIUM SERPL-MCNC: 10.2 MG/DL (ref 8.6–10.4)
CHLORIDE SERPL-SCNC: 96 MMOL/L (ref 98–107)
CO2 SERPL-SCNC: 41 MMOL/L (ref 20–31)
CREAT SERPL-MCNC: 1.1 MG/DL (ref 0.5–0.9)
EOSINOPHIL # BLD: 0.11 K/UL (ref 0–0.44)
EOSINOPHILS RELATIVE PERCENT: 1 % (ref 1–4)
ERYTHROCYTE [DISTWIDTH] IN BLOOD BY AUTOMATED COUNT: 15.5 % (ref 11.8–14.4)
GFR SERPL CREATININE-BSD FRML MDRD: 54 ML/MIN/1.73M2
GLUCOSE SERPL-MCNC: 202 MG/DL (ref 70–99)
HCT VFR BLD AUTO: 36.7 % (ref 36.3–47.1)
HGB BLD-MCNC: 10.9 G/DL (ref 11.9–15.1)
IMM GRANULOCYTES # BLD AUTO: 0.05 K/UL (ref 0–0.3)
IMM GRANULOCYTES NFR BLD: 1 %
LYMPHOCYTES # BLD: 9 % (ref 24–43)
LYMPHOCYTES NFR BLD: 0.73 K/UL (ref 1.1–3.7)
MAGNESIUM SERPL-MCNC: 1.6 MG/DL (ref 1.6–2.6)
MCH RBC QN AUTO: 27.9 PG (ref 25.2–33.5)
MCHC RBC AUTO-ENTMCNC: 29.7 G/DL (ref 28.4–34.8)
MCV RBC AUTO: 93.9 FL (ref 82.6–102.9)
MONOCYTES NFR BLD: 0.7 K/UL (ref 0.1–1.2)
MONOCYTES NFR BLD: 8 % (ref 3–12)
NEUTROPHILS NFR BLD: 81 % (ref 36–65)
NEUTS SEG NFR BLD: 6.9 K/UL (ref 1.5–8.1)
NRBC AUTOMATED: 0.2 PER 100 WBC
PLATELET # BLD AUTO: 259 K/UL (ref 138–453)
PMV BLD AUTO: 12.4 FL (ref 8.1–13.5)
POTASSIUM SERPL-SCNC: 4 MMOL/L (ref 3.7–5.3)
PROT SERPL-MCNC: 6.6 G/DL (ref 6.4–8.3)
RBC # BLD AUTO: 3.91 M/UL (ref 3.95–5.11)
SODIUM SERPL-SCNC: 142 MMOL/L (ref 135–144)
WBC OTHER # BLD: 8.5 K/UL (ref 3.5–11.3)

## 2023-06-05 PROCEDURE — 6360000002 HC RX W HCPCS: Performed by: NURSE PRACTITIONER

## 2023-06-05 PROCEDURE — 85027 COMPLETE CBC AUTOMATED: CPT

## 2023-06-05 PROCEDURE — 2000000000 HC ICU R&B

## 2023-06-05 PROCEDURE — 2700000000 HC OXYGEN THERAPY PER DAY

## 2023-06-05 PROCEDURE — 2580000003 HC RX 258: Performed by: NURSE PRACTITIONER

## 2023-06-05 PROCEDURE — 99232 SBSQ HOSP IP/OBS MODERATE 35: CPT | Performed by: INTERNAL MEDICINE

## 2023-06-05 PROCEDURE — 6370000000 HC RX 637 (ALT 250 FOR IP): Performed by: NURSE PRACTITIONER

## 2023-06-05 PROCEDURE — 80053 COMPREHEN METABOLIC PANEL: CPT

## 2023-06-05 PROCEDURE — 83735 ASSAY OF MAGNESIUM: CPT

## 2023-06-05 PROCEDURE — 6370000000 HC RX 637 (ALT 250 FOR IP): Performed by: INTERNAL MEDICINE

## 2023-06-05 PROCEDURE — 94761 N-INVAS EAR/PLS OXIMETRY MLT: CPT

## 2023-06-05 PROCEDURE — 36415 COLL VENOUS BLD VENIPUNCTURE: CPT

## 2023-06-05 RX ORDER — AMIODARONE HYDROCHLORIDE 200 MG/1
200 TABLET ORAL 2 TIMES DAILY
Status: DISCONTINUED | OUTPATIENT
Start: 2023-06-05 | End: 2023-06-06 | Stop reason: HOSPADM

## 2023-06-05 RX ORDER — DIGOXIN 250 MCG
250 TABLET ORAL DAILY
Status: DISCONTINUED | OUTPATIENT
Start: 2023-06-05 | End: 2023-06-06 | Stop reason: HOSPADM

## 2023-06-05 RX ADMIN — SERTRALINE 100 MG: 100 TABLET, FILM COATED ORAL at 20:04

## 2023-06-05 RX ADMIN — APIXABAN 5 MG: 5 TABLET, FILM COATED ORAL at 20:04

## 2023-06-05 RX ADMIN — APIXABAN 5 MG: 5 TABLET, FILM COATED ORAL at 07:49

## 2023-06-05 RX ADMIN — CEFTRIAXONE SODIUM 1000 MG: 1 INJECTION, POWDER, FOR SOLUTION INTRAMUSCULAR; INTRAVENOUS at 09:03

## 2023-06-05 RX ADMIN — BUMETANIDE 1 MG: 1 TABLET ORAL at 07:49

## 2023-06-05 RX ADMIN — SODIUM CHLORIDE, PRESERVATIVE FREE 10 ML: 5 INJECTION INTRAVENOUS at 07:49

## 2023-06-05 RX ADMIN — FAMOTIDINE 20 MG: 20 TABLET ORAL at 20:04

## 2023-06-05 RX ADMIN — METOPROLOL SUCCINATE 200 MG: 100 TABLET, EXTENDED RELEASE ORAL at 07:49

## 2023-06-05 RX ADMIN — BUMETANIDE 1 MG: 1 TABLET ORAL at 20:04

## 2023-06-05 RX ADMIN — AMIODARONE HYDROCHLORIDE 200 MG: 200 TABLET ORAL at 08:11

## 2023-06-05 RX ADMIN — AMIODARONE HYDROCHLORIDE 200 MG: 200 TABLET ORAL at 20:04

## 2023-06-05 RX ADMIN — DIGOXIN 250 MCG: 250 TABLET ORAL at 07:49

## 2023-06-05 RX ADMIN — SPIRONOLACTONE 25 MG: 25 TABLET, FILM COATED ORAL at 07:49

## 2023-06-05 RX ADMIN — SACUBITRIL AND VALSARTAN 0.5 TABLET: 24; 26 TABLET, FILM COATED ORAL at 07:49

## 2023-06-05 RX ADMIN — GABAPENTIN 600 MG: 300 CAPSULE ORAL at 07:49

## 2023-06-05 RX ADMIN — SACUBITRIL AND VALSARTAN 0.5 TABLET: 24; 26 TABLET, FILM COATED ORAL at 20:04

## 2023-06-05 RX ADMIN — SODIUM CHLORIDE, PRESERVATIVE FREE 10 ML: 5 INJECTION INTRAVENOUS at 20:04

## 2023-06-05 NOTE — PLAN OF CARE
Problem: Discharge Planning  Goal: Discharge to home or other facility with appropriate resources  Outcome: Progressing     Problem: Pain  Goal: Verbalizes/displays adequate comfort level or baseline comfort level  Outcome: Progressing     Problem: Safety - Adult  Goal: Free from fall injury  Outcome: Progressing     Problem: Skin/Tissue Integrity  Goal: Absence of new skin breakdown  Description: 1. Monitor for areas of redness and/or skin breakdown  2. Assess vascular access sites hourly  3. Every 4-6 hours minimum:  Change oxygen saturation probe site  4. Every 4-6 hours:  If on nasal continuous positive airway pressure, respiratory therapy assess nares and determine need for appliance change or resting period.   Outcome: Progressing     Problem: Respiratory - Adult  Goal: Achieves optimal ventilation and oxygenation  Outcome: Progressing     Problem: Cardiovascular - Adult  Goal: Maintains optimal cardiac output and hemodynamic stability  Outcome: Progressing  Goal: Absence of cardiac dysrhythmias or at baseline  Outcome: Progressing     Problem: Genitourinary - Adult  Goal: Absence of urinary retention  Outcome: Progressing  Goal: Urinary catheter remains patent  Outcome: Progressing     Problem: Infection - Adult  Goal: Absence of infection at discharge  Outcome: Progressing  Goal: Absence of infection during hospitalization  Outcome: Progressing  Goal: Absence of fever/infection during anticipated neutropenic period  Outcome: Progressing     Problem: Nutrition Deficit:  Goal: Optimize nutritional status  Outcome: Progressing     Problem: Chronic Conditions and Co-morbidities  Goal: Patient's chronic conditions and co-morbidity symptoms are monitored and maintained or improved  Outcome: Progressing

## 2023-06-05 NOTE — CARE COORDINATION
fib with RVR, and CHF. Pt lives at [facility] and has been there about 3 years from previous note in February when pt was in. Pt has a walker, wheelchair, and oxygen that she uses at the facility. Staff at [facility] assist her as needed and can provide transport to outside appointments. Pt is a DNR CCA and follows with Dr Starr Meyers as PCP. Pt has advance directives on file in her medical record and reports that these remain accurate. Pt medications are covered well by insurance. Pt plans to return to [facility] at discharge. SW called and left a message for admissions at [facility] regarding plan for pt to return there at discharge. SW will follow and remain available. [place] THEODOREW 6/5/2023     The Plan for Transition of Care is related to the following treatment goals of Acute UTI [N39.0]  Atrial fibrillation with RVR ([address] [I48.91]  Acute on chronic combined systolic and diastolic CHF, NYHA class 4 (HCC) [I50.43]  Acute on chronic systolic CHF (congestive heart failure) ([address] [H98.91]    IF APPLICABLE: The Patient and/or patient representative Savage Wolff and her family were provided with a choice of provider and agrees with the discharge plan. Freedom of choice list with basic dialogue that supports the patient's individualized plan of care/goals and shares the quality data associated with the providers was provided to: Patient   Patient Representative Name:       The Patient and/or Patient Representative Agree with the Discharge Plan?  Yes    Jason BARR [facility]   Case Management Department  Ph: [phone] Fax: [phone]

## 2023-06-05 NOTE — FLOWSHEET NOTE
Continues up in chair at bedside. Ate 75 % of her dinner. No needs at present time. Call light within reach.  Continue to monitor

## 2023-06-05 NOTE — FLOWSHEET NOTE
Resting in bed with eyes closed. Awake for vitals and assessment. Denies pain. No needs at present time. Call light within reach.  Bed alarm on for safety

## 2023-06-06 VITALS
HEIGHT: 64 IN | TEMPERATURE: 96.9 F | DIASTOLIC BLOOD PRESSURE: 79 MMHG | SYSTOLIC BLOOD PRESSURE: 119 MMHG | WEIGHT: 178.79 LBS | RESPIRATION RATE: 27 BRPM | OXYGEN SATURATION: 97 % | BODY MASS INDEX: 30.52 KG/M2 | HEART RATE: 90 BPM

## 2023-06-06 LAB
ALBUMIN SERPL-MCNC: 2.9 G/DL (ref 3.5–5.2)
ALBUMIN/GLOB SERPL: 0.9 {RATIO} (ref 1–2.5)
ALP SERPL-CCNC: 71 U/L (ref 35–104)
ALT SERPL-CCNC: 9 U/L (ref 5–33)
ANION GAP SERPL CALCULATED.3IONS-SCNC: 7 MMOL/L (ref 9–17)
AST SERPL-CCNC: 14 U/L
BASOPHILS # BLD: <0.03 K/UL (ref 0–0.2)
BASOPHILS NFR BLD: 0 % (ref 0–2)
BILIRUB SERPL-MCNC: 0.4 MG/DL (ref 0.3–1.2)
BUN SERPL-MCNC: 30 MG/DL (ref 8–23)
BUN/CREAT SERPL: 26 (ref 9–20)
CALCIUM SERPL-MCNC: 10.1 MG/DL (ref 8.6–10.4)
CHLORIDE SERPL-SCNC: 95 MMOL/L (ref 98–107)
CO2 SERPL-SCNC: 40 MMOL/L (ref 20–31)
CREAT SERPL-MCNC: 1.14 MG/DL (ref 0.5–0.9)
EOSINOPHIL # BLD: 0.14 K/UL (ref 0–0.44)
EOSINOPHILS RELATIVE PERCENT: 2 % (ref 1–4)
ERYTHROCYTE [DISTWIDTH] IN BLOOD BY AUTOMATED COUNT: 15.5 % (ref 11.8–14.4)
GFR SERPL CREATININE-BSD FRML MDRD: 52 ML/MIN/1.73M2
GLUCOSE SERPL-MCNC: 197 MG/DL (ref 70–99)
HCT VFR BLD AUTO: 34.9 % (ref 36.3–47.1)
HGB BLD-MCNC: 10.6 G/DL (ref 11.9–15.1)
IMM GRANULOCYTES # BLD AUTO: <0.03 K/UL (ref 0–0.3)
IMM GRANULOCYTES NFR BLD: 0 %
LYMPHOCYTES # BLD: 9 % (ref 24–43)
LYMPHOCYTES NFR BLD: 0.74 K/UL (ref 1.1–3.7)
MAGNESIUM SERPL-MCNC: 1.6 MG/DL (ref 1.6–2.6)
MCH RBC QN AUTO: 28.1 PG (ref 25.2–33.5)
MCHC RBC AUTO-ENTMCNC: 30.4 G/DL (ref 28.4–34.8)
MCV RBC AUTO: 92.6 FL (ref 82.6–102.9)
MONOCYTES NFR BLD: 0.66 K/UL (ref 0.1–1.2)
MONOCYTES NFR BLD: 8 % (ref 3–12)
NEUTROPHILS NFR BLD: 81 % (ref 36–65)
NEUTS SEG NFR BLD: 6.31 K/UL (ref 1.5–8.1)
NRBC AUTOMATED: 0 PER 100 WBC
PLATELET # BLD AUTO: 251 K/UL (ref 138–453)
PMV BLD AUTO: 11.4 FL (ref 8.1–13.5)
POTASSIUM SERPL-SCNC: 4 MMOL/L (ref 3.7–5.3)
PROT SERPL-MCNC: 6.3 G/DL (ref 6.4–8.3)
RBC # BLD AUTO: 3.77 M/UL (ref 3.95–5.11)
SODIUM SERPL-SCNC: 142 MMOL/L (ref 135–144)
WBC OTHER # BLD: 7.9 K/UL (ref 3.5–11.3)

## 2023-06-06 PROCEDURE — 2580000003 HC RX 258: Performed by: NURSE PRACTITIONER

## 2023-06-06 PROCEDURE — 99233 SBSQ HOSP IP/OBS HIGH 50: CPT | Performed by: INTERNAL MEDICINE

## 2023-06-06 PROCEDURE — 83735 ASSAY OF MAGNESIUM: CPT

## 2023-06-06 PROCEDURE — 85027 COMPLETE CBC AUTOMATED: CPT

## 2023-06-06 PROCEDURE — 36415 COLL VENOUS BLD VENIPUNCTURE: CPT

## 2023-06-06 PROCEDURE — 94761 N-INVAS EAR/PLS OXIMETRY MLT: CPT

## 2023-06-06 PROCEDURE — 2700000000 HC OXYGEN THERAPY PER DAY

## 2023-06-06 PROCEDURE — 6370000000 HC RX 637 (ALT 250 FOR IP): Performed by: NURSE PRACTITIONER

## 2023-06-06 PROCEDURE — 6360000002 HC RX W HCPCS: Performed by: NURSE PRACTITIONER

## 2023-06-06 PROCEDURE — 6370000000 HC RX 637 (ALT 250 FOR IP): Performed by: INTERNAL MEDICINE

## 2023-06-06 PROCEDURE — 80053 COMPREHEN METABOLIC PANEL: CPT

## 2023-06-06 PROCEDURE — 93298 REM INTERROG DEV EVAL SCRMS: CPT | Performed by: FAMILY MEDICINE

## 2023-06-06 RX ORDER — DIGOXIN 250 MCG
250 TABLET ORAL DAILY
Qty: 30 TABLET | Refills: 3 | DISCHARGE
Start: 2023-06-07

## 2023-06-06 RX ORDER — BUMETANIDE 1 MG/1
1 TABLET ORAL 2 TIMES DAILY
Qty: 60 TABLET | Refills: 3 | DISCHARGE
Start: 2023-06-06

## 2023-06-06 RX ORDER — SPIRONOLACTONE 25 MG/1
25 TABLET ORAL DAILY
Qty: 30 TABLET | Refills: 3 | DISCHARGE
Start: 2023-06-07

## 2023-06-06 RX ORDER — AMIODARONE HYDROCHLORIDE 200 MG/1
200 TABLET ORAL 2 TIMES DAILY
Qty: 60 TABLET | Refills: 0 | DISCHARGE
Start: 2023-06-06

## 2023-06-06 RX ADMIN — SODIUM CHLORIDE: 9 INJECTION, SOLUTION INTRAVENOUS at 08:26

## 2023-06-06 RX ADMIN — DIGOXIN 250 MCG: 250 TABLET ORAL at 08:20

## 2023-06-06 RX ADMIN — BUMETANIDE 1 MG: 1 TABLET ORAL at 08:20

## 2023-06-06 RX ADMIN — AMIODARONE HYDROCHLORIDE 200 MG: 200 TABLET ORAL at 08:20

## 2023-06-06 RX ADMIN — CEFTRIAXONE SODIUM 1000 MG: 1 INJECTION, POWDER, FOR SOLUTION INTRAMUSCULAR; INTRAVENOUS at 08:29

## 2023-06-06 RX ADMIN — SACUBITRIL AND VALSARTAN 0.5 TABLET: 24; 26 TABLET, FILM COATED ORAL at 08:20

## 2023-06-06 RX ADMIN — APIXABAN 5 MG: 5 TABLET, FILM COATED ORAL at 08:20

## 2023-06-06 RX ADMIN — METOPROLOL SUCCINATE 200 MG: 100 TABLET, EXTENDED RELEASE ORAL at 08:20

## 2023-06-06 RX ADMIN — SPIRONOLACTONE 25 MG: 25 TABLET, FILM COATED ORAL at 08:20

## 2023-06-06 RX ADMIN — GABAPENTIN 600 MG: 300 CAPSULE ORAL at 08:20

## 2023-06-06 NOTE — DISCHARGE SUMMARY
Discharge Summary    Marci Garcia  :  1950  MRN:  [id number]    Admit date:  2023      Discharge date: 2023     Admitting Physician:  Eliceo Espinal MD    Discharge Diagnoses:    Principal Problem:    Acute on chronic combined systolic and diastolic CHF, NYHA class 4 ([facility])  Active Problems:    Hypertension    CKD (chronic kidney disease) stage 3, GFR 30-59 ml/min ([facility])    Old lacunar stroke without late effect    Type 2 diabetes mellitus, without long-term current use of insulin ([facility])    Hemiplegia and hemiparesis following cerebral infarction affecting right dominant side (HCC)    Atrial fibrillation with RVR ([address]    Acute UTI  Resolved Problems:    * No resolved hospital problems. *      Hospital Course:   Marci Garcia is a 81 y.o. female admitted with acute on chronic combined CHF. She presented to the emergency room from the nursing home with complaints of shortness of breath. Patient reports symptoms have been ongoing for some time but it is difficult for her to provide history. Patient's baseline is 2 L of oxygen continuously. She complained of dry cough but denied fever or chill. Upon arrival patient was found to be in mild respiratory distress. She was afebrile. Patient does have history of PAF and is chronically on Eliquis. Nursing home reported the patient appeared to be slightly confused and had swelling of her lower extremities. During patient's evaluation she was tachycardic at 126 and found to be in atrial fib with RVR. Chest x-ray showed pulmonary edema. Patient's proBNP was 24,377. Troponins were 38 and 35. Urinalysis was positive for UTI as well. Patient was placed on 6 L of oxygen to maintain her SPO2 above 90. She was initiated with IV Lasix and metoprolol tartrate ER and cardiology was consulted. .  During patient's hospitalization she was hospitalized in ICU. Cardiology was consulted and labs were trended.   Initially patient was septic with UTI which grew

## 2023-06-06 NOTE — PLAN OF CARE
Problem: Pain  Goal: Verbalizes/displays adequate comfort level or baseline comfort level  6/6/2023 0007 by Syed Mandujano RN  Outcome: Progressing  Note: Pt is able to verbalize when in pain. Pt denies pain at this time. Will continue to monitor. Problem: Safety - Adult  Goal: Free from fall injury  6/6/2023 0007 by Syed Mandujano RN  Outcome: Progressing  Note: Bed in low position. Wheels locked. Bed alarm on. 2/4 side rails are up. Fall band on. Call light within reach. Problem: Respiratory - Adult  Goal: Achieves optimal ventilation and oxygenation  6/6/2023 0007 by Syed Mandujano RN  Outcome: Progressing  Note: Lungs are clear and diminished. SpO2 was 93% on 3L NC, will continue to monitor.

## 2023-06-06 NOTE — PROGRESS NOTES
[facility] updated over phone.
Dr. Lucrecia Scott at bedside.
Patient called out at this time reporting of leakage around carr. Urine present on bed pad- additional 3 cc added resulting in 13 cc total in balloon. Patient tolerated well with no complaints or reports of discomfort. Care ongoing, will continue to monitor.
Pt awake in bed  watching TV. Pt is orientated to person and place but disorientated to time and situation. Vitals and assessment as charted. Pt denies pain. Patients phone rang and writer assisted pt with the phone. Pt denies any further needs at this time. Call light within reach. Bed alarm on.
Pt called out to use the [facility]. Pt 2 assist up to the [facility] and then back into the bed. Bath and carr care completed at this time. Vitals and assessment as charted. Pt denies pain. Call light within reach. Bed alarm on.
Pt laying in bed awake watching TV. Vitals and assessment as charted. Pt denies pain. Pt remains dry from the carr at this time. Call light within reach. Bed alarm on.
Report given to transport. Pt assisted from bedside commode to stretcher for transport with all belongings in possession. Perkins output and stool occurrence recorded in I&O. Pt left floor at this time for transport to [address] [place] [address].
Writer at bedside, patient is resting in bed, morning assessment and vitals taken at this time. Patient complains of no pain at this time. Call light left within reach. No more needs at this  time.
Writer called [email or web address] access to request transport needs.
Writer called report to [facility] at the [facility]. All questions answered at this time.
Writer to room, vitals and assessment as charted. Assisted patient to commode, bed bath and oral care performed. Patient returned to bed after commode use. Patient denies pain or further needs. Call light and bedside table within reach. Bed alarm active, will continue to monitor.
created by: Angelica Heard on 6/5/2023 2:16 PM      Electronically signed by:  Gautam Montenegro MD 6/5/2023 4:03 PM
supplemental O2  SpO2 range:   SpO2  Av.1 %  Min: 88 %  Max: 95 %    Weight  Wt Readings from Last 3 Encounters:   23 177 lb 14.6 oz (80.7 kg)   23 190 lb 9.6 oz (86.5 kg)   23 201 lb (91.2 kg)     Body mass index is 30.54 kg/m². 24HR INTAKE/OUTPUT:      Intake/Output Summary (Last 24 hours) at 2023 0756  Last data filed at 2023 0427  Gross per 24 hour   Intake 610 ml   Output 1175 ml   Net -565 ml     Date 23 0000 - 23 2359   Shift 5765-3075 6379-8305 0529-3514 24 Hour Total   INTAKE   P.O. 120   120   Shift Total(mL/kg) 120(1.5)   120(1.5)   OUTPUT   Urine(mL/kg/hr) 450   450   Shift Total(mL/kg) 450(5.6)   450(5.6)   Weight (kg) 80.7 80.7 80.7 80.7         PHYSICAL EXAM:  GEN:    Awake and following commands:     [] No   [x] Yes  MENTAL STATUS: alert and oriented x3. DISTRESS: Acute respiratory distress:       [x] No   [] Yes  EYES:  EOMI, pupils equal   NECK: Supple. No lymphadenopathy. No carotid bruit  CVS:    irregularly irregular, tachycardia 2/6 systolic murmur  PULM:  diminished but clear without wheezing, rales or rhonchi, no acute respiratory distress  ABD:    Bowels sounds normal.  Abdomen is soft. No distention. no tenderness to palpation. EXT:   trace edema bilaterally . No calf tenderness. NEURO: Moves all extremities. Motor and sensory are grossly intact  SKIN:  No rashes. No skin lesions.           MEDICATIONS:  Scheduled Meds:   sacubitril-valsartan  0.5 tablet Oral BID    digoxin  250 mcg Oral Daily    bumetanide  1 mg Oral BID    apixaban  5 mg Oral BID    famotidine  20 mg Oral Nightly    gabapentin  600 mg Oral Daily    metoprolol succinate  200 mg Oral Daily    sertraline  100 mg Oral Nightly    sodium chloride flush  10 mL IntraVENous 2 times per day    cefTRIAXone (ROCEPHIN) IV  1,000 mg IntraVENous Q24H    spironolactone  25 mg Oral Daily     Continuous Infusions:   sodium chloride       PRN Meds:   sodium chloride flush, 10 mL,
this patient with life threatening, unstable organ failure, including direct patient contact, management of life support systems, review of data including imaging and labs, discussions with other team members and physicians at least 0 minutes so far today, excluding separately billable procedures. MIPS Advanced Care Planning documentation:  [] I have confirmed that the patient's Advance Care Plan is present, Code Status is documented, or surrogate decision maker is listed in the patient's medical record  [If \"yes\", STOP HERE]     [] The patient's Advance Care Plan is NOT present because:    []  I confirmed today that the patient does not wish or was not able to name a   surrogate decision maker or provide and advance care plan.    [] Hospice care is currently being provided or has been provided within the   calendar year. []  I did NOT confirm today the presence of an [address] or surrogate   decision maker documented within the patient's medical record.    [DOES NOT SATISFY [id number] [facility], APRN - CNP , APRN-NP-C  6/6/2023  7:57 AM
symptoms, working with PT and reports she has been getting a little stronger. Secondary to atrial fibrillation. Continue Eliquis as above. Beta Blocker: Continue Metoprolol succinate (Toprol XL) 200 mg daily. Cholesterol Reduction Therapy: Restart Lipitor 40 mg daily. Hyperlipidemia: Mixed, LDL done on 4/6/2023 was 27 mg/dL   Cholesterol Reduction Therapy: Continue Atorvastatin (Lipitor) 80 mg daily. Will get repeat lipid panel. Currently using Nasal cannula O2   Continue using 2 L as directed. Continue follow up with Wilmar Preston MD.    UTI:  Continue current treatment. Finally, I recommended that she continue her other medications and follow up with you as previously scheduled. Sincerely,  Keisha Mcmahan MD, MS, F.A.C.C. [facility]) [facility], [address] [place] [address], [address]  Phone: [phone], Fax: [phone]         I believe that the risk of significant morbidity and mortality related to the patient's current medical conditions are: high.         June 5, 2023
MD.    UTI:  Continue current treatment. No fever, leukocytosis or bacteremia. I do not think this is a contraindication for pacemaker therapy. Finally, I recommended that she continue her other medications and follow up with you as previously scheduled. Sincerely,  Fernando Farrar MD, MS, F.A.C.C. [facility], [address] [facility], [address]  Phone: [phone], Fax: [phone]         I believe that the risk of significant morbidity and mortality related to the patient's current medical conditions are: high.         June 6, 2023

## 2023-06-07 LAB
MICROORGANISM SPEC CULT: NORMAL
MICROORGANISM SPEC CULT: NORMAL
SERVICE CMNT-IMP: NORMAL
SERVICE CMNT-IMP: NORMAL
SPECIMEN DESCRIPTION: NORMAL
SPECIMEN DESCRIPTION: NORMAL

## 2023-06-19 ENCOUNTER — NURSE ONLY (OUTPATIENT)
Dept: CARDIOLOGY | Age: 73
End: 2023-06-19
Payer: MEDICARE

## 2023-06-19 DIAGNOSIS — I63.9 CRYPTOGENIC STROKE (HCC): Primary | ICD-10-CM

## 2023-06-19 DIAGNOSIS — Z95.818 IMPLANTABLE LOOP RECORDER PRESENT: ICD-10-CM

## 2023-06-29 ENCOUNTER — TELEPHONE (OUTPATIENT)
Dept: CARDIOLOGY | Age: 73
End: 2023-06-29

## 2023-06-30 ENCOUNTER — HOSPITAL ENCOUNTER (OUTPATIENT)
Age: 73
Setting detail: SPECIMEN
Discharge: HOME | End: 2023-06-30
Payer: MEDICARE

## 2023-06-30 LAB
ALBUMIN SERPL-MCNC: 3.8 G/DL (ref 3.5–5.2)
ALBUMIN/GLOB SERPL: 1.1 {RATIO} (ref 1–2.5)
ALP SERPL-CCNC: 101 U/L (ref 35–104)
ALT SERPL-CCNC: 11 U/L (ref 5–33)
ANION GAP SERPL CALCULATED.3IONS-SCNC: 9 MMOL/L (ref 9–17)
AST SERPL-CCNC: 15 U/L
BILIRUB SERPL-MCNC: 0.3 MG/DL (ref 0.3–1.2)
BUN SERPL-MCNC: 42 MG/DL (ref 8–23)
BUN/CREAT SERPL: 27 (ref 9–20)
CALCIUM SERPL-MCNC: 10.6 MG/DL (ref 8.6–10.4)
CHLORIDE SERPL-SCNC: 96 MMOL/L (ref 98–107)
CO2 SERPL-SCNC: 30 MMOL/L (ref 20–31)
CREAT SERPL-MCNC: 1.56 MG/DL (ref 0.5–0.9)
ERYTHROCYTE [DISTWIDTH] IN BLOOD BY AUTOMATED COUNT: 15.2 % (ref 11.8–14.4)
GFR SERPL CREATININE-BSD FRML MDRD: 36 ML/MIN/1.73M2
GLUCOSE SERPL-MCNC: 245 MG/DL (ref 70–99)
HCT VFR BLD AUTO: 36.9 % (ref 36.3–47.1)
HGB BLD-MCNC: 11.4 G/DL (ref 11.9–15.1)
MCH RBC QN AUTO: 27.7 PG (ref 25.2–33.5)
MCHC RBC AUTO-ENTMCNC: 30.9 G/DL (ref 28.4–34.8)
MCV RBC AUTO: 89.6 FL (ref 82.6–102.9)
NRBC BLD-RTO: 0 PER 100 WBC
PLATELET # BLD AUTO: 194 K/UL (ref 138–453)
PMV BLD AUTO: 11.8 FL (ref 8.1–13.5)
POTASSIUM SERPL-SCNC: 4.4 MMOL/L (ref 3.7–5.3)
PROT SERPL-MCNC: 7.2 G/DL (ref 6.4–8.3)
RBC # BLD AUTO: 4.12 M/UL (ref 3.95–5.11)
SODIUM SERPL-SCNC: 135 MMOL/L (ref 135–144)
WBC OTHER # BLD: 6.5 K/UL (ref 3.5–11.3)

## 2023-06-30 PROCEDURE — 36415 COLL VENOUS BLD VENIPUNCTURE: CPT

## 2023-06-30 PROCEDURE — 80053 COMPREHEN METABOLIC PANEL: CPT

## 2023-06-30 PROCEDURE — 85027 COMPLETE CBC AUTOMATED: CPT

## 2023-07-26 ENCOUNTER — HOSPITAL ENCOUNTER (OUTPATIENT)
Age: 73
Setting detail: SPECIMEN
Discharge: HOME | End: 2023-07-26
Payer: MEDICARE

## 2023-07-26 LAB
EST. AVERAGE GLUCOSE BLD GHB EST-MCNC: 324 MG/DL
HBA1C MFR BLD: 12.9 % (ref 4–6)

## 2023-07-26 PROCEDURE — 36415 COLL VENOUS BLD VENIPUNCTURE: CPT

## 2023-07-26 PROCEDURE — P9603 ONE-WAY ALLOW PRORATED MILES: HCPCS

## 2023-07-26 PROCEDURE — 83036 HEMOGLOBIN GLYCOSYLATED A1C: CPT

## 2023-08-09 ENCOUNTER — APPOINTMENT (OUTPATIENT)
Dept: CT IMAGING | Age: 73
DRG: 638 | End: 2023-08-09
Payer: MEDICARE

## 2023-08-09 ENCOUNTER — HOSPITAL ENCOUNTER (INPATIENT)
Age: 73
LOS: 3 days | Discharge: NURSING, REHAB OR LONG-TERM CARE | DRG: 638 | End: 2023-08-12
Attending: EMERGENCY MEDICINE | Admitting: INTERNAL MEDICINE
Payer: MEDICARE

## 2023-08-09 DIAGNOSIS — R73.9 HYPERGLYCEMIA: ICD-10-CM

## 2023-08-09 DIAGNOSIS — R53.1 GENERALIZED WEAKNESS: Primary | ICD-10-CM

## 2023-08-09 PROBLEM — E11.65 HYPERGLYCEMIA DUE TO TYPE 2 DIABETES MELLITUS (HCC): Status: ACTIVE | Noted: 2023-08-09

## 2023-08-09 LAB
ALBUMIN SERPL-MCNC: 3.8 G/DL (ref 3.5–5.2)
ALBUMIN/GLOB SERPL: 0.9 {RATIO} (ref 1–2.5)
ALP SERPL-CCNC: 144 U/L (ref 35–104)
ALT SERPL-CCNC: 13 U/L (ref 5–33)
ANION GAP SERPL CALCULATED.3IONS-SCNC: 14 MMOL/L (ref 9–17)
AST SERPL-CCNC: 17 U/L
B-OH-BUTYR SERPL-MCNC: 0 MMOL/L (ref 0.02–0.27)
BACTERIA URNS QL MICRO: ABNORMAL
BASOPHILS # BLD: 0.03 K/UL (ref 0–0.2)
BASOPHILS NFR BLD: 0 % (ref 0–2)
BILIRUB SERPL-MCNC: 0.4 MG/DL (ref 0.3–1.2)
BILIRUB UR QL STRIP: NEGATIVE
BODY TEMPERATURE: 37
BUN SERPL-MCNC: 46 MG/DL (ref 8–23)
BUN/CREAT SERPL: 18 (ref 9–20)
CALCIUM SERPL-MCNC: 11.5 MG/DL (ref 8.6–10.4)
CHLORIDE SERPL-SCNC: 84 MMOL/L (ref 98–107)
CHP ED QC CHECK: NORMAL
CHP ED QC CHECK: NORMAL
CLARITY UR: CLEAR
CO2 SERPL-SCNC: 29 MMOL/L (ref 20–31)
COLOR UR: YELLOW
CREAT SERPL-MCNC: 2.5 MG/DL (ref 0.5–0.9)
EOSINOPHIL # BLD: 0.03 K/UL (ref 0–0.44)
EOSINOPHILS RELATIVE PERCENT: 0 % (ref 1–4)
EPI CELLS #/AREA URNS HPF: ABNORMAL /HPF (ref 0–25)
ERYTHROCYTE [DISTWIDTH] IN BLOOD BY AUTOMATED COUNT: 15.2 % (ref 11.8–14.4)
GFR SERPL CREATININE-BSD FRML MDRD: 20 ML/MIN/1.73M2
GLUCOSE BLD-MCNC: 146 MG/DL (ref 74–100)
GLUCOSE BLD-MCNC: 423 MG/DL (ref 74–100)
GLUCOSE BLD-MCNC: 432 MG/DL (ref 74–100)
GLUCOSE BLD-MCNC: 443 MG/DL (ref 74–100)
GLUCOSE BLD-MCNC: 533 MG/DL
GLUCOSE BLD-MCNC: 533 MG/DL (ref 74–100)
GLUCOSE BLD-MCNC: 549 MG/DL
GLUCOSE BLD-MCNC: 549 MG/DL (ref 74–100)
GLUCOSE BLD-MCNC: 558 MG/DL
GLUCOSE BLD-MCNC: 558 MG/DL (ref 74–100)
GLUCOSE SERPL-MCNC: 669 MG/DL (ref 70–99)
GLUCOSE UR STRIP-MCNC: ABNORMAL MG/DL
HCO3 VENOUS: 30.9 MMOL/L (ref 24–30)
HCT VFR BLD AUTO: 42.9 % (ref 36.3–47.1)
HGB BLD-MCNC: 14.1 G/DL (ref 11.9–15.1)
HGB UR QL STRIP.AUTO: NEGATIVE
IMM GRANULOCYTES # BLD AUTO: 0.03 K/UL (ref 0–0.3)
IMM GRANULOCYTES NFR BLD: 0 %
KETONES UR STRIP-MCNC: NEGATIVE MG/DL
LACTATE BLDV-SCNC: 1.6 MMOL/L (ref 0.5–1.9)
LACTATE BLDV-SCNC: 2.4 MMOL/L (ref 0.5–1.9)
LEUKOCYTE ESTERASE UR QL STRIP: NEGATIVE
LYMPHOCYTES NFR BLD: 0.75 K/UL (ref 1.1–3.7)
LYMPHOCYTES RELATIVE PERCENT: 8 % (ref 24–43)
MAGNESIUM SERPL-MCNC: 2.8 MG/DL (ref 1.6–2.6)
MCH RBC QN AUTO: 28 PG (ref 25.2–33.5)
MCHC RBC AUTO-ENTMCNC: 32.9 G/DL (ref 28.4–34.8)
MCV RBC AUTO: 85.1 FL (ref 82.6–102.9)
MONOCYTES NFR BLD: 0.71 K/UL (ref 0.1–1.2)
MONOCYTES NFR BLD: 7 % (ref 3–12)
NEUTROPHILS NFR BLD: 85 % (ref 36–65)
NEUTS SEG NFR BLD: 8.32 K/UL (ref 1.5–8.1)
NITRITE UR QL STRIP: NEGATIVE
NRBC BLD-RTO: 0 PER 100 WBC
O2 SAT, VEN: 64.1 % (ref 60–85)
PCO2, VEN: 47 MM HG (ref 39–55)
PH UR STRIP: 6 [PH] (ref 5–9)
PH VENOUS: 7.44 (ref 7.32–7.42)
PLATELET # BLD AUTO: 233 K/UL (ref 138–453)
PMV BLD AUTO: 12.1 FL (ref 8.1–13.5)
PO2, VEN: 32.6 MM HG (ref 30–50)
POSITIVE BASE EXCESS, VEN: 5.7 MMOL/L (ref 0–2)
POTASSIUM SERPL-SCNC: 5.2 MMOL/L (ref 3.7–5.3)
PROT SERPL-MCNC: 8.2 G/DL (ref 6.4–8.3)
PROT UR STRIP-MCNC: NEGATIVE MG/DL
RBC # BLD AUTO: 5.04 M/UL (ref 3.95–5.11)
RBC #/AREA URNS HPF: ABNORMAL /HPF (ref 0–2)
SODIUM SERPL-SCNC: 127 MMOL/L (ref 135–144)
SP GR UR STRIP: <1.005 (ref 1.01–1.02)
TROPONIN I SERPL HS-MCNC: 48 NG/L (ref 0–14)
TROPONIN I SERPL HS-MCNC: 50 NG/L (ref 0–14)
TROPONIN I SERPL HS-MCNC: 53 NG/L (ref 0–14)
UROBILINOGEN UR STRIP-ACNC: NORMAL EU/DL (ref 0–1)
WBC #/AREA URNS HPF: ABNORMAL /HPF (ref 0–5)
WBC OTHER # BLD: 9.9 K/UL (ref 3.5–11.3)

## 2023-08-09 PROCEDURE — 80053 COMPREHEN METABOLIC PANEL: CPT

## 2023-08-09 PROCEDURE — 6360000002 HC RX W HCPCS: Performed by: NURSE PRACTITIONER

## 2023-08-09 PROCEDURE — 83735 ASSAY OF MAGNESIUM: CPT

## 2023-08-09 PROCEDURE — 96374 THER/PROPH/DIAG INJ IV PUSH: CPT

## 2023-08-09 PROCEDURE — 2580000003 HC RX 258: Performed by: NURSE PRACTITIONER

## 2023-08-09 PROCEDURE — 87086 URINE CULTURE/COLONY COUNT: CPT

## 2023-08-09 PROCEDURE — 99285 EMERGENCY DEPT VISIT HI MDM: CPT

## 2023-08-09 PROCEDURE — 36415 COLL VENOUS BLD VENIPUNCTURE: CPT

## 2023-08-09 PROCEDURE — 83605 ASSAY OF LACTIC ACID: CPT

## 2023-08-09 PROCEDURE — 82947 ASSAY GLUCOSE BLOOD QUANT: CPT

## 2023-08-09 PROCEDURE — 93005 ELECTROCARDIOGRAM TRACING: CPT | Performed by: EMERGENCY MEDICINE

## 2023-08-09 PROCEDURE — 70450 CT HEAD/BRAIN W/O DYE: CPT

## 2023-08-09 PROCEDURE — 6370000000 HC RX 637 (ALT 250 FOR IP): Performed by: EMERGENCY MEDICINE

## 2023-08-09 PROCEDURE — 2000000000 HC ICU R&B

## 2023-08-09 PROCEDURE — 6370000000 HC RX 637 (ALT 250 FOR IP): Performed by: INTERNAL MEDICINE

## 2023-08-09 PROCEDURE — 2700000000 HC OXYGEN THERAPY PER DAY

## 2023-08-09 PROCEDURE — 82010 KETONE BODYS QUAN: CPT

## 2023-08-09 PROCEDURE — 6370000000 HC RX 637 (ALT 250 FOR IP): Performed by: NURSE PRACTITIONER

## 2023-08-09 PROCEDURE — 2500000003 HC RX 250 WO HCPCS: Performed by: INTERNAL MEDICINE

## 2023-08-09 PROCEDURE — 82805 BLOOD GASES W/O2 SATURATION: CPT

## 2023-08-09 PROCEDURE — 2580000003 HC RX 258: Performed by: EMERGENCY MEDICINE

## 2023-08-09 PROCEDURE — 84484 ASSAY OF TROPONIN QUANT: CPT

## 2023-08-09 PROCEDURE — 96361 HYDRATE IV INFUSION ADD-ON: CPT

## 2023-08-09 PROCEDURE — 87040 BLOOD CULTURE FOR BACTERIA: CPT

## 2023-08-09 PROCEDURE — 85025 COMPLETE CBC W/AUTO DIFF WBC: CPT

## 2023-08-09 PROCEDURE — 94761 N-INVAS EAR/PLS OXIMETRY MLT: CPT

## 2023-08-09 PROCEDURE — 6360000002 HC RX W HCPCS: Performed by: EMERGENCY MEDICINE

## 2023-08-09 PROCEDURE — 72125 CT NECK SPINE W/O DYE: CPT

## 2023-08-09 PROCEDURE — 81001 URINALYSIS AUTO W/SCOPE: CPT

## 2023-08-09 RX ORDER — INSULIN LISPRO 100 [IU]/ML
5 INJECTION, SOLUTION INTRAVENOUS; SUBCUTANEOUS
Status: DISCONTINUED | OUTPATIENT
Start: 2023-08-09 | End: 2023-08-11

## 2023-08-09 RX ORDER — INSULIN LISPRO 100 [IU]/ML
0-4 INJECTION, SOLUTION INTRAVENOUS; SUBCUTANEOUS NIGHTLY
Status: DISCONTINUED | OUTPATIENT
Start: 2023-08-09 | End: 2023-08-09

## 2023-08-09 RX ORDER — SERTRALINE HYDROCHLORIDE 100 MG/1
100 TABLET, FILM COATED ORAL NIGHTLY
Status: DISCONTINUED | OUTPATIENT
Start: 2023-08-09 | End: 2023-08-12 | Stop reason: HOSPADM

## 2023-08-09 RX ORDER — LOSARTAN POTASSIUM AND HYDROCHLOROTHIAZIDE 25; 100 MG/1; MG/1
1 TABLET ORAL DAILY
Status: ON HOLD | COMMUNITY
End: 2023-08-11

## 2023-08-09 RX ORDER — DEXTROSE MONOHYDRATE 100 MG/ML
INJECTION, SOLUTION INTRAVENOUS CONTINUOUS PRN
Status: DISCONTINUED | OUTPATIENT
Start: 2023-08-09 | End: 2023-08-12 | Stop reason: HOSPADM

## 2023-08-09 RX ORDER — POTASSIUM CHLORIDE 20 MEQ/1
20 TABLET, EXTENDED RELEASE ORAL
Status: DISCONTINUED | OUTPATIENT
Start: 2023-08-10 | End: 2023-08-12 | Stop reason: HOSPADM

## 2023-08-09 RX ORDER — INSULIN GLARGINE 100 [IU]/ML
30 INJECTION, SOLUTION SUBCUTANEOUS NIGHTLY
Status: DISCONTINUED | OUTPATIENT
Start: 2023-08-09 | End: 2023-08-12 | Stop reason: HOSPADM

## 2023-08-09 RX ORDER — FAMOTIDINE 20 MG/1
20 TABLET, FILM COATED ORAL NIGHTLY
Status: DISCONTINUED | OUTPATIENT
Start: 2023-08-09 | End: 2023-08-12 | Stop reason: HOSPADM

## 2023-08-09 RX ORDER — 0.9 % SODIUM CHLORIDE 0.9 %
250 INTRAVENOUS SOLUTION INTRAVENOUS ONCE
Status: COMPLETED | OUTPATIENT
Start: 2023-08-09 | End: 2023-08-09

## 2023-08-09 RX ORDER — INSULIN LISPRO 100 [IU]/ML
0-4 INJECTION, SOLUTION INTRAVENOUS; SUBCUTANEOUS NIGHTLY
Status: DISCONTINUED | OUTPATIENT
Start: 2023-08-09 | End: 2023-08-10

## 2023-08-09 RX ORDER — ATORVASTATIN CALCIUM 80 MG/1
80 TABLET, FILM COATED ORAL DAILY
COMMUNITY

## 2023-08-09 RX ORDER — SODIUM CHLORIDE 9 MG/ML
INJECTION, SOLUTION INTRAVENOUS PRN
Status: DISCONTINUED | OUTPATIENT
Start: 2023-08-09 | End: 2023-08-12 | Stop reason: HOSPADM

## 2023-08-09 RX ORDER — INSULIN LISPRO 100 [IU]/ML
0-16 INJECTION, SOLUTION INTRAVENOUS; SUBCUTANEOUS
Status: DISCONTINUED | OUTPATIENT
Start: 2023-08-09 | End: 2023-08-10

## 2023-08-09 RX ORDER — ATORVASTATIN CALCIUM 40 MG/1
80 TABLET, FILM COATED ORAL DAILY
Status: DISCONTINUED | OUTPATIENT
Start: 2023-08-10 | End: 2023-08-12 | Stop reason: HOSPADM

## 2023-08-09 RX ORDER — ACETAMINOPHEN 325 MG/1
650 TABLET ORAL EVERY 6 HOURS PRN
Status: DISCONTINUED | OUTPATIENT
Start: 2023-08-09 | End: 2023-08-12 | Stop reason: HOSPADM

## 2023-08-09 RX ORDER — BISACODYL 10 MG
10 SUPPOSITORY, RECTAL RECTAL DAILY PRN
Status: DISCONTINUED | OUTPATIENT
Start: 2023-08-09 | End: 2023-08-12 | Stop reason: HOSPADM

## 2023-08-09 RX ORDER — POLYETHYLENE GLYCOL 3350 17 G/17G
17 POWDER, FOR SOLUTION ORAL DAILY PRN
Status: DISCONTINUED | OUTPATIENT
Start: 2023-08-09 | End: 2023-08-12 | Stop reason: HOSPADM

## 2023-08-09 RX ORDER — SODIUM CHLORIDE 0.9 % (FLUSH) 0.9 %
10 SYRINGE (ML) INJECTION EVERY 12 HOURS SCHEDULED
Status: DISCONTINUED | OUTPATIENT
Start: 2023-08-09 | End: 2023-08-12 | Stop reason: HOSPADM

## 2023-08-09 RX ORDER — DILTIAZEM HYDROCHLORIDE 240 MG/1
240 CAPSULE, EXTENDED RELEASE ORAL DAILY
COMMUNITY

## 2023-08-09 RX ORDER — ONDANSETRON 4 MG/1
4 TABLET, FILM COATED ORAL EVERY 8 HOURS PRN
Status: ON HOLD | COMMUNITY
End: 2023-08-12 | Stop reason: HOSPADM

## 2023-08-09 RX ORDER — METOPROLOL SUCCINATE 100 MG/1
200 TABLET, EXTENDED RELEASE ORAL DAILY
Status: DISCONTINUED | OUTPATIENT
Start: 2023-08-09 | End: 2023-08-12 | Stop reason: HOSPADM

## 2023-08-09 RX ORDER — INSULIN LISPRO 100 [IU]/ML
0-8 INJECTION, SOLUTION INTRAVENOUS; SUBCUTANEOUS
Status: DISCONTINUED | OUTPATIENT
Start: 2023-08-09 | End: 2023-08-09

## 2023-08-09 RX ORDER — 0.9 % SODIUM CHLORIDE 0.9 %
500 INTRAVENOUS SOLUTION INTRAVENOUS ONCE
Status: COMPLETED | OUTPATIENT
Start: 2023-08-09 | End: 2023-08-09

## 2023-08-09 RX ORDER — AMIODARONE HYDROCHLORIDE 200 MG/1
200 TABLET ORAL 2 TIMES DAILY
Status: DISCONTINUED | OUTPATIENT
Start: 2023-08-09 | End: 2023-08-10

## 2023-08-09 RX ORDER — ACETAMINOPHEN 650 MG/1
650 SUPPOSITORY RECTAL EVERY 6 HOURS PRN
Status: DISCONTINUED | OUTPATIENT
Start: 2023-08-09 | End: 2023-08-12 | Stop reason: HOSPADM

## 2023-08-09 RX ORDER — SODIUM CHLORIDE 9 MG/ML
INJECTION, SOLUTION INTRAVENOUS CONTINUOUS
Status: DISCONTINUED | OUTPATIENT
Start: 2023-08-09 | End: 2023-08-10

## 2023-08-09 RX ORDER — SODIUM CHLORIDE 0.9 % (FLUSH) 0.9 %
10 SYRINGE (ML) INJECTION PRN
Status: DISCONTINUED | OUTPATIENT
Start: 2023-08-09 | End: 2023-08-12 | Stop reason: HOSPADM

## 2023-08-09 RX ORDER — POTASSIUM CHLORIDE 1500 MG/1
20 TABLET, EXTENDED RELEASE ORAL
COMMUNITY

## 2023-08-09 RX ADMIN — INSULIN LISPRO 5 UNITS: 100 INJECTION, SOLUTION INTRAVENOUS; SUBCUTANEOUS at 18:41

## 2023-08-09 RX ADMIN — PIPERACILLIN AND TAZOBACTAM 3375 MG: 3; .375 INJECTION, POWDER, LYOPHILIZED, FOR SOLUTION INTRAVENOUS at 15:12

## 2023-08-09 RX ADMIN — SODIUM CHLORIDE, PRESERVATIVE FREE 10 ML: 5 INJECTION INTRAVENOUS at 20:28

## 2023-08-09 RX ADMIN — MICONAZOLE NITRATE: 2 POWDER TOPICAL at 20:27

## 2023-08-09 RX ADMIN — AMIODARONE HYDROCHLORIDE 200 MG: 200 TABLET ORAL at 20:27

## 2023-08-09 RX ADMIN — SODIUM CHLORIDE: 9 INJECTION, SOLUTION INTRAVENOUS at 18:33

## 2023-08-09 RX ADMIN — SODIUM CHLORIDE 250 ML: 9 INJECTION, SOLUTION INTRAVENOUS at 10:05

## 2023-08-09 RX ADMIN — APIXABAN 5 MG: 5 TABLET, FILM COATED ORAL at 20:27

## 2023-08-09 RX ADMIN — EMPAGLIFLOZIN 10 MG: 10 TABLET, FILM COATED ORAL at 18:42

## 2023-08-09 RX ADMIN — SODIUM CHLORIDE 250 ML: 9 INJECTION, SOLUTION INTRAVENOUS at 14:23

## 2023-08-09 RX ADMIN — SODIUM CHLORIDE 250 ML: 9 INJECTION, SOLUTION INTRAVENOUS at 13:49

## 2023-08-09 RX ADMIN — INSULIN LISPRO 4 UNITS: 100 INJECTION, SOLUTION INTRAVENOUS; SUBCUTANEOUS at 20:29

## 2023-08-09 RX ADMIN — SODIUM CHLORIDE 250 ML: 9 INJECTION, SOLUTION INTRAVENOUS at 11:07

## 2023-08-09 RX ADMIN — INSULIN HUMAN 6 UNITS: 100 INJECTION, SOLUTION PARENTERAL at 13:49

## 2023-08-09 RX ADMIN — INSULIN LISPRO 16 UNITS: 100 INJECTION, SOLUTION INTRAVENOUS; SUBCUTANEOUS at 18:42

## 2023-08-09 RX ADMIN — SODIUM CHLORIDE 500 ML: 9 INJECTION, SOLUTION INTRAVENOUS at 14:15

## 2023-08-09 RX ADMIN — PIPERACILLIN AND TAZOBACTAM 3375 MG: 3; .375 INJECTION, POWDER, LYOPHILIZED, FOR SOLUTION INTRAVENOUS at 22:47

## 2023-08-09 RX ADMIN — SERTRALINE 100 MG: 100 TABLET, FILM COATED ORAL at 20:27

## 2023-08-09 RX ADMIN — FAMOTIDINE 20 MG: 20 TABLET, FILM COATED ORAL at 20:27

## 2023-08-09 RX ADMIN — INSULIN GLARGINE 30 UNITS: 100 INJECTION, SOLUTION SUBCUTANEOUS at 20:30

## 2023-08-09 NOTE — ED PROVIDER NOTES
[address] [facility]      Pt Name: Mary Anne Howard  MRN: [id number]  [id number] [facility] [place] 1950  Date of evaluation: 8/9/2023  Provider: Bronson Muñoz MD    CHIEF COMPLAINT       Chief Complaint   Patient presents with    Fatigue     Generalized weakness, worsening over the past 48hrs. HISTORY OF PRESENT ILLNESS      Mary Anne Howard is a 81 y.o. female who presents to the emergency department by EMS from her nursing facility for evaluation of generalized weakness. Patient reports that this has been present for about the last 3 days. She does have a history of a stroke with reported residual deficits to the right lower extremity but notes new weakness in all extremities over the past few days. She also states that she had a fall about 2 to 3 days ago, striking her head. She does not feel that she had any injuries related to this. Denies any headache. No vision changes. No nausea or vomiting. Denies any abdominal pain complaints at this time but states that she did have some \"a while ago. \"    No other complaints at this time. PAST MEDICAL HISTORY     Past Medical History:   Diagnosis Date    Adjustment disorder with depressed mood 06/15/2022    Arthritis     Atrial fibrillation, unspecified type ([facility]) 11/20/2021    Cerebral artery occlusion with cerebral infarction [facility])     CHF (congestive heart failure) ([address] [place] [address])     Chronic kidney disease 11/20/2021    Stage 3    Diabetes mellitus ([address] [place] [address])     Dysarthria and anarthria 11/20/2021    Endothelial corneal dystrophy of both eyes 04/21/2022    H/O cardiac catheterization 08/04/2017    LMCA: Mild irregularites 10-20%. LAD: Mild irregularites 10-20%. LCx: Mild irregularites 10-20%. RCA: Mild irregularites 10-20%. LV function assessed as : Abnormal. EF of 40%. H/O echocardiogram 12/18/2018    EF 55%. Mildly increased LV wall thickness. The left atrium appears moderately to severely dilated. Moderate to severe mitral regurg.  Moderate mis-transcribed.)    Severo Click, MD (electronically signed)  Attending Emergency Physician            Severo Click, MD  08/09/23 8917

## 2023-08-09 NOTE — PROGRESS NOTES
Pt is resting in bed. Denies any pain or sob. A/O x2, unable to state time or situation. Insulin orders clarified. VS and assessment complete.  Call light in reach and bed alarm on.l

## 2023-08-09 NOTE — PLAN OF CARE
Problem: Discharge Planning  Goal: Discharge to home or other facility with appropriate resources  Outcome: Progressing  Flowsheets (Taken 8/9/2023 1922)  Discharge to home or other facility with appropriate resources:   Identify barriers to discharge with patient and caregiver   Identify discharge learning needs (meds, wound care, etc)     Problem: Skin/Tissue Integrity  Goal: Absence of new skin breakdown  Description: 1. Monitor for areas of redness and/or skin breakdown  2. Assess vascular access sites hourly  3. Every 4-6 hours minimum:  Change oxygen saturation probe site  4. Every 4-6 hours:  If on nasal continuous positive airway pressure, respiratory therapy assess nares and determine need for appliance change or resting period. Outcome: Progressing  Note: Skin assessed per order. Cream and powder ordered.       Problem: Safety - Adult  Goal: Free from fall injury  Outcome: Progressing  Flowsheets (Taken 8/9/2023 1922)  Free From Fall Injury: Instruct family/caregiver on patient safety

## 2023-08-10 PROBLEM — E44.1 MILD MALNUTRITION (HCC): Status: ACTIVE | Noted: 2023-08-10

## 2023-08-10 PROBLEM — R53.1 GENERALIZED WEAKNESS: Status: ACTIVE | Noted: 2023-08-10

## 2023-08-10 PROBLEM — R94.31 PROLONGED QT INTERVAL: Status: ACTIVE | Noted: 2023-08-10

## 2023-08-10 LAB
ANION GAP SERPL CALCULATED.3IONS-SCNC: 10 MMOL/L (ref 9–17)
BASOPHILS # BLD: <0.03 K/UL (ref 0–0.2)
BASOPHILS NFR BLD: 0 % (ref 0–2)
BUN SERPL-MCNC: 44 MG/DL (ref 8–23)
BUN/CREAT SERPL: 22 (ref 9–20)
CALCIUM SERPL-MCNC: 10.5 MG/DL (ref 8.6–10.4)
CHLORIDE SERPL-SCNC: 96 MMOL/L (ref 98–107)
CO2 SERPL-SCNC: 28 MMOL/L (ref 20–31)
CREAT SERPL-MCNC: 2 MG/DL (ref 0.5–0.9)
EKG ATRIAL RATE: 202 BPM
EKG Q-T INTERVAL: 478 MS
EKG QRS DURATION: 142 MS
EKG QTC CALCULATION (BAZETT): 551 MS
EKG R AXIS: -115 DEGREES
EKG T AXIS: 80 DEGREES
EKG VENTRICULAR RATE: 80 BPM
EOSINOPHIL # BLD: <0.03 K/UL (ref 0–0.44)
EOSINOPHILS RELATIVE PERCENT: 0 % (ref 1–4)
ERYTHROCYTE [DISTWIDTH] IN BLOOD BY AUTOMATED COUNT: 15.5 % (ref 11.8–14.4)
GFR SERPL CREATININE-BSD FRML MDRD: 26 ML/MIN/1.73M2
GLUCOSE BLD-MCNC: 143 MG/DL (ref 74–100)
GLUCOSE BLD-MCNC: 295 MG/DL (ref 74–100)
GLUCOSE BLD-MCNC: 306 MG/DL (ref 74–100)
GLUCOSE BLD-MCNC: 394 MG/DL (ref 74–100)
GLUCOSE SERPL-MCNC: 114 MG/DL (ref 70–99)
HCT VFR BLD AUTO: 38.5 % (ref 36.3–47.1)
HGB BLD-MCNC: 12.4 G/DL (ref 11.9–15.1)
IMM GRANULOCYTES # BLD AUTO: 0.03 K/UL (ref 0–0.3)
IMM GRANULOCYTES NFR BLD: 0 %
LYMPHOCYTES NFR BLD: 0.81 K/UL (ref 1.1–3.7)
LYMPHOCYTES RELATIVE PERCENT: 7 % (ref 24–43)
MAGNESIUM SERPL-MCNC: 2.4 MG/DL (ref 1.6–2.6)
MCH RBC QN AUTO: 27.5 PG (ref 25.2–33.5)
MCHC RBC AUTO-ENTMCNC: 32.2 G/DL (ref 28.4–34.8)
MCV RBC AUTO: 85.4 FL (ref 82.6–102.9)
MICROORGANISM SPEC CULT: NO GROWTH
MONOCYTES NFR BLD: 0.71 K/UL (ref 0.1–1.2)
MONOCYTES NFR BLD: 6 % (ref 3–12)
NEUTROPHILS NFR BLD: 87 % (ref 36–65)
NEUTS SEG NFR BLD: 9.9 K/UL (ref 1.5–8.1)
NRBC BLD-RTO: 0 PER 100 WBC
PLATELET # BLD AUTO: 193 K/UL (ref 138–453)
PMV BLD AUTO: 11.6 FL (ref 8.1–13.5)
POTASSIUM SERPL-SCNC: 4 MMOL/L (ref 3.7–5.3)
RBC # BLD AUTO: 4.51 M/UL (ref 3.95–5.11)
SODIUM SERPL-SCNC: 134 MMOL/L (ref 135–144)
SPECIMEN DESCRIPTION: NORMAL
WBC OTHER # BLD: 11.5 K/UL (ref 3.5–11.3)

## 2023-08-10 PROCEDURE — 93005 ELECTROCARDIOGRAM TRACING: CPT | Performed by: NURSE PRACTITIONER

## 2023-08-10 PROCEDURE — 2000000000 HC ICU R&B

## 2023-08-10 PROCEDURE — 97166 OT EVAL MOD COMPLEX 45 MIN: CPT

## 2023-08-10 PROCEDURE — 97530 THERAPEUTIC ACTIVITIES: CPT

## 2023-08-10 PROCEDURE — 6360000002 HC RX W HCPCS: Performed by: NURSE PRACTITIONER

## 2023-08-10 PROCEDURE — 99222 1ST HOSP IP/OBS MODERATE 55: CPT | Performed by: FAMILY MEDICINE

## 2023-08-10 PROCEDURE — 97162 PT EVAL MOD COMPLEX 30 MIN: CPT

## 2023-08-10 PROCEDURE — 85025 COMPLETE CBC W/AUTO DIFF WBC: CPT

## 2023-08-10 PROCEDURE — 97110 THERAPEUTIC EXERCISES: CPT

## 2023-08-10 PROCEDURE — 6370000000 HC RX 637 (ALT 250 FOR IP): Performed by: NURSE PRACTITIONER

## 2023-08-10 PROCEDURE — 82947 ASSAY GLUCOSE BLOOD QUANT: CPT

## 2023-08-10 PROCEDURE — 36415 COLL VENOUS BLD VENIPUNCTURE: CPT

## 2023-08-10 PROCEDURE — 80048 BASIC METABOLIC PNL TOTAL CA: CPT

## 2023-08-10 PROCEDURE — 94761 N-INVAS EAR/PLS OXIMETRY MLT: CPT

## 2023-08-10 PROCEDURE — 93010 ELECTROCARDIOGRAM REPORT: CPT | Performed by: FAMILY MEDICINE

## 2023-08-10 PROCEDURE — 2580000003 HC RX 258: Performed by: NURSE PRACTITIONER

## 2023-08-10 PROCEDURE — 83735 ASSAY OF MAGNESIUM: CPT

## 2023-08-10 RX ORDER — 0.9 % SODIUM CHLORIDE 0.9 %
500 INTRAVENOUS SOLUTION INTRAVENOUS ONCE
Status: COMPLETED | OUTPATIENT
Start: 2023-08-10 | End: 2023-08-10

## 2023-08-10 RX ORDER — MIDODRINE HYDROCHLORIDE 5 MG/1
5 TABLET ORAL
Status: DISCONTINUED | OUTPATIENT
Start: 2023-08-10 | End: 2023-08-10

## 2023-08-10 RX ORDER — SODIUM CHLORIDE 9 MG/ML
INJECTION, SOLUTION INTRAVENOUS CONTINUOUS
Status: DISCONTINUED | OUTPATIENT
Start: 2023-08-10 | End: 2023-08-11

## 2023-08-10 RX ORDER — INSULIN LISPRO 100 [IU]/ML
0-4 INJECTION, SOLUTION INTRAVENOUS; SUBCUTANEOUS NIGHTLY
Status: DISCONTINUED | OUTPATIENT
Start: 2023-08-10 | End: 2023-08-12 | Stop reason: HOSPADM

## 2023-08-10 RX ORDER — INSULIN LISPRO 100 [IU]/ML
0-4 INJECTION, SOLUTION INTRAVENOUS; SUBCUTANEOUS
Status: DISCONTINUED | OUTPATIENT
Start: 2023-08-10 | End: 2023-08-12 | Stop reason: HOSPADM

## 2023-08-10 RX ADMIN — INSULIN LISPRO 5 UNITS: 100 INJECTION, SOLUTION INTRAVENOUS; SUBCUTANEOUS at 17:11

## 2023-08-10 RX ADMIN — APIXABAN 5 MG: 5 TABLET, FILM COATED ORAL at 21:49

## 2023-08-10 RX ADMIN — AMIODARONE HYDROCHLORIDE 200 MG: 200 TABLET ORAL at 21:49

## 2023-08-10 RX ADMIN — MIDODRINE HYDROCHLORIDE 5 MG: 5 TABLET ORAL at 12:04

## 2023-08-10 RX ADMIN — POTASSIUM CHLORIDE 20 MEQ: 1500 TABLET, EXTENDED RELEASE ORAL at 08:18

## 2023-08-10 RX ADMIN — MIDODRINE HYDROCHLORIDE 5 MG: 5 TABLET ORAL at 17:10

## 2023-08-10 RX ADMIN — FAMOTIDINE 20 MG: 20 TABLET, FILM COATED ORAL at 21:49

## 2023-08-10 RX ADMIN — INSULIN LISPRO 4 UNITS: 100 INJECTION, SOLUTION INTRAVENOUS; SUBCUTANEOUS at 12:03

## 2023-08-10 RX ADMIN — AMIODARONE HYDROCHLORIDE 200 MG: 200 TABLET ORAL at 08:18

## 2023-08-10 RX ADMIN — SERTRALINE 100 MG: 100 TABLET, FILM COATED ORAL at 21:49

## 2023-08-10 RX ADMIN — SODIUM CHLORIDE 500 ML: 9 INJECTION, SOLUTION INTRAVENOUS at 09:31

## 2023-08-10 RX ADMIN — SODIUM CHLORIDE: 9 INJECTION, SOLUTION INTRAVENOUS at 04:12

## 2023-08-10 RX ADMIN — ATORVASTATIN CALCIUM 80 MG: 40 TABLET, FILM COATED ORAL at 08:18

## 2023-08-10 RX ADMIN — SODIUM CHLORIDE, PRESERVATIVE FREE 10 ML: 5 INJECTION INTRAVENOUS at 08:18

## 2023-08-10 RX ADMIN — INSULIN LISPRO 3 UNITS: 100 INJECTION, SOLUTION INTRAVENOUS; SUBCUTANEOUS at 17:10

## 2023-08-10 RX ADMIN — SODIUM CHLORIDE: 9 INJECTION, SOLUTION INTRAVENOUS at 11:50

## 2023-08-10 RX ADMIN — INSULIN LISPRO 5 UNITS: 100 INJECTION, SOLUTION INTRAVENOUS; SUBCUTANEOUS at 08:18

## 2023-08-10 RX ADMIN — PIPERACILLIN AND TAZOBACTAM 3375 MG: 3; .375 INJECTION, POWDER, LYOPHILIZED, FOR SOLUTION INTRAVENOUS at 06:56

## 2023-08-10 RX ADMIN — MICONAZOLE NITRATE: 2 POWDER TOPICAL at 07:23

## 2023-08-10 RX ADMIN — EMPAGLIFLOZIN 10 MG: 10 TABLET, FILM COATED ORAL at 08:18

## 2023-08-10 RX ADMIN — INSULIN LISPRO 5 UNITS: 100 INJECTION, SOLUTION INTRAVENOUS; SUBCUTANEOUS at 12:03

## 2023-08-10 RX ADMIN — INSULIN GLARGINE 30 UNITS: 100 INJECTION, SOLUTION SUBCUTANEOUS at 21:52

## 2023-08-10 RX ADMIN — APIXABAN 5 MG: 5 TABLET, FILM COATED ORAL at 08:19

## 2023-08-10 RX ADMIN — MIDODRINE HYDROCHLORIDE 5 MG: 5 TABLET ORAL at 09:31

## 2023-08-10 RX ADMIN — SODIUM CHLORIDE, PRESERVATIVE FREE 10 ML: 5 INJECTION INTRAVENOUS at 21:53

## 2023-08-10 NOTE — PLAN OF CARE
Spoke with Albert in cath lab and she will also pass on consult information to Dr. Lorenza Morocho. Sole Barajas RN

## 2023-08-10 NOTE — CARE COORDINATION
Case Management Assessment  Initial Evaluation    Date/Time of Evaluation: 8/10/2023 11:06 AM  Assessment Completed by: Roque THEODOREW     If patient is discharged prior to next notation, then this note serves as note for discharge by case management. Patient Name: Jacy Yu                   YOB: 1950  Diagnosis: Hyperglycemia [R73.9]  Generalized weakness [R53.1]  Hyperglycemia due to type 2 diabetes mellitus ([address]) [E11.65]                   Date / Time: 8/9/2023  9:37 AM    Patient Admission Status: Inpatient   Readmission Risk (Low < 19, Mod (19-27), High > 27): Readmission Risk Score: 20.9    Current PCP: Sujey Mejia MD  PCP verified by CM? Yes    Chart Reviewed: Yes      History Provided by: Medical Record, Patient  Patient Orientation: Alert and Oriented, Other (see comment) (Pt answers most questions appropriately with the exception of a few.)    Patient Cognition: Alert    Hospitalization in the last 30 days (Readmission):  Yes    If yes, Readmission Assessment in CM Navigator will be completed. Advance Directives:      Code Status: DNR-CCA   Patient's Primary Decision Maker is: Named in [address]     Primary Decision MakerAlphmaki Recinos - Child - [phone]    Secondary Decision Maker: Frantz Blanco - Child - [phone]    Supplemental (Other) Decision Maker: Claudia Reynolds - Brother/Sister - [phone]    Discharge Planning:    Patient lives with: Other (Comment) [facility]) Type of Home: Long-Term Care  Primary Care Giver:  Other (Comment) (staff at [facility])  Patient Support Systems include: Children, Other (Comment) (staff at [facility])   Current Financial resources: Medicare, Medicaid  Current community resources: ECF/Home Care  Current services prior to admission: [place], Durable Medical Equipment, Oxygen Therapy            Current DME: Arianna Socks, Wheelchair, Oxygen Therapy (Comment)            Type of Home Care services:  None    ADLS  Prior functional level: Assistance with the following:, Bathing, Dressing, Cooking, Housework, Shopping, Mobility  Current functional level: Shopping, Housework, Cooking, Dressing, Bathing, Assistance with the following:, Mobility    PT AM-PAC:   /24  OT AM-PAC:   /24    Family can provide assistance at DC: No  Would you like Case Management to discuss the discharge plan with any other family members/significant others, and if so, who? No  Plans to Return to Present Housing: Yes  Other Identified Issues/Barriers to RETURNING to current housing: None  Potential Assistance needed at discharge: [place]            Potential DME:  None  Patient expects to discharge to: Long-term care  Plan for transportation at discharge: Sonja Martinez    Payor: Lyubov Narayanan / Plan: MEDICARE PART A AND B / Product Type: *No Product type* /     Does insurance require precert for SNF: No    Potential assistance Purchasing Medications: No        Hilton Cotter [id number] [facility], [address] [place] [address]  Phone: [phone] Fax: [facility] [address] [facility], [address]  [place] [address]  Phone: [phone] Fax: [facility] [address] [place] [address]  Phone: [phone] Fax: [phone]      Notes:    Factors facilitating achievement of predicted outcomes: Family support, Caregiver support, Cooperative, Pleasant, and Has needed Durable Medical Equipment at home  Transportation/ housing/ food security: No issues identified. Barriers to discharge: Decreased endurance and Medical complications    Additional Case Management Notes: SW met with pt to complete assessment.  Pt is alert and oriented but

## 2023-08-10 NOTE — PROGRESS NOTES
Physical Therapy  Facility/Department: [address] [place] [address]  Physical Therapy Initial Assessment    Name: Scott Gallegos  : 1950  MRN: [id number]  Date of Service: 8/10/2023    Discharge Recommendations:  Continue to assess pending progress, Home with assist PRN, Long Term Care with PT, Long Term Care without PT          Patient Diagnosis(es): The primary encounter diagnosis was Generalized weakness. A diagnosis of Hyperglycemia was also pertinent to this visit. Past Medical History:  has a past medical history of Adjustment disorder with depressed mood, Arthritis, Atrial fibrillation, unspecified type [facility]), Cerebral artery occlusion with cerebral infarction ([address]), CHF (congestive heart failure) ([address]), Chronic kidney disease, Diabetes mellitus ([address]), Dysarthria and anarthria, Endothelial corneal dystrophy of both eyes, H/O cardiac catheterization, H/O echocardiogram, Hearing loss, History of echocardiogram, History of echocardiogram, Hyperlipidemia, Hypertension, and Major depressive disorder. Past Surgical History:  has a past surgical history that includes Cholecystectomy and Cardiac catheterization (Left, 2017). Assessment   Assessment: Patient is 81year old female with dx of hyperglycemia who presents with decreased B LE strength, decreased functional mobility and endurance and decreased safety and balance during transfers and ambulation and would benefit from physical therapy to address all concerns and safely return to [facility].   Treatment Diagnosis: Difficulty walking  Specific Instructions for Next Treatment: once daily on weekends  Therapy Prognosis: Good  Decision Making: Medium Complexity  Requires PT Follow-Up: Yes  Activity Tolerance  Activity Tolerance: Patient tolerated evaluation without incident     Plan   Physcial Therapy Plan  General Plan: 2 times a day 7 days a week  Specific Instructions for Next Treatment: once daily on weekends  Current Treatment Recommendations: Strengthening, ROM, Balance training, Functional mobility training, Transfer training, Neuromuscular re-education, Gait training, Safety education & training, Home exercise program, Therapeutic activities, Patient/Caregiver education & training, Endurance training  Safety Devices  Type of Devices: All zackary prominences offloaded, All fall risk precautions in place, Call light within reach, Nurse notified, Left in chair, Patient at risk for falls     Restrictions  Restrictions/Precautions  Restrictions/Precautions: General Precautions, Fall Risk     Subjective   General  Chart Reviewed: Yes  Patient assessed for rehabilitation services?: Yes  Response To Previous Treatment: Not applicable  Family / Caregiver Present: No  Referring Practitioner: RIKA Mallory  Referral Date : 08/09/23  Diagnosis: Hyperglycemia, R73.9  Follows Commands: Within Functional Limits  Subjective  Subjective: Pt agrees to PT eval at this time         Social/Functional History  Social/Functional History  Lives With: Other (comment)  Type of Home: Facility  Home Layout: One level  Home Access: Level entry  Home Equipment: Arianna Socks, rolling, Wheelchair-manual, Oxygen  Has the patient had two or more falls in the past year or any fall with injury in the past year?: Unknown  Receives Help From: Other (comment)  ADL Assistance: Needs assistance  Homemaking Assistance: Needs assistance  Homemaking Responsibilities: No  Ambulation Assistance: Non-ambulatory  Transfer Assistance: Needs assistance  Additional Comments: Pt reports she was pivoting with a walker and assistance but recently they started using a mikey for transfers. Hx of R sided weakness from a stroke. She uses a manual w/c for mobility.   Vision/Hearing  Vision  Vision: Impaired  Vision Exceptions: Wears glasses at all times  Hearing  Hearing: Within functional limits    Cognition   Orientation  Overall Orientation Status: Within Functional Limits  Cognition  Overall Cognitive Status: [facility]

## 2023-08-10 NOTE — PLAN OF CARE
Called and spoke with office staff. Clarified that Dr. Kayla Sterling would be seeing the patient. States they will let him know about consult.  Shayy Mitchell RN

## 2023-08-10 NOTE — PROGRESS NOTES
Occupational Therapy  Facility/Department: [facility] AT THE [facility]  Occupational Therapy Initial Assessment    Name: Syed Harding  : 1950  MRN: [id number]  Date of Service: 8/10/2023    Discharge Recommendations:  Continue to assess pending progress, Subacute/Skilled Nursing Facility     Patient Diagnosis(es): The primary encounter diagnosis was Generalized weakness. A diagnosis of Hyperglycemia was also pertinent to this visit. Past Medical History:  has a past medical history of Adjustment disorder with depressed mood, Arthritis, Atrial fibrillation, unspecified type [facility]), Cerebral artery occlusion with cerebral infarction ([address]), CHF (congestive heart failure) ([address]), Chronic combined systolic and diastolic CHF, NYHA class 4 ([address]), Chronic kidney disease, Diabetes mellitus ([address]), Dysarthria and anarthria, Endothelial corneal dystrophy of both eyes, H/O cardiac catheterization, H/O echocardiogram, Hearing loss, History of echocardiogram, History of echocardiogram, Hyperlipidemia, Hypertension, Major depressive disorder, and Prolonged QT interval.  Past Surgical History:  has a past surgical history that includes Cholecystectomy and Cardiac catheterization (Left, 2017). Treatment Diagnosis: M62.81    Assessment   Performance deficits / Impairments: Decreased functional mobility ; Decreased ADL status; Decreased strength;Decreased ROM; Decreased endurance;Decreased balance  Assessment: Pt is 80 y/o female with admitting dx of hyperglycemia, presenting with decreased strength, endurance, and ability to assist with functional transfers impacting pt's ability to complete ADLs and functional mobility at OF. Pt would benefit from skilled OT intervention to address current functional deficits to safely return to [facility].   Treatment Diagnosis: M62.81  Prognosis: Fair  Decision Making: Medium Complexity  REQUIRES OT FOLLOW-UP: Yes  Activity Tolerance  Activity Tolerance: Patient Tolerated treatment well        Plan %  O2 Device: Nasal cannula          Observation/Palpation  Posture: Fair  Safety Devices  Type of Devices: All zackary prominences offloaded; All fall risk precautions in place;Call light within reach;Nurse notified; Left in chair;Patient at risk for falls        AROM: Generally decreased, functional  PROM: Within functional limits  Strength: Generally decreased, functional (RUE decreased due to hx of CVA.)  Coordination: Within functional limits  Tone: Normal  Sensation: Intact  ADL  Feeding: Modified independent   Grooming: Minimal assistance  UE Bathing: Moderate assistance  LE Bathing: Maximum assistance  UE Dressing: Maximum assistance  LE Dressing: Maximum assistance  Toileting: Maximum assistance     Activity Tolerance  Activity Tolerance: Patient tolerated evaluation without incident     Transfers  Transfer Comments: Previously, pt used walker to stand/pivot, but recently staff has been using mikey to transfer pt. Vision  Vision: Impaired  Vision Exceptions: Wears glasses at all times  Hearing  Hearing: Within functional limits  Cognition  Overall Cognitive Status: Exceptions  Arousal/Alertness: Delayed responses to stimuli  Following Commands: Follows one step commands with increased time  Attention Span: Attends with cues to redirect  Orientation  Overall Orientation Status: Within Functional Limits                  Education Given To: Patient  Education Provided: Role of Therapy;Plan of Care  Education Provided Comments: Educated on OT role and plan for txs.   Education Method: Verbal  Barriers to Learning: None  Education Outcome: Verbalized understanding      AM-PAC Score        AM-PAC Inpatient Daily Activity Raw Score: 12 (08/10/23 1202)  AM-PAC Inpatient ADL T-Scale Score : 30.6 (08/10/23 1202)  ADL Inpatient CMS 0-100% Score: 66.57 (08/10/23 1202)  ADL Inpatient CMS G-Code Modifier : CL (08/10/23 1202)      Goals  Short Term Goals  Time Frame for Short Term Goals: 21 visits  Short Term Goal 1: Pt will

## 2023-08-10 NOTE — H&P
extremities. Motor and sensory are grossly intact  SKIN:  No rashes. No skin lesions.    -----------------------------------------------------------------  Diagnostic Data:     CBC:   Lab Results   Component Value Date    WBC 11.5 (H) 08/10/2023    RBC 4.51 08/10/2023    HGB 12.4 08/10/2023    HCT 38.5 08/10/2023    MCV 85.4 08/10/2023     08/10/2023      Lab Results   Component Value Date    SEGS 81 (H) 06/06/2023    NEUTROABS 9.90 (H) 08/10/2023    LYMPHOPCT 7 (L) 08/10/2023    LYMPHSABS 0.81 (L) 08/10/2023    MONOPCT 6 08/10/2023    EOSRELPCT 0 (L) 08/10/2023    BASOPCT 0 08/10/2023    IMMGRAN 0 08/10/2023     CMP:   Lab Results   Component Value Date    GLUCOSE 114 (H) 08/10/2023    BUN 44 (H) 08/10/2023    CREATININE 2.0 (H) 08/10/2023     (L) 08/10/2023    K 4.0 08/10/2023    CALCIUM 10.5 (H) 08/10/2023    CL 96 (L) 08/10/2023    CO2 28 08/10/2023    PROT 8.2 08/09/2023    LABALBU 3.8 08/09/2023    BILITOT 0.4 08/09/2023    ALKPHOS 144 (H) 08/09/2023    ALT 13 08/09/2023    AST 17 08/09/2023     UA:   Lab Results   Component Value Date    COLORU Yellow 08/09/2023    SPECGRAV <1.005 (L) 08/09/2023    WBCUA 0 TO 2 08/09/2023    RBCUA None 08/09/2023    EPITHUA 0 TO 2 08/09/2023    LEUKOCYTESUR NEGATIVE 08/09/2023    GLUCOSEU 3+ (A) 08/09/2023    KETUA NEGATIVE 08/09/2023    PROTEINU NEGATIVE 08/09/2023    HGBUR NEGATIVE 08/09/2023    CASTUA NOT REPORTED 06/21/2021    CRYSTUA NOT REPORTED 06/21/2021    BACTERIA TRACE (A) 08/09/2023    YEAST NOT REPORTED 06/21/2021     Lactic Acid:   Lab Results   Component Value Date    LACTA 1.9 06/02/2023     D-Dimer:  No results found for: DDIMER  PT/INR:  Lab Results   Component Value Date/Time    PROTIME 17.7 06/02/2023 09:18 AM    INR 1.4 06/02/2023 09:18 AM     Troponin:  No results for input(s): TROPONINI in the last 72 hours.   ABGs:   Lab Results   Component Value Date/Time    PHART 7.405 06/02/2023 03:10 PM    JPN2NVQ 56.2 06/02/2023 03:10 PM    PO2ART

## 2023-08-10 NOTE — PROGRESS NOTES
Physical Therapy  Facility/Department: [facility] to [facility] AT THE [facility]  Daily Treatment Note  NAME: Gabriela Cho  : 1950  MRN: [id number]    Date of Service: 8/10/2023    Discharge Recommendations:  Continue to assess pending progress, Home with assist PRN, Long Term Care with PT, Long Term Care without PT        Patient Diagnosis(es): The primary encounter diagnosis was Generalized weakness. A diagnosis of Hyperglycemia was also pertinent to this visit. Assessment   Assessment: Ther-ex: Seated BLE x15 in planes with min vc's for technique. Held on further tx due to increased SPO2 fluctuations from 76%-93% with SOB noted by pt thorughout tx and increased confusion. Nursing made aware. Activity Tolerance: Treatment limited secondary to medical complications     Plan    Physcial Therapy Plan  General Plan:  (1x/day on weekends and holidays)  Specific Instructions for Next Treatment: once daily on weekends  Current Treatment Recommendations: Strengthening;ROM;Balance training;Functional mobility training;Transfer training;Neuromuscular re-education;Gait training; Safety education & training;Home exercise program;Therapeutic activities; Patient/Caregiver education & training; Endurance training     Restrictions  Restrictions/Precautions  Restrictions/Precautions: General Precautions, Fall Risk     Subjective    Subjective  Subjective: Pt in chair upon arrival, states she is feeling \"pretty good,\" agreeable to therapy. Pain: denies pain complaint  Orientation  Overall Orientation Status: Within Functional Limits  Cognition  Overall Cognitive Status: Exceptions  Arousal/Alertness: Delayed responses to stimuli  Following Commands:  Follows one step commands with increased time  Attention Span: Attends with cues to redirect     Objective     Bed Mobility Training  Bed Mobility Training: No  Transfer Training  Transfer Training: No  Gait Training  Gait Training: No     PT Exercises  Exercise Treatment: Seated BLE x15 in all planes

## 2023-08-10 NOTE — PROGRESS NOTES
Pt refused linen/gown change and hygiene care. Becomes agitated when woken up. VS and assessment complete. Bed alarm on and call light in reach.

## 2023-08-10 NOTE — PROGRESS NOTES
Piperacillin-Tazobactam Extended Interval Interchange    The following ordered dose of Piperacillin-Tazobactam has been changed to optimize its pharmacodynamic profile as approved by the Patient Care Review Committee. Ordered Dose    _X_ 3.375 gm IV q6 or 8hrs (30 minute infusion)      __ 4.5gm IV q6 or 8hrs  (30 minute infusion)      __ 2.25gm IV q6 or 8hrs (30 minute infusion)      New Dose    CrCl  ? 20ml/min    _X_ 3.375gm IV q8hrs  (4-hour infusion)      CrCl < 20ml/min     __ 3.375gm IV q12hrs  (4-hour infusion)      Pharmacists should be contacted for issues concerning drug compatibility with multiple IV medications. All doses will be prepared using 50ml NS to be infused over 4-hours at a rate of 12.5ml/hr.     Thank Valdez Sherman, PharmD, 8/10/2023 7:08 AM

## 2023-08-10 NOTE — PLAN OF CARE
Problem: Discharge Planning  Goal: Discharge to home or other facility with appropriate resources  Outcome: Progressing     Problem: Skin/Tissue Integrity  Goal: Absence of new skin breakdown  Description: 1. Monitor for areas of redness and/or skin breakdown  2. Assess vascular access sites hourly  3. Every 4-6 hours minimum:  Change oxygen saturation probe site  4. Every 4-6 hours:  If on nasal continuous positive airway pressure, respiratory therapy assess nares and determine need for appliance change or resting period.   Outcome: Progressing     Problem: Safety - Adult  Goal: Free from fall injury  Outcome: Progressing     Problem: Nutrition Deficit:  Goal: Optimize nutritional status  8/10/2023 1129 by Malika Velásquez RN  Outcome: Progressing  8/10/2023 0742 by Judy Sebastian RD, LD  Outcome: Progressing  Flowsheets (Taken 8/10/2023 0617)  Nutrient intake appropriate for improving, restoring, or maintaining nutritional needs:   Monitor oral intake, labs, and treatment plans   Recommend appropriate diets, oral nutritional supplements, and vitamin/mineral supplements  Note: Nutrition Problem #1: Other (Comment) (mild malnutrition)  Intervention: Food and/or Nutrient Delivery: Continue Current Diet, Continue Oral Nutrition Supplement  Recommend vit D

## 2023-08-10 NOTE — PROGRESS NOTES
Comprehensive Nutrition Assessment    Type and Reason for Visit:  Initial, Positive Nutrition Screen    Nutrition Recommendations/Plan:   Recommend check vit D  Discharge on a NCS MAGGIE diet for ECF     Malnutrition Assessment:  Malnutrition Status:  Mild malnutrition (08/10/23 0730)    Context:  Chronic Illness     Findings of the 6 clinical characteristics of malnutrition:  Energy Intake:  Mild decrease in energy intake (Comment) (just pta)  Weight Loss:  Greater than 10% over 6 months     Body Fat Loss:  No significant body fat loss     Muscle Mass Loss:  No significant muscle mass loss    Fluid Accumulation:  Mild     Strength:  Not Performed    Nutrition Assessment:    Mild malnutrition r/t endocrine dysfunction, AEB significant weight losses with poorly controlled diabetes. Low serum Na+ also likely r/t hyperglycemia, improving as glucose improves. Only on MAGGIE diet at [facility] and FBS once weekly. Have altered her supplement to Glucerna from Ensure to control carb intakes. Recommend d/c on a NCS and MAGGIE diet. Hypercalcemia (noted on last admission as well). No vit D value and likely deficient. Nutrition Related Findings:    +1 BLE edema. Wound Type: None       Current Nutrition Intake & Therapies:    Average Meal Intake: 51-75%  Average Supplements Intake: Unable to assess (not recorded)  ADULT DIET; Regular; 3 carb choices (45 gm/meal); Low Fat/Low Chol/High Fiber/2 gm Na; 2000 ml  ADULT ORAL NUTRITION SUPPLEMENT; Breakfast, Lunch, Dinner; Diabetic Oral Supplement    Anthropometric Measures:  Height: 5' 4\" (162.6 cm)  Ideal Body Weight (IBW): 120 lbs (55 kg)    Admission Body Weight: 166 lb 12.8 oz (75.7 kg)  Current Body Weight: 170 lb 0.5 oz (77.1 kg), 141.7 % IBW.  Weight Source: Bed Scale  Current BMI (kg/m2): 29.2  Usual Body Weight: 200 lb (90.7 kg) (6 months ago)  % Weight Change (Calculated): -15  Weight Adjustment For: No Adjustment                 BMI Categories: Overweight (BMI

## 2023-08-10 NOTE — CONSULTS
class 4 ([address]) 08/28/2017    Acute on chronic systolic CHF (congestive heart failure) ([address]) 08/01/2017    Hypertensive urgency 08/01/2017    Hypertensive emergency 08/01/2017    Hypertension     Hyperlipidemia     Idiopathic cardiomyopathy (HCC)      PLAN:  Prolonged QT: Last EKG showed her QT at 530 ms  HOLD Zosyn at this time. At this time I do not see an sign of infection. I do feel like her tiredness may be due to her sugar levels. HOLD midodrine as this can also prolong QTc  HOLD amiodarone for now as well    Chronic Atrial Fibrillation: Rate Control Asymptomatic  Beta Blocker: Continue Metoprolol succinate (Toprol XL) 200 mg daily. [id number] Score for Atrial Fibrillation Stroke Risk   Risk   Factors  Component Value   C CHF Yes 1   H HTN Yes 1   A2 Age >= 76 No,  (71 y.o.) 0   D DM Yes 1   S2 Prior Stroke/TIA Yes 2   V Vascular Disease No 0   A Age 77-78 Yes,  (71 y.o.) 1   Sc Sex female 1    [id number]  Score  7   Score last updated 6/78/65 31:50 AM EDT  Click here for a link to the UpToDate guideline \"Atrial Fibrillation: Anticoagulation therapy to prevent embolization  Disclaimer: Risk Score calculation is dependent on accuracy of patient problem list and past encounter diagnosis. Stroke Risk: CHADS2-VASc Score: 7/9 (9.6% stroke risk)  Anticoagulation: Continue Apixaban (Eliquis) 5 mg every 12 hours. Because of her atrial fibrillation, I also would also recommend that she continue with anticoagulation to decrease her risk of stoke but also reminded her to watch for signs of blood in her stool or black tarry stools and stop the medication immediately if this develops as this could be life threatening. Chronic combined heart failure: New York Heart Association Class: IIb (Marked symptoms during daily activities)  Beta Blocker: Continue Metoprolol succinate (Toprol XL) 200 mg daily. ACE Inibitor/ARB: Continue losartan/HCTZ (Hyzaar) 100/25 mg every morning.   Heart failure counseling: I advised

## 2023-08-10 NOTE — DISCHARGE INSTR - COC
Continuity of Care Form    Patient Name: Lauren Sutton   :  1950  MRN:  [id number]    Admit date:  2023  Discharge date:  2023    Code Status Order: DNR-CCA   Advance Directives:     Admitting Physician:  Leslee Ruiz MD  PCP: Leslee Ruiz MD    Discharging Nurse: Frankey Dare RN  [facility] Unit/Room#: I306/I306-01  Discharging Unit Phone Number: [phone]    Emergency Contact:   Extended Emergency Contact Information  Primary Emergency Contact: Dodie Miranda   [facility] of [address] Phone: [phone]  Relation: Child  Hearing or visual needs: None  Other needs: None  Preferred language: English   needed? No  Secondary Emergency Contact: [facility] of [address] [address] Phone: [phone]  Work Phone: [phone]  Mobile Phone: [phone]  Relation: Brother/Sister  Hearing or visual needs: None  Other needs: None  Preferred language: English   needed?  No    Past Surgical History:  Past Surgical History:   Procedure Laterality Date    CARDIAC CATHETERIZATION Left 2017    right radial, [facility] Dr. Stiven Chen         Immunization History:   Immunization History   Administered Date(s) Administered    COVID-19, MODERNA BLUE border, Primary or Immunocompromised, (age 12y+), IM, 100 mcg/0.5mL 2021, 2021    Influenza, Quadv, 6 mo and older, IM, PF (Flulaval, Fluarix) 2018    Pneumococcal, PCV-13, PREVNAR 15, (age 6w+), IM, 0.5mL 2013       Active Problems:  Patient Active Problem List   Diagnosis Code    Hypertension I10    Hyperlipidemia E78.5    Acute on chronic systolic CHF (congestive heart failure) (HCC) I50.23    Hypertensive urgency I16.0    Idiopathic cardiomyopathy (HCC) I42.9    Hypertensive emergency I16.1    Chronic combined systolic and diastolic CHF, NYHA class 4 (HCC) I50.42    Acute on chronic combined systolic and diastolic CHF, NYHA class 4 (HCC) I50.43    Hypoxia R09.02 NOT a DME order):  wheelchair, walker, and bedside commode  Other Treatments: n/a    Patient's personal belongings (please select all that are sent with patient):  Glasses, clothing, medication, and bag of toiletries. RN SIGNATURE:  Electronically signed by Te Lugo RN on 8/12/23 at 11:36 AM EDT    CASE MANAGEMENT/SOCIAL WORK SECTION    Inpatient Status Date: 8/9/2023    Readmission Risk Assessment Score:  Readmission Risk              Risk of Unplanned Readmission:  21           Discharging to Facility/ Agency   Name: [facility]  Address: [address] [place] [address], [address] [place] [address] Road  Phone: [phone] [address] [address] (if applicable)   Name:  Address:  Dialysis Schedule:  Phone:  Fax:    / signature: Electronically signed by PAWAN Camacho on 8/10/23 at 8:43 AM EDT    PHYSICIAN SECTION    Prognosis: {Prognosis:6118540801}    Condition at Discharge: [address] Patient Condition:[id number]}    Rehab Potential (if transferring to Rehab): {Prognosis:4676122325}    Recommended Labs or Other Treatments After Discharge: ***    Physician Certification: I certify the above information and transfer of Kyra Castañeda  is necessary for the continuing treatment of the diagnosis listed and that she requires [address] for greater 30 days.      Update Admission H&P: {[facility] DME Changes in [facility]:[id number]}    PHYSICIAN SIGNATURE:  {Esignature:[id number]}

## 2023-08-11 LAB
ANION GAP SERPL CALCULATED.3IONS-SCNC: 7 MMOL/L (ref 9–17)
BASOPHILS # BLD: 0.03 K/UL (ref 0–0.2)
BASOPHILS NFR BLD: 0 % (ref 0–2)
BUN SERPL-MCNC: 37 MG/DL (ref 8–23)
BUN/CREAT SERPL: 23 (ref 9–20)
CALCIUM SERPL-MCNC: 9.8 MG/DL (ref 8.6–10.4)
CHLORIDE SERPL-SCNC: 105 MMOL/L (ref 98–107)
CO2 SERPL-SCNC: 27 MMOL/L (ref 20–31)
CREAT SERPL-MCNC: 1.6 MG/DL (ref 0.5–0.9)
EKG ATRIAL RATE: 214 BPM
EKG ATRIAL RATE: 220 BPM
EKG ATRIAL RATE: 74 BPM
EKG Q-T INTERVAL: 484 MS
EKG Q-T INTERVAL: 528 MS
EKG Q-T INTERVAL: 530 MS
EKG QRS DURATION: 138 MS
EKG QRS DURATION: 186 MS
EKG QRS DURATION: 188 MS
EKG QTC CALCULATION (BAZETT): 558 MS
EKG QTC CALCULATION (BAZETT): 608 MS
EKG QTC CALCULATION (BAZETT): 615 MS
EKG R AXIS: -105 DEGREES
EKG R AXIS: -108 DEGREES
EKG R AXIS: -120 DEGREES
EKG T AXIS: 95 DEGREES
EKG T AXIS: 97 DEGREES
EKG T AXIS: 97 DEGREES
EKG VENTRICULAR RATE: 80 BPM
EKG VENTRICULAR RATE: 80 BPM
EKG VENTRICULAR RATE: 81 BPM
EOSINOPHIL # BLD: 0.05 K/UL (ref 0–0.44)
EOSINOPHILS RELATIVE PERCENT: 1 % (ref 1–4)
ERYTHROCYTE [DISTWIDTH] IN BLOOD BY AUTOMATED COUNT: 15.7 % (ref 11.8–14.4)
GFR SERPL CREATININE-BSD FRML MDRD: 34 ML/MIN/1.73M2
GLUCOSE BLD-MCNC: 134 MG/DL (ref 74–100)
GLUCOSE BLD-MCNC: 207 MG/DL (ref 74–100)
GLUCOSE BLD-MCNC: 228 MG/DL (ref 74–100)
GLUCOSE BLD-MCNC: 234 MG/DL (ref 74–100)
GLUCOSE SERPL-MCNC: 265 MG/DL (ref 70–99)
HCT VFR BLD AUTO: 35.9 % (ref 36.3–47.1)
HGB BLD-MCNC: 11.1 G/DL (ref 11.9–15.1)
IMM GRANULOCYTES # BLD AUTO: 0.03 K/UL (ref 0–0.3)
IMM GRANULOCYTES NFR BLD: 0 %
LYMPHOCYTES NFR BLD: 0.66 K/UL (ref 1.1–3.7)
LYMPHOCYTES RELATIVE PERCENT: 8 % (ref 24–43)
MCH RBC QN AUTO: 27.2 PG (ref 25.2–33.5)
MCHC RBC AUTO-ENTMCNC: 30.9 G/DL (ref 28.4–34.8)
MCV RBC AUTO: 88 FL (ref 82.6–102.9)
MONOCYTES NFR BLD: 0.68 K/UL (ref 0.1–1.2)
MONOCYTES NFR BLD: 9 % (ref 3–12)
NEUTROPHILS NFR BLD: 82 % (ref 36–65)
NEUTS SEG NFR BLD: 6.58 K/UL (ref 1.5–8.1)
NRBC BLD-RTO: 0 PER 100 WBC
PLATELET # BLD AUTO: 159 K/UL (ref 138–453)
PMV BLD AUTO: 11.6 FL (ref 8.1–13.5)
POTASSIUM SERPL-SCNC: 4.3 MMOL/L (ref 3.7–5.3)
RBC # BLD AUTO: 4.08 M/UL (ref 3.95–5.11)
SODIUM SERPL-SCNC: 139 MMOL/L (ref 135–144)
WBC OTHER # BLD: 8 K/UL (ref 3.5–11.3)

## 2023-08-11 PROCEDURE — 2580000003 HC RX 258: Performed by: NURSE PRACTITIONER

## 2023-08-11 PROCEDURE — 97116 GAIT TRAINING THERAPY: CPT

## 2023-08-11 PROCEDURE — 80048 BASIC METABOLIC PNL TOTAL CA: CPT

## 2023-08-11 PROCEDURE — 93005 ELECTROCARDIOGRAM TRACING: CPT | Performed by: NURSE PRACTITIONER

## 2023-08-11 PROCEDURE — 94761 N-INVAS EAR/PLS OXIMETRY MLT: CPT

## 2023-08-11 PROCEDURE — 36415 COLL VENOUS BLD VENIPUNCTURE: CPT

## 2023-08-11 PROCEDURE — 97535 SELF CARE MNGMENT TRAINING: CPT

## 2023-08-11 PROCEDURE — 2700000000 HC OXYGEN THERAPY PER DAY

## 2023-08-11 PROCEDURE — 6370000000 HC RX 637 (ALT 250 FOR IP): Performed by: NURSE PRACTITIONER

## 2023-08-11 PROCEDURE — 85025 COMPLETE CBC W/AUTO DIFF WBC: CPT

## 2023-08-11 PROCEDURE — 2500000003 HC RX 250 WO HCPCS: Performed by: INTERNAL MEDICINE

## 2023-08-11 PROCEDURE — 1200000000 HC SEMI PRIVATE

## 2023-08-11 PROCEDURE — 97110 THERAPEUTIC EXERCISES: CPT

## 2023-08-11 PROCEDURE — 93010 ELECTROCARDIOGRAM REPORT: CPT | Performed by: FAMILY MEDICINE

## 2023-08-11 PROCEDURE — 82947 ASSAY GLUCOSE BLOOD QUANT: CPT

## 2023-08-11 PROCEDURE — 6370000000 HC RX 637 (ALT 250 FOR IP): Performed by: INTERNAL MEDICINE

## 2023-08-11 RX ORDER — DILTIAZEM HYDROCHLORIDE 240 MG/1
240 CAPSULE, EXTENDED RELEASE ORAL DAILY
Status: DISCONTINUED | OUTPATIENT
Start: 2023-08-11 | End: 2023-08-11 | Stop reason: CLARIF

## 2023-08-11 RX ORDER — DILTIAZEM HYDROCHLORIDE 240 MG/1
240 CAPSULE, COATED, EXTENDED RELEASE ORAL DAILY
Status: DISCONTINUED | OUTPATIENT
Start: 2023-08-11 | End: 2023-08-12 | Stop reason: HOSPADM

## 2023-08-11 RX ORDER — INSULIN LISPRO 100 [IU]/ML
8 INJECTION, SOLUTION INTRAVENOUS; SUBCUTANEOUS
Status: DISCONTINUED | OUTPATIENT
Start: 2023-08-11 | End: 2023-08-12 | Stop reason: HOSPADM

## 2023-08-11 RX ADMIN — INSULIN LISPRO 8 UNITS: 100 INJECTION, SOLUTION INTRAVENOUS; SUBCUTANEOUS at 17:17

## 2023-08-11 RX ADMIN — SERTRALINE 100 MG: 100 TABLET, FILM COATED ORAL at 20:49

## 2023-08-11 RX ADMIN — DILTIAZEM HYDROCHLORIDE 240 MG: 240 CAPSULE, COATED, EXTENDED RELEASE ORAL at 08:31

## 2023-08-11 RX ADMIN — POTASSIUM CHLORIDE 20 MEQ: 1500 TABLET, EXTENDED RELEASE ORAL at 08:31

## 2023-08-11 RX ADMIN — METOPROLOL SUCCINATE 200 MG: 100 TABLET, EXTENDED RELEASE ORAL at 08:31

## 2023-08-11 RX ADMIN — INSULIN LISPRO 1 UNITS: 100 INJECTION, SOLUTION INTRAVENOUS; SUBCUTANEOUS at 12:06

## 2023-08-11 RX ADMIN — INSULIN LISPRO 8 UNITS: 100 INJECTION, SOLUTION INTRAVENOUS; SUBCUTANEOUS at 12:06

## 2023-08-11 RX ADMIN — APIXABAN 5 MG: 5 TABLET, FILM COATED ORAL at 20:49

## 2023-08-11 RX ADMIN — ACETAMINOPHEN 650 MG: 325 TABLET ORAL at 20:49

## 2023-08-11 RX ADMIN — SODIUM CHLORIDE, PRESERVATIVE FREE 10 ML: 5 INJECTION INTRAVENOUS at 08:37

## 2023-08-11 RX ADMIN — INSULIN LISPRO 1 UNITS: 100 INJECTION, SOLUTION INTRAVENOUS; SUBCUTANEOUS at 08:35

## 2023-08-11 RX ADMIN — MICONAZOLE NITRATE: 2 POWDER TOPICAL at 08:37

## 2023-08-11 RX ADMIN — ATORVASTATIN CALCIUM 80 MG: 40 TABLET, FILM COATED ORAL at 08:32

## 2023-08-11 RX ADMIN — INSULIN LISPRO 8 UNITS: 100 INJECTION, SOLUTION INTRAVENOUS; SUBCUTANEOUS at 08:36

## 2023-08-11 RX ADMIN — SODIUM CHLORIDE, PRESERVATIVE FREE 10 ML: 5 INJECTION INTRAVENOUS at 20:49

## 2023-08-11 RX ADMIN — FAMOTIDINE 20 MG: 20 TABLET, FILM COATED ORAL at 20:49

## 2023-08-11 RX ADMIN — MICONAZOLE NITRATE: 2 POWDER TOPICAL at 00:20

## 2023-08-11 RX ADMIN — ACETAMINOPHEN 650 MG: 325 TABLET ORAL at 13:56

## 2023-08-11 RX ADMIN — MICONAZOLE NITRATE: 2 POWDER TOPICAL at 20:55

## 2023-08-11 RX ADMIN — INSULIN GLARGINE 30 UNITS: 100 INJECTION, SOLUTION SUBCUTANEOUS at 20:48

## 2023-08-11 RX ADMIN — APIXABAN 5 MG: 5 TABLET, FILM COATED ORAL at 08:37

## 2023-08-11 RX ADMIN — EMPAGLIFLOZIN 10 MG: 10 TABLET, FILM COATED ORAL at 08:31

## 2023-08-11 ASSESSMENT — PAIN DESCRIPTION - ONSET
ONSET: ON-GOING

## 2023-08-11 ASSESSMENT — PAIN DESCRIPTION - LOCATION
LOCATION: GENERALIZED

## 2023-08-11 ASSESSMENT — PAIN DESCRIPTION - FREQUENCY
FREQUENCY: CONTINUOUS

## 2023-08-11 ASSESSMENT — PAIN SCALES - GENERAL
PAINLEVEL_OUTOF10: 8
PAINLEVEL_OUTOF10: 7
PAINLEVEL_OUTOF10: 8

## 2023-08-11 ASSESSMENT — PAIN DESCRIPTION - ORIENTATION
ORIENTATION: RIGHT;LEFT
ORIENTATION: LEFT;RIGHT
ORIENTATION: INNER

## 2023-08-11 ASSESSMENT — PAIN DESCRIPTION - DESCRIPTORS
DESCRIPTORS: ACHING;DISCOMFORT
DESCRIPTORS: ACHING;DISCOMFORT
DESCRIPTORS: ACHING

## 2023-08-11 ASSESSMENT — PAIN DESCRIPTION - PAIN TYPE
TYPE: CHRONIC PAIN

## 2023-08-11 ASSESSMENT — PAIN - FUNCTIONAL ASSESSMENT
PAIN_FUNCTIONAL_ASSESSMENT: ACTIVITIES ARE NOT PREVENTED
PAIN_FUNCTIONAL_ASSESSMENT: PREVENTS OR INTERFERES SOME ACTIVE ACTIVITIES AND ADLS
PAIN_FUNCTIONAL_ASSESSMENT: ACTIVITIES ARE NOT PREVENTED

## 2023-08-11 NOTE — PROGRESS NOTES
Physical Therapy  Facility/Department: [facility] AT THE [facility]  Daily Treatment Note  NAME: Giselle Farr  : 1950  MRN: [id number]    Date of Service: 2023    Discharge Recommendations:  Continue to assess pending progress, Home with assist PRN, Long Term Care with PT, Long Term Care without PT        Patient Diagnosis(es): The primary encounter diagnosis was Generalized weakness. A diagnosis of Hyperglycemia was also pertinent to this visit. Assessment   Assessment: pt dizzy/lightheaded upon standing both attempts with sxs remaining despite prolonged standing rest break (~1 minute each trial). Therefore unsafe to ambulate at this time. Continue to progress as safe to do so in order to improve activity tolerance and proprioceptive awareness. Patient completed seated and supine B LE therex x 15 in all available planes of motion with short rest breaks frequently d/t fatigue. Transfers completed with CGA. pt on 2L supplimental oxygen with sp02 remaining ~96% and above throughout tx. Activity Tolerance: Patient limited by fatigue;Treatment limited secondary to medical complications; Other (comment) (dizzy/lightheaded.)     Plan    Physcial Therapy Plan  General Plan: 2 times a day 7 days a week (1x per day on weekends.)  Current Treatment Recommendations: Strengthening;ROM;Balance training;Functional mobility training;Transfer training;Neuromuscular re-education;Gait training; Safety education & training;Home exercise program;Therapeutic activities; Patient/Caregiver education & training; Endurance training     Restrictions  Restrictions/Precautions  Restrictions/Precautions: General Precautions, Fall Risk     Subjective    Subjective  Subjective: pt in chair upon arrival, tired but pleasant and agreeable to therapy  Pain: R foot 5-6/10  Orientation  Overall Orientation Status: Within Functional Limits     Objective   Balance  Sitting: Intact  Standing: Impaired  Transfer Training  Transfer Training: Yes  Overall Level of Assistance: Contact-guard assistance;Assist X1  Interventions: Safety awareness training;Verbal cues  Sit to Stand: Assist X1;Contact-guard assistance  Stand to Sit: Assist X1;Contact-guard assistance  Stand Pivot Transfers: Assist X1;Contact-guard assistance; Additional time  Gait Training  Gait Training: No (pt dizzy/lightheaded upon standing both attempts with sxs remaining despite prolonged standing rest break. Therefore unsafe to ambulate at this time.)  Gait  Overall Level of Assistance: Assist X1;Contact-guard assistance  Interventions: Demonstration;Verbal cues (technique and importance of slow positional changes.)  Speed/Jacinda: Delayed  Step Length: Right shortened;Left shortened  Gait Abnormalities: Decreased step clearance  Distance (ft): 5 Feet (5ft x 2)  Assistive Device: Walker, rolling;Gait belt     PT Exercises  Exercise Treatment: Seated and reclined BLE x15 in all planes with short rest break between each d/t fatigue. Dynamic Sitting Balance Exercises: unsupport while completing functional reaching tasks within COTL. pt with good balance but did fatigue quickly without support. Static Standing Balance Exercises: Static standing at RW ~1 minute trials with CGA, pt limited by dizziness/lightheadeness. Continue to progress as tolerated to improve activity tolerance. Safety Devices  Type of Devices: All fall risk precautions in place;Call light within reach; Chair alarm in place;Gait belt;Patient at risk for falls; Left in chair;Nurse notified       Goals  Short Term Goals  Time Frame for Short Term Goals: 20 days  Short Term Goal 1: Patient to complete all transfers with SUP and no LOB to decrease fall risk. Short Term Goal 2: Patient to ambulate 10ftx2 with FWW and CGA with no LOB or fatigue to improve mobility. Short Term Goal 3: Patient to tolerate 20-30 min of ther ex/act to improve functional strength.   Short Term Goal 4: Patient to have good dynamic sitting balance to decrease fall

## 2023-08-11 NOTE — PROGRESS NOTES
Physician Progress Note      PATIENTLi Gresham  CSN #:                  [id number]  :                       1950  ADMIT DATE:       2023 9:37 AM  DISCH DATE:  RESPONDING  PROVIDER #:        Love Elizabeth MD          QUERY TEXT:    Patient admitted with Hyperglycemia due to DM type 2  noted to have chronic   atrial fibrillation and is maintained on Eliquis. If possible, please document in progress notes and discharge summary if you   are evaluating and/or treating any of the following: The medical record reflects the following:  Risk Factors: 80 y/o female, CHF, HTN, DM, hx stroke  Clinical Indicators: chronic atrial fib, [id number] Score for Atrial   Fibrillation Stroke Risk = 7, positive for components CHF, HTN, DM, prior   stroke, age 63-76, female  Treatment: Eliquis home med, continued this hospitalization    Olga Lidia Gacria, MSN, RN, CCDS, CRCR  Clinical   .  Options provided:  -- Secondary hypercoagulable state in a patient with atrial fibrillation  -- Secondary hypercoagulable state ruled out  -- Other - I will add my own diagnosis  -- Disagree - Not applicable / Not valid  -- Disagree - Clinically unable to determine / Unknown  -- Refer to Clinical Documentation Reviewer    PROVIDER RESPONSE TEXT:    This patient has secondary hypercoagulable state in a patient with atrial   fibrillation.     Query created by: Olga Lidia Garcia on 2023 11:44 AM      Electronically signed by:  Love Elizabeth MD 2023 11:51 AM

## 2023-08-11 NOTE — ACP (ADVANCE CARE PLANNING)
Advance Care Planning     Advance Care Planning Activator (Inpatient)  Conversation Note      Date of ACP Conversation: 8/11/2023     Conversation Conducted with: Patient with Decision Making Capacity    ACP Activator: Porsche Oconnell RN        Health Care Decision Maker:     Current Designated Health Care Decision Maker:     Primary Decision Maker: Dino Beckham Child - [phone]    Secondary Decision Maker: Jinger Pallas - Child - [phone]    Supplemental (Other) Decision Maker: Federico Zahirajalen - Brother/Sister - [phone]  Care Preferences    Ventilation: \"If you were in your present state of health and suddenly became very ill and were unable to breathe on your own, what would your preference be about the use of a ventilator (breathing machine) if it were available to you? \"      Would the patient desire the use of ventilator (breathing machine)?: no    \"If your health worsens and it becomes clear that your chance of recovery is unlikely, what would your preference be about the use of a ventilator (breathing machine) if it were available to you? \"     Would the patient desire the use of ventilator (breathing machine)?: No      Resuscitation  \"CPR works best to restart the heart when there is a sudden event, like a heart attack, in someone who is otherwise healthy. Unfortunately, CPR does not typically restart the heart for people who have serious health conditions or who are very sick. \"    \"In the event your heart stopped as a result of an underlying serious health condition, would you want attempts to be made to restart your heart (answer \"yes\" for attempt to resuscitate) or would you prefer a natural death (answer \"no\" for do not attempt to resuscitate)? \" no       [x] Yes   [] No   Educated Patient / Donnamae Ivana regarding differences between Advance Directives and portable DNR orders.     Length of ACP Conversation in minutes:  15    Conversation Outcomes:  ACP discussion completed and Existing advance directive reviewed with patient; no changes to patient's previously recorded wishes    Follow-up plan:    [] Schedule follow-up conversation to continue planning  [x] Referred individual to Provider for additional questions/concerns   [] Advised patient/agent/surrogate to review completed ACP document and update if needed with changes in condition, patient preferences or care setting    [] This note routed to one or more involved healthcare providers    [address] [facility] and [address] Nurse Coordinator  8/11/2023 12:30 PM

## 2023-08-11 NOTE — PLAN OF CARE
Problem: Discharge Planning  Goal: Discharge to home or other facility with appropriate resources  Outcome: Progressing  Flowsheets  Taken 8/11/2023 0800 by Carter Joaquin RN  Discharge to home or other facility with appropriate resources: Identify barriers to discharge with patient and caregiver  Taken 8/10/2023 2000 by Genesis Polk RN  Discharge to home or other facility with appropriate resources:   Identify barriers to discharge with patient and caregiver   Arrange for needed discharge resources and transportation as appropriate   Identify discharge learning needs (meds, wound care, etc)   Arrange for interpreters to assist at discharge as needed   Refer to discharge planning if patient needs post-hospital services based on physician order or complex needs related to functional status, cognitive ability or social support system     Problem: Skin/Tissue Integrity  Goal: Absence of new skin breakdown  Description: 1. Monitor for areas of redness and/or skin breakdown  2. Assess vascular access sites hourly  3. Every 4-6 hours minimum:  Change oxygen saturation probe site  4. Every 4-6 hours:  If on nasal continuous positive airway pressure, respiratory therapy assess nares and determine need for appliance change or resting period.   Outcome: Progressing     Problem: Safety - Adult  Goal: Free from fall injury  Outcome: Progressing  Flowsheets (Taken 8/11/2023 3049)  Free From Fall Injury: Instruct family/caregiver on patient safety     Problem: Nutrition Deficit:  Goal: Optimize nutritional status  Outcome: Progressing

## 2023-08-11 NOTE — PROGRESS NOTES
Physical Therapy  Facility/Department: [facility] AT THE [facility]  Daily Treatment Note  NAME: Armida Vaz  : 1950  MRN: [id number]    Date of Service: 2023    Discharge Recommendations:  Continue to assess pending progress, Home with assist PRN, Long Term Care with PT, Long Term Care without PT        Patient Diagnosis(es): The primary encounter diagnosis was Generalized weakness. A diagnosis of Hyperglycemia was also pertinent to this visit. Assessment   Assessment: Gait with RW and CGA 5ft x 2 with extra time to complete. Transfers completed with CGA. Seated core tasks without support to improve activity tolerance and stability. Seated B LE therex x 15 in all available planes of motion with short rest breaks throughout d/t fatigue. sp02 90% and above throughout tx. Activity Tolerance: Patient limited by fatigue     Plan    Physcial Therapy Plan  General Plan: 2 times a day 7 days a week (1x per day on weekends.)  Current Treatment Recommendations: Strengthening;ROM;Balance training;Functional mobility training;Transfer training;Neuromuscular re-education;Gait training; Safety education & training;Home exercise program;Therapeutic activities; Patient/Caregiver education & training; Endurance training     Restrictions  Restrictions/Precautions  Restrictions/Precautions: General Precautions, Fall Risk     Subjective    Subjective  Subjective: pt on bedside commode with MURRAY upon arrival, second per assist for safety d/t required equipment at [facility]. Pain: R foot moderate pain.   Orientation  Overall Orientation Status: Within Functional Limits     Objective   Balance  Sitting: Intact  Standing: Impaired  Transfer Training  Transfer Training: Yes  Overall Level of Assistance: Contact-guard assistance;Assist X1  Interventions: Safety awareness training;Verbal cues  Sit to Stand: Assist X1;Contact-guard assistance  Stand to Sit: Assist X1;Contact-guard assistance  Stand Pivot Transfers: Assist X1;Contact-guard assistance; Additional time  Gait Training  Gait Training: Yes  Gait  Overall Level of Assistance: Assist X1;Contact-guard assistance  Interventions: Demonstration;Verbal cues (technique and importance of slow positional changes.)  Speed/Jacinda: Delayed  Step Length: Right shortened;Left shortened  Gait Abnormalities: Decreased step clearance  Distance (ft): 5 Feet (5ft x 2)  Assistive Device: Walker, rolling;Gait belt     PT Exercises  Exercise Treatment: Seated BLE x15 in all planes with short rest break between each d/t fatigue. Dynamic Sitting Balance Exercises: unsupport while completing functional reaching tasks within COLT. pt with good balance but did fatigue quickly without support. Safety Devices  Type of Devices: All fall risk precautions in place;Call light within reach; Chair alarm in place;Gait belt;Patient at risk for falls; Left in chair;Nurse notified       Goals  Short Term Goals  Time Frame for Short Term Goals: 20 days  Short Term Goal 1: Patient to complete all transfers with SUP and no LOB to decrease fall risk. Short Term Goal 2: Patient to ambulate 10ftx2 with FWW and CGA with no LOB or fatigue to improve mobility. Short Term Goal 3: Patient to tolerate 20-30 min of ther ex/act to improve functional strength. Short Term Goal 4: Patient to have good dynamic sitting balance to decrease fall risk.     Education  Patient Education  Education Given To: Patient  Education Provided: Home Exercise Program;Fall Prevention Strategies  Education Method: Verbal;Demonstration  Barriers to Learning: None  Education Outcome: Verbalized understanding;Continued education needed    Therapy Time   Individual Concurrent Group Co-treatment   Time In 0858         Time Out 0926         Minutes [address], [address]

## 2023-08-11 NOTE — PROGRESS NOTES
Occupational Therapy  Facility/Department: [facility] AT THE [facility]  Daily Treatment Note  NAME: Lauren Sutton  : 1950  MRN: [id number]    Date of Service: 2023    Discharge Recommendations:  Continue to assess pending progress, Subacute/Skilled Nursing Facility         Patient Diagnosis(es): The primary encounter diagnosis was Generalized weakness. A diagnosis of Hyperglycemia was also pertinent to this visit. Assessment    Activity Tolerance: Patient tolerated treatment well  Discharge Recommendations: Continue to assess pending progress;Subacute/Skilled [address]  Times Per Day: Once a day  Days Per Week: 7 Days  Current Treatment Recommendations: Strengthening; Endurance training; Safety education & training;Patient/Caregiver education & training;Self-Care / ADL; Home management training;ROM;Wheelchair mobility training     Restrictions   General fall risk    Subjective   Subjective  Subjective: Pt in bed, agreeable to self care EOB. \"I live at [facility] here in [address] [place] [address] for 2 years. \"  Pain: denied, then reported R foot \"big\" pain post transfer           Objective    Vitals     Bed Mobility Training  Bed Mobility Training: Yes  Overall Level of Assistance: Stand-by assistance;Supervision;Assist X1 (supine<>EOB using R bed rail)  Interventions: Verbal cues; Safety awareness training  Balance  Sitting: Intact (unsupported EOB during self care no more than ~ 5 min, 0 LOB.  Pt became fatigued and attempted to lay back several times)  Standing: Impaired (no more than 1-2 min during transfers and self care supported DONTAE, 0 LOB noted)  Transfer Training  Transfer Training: Yes  Overall Level of Assistance: Contact-guard assistance;Assist X1 (Pt stood from EOB using bed rail and arm of BSC and stepped to BSC and sat using BSC arms with 0 LOB ntoed)  Interventions: Safety awareness training;Verbal cues  Gait  Overall Level of Assistance:  (did not assess, transfers only)

## 2023-08-11 NOTE — PROGRESS NOTES
Pt up in chair watching television. VS and assessment as charted. Pt is oriented to person and place only at this time. Pt does have complaints of pain but requests prn Tylenol when next available. Pt assisted to bedside commode, then to bed with 2+ assist. Pt positioned per comfort after getting washed up. Denies any other needs at this time and has call light within reach, will continue to monitor.

## 2023-08-11 NOTE — PROGRESS NOTES
Pt resting in bed, assessment complete, see flow sheet for documentation, patient noted to be incontinent of urine, luis care provided, brief changed, denies pain at this time, repositioned for comfort, all needs met, call light within reach.

## 2023-08-12 VITALS
SYSTOLIC BLOOD PRESSURE: 143 MMHG | HEART RATE: 80 BPM | TEMPERATURE: 97.1 F | BODY MASS INDEX: 29.86 KG/M2 | RESPIRATION RATE: 18 BRPM | DIASTOLIC BLOOD PRESSURE: 86 MMHG | OXYGEN SATURATION: 96 % | WEIGHT: 174.9 LBS | HEIGHT: 64 IN

## 2023-08-12 LAB
ANION GAP SERPL CALCULATED.3IONS-SCNC: 8 MMOL/L (ref 9–17)
BASOPHILS # BLD: 0.03 K/UL (ref 0–0.2)
BASOPHILS NFR BLD: 0 % (ref 0–2)
BUN SERPL-MCNC: 27 MG/DL (ref 8–23)
BUN/CREAT SERPL: 21 (ref 9–20)
CALCIUM SERPL-MCNC: 10.5 MG/DL (ref 8.6–10.4)
CHLORIDE SERPL-SCNC: 103 MMOL/L (ref 98–107)
CO2 SERPL-SCNC: 25 MMOL/L (ref 20–31)
CREAT SERPL-MCNC: 1.3 MG/DL (ref 0.5–0.9)
EOSINOPHIL # BLD: 0.12 K/UL (ref 0–0.44)
EOSINOPHILS RELATIVE PERCENT: 2 % (ref 1–4)
ERYTHROCYTE [DISTWIDTH] IN BLOOD BY AUTOMATED COUNT: 16 % (ref 11.8–14.4)
GFR SERPL CREATININE-BSD FRML MDRD: 44 ML/MIN/1.73M2
GLUCOSE BLD-MCNC: 106 MG/DL (ref 74–100)
GLUCOSE BLD-MCNC: 274 MG/DL (ref 74–100)
GLUCOSE SERPL-MCNC: 131 MG/DL (ref 70–99)
HCT VFR BLD AUTO: 37.1 % (ref 36.3–47.1)
HGB BLD-MCNC: 11.4 G/DL (ref 11.9–15.1)
IMM GRANULOCYTES # BLD AUTO: 0.03 K/UL (ref 0–0.3)
IMM GRANULOCYTES NFR BLD: 0 %
LYMPHOCYTES NFR BLD: 0.78 K/UL (ref 1.1–3.7)
LYMPHOCYTES RELATIVE PERCENT: 10 % (ref 24–43)
MCH RBC QN AUTO: 27.3 PG (ref 25.2–33.5)
MCHC RBC AUTO-ENTMCNC: 30.7 G/DL (ref 28.4–34.8)
MCV RBC AUTO: 89 FL (ref 82.6–102.9)
MONOCYTES NFR BLD: 0.55 K/UL (ref 0.1–1.2)
MONOCYTES NFR BLD: 7 % (ref 3–12)
NEUTROPHILS NFR BLD: 81 % (ref 36–65)
NEUTS SEG NFR BLD: 6.39 K/UL (ref 1.5–8.1)
NRBC BLD-RTO: 0 PER 100 WBC
PLATELET # BLD AUTO: 168 K/UL (ref 138–453)
PMV BLD AUTO: 11.6 FL (ref 8.1–13.5)
POTASSIUM SERPL-SCNC: 4.1 MMOL/L (ref 3.7–5.3)
RBC # BLD AUTO: 4.17 M/UL (ref 3.95–5.11)
SARS-COV-2 RDRP RESP QL NAA+PROBE: NOT DETECTED
SODIUM SERPL-SCNC: 136 MMOL/L (ref 135–144)
SPECIMEN DESCRIPTION: NORMAL
WBC OTHER # BLD: 7.9 K/UL (ref 3.5–11.3)

## 2023-08-12 PROCEDURE — 6370000000 HC RX 637 (ALT 250 FOR IP): Performed by: INTERNAL MEDICINE

## 2023-08-12 PROCEDURE — 2580000003 HC RX 258: Performed by: NURSE PRACTITIONER

## 2023-08-12 PROCEDURE — 2700000000 HC OXYGEN THERAPY PER DAY

## 2023-08-12 PROCEDURE — 85025 COMPLETE CBC W/AUTO DIFF WBC: CPT

## 2023-08-12 PROCEDURE — 97110 THERAPEUTIC EXERCISES: CPT

## 2023-08-12 PROCEDURE — 94761 N-INVAS EAR/PLS OXIMETRY MLT: CPT

## 2023-08-12 PROCEDURE — 99232 SBSQ HOSP IP/OBS MODERATE 35: CPT | Performed by: FAMILY MEDICINE

## 2023-08-12 PROCEDURE — 6370000000 HC RX 637 (ALT 250 FOR IP): Performed by: NURSE PRACTITIONER

## 2023-08-12 PROCEDURE — C9803 HOPD COVID-19 SPEC COLLECT: HCPCS

## 2023-08-12 PROCEDURE — 87635 SARS-COV-2 COVID-19 AMP PRB: CPT

## 2023-08-12 PROCEDURE — 80048 BASIC METABOLIC PNL TOTAL CA: CPT

## 2023-08-12 PROCEDURE — 82947 ASSAY GLUCOSE BLOOD QUANT: CPT

## 2023-08-12 PROCEDURE — 36415 COLL VENOUS BLD VENIPUNCTURE: CPT

## 2023-08-12 RX ORDER — INSULIN GLARGINE 100 [IU]/ML
30 INJECTION, SOLUTION SUBCUTANEOUS NIGHTLY
Qty: 10 ML | Refills: 3 | DISCHARGE
Start: 2023-08-12

## 2023-08-12 RX ORDER — INSULIN LISPRO 100 [IU]/ML
8 INJECTION, SOLUTION INTRAVENOUS; SUBCUTANEOUS
DISCHARGE
Start: 2023-08-12

## 2023-08-12 RX ORDER — BUMETANIDE 1 MG/1
1 TABLET ORAL DAILY
Qty: 60 TABLET | Refills: 3 | DISCHARGE
Start: 2023-08-12

## 2023-08-12 RX ADMIN — MICONAZOLE NITRATE: 2 POWDER TOPICAL at 08:28

## 2023-08-12 RX ADMIN — APIXABAN 5 MG: 5 TABLET, FILM COATED ORAL at 08:23

## 2023-08-12 RX ADMIN — ACETAMINOPHEN 650 MG: 325 TABLET ORAL at 08:27

## 2023-08-12 RX ADMIN — EMPAGLIFLOZIN 10 MG: 10 TABLET, FILM COATED ORAL at 08:30

## 2023-08-12 RX ADMIN — DILTIAZEM HYDROCHLORIDE 240 MG: 240 CAPSULE, COATED, EXTENDED RELEASE ORAL at 08:23

## 2023-08-12 RX ADMIN — INSULIN LISPRO 8 UNITS: 100 INJECTION, SOLUTION INTRAVENOUS; SUBCUTANEOUS at 11:45

## 2023-08-12 RX ADMIN — INSULIN LISPRO 8 UNITS: 100 INJECTION, SOLUTION INTRAVENOUS; SUBCUTANEOUS at 08:23

## 2023-08-12 RX ADMIN — INSULIN LISPRO 2 UNITS: 100 INJECTION, SOLUTION INTRAVENOUS; SUBCUTANEOUS at 11:45

## 2023-08-12 RX ADMIN — ATORVASTATIN CALCIUM 80 MG: 40 TABLET, FILM COATED ORAL at 08:23

## 2023-08-12 RX ADMIN — SODIUM CHLORIDE, PRESERVATIVE FREE 10 ML: 5 INJECTION INTRAVENOUS at 08:24

## 2023-08-12 RX ADMIN — METOPROLOL SUCCINATE 200 MG: 100 TABLET, EXTENDED RELEASE ORAL at 08:23

## 2023-08-12 RX ADMIN — POTASSIUM CHLORIDE 20 MEQ: 1500 TABLET, EXTENDED RELEASE ORAL at 08:23

## 2023-08-12 ASSESSMENT — PAIN - FUNCTIONAL ASSESSMENT: PAIN_FUNCTIONAL_ASSESSMENT: ACTIVITIES ARE NOT PREVENTED

## 2023-08-12 ASSESSMENT — PAIN DESCRIPTION - LOCATION: LOCATION: FOOT

## 2023-08-12 ASSESSMENT — PAIN SCALES - GENERAL: PAINLEVEL_OUTOF10: 6

## 2023-08-12 ASSESSMENT — PAIN DESCRIPTION - FREQUENCY: FREQUENCY: INTERMITTENT

## 2023-08-12 ASSESSMENT — PAIN DESCRIPTION - DESCRIPTORS: DESCRIPTORS: ACHING

## 2023-08-12 ASSESSMENT — PAIN DESCRIPTION - ORIENTATION: ORIENTATION: RIGHT

## 2023-08-12 ASSESSMENT — PAIN DESCRIPTION - PAIN TYPE: TYPE: CHRONIC PAIN

## 2023-08-12 NOTE — PROGRESS NOTES
Patient wrapped up in her blankets and gown- reapplied her gown and straightened out her covers- changed her briefs. Call light within reach and given a sip of water.

## 2023-08-12 NOTE — PLAN OF CARE
Problem: Discharge Planning  Goal: Discharge to home or other facility with appropriate resources  Outcome: Progressing     Problem: Skin/Tissue Integrity  Goal: Absence of new skin breakdown  Description: 1. Monitor for areas of redness and/or skin breakdown  2. Assess vascular access sites hourly  3. Every 4-6 hours minimum:  Change oxygen saturation probe site  4. Every 4-6 hours:  If on nasal continuous positive airway pressure, respiratory therapy assess nares and determine need for appliance change or resting period.   Outcome: Progressing     Problem: Safety - Adult  Goal: Free from fall injury  Outcome: Progressing     Problem: Nutrition Deficit:  Goal: Optimize nutritional status  Outcome: Progressing     Problem: Chronic Conditions and Co-morbidities  Goal: Patient's chronic conditions and co-morbidity symptoms are monitored and maintained or improved  Outcome: Progressing     Problem: Pain  Goal: Verbalizes/displays adequate comfort level or baseline comfort level  Outcome: Progressing

## 2023-08-12 NOTE — PROGRESS NOTES
Physical Therapy  Facility/Department: [facility] AT THE [facility] MED SURG  Daily Treatment Note  NAME: David Roland  : 1950  MRN: [id number]    Date of Service: 2023    Discharge Recommendations:  Continue to assess pending progress, Home with assist PRN, Long Term Care with PT, Long Term Care without PT   PT Equipment Recommendations  Equipment Needed: No    Patient Diagnosis(es): The primary encounter diagnosis was Generalized weakness. A diagnosis of Hyperglycemia was also pertinent to this visit. Assessment   Assessment: Pt in chair upon therapist arrival. Pt directed through seated exercises this date with fair tolerance. Rest breaks provided due to fatigue. Pt declined ambulation this date but agreeable to practice transfers and standing with short duration. Pt able to complete 5 sit to stands with Min A and verbal cuing for hand placement. Pt limited in standing tolerance this date. Activity Tolerance: Patient limited by fatigue;Patient limited by endurance  Equipment Needed: No     Plan    Physcial Therapy Plan  General Plan: 2 times a day 7 days a week (1x daily on weekends)  Current Treatment Recommendations: Strengthening;ROM;Balance training;Functional mobility training;Transfer training;Neuromuscular re-education;Gait training; Safety education & training;Home exercise program;Therapeutic activities; Patient/Caregiver education & training; Endurance training     Restrictions  Restrictions/Precautions  Restrictions/Precautions: General Precautions, Fall Risk     Subjective    Subjective  Subjective: Pt in chair upon therapist arrival, tired but agreeable to therapy  Orientation  Overall Orientation Status: Within Functional Limits  Cognition  Overall Cognitive Status: WFL     Objective   Vitals     Bed Mobility Training  Bed Mobility Training: No  Balance  Sitting: Intact  Standing: With support  Standing - Static: Good  Standing - Dynamic: Good  Transfer Training  Transfer Training: Yes  Overall Level of Assistance: Contact-guard assistance;Assist X1  Interventions: Safety awareness training;Verbal cues  Sit to Stand: Assist X1;Contact-guard assistance  Stand to Sit: Assist X1;Contact-guard assistance  Gait Training  Gait Training: No     PT Exercises  Exercise Treatment: Seated BLE x15 in all planes with short rest break between each d/t fatigue. Safety Devices  Type of Devices: All fall risk precautions in place;Call light within reach; Chair alarm in place; Patient at risk for falls; Left in chair;Nurse notified       Goals  Short Term Goals  Time Frame for Short Term Goals: 20 days  Short Term Goal 1: Patient to complete all transfers with SUP and no LOB to decrease fall risk. Short Term Goal 2: Patient to ambulate 10ftx2 with FWW and CGA with no LOB or fatigue to improve mobility. Short Term Goal 3: Patient to tolerate 20-30 min of ther ex/act to improve functional strength. Short Term Goal 4: Patient to have good dynamic sitting balance to decrease fall risk.     Education  Patient Education  Education Given To: Patient  Education Provided Comments: energy conservation  Education Method: Verbal  Barriers to Learning: None  Education Outcome: Verbalized understanding    Therapy Time   Individual Concurrent Group Co-treatment   Time In 0911         Time Out 0927         Minutes 16         Timed Code Treatment Minutes: [address] [facility], PT, DPT

## 2023-08-12 NOTE — PROGRESS NOTES
Vital signs and assessment completed as documented in flowsheets. Patient does report right foot pain rated 6 on 0-10 scale. Will administer PRN tylenol per pt. request. SPO2 is 96% on 2L nasal cannula. Patient denies further needs at this time. Call light and bedside table are within reach. Care ongoing.

## 2023-08-12 NOTE — PROGRESS NOTES
Occupational Therapy  Facility/Department: [facility] AT THE [facility] MED SURG  Daily Treatment Note  NAME: Cande Oliver  : 1950  MRN: [id number]    Date of Service: 2023    Discharge Recommendations:  Continue to assess pending progress, Subacute/Skilled Nursing Facility         Patient Diagnosis(es): The primary encounter diagnosis was Generalized weakness. A diagnosis of Hyperglycemia was also pertinent to this visit. Assessment    Activity Tolerance: Patient limited by fatigue;Patient limited by endurance  Discharge Recommendations: Continue to assess pending progress;Subacute/Skilled [facility]   Occupational Therapy Plan  Times Per Day: Once a day  Days Per Week: 7 Days  Current Treatment Recommendations: Strengthening; Endurance training; Safety education & training;Patient/Caregiver education & training;Self-Care / ADL; Home management training;ROM;Wheelchair mobility training     Restrictions  Restrictions/Precautions  Restrictions/Precautions: General Precautions; Fall Risk    Subjective   Subjective  Subjective: Pt sitting up in bedside chair upon arrival. Pt agreed to participate in therapy session. Pain: Pt had no complaints of pain. Objective    Vitals     Transfer Training  Transfer Training: Yes  Overall Level of Assistance: Contact-guard assistance;Assist X1  Sit to Stand: Assist X1;Contact-guard assistance  Stand to Sit: Assist X1;Contact-guard assistance        OT Exercises  Exercise Treatment: Pt completed BUE ther ex x 4 planes x 15 reps x 1 set to increase UE strength and endurance in order to ease completion of ADL tasks. Pt required RBs as needed secondary to fatigue. Safety Devices  Type of Devices: All fall risk precautions in place;Call light within reach; Chair alarm in place; Patient at risk for falls; Left in chair;Nurse notified     Patient Education  Education Given To: Patient  Education Provided: Role of Therapy;Plan of Care  Education Method:

## 2023-08-12 NOTE — PROGRESS NOTES
Transportation set up for patient at this time. [facility] transport was going to be at [employer], so writer set up [facility] transportation for patient. [facility] ETA is 1230. Patient is aware. Patients daughter Cony Milner also made aware of discharge. [facility] was also made aware of patients discharge and is agreeable. Report given to nurse at [facility]. Care ongoing.

## 2023-08-12 NOTE — DISCHARGE SUMMARY
Discharge Summary    Lion Zhong  :  1950  MRN:  [id number]    Admit date:  2023      Discharge date:    2023    Admitting Physician:  Jordan Fernandez MD    Discharge Diagnoses:      Principal Problem:    Hyperglycemia due to type 2 diabetes mellitus [facility])  Active Problems:    Generalized weakness    Hypertension    Idiopathic cardiomyopathy ([address])    Chronic combined systolic and diastolic CHF, NYHA class 4 (HCC)    CKD (chronic kidney disease) stage 3, GFR 30-59 ml/min (HCC)    Hemiplegia and hemiparesis following cerebral infarction affecting right dominant side (HCC)    Mild malnutrition (HCC)    Prolonged Q-T interval on ECG  Resolved Problems:    * No resolved hospital problems.  *      Active Hospital Problems    Diagnosis Date Noted    Generalized weakness [R53.1] 08/10/2023     Priority: High    Mild malnutrition (HCC) [E44.1] 08/10/2023    Prolonged Q-T interval on ECG [R94.31] 08/10/2023    Hyperglycemia due to type 2 diabetes mellitus ([address]) [E11.65] 2023    Hemiplegia and hemiparesis following cerebral infarction affecting right dominant side [facility]) [I69.351] 2021    CKD (chronic kidney disease) stage 3, GFR 30-59 ml/min (HCC) [N18.30] 2018    Chronic combined systolic and diastolic CHF, NYHA class 4 ([address]) [I50.42] 2017    Idiopathic cardiomyopathy ([address]) [I42.9]     Hypertension [I10]        Discharge Medications:         Medication List        START taking these medications      insulin glargine 100 UNIT/ML injection vial  Commonly known as: LANTUS  Inject 30 Units into the skin nightly     insulin lispro 100 UNIT/ML Soln injection vial  Commonly known as: HUMALOG  Inject 8 Units into the skin 3 times daily (with meals)            CHANGE how you take these medications      bumetanide 1 MG tablet  Commonly known as: BUMEX  Take 1 tablet by mouth daily  What changed: when to take this            CONTINUE taking these medications      acetaminophen 325 MG

## 2023-08-12 NOTE — PROGRESS NOTES
Patient discharged. Patient left to [address] [place] [address] with [facility] transport. Belongings sent with patient.

## 2023-08-14 ENCOUNTER — CLINICAL DOCUMENTATION ONLY (OUTPATIENT)
Facility: CLINIC | Age: 73
End: 2023-08-14

## 2023-08-14 LAB
EKG ATRIAL RATE: 214 BPM
EKG Q-T INTERVAL: 484 MS
EKG QRS DURATION: 138 MS
EKG QTC CALCULATION (BAZETT): 558 MS
EKG R AXIS: -105 DEGREES
EKG T AXIS: 95 DEGREES
EKG VENTRICULAR RATE: 80 BPM
MICROORGANISM SPEC CULT: NORMAL
MICROORGANISM SPEC CULT: NORMAL
SERVICE CMNT-IMP: NORMAL
SERVICE CMNT-IMP: NORMAL
SPECIMEN DESCRIPTION: NORMAL
SPECIMEN DESCRIPTION: NORMAL

## 2023-08-14 PROCEDURE — 93010 ELECTROCARDIOGRAM REPORT: CPT | Performed by: FAMILY MEDICINE

## 2023-08-14 NOTE — PROGRESS NOTES
irregularites 10-20%. LCx: Mild irregularites 10-20%. RCA: Mild irregularites 10-20%. LV function assessed as : Abnormal. EF of 40%. H/O echocardiogram 12/18/2018    EF 55%. Mildly increased LV wall thickness. The left atrium appears moderately to severely dilated. Moderate to severe mitral regurg. Moderate pulmonary HTN with an estimated RV systolic pressure of [id number]. Moderate tricuspid regurg. Evidnece fo moderate diastolic dysfunction is seen. Hearing loss 09/26/2022    History of echocardiogram 01/17/2019    EF 55%. LV wall thickness moderately increased. LA is mildly dilated w/ LA volume index of 30. trivial mitral regurg. Evidence of moderate (grade II) diastolic dysfunction seen. History of echocardiogram 06/03/2019    EF of 50-55%. LV wall thickness is mildly increased. LA is mildly dilated (29-33) with a left atrial volume index of 31 m1/m2. mild diastolic dysfunction. Hyperlipidemia     Hypertension     Major depressive disorder 09/28/2022    Prolonged QT interval 8/10/2023     CURRENT ALLERGIES: Patient has no known allergies. REVIEW OF SYSTEMS: 14 systems were reviewed. Pertinent positives and negatives as above, all else negative.      Past Surgical History:   Procedure Laterality Date    CARDIAC CATHETERIZATION Left 08/04/2017    right radial, MHT Dr[address]pt      Social History:  Social History     Tobacco Use    Smoking status: Never    Smokeless tobacco: Never   Vaping Use    Vaping Use: Never used   Substance Use Topics    Alcohol use: Not Currently     Alcohol/week: 1.0 standard drink     Types: 1 Glasses of wine per week    Drug use: No        CURRENT MEDICATIONS:  Outpatient Medications Marked as Taking for the 8/9/23 encounter [facility] HOSPITAL Encounter)   Medication Sig Dispense Refill    bumetanide (BUMEX) 1 MG tablet Take 1 tablet by mouth daily 60 tablet 3    insulin glargine (LANTUS) 100 UNIT/ML injection vial Inject 30 Units into the skin nightly 10 mL 3    insulin

## 2023-08-16 ENCOUNTER — OFFICE VISIT (OUTPATIENT)
Dept: CARDIOLOGY | Age: 73
End: 2023-08-16
Payer: MEDICARE

## 2023-08-16 VITALS
SYSTOLIC BLOOD PRESSURE: 110 MMHG | DIASTOLIC BLOOD PRESSURE: 69 MMHG | BODY MASS INDEX: 29.71 KG/M2 | HEART RATE: 80 BPM | RESPIRATION RATE: 18 BRPM | OXYGEN SATURATION: 97 % | HEIGHT: 64 IN | WEIGHT: 174 LBS

## 2023-08-16 DIAGNOSIS — I50.42 CHRONIC COMBINED SYSTOLIC AND DIASTOLIC HF (HEART FAILURE), NYHA CLASS 2 (HCC): ICD-10-CM

## 2023-08-16 DIAGNOSIS — Z95.810 ICD (IMPLANTABLE CARDIOVERTER-DEFIBRILLATOR) IN PLACE: ICD-10-CM

## 2023-08-16 DIAGNOSIS — I48.20 CHRONIC A-FIB (HCC): Primary | ICD-10-CM

## 2023-08-16 DIAGNOSIS — Z79.01 ENCOUNTER FOR CURRENT LONG-TERM USE OF ANTICOAGULANTS: ICD-10-CM

## 2023-08-16 DIAGNOSIS — G45.9 TIA (TRANSIENT ISCHEMIC ATTACK): ICD-10-CM

## 2023-08-16 PROCEDURE — 1111F DSCHRG MED/CURRENT MED MERGE: CPT | Performed by: FAMILY MEDICINE

## 2023-08-16 PROCEDURE — 1036F TOBACCO NON-USER: CPT | Performed by: FAMILY MEDICINE

## 2023-08-16 PROCEDURE — 93005 ELECTROCARDIOGRAM TRACING: CPT | Performed by: FAMILY MEDICINE

## 2023-08-16 PROCEDURE — G8427 DOCREV CUR MEDS BY ELIG CLIN: HCPCS | Performed by: FAMILY MEDICINE

## 2023-08-16 PROCEDURE — 3074F SYST BP LT 130 MM HG: CPT | Performed by: FAMILY MEDICINE

## 2023-08-16 PROCEDURE — G8400 PT W/DXA NO RESULTS DOC: HCPCS | Performed by: FAMILY MEDICINE

## 2023-08-16 PROCEDURE — 1123F ACP DISCUSS/DSCN MKR DOCD: CPT | Performed by: FAMILY MEDICINE

## 2023-08-16 PROCEDURE — 99214 OFFICE O/P EST MOD 30 MIN: CPT | Performed by: FAMILY MEDICINE

## 2023-08-16 PROCEDURE — 3078F DIAST BP <80 MM HG: CPT | Performed by: FAMILY MEDICINE

## 2023-08-16 PROCEDURE — G8417 CALC BMI ABV UP PARAM F/U: HCPCS | Performed by: FAMILY MEDICINE

## 2023-08-16 PROCEDURE — 93289 INTERROG DEVICE EVAL HEART: CPT | Performed by: FAMILY MEDICINE

## 2023-08-16 PROCEDURE — 1090F PRES/ABSN URINE INCON ASSESS: CPT | Performed by: FAMILY MEDICINE

## 2023-08-16 PROCEDURE — 93010 ELECTROCARDIOGRAM REPORT: CPT | Performed by: FAMILY MEDICINE

## 2023-08-16 PROCEDURE — 3017F COLORECTAL CA SCREEN DOC REV: CPT | Performed by: FAMILY MEDICINE

## 2023-08-16 PROCEDURE — 99211 OFF/OP EST MAY X REQ PHY/QHP: CPT | Performed by: FAMILY MEDICINE

## 2023-08-16 RX ORDER — AMIODARONE HYDROCHLORIDE 200 MG/1
200 TABLET ORAL DAILY
Qty: 60 TABLET | Refills: 0 | Status: CANCELLED | OUTPATIENT
Start: 2023-08-16

## 2023-08-16 NOTE — PATIENT INSTRUCTIONS
SURVEY:    You may be receiving a survey from [employer] regarding your visit today. Please complete the survey to enable us to provide the highest quality of care to you and your family. If you cannot score us a very good on any question, please call the office to discuss how we could have made your experience a very good one. Thank you.

## 2023-08-16 NOTE — PROGRESS NOTES
Gisselle Vargas am scribing for and in the presence of Lisette Rodriguez MD, MS, F.A.C.C..    Patient: Ritika Olivarez  : 1950  Date of Admission: (Not on file)  Primary Care Physician: Alexey Garcia  Today's Date: 2023    REASON FOR CONSULTATION: Congestive Heart Failure (HX:CH AFib, Prolong QT, CHF PT is here for follow up and ICD check she states she is doing good. She is on 1-2 liters of 02 now, lightheaded/dizziness she did have fall hit head went to ER  sugar was high,palp  Denies:CP, sob, )    HPI:  Ms. Corbin Gramajo is a 81 y.o. female who was admitted to the hospital for fatigue and generalized weakness. Ms. Corbin Gramajo has a history of history of abnormal echocardiogram which showed that her ejection fraction was reduced to about 40-45%. And a heart catheterization that was relatively unremarkable. Fortunately her repeat echocardiogram in 2018 and in 2019 showed her ejection fraction increased from 45-50% to 55% as well as showing only trivial mitral regurgitation. She had an ECHO on 6/3/2019 that showed EF of 50-55%. LV wall thickness is mildly increased. LA is mildly dilated (29-33) with a left atrial volume index of 31 m1/m2. mild diastolic dysfunction. She came to the ER on 2019 for slurred speech. She was than taken to [facility] and found to have right sided weakness and was found to have a stroke at that time. She continued to have mild to moderate right sided weakness. She had abnormal stress test on 2022 There is a moderate perfusion defect of moderate intensity in the lateral, inferolateral and inferior region(s), which is most consistent with an old myocardial infarction with luis-infarct ischemia. Heart Cath done on 10/6/2022 Mild coronary artery disease without any significant focal stenosis. Normal left ventricular end diastolic pressure (LVEDP).   Continue standard risk factor modification as clinically indicated and consider alternative etiologies of
(Patient taking differently: Take 0.5 tablets by mouth 2 times daily) 30 tablet 3    apixaban (ELIQUIS) 5 MG TABS tablet Take 1 tablet by mouth 2 times daily 180 tablet 3    famotidine (PEPCID) 20 MG tablet Take 1 tablet by mouth every morning       FAMILY HISTORY: family history includes COPD in her sister; Coronary Art Dis in her brother and father. PHYSICAL EXAM:   There were no vitals taken for this visit. There is no height or weight on file to calculate BMI. Constitutional: She is oriented to person, place, and time. She appears well-developed and well-nourished. In no acute distress. HEENT: Normocephalic and atraumatic. No JVD present. Carotid bruit is not present. No mass and no thyromegaly present. No lymphadenopathy present. Cardiovascular: Normal rate, irregularly irregular rhythm, normal heart sounds. Exam reveals no gallop and no friction rubs. 2/6 systolic murmur, 5th intercostal space on the LEFT in the mid-clavicular line (cardiac apex). Pulmonary/Chest: Effort normal and breath sounds normal. No respiratory distress. She has no wheezes, rhonchi or rales. Abdominal: Soft, non-tender. Bowel sounds and aorta are normal. She exhibits no organomegaly, mass or bruit. Extremities: Trace-1+ 1/2 up to the knees bilaterally. No cyanosis or clubbing. 2+ radial and carotid pulses. Distal extremity pulses: 2+ bilaterally. Neurological: She is alert and oriented to person, place, and time. No evidence of gross cranial nerve deficit. Coordination appeared normal.   Skin: Skin is warm and dry. There is no rash or diaphoresis. Psychiatric: She has a normal mood and affect.  Her speech is normal and behavior is normal.      MOST RECENT LABS ON RECORD:   Lab Results   Component Value Date    WBC 7.9 08/12/2023    HGB 11.4 (L) 08/12/2023    HCT 37.1 08/12/2023     08/12/2023    CHOL 93 06/02/2023    TRIG 110 06/02/2023    HDL 35 (L) 06/02/2023    ALT 13 08/09/2023    AST 17 08/09/2023

## 2023-10-19 ENCOUNTER — HOSPITAL ENCOUNTER (OUTPATIENT)
Age: 73
Setting detail: SPECIMEN
Discharge: HOME | End: 2023-10-19
Payer: MEDICARE

## 2023-10-19 LAB
ALBUMIN SERPL-MCNC: 3.7 G/DL (ref 3.5–5.2)
ALBUMIN/GLOB SERPL: 1.4 {RATIO} (ref 1–2.5)
ALP SERPL-CCNC: 108 U/L (ref 35–104)
ALT SERPL-CCNC: 13 U/L (ref 5–33)
ANION GAP SERPL CALCULATED.3IONS-SCNC: 10 MMOL/L (ref 9–17)
AST SERPL-CCNC: 16 U/L
BILIRUB SERPL-MCNC: 0.2 MG/DL (ref 0.3–1.2)
BUN SERPL-MCNC: 44 MG/DL (ref 8–23)
BUN/CREAT SERPL: 28 (ref 9–20)
CALCIUM SERPL-MCNC: 10.3 MG/DL (ref 8.6–10.4)
CHLORIDE SERPL-SCNC: 104 MMOL/L (ref 98–107)
CO2 SERPL-SCNC: 25 MMOL/L (ref 20–31)
CREAT SERPL-MCNC: 1.6 MG/DL (ref 0.5–0.9)
EST. AVERAGE GLUCOSE BLD GHB EST-MCNC: 223 MG/DL
GFR SERPL CREATININE-BSD FRML MDRD: 34 ML/MIN/1.73M2
GLUCOSE SERPL-MCNC: 76 MG/DL (ref 70–99)
HBA1C MFR BLD: 9.4 % (ref 4–6)
POTASSIUM SERPL-SCNC: 4.6 MMOL/L (ref 3.7–5.3)
PROT SERPL-MCNC: 6.3 G/DL (ref 6.4–8.3)
SODIUM SERPL-SCNC: 139 MMOL/L (ref 135–144)

## 2023-10-19 PROCEDURE — 36415 COLL VENOUS BLD VENIPUNCTURE: CPT

## 2023-10-19 PROCEDURE — 83036 HEMOGLOBIN GLYCOSYLATED A1C: CPT

## 2023-10-19 PROCEDURE — 80053 COMPREHEN METABOLIC PANEL: CPT

## 2023-10-24 ENCOUNTER — HOSPITAL ENCOUNTER (OUTPATIENT)
Age: 73
Setting detail: SPECIMEN
Discharge: HOME | End: 2023-10-24

## 2023-11-01 ENCOUNTER — HOSPITAL ENCOUNTER (OUTPATIENT)
Age: 73
Setting detail: SPECIMEN
Discharge: HOME | End: 2023-11-01
Payer: MEDICARE

## 2023-11-01 LAB
ALBUMIN SERPL-MCNC: 3.5 G/DL (ref 3.5–5.2)
ANION GAP SERPL CALCULATED.3IONS-SCNC: 5 MMOL/L (ref 9–17)
BUN SERPL-MCNC: 28 MG/DL (ref 8–23)
BUN/CREAT SERPL: 16 (ref 9–20)
CALCIUM SERPL-MCNC: 10.3 MG/DL (ref 8.6–10.4)
CHLORIDE SERPL-SCNC: 107 MMOL/L (ref 98–107)
CO2 SERPL-SCNC: 28 MMOL/L (ref 20–31)
CREAT SERPL-MCNC: 1.7 MG/DL (ref 0.5–0.9)
GFR SERPL CREATININE-BSD FRML MDRD: 32 ML/MIN/1.73M2
GLUCOSE SERPL-MCNC: 107 MG/DL (ref 70–99)
PHOSPHATE SERPL-MCNC: 3.9 MG/DL (ref 2.6–4.5)
POTASSIUM SERPL-SCNC: 4.4 MMOL/L (ref 3.7–5.3)
SODIUM SERPL-SCNC: 140 MMOL/L (ref 135–144)

## 2023-11-01 PROCEDURE — 36415 COLL VENOUS BLD VENIPUNCTURE: CPT

## 2023-11-01 PROCEDURE — P9604 ONE-WAY ALLOW PRORATED TRIP: HCPCS

## 2023-11-01 PROCEDURE — 80069 RENAL FUNCTION PANEL: CPT

## 2023-11-11 ENCOUNTER — HOSPITAL ENCOUNTER (EMERGENCY)
Age: 73
Discharge: HOME | End: 2023-11-12
Attending: STUDENT IN AN ORGANIZED HEALTH CARE EDUCATION/TRAINING PROGRAM
Payer: MEDICARE

## 2023-11-11 DIAGNOSIS — E16.2 HYPOGLYCEMIA: Primary | ICD-10-CM

## 2023-11-11 LAB
CHP ED QC CHECK: YES
GLUCOSE BLD-MCNC: 93 MG/DL
GLUCOSE BLD-MCNC: 93 MG/DL (ref 74–100)

## 2023-11-11 PROCEDURE — 99284 EMERGENCY DEPT VISIT MOD MDM: CPT

## 2023-11-11 PROCEDURE — 2500000003 HC RX 250 WO HCPCS: Performed by: STUDENT IN AN ORGANIZED HEALTH CARE EDUCATION/TRAINING PROGRAM

## 2023-11-11 PROCEDURE — 2580000003 HC RX 258: Performed by: STUDENT IN AN ORGANIZED HEALTH CARE EDUCATION/TRAINING PROGRAM

## 2023-11-11 PROCEDURE — 82947 ASSAY GLUCOSE BLOOD QUANT: CPT

## 2023-11-11 PROCEDURE — 6370000000 HC RX 637 (ALT 250 FOR IP): Performed by: STUDENT IN AN ORGANIZED HEALTH CARE EDUCATION/TRAINING PROGRAM

## 2023-11-11 RX ORDER — DEXTROSE MONOHYDRATE 25 G/50ML
25 INJECTION, SOLUTION INTRAVENOUS ONCE
Status: COMPLETED | OUTPATIENT
Start: 2023-11-11 | End: 2023-11-11

## 2023-11-11 RX ORDER — 0.9 % SODIUM CHLORIDE 0.9 %
500 INTRAVENOUS SOLUTION INTRAVENOUS ONCE
Status: COMPLETED | OUTPATIENT
Start: 2023-11-11 | End: 2023-11-12

## 2023-11-11 RX ORDER — MIDODRINE HYDROCHLORIDE 5 MG/1
5 TABLET ORAL ONCE
Status: COMPLETED | OUTPATIENT
Start: 2023-11-11 | End: 2023-11-11

## 2023-11-11 RX ADMIN — SODIUM CHLORIDE 500 ML: 9 INJECTION, SOLUTION INTRAVENOUS at 23:45

## 2023-11-11 RX ADMIN — MIDODRINE HYDROCHLORIDE 5 MG: 5 TABLET ORAL at 23:52

## 2023-11-11 RX ADMIN — DEXTROSE MONOHYDRATE 25 G: 25 INJECTION, SOLUTION INTRAVENOUS at 23:43

## 2023-11-11 ASSESSMENT — PAIN - FUNCTIONAL ASSESSMENT: PAIN_FUNCTIONAL_ASSESSMENT: NONE - DENIES PAIN

## 2023-11-12 VITALS
HEIGHT: 64 IN | DIASTOLIC BLOOD PRESSURE: 72 MMHG | TEMPERATURE: 98.8 F | HEART RATE: 81 BPM | SYSTOLIC BLOOD PRESSURE: 118 MMHG | BODY MASS INDEX: 30.22 KG/M2 | WEIGHT: 177 LBS | RESPIRATION RATE: 11 BRPM | OXYGEN SATURATION: 99 %

## 2023-11-12 LAB
CHP ED QC CHECK: YES
GLUCOSE BLD-MCNC: 123 MG/DL
GLUCOSE BLD-MCNC: 123 MG/DL (ref 74–100)
GLUCOSE BLD-MCNC: 143 MG/DL (ref 74–100)

## 2023-11-12 PROCEDURE — 82947 ASSAY GLUCOSE BLOOD QUANT: CPT

## 2023-11-12 PROCEDURE — 6370000000 HC RX 637 (ALT 250 FOR IP): Performed by: EMERGENCY MEDICINE

## 2023-11-12 PROCEDURE — 96372 THER/PROPH/DIAG INJ SC/IM: CPT

## 2023-11-12 RX ORDER — FAMOTIDINE 20 MG/1
20 TABLET, FILM COATED ORAL ONCE
Status: COMPLETED | OUTPATIENT
Start: 2023-11-12 | End: 2023-11-12

## 2023-11-12 RX ORDER — ATORVASTATIN CALCIUM 40 MG/1
80 TABLET, FILM COATED ORAL NIGHTLY
Status: DISCONTINUED | OUTPATIENT
Start: 2023-11-12 | End: 2023-11-12 | Stop reason: HOSPADM

## 2023-11-12 RX ORDER — INSULIN LISPRO 100 [IU]/ML
8 INJECTION, SOLUTION INTRAVENOUS; SUBCUTANEOUS ONCE
Status: COMPLETED | OUTPATIENT
Start: 2023-11-12 | End: 2023-11-12

## 2023-11-12 RX ORDER — POTASSIUM CHLORIDE 20 MEQ/1
20 TABLET, EXTENDED RELEASE ORAL ONCE
Status: COMPLETED | OUTPATIENT
Start: 2023-11-12 | End: 2023-11-12

## 2023-11-12 RX ORDER — METOPROLOL TARTRATE 50 MG/1
200 TABLET, FILM COATED ORAL ONCE
Status: COMPLETED | OUTPATIENT
Start: 2023-11-12 | End: 2023-11-12

## 2023-11-12 RX ADMIN — POTASSIUM CHLORIDE 20 MEQ: 1500 TABLET, EXTENDED RELEASE ORAL at 07:33

## 2023-11-12 RX ADMIN — FAMOTIDINE 20 MG: 20 TABLET, FILM COATED ORAL at 07:33

## 2023-11-12 RX ADMIN — EMPAGLIFLOZIN 10 MG: 10 TABLET, FILM COATED ORAL at 07:33

## 2023-11-12 RX ADMIN — APIXABAN 5 MG: 5 TABLET, FILM COATED ORAL at 07:33

## 2023-11-12 RX ADMIN — INSULIN LISPRO 8 UNITS: 100 INJECTION, SOLUTION INTRAVENOUS; SUBCUTANEOUS at 07:34

## 2023-11-12 RX ADMIN — METOPROLOL TARTRATE 200 MG: 50 TABLET, FILM COATED ORAL at 07:33

## 2023-11-12 NOTE — ED PROVIDER NOTES
[facility]  Emergency Department Encounter  Emergency Medicine Attending     Pt Name:Michelle Meyer  MRN: [id number]  [address]idalgo 1950  Date of evaluation: 11/12/23  PCP:  Gordy Terry MD  Note Started: 2:00 AM EST      CHIEF COMPLAINT       Chief Complaint   Patient presents with    Hypoglycemia     Pt sent from [facility] for low blood sugar. Facility reports POC glucose for them was 40. They attempted to give pt juice, however pt would not remain awake to drink it. PT lethargic during triage, however is able to answer questions       HISTORY OF PRESENT ILLNESS  (Location/Symptom, Timing/Onset, Context/Setting, Quality, Duration, Modifying Factors, Severity.)      Mary Anne Howard is a 72 y.o. female who presents with hypoglycemia from her nursing home. Patient was found to be acting lethargic in her room. Blood sugar was taken and it was in the 40s. Nursing home was unable to get her to drink orange juice or take oral glucose and did not have a order from the on-call physician to give dextrose so they had the patient transferred to the ER for evaluation. Patient arrives, blood sugar is improving after squad was able to give some glucose. Blood sugar here is 93 on initial check. Patient is awake, somnolent but it is the middle of the evening and patient's recollection is minimal.  Per report from nursing home this is the patient's baseline.     PAST MEDICAL / SURGICAL / SOCIAL / FAMILY HISTORY      has a past medical history of Adjustment disorder with depressed mood, Arthritis, Atrial fibrillation, unspecified type ([address]), Cerebral artery occlusion with cerebral infarction ([address]), CHF (congestive heart failure) ([address]), Chronic combined systolic and diastolic CHF, NYHA class 4 ([address]), Chronic kidney disease, Diabetes mellitus ([address]), Dysarthria and anarthria, Endothelial corneal dystrophy of both eyes, H/O cardiac catheterization, H/O echocardiogram, Hearing loss, History of Yes

## 2023-11-12 NOTE — PROGRESS NOTES
Spiritual Services Interventions  MARELY/MARELY   11/12/2023  [place] RICCI [address] [facility]  72y.o. year old female    Encounter Summary  Encounter Overview/Reason : (P) Crisis  Service Provided For[de-identified] (P) Patient  Referral/Consult From[de-identified] (P) Rounding  Support System: (P) Children, Home care staff  Complexity of Encounter: (P) Moderate  Begin Time: (P) 0900  End Time : (P) 0915  Total Time Calculated: (P) 15 min  Crisis  Type: (P)  (general)                       Assessment/Intervention/Outcome  Intervention: (P) Explored/Affirmed feelings, thoughts, concerns, Nurtured Hope, Prayer (assurance of)/[place]  Outcome: (P) Engaged in conversation, Receptive

## 2023-11-12 NOTE — ED NOTES
Anil Shaw for transport back to [address] [place] [address]. Was told by \"Miladys\" she has no one to send all day.      Sandrita Greene  11/12/23 6785

## 2023-11-12 NOTE — ED NOTES
[facility] updated on plan of care for patient.  Marin Nugent at [facility] verbalized understanding     Claudette Holguin  11/12/23 5044

## 2023-11-12 NOTE — DISCHARGE INSTRUCTIONS
Patient was evaluated in the emergency department after being found to be hypoglycemic. Please ensure the patient has plenty of regular checks to ensure her blood glucose does not drop precipitously to where she is unable to take care of herself or take oral meds at the facility. If there are any concerns, please return her to the emergency department for reevaluation.

## 2023-11-14 ENCOUNTER — CLINICAL DOCUMENTATION ONLY (OUTPATIENT)
Facility: CLINIC | Age: 73
End: 2023-11-14

## 2024-01-11 ENCOUNTER — HOSPITAL ENCOUNTER (OUTPATIENT)
Age: 74
Setting detail: SPECIMEN
Discharge: HOME | End: 2024-01-11
Payer: MEDICARE

## 2024-01-11 LAB
EST. AVERAGE GLUCOSE BLD GHB EST-MCNC: 117 MG/DL
HBA1C MFR BLD: 5.7 % (ref 4–6)

## 2024-01-11 PROCEDURE — 36415 COLL VENOUS BLD VENIPUNCTURE: CPT

## 2024-01-11 PROCEDURE — 83036 HEMOGLOBIN GLYCOSYLATED A1C: CPT

## 2024-01-11 PROCEDURE — P9603 ONE-WAY ALLOW PRORATED MILES: HCPCS

## 2024-04-04 ENCOUNTER — HOSPITAL ENCOUNTER (OUTPATIENT)
Age: 74
Setting detail: SPECIMEN
Discharge: HOME | End: 2024-04-04
Payer: MEDICARE

## 2024-04-04 LAB
ALBUMIN SERPL-MCNC: 3.7 G/DL (ref 3.5–5.2)
ALBUMIN/GLOB SERPL: 1.1 {RATIO} (ref 1–2.5)
ALP SERPL-CCNC: 103 U/L (ref 35–104)
ALT SERPL-CCNC: 8 U/L (ref 5–33)
ANION GAP SERPL CALCULATED.3IONS-SCNC: 11 MMOL/L (ref 9–17)
AST SERPL-CCNC: 14 U/L
BILIRUB SERPL-MCNC: 0.5 MG/DL (ref 0.3–1.2)
BUN SERPL-MCNC: 25 MG/DL (ref 8–23)
BUN/CREAT SERPL: 21 (ref 9–20)
CALCIUM SERPL-MCNC: 10.9 MG/DL (ref 8.6–10.4)
CHLORIDE SERPL-SCNC: 107 MMOL/L (ref 98–107)
CHOLEST SERPL-MCNC: 90 MG/DL (ref 0–199)
CHOLESTEROL/HDL RATIO: 2
CO2 SERPL-SCNC: 23 MMOL/L (ref 20–31)
CREAT SERPL-MCNC: 1.2 MG/DL (ref 0.5–0.9)
ERYTHROCYTE [DISTWIDTH] IN BLOOD BY AUTOMATED COUNT: 18.5 % (ref 11.8–14.4)
EST. AVERAGE GLUCOSE BLD GHB EST-MCNC: 128 MG/DL
GFR SERPL CREATININE-BSD FRML MDRD: 48 ML/MIN/1.73M2
GLUCOSE SERPL-MCNC: 71 MG/DL (ref 70–99)
HBA1C MFR BLD: 6.1 % (ref 4–6)
HCT VFR BLD AUTO: 32.8 % (ref 36.3–47.1)
HDLC SERPL-MCNC: 38 MG/DL
HGB BLD-MCNC: 9.4 G/DL (ref 11.9–15.1)
LDLC SERPL CALC-MCNC: 41 MG/DL (ref 0–100)
MCH RBC QN AUTO: 23.6 PG (ref 25.2–33.5)
MCHC RBC AUTO-ENTMCNC: 28.7 G/DL (ref 28.4–34.8)
MCV RBC AUTO: 82.4 FL (ref 82.6–102.9)
NRBC BLD-RTO: 0 PER 100 WBC
PLATELET # BLD AUTO: 173 K/UL (ref 138–453)
PMV BLD AUTO: 11.2 FL (ref 8.1–13.5)
POTASSIUM SERPL-SCNC: 4.8 MMOL/L (ref 3.7–5.3)
PROT SERPL-MCNC: 7 G/DL (ref 6.4–8.3)
RBC # BLD AUTO: 3.98 M/UL (ref 3.95–5.11)
SODIUM SERPL-SCNC: 141 MMOL/L (ref 135–144)
TRIGL SERPL-MCNC: 58 MG/DL
TSH SERPL DL<=0.05 MIU/L-ACNC: 0.58 UIU/ML (ref 0.3–5)
VLDLC SERPL CALC-MCNC: 12 MG/DL
WBC OTHER # BLD: 6.1 K/UL (ref 3.5–11.3)

## 2024-04-04 PROCEDURE — 84443 ASSAY THYROID STIM HORMONE: CPT

## 2024-04-04 PROCEDURE — 80061 LIPID PANEL: CPT

## 2024-04-04 PROCEDURE — 83036 HEMOGLOBIN GLYCOSYLATED A1C: CPT

## 2024-04-04 PROCEDURE — 85027 COMPLETE CBC AUTOMATED: CPT

## 2024-04-04 PROCEDURE — 36415 COLL VENOUS BLD VENIPUNCTURE: CPT

## 2024-04-04 PROCEDURE — 80053 COMPREHEN METABOLIC PANEL: CPT

## 2024-04-04 PROCEDURE — P9604 ONE-WAY ALLOW PRORATED TRIP: HCPCS

## 2024-04-22 ENCOUNTER — APPOINTMENT (OUTPATIENT)
Dept: GENERAL RADIOLOGY | Age: 74
End: 2024-04-22
Payer: MEDICARE

## 2024-04-22 ENCOUNTER — HOSPITAL ENCOUNTER (INPATIENT)
Age: 74
LOS: 5 days | Discharge: OTHER INSTITUTION | End: 2024-04-27
Attending: EMERGENCY MEDICINE | Admitting: INTERNAL MEDICINE
Payer: MEDICARE

## 2024-04-22 DIAGNOSIS — R06.03 ACUTE RESPIRATORY DISTRESS: Primary | ICD-10-CM

## 2024-04-22 DIAGNOSIS — I50.9 CONGESTIVE HEART FAILURE, UNSPECIFIED HF CHRONICITY, UNSPECIFIED HEART FAILURE TYPE (HCC): ICD-10-CM

## 2024-04-22 DIAGNOSIS — I50.21 ACUTE SYSTOLIC CHF (CONGESTIVE HEART FAILURE), NYHA CLASS 3 (HCC): ICD-10-CM

## 2024-04-22 PROBLEM — D68.69 SECONDARY HYPERCOAGULABLE STATE (HCC): Status: ACTIVE | Noted: 2024-04-22

## 2024-04-22 PROBLEM — I50.31 ACUTE DIASTOLIC CHF (CONGESTIVE HEART FAILURE), NYHA CLASS 3 (HCC): Status: ACTIVE | Noted: 2024-04-22

## 2024-04-22 LAB
ALBUMIN SERPL-MCNC: 4.1 G/DL (ref 3.5–5.2)
ALBUMIN/GLOB SERPL: 1 {RATIO} (ref 1–2.5)
ALP SERPL-CCNC: 116 U/L (ref 35–104)
ALT SERPL-CCNC: 13 U/L (ref 5–33)
ANION GAP SERPL CALCULATED.3IONS-SCNC: 8 MMOL/L (ref 9–17)
ARTERIAL PATENCY WRIST A: ABNORMAL
AST SERPL-CCNC: 17 U/L
B PARAP IS1001 DNA NPH QL NAA+NON-PROBE: NOT DETECTED
B PERT DNA SPEC QL NAA+PROBE: NOT DETECTED
BASOPHILS # BLD: 0.06 K/UL (ref 0–0.2)
BASOPHILS NFR BLD: 1 % (ref 0–2)
BILIRUB SERPL-MCNC: 0.7 MG/DL (ref 0.3–1.2)
BNP SERPL-MCNC: ABNORMAL PG/ML
BODY TEMPERATURE: 37
BUN SERPL-MCNC: 24 MG/DL (ref 8–23)
BUN/CREAT SERPL: 20 (ref 9–20)
C PNEUM DNA NPH QL NAA+NON-PROBE: NOT DETECTED
CALCIUM SERPL-MCNC: 11.4 MG/DL (ref 8.6–10.4)
CHLORIDE SERPL-SCNC: 102 MMOL/L (ref 98–107)
CO2 SERPL-SCNC: 29 MMOL/L (ref 20–31)
CREAT SERPL-MCNC: 1.2 MG/DL (ref 0.5–0.9)
EKG ATRIAL RATE: 340 BPM
EKG Q-T INTERVAL: 456 MS
EKG QRS DURATION: 156 MS
EKG QTC CALCULATION (BAZETT): 529 MS
EKG R AXIS: -81 DEGREES
EKG T AXIS: 97 DEGREES
EKG VENTRICULAR RATE: 81 BPM
EOSINOPHIL # BLD: 0 K/UL (ref 0–0.44)
EOSINOPHILS RELATIVE PERCENT: 0 % (ref 1–4)
ERYTHROCYTE [DISTWIDTH] IN BLOOD BY AUTOMATED COUNT: 19.2 % (ref 11.8–14.4)
FIO2 ON VENT: 40 %
FLUAV RNA NPH QL NAA+NON-PROBE: NOT DETECTED
FLUBV RNA NPH QL NAA+NON-PROBE: NOT DETECTED
GAS FLOW.O2 O2 DELIVERY SYS: ABNORMAL L/MIN
GFR SERPL CREATININE-BSD FRML MDRD: 48 ML/MIN/1.73M2
GLUCOSE BLD-MCNC: 108 MG/DL (ref 74–100)
GLUCOSE SERPL-MCNC: 136 MG/DL (ref 70–99)
HADV DNA NPH QL NAA+NON-PROBE: NOT DETECTED
HCO3 VENOUS: 26.6 MMOL/L (ref 24–30)
HCOV 229E RNA NPH QL NAA+NON-PROBE: NOT DETECTED
HCOV HKU1 RNA NPH QL NAA+NON-PROBE: NOT DETECTED
HCOV NL63 RNA NPH QL NAA+NON-PROBE: NOT DETECTED
HCOV OC43 RNA NPH QL NAA+NON-PROBE: NOT DETECTED
HCT VFR BLD AUTO: 35.8 % (ref 36.3–47.1)
HGB BLD-MCNC: 10.2 G/DL (ref 11.9–15.1)
HMPV RNA NPH QL NAA+NON-PROBE: NOT DETECTED
HPIV1 RNA NPH QL NAA+NON-PROBE: NOT DETECTED
HPIV2 RNA NPH QL NAA+NON-PROBE: NOT DETECTED
HPIV3 RNA NPH QL NAA+NON-PROBE: NOT DETECTED
HPIV4 RNA NPH QL NAA+NON-PROBE: NOT DETECTED
IMM GRANULOCYTES # BLD AUTO: 0.13 K/UL (ref 0–0.3)
IMM GRANULOCYTES NFR BLD: 2 %
LACTATE BLDV-SCNC: 1.1 MMOL/L (ref 0.5–2.2)
LYMPHOCYTES NFR BLD: 0.44 K/UL (ref 1.1–3.7)
LYMPHOCYTES RELATIVE PERCENT: 7 % (ref 24–43)
M PNEUMO DNA NPH QL NAA+NON-PROBE: NOT DETECTED
MCH RBC QN AUTO: 23.7 PG (ref 25.2–33.5)
MCHC RBC AUTO-ENTMCNC: 28.5 G/DL (ref 28.4–34.8)
MCV RBC AUTO: 83.3 FL (ref 82.6–102.9)
MONOCYTES NFR BLD: 0.32 K/UL (ref 0.1–1.2)
MONOCYTES NFR BLD: 5 % (ref 3–12)
MORPHOLOGY: ABNORMAL
NEUTROPHILS NFR BLD: 85 % (ref 36–65)
NEUTS SEG NFR BLD: 5.35 K/UL (ref 1.5–8.1)
NRBC BLD-RTO: 0 PER 100 WBC
O2 SAT, VEN: 98.5 % (ref 60–85)
PCO2, VEN, TEMP ADJ: 44.4 MMHG (ref 39–55)
PCO2, VEN: 44.4 MM HG (ref 39–55)
PH VENOUS: 7.4 (ref 7.32–7.42)
PH, VEN, TEMP ADJ: 7.4 (ref 7.32–7.42)
PLATELET # BLD AUTO: 176 K/UL (ref 138–453)
PMV BLD AUTO: 11.7 FL (ref 8.1–13.5)
PO2, VEN, TEMP ADJ: 126.3 MMHG (ref 30–50)
PO2, VEN: 126.3 MM HG (ref 30–50)
POSITIVE BASE EXCESS, VEN: 1.4 MMOL/L (ref 0–2)
POTASSIUM SERPL-SCNC: 5.4 MMOL/L (ref 3.7–5.3)
PROT SERPL-MCNC: 8.1 G/DL (ref 6.4–8.3)
RBC # BLD AUTO: 4.3 M/UL (ref 3.95–5.11)
RSV RNA NPH QL NAA+NON-PROBE: NOT DETECTED
RV+EV RNA NPH QL NAA+NON-PROBE: DETECTED
SARS-COV-2 RNA NPH QL NAA+NON-PROBE: NOT DETECTED
SODIUM SERPL-SCNC: 139 MMOL/L (ref 135–144)
SPECIMEN DESCRIPTION: ABNORMAL
TROPONIN I SERPL HS-MCNC: 29 NG/L (ref 0–14)
TROPONIN I SERPL HS-MCNC: 30 NG/L (ref 0–14)
TSH SERPL DL<=0.05 MIU/L-ACNC: 0.36 UIU/ML (ref 0.3–5)
WBC OTHER # BLD: 6.3 K/UL (ref 3.5–11.3)

## 2024-04-22 PROCEDURE — 83880 ASSAY OF NATRIURETIC PEPTIDE: CPT

## 2024-04-22 PROCEDURE — 94640 AIRWAY INHALATION TREATMENT: CPT

## 2024-04-22 PROCEDURE — 2000000000 HC ICU R&B

## 2024-04-22 PROCEDURE — 85025 COMPLETE CBC W/AUTO DIFF WBC: CPT

## 2024-04-22 PROCEDURE — 82805 BLOOD GASES W/O2 SATURATION: CPT

## 2024-04-22 PROCEDURE — 87040 BLOOD CULTURE FOR BACTERIA: CPT

## 2024-04-22 PROCEDURE — 83540 ASSAY OF IRON: CPT

## 2024-04-22 PROCEDURE — 6360000002 HC RX W HCPCS: Performed by: EMERGENCY MEDICINE

## 2024-04-22 PROCEDURE — 82947 ASSAY GLUCOSE BLOOD QUANT: CPT

## 2024-04-22 PROCEDURE — 83550 IRON BINDING TEST: CPT

## 2024-04-22 PROCEDURE — 71045 X-RAY EXAM CHEST 1 VIEW: CPT

## 2024-04-22 PROCEDURE — 93005 ELECTROCARDIOGRAM TRACING: CPT | Performed by: EMERGENCY MEDICINE

## 2024-04-22 PROCEDURE — 83605 ASSAY OF LACTIC ACID: CPT

## 2024-04-22 PROCEDURE — 94660 CPAP INITIATION&MGMT: CPT

## 2024-04-22 PROCEDURE — 84439 ASSAY OF FREE THYROXINE: CPT

## 2024-04-22 PROCEDURE — 2700000000 HC OXYGEN THERAPY PER DAY

## 2024-04-22 PROCEDURE — 6360000002 HC RX W HCPCS: Performed by: INTERNAL MEDICINE

## 2024-04-22 PROCEDURE — 6370000000 HC RX 637 (ALT 250 FOR IP): Performed by: EMERGENCY MEDICINE

## 2024-04-22 PROCEDURE — 36415 COLL VENOUS BLD VENIPUNCTURE: CPT

## 2024-04-22 PROCEDURE — 94664 DEMO&/EVAL PT USE INHALER: CPT

## 2024-04-22 PROCEDURE — 84443 ASSAY THYROID STIM HORMONE: CPT

## 2024-04-22 PROCEDURE — 2580000003 HC RX 258: Performed by: INTERNAL MEDICINE

## 2024-04-22 PROCEDURE — 80053 COMPREHEN METABOLIC PANEL: CPT

## 2024-04-22 PROCEDURE — 5A09457 ASSISTANCE WITH RESPIRATORY VENTILATION, 24-96 CONSECUTIVE HOURS, CONTINUOUS POSITIVE AIRWAY PRESSURE: ICD-10-PCS | Performed by: INTERNAL MEDICINE

## 2024-04-22 PROCEDURE — 93010 ELECTROCARDIOGRAM REPORT: CPT | Performed by: INTERNAL MEDICINE

## 2024-04-22 PROCEDURE — 84484 ASSAY OF TROPONIN QUANT: CPT

## 2024-04-22 PROCEDURE — 99285 EMERGENCY DEPT VISIT HI MDM: CPT

## 2024-04-22 PROCEDURE — 94761 N-INVAS EAR/PLS OXIMETRY MLT: CPT

## 2024-04-22 PROCEDURE — 0202U NFCT DS 22 TRGT SARS-COV-2: CPT

## 2024-04-22 RX ORDER — ACETAMINOPHEN 325 MG/1
650 TABLET ORAL EVERY 4 HOURS PRN
Status: DISCONTINUED | OUTPATIENT
Start: 2024-04-22 | End: 2024-04-27 | Stop reason: HOSPADM

## 2024-04-22 RX ORDER — FUROSEMIDE 10 MG/ML
40 INJECTION INTRAMUSCULAR; INTRAVENOUS ONCE
Status: COMPLETED | OUTPATIENT
Start: 2024-04-22 | End: 2024-04-22

## 2024-04-22 RX ORDER — INSULIN LISPRO 100 [IU]/ML
8 INJECTION, SOLUTION INTRAVENOUS; SUBCUTANEOUS
Status: DISCONTINUED | OUTPATIENT
Start: 2024-04-22 | End: 2024-04-27 | Stop reason: HOSPADM

## 2024-04-22 RX ORDER — INSULIN GLARGINE 100 [IU]/ML
30 INJECTION, SOLUTION SUBCUTANEOUS NIGHTLY
Status: DISCONTINUED | OUTPATIENT
Start: 2024-04-22 | End: 2024-04-27 | Stop reason: HOSPADM

## 2024-04-22 RX ORDER — ACETAMINOPHEN 650 MG/1
650 SUPPOSITORY RECTAL EVERY 6 HOURS PRN
Status: DISCONTINUED | OUTPATIENT
Start: 2024-04-22 | End: 2024-04-27 | Stop reason: HOSPADM

## 2024-04-22 RX ORDER — POTASSIUM CHLORIDE 7.45 MG/ML
10 INJECTION INTRAVENOUS PRN
Status: DISCONTINUED | OUTPATIENT
Start: 2024-04-22 | End: 2024-04-27 | Stop reason: HOSPADM

## 2024-04-22 RX ORDER — SERTRALINE HYDROCHLORIDE 100 MG/1
100 TABLET, FILM COATED ORAL NIGHTLY
Status: DISCONTINUED | OUTPATIENT
Start: 2024-04-22 | End: 2024-04-27 | Stop reason: HOSPADM

## 2024-04-22 RX ORDER — DEXTROSE MONOHYDRATE 100 MG/ML
INJECTION, SOLUTION INTRAVENOUS CONTINUOUS PRN
Status: DISCONTINUED | OUTPATIENT
Start: 2024-04-22 | End: 2024-04-27 | Stop reason: HOSPADM

## 2024-04-22 RX ORDER — SODIUM CHLORIDE 9 MG/ML
INJECTION, SOLUTION INTRAVENOUS PRN
Status: DISCONTINUED | OUTPATIENT
Start: 2024-04-22 | End: 2024-04-27 | Stop reason: HOSPADM

## 2024-04-22 RX ORDER — SODIUM CHLORIDE 0.9 % (FLUSH) 0.9 %
5-40 SYRINGE (ML) INJECTION PRN
Status: DISCONTINUED | OUTPATIENT
Start: 2024-04-22 | End: 2024-04-27 | Stop reason: HOSPADM

## 2024-04-22 RX ORDER — ACETAMINOPHEN 325 MG/1
650 TABLET ORAL EVERY 6 HOURS PRN
Status: DISCONTINUED | OUTPATIENT
Start: 2024-04-22 | End: 2024-04-27 | Stop reason: HOSPADM

## 2024-04-22 RX ORDER — ALBUTEROL SULFATE 2.5 MG/3ML
2.5 SOLUTION RESPIRATORY (INHALATION) ONCE
Status: COMPLETED | OUTPATIENT
Start: 2024-04-22 | End: 2024-04-22

## 2024-04-22 RX ORDER — POLYETHYLENE GLYCOL 3350 17 G/17G
17 POWDER, FOR SOLUTION ORAL DAILY PRN
Status: DISCONTINUED | OUTPATIENT
Start: 2024-04-22 | End: 2024-04-27 | Stop reason: HOSPADM

## 2024-04-22 RX ORDER — EYELID CLEANSER COMBINATION 5
1 PADS, MEDICATED (EA) TOPICAL 2 TIMES DAILY
COMMUNITY

## 2024-04-22 RX ORDER — ATORVASTATIN CALCIUM 40 MG/1
80 TABLET, FILM COATED ORAL DAILY
Status: DISCONTINUED | OUTPATIENT
Start: 2024-04-22 | End: 2024-04-27 | Stop reason: HOSPADM

## 2024-04-22 RX ORDER — ONDANSETRON 2 MG/ML
4 INJECTION INTRAMUSCULAR; INTRAVENOUS EVERY 6 HOURS PRN
Status: DISCONTINUED | OUTPATIENT
Start: 2024-04-22 | End: 2024-04-22

## 2024-04-22 RX ORDER — DILTIAZEM HYDROCHLORIDE 240 MG/1
240 CAPSULE, COATED, EXTENDED RELEASE ORAL DAILY
Status: DISCONTINUED | OUTPATIENT
Start: 2024-04-22 | End: 2024-04-27 | Stop reason: HOSPADM

## 2024-04-22 RX ORDER — FUROSEMIDE 10 MG/ML
40 INJECTION INTRAMUSCULAR; INTRAVENOUS 2 TIMES DAILY
Status: DISCONTINUED | OUTPATIENT
Start: 2024-04-22 | End: 2024-04-26

## 2024-04-22 RX ORDER — ONDANSETRON 4 MG/1
4 TABLET, ORALLY DISINTEGRATING ORAL EVERY 8 HOURS PRN
Status: DISCONTINUED | OUTPATIENT
Start: 2024-04-22 | End: 2024-04-22

## 2024-04-22 RX ORDER — GLUCAGON 1 MG/ML
1 KIT INJECTION PRN
Status: DISCONTINUED | OUTPATIENT
Start: 2024-04-22 | End: 2024-04-27 | Stop reason: HOSPADM

## 2024-04-22 RX ORDER — MAGNESIUM SULFATE IN WATER 40 MG/ML
2000 INJECTION, SOLUTION INTRAVENOUS PRN
Status: DISCONTINUED | OUTPATIENT
Start: 2024-04-22 | End: 2024-04-27 | Stop reason: HOSPADM

## 2024-04-22 RX ORDER — POTASSIUM CHLORIDE 20 MEQ/1
40 TABLET, EXTENDED RELEASE ORAL PRN
Status: DISCONTINUED | OUTPATIENT
Start: 2024-04-22 | End: 2024-04-27 | Stop reason: HOSPADM

## 2024-04-22 RX ORDER — POTASSIUM CHLORIDE 750 MG/1
20 TABLET, EXTENDED RELEASE ORAL
Status: DISCONTINUED | OUTPATIENT
Start: 2024-04-23 | End: 2024-04-27 | Stop reason: HOSPADM

## 2024-04-22 RX ORDER — SODIUM CHLORIDE 0.9 % (FLUSH) 0.9 %
5-40 SYRINGE (ML) INJECTION EVERY 12 HOURS SCHEDULED
Status: DISCONTINUED | OUTPATIENT
Start: 2024-04-22 | End: 2024-04-27 | Stop reason: HOSPADM

## 2024-04-22 RX ORDER — FAMOTIDINE 20 MG/1
20 TABLET, FILM COATED ORAL EVERY MORNING
Status: DISCONTINUED | OUTPATIENT
Start: 2024-04-23 | End: 2024-04-27 | Stop reason: HOSPADM

## 2024-04-22 RX ORDER — GABAPENTIN 400 MG/1
400 CAPSULE ORAL 2 TIMES DAILY
Status: DISCONTINUED | OUTPATIENT
Start: 2024-04-22 | End: 2024-04-27 | Stop reason: HOSPADM

## 2024-04-22 RX ORDER — GABAPENTIN 400 MG/1
400 CAPSULE ORAL 2 TIMES DAILY
COMMUNITY

## 2024-04-22 RX ORDER — METOPROLOL SUCCINATE 100 MG/1
200 TABLET, EXTENDED RELEASE ORAL DAILY
Status: DISCONTINUED | OUTPATIENT
Start: 2024-04-22 | End: 2024-04-27 | Stop reason: HOSPADM

## 2024-04-22 RX ORDER — IPRATROPIUM BROMIDE AND ALBUTEROL SULFATE 2.5; .5 MG/3ML; MG/3ML
1 SOLUTION RESPIRATORY (INHALATION) ONCE
Status: COMPLETED | OUTPATIENT
Start: 2024-04-22 | End: 2024-04-22

## 2024-04-22 RX ADMIN — FUROSEMIDE 40 MG: 10 INJECTION, SOLUTION INTRAMUSCULAR; INTRAVENOUS at 21:54

## 2024-04-22 RX ADMIN — IPRATROPIUM BROMIDE AND ALBUTEROL SULFATE 1 DOSE: .5; 2.5 SOLUTION RESPIRATORY (INHALATION) at 14:42

## 2024-04-22 RX ADMIN — SODIUM CHLORIDE, PRESERVATIVE FREE 10 ML: 5 INJECTION INTRAVENOUS at 21:55

## 2024-04-22 RX ADMIN — ALBUTEROL SULFATE 2.5 MG: 2.5 SOLUTION RESPIRATORY (INHALATION) at 16:39

## 2024-04-22 RX ADMIN — FUROSEMIDE 40 MG: 10 INJECTION, SOLUTION INTRAMUSCULAR; INTRAVENOUS at 16:28

## 2024-04-22 ASSESSMENT — PAIN - FUNCTIONAL ASSESSMENT: PAIN_FUNCTIONAL_ASSESSMENT: NONE - DENIES PAIN

## 2024-04-22 ASSESSMENT — LIFESTYLE VARIABLES
HOW MANY STANDARD DRINKS CONTAINING ALCOHOL DO YOU HAVE ON A TYPICAL DAY: PATIENT DOES NOT DRINK
HOW OFTEN DO YOU HAVE A DRINK CONTAINING ALCOHOL: NEVER

## 2024-04-22 NOTE — PROGRESS NOTES
Pt admitted to  from ER with CHF and on BiPAP. Pt moved from one bed to the other by staff. Pt hooked up to ICU monitor and wires. Assessment and vital signs as charted. Redness noted to coccyx. No dressing at this time. Perkins catheter in place, patent and draining. Heels elevated on pillow. Cardiac monitor shows V paced. Pacer noted to left chest. Pt wears glasses but they are not here at this time. BiPAP on and pt tolerating at 30% 14/7. Continuous pulse ox WNL. BLE edema noted. Pt is lethargic and sleeping. Pt will open eyes to voice but falls right back to sleep without answering any questions. Daughter at bedside to help answer admission questions. Daughter states that pt usually is A & O x 4 with intermittent sundowners. She gets up in a wheelchair at [facility] with stand and pivot help. Pt does have a history of a stroke so expressive aphasia is reported by daughter. Right side is affected from stroke as well. Pt is able to feed herself with her left hand per daughter. Pt has been recently wearing oxygen at [facility] per daughter. POC reviewed with daughter and she states she will be back tomorrow to visit. Daughter denies any other needs at this time. Call light in reach. Bed alarm on. Side rails up x 2. Will continue to monitor.

## 2024-04-22 NOTE — ED PROVIDER NOTES
[facility]  EMERGENCY DEPARTMENT ENCOUNTER      Pt Name: Michelle Ragland  MRN: [id number]  Birthdate 1950  Date of evaluation: 4/22/2024  Provider: Kimberly Camargo MD    CHIEF COMPLAINT       Chief Complaint   Patient presents with    Shortness of Breath         HISTORY OF PRESENT ILLNESS   (Location/Symptom, Timing/Onset, Context/Setting, Quality, Duration, Modifying Factors, Severity)  Note limiting factors.   Michelle Ragland is a 73 y.o. female who presents to the emergency department      73-year-old female sent from [facility] nursing [facility] for evaluation of shortness of breath.  On arrival patient is on a nonrebreather.  Oxygen saturation was in the low 90s.  She has poor air exchange and she is in moderate acute respiratory distress.  She is afebrile and nontoxic-appearing.  She is denying any chest pain or abdominal pain.  She is denying any fevers or chills.  No reported known sick contacts.  Patient has extensive cardiac history but does not have any significant pulmonary disease.        Nursing Notes were reviewed.    REVIEW OF SYSTEMS    (2-9 systems for level 4, 10 or more for level 5)     Review of Systems   All other systems reviewed and are negative.      Except as noted above the remainder of the review of systems was reviewed and negative.       PAST MEDICAL HISTORY     Past Medical History:   Diagnosis Date    Adjustment disorder with depressed mood 06/15/2022    Arthritis     Atrial fibrillation, unspecified type ([facility]) 11/20/2021    Cerebral artery occlusion with cerebral infarction ([facility])     CHF (congestive heart failure) ([facility])     Chronic combined systolic and diastolic CHF, NYHA class 4 ([facility]) 8/28/2017    Chronic kidney disease 11/20/2021    Stage 3    Diabetes mellitus ([facility])     Dysarthria and anarthria 11/20/2021    Endothelial corneal dystrophy of both eyes 04/21/2022    H/O cardiac catheterization 08/04/2017    LMCA: Mild irregularites 10-20%. LAD: Mild irregularites 10-20%.  Value    Hemoglobin 10.2 (*)     Hematocrit 35.8 (*)     MCH 23.7 (*)     RDW 19.2 (*)     Neutrophils % 85 (*)     Lymphocytes % 7 (*)     Eosinophils % 0 (*)     Immature Granulocytes % 2 (*)     Lymphocytes Absolute 0.44 (*)     All other components within normal limits   COMPREHENSIVE METABOLIC PANEL - Abnormal; Notable for the following components:    Potassium 5.4 (*)     Anion Gap 8 (*)     Glucose 136 (*)     BUN 24 (*)     Creatinine 1.2 (*)     Est, Glom Filt Rate 48 (*)     Calcium 11.4 (*)     Alkaline Phosphatase 116 (*)     All other components within normal limits   TROPONIN - Abnormal; Notable for the following components:    Troponin, High Sensitivity 30 (*)     All other components within normal limits   BRAIN NATRIURETIC PEPTIDE - Abnormal; Notable for the following components:    Pro-BNP 19,667 (*)     All other components within normal limits   BLOOD GAS, VENOUS - Abnormal; Notable for the following components:    pO2, Julian 126.3 (*)     O2 Sat, Julian 98.5 (*)     pO2, Julian, Temp Adj 126.3 (*)     All other components within normal limits   TROPONIN - Abnormal; Notable for the following components:    Troponin, High Sensitivity 29 (*)     All other components within normal limits   CULTURE, BLOOD 1   CULTURE, BLOOD 2   RESPIRATORY PANEL, MOLECULAR, WITH COVID-19   LACTIC ACID   BLOOD GAS, VENOUS       All other labs were within normal range or not returned as of this dictation.    EMERGENCY DEPARTMENT COURSE and DIFFERENTIAL DIAGNOSIS/MDM:   Vitals:    Vitals:    04/22/24 1542 04/22/24 1545 04/22/24 1600 04/22/24 1615   BP: (!) 159/88 (!) 152/81 (!) 160/90 (!) 167/92   Pulse: 80 80     Resp: 19      Temp:       TempSrc:       SpO2: 97% 93%             MDM  Number of Diagnoses or Management Options  Diagnosis management comments: DuoNeb's x 2 administered as well as Solu-Medrol 125 mg.  Patient remains tachypneic with increased work of breathing and BiPAP was placed.  40 mg of Lasix IV was ordered.

## 2024-04-22 NOTE — ED NOTES
Adult Non-Invasive Positive Pressure Ventilation for Acute Respiratory Distress  Patient & Family Education Note     Patient: Michelle Ragland  Age: 73 y.o.     The patient and/or family has been educated on the following items and have verbalized understanding and agreement:    [x]Patient and/or family have been educated on the purpose and advantages of NIV and have verbalized understanding and agreement.  [x]Patient and/or family is in agreement that the patient will be NPO (Nothing by Mouth) for the duration of NIV use.  [x]Patient and/or  family is in agreement that NIV will not be routinely disrupted except to complete oral care.  [x]Patient and/or family have been educated on the level of care, vital signs frequency and monitoring requirements for NIV and are in agreement.  [x]Patient and/or family have been educated on reporting any nausea, chest discomfort, sudden increase in shortness of breath, or a severe headache to the staff immediately.

## 2024-04-23 ENCOUNTER — APPOINTMENT (OUTPATIENT)
Age: 74
End: 2024-04-23
Payer: MEDICARE

## 2024-04-23 PROBLEM — B34.1 ENTEROVIRUS INFECTION: Status: ACTIVE | Noted: 2024-04-23

## 2024-04-23 LAB
ANION GAP SERPL CALCULATED.3IONS-SCNC: 7 MMOL/L (ref 9–17)
ARTERIAL PATENCY WRIST A: YES
BASOPHILS # BLD: <0.03 K/UL (ref 0–0.2)
BASOPHILS NFR BLD: 0 % (ref 0–2)
BDY SITE: ABNORMAL
BODY TEMPERATURE: 37
BUN SERPL-MCNC: 24 MG/DL (ref 8–23)
BUN/CREAT SERPL: 20 (ref 9–20)
CALCIUM SERPL-MCNC: 10.8 MG/DL (ref 8.6–10.4)
CHLORIDE SERPL-SCNC: 99 MMOL/L (ref 98–107)
CHOLEST SERPL-MCNC: 76 MG/DL (ref 0–199)
CHOLESTEROL/HDL RATIO: 2
CO2 SERPL-SCNC: 32 MMOL/L (ref 20–31)
CREAT SERPL-MCNC: 1.2 MG/DL (ref 0.5–0.9)
ECHO AV CUSP MM: 1.3 CM
ECHO AV MEAN GRADIENT: 8 MMHG
ECHO AV MEAN VELOCITY: 1.3 M/S
ECHO AV PEAK GRADIENT: 14 MMHG
ECHO AV PEAK VELOCITY: 1.9 M/S
ECHO AV VELOCITY RATIO: 0.47
ECHO AV VTI: 35.7 CM
ECHO BSA: 1.93 M2
ECHO EST RA PRESSURE: 8 MMHG
ECHO LA AREA 2C: 33.3 CM2
ECHO LA AREA 4C: 35.4 CM2
ECHO LA MAJOR AXIS: 7.6 CM
ECHO LA MINOR AXIS: 7.2 CM
ECHO LA VOL BP: 130 ML (ref 22–52)
ECHO LA VOL MOD A2C: 123 ML (ref 22–52)
ECHO LA VOL MOD A4C: 131 ML (ref 22–52)
ECHO LA VOL/BSA BIPLANE: 70 ML/M2 (ref 16–34)
ECHO LA VOLUME INDEX MOD A2C: 66 ML/M2 (ref 16–34)
ECHO LA VOLUME INDEX MOD A4C: 70 ML/M2 (ref 16–34)
ECHO LV E' LATERAL VELOCITY: 7 CM/S
ECHO LV EDV A2C: 87 ML
ECHO LV EDV A4C: 88 ML
ECHO LV EDV INDEX A4C: 47 ML/M2
ECHO LV EDV NDEX A2C: 47 ML/M2
ECHO LV EJECTION FRACTION A2C: 38 %
ECHO LV EJECTION FRACTION A4C: 32 %
ECHO LV EJECTION FRACTION BIPLANE: 34 % (ref 55–100)
ECHO LV ESV A2C: 54 ML
ECHO LV ESV A4C: 60 ML
ECHO LV ESV INDEX A2C: 29 ML/M2
ECHO LV ESV INDEX A4C: 32 ML/M2
ECHO LV FRACTIONAL SHORTENING: 13 % (ref 28–44)
ECHO LV INTERNAL DIMENSION DIASTOLE INDEX: 2.96 CM/M2
ECHO LV INTERNAL DIMENSION DIASTOLIC: 5.5 CM (ref 3.9–5.3)
ECHO LV INTERNAL DIMENSION SYSTOLIC INDEX: 2.58 CM/M2
ECHO LV INTERNAL DIMENSION SYSTOLIC: 4.8 CM
ECHO LV IVSD: 0.9 CM (ref 0.6–0.9)
ECHO LV MASS 2D: 185.8 G (ref 67–162)
ECHO LV MASS INDEX 2D: 99.9 G/M2 (ref 43–95)
ECHO LV POSTERIOR WALL DIASTOLIC: 0.9 CM (ref 0.6–0.9)
ECHO LV RELATIVE WALL THICKNESS RATIO: 0.33
ECHO LVOT AV VTI INDEX: 0.42
ECHO LVOT MEAN GRADIENT: 2 MMHG
ECHO LVOT PEAK GRADIENT: 3 MMHG
ECHO LVOT PEAK VELOCITY: 0.9 M/S
ECHO LVOT VTI: 15 CM
ECHO MV E DECELERATION TIME (DT): 306 MS
ECHO MV E VELOCITY: 1.56 M/S
ECHO MV E/E' LATERAL: 22.29
ECHO MV LVOT VTI INDEX: 3.04
ECHO MV MAX VELOCITY: 1.8 M/S
ECHO MV MEAN GRADIENT: 4 MMHG
ECHO MV MEAN VELOCITY: 0.9 M/S
ECHO MV PEAK GRADIENT: 13 MMHG
ECHO MV REGURGITANT ALIASING (NYQUIST) VELOCITY: 35 CM/S
ECHO MV REGURGITANT PEAK GRADIENT: 104 MMHG
ECHO MV REGURGITANT PEAK VELOCITY: 5.1 M/S
ECHO MV REGURGITANT RADIUS PISA: 0.5 CM
ECHO MV REGURGITANT VTIA: 168 CM
ECHO MV VENA CONTRACTA AREA: 0.6 CM2
ECHO MV VENA CONTRACTA/LEFT ATRIUM AREA: 0.02
ECHO MV VENA CONTRACTA: 0.5 CM
ECHO MV VTI: 45.6 CM
ECHO PULMONARY ARTERY END DIASTOLIC PRESSURE: 18 MMHG
ECHO PV MAX VELOCITY: 1 M/S
ECHO PV PEAK GRADIENT: 4 MMHG
ECHO PV REGURGITANT MAX VELOCITY: 2.1 M/S
ECHO RIGHT VENTRICULAR SYSTOLIC PRESSURE (RVSP): 41 MMHG
ECHO TV REGURGITANT MAX VELOCITY: 2.86 M/S
ECHO TV REGURGITANT PEAK GRADIENT: 33 MMHG
EOSINOPHIL # BLD: 0.08 K/UL (ref 0–0.44)
EOSINOPHILS RELATIVE PERCENT: 2 % (ref 1–4)
ERYTHROCYTE [DISTWIDTH] IN BLOOD BY AUTOMATED COUNT: 19.2 % (ref 11.8–14.4)
FIO2 ON VENT: 30 %
GAS FLOW.O2 O2 DELIVERY SYS: ABNORMAL L/MIN
GFR SERPL CREATININE-BSD FRML MDRD: 48 ML/MIN/1.73M2
GLUCOSE BLD-MCNC: 118 MG/DL (ref 74–100)
GLUCOSE BLD-MCNC: 139 MG/DL (ref 74–100)
GLUCOSE BLD-MCNC: 151 MG/DL (ref 74–100)
GLUCOSE SERPL-MCNC: 100 MG/DL (ref 70–99)
HCO3 ARTERIAL: 30.2 MMOL/L (ref 22–26)
HCT VFR BLD AUTO: 33.4 % (ref 36.3–47.1)
HDLC SERPL-MCNC: 39 MG/DL
HGB BLD-MCNC: 9.7 G/DL (ref 11.9–15.1)
IMM GRANULOCYTES # BLD AUTO: <0.03 K/UL (ref 0–0.3)
IMM GRANULOCYTES NFR BLD: 0 %
IRON SATN MFR SERPL: 5 % (ref 20–55)
IRON SERPL-MCNC: 23 UG/DL (ref 37–145)
LDLC SERPL CALC-MCNC: 26 MG/DL (ref 0–100)
LYMPHOCYTES NFR BLD: 0.58 K/UL (ref 1.1–3.7)
LYMPHOCYTES RELATIVE PERCENT: 12 % (ref 24–43)
MAGNESIUM SERPL-MCNC: 2 MG/DL (ref 1.6–2.6)
MCH RBC QN AUTO: 23.8 PG (ref 25.2–33.5)
MCHC RBC AUTO-ENTMCNC: 29 G/DL (ref 28.4–34.8)
MCV RBC AUTO: 81.9 FL (ref 82.6–102.9)
MONOCYTES NFR BLD: 0.7 K/UL (ref 0.1–1.2)
MONOCYTES NFR BLD: 14 % (ref 3–12)
NEUTROPHILS NFR BLD: 72 % (ref 36–65)
NEUTS SEG NFR BLD: 3.48 K/UL (ref 1.5–8.1)
NRBC BLD-RTO: 0 PER 100 WBC
O2 SAT, ARTERIAL: 90.1 % (ref 95–98)
PCO2 ARTERIAL: 46 MMHG (ref 35–45)
PCO2, ART, TEMP ADJ: 46 (ref 35–45)
PH ARTERIAL: 7.43 (ref 7.35–7.45)
PH, ART, TEMP ADJ: 7.43 (ref 7.35–7.45)
PLATELET # BLD AUTO: 139 K/UL (ref 138–453)
PMV BLD AUTO: 11.5 FL (ref 8.1–13.5)
PO2 ARTERIAL: 56.5 MMHG (ref 80–100)
PO2, ART, TEMP ADJ: 56.5 MMHG (ref 80–100)
POSITIVE BASE EXCESS, ART: 5.1 MMOL/L (ref 0–2)
POTASSIUM SERPL-SCNC: 5.2 MMOL/L (ref 3.7–5.3)
PT. POSITION: ABNORMAL
RBC # BLD AUTO: 4.08 M/UL (ref 3.95–5.11)
SODIUM SERPL-SCNC: 138 MMOL/L (ref 135–144)
T4 FREE SERPL-MCNC: 1.8 NG/DL (ref 0.92–1.68)
TIBC SERPL-MCNC: 429 UG/DL (ref 250–450)
TRIGL SERPL-MCNC: 60 MG/DL
UNSATURATED IRON BINDING CAPACITY: 406 UG/DL (ref 112–347)
VLDLC SERPL CALC-MCNC: 12 MG/DL
WBC OTHER # BLD: 4.9 K/UL (ref 3.5–11.3)

## 2024-04-23 PROCEDURE — 94660 CPAP INITIATION&MGMT: CPT

## 2024-04-23 PROCEDURE — 94761 N-INVAS EAR/PLS OXIMETRY MLT: CPT

## 2024-04-23 PROCEDURE — 80048 BASIC METABOLIC PNL TOTAL CA: CPT

## 2024-04-23 PROCEDURE — 6360000002 HC RX W HCPCS: Performed by: INTERNAL MEDICINE

## 2024-04-23 PROCEDURE — 2700000000 HC OXYGEN THERAPY PER DAY

## 2024-04-23 PROCEDURE — 93306 TTE W/DOPPLER COMPLETE: CPT | Performed by: FAMILY MEDICINE

## 2024-04-23 PROCEDURE — 94664 DEMO&/EVAL PT USE INHALER: CPT

## 2024-04-23 PROCEDURE — 94669 MECHANICAL CHEST WALL OSCILL: CPT

## 2024-04-23 PROCEDURE — 2000000000 HC ICU R&B

## 2024-04-23 PROCEDURE — 93306 TTE W/DOPPLER COMPLETE: CPT

## 2024-04-23 PROCEDURE — 6370000000 HC RX 637 (ALT 250 FOR IP): Performed by: INTERNAL MEDICINE

## 2024-04-23 PROCEDURE — 80061 LIPID PANEL: CPT

## 2024-04-23 PROCEDURE — 97162 PT EVAL MOD COMPLEX 30 MIN: CPT

## 2024-04-23 PROCEDURE — 85025 COMPLETE CBC W/AUTO DIFF WBC: CPT

## 2024-04-23 PROCEDURE — 94640 AIRWAY INHALATION TREATMENT: CPT

## 2024-04-23 PROCEDURE — 36415 COLL VENOUS BLD VENIPUNCTURE: CPT

## 2024-04-23 PROCEDURE — 6370000000 HC RX 637 (ALT 250 FOR IP): Performed by: NURSE PRACTITIONER

## 2024-04-23 PROCEDURE — 2580000003 HC RX 258: Performed by: INTERNAL MEDICINE

## 2024-04-23 PROCEDURE — 82805 BLOOD GASES W/O2 SATURATION: CPT

## 2024-04-23 PROCEDURE — 6360000002 HC RX W HCPCS: Performed by: NURSE PRACTITIONER

## 2024-04-23 PROCEDURE — 82947 ASSAY GLUCOSE BLOOD QUANT: CPT

## 2024-04-23 PROCEDURE — 83735 ASSAY OF MAGNESIUM: CPT

## 2024-04-23 RX ORDER — MORPHINE SULFATE 2 MG/ML
2 INJECTION, SOLUTION INTRAMUSCULAR; INTRAVENOUS
Status: DISCONTINUED | OUTPATIENT
Start: 2024-04-23 | End: 2024-04-27 | Stop reason: HOSPADM

## 2024-04-23 RX ORDER — ALBUTEROL SULFATE 2.5 MG/3ML
2.5 SOLUTION RESPIRATORY (INHALATION) EVERY 4 HOURS PRN
Status: DISCONTINUED | OUTPATIENT
Start: 2024-04-23 | End: 2024-04-27 | Stop reason: HOSPADM

## 2024-04-23 RX ORDER — IPRATROPIUM BROMIDE AND ALBUTEROL SULFATE 2.5; .5 MG/3ML; MG/3ML
1 SOLUTION RESPIRATORY (INHALATION)
Status: DISCONTINUED | OUTPATIENT
Start: 2024-04-23 | End: 2024-04-23

## 2024-04-23 RX ORDER — IPRATROPIUM BROMIDE AND ALBUTEROL SULFATE 2.5; .5 MG/3ML; MG/3ML
1 SOLUTION RESPIRATORY (INHALATION) 3 TIMES DAILY
Status: DISCONTINUED | OUTPATIENT
Start: 2024-04-23 | End: 2024-04-24

## 2024-04-23 RX ADMIN — FUROSEMIDE 40 MG: 10 INJECTION, SOLUTION INTRAMUSCULAR; INTRAVENOUS at 17:04

## 2024-04-23 RX ADMIN — IPRATROPIUM BROMIDE AND ALBUTEROL SULFATE 1 DOSE: 2.5; .5 SOLUTION RESPIRATORY (INHALATION) at 21:16

## 2024-04-23 RX ADMIN — GUAIFENESIN, DEXTROMETHORPHAN HBR 1 TABLET: 600; 30 TABLET ORAL at 11:59

## 2024-04-23 RX ADMIN — FUROSEMIDE 40 MG: 10 INJECTION, SOLUTION INTRAMUSCULAR; INTRAVENOUS at 08:18

## 2024-04-23 RX ADMIN — SODIUM CHLORIDE, PRESERVATIVE FREE 10 ML: 5 INJECTION INTRAVENOUS at 21:47

## 2024-04-23 RX ADMIN — ATORVASTATIN CALCIUM 80 MG: 40 TABLET, FILM COATED ORAL at 08:18

## 2024-04-23 RX ADMIN — SODIUM CHLORIDE, PRESERVATIVE FREE 10 ML: 5 INJECTION INTRAVENOUS at 08:19

## 2024-04-23 RX ADMIN — MORPHINE SULFATE 2 MG: 2 INJECTION, SOLUTION INTRAMUSCULAR; INTRAVENOUS at 08:50

## 2024-04-23 RX ADMIN — METOPROLOL SUCCINATE 200 MG: 100 TABLET, EXTENDED RELEASE ORAL at 08:18

## 2024-04-23 RX ADMIN — APIXABAN 5 MG: 5 TABLET, FILM COATED ORAL at 21:47

## 2024-04-23 RX ADMIN — APIXABAN 5 MG: 5 TABLET, FILM COATED ORAL at 08:18

## 2024-04-23 RX ADMIN — GABAPENTIN 400 MG: 400 CAPSULE ORAL at 21:47

## 2024-04-23 RX ADMIN — IPRATROPIUM BROMIDE AND ALBUTEROL SULFATE 1 DOSE: 2.5; .5 SOLUTION RESPIRATORY (INHALATION) at 15:44

## 2024-04-23 RX ADMIN — EMPAGLIFLOZIN 10 MG: 10 TABLET, FILM COATED ORAL at 08:18

## 2024-04-23 RX ADMIN — IPRATROPIUM BROMIDE AND ALBUTEROL SULFATE 1 DOSE: 2.5; .5 SOLUTION RESPIRATORY (INHALATION) at 10:10

## 2024-04-23 RX ADMIN — GABAPENTIN 400 MG: 400 CAPSULE ORAL at 08:18

## 2024-04-23 RX ADMIN — FAMOTIDINE 20 MG: 20 TABLET, FILM COATED ORAL at 08:19

## 2024-04-23 RX ADMIN — POTASSIUM CHLORIDE 20 MEQ: 750 TABLET, EXTENDED RELEASE ORAL at 08:18

## 2024-04-23 RX ADMIN — GUAIFENESIN, DEXTROMETHORPHAN HBR 1 TABLET: 600; 30 TABLET ORAL at 21:47

## 2024-04-23 RX ADMIN — SERTRALINE 100 MG: 100 TABLET, FILM COATED ORAL at 21:47

## 2024-04-23 RX ADMIN — DILTIAZEM HYDROCHLORIDE 240 MG: 240 CAPSULE, COATED, EXTENDED RELEASE ORAL at 08:18

## 2024-04-23 NOTE — PROGRESS NOTES
Vitals and assessment completed. Patient is resting in bed with eyes closed, responds to voices, after questions are asked patient returns to sleep. Patient is only oriented to self at this time. Bipap in place 14/7 30%, tolerating well. End expiratory wheezes heard throughout, patient states she is short of breath, no distress noted. Heart rhythm is regular, V paced on the monitor. +1 pitting edema to BLE, denies numbness or tingling. Bowel sounds are active. Perkins in place and draining. Patient repositioned and denies any other needs. Call light within reach and bed alarm on.

## 2024-04-23 NOTE — PROGRESS NOTES
Pt resting in bed quietly with eyes closed. Respirations even and unlabored. Pt wakes to RN opening the door. Pt able to stay awake longer and answer questions. Assessment and vital signs as charted. Pt denies pain at this time. Pt is alert and oriented to self and time. Pt denies any other needs. Call light in reach. Bed alarm on. Will continue to monitor.

## 2024-04-23 NOTE — PROGRESS NOTES
Patients breathing became more labored and tachypenic, patient assisted back to bed and placed on bipap. NP at the beside. New orders.

## 2024-04-23 NOTE — PLAN OF CARE
Problem: Discharge Planning  Goal: Discharge to home or other facility with appropriate resources  Outcome: Progressing  Flowsheets (Taken 4/22/2024 2004)  Discharge to home or other facility with appropriate resources: Arrange for needed discharge resources and transportation as appropriate  Note: Pt is from [facility].      Problem: Safety - Adult  Goal: Free from fall injury  Outcome: Progressing  Flowsheets (Taken 4/22/2024 2004)  Free From Fall Injury: Instruct family/caregiver on patient safety  Note: Pt is at high risk for falls. Non skid socks on. Bed in low position and wheels locked. Fall sign posted and bracelet on. Siderails up x 2. Bed alarm on. Call light within reach. Will continue to assess.     Problem: ABCDS Injury Assessment  Goal: Absence of physical injury  Outcome: Progressing  Flowsheets (Taken 4/22/2024 2004)  Absence of Physical Injury: Implement safety measures based on patient assessment  Note: Pt is at high risk for falls. Non skid socks on. Bed in low position and wheels locked. Fall sign posted and bracelet on. Siderails up x 2. Bed alarm on. Call light within reach. Will continue to assess.     Problem: Pain  Goal: Verbalizes/displays adequate comfort level or baseline comfort level  Outcome: Progressing  Flowsheets (Taken 4/22/2024 2004)  Verbalizes/displays adequate comfort level or baseline comfort level:   Encourage patient to monitor pain and request assistance   Administer analgesics based on type and severity of pain and evaluate response   Assess pain using appropriate pain scale   Implement non-pharmacological measures as appropriate and evaluate response   Notify Licensed Independent Practitioner if interventions unsuccessful or patient reports new pain  Note: FLACC = 0. Pain meds given as needed & as ordered. Will continue to assess.     Problem: Respiratory - Adult  Goal: Achieves optimal ventilation and oxygenation  Outcome: Progressing  Flowsheets (Taken 4/22/2024

## 2024-04-23 NOTE — PROGRESS NOTES
Patient assisted to chair to eat breakfast. Patient placed on 4L nasal cannula, SpO2 is in the low 90s. Patient appears more short of breath while sitting in the chair eating but states she is comfortable. Patient denies needs. Call light within reach and chair alarm on.

## 2024-04-23 NOTE — PLAN OF CARE
Problem: Discharge Planning  Goal: Discharge to home or other facility with appropriate resources  4/23/2024 0703 by Elke Nelson RN  Outcome: Progressing  Flowsheets (Taken 4/22/2024 2004 by Jhoana Toledo RN)  Discharge to home or other facility with appropriate resources: Arrange for needed discharge resources and transportation as appropriate     Problem: Safety - Adult  Goal: Free from fall injury  4/23/2024 0703 by Elke Nelson RN  Outcome: Progressing  Flowsheets (Taken 4/22/2024 2004 by Jhoana Toledo RN)  Free From Fall Injury: Instruct family/caregiver on patient safety     Problem: Pain  Goal: Verbalizes/displays adequate comfort level or baseline comfort level  4/23/2024 0703 by Elke Nelson RN  Outcome: Progressing  Flowsheets (Taken 4/22/2024 2004 by Jhoana Toledo RN)  Verbalizes/displays adequate comfort level or baseline comfort level:   Encourage patient to monitor pain and request assistance   Administer analgesics based on type and severity of pain and evaluate response   Assess pain using appropriate pain scale   Implement non-pharmacological measures as appropriate and evaluate response   Notify Licensed Independent Practitioner if interventions unsuccessful or patient reports new pain     Problem: Respiratory - Adult  Goal: Achieves optimal ventilation and oxygenation  4/23/2024 0703 by Elke Nelson RN  Outcome: Progressing  Flowsheets (Taken 4/22/2024 2004 by Jhoana Toledo RN)  Achieves optimal ventilation and oxygenation:   Assess for changes in respiratory status   Position to facilitate oxygenation and minimize respiratory effort   Assess the need for suctioning and aspirate as needed   Respiratory therapy support as indicated   Assess for changes in mentation and behavior   Oxygen supplementation based on oxygen saturation or arterial blood gases   Encourage broncho-pulmonary hygiene including cough, deep breathe, incentive spirometry   Assess  and instruct to report shortness of breath or any respiratory difficulty     Problem: Cardiovascular - Adult  Goal: Maintains optimal cardiac output and hemodynamic stability  4/23/2024 0703 by Elke Nelson RN  Outcome: Progressing  Flowsheets (Taken 4/22/2024 2004 by Jhoana Toledo RN)  Maintains optimal cardiac output and hemodynamic stability:   Monitor blood pressure and heart rate   Monitor urine output and notify Licensed Independent Practitioner for values outside of normal range     Problem: Cardiovascular - Adult  Goal: Absence of cardiac dysrhythmias or at baseline  4/23/2024 0703 by Elke Nelson RN  Outcome: Progressing  Flowsheets (Taken 4/22/2024 2004 by Jhoana Toledo RN)  Absence of cardiac dysrhythmias or at baseline: Monitor cardiac rate and rhythm     Problem: Skin/Tissue Integrity - Adult  Goal: Skin integrity remains intact  4/23/2024 0703 by Elke Nelson RN  Outcome: Progressing  Flowsheets (Taken 4/22/2024 2004 by Jhoana Toledo RN)  Skin Integrity Remains Intact: Monitor for areas of redness and/or skin breakdown     Problem: Musculoskeletal - Adult  Goal: Return mobility to safest level of function  4/23/2024 0703 by Elke Nelson RN  Outcome: Progressing  Flowsheets (Taken 4/22/2024 2004 by Jhoana Toledo RN)  Return Mobility to Safest Level of Function:   Assess patient stability and activity tolerance for standing, transferring and ambulating with or without assistive devices   Assist with transfers and ambulation using safe patient handling equipment as needed     Problem: Genitourinary - Adult  Goal: Absence of urinary retention  Outcome: Progressing  Flowsheets (Taken 4/23/2024 0703)  Absence of urinary retention: Assess patient’s ability to void and empty bladder     Problem: Infection - Adult  Goal: Absence of infection during hospitalization  4/23/2024 0703 by Elke Nelson RN  Outcome: Progressing  Flowsheets (Taken 4/22/2024 2004 by

## 2024-04-23 NOTE — CARE COORDINATION
Current DME:              Type of Home Care services:  (P) None    ADLS  Prior functional level: (P) Assistance with the following:, Bathing, Dressing, Toileting  Current functional level: (P) Assistance with the following:, Bathing, Dressing, Toileting    PT AM-PAC:   /24  OT AM-PAC:   /24    Family can provide assistance at DC: (P) No (Patient resides at [facility].)  Would you like Case Management to discuss the discharge plan with any other family members/significant others, and if so, who? (P) No (Facility aware patient will return.)  Plans to Return to Present Housing: (P) Yes  Other Identified Issues/Barriers to RETURNING to current housing: none.  Potential Assistance needed at discharge: (P) N/A            Potential DME:    Patient expects to discharge to: (P) Long-term care  Plan for transportation at discharge:      Financial    Payor: MEDICARE / Plan: MEDICARE PART A AND B / Product Type: *No Product type* /     Does insurance require precert for SNF: Yes    Potential assistance Purchasing Medications: (P) No  Meds-to-Beds request:        [facility] [id number] [address] [place] [address] -  [phone] - F [phone]  [address] [place] [address]  Phone: [phone] Fax: [phone]    [facility] [address] [facility] - P [phone] - F [phone]  [address] [place] [address]  [address]  [facility] [address]  Phone: [phone] Fax: [phone]    [facility] [address] [place], [place] - [address] [facility] - P [phone] - F [phone]  [address] [address]  Phone: [phone] Fax: [phone]      Notes:    Factors facilitating achievement of predicted outcomes: Caregiver support, Cooperative.  Barriers to discharge: Cognitive deficit, Decreased endurance, Upper extremity weakness, Lower extremity weakness, Long standing deficits.  Additional Case Management Notes: Patient resides at [facility]. Patient uses wheelchair as she

## 2024-04-23 NOTE — PROGRESS NOTES
Pt sitting up in bed watching TV. Assessment and vital signs as charted. Pt denies pain at this time. Pt is A & O to self and place, disoriented to time and situation. Cardiac monitor shows v paced. IVL in place. Perkins catheter intact, draining and patent. Pt is on nasal canula at 5 L. Continuous pulse ox WNL. Productive cough noted and charted. Pt requesting to use the bedpan at this time. Call light in reach. Bed alarm on. Will continue to monitor.

## 2024-04-23 NOTE — PROGRESS NOTES
YEAST NOT REPORTED 06/21/2021 05:00 PM    BACTERIA TRACE 08/09/2023 11:00 AM    SPECGRAV <1.005 08/09/2023 11:00 AM    LEUKOCYTESUR NEGATIVE 08/09/2023 11:00 AM    UROBILINOGEN Normal 08/09/2023 11:00 AM    BILIRUBINUR NEGATIVE 08/09/2023 11:00 AM    GLUCOSEU 3+ 08/09/2023 11:00 AM    KETUA NEGATIVE 08/09/2023 11:00 AM    AMORPHOUS NOT REPORTED 06/21/2021 05:00 PM       HgBA1c:    Lab Results   Component Value Date/Time    LABA1C 6.1 04/04/2024 06:15 AM       TSH:    Lab Results   Component Value Date/Time    TSH 0.36 04/22/2024 03:54 PM       Lactic Acid:   Lab Results   Component Value Date/Time    LACTA 1.1 04/22/2024 03:05 PM    LACTA 1.9 06/02/2023 09:30 AM    LACTA 1.8 06/21/2021 04:45 PM        High Sensitivity Troponin:  Recent Labs     04/22/24  1425 04/22/24  1554   TROPHS 30* 29*     Pro-BNP:  Lab Results   Component Value Date    PROBNP 19,667 (H) 04/22/2024     D-Dimer:No results found for: \"DDIMER\"  PT/INR:    Lab Results   Component Value Date/Time    PROTIME 17.7 06/02/2023 09:18 AM    INR 1.4 06/02/2023 09:18 AM     PTT:    Lab Results   Component Value Date/Time    APTT 25.2 08/23/2020 03:38 PM       CRP: No results for input(s): \"CRP\" in the last 72 hours.    ABGs:   Lab Results   Component Value Date/Time    PHART 7.405 06/02/2023 03:10 PM    SYF1GWT 56.2 06/02/2023 03:10 PM    PO2ART 65.3 06/02/2023 03:10 PM    FOV9BYB 34.4 06/02/2023 03:10 PM    R7DAMKFH 92.5 06/02/2023 03:10 PM    FIO2 40 04/22/2024 03:27 PM         Radiology/Imaging:  XR CHEST PORTABLE   Final Result   Cardiomegaly with pulmonary venous congestion and perihilar pulmonary   consolidation. Findings are most suggestive of congestive heart failure.               ASSESSMENT / PLAN:     Primary Problem(s): Acute on chronic combined systolic and diastolic CHF, NYHA class 4 (HCC)  Differential diagnoses: Viral illness, pneumonia  Condition is stable  Treatment plan:   NIPPV  Monitor labs replace electrolytes  Telemetry  monitoring  Imaging:   Echo ordered  Last echo from 6/2/2023 showed an EF of 15 to 20%, severe global hypokinesis, biatrial enlargement, moderate to severe mitral regurgitation, moderate tricuspid and pulmonic valve regurgitation, mild pulmonary hypertension with an estimated RVSP of 33 mmHg  Medications:   IV Lasix  Continue metoprolol  Continue empagliflozin  Medication Monitoring / High Risk Medications: none       Enterovirus    Condition is unchanged  Treatment plan:   Viral panel-enterovirus/rhinovirus  Wean oxygen  Acapella  Imaging: no further imaging studies ordered today  Medications:   Start Mucinex DM twice daily  Start DuoNeb 4 times daily    Acute respiratory failure with hypoxia  Condition is unchanged  Treatment plan:   Viral panel-enterovirus/rhinovirus  Wean oxygen  Repeat ABGs  Acapella  Imaging: no further imaging studies ordered today  Medications:   Start DuoNeb 4 times daily  Start morphine 2 mg every 3 hours as needed respiratory distress    Paroxysmal atrial fibrillation  Condition is a chronic stable condition  Treatment plan: Telemetry monitoring  Imaging: no further imaging studies ordered today  Medications:   Continue apixaban  Continue diltiazem  Continue metoprolol  Continue potassium     Type 2 diabetes  Condition is a chronic stable condition  Treatment plan: POC glucose ACHS  Imaging: no further imaging studies ordered today  Medications:   Continue empagliflozin  Continue Humalog  Continue Lantus  Hypoglycemia protocol     Nutrition status:   at risk for malnutrition  Dietician consult initiated     Hospital Prophylaxis:   DVT: Eliquis   Stress Ulcer: H2 Blocker      Disposition:  Shared decision making: All test results, treatment options and disposition options were discussed with the patient today  Social determinants of health that may impact management: none  Code status: DNR-CCA   Disposition: Discharge plan is pending        Critical Care Time:  Total critical care time

## 2024-04-23 NOTE — H&P
History and Physical    Patient:  Michelle Ragland  MRN: [id number]    Chief Complaint: Shortness of breath    History Obtained From:  patient, electronic medical record    PCP: Hal Rider MD    History of Present Illness:   The patient is a 73 y.o. female who presents with shortness of breath.  Patient was transported from [facility] to the ER by EMS.  She had moderate acute respiratory distress on arrival to the emergency department with an SpO2 87% on 5 L of oxygen by nasal cannula.  She was changed to BiPAP shortly after her arrival and tolerated it well.  SpO2 currently 99% on BiPAP with an FiO2 of 30%.  She was given IV Lasix as well as Solu-Medrol IV and nebulizer treatments.  Patient states she has not felt well for several days.  She denies any chest pain, fever, abdominal pain or chills.  She is unsure of any sick exposure.  Past medical history includes CHF, CKD, diabetes, hypertension, hyperlipidemia, pacemaker placement, stroke and atrial fibrillation.  proBNP 19,667, troponin 30 and 29, potassium 5.4, no leukocytosis.  Chest x-ray showed cardiomegaly with pulmonary vascular congestion and perihilar pulmonary validation suggestive of CHF.  EKG showed a ventricular paced rhythm without ectopy.    Past Medical History:        Diagnosis Date    Adjustment disorder with depressed mood 06/15/2022    Arthritis     Atrial fibrillation, unspecified type ([facility]) 11/20/2021    Cerebral artery occlusion with cerebral infarction ([facility])     CHF (congestive heart failure) ([facility])     Chronic combined systolic and diastolic CHF, NYHA class 4 ([facility]) 8/28/2017    Chronic kidney disease 11/20/2021    Stage 3    Diabetes mellitus ([facility])     Dysarthria and anarthria 11/20/2021    Endothelial corneal dystrophy of both eyes 04/21/2022    H/O cardiac catheterization 08/04/2017    LMCA: Mild irregularites 10-20%. LAD: Mild irregularites 10-20%. LCx: Mild irregularites 10-20%. RCA: Mild irregularites 10-20%. LV  severe global hypokinesis, biatrial enlargement, moderate to severe mitral regurgitation, moderate tricuspid and pulmonic valve regurgitation, mild pulmonary hypertension with an estimated RVSP of 33 mmHg  Medications:   IV Lasix  Continue metoprolol  Continue empagliflozin  Medication Monitoring / High Risk Medications: none      Paroxysmal atrial fibrillation  Condition is a chronic stable condition  Treatment plan: Telemetry monitoring  Imaging: no further imaging studies ordered today  Medications:   Continue apixaban  Continue diltiazem  Continue metoprolol  Continue potassium      Type 2 diabetes  Condition is a chronic stable condition  Treatment plan: POC glucose ACHS  Imaging: no further imaging studies ordered today  Medications:   Continue empagliflozin  Continue Humalog  Continue Lantus  Hypoglycemia protocol    Nutrition status:   at risk for malnutrition  Dietician consult initiated    Hospital Prophylaxis:   DVT: Eliquis   Stress Ulcer: H2 Blocker     MDM Data:   Test interpretation:  My independent EKG interpretation: Ventricular paced rhythm  My independent X-ray interpretation: Chest x-ray shows CHF  Management and/or test interpretation discussed with ER MD at time of admission  Consults and Nursing notes were personally reviewed, all current labs and imaging were personally reviewed, tests ordered: CBC, BMP, and history obtained by independent historian       Disposition:  Shared decision making: All test results, treatment options and disposition options were discussed with the patient today  Social determinants of health that may impact management: none  Code status: DNR-CCA   Disposition: Discharge plan is [facility]        Critical Care Time:  Total critical care time caring for this patient with life threatening, unstable organ failure, including direct patient contact, management of life support systems, review of data including imaging and labs, discussions with other team

## 2024-04-23 NOTE — PROGRESS NOTES
Pt resting in bed with eyes closed. Respirations even and unlabored. No distress noted. Pt wakes easily to verbal stimuli. Assessment and vital signs as charted. Pt is alert and oriented to self. Pt denies pain. Cardiac monitor continues to show v paced. Continuous pulse ox remains WNL. BiPAP remains in use at 30 %, 14/7. Pt continues to tolerate BiPAP. Perkins catheter remains intact, draining and patent. Pt falls back to sleep quickly. Bed alarm on. Call light in reach. Side rails up x 2. Will continue to monitor.

## 2024-04-23 NOTE — CONSULTS
Unable to speak to patient Bipap and sleeping no responding    HEART FAILURE:    With heart failure you must follow up with your Cardiologist, your PCP and other physicians as appropriate - especially 7 days after a hospitalization and then as directed.     Please call right away with any signs or symptoms of heart failure or other medical conditions that need attention or with any questions at all. Please call your Cardiologist or your PCP or the Outpatient Heart Failure Clinic at [phone].  We can help manage these symptoms and often prevent a visit to the Emergency Room or hospitalization.       * Know your dry weight (your weight without any fluid on board)    * Call any time you have questions about anything. Do not wait.    * It is very important to take your medications as prescribed, do not miss any doses.   Call for refills before you run out. Typically when you have one week left.   If you have trouble getting your medications for any reason, call us at [phone].    * Avoid NSAIDs (non steroidal anti inflammatory drugs) like Aleve (naproxen), Advil and Motrin (ibuprofen), Mobic (diclofenac), Celebrex (celecoxib) and aspirin. A daily aspirin is okay if recommended by your physician.      It is okay to take Tylenol (acetaminophen) unless your physician has told you otherwise.    * Limit sodium intake.   Typically this is no more than 2,000-2,400 milligrams (mg) per day.   You will need to add up the sodium content found on the nutrition label for the foods you eat each day.    * Weigh yourself every day. Weigh yourself before breakfast and after you go to the restroom.  Wear the same thing each time you weigh yourself.   Keep a log and bring it to each visit.   Call if you gain 3 or more pounds in 1-7 days - [phone]                                      * If you have hypertension (high blood pressure), you may want to check your blood pressure daily. Your blood pressure goal is less than 130/80  unless instructed differently by your physician. Keep a log and bring it to each visit.    * Be sure to keep good control of your Diabetes     * Be sure to keep good control of your Cholesterol    * Eat a Heart healthy diet    * Be active.  Follow an exercise plan that is reasonable for you.    * If you smoke, work to stop smoking. Best to avoid alcohol.     Last echo from 6/2/2023 showed an EF of 15 to 20%, severe global hypokinesis, biatrial enlargement, moderate to severe mitral regurgitation, moderate tricuspid and pulmonic valve regurgitation, mild pulmonary hypertension with an estimated RVSP of 33 mmHg    IV Lasix 40 mEq twice daily (had Bumex 1 mg po daily at home)  Metoprolol succinate (Toprol) 200 mg daily   Empagliflozin (Jardiance) 10 mg daily  Apixaban (Eliquis) 5 mg twice daily  Diltiazem (Cardizem) 240 mg daily  Potassium 20 mEq daily (level is 5.2 today)

## 2024-04-23 NOTE — PROGRESS NOTES
Pt's FSBS = 108. Lantus ordered. Pt not eating at this time. Pt continues to be lethargic. Writer tried to give pt water but pt was not able to drink more than a sip. Oral meds and lantus will not be given this evening. HERBER Manzanares aware.

## 2024-04-23 NOTE — RT PROTOCOL NOTE
RESPIRATORY ASSESSMENT PROTOCOL                                                                                              Patient Name: Michelle Ragland Room#: I305/I305-01 : 1950     Admitting diagnosis: Acute respiratory distress [R06.03]  Acute diastolic CHF (congestive heart failure), NYHA class 3 ([facility]) [I50.31]  Congestive heart failure, unspecified HF chronicity, unspecified heart failure type ([facility]) [I50.9]       Medical History:   Past Medical History:   Diagnosis Date    Adjustment disorder with depressed mood 06/15/2022    Arthritis     Atrial fibrillation, unspecified type ([facility]) 2021    Cerebral artery occlusion with cerebral infarction ([facility])     CHF (congestive heart failure) ([facility])     Chronic combined systolic and diastolic CHF, NYHA class 4 ([facility]) 2017    Chronic kidney disease 2021    Stage 3    Diabetes mellitus ([facility])     Dysarthria and anarthria 2021    Endothelial corneal dystrophy of both eyes 2022    Enterovirus infection 2024    H/O cardiac catheterization 2017    LMCA: Mild irregularites 10-20%. LAD: Mild irregularites 10-20%. LCx: Mild irregularites 10-20%. RCA: Mild irregularites 10-20%. LV function assessed as : Abnormal. EF of 40%.    H/O echocardiogram 2018    EF 55%. Mildly increased LV wall thickness. The left atrium appears moderately to severely dilated. Moderate to severe mitral regurg. Moderate pulmonary HTN with an estimated RV systolic pressure of 44mmHg. Moderate tricuspid regurg. Evidnece fo moderate diastolic dysfunction is seen.     Hearing loss 2022    History of echocardiogram 2019    EF 55%. LV wall thickness moderately increased. LA is mildly dilated w/ LA volume index of 30. trivial mitral regurg. Evidence of moderate (grade II) diastolic dysfunction seen.    History of echocardiogram 2019    EF of 50-55%. LV wall thickness is mildly increased. LA is mildly dilated (29-33) with a left atrial volume  index of 31 m1/m2. mild diastolic dysfunction.    Hyperlipidemia     Hypertension     Major depressive disorder 09/28/2022    Prolonged QT interval 08/10/2023       PATIENT ASSESSMENT    LABORATORY DATA  Hematology:   Lab Results   Component Value Date/Time    WBC 4.9 04/23/2024 05:10 AM    RBC 4.08 04/23/2024 05:10 AM    HGB 9.7 04/23/2024 05:10 AM    HCT 33.4 04/23/2024 05:10 AM     04/23/2024 05:10 AM     Chemistry:    Lab Results   Component Value Date/Time    PHART 7.435 04/23/2024 09:34 AM    GIV8WOZ 46.0 04/23/2024 09:34 AM    PO2ART 56.5 04/23/2024 09:34 AM    R3VVWDMS 90.1 04/23/2024 09:34 AM    AFT1NKB 30.2 04/23/2024 09:34 AM    PBEA 5.1 04/23/2024 09:34 AM       VITALS  Pulse: 80   Respirations: 20  BP: 123/71  SpO2: 94 % O2 Device: PAP (positive airway pressure)  Temp: 97 °F (36.1 °C)    SKIN COLOR  [x] Normal  [] Pale  [] Dusky  [] Cyanotic    RESPIRATORY PATTERN  [] Normal  [x] Dyspnea  [] Cheyne-Rodriguez  [] Kussmaul  [] Biots    AMBULATORY  [] Yes  [x] No  [] With Assistance      Patient Acuity 0 1 2 3 4 Score   Level of Consciousness (LOC) [x]  Alert & Oriented or Pt normal LOC []  Confused;follows directions []  Confused & uncooper-ative []  Obtunded []  Comatose 0   Respiratory Rate  (RR) []  Reg. rate & pattern. 12 - 20 bpm  []  Increased RR. Greater than 20 bpm   [x]  SOB w/ exertion or RR greater than 24 bpm []  Access- ory muscle use at rest. Abn.  resp. []  SOB at rest.   2   Bilateral Breath Sounds (BBS) []  Clear []  Diminish-ed bases  [x]  Diminish-ed t/o, or rales   []  Sporadic, scattered wheezes or rhonchi []  Persistentwheezes and, or absent BBS 2   Cough [x]  Strong, effective, & non-prod. []  Effective & prod. Less than 25 ml (2 TBSP) over past 24 hrs []  Ineffective & non-prod to less than 25 ML over past 24 hrs []  Ineffective and, or greater than 25 ml sputum prod. past 24 hrs. []  Nonspon- taneous; Requires suctioning 0   Pulmonary History  (PULM HX) [x]  No smoking and no

## 2024-04-23 NOTE — PROGRESS NOTES
Comprehensive Nutrition Assessment    Type and Reason for Visit:  Initial, Consult, Patient Education    Nutrition Recommendations/Plan:   Encourage lean protein foods  MAGGIE/NCS diet for ECF     Malnutrition Assessment:  Malnutrition Status:  At risk for malnutrition (Comment) (04/23/24 7445)    Context:  Acute Illness     Findings of the 6 clinical characteristics of malnutrition:  Energy Intake:  Mild decrease in energy intake (Comment)  Weight Loss:  No significant weight loss     Body Fat Loss:  No significant body fat loss     Muscle Mass Loss:  No significant muscle mass loss    Fluid Accumulation:  Mild Extremities (and face)   Strength:  Not Performed    Nutrition Assessment:    Altered nutrition related labs r/t endocrine dysfunction, AEB glycemic range of  mg/dl pta at ECF.  Improved A1C from prior hospitalization but hypoglycemia likely skewing downward. Elevated K+ on admission, improving (on KCl pta with potassium losing diuretics).  On a \"regular\" diet at ECF and would benefit from AT LEAST MAGGIE/NCS to aid management of chronic conditions.  On bipap currently and will attach low sodium eduation materials to d/c instructions. Recommend lean proteins for fluid management.    Nutrition Related Findings:    + 1 BLE edema. puffy face. Wound Type: None       Current Nutrition Intake & Therapies:    Average Meal Intake: Unable to assess (no PO records)  Average Supplements Intake: None Ordered  ADULT DIET; Regular; 3 carb choices (45 gm/meal); No Added Salt (3-4 gm); 2000 ml    Anthropometric Measures:  Height: 157.5 cm (5' 2\")  Ideal Body Weight (IBW): 110 lbs (50 kg)    Admission Body Weight: 84.7 kg (186 lb 11.7 oz)  Current Body Weight: 82.5 kg (181 lb 14.1 oz), 165.3 % IBW. Weight Source: Bed Scale  Current BMI (kg/m2): 33.3  Usual Body Weight: 78.8 kg (173 lb 11.6 oz) (low in February)  % Weight Change (Calculated): 4.7  Weight Adjustment For: No Adjustment                 BMI Categories: Obese

## 2024-04-23 NOTE — PROGRESS NOTES
[facility]  Inpatient/Observation/Outpatient Rehabilitation    Date: 2024  Patient Name: Michelle Ragland       [x] Inpatient Acute/Observation       []  Outpatient  : 1950     [] Pt no showed for scheduled appointment    [x] Pt refused/declined therapy at this time due to:  OT eval attempted x 3. Getting echo on first attempt, declined d/t eating lunch on second attempt, and RN requested to hold eval on third attempt.     [] Pt cancelled due to:  [] No Reason Given   [] Sick/ill   [] Other:    [] Evaluation held by RN/Provider due to:    [] High Heart Rate   [] High Blood Pressure   [] Orthopedic Consult   [] Hgb < 7   [] Other:    [] Pt does not require skilled services due to:      Therapist/Assistant will attempt to see this patient, at our earliest opportunity.       Tara Branch, OT Date: 2024

## 2024-04-23 NOTE — PROGRESS NOTES
[facility]  Inpatient/Observation/Outpatient Rehabilitation    Date: 2024  Patient Name: Michelle Ragland       [x] Inpatient Acute/Observation       []  Outpatient  : 1950     Plan of Care/Recert ends    [] Pt no showed for scheduled appointment    [x] Pt refused/declined therapy at this time due to: Spoke with RN, pt. currently on Bipap and resting at this time d/t being tired and requested to let pt. rest at this time.      [] Pt cancelled due to:  [] No Reason Given   [] Sick/ill   [] Other:    [] Evaluation held by RN/Provider due to:    [] High Heart Rate   [] High Blood Pressure   [] Orthopedic Consult   [] Hgb < 7   [] Other:    [] Pt does not require skilled services due to:      Therapist/Assistant will attempt to see this patient, at our earliest opportunity.       Lindsay Agrawal, PTA Date: 2024

## 2024-04-23 NOTE — PROGRESS NOTES
Patient is resting in bed with her eyes closed. SpO2 is 87% while on bipap. FiO2 increased to 40%, SpO2 improved to 91%. Respiratory notified of the increase.

## 2024-04-23 NOTE — PROGRESS NOTES
Assessment/Plan:    BMI Counseling: There is no height or weight on file to calculate BMI  The BMI is above normal  Nutrition recommendations include decreasing portion sizes, encouraging healthy choices of fruits and vegetables and decreasing fast food intake  Exercise recommendations include moderate physical activity 150 minutes/week  1  Uncontrolled type 2 diabetes mellitus with hyperglycemia ([address] )    2  Essential hypertension    3  Mixed hyperlipidemia    4  Dysthymic disorder    5  Body mass index 36 0-36 9, adult    6  Special screening for malignant neoplasms, colon  -     Occult Blood, Fecal Immunochemical; Future    7  Need for influenza vaccination  -     FLUAD: influenza vaccine, trivalent, adjuvanted, 0 5 mL           Subjective:      Patient ID: Ruth Ramsey is a 74 y o  male  Follow-up on blood test done on 09/09/2020 -discussed with him      The following portions of the patient's history were reviewed and updated as appropriate: He  has a past medical history of Acquired hypothyroidism, Anxiety, Anxiety disorder, Cigarette smoker, Diabetes mellitus ([address] ), Essential hypertension, benign, Hyperlipidemia, Hypertension, and Type II diabetes mellitus, uncontrolled ([facility] )  He   Patient Active Problem List    Diagnosis Date Noted    Dysthymic disorder 09/16/2020    Body mass index 36 0-36 9, adult 09/16/2020    Hypertension     Hyperlipidemia     Essential hypertension, benign     Type II diabetes mellitus, uncontrolled ([facility] )     Anxiety disorder      He  has a past surgical history that includes Kidney stone surgery and Cardiac catheterization  His family history includes Heart disease in his father  He  reports that he quit smoking about 2 years ago  He has a 2 50 pack-year smoking history  He has never used smokeless tobacco  He reports current alcohol use of about 1 0 standard drinks of alcohol per week  He reports that he does not use drugs    Current Outpatient Medications Physical Therapy  Facility/Department: [facility] ICU  Physical Therapy Initial Assessment    Name: Michelle Ragland  : 1950  MRN: [id number]  Date of Service: 2024    Discharge Recommendations:  Continue to assess pending progress, Subacute/Skilled Nursing Facility, Home with Home health PT   PT Equipment Recommendations  Equipment Needed: No      Patient Diagnosis(es): The primary encounter diagnosis was Acute respiratory distress. Diagnoses of Congestive heart failure, unspecified HF chronicity, unspecified heart failure type (HCC) and Acute systolic CHF (congestive heart failure), NYHA class 3 (HCC) were also pertinent to this visit.  Past Medical History:  has a past medical history of Adjustment disorder with depressed mood, Arthritis, Atrial fibrillation, unspecified type (HCC), Cerebral artery occlusion with cerebral infarction (HCC), CHF (congestive heart failure) (HCC), Chronic combined systolic and diastolic CHF, NYHA class 4 (HCC), Chronic kidney disease, Diabetes mellitus (HCC), Dysarthria and anarthria, Endothelial corneal dystrophy of both eyes, Enterovirus infection, H/O cardiac catheterization, H/O echocardiogram, Hearing loss, History of echocardiogram, History of echocardiogram, Hyperlipidemia, Hypertension, Major depressive disorder, and Prolonged QT interval.  Past Surgical History:  has a past surgical history that includes Cholecystectomy; Cardiac catheterization (Left, 2017); and pacemaker placement (Left, 2023).    Assessment   Body Structures, Functions, Activity Limitations Requiring Skilled Therapeutic Intervention: Decreased functional mobility ;Decreased ADL status;Decreased strength;Decreased endurance;Decreased balance;Decreased high-level IADLs;Decreased posture  Assessment: The patient is a 73 y.o. female who was admitted due to acute respiratory distress.  She demonstrates LE weakness, decreased activity endurance, and impaired balance.  She would benefit from  Medication Sig Dispense Refill    ALPRAZolam (XANAX) 0 5 mg tablet Take 0 5 mg by mouth daily at bedtime as needed for anxiety      APPLE CIDER VINEGAR PO Take by mouth 2 daily      aspirin (ECOTRIN LOW STRENGTH) 81 mg EC tablet Take 81 mg by mouth daily      CINNAMON PO Take by mouth      fluticasone (FLONASE) 50 mcg/act nasal spray 1 spray into each nostril daily 16 g 0    lisinopril (ZESTRIL) 5 mg tablet TAKE 1 TABLET BY MOUTH EVERY DAY 90 tablet 0    metFORMIN (GLUCOPHAGE) 1000 MG tablet TAKE 1 TABLET BY MOUTH TWICE A DAY (Patient taking differently: 1,000 mg 2 (two) times a day with meals ) 180 tablet 0    Multiple Minerals-Vitamins (Mannie-Mag-Zinc-D) TABS Take by mouth 3 daily      patient supplied medication Multi-Betic vitamin      simvastatin (ZOCOR) 20 mg tablet Take 20 mg by mouth daily at bedtime      VITAMIN D PO Take 10,000 Units by mouth      amLODIPine-valsartan (EXFORGE) 5-320 MG per tablet Take 1 tablet by mouth daily      Linagliptin (TRADJENTA PO) Take 5 mg by mouth daily       No current facility-administered medications for this visit        Current Outpatient Medications on File Prior to Visit   Medication Sig    ALPRAZolam (XANAX) 0 5 mg tablet Take 0 5 mg by mouth daily at bedtime as needed for anxiety    APPLE CIDER VINEGAR PO Take by mouth 2 daily    aspirin (ECOTRIN LOW STRENGTH) 81 mg EC tablet Take 81 mg by mouth daily    CINNAMON PO Take by mouth    fluticasone (FLONASE) 50 mcg/act nasal spray 1 spray into each nostril daily    lisinopril (ZESTRIL) 5 mg tablet TAKE 1 TABLET BY MOUTH EVERY DAY    metFORMIN (GLUCOPHAGE) 1000 MG tablet TAKE 1 TABLET BY MOUTH TWICE A DAY (Patient taking differently: 1,000 mg 2 (two) times a day with meals )    Multiple Minerals-Vitamins (Mannie-Mag-Zinc-D) TABS Take by mouth 3 daily    patient supplied medication Multi-Betic vitamin    simvastatin (ZOCOR) 20 mg tablet Take 20 mg by mouth daily at bedtime    VITAMIN D PO Take 10,000 Units by mouth    amLODIPine-valsartan (EXFORGE) 5-320 MG per tablet Take 1 tablet by mouth daily    Linagliptin (TRADJENTA PO) Take 5 mg by mouth daily     No current facility-administered medications on file prior to visit  He has No Known Allergies       Review of Systems   Constitutional: Negative for chills and fever  HENT: Negative for congestion, ear pain and sore throat  Eyes: Negative for pain  Respiratory: Positive for cough  Negative for shortness of breath  Cardiovascular: Negative for chest pain and leg swelling  Gastrointestinal: Negative for abdominal pain, nausea and vomiting  Endocrine: Negative for polyuria  Genitourinary: Negative for difficulty urinating, frequency and urgency  Musculoskeletal: Negative for arthralgias and back pain  Skin: Negative for rash  Neurological: Negative for weakness and headaches  Psychiatric/Behavioral: Negative for sleep disturbance  The patient is not nervous/anxious  Objective:      /83 (BP Location: Left arm, Patient Position: Sitting, Cuff Size: Large)   Pulse 84   Temp 97 9 °F (36 6 °C) (Skin)   Ht 6' (1 829 m)   Wt 121 kg (267 lb)   BMI 36 21 kg/m²     Recent Results (from the past 1344 hour(s))   Lipid panel    Collection Time: 09/09/20  8:50 AM   Result Value Ref Range    Total Cholesterol 109 <200 mg/dL    HDL 42 > OR = 40 mg/dL    Triglycerides 94 <150 mg/dL    LDL Calculated 49 mg/dL (calc)    Chol HDLC Ratio 2 6 <5 0 (calc)    Non-HDL Cholesterol 67 <130 mg/dL (calc)   Microalbumin, Random Urine (W/Creatinine)    Collection Time: 09/09/20  8:50 AM   Result Value Ref Range    Creatinine, Urine 148 20 - 320 mg/dL    Microalbum  ,U,Random 1 1 See Note: mg/dL    Microalb/Creat Ratio 7 <30 mcg/mg creat   Comprehensive metabolic panel    Collection Time: 09/09/20  8:50 AM   Result Value Ref Range    Glucose, Random 190 (H) 65 - 99 mg/dL    BUN 18 7 - 25 mg/dL    Creatinine 0 93 0 70 - 1 25 mg/dL    eGFR Non  86 > OR = 60 mL/min/1 73m2    eGFR  99 > OR = 60 mL/min/1 73m2    SL AMB BUN/CREATININE RATIO NOT APPLICABLE 6 - 22 (calc)    Sodium 137 135 - 146 mmol/L    Potassium 4 5 3 5 - 5 3 mmol/L    Chloride 104 98 - 110 mmol/L    CO2 25 20 - 32 mmol/L    Calcium 9 1 8 6 - 10 3 mg/dL    Protein, Total 6 2 6 1 - 8 1 g/dL    Albumin 4 3 3 6 - 5 1 g/dL    Globulin 1 9 1 9 - 3 7 g/dL (calc)    Albumin/Globulin Ratio 2 3 1 0 - 2 5 (calc)    TOTAL BILIRUBIN 1 2 0 2 - 1 2 mg/dL    Alkaline Phosphatase 35 35 - 144 U/L    AST 23 10 - 35 U/L    ALT 30 9 - 46 U/L   CBC and differential    Collection Time: 09/09/20  8:50 AM   Result Value Ref Range    White Blood Cell Count 12 3 (H) 3 8 - 10 8 Thousand/uL    Red Blood Cell Count 4 01 (L) 4 20 - 5 80 Million/uL    Hemoglobin 13 4 13 2 - 17 1 g/dL    HCT 40 5 38 5 - 50 0 %     0 (H) 80 0 - 100 0 fL    MCH 33 4 (H) 27 0 - 33 0 pg    MCHC 33 1 32 0 - 36 0 g/dL    RDW 13 6 11 0 - 15 0 %    Platelet Count 540 738 - 400 Thousand/uL    SL AMB MPV 10 0 7 5 - 12 5 fL    Neutrophils (Absolute) 6,494 1,500 - 7,800 cells/uL    Lymphocytes (Absolute) 4,133 (H) 850 - 3,900 cells/uL    Monocytes (Absolute) 1,193 (H) 200 - 950 cells/uL    Eosinophils (Absolute) 406 15 - 500 cells/uL    Basophils ABS 74 0 - 200 cells/uL    Neutrophils 52 8 %    Lymphocytes 33 6 %    Monocytes 9 7 %    Eosinophils 3 3 %    Basophils PCT 0 6 %   TSH, 3rd generation    Collection Time: 09/09/20  8:50 AM   Result Value Ref Range    TSH 1 61 0 40 - 4 50 mIU/L   Hemoglobin A1c (w/out EAG)    Collection Time: 09/09/20  8:50 AM   Result Value Ref Range    Hemoglobin A1C 4 8 <5 7 % of total Hgb        Physical Exam  Constitutional:       Appearance: Normal appearance  HENT:      Head: Normocephalic  Right Ear: Tympanic membrane, ear canal and external ear normal       Left Ear: Tympanic membrane, ear canal and external ear normal       Nose: Nose normal  No congestion  Mouth/Throat:      Mouth: Mucous membranes are moist       Pharynx: Oropharynx is clear  No oropharyngeal exudate or posterior oropharyngeal erythema  Eyes:      Extraocular Movements: Extraocular movements intact  Conjunctiva/sclera: Conjunctivae normal       Pupils: Pupils are equal, round, and reactive to light  Neck:      Musculoskeletal: Normal range of motion and neck supple  Cardiovascular:      Rate and Rhythm: Normal rate and regular rhythm  Heart sounds: Normal heart sounds  No murmur  Pulmonary:      Effort: Pulmonary effort is normal       Breath sounds: Normal breath sounds  No wheezing or rales  Abdominal:      General: Bowel sounds are normal  There is no distension  Palpations: Abdomen is soft  Tenderness: There is no abdominal tenderness  Musculoskeletal: Normal range of motion  Right lower leg: No edema  Left lower leg: No edema  Lymphadenopathy:      Cervical: No cervical adenopathy  Skin:     General: Skin is warm  Neurological:      General: No focal deficit present  Mental Status: He is alert and oriented to person, place, and time

## 2024-04-24 LAB
ANION GAP SERPL CALCULATED.3IONS-SCNC: 8 MMOL/L (ref 9–17)
BASOPHILS # BLD: <0.03 K/UL (ref 0–0.2)
BASOPHILS NFR BLD: 0 % (ref 0–2)
BUN SERPL-MCNC: 28 MG/DL (ref 8–23)
BUN/CREAT SERPL: 18 (ref 9–20)
CALCIUM SERPL-MCNC: 10.2 MG/DL (ref 8.6–10.4)
CHLORIDE SERPL-SCNC: 100 MMOL/L (ref 98–107)
CO2 SERPL-SCNC: 34 MMOL/L (ref 20–31)
CREAT SERPL-MCNC: 1.6 MG/DL (ref 0.5–0.9)
EOSINOPHIL # BLD: 0.07 K/UL (ref 0–0.44)
EOSINOPHILS RELATIVE PERCENT: 1 % (ref 1–4)
ERYTHROCYTE [DISTWIDTH] IN BLOOD BY AUTOMATED COUNT: 19.2 % (ref 11.8–14.4)
GFR SERPL CREATININE-BSD FRML MDRD: 34 ML/MIN/1.73M2
GLUCOSE BLD-MCNC: 139 MG/DL (ref 74–100)
GLUCOSE BLD-MCNC: 144 MG/DL (ref 74–100)
GLUCOSE BLD-MCNC: 152 MG/DL (ref 74–100)
GLUCOSE BLD-MCNC: 165 MG/DL (ref 74–100)
GLUCOSE SERPL-MCNC: 140 MG/DL (ref 70–99)
HCT VFR BLD AUTO: 31.1 % (ref 36.3–47.1)
HGB BLD-MCNC: 9.2 G/DL (ref 11.9–15.1)
IMM GRANULOCYTES # BLD AUTO: <0.03 K/UL (ref 0–0.3)
IMM GRANULOCYTES NFR BLD: 0 %
LYMPHOCYTES NFR BLD: 0.58 K/UL (ref 1.1–3.7)
LYMPHOCYTES RELATIVE PERCENT: 11 % (ref 24–43)
MAGNESIUM SERPL-MCNC: 1.8 MG/DL (ref 1.6–2.6)
MCH RBC QN AUTO: 23.8 PG (ref 25.2–33.5)
MCHC RBC AUTO-ENTMCNC: 29.6 G/DL (ref 28.4–34.8)
MCV RBC AUTO: 80.6 FL (ref 82.6–102.9)
MONOCYTES NFR BLD: 0.93 K/UL (ref 0.1–1.2)
MONOCYTES NFR BLD: 17 % (ref 3–12)
NEUTROPHILS NFR BLD: 71 % (ref 36–65)
NEUTS SEG NFR BLD: 3.79 K/UL (ref 1.5–8.1)
NRBC BLD-RTO: 0 PER 100 WBC
PLATELET # BLD AUTO: 151 K/UL (ref 138–453)
PMV BLD AUTO: 10.8 FL (ref 8.1–13.5)
POTASSIUM SERPL-SCNC: 4.5 MMOL/L (ref 3.7–5.3)
RBC # BLD AUTO: 3.86 M/UL (ref 3.95–5.11)
SODIUM SERPL-SCNC: 142 MMOL/L (ref 135–144)
WBC OTHER # BLD: 5.4 K/UL (ref 3.5–11.3)

## 2024-04-24 PROCEDURE — 85025 COMPLETE CBC W/AUTO DIFF WBC: CPT

## 2024-04-24 PROCEDURE — 83735 ASSAY OF MAGNESIUM: CPT

## 2024-04-24 PROCEDURE — 6360000002 HC RX W HCPCS: Performed by: INTERNAL MEDICINE

## 2024-04-24 PROCEDURE — 2700000000 HC OXYGEN THERAPY PER DAY

## 2024-04-24 PROCEDURE — 36415 COLL VENOUS BLD VENIPUNCTURE: CPT

## 2024-04-24 PROCEDURE — 6370000000 HC RX 637 (ALT 250 FOR IP): Performed by: INTERNAL MEDICINE

## 2024-04-24 PROCEDURE — 94660 CPAP INITIATION&MGMT: CPT

## 2024-04-24 PROCEDURE — 97110 THERAPEUTIC EXERCISES: CPT

## 2024-04-24 PROCEDURE — 80048 BASIC METABOLIC PNL TOTAL CA: CPT

## 2024-04-24 PROCEDURE — 97166 OT EVAL MOD COMPLEX 45 MIN: CPT

## 2024-04-24 PROCEDURE — 2580000003 HC RX 258: Performed by: INTERNAL MEDICINE

## 2024-04-24 PROCEDURE — 82947 ASSAY GLUCOSE BLOOD QUANT: CPT

## 2024-04-24 PROCEDURE — 6370000000 HC RX 637 (ALT 250 FOR IP): Performed by: NURSE PRACTITIONER

## 2024-04-24 PROCEDURE — 94761 N-INVAS EAR/PLS OXIMETRY MLT: CPT

## 2024-04-24 PROCEDURE — 94640 AIRWAY INHALATION TREATMENT: CPT

## 2024-04-24 PROCEDURE — 2000000000 HC ICU R&B

## 2024-04-24 PROCEDURE — 94664 DEMO&/EVAL PT USE INHALER: CPT

## 2024-04-24 PROCEDURE — 51702 INSERT TEMP BLADDER CATH: CPT

## 2024-04-24 PROCEDURE — 94669 MECHANICAL CHEST WALL OSCILL: CPT

## 2024-04-24 RX ORDER — IPRATROPIUM BROMIDE AND ALBUTEROL SULFATE 2.5; .5 MG/3ML; MG/3ML
1 SOLUTION RESPIRATORY (INHALATION)
Status: DISCONTINUED | OUTPATIENT
Start: 2024-04-25 | End: 2024-04-27 | Stop reason: HOSPADM

## 2024-04-24 RX ADMIN — DILTIAZEM HYDROCHLORIDE 240 MG: 240 CAPSULE, COATED, EXTENDED RELEASE ORAL at 08:25

## 2024-04-24 RX ADMIN — ACETAMINOPHEN 650 MG: 325 TABLET ORAL at 23:38

## 2024-04-24 RX ADMIN — INSULIN GLARGINE 30 UNITS: 100 INJECTION, SOLUTION SUBCUTANEOUS at 20:13

## 2024-04-24 RX ADMIN — GUAIFENESIN, DEXTROMETHORPHAN HBR 1 TABLET: 600; 30 TABLET ORAL at 08:25

## 2024-04-24 RX ADMIN — ALBUTEROL SULFATE 2.5 MG: 2.5 SOLUTION RESPIRATORY (INHALATION) at 21:13

## 2024-04-24 RX ADMIN — APIXABAN 5 MG: 5 TABLET, FILM COATED ORAL at 08:26

## 2024-04-24 RX ADMIN — IPRATROPIUM BROMIDE AND ALBUTEROL SULFATE 1 DOSE: 2.5; .5 SOLUTION RESPIRATORY (INHALATION) at 16:15

## 2024-04-24 RX ADMIN — GUAIFENESIN, DEXTROMETHORPHAN HBR 1 TABLET: 600; 30 TABLET ORAL at 20:14

## 2024-04-24 RX ADMIN — GABAPENTIN 400 MG: 400 CAPSULE ORAL at 20:14

## 2024-04-24 RX ADMIN — APIXABAN 5 MG: 5 TABLET, FILM COATED ORAL at 20:14

## 2024-04-24 RX ADMIN — POTASSIUM CHLORIDE 20 MEQ: 750 TABLET, EXTENDED RELEASE ORAL at 08:26

## 2024-04-24 RX ADMIN — SODIUM CHLORIDE, PRESERVATIVE FREE 10 ML: 5 INJECTION INTRAVENOUS at 20:13

## 2024-04-24 RX ADMIN — EMPAGLIFLOZIN 10 MG: 10 TABLET, FILM COATED ORAL at 08:26

## 2024-04-24 RX ADMIN — FUROSEMIDE 40 MG: 10 INJECTION, SOLUTION INTRAMUSCULAR; INTRAVENOUS at 08:26

## 2024-04-24 RX ADMIN — GABAPENTIN 400 MG: 400 CAPSULE ORAL at 08:25

## 2024-04-24 RX ADMIN — ATORVASTATIN CALCIUM 80 MG: 40 TABLET, FILM COATED ORAL at 08:25

## 2024-04-24 RX ADMIN — METOPROLOL SUCCINATE 200 MG: 100 TABLET, EXTENDED RELEASE ORAL at 08:26

## 2024-04-24 RX ADMIN — FAMOTIDINE 20 MG: 20 TABLET, FILM COATED ORAL at 08:26

## 2024-04-24 RX ADMIN — FUROSEMIDE 40 MG: 10 INJECTION, SOLUTION INTRAMUSCULAR; INTRAVENOUS at 17:16

## 2024-04-24 RX ADMIN — SODIUM CHLORIDE, PRESERVATIVE FREE 10 ML: 5 INJECTION INTRAVENOUS at 08:26

## 2024-04-24 RX ADMIN — IPRATROPIUM BROMIDE AND ALBUTEROL SULFATE 1 DOSE: 2.5; .5 SOLUTION RESPIRATORY (INHALATION) at 10:18

## 2024-04-24 RX ADMIN — SERTRALINE 100 MG: 100 TABLET, FILM COATED ORAL at 20:14

## 2024-04-24 ASSESSMENT — PAIN DESCRIPTION - DESCRIPTORS: DESCRIPTORS: ACHING

## 2024-04-24 ASSESSMENT — PAIN - FUNCTIONAL ASSESSMENT: PAIN_FUNCTIONAL_ASSESSMENT: ACTIVITIES ARE NOT PREVENTED

## 2024-04-24 ASSESSMENT — PAIN DESCRIPTION - ORIENTATION: ORIENTATION: LEFT

## 2024-04-24 ASSESSMENT — PAIN DESCRIPTION - LOCATION: LOCATION: SHOULDER;NECK

## 2024-04-24 NOTE — ACP (ADVANCE CARE PLANNING)
Advance Care Planning     Palliative Team Advance Care Planning (ACP) Conversation    Date of Conversation: 04/24/24    Individuals present for the conversation: Patient with decision making capacity     ACP documents on file prior to discussion:  -Power of  for Healthcare  -Portable DNR    Previously completed document/s not on file: N/A    Healthcare Decision Maker:    Primary Decision Maker: RubenDodie - Child - [phone]    Secondary Decision Maker: Urbano Miranda - Child - [phone]    Supplemental (Other) Decision Maker: Raquel Orellana - Brother/Sister - [phone]     Conversation Summary:      Resuscitation Status:   Code Status: DNR-CCA     Documentation Completed:  -No new documents completed.    I spent 20 minutes with the patient and/or surrogate decision maker discussing the patient's wishes and goals.      Elisa Duong RN

## 2024-04-24 NOTE — PROGRESS NOTES
Pt resting in bed quietly with eyes closed. Respirations even and unlabored. No distress noted. Pt wakes easily to verbal stimuli. Assessment and vital signs as charted. Pt denies pain. Pt continues to wear BiPAP and is tolerating it well. Perkins catheter remains intact, draining and patent. A drink of water was given to pt and chapstick applied. Pt wants to lay on her back and refuses to reposition at this time. Call light in reach. Bed alarm on. Will continue to monitor.

## 2024-04-24 NOTE — PROGRESS NOTES
Physical Therapy  Facility/Department: [facility] ICU  Daily Treatment Note  NAME: Michelle Ragland  : 1950  MRN: [id number]    Date of Service: 2024    Discharge Recommendations:  Continue to assess pending progress, Subacute/Skilled Nursing Facility, Home with Home health PT     Patient Diagnosis(es): The primary encounter diagnosis was Acute respiratory distress. Diagnoses of Congestive heart failure, unspecified HF chronicity, unspecified heart failure type (HCC) and Acute systolic CHF (congestive heart failure), NYHA class 3 (HCC) were also pertinent to this visit.    Assessment   Assessment: Pt. in chair upon arrival, agreeable to therex at this time. Pt. completed reclined seated therex BLE x15 in all available planes of motion. AAROM as needed to achieve full ROM. SpO2 ranged from 89%-91% throughout treatment. RB needed throughout d/t fatigue.  Activity Tolerance: Patient tolerated evaluation without incident     Plan    Physical Therapy Plan  General Plan: 2 times a day 7 days a week  Specific Instructions for Next Treatment: 1/x daily on weekends  Current Treatment Recommendations: Strengthening;ROM;Balance training;Functional mobility training;Endurance training;IADL training;ADL/Self-care training;Transfer training;Gait training;Stair training;Neuromuscular re-education;Manual;Return to work related activity;Home exercise program;Safety education & training;Patient/Caregiver education & training;Positioning;Therapeutic activities     Restrictions  Restrictions/Precautions  Restrictions/Precautions: General Precautions, Isolation, Fall Risk  Required Braces or Orthoses?: No     Subjective    Subjective  Subjective: Pt in chair upon therapist arrival, tired but agreeable to therapy  Pain: 7/10 R leg pain  Orientation  Overall Orientation Status: Within Functional Limits     Objective   Bed Mobility Training  Bed Mobility Training: No  Transfer Training  Transfer Training: No  Gait  Gait Training: No  PT

## 2024-04-24 NOTE — PROGRESS NOTES
FSBS = 139. Lantus 30 units not given this evening. Pt has not eaten much today and is not staying awake tonight to eat a snack. HERBER Manzanares aware.

## 2024-04-24 NOTE — PLAN OF CARE
Problem: Discharge Planning  Goal: Discharge to home or other facility with appropriate resources  4/24/2024 1853 by Skylar Puga RN  Outcome: Progressing  Flowsheets (Taken 4/24/2024 1853)  Discharge to home or other facility with appropriate resources:   Identify barriers to discharge with patient and caregiver   Refer to discharge planning if patient needs post-hospital services based on physician order or complex needs related to functional status, cognitive ability or social support system     Problem: Safety - Adult  Goal: Free from fall injury  4/24/2024 1853 by Skylar Puga RN  Outcome: Progressing  Flowsheets (Taken 4/23/2024 1930 by Jhoana Toledo, RN)  Free From Fall Injury: Instruct family/caregiver on patient safety     Problem: ABCDS Injury Assessment  Goal: Absence of physical injury  Outcome: Progressing  Flowsheets (Taken 4/22/2024 2004 by Jhoana Toledo, RN)  Absence of Physical Injury: Implement safety measures based on patient assessment     Problem: Pain  Goal: Verbalizes/displays adequate comfort level or baseline comfort level  4/24/2024 1853 by Skylar Puga RN  Outcome: Progressing  Flowsheets (Taken 4/24/2024 1853)  Verbalizes/displays adequate comfort level or baseline comfort level:   Encourage patient to monitor pain and request assistance   Assess pain using appropriate pain scale   Implement non-pharmacological measures as appropriate and evaluate response     Problem: Cardiovascular - Adult  Goal: Maintains optimal cardiac output and hemodynamic stability  4/24/2024 1853 by Skylar Puga RN  Outcome: Progressing  Flowsheets (Taken 4/24/2024 1853)  Maintains optimal cardiac output and hemodynamic stability:   Monitor blood pressure and heart rate   Monitor urine output and notify Licensed Independent Practitioner for values outside of normal range     Problem: Cardiovascular - Adult  Goal: Absence of cardiac dysrhythmias or at

## 2024-04-24 NOTE — CONSULTS
Palliative Care Inpatient Consult    NAME:  Michelle Ragland  MEDICAL RECORD NUMBER:  [id number]  AGE: 73 y.o.   GENDER: female  : 1950  TODAY'S DATE:  2024    Reason for Consult:  goals of care    History of Present Illness     The patient is a 73 y.o.  Non- / non  female who presents with Shortness of Breath      Referred to Palliative Care by  [] Physician   [] Nursing  [] Family Request   [] Other:      She was admitted to the hospitalist service for Acute respiratory distress [R06.03]  Acute diastolic CHF (congestive heart failure), NYHA class 3 ([facility]) [I50.31]  Congestive heart failure, unspecified HF chronicity, unspecified heart failure type (HCC) [I50.9]. The patient has a complicated medical history and has been hospitalized since 2024  2:31 PM.    Active Hospital Problems    Diagnosis Date Noted    Enterovirus infection [B34.1] 2024    Secondary hypercoagulable state ([facility]) [D68.69] 2024    PAF (paroxysmal atrial fibrillation) ([facility]) [I48.0] 2020    Type 2 diabetes mellitus, without long-term current use of insulin ([facility]) [E11.9] 2020    Acute on chronic combined systolic and diastolic CHF, NYHA class 4 (HCC) [I50.43] 2018    Idiopathic cardiomyopathy ([facility]) [I42.9]     Hypertension [I10]        Data         /79   Pulse 85   Temp 97.2 °F (36.2 °C) (Temporal)   Resp 27   Ht 1.575 m (5' 2.01\")   Wt 84.2 kg (185 lb 11.2 oz)   SpO2 90%   BMI 33.96 kg/m²     Wt Readings from Last 3 Encounters:   24 84.2 kg (185 lb 11.2 oz)   24 82.5 kg (181 lb 12.8 oz)   24 78.8 kg (173 lb 11 oz)        Code Status: DNR-CCA     ADVANCED CARE PLANNING:  Patient has capacity for medical decisions: yes  Health Care Power of : yes  Living Will: no     Personal, Social, and Family History  Marital Status:   Living situation:nursing home  Importance of yusef/Catholic/spiritual beliefs: [] Very [] Somewhat [] Not   Psychological  [place].  She tells me that she is feeling better today.  She confirms her decision makers and her DPOAHC with me.  She plans to return to [facility] upon discharge.  She denies any further needs.    Education/support to patient  Continue with current plan of care  Code status clarified: DNRCCA    Principle Problem/Diagnosis:  Acute on chronic combined systolic and diastolic CHF, NYHA class 4 (HCC)    Goals of care evaluation   The patient goals of care are live longer, improve or maintain function/quality of life, and preserve independence/autonomy/control   Goals of care discussed with:    [x] Patient independently    [] Patient and Family    [] Family or Healthcare DPOA independently    [] Unable to discuss with patient, family/DPOA not present    Code Status  DNR-CCA     Palliative Care will continue to follow Ms. Ragland's care as needed.      Thank you for allowing Palliative Care to participate in the care of Ms. Ragland .     Electronically signed by   Elisa Duong RN  Palliative Care Team  on 4/24/2024 at 10:59 AM    Palliative care office: [phone]

## 2024-04-24 NOTE — RT PROTOCOL NOTE
RESPIRATORY ASSESSMENT PROTOCOL                                                                                              Patient Name: Michelle Ragland Room#: I305/I305-01 : 1950     Admitting diagnosis: Acute respiratory distress [R06.03]  Acute diastolic CHF (congestive heart failure), NYHA class 3 ([facility]) [I50.31]  Congestive heart failure, unspecified HF chronicity, unspecified heart failure type ([facility]) [I50.9]       Medical History:   Past Medical History:   Diagnosis Date    Adjustment disorder with depressed mood 06/15/2022    Arthritis     Atrial fibrillation, unspecified type ([facility]) 2021    Cerebral artery occlusion with cerebral infarction ([facility])     CHF (congestive heart failure) ([facility])     Chronic combined systolic and diastolic CHF, NYHA class 4 ([facility]) 2017    Chronic kidney disease 2021    Stage 3    Diabetes mellitus ([facility])     Dysarthria and anarthria 2021    Endothelial corneal dystrophy of both eyes 2022    Enterovirus infection 2024    H/O cardiac catheterization 2017    LMCA: Mild irregularites 10-20%. LAD: Mild irregularites 10-20%. LCx: Mild irregularites 10-20%. RCA: Mild irregularites 10-20%. LV function assessed as : Abnormal. EF of 40%.    H/O echocardiogram 2018    EF 55%. Mildly increased LV wall thickness. The left atrium appears moderately to severely dilated. Moderate to severe mitral regurg. Moderate pulmonary HTN with an estimated RV systolic pressure of 44mmHg. Moderate tricuspid regurg. Evidnece fo moderate diastolic dysfunction is seen.     Hearing loss 2022    History of echocardiogram 2019    EF 55%. LV wall thickness moderately increased. LA is mildly dilated w/ LA volume index of 30. trivial mitral regurg. Evidence of moderate (grade II) diastolic dysfunction seen.    History of echocardiogram 2019    EF of 50-55%. LV wall thickness is mildly increased. LA is mildly dilated (29-33) with a left atrial volume

## 2024-04-24 NOTE — DISCHARGE INSTR - COC
(Formerly [facility]) I50.43    Hypoxia R09.02    Moderate mitral regurgitation by prior echocardiogram I34.0    Morbid obesity (Formerly [facility]) E66.01    CKD (chronic kidney disease) stage 3, GFR 30-59 ml/min (Formerly [facility]) N18.30    Physical deconditioning R53.81    TIA (transient ischemic attack) G45.9    Old lacunar stroke without late effect Z86.73    Dysarthria R47.1    Stroke determined by clinical assessment (Formerly [facility]) I63.9    Aphasia R47.01    Type 2 diabetes mellitus, without long-term current use of insulin (Formerly [facility]) E11.9    Uncontrolled type 2 diabetes mellitus with hyperglycemia (Formerly [facility]) E11.65    Acute ischemic stroke (Formerly [facility]) I63.9    PAF (paroxysmal atrial fibrillation) (Formerly [facility]) I48.0    Encounter for current long-term use of anticoagulants Z79.01    Hemiplegia and hemiparesis following cerebral infarction affecting right dominant side (Formerly [facility]) I69.351    Atrial fibrillation with RVR (Formerly [facility]) I48.91    Unable to care for self Z78.9    Abnormal result of other cardiovascular function study R94.39    Chest pain due to CAD (Formerly [facility]) I25.119    Acute systolic CHF (congestive heart failure), NYHA class 3 (Formerly [facility]) I50.21    Acute UTI N39.0    Hyperglycemia due to type 2 diabetes mellitus (Formerly [facility]) E11.65    Mild malnutrition (Formerly [facility]) E44.1    Prolonged Q-T interval on ECG R94.31    Generalized weakness R53.1    Acute diastolic CHF (congestive heart failure), NYHA class 3 (Formerly [facility]) I50.31    Secondary hypercoagulable state (Formerly [facility]) D68.69    Enterovirus infection B34.1       Isolation/Infection:   Isolation            Droplet          Patient Infection Status       Infection Onset Added Last Indicated Last Indicated By Review Planned Expiration Resolved Resolved By    Rhinovirus 04/22/24 04/22/24 04/22/24 Respiratory Panel, Molecular, with COVID-19 (Restricted: peds pts or suitable admitted adults) 04/29/24 05/02/24                         Nurse Assessment:  Last Vital Signs: /79   Pulse 85   Temp 97.2 °F (36.2 °C) (Temporal)   Resp 27   Ht 1.575 m (5' 2.01\")   Wt 84.2 kg  (185 lb 11.2 oz)   SpO2 90%   BMI 33.96 kg/m²     Last documented pain score (0-10 scale):    Last Weight:   Wt Readings from Last 1 Encounters:   04/24/24 84.2 kg (185 lb 11.2 oz)     Mental Status:  oriented and alert    IV Access:  - None    Nursing Mobility/ADLs:  Walking   Assisted  Transfer  Assisted  Bathing  Assisted  Dressing  Assisted  Toileting  Assisted  Feeding  Independent  Med Admin  Assisted  Med Delivery   whole    Wound Care Documentation and Therapy:  Incision 08/04/17 Wrist Right (Active)   Number of days: 2454        Elimination:  Continence:   Bowel: Yes  Bladder: Yes  Urinary Catheter: Removal Date 4/27/24    Colostomy/Ileostomy/Ileal Conduit: No       Date of Last BM: 4/27/24    Intake/Output Summary (Last 24 hours) at 4/24/2024 1127  Last data filed at 4/24/2024 0916  Gross per 24 hour   Intake 550 ml   Output 2750 ml   Net -2200 ml     I/O last 3 completed shifts:  In: 610 [P.O.:590; I.V.:20]  Out: 6100 [Urine:6100]    Safety Concerns:     History of Falls (last 30 days)    Impairments/Disabilities:      Vision and Hearing    Nutrition Therapy:  Current Nutrition Therapy:   - Oral Diet:  Carb Control 3 carbs/meal (1500kcals/day)    Routes of Feeding: Oral  Liquids: Thin Liquids  Daily Fluid Restriction: yes - amount 2000  Last Modified Barium Swallow with Video (Video Swallowing Test): not done    Treatments at the Time of Hospital Discharge:   Respiratory Treatments: ***  Oxygen Therapy:  is on oxygen at 3 L/min per nasal cannula.  Ventilator:    - No ventilator support    Rehab Therapies: Physical Therapy and Occupational Therapy  Weight Bearing Status/Restrictions: No weight bearing restrictions  Other Medical Equipment (for information only, NOT a DME order):  bedside commode  Other Treatments: ***    Patient's personal belongings (please select all that are sent with patient):  Jarett    RN SIGNATURE:  Electronically signed by Dodie Wilkerson on 4/27/24 at 1:32 PM EDT    CASE

## 2024-04-24 NOTE — PROGRESS NOTES
Physical Therapy  Facility/Department: [facility] ICU  Daily Treatment Note  NAME: Michelle Ragland  : 1950  MRN: [id number]    Date of Service: 2024    Discharge Recommendations:  Continue to assess pending progress, Subacute/Skilled Nursing Facility, Home with Home health PT     Patient Diagnosis(es): The primary encounter diagnosis was Acute respiratory distress. Diagnoses of Congestive heart failure, unspecified HF chronicity, unspecified heart failure type (HCC) and Acute systolic CHF (congestive heart failure), NYHA class 3 (HCC) were also pertinent to this visit.    Assessment   Assessment: Pt. does not ambulate at facility and is SPT. STS 5x2 with Min A with increased v/c for proper hand placmeent for safe transfers with poor carryover. Reclined and seated exercises B LE x15 with v/c to stay on task and awake.  Activity Tolerance: Patient tolerated treatment well     Plan    Physical Therapy Plan  General Plan: 2 times a day 7 days a week  Specific Instructions for Next Treatment: 1/x daily on weekends  Current Treatment Recommendations: Strengthening;ROM;Balance training;Functional mobility training;Endurance training;IADL training;ADL/Self-care training;Transfer training;Gait training;Stair training;Neuromuscular re-education;Manual;Return to work related activity;Home exercise program;Safety education & training;Patient/Caregiver education & training;Positioning;Therapeutic activities     Restrictions  Restrictions/Precautions  Restrictions/Precautions: General Precautions, Isolation, Fall Risk  Required Braces or Orthoses?: No     Subjective    Subjective  Subjective: Pt. up in chair upon arrival, sleepy but agreeable to therapy at this time.  Pain: 6/10 R LE  Orientation  Overall Orientation Status: Within Functional Limits     Objective   Bed Mobility Training  Bed Mobility Training: No  Transfer Training  Transfer Training: Yes  Overall Level of Assistance: Minimum assistance;Assist  no

## 2024-04-24 NOTE — PROGRESS NOTES
Occupational Therapy  Facility/Department: [facility] ICU  Occupational Therapy Initial Assessment    Name: Michelle Ragland  : 1950  MRN: [id number]  Date of Service: 2024    Discharge Recommendations:  Continue to assess pending progress, Long Term Care without OT, Long Term Care with OT          Patient Diagnosis(es): The primary encounter diagnosis was Acute respiratory distress. Diagnoses of Congestive heart failure, unspecified HF chronicity, unspecified heart failure type (HCC) and Acute systolic CHF (congestive heart failure), NYHA class 3 (HCC) were also pertinent to this visit.  Past Medical History:  has a past medical history of Adjustment disorder with depressed mood, Arthritis, Atrial fibrillation, unspecified type (HCC), Cerebral artery occlusion with cerebral infarction (HCC), CHF (congestive heart failure) (HCC), Chronic combined systolic and diastolic CHF, NYHA class 4 (HCC), Chronic kidney disease, Diabetes mellitus (HCC), Dysarthria and anarthria, Endothelial corneal dystrophy of both eyes, Enterovirus infection, H/O cardiac catheterization, H/O echocardiogram, Hearing loss, History of echocardiogram, History of echocardiogram, Hyperlipidemia, Hypertension, Major depressive disorder, and Prolonged QT interval.  Past Surgical History:  has a past surgical history that includes Cholecystectomy; Cardiac catheterization (Left, 2017); and pacemaker placement (Left, 2023).    Treatment Diagnosis: Weakness      Assessment   Performance deficits / Impairments: Decreased functional mobility ;Decreased endurance;Decreased ADL status;Decreased balance;Decreased strength;Decreased safe awareness;Decreased ROM  Assessment: 72 y/o F admitted to [facility] for acute respiratory distress resulting in weakness and deconditioning, requiring increased need for assist during ADL.  Treatment Diagnosis: Weakness  Prognosis: Fair  Decision Making: Medium Complexity  REQUIRES OT FOLLOW-UP: Yes        Plan  glasses for reading  Hearing  Hearing: Within functional limits  Cognition  Overall Cognitive Status: WFL  Orientation  Overall Orientation Status: Within Functional Limits                  Education Given To: Patient  Education Provided: Plan of Care;Transfer Training;Role of Therapy  Education Method: Verbal  Barriers to Learning: None  Education Outcome: Verbalized understanding;Demonstrated understanding;Continued education needed                          AM-PAC - ADL  AM-PAC Daily Activity - Inpatient   How much help is needed for putting on and taking off regular lower body clothing?: A Lot  How much help is needed for bathing (which includes washing, rinsing, drying)?: A Lot  How much help is needed for toileting (which includes using toilet, bedpan, or urinal)?: A Lot  How much help is needed for putting on and taking off regular upper body clothing?: A Little  How much help is needed for taking care of personal grooming?: A Little  How much help for eating meals?: None  AM-PAC Inpatient Daily Activity Raw Score: 16  AM-PAC Inpatient ADL T-Scale Score : 35.96  ADL Inpatient CMS 0-100% Score: 53.32  ADL Inpatient CMS G-Code Modifier : CK      Goals  Short Term Goals  Time Frame for Short Term Goals: 21 visits  Short Term Goal 1: Patient to complete ADL transfers c SBA/CGA to ensure safe return to [facility].  Short Term Goal 2: Patient to engage in 15 minutes of ther ex/ther act ot improve strength for ADL and transfers.  Short Term Goal 3: Patient to complete simple UB self care tasks, including bathing, dressing and basic grooming with S/U to improve ADL engagement and independence.       Therapy Time   Individual Concurrent Group Co-treatment   Time In           Time Out           Minutes                   Jasmyn Burton OTR/L

## 2024-04-24 NOTE — PROGRESS NOTES
Pt is a/o x4, answered questions appropriately with delayed response. VS and assessment complete. Perkins draining clear yellow urine. Moist productive cough noted, denies any pain or sob.  Hygiene care performed, pt tolerated well. Call light within reach and bed alarm on.

## 2024-04-24 NOTE — PROGRESS NOTES
Component Value Date/Time    NITRU NEGATIVE 08/09/2023 11:00 AM    COLORU Yellow 08/09/2023 11:00 AM    PHUR 6.0 08/09/2023 11:00 AM    WBCUA 0 TO 2 08/09/2023 11:00 AM    RBCUA None 08/09/2023 11:00 AM    MUCUS NOT REPORTED 06/21/2021 05:00 PM    TRICHOMONAS NOT REPORTED 06/21/2021 05:00 PM    YEAST NOT REPORTED 06/21/2021 05:00 PM    BACTERIA TRACE 08/09/2023 11:00 AM    SPECGRAV <1.005 08/09/2023 11:00 AM    LEUKOCYTESUR NEGATIVE 08/09/2023 11:00 AM    UROBILINOGEN Normal 08/09/2023 11:00 AM    BILIRUBINUR NEGATIVE 08/09/2023 11:00 AM    GLUCOSEU 3+ 08/09/2023 11:00 AM    KETUA NEGATIVE 08/09/2023 11:00 AM    AMORPHOUS NOT REPORTED 06/21/2021 05:00 PM       HgBA1c:    Lab Results   Component Value Date/Time    LABA1C 6.1 04/04/2024 06:15 AM       TSH:    Lab Results   Component Value Date/Time    TSH 0.36 04/22/2024 03:54 PM       Lactic Acid:   Lab Results   Component Value Date/Time    LACTA 1.1 04/22/2024 03:05 PM    LACTA 1.9 06/02/2023 09:30 AM    LACTA 1.8 06/21/2021 04:45 PM        High Sensitivity Troponin:  Recent Labs     04/22/24  1425 04/22/24  1554   TROPHS 30* 29*     Pro-BNP:  Lab Results   Component Value Date    PROBNP 19,667 (H) 04/22/2024     D-Dimer:No results found for: \"DDIMER\"  PT/INR:    Lab Results   Component Value Date/Time    PROTIME 17.7 06/02/2023 09:18 AM    INR 1.4 06/02/2023 09:18 AM     PTT:    Lab Results   Component Value Date/Time    APTT 25.2 08/23/2020 03:38 PM       CRP: No results for input(s): \"CRP\" in the last 72 hours.    ABGs:   Lab Results   Component Value Date/Time    PHART 7.435 04/23/2024 09:34 AM    CWY5DNG 46.0 04/23/2024 09:34 AM    PO2ART 56.5 04/23/2024 09:34 AM    ZPR3YGW 30.2 04/23/2024 09:34 AM    W4IBRGRT 90.1 04/23/2024 09:34 AM    FIO2 30 04/23/2024 09:34 AM         Radiology/Imaging:  XR CHEST PORTABLE   Final Result   Cardiomegaly with pulmonary venous congestion and perihilar pulmonary   consolidation. Findings are most suggestive of congestive  empagliflozin  Continue Humalog  Continue Lantus  Hypoglycemia protocol     Nutrition status:   at risk for malnutrition  Dietician consult initiated     Hospital Prophylaxis:   DVT: Eliquis   Stress Ulcer: H2 Blocker      Disposition:  Shared decision making: All test results, treatment options and disposition options were discussed with the patient today  Social determinants of health that may impact management: none  Code status: DNR-CCA   Disposition: Discharge plan is pending        Critical Care Time:  Total critical care time caring for this patient with life threatening, unstable organ failure, including direct patient contact, management of life support systems, review of data including imaging and labs, discussions with other team members and physicians at least 0 minutes so far today, excluding separately billable procedures.    [facility] Advanced Care Planning documentation:  [x] I have confirmed that the patient's Advance Care Plan is present, Code Status is documented, or surrogate decision maker is listed in the patient's medical record  [If \"yes\", STOP HERE]     [] The patient's Advance Care Plan is NOT present because:    []  I confirmed today that the patient does not wish or was not able to name a   surrogate decision maker or provide and advance care plan.    [] Hospice care is currently being provided or has been provided within the   calendar year.    []  I did NOT confirm today the presence of an Advance Care Plan or surrogate   decision maker documented within the patient's medical record.   [DOES NOT SATISFY [facility] PERFORMANCE]      Ree Weinberg, SUE - HERBER , SUE-NP-C  4/24/2024  8:00 AM

## 2024-04-24 NOTE — PROGRESS NOTES
Vitals and assessment completed. Patient laying in bed, oriented to self and place. Heart rhythm is regular, paced, denies chest pain or dizziness. Lung sounds are diminished, denies shortness of breath, 5L nasal cannula on. Bowel sounds are active. Perkins catheter remains in place and draining. +1 pitting edema to BLE. Denies any pain or needs at this time. Call light within reach and bed alarm on.

## 2024-04-24 NOTE — PLAN OF CARE
Problem: Discharge Planning  Goal: Discharge to home or other facility with appropriate resources  Outcome: Progressing  Flowsheets (Taken 4/22/2024 2004 by Jhoana Toledo RN)  Discharge to home or other facility with appropriate resources: Arrange for needed discharge resources and transportation as appropriate     Problem: Safety - Adult  Goal: Free from fall injury  4/24/2024 0742 by Elke Nelson RN  Outcome: Progressing  Flowsheets (Taken 4/23/2024 1930 by Jhoana Toledo RN)  Free From Fall Injury: Instruct family/caregiver on patient safety     Problem: Pain  Goal: Verbalizes/displays adequate comfort level or baseline comfort level  4/24/2024 0742 by Elke Nelson RN  Outcome: Progressing  Flowsheets (Taken 4/23/2024 1930 by Jhoana Toledo RN)  Verbalizes/displays adequate comfort level or baseline comfort level: Assess pain using appropriate pain scale     Problem: Respiratory - Adult  Goal: Achieves optimal ventilation and oxygenation  4/24/2024 0742 by Elke Nelson RN  Outcome: Progressing  Flowsheets (Taken 4/23/2024 1930 by Jhoana Toledo RN)  Achieves optimal ventilation and oxygenation:   Assess for changes in respiratory status   Position to facilitate oxygenation and minimize respiratory effort   Assess the need for suctioning and aspirate as needed   Respiratory therapy support as indicated   Assess for changes in mentation and behavior   Oxygen supplementation based on oxygen saturation or arterial blood gases   Encourage broncho-pulmonary hygiene including cough, deep breathe, incentive spirometry   Assess and instruct to report shortness of breath or any respiratory difficulty     Problem: Cardiovascular - Adult  Goal: Maintains optimal cardiac output and hemodynamic stability  Outcome: Progressing  Flowsheets (Taken 4/22/2024 2004 by Jhoana Toledo RN)  Maintains optimal cardiac output and hemodynamic stability:   Monitor blood pressure and heart rate    Monitor urine output and notify Licensed Independent Practitioner for values outside of normal range     Problem: Cardiovascular - Adult  Goal: Absence of cardiac dysrhythmias or at baseline  4/24/2024 0742 by Elke Nelson RN  Outcome: Progressing  Flowsheets (Taken 4/23/2024 1930 by Jhoana Toledo, RN)  Absence of cardiac dysrhythmias or at baseline: Monitor cardiac rate and rhythm     Problem: Skin/Tissue Integrity - Adult  Goal: Skin integrity remains intact  Outcome: Progressing  Flowsheets (Taken 4/22/2024 2004 by Jhoana Toledo, RN)  Skin Integrity Remains Intact: Monitor for areas of redness and/or skin breakdown     Problem: Musculoskeletal - Adult  Goal: Return mobility to safest level of function  Outcome: Progressing  Flowsheets (Taken 4/22/2024 2004 by Jhoana Toledo, RN)  Return Mobility to Safest Level of Function:   Assess patient stability and activity tolerance for standing, transferring and ambulating with or without assistive devices   Assist with transfers and ambulation using safe patient handling equipment as needed     Problem: Genitourinary - Adult  Goal: Absence of urinary retention  Outcome: Progressing  Flowsheets (Taken 4/23/2024 0703)  Absence of urinary retention: Assess patient’s ability to void and empty bladder     Problem: Infection - Adult  Goal: Absence of infection during hospitalization  Outcome: Progressing  Flowsheets (Taken 4/22/2024 2004 by Jhoana Toledo, RN)  Absence of infection during hospitalization:   Assess and monitor for signs and symptoms of infection   Monitor lab/diagnostic results   Monitor all insertion sites i.e., indwelling lines, tubes and drains   Identify and instruct in appropriate isolation precautions for identified infection/condition     Problem: Metabolic/Fluid and Electrolytes - Adult  Goal: Electrolytes maintained within normal limits  Outcome: Progressing  Flowsheets (Taken 4/22/2024 2004 by Jhoana Toledo,

## 2024-04-25 LAB
ANION GAP SERPL CALCULATED.3IONS-SCNC: 7 MMOL/L (ref 9–17)
BASOPHILS # BLD: <0.03 K/UL (ref 0–0.2)
BASOPHILS NFR BLD: 0 % (ref 0–2)
BNP SERPL-MCNC: 5972 PG/ML
BUN SERPL-MCNC: 28 MG/DL (ref 8–23)
BUN/CREAT SERPL: 19 (ref 9–20)
CALCIUM SERPL-MCNC: 10.6 MG/DL (ref 8.6–10.4)
CHLORIDE SERPL-SCNC: 96 MMOL/L (ref 98–107)
CO2 SERPL-SCNC: 36 MMOL/L (ref 20–31)
CREAT SERPL-MCNC: 1.5 MG/DL (ref 0.5–0.9)
EKG ATRIAL RATE: 214 BPM
EKG Q-T INTERVAL: 486 MS
EKG QRS DURATION: 160 MS
EKG QTC CALCULATION (BAZETT): 581 MS
EKG R AXIS: -84 DEGREES
EKG T AXIS: 99 DEGREES
EKG VENTRICULAR RATE: 86 BPM
EOSINOPHIL # BLD: 0.08 K/UL (ref 0–0.44)
EOSINOPHILS RELATIVE PERCENT: 2 % (ref 1–4)
ERYTHROCYTE [DISTWIDTH] IN BLOOD BY AUTOMATED COUNT: 19.2 % (ref 11.8–14.4)
GFR SERPL CREATININE-BSD FRML MDRD: 37 ML/MIN/1.73M2
GLUCOSE BLD-MCNC: 121 MG/DL (ref 74–100)
GLUCOSE BLD-MCNC: 124 MG/DL (ref 74–100)
GLUCOSE BLD-MCNC: 146 MG/DL (ref 74–100)
GLUCOSE BLD-MCNC: 273 MG/DL (ref 74–100)
GLUCOSE SERPL-MCNC: 137 MG/DL (ref 70–99)
HCT VFR BLD AUTO: 33.8 % (ref 36.3–47.1)
HGB BLD-MCNC: 10 G/DL (ref 11.9–15.1)
IMM GRANULOCYTES # BLD AUTO: <0.03 K/UL (ref 0–0.3)
IMM GRANULOCYTES NFR BLD: 0 %
LYMPHOCYTES NFR BLD: 0.65 K/UL (ref 1.1–3.7)
LYMPHOCYTES RELATIVE PERCENT: 12 % (ref 24–43)
MAGNESIUM SERPL-MCNC: 2 MG/DL (ref 1.6–2.6)
MCH RBC QN AUTO: 23.9 PG (ref 25.2–33.5)
MCHC RBC AUTO-ENTMCNC: 29.6 G/DL (ref 28.4–34.8)
MCV RBC AUTO: 80.9 FL (ref 82.6–102.9)
MONOCYTES NFR BLD: 0.83 K/UL (ref 0.1–1.2)
MONOCYTES NFR BLD: 16 % (ref 3–12)
NEUTROPHILS NFR BLD: 70 % (ref 36–65)
NEUTS SEG NFR BLD: 3.77 K/UL (ref 1.5–8.1)
NRBC BLD-RTO: 0 PER 100 WBC
PLATELET # BLD AUTO: 167 K/UL (ref 138–453)
PMV BLD AUTO: 11 FL (ref 8.1–13.5)
POTASSIUM SERPL-SCNC: 4.2 MMOL/L (ref 3.7–5.3)
RBC # BLD AUTO: 4.18 M/UL (ref 3.95–5.11)
SODIUM SERPL-SCNC: 139 MMOL/L (ref 135–144)
TROPONIN I SERPL HS-MCNC: 41 NG/L (ref 0–14)
TROPONIN I SERPL HS-MCNC: 42 NG/L (ref 0–14)
TROPONIN I SERPL HS-MCNC: 44 NG/L (ref 0–14)
WBC OTHER # BLD: 5.4 K/UL (ref 3.5–11.3)

## 2024-04-25 PROCEDURE — 6370000000 HC RX 637 (ALT 250 FOR IP): Performed by: NURSE PRACTITIONER

## 2024-04-25 PROCEDURE — 83880 ASSAY OF NATRIURETIC PEPTIDE: CPT

## 2024-04-25 PROCEDURE — 80048 BASIC METABOLIC PNL TOTAL CA: CPT

## 2024-04-25 PROCEDURE — 97535 SELF CARE MNGMENT TRAINING: CPT

## 2024-04-25 PROCEDURE — 93010 ELECTROCARDIOGRAM REPORT: CPT | Performed by: INTERNAL MEDICINE

## 2024-04-25 PROCEDURE — 94664 DEMO&/EVAL PT USE INHALER: CPT

## 2024-04-25 PROCEDURE — 6370000000 HC RX 637 (ALT 250 FOR IP): Performed by: INTERNAL MEDICINE

## 2024-04-25 PROCEDURE — 97116 GAIT TRAINING THERAPY: CPT

## 2024-04-25 PROCEDURE — 36415 COLL VENOUS BLD VENIPUNCTURE: CPT

## 2024-04-25 PROCEDURE — 94669 MECHANICAL CHEST WALL OSCILL: CPT

## 2024-04-25 PROCEDURE — 2700000000 HC OXYGEN THERAPY PER DAY

## 2024-04-25 PROCEDURE — 82947 ASSAY GLUCOSE BLOOD QUANT: CPT

## 2024-04-25 PROCEDURE — 6360000002 HC RX W HCPCS: Performed by: INTERNAL MEDICINE

## 2024-04-25 PROCEDURE — 84484 ASSAY OF TROPONIN QUANT: CPT

## 2024-04-25 PROCEDURE — 2580000003 HC RX 258: Performed by: INTERNAL MEDICINE

## 2024-04-25 PROCEDURE — 94761 N-INVAS EAR/PLS OXIMETRY MLT: CPT

## 2024-04-25 PROCEDURE — 83735 ASSAY OF MAGNESIUM: CPT

## 2024-04-25 PROCEDURE — 93005 ELECTROCARDIOGRAM TRACING: CPT | Performed by: NURSE PRACTITIONER

## 2024-04-25 PROCEDURE — 85025 COMPLETE CBC W/AUTO DIFF WBC: CPT

## 2024-04-25 PROCEDURE — 94640 AIRWAY INHALATION TREATMENT: CPT

## 2024-04-25 PROCEDURE — 51702 INSERT TEMP BLADDER CATH: CPT

## 2024-04-25 PROCEDURE — 99222 1ST HOSP IP/OBS MODERATE 55: CPT | Performed by: FAMILY MEDICINE

## 2024-04-25 PROCEDURE — 94660 CPAP INITIATION&MGMT: CPT

## 2024-04-25 PROCEDURE — 97110 THERAPEUTIC EXERCISES: CPT

## 2024-04-25 PROCEDURE — 1200000000 HC SEMI PRIVATE

## 2024-04-25 RX ORDER — IPRATROPIUM BROMIDE AND ALBUTEROL SULFATE 2.5; .5 MG/3ML; MG/3ML
SOLUTION RESPIRATORY (INHALATION)
Status: DISPENSED
Start: 2024-04-25 | End: 2024-04-25

## 2024-04-25 RX ADMIN — EMPAGLIFLOZIN 10 MG: 10 TABLET, FILM COATED ORAL at 08:23

## 2024-04-25 RX ADMIN — GABAPENTIN 400 MG: 400 CAPSULE ORAL at 20:04

## 2024-04-25 RX ADMIN — FAMOTIDINE 20 MG: 20 TABLET, FILM COATED ORAL at 08:23

## 2024-04-25 RX ADMIN — INSULIN LISPRO 8 UNITS: 100 INJECTION, SOLUTION INTRAVENOUS; SUBCUTANEOUS at 12:11

## 2024-04-25 RX ADMIN — IPRATROPIUM BROMIDE AND ALBUTEROL SULFATE 1 DOSE: 2.5; .5 SOLUTION RESPIRATORY (INHALATION) at 09:57

## 2024-04-25 RX ADMIN — IPRATROPIUM BROMIDE AND ALBUTEROL SULFATE 1 DOSE: 2.5; .5 SOLUTION RESPIRATORY (INHALATION) at 05:38

## 2024-04-25 RX ADMIN — INSULIN LISPRO 8 UNITS: 100 INJECTION, SOLUTION INTRAVENOUS; SUBCUTANEOUS at 17:13

## 2024-04-25 RX ADMIN — INSULIN GLARGINE 30 UNITS: 100 INJECTION, SOLUTION SUBCUTANEOUS at 20:05

## 2024-04-25 RX ADMIN — SODIUM CHLORIDE, PRESERVATIVE FREE 10 ML: 5 INJECTION INTRAVENOUS at 08:24

## 2024-04-25 RX ADMIN — SERTRALINE 100 MG: 100 TABLET, FILM COATED ORAL at 20:05

## 2024-04-25 RX ADMIN — APIXABAN 5 MG: 5 TABLET, FILM COATED ORAL at 08:23

## 2024-04-25 RX ADMIN — FUROSEMIDE 40 MG: 10 INJECTION, SOLUTION INTRAMUSCULAR; INTRAVENOUS at 08:23

## 2024-04-25 RX ADMIN — GUAIFENESIN, DEXTROMETHORPHAN HBR 1 TABLET: 600; 30 TABLET ORAL at 08:23

## 2024-04-25 RX ADMIN — INSULIN LISPRO 8 UNITS: 100 INJECTION, SOLUTION INTRAVENOUS; SUBCUTANEOUS at 08:22

## 2024-04-25 RX ADMIN — METOPROLOL SUCCINATE 200 MG: 100 TABLET, EXTENDED RELEASE ORAL at 08:23

## 2024-04-25 RX ADMIN — ATORVASTATIN CALCIUM 80 MG: 40 TABLET, FILM COATED ORAL at 08:23

## 2024-04-25 RX ADMIN — FUROSEMIDE 40 MG: 10 INJECTION, SOLUTION INTRAMUSCULAR; INTRAVENOUS at 17:13

## 2024-04-25 RX ADMIN — GABAPENTIN 400 MG: 400 CAPSULE ORAL at 08:24

## 2024-04-25 RX ADMIN — DILTIAZEM HYDROCHLORIDE 240 MG: 240 CAPSULE, COATED, EXTENDED RELEASE ORAL at 08:23

## 2024-04-25 RX ADMIN — APIXABAN 5 MG: 5 TABLET, FILM COATED ORAL at 20:05

## 2024-04-25 RX ADMIN — SODIUM CHLORIDE, PRESERVATIVE FREE 10 ML: 5 INJECTION INTRAVENOUS at 17:14

## 2024-04-25 RX ADMIN — IPRATROPIUM BROMIDE AND ALBUTEROL SULFATE 1 DOSE: 2.5; .5 SOLUTION RESPIRATORY (INHALATION) at 21:00

## 2024-04-25 RX ADMIN — POTASSIUM CHLORIDE 20 MEQ: 750 TABLET, EXTENDED RELEASE ORAL at 08:23

## 2024-04-25 RX ADMIN — IPRATROPIUM BROMIDE AND ALBUTEROL SULFATE 1 DOSE: 2.5; .5 SOLUTION RESPIRATORY (INHALATION) at 16:35

## 2024-04-25 RX ADMIN — GUAIFENESIN, DEXTROMETHORPHAN HBR 1 TABLET: 600; 30 TABLET ORAL at 20:04

## 2024-04-25 RX ADMIN — SODIUM CHLORIDE, PRESERVATIVE FREE 10 ML: 5 INJECTION INTRAVENOUS at 20:05

## 2024-04-25 ASSESSMENT — PAIN SCALES - GENERAL
PAINLEVEL_OUTOF10: 4
PAINLEVEL_OUTOF10: 1

## 2024-04-25 ASSESSMENT — PAIN DESCRIPTION - FREQUENCY: FREQUENCY: INTERMITTENT

## 2024-04-25 ASSESSMENT — PAIN DESCRIPTION - ORIENTATION: ORIENTATION: LEFT;UPPER

## 2024-04-25 ASSESSMENT — PAIN DESCRIPTION - ONSET: ONSET: SUDDEN

## 2024-04-25 ASSESSMENT — PAIN DESCRIPTION - DESCRIPTORS: DESCRIPTORS: HEAVINESS

## 2024-04-25 ASSESSMENT — PAIN DESCRIPTION - PAIN TYPE: TYPE: ACUTE PAIN

## 2024-04-25 ASSESSMENT — PAIN - FUNCTIONAL ASSESSMENT: PAIN_FUNCTIONAL_ASSESSMENT: ACTIVITIES ARE NOT PREVENTED

## 2024-04-25 ASSESSMENT — PAIN DESCRIPTION - LOCATION: LOCATION: CHEST

## 2024-04-25 NOTE — PROGRESS NOTES
Pt remains a/o x4, slow to respond at times. Denies any pain or sob. VS and assessment complete. BIPAP repositioned d/t leak. Settings 14/7 and %35. Perkins draining clear yellow urine. No needs at this time. Bed alarm on and call light in reach.

## 2024-04-25 NOTE — PROGRESS NOTES
Entered patient's room for hourly vital signs and head to toe assessment. Patient resting in the bed at this time. A&O x3, lethargic, tired, and presents with delayed responses. Patient complains of chest heaviness at this time in her left upper chest, 12 lead EKG completed, see results. Vital signs and head to toe assessment completed at this time, see flowsheets for more details. Patient repositioned in the bed. Patient denies no more needs at this time. Call light within reach. Bed alarm on. Bed wheels locked. Bed in lowest position.    Ree CRAVEN notified of patient's complaint of chest heaviness and lethargy, 12 lead EKG reviewed, and troponin added to morning lab work. Patient appears to be in bigeminy and ventricular paced.

## 2024-04-25 NOTE — PROGRESS NOTES
Occupational Therapy  Facility/Department: [facility] ICU  Daily Treatment Note  NAME: Michelle Ragland  : 1950  MRN: [id number]    Date of Service: 2024    Discharge Recommendations:  Continue to assess pending progress, Long Term Care without OT, Long Term Care with OT         Patient Diagnosis(es): The primary encounter diagnosis was Acute respiratory distress. Diagnoses of Congestive heart failure, unspecified HF chronicity, unspecified heart failure type (HCC) and Acute systolic CHF (congestive heart failure), NYHA class 3 (HCC) were also pertinent to this visit.     Assessment    Activity Tolerance: Patient tolerated treatment well  Discharge Recommendations: Continue to assess pending progress;Long Term Care without OT;Long Term Care with OT      Plan   Occupational Therapy Plan  Times Per Day: Once a day  Days Per Week: 7 Days  Current Treatment Recommendations: Strengthening;Balance training;Endurance training;Safety education & training;Patient/Caregiver education & training;Equipment evaluation, education, & procurement;Self-Care / ADL     Restrictions   Droplet, fall risk    Subjective   Subjective  Subjective: Pt transfering to [facility] upon MURRAY arrival, agreed to washing up.  Pain: denied  Orientation  Overall Orientation Status: Within Functional Limits         Objective    Vitals       ADL  Grooming: Minimal assistance  UE Bathing: Minimal assistance  LE Bathing: Maximum assistance  UE Dressing: Minimal assistance  LE Dressing: Maximum assistance  Toileting: Maximum assistance  Functional Mobility: Moderate assistance  Functional Mobility Skilled Clinical Factors: MOD x 2 for fxl transfer BS <> recliner no AD with increased time and 0 LOB noted with 2 clinicians under pt arms. Pt able to follow 1 step commands.   Additional Comments: Min A UB ADLs MAX A LB ADLs seated        Safety Devices  Type of Devices: All fall risk precautions in place;Call light within reach;Chair alarm in place;Left in

## 2024-04-25 NOTE — PROGRESS NOTES
Physical Therapy  Facility/Department: [facility] ICU  Daily Treatment Note  NAME: Michelle Ragland  : 1950  MRN: [id number]    Date of Service: 2024    Discharge Recommendations:  Continue to assess pending progress, Subacute/Skilled Nursing Facility, Home with Home health PT     Patient Diagnosis(es): The primary encounter diagnosis was Acute respiratory distress. Diagnoses of Congestive heart failure, unspecified HF chronicity, unspecified heart failure type (HCC) and Acute systolic CHF (congestive heart failure), NYHA class 3 (HCC) were also pertinent to this visit.    Assessment   Assessment: Pt. completed reclined and seated B LE x10-15 in all available planes of motion. RB needed throughout d/t SOB and fatigue. Verbal cues on pursed lip breathing d/t SOB and SpO2 ranging from 87%-90%. Pt. was able to scoot back in chair with SBA and SpO2 dropped to 84%. Pt. was able to regain SpO2 back to 89%.  Activity Tolerance: Patient tolerated treatment well     Plan    Physical Therapy Plan  General Plan: 2 times a day 7 days a week  Specific Instructions for Next Treatment: 1/x daily on weekends  Current Treatment Recommendations: Strengthening;ROM;Balance training;Functional mobility training;Endurance training;IADL training;ADL/Self-care training;Transfer training;Gait training;Stair training;Neuromuscular re-education;Manual;Return to work related activity;Home exercise program;Safety education & training;Patient/Caregiver education & training;Positioning;Therapeutic activities     Restrictions  Restrictions/Precautions  Restrictions/Precautions: General Precautions, Isolation, Fall Risk  Required Braces or Orthoses?: No     Subjective    Subjective  Subjective: Pt. in chair upon arrival, agreeable to therapy at this time  Pain: reported some chest pain but didn't give a rating  Orientation  Overall Orientation Status: Within Functional Limits     Objective   Bed Mobility Training  Bed Mobility Training:

## 2024-04-25 NOTE — RT PROTOCOL NOTE
index of 31 m1/m2. mild diastolic dysfunction.    Hyperlipidemia     Hypertension     Major depressive disorder 09/28/2022    Prolonged QT interval 08/10/2023       PATIENT ASSESSMENT    LABORATORY DATA  Hematology:   Lab Results   Component Value Date/Time    WBC 5.4 04/25/2024 05:20 AM    RBC 4.18 04/25/2024 05:20 AM    HGB 10.0 04/25/2024 05:20 AM    HCT 33.8 04/25/2024 05:20 AM     04/25/2024 05:20 AM     Chemistry:    Lab Results   Component Value Date/Time    PHART 7.435 04/23/2024 09:34 AM    ABD9TLJ 46.0 04/23/2024 09:34 AM    PO2ART 56.5 04/23/2024 09:34 AM    S3ULEKYJ 90.1 04/23/2024 09:34 AM    YTK8KDF 30.2 04/23/2024 09:34 AM    PBEA 5.1 04/23/2024 09:34 AM       VITALS  Pulse: 80   Respirations: 20  BP: 104/88  SpO2: 90 % O2 Device: Nasal cannula  Temp: 97.3 °F (36.3 °C)    SKIN COLOR  [x] Normal  [] Pale  [] Dusky  [] Cyanotic    RESPIRATORY PATTERN  [x] Normal  [] Dyspnea  [] Cheyne-Rodriguez  [] Kussmaul  [] Biots    AMBULATORY  [] Yes  [x] No  [] With Assistance            Patient Acuity 0 1 2 3 4 Score   Level of Consciousness (LOC) [x]  Alert & Oriented or Pt normal LOC []  Confused;follows directions []  Confused & uncooper-ative []  Obtunded []  Comatose 0   Respiratory Rate  (RR) []  Reg. rate & pattern. 12 - 20 bpm  []  Increased RR. Greater than 20 bpm   [x]  SOB w/ exertion or RR greater than 24 bpm []  Access- ory muscle use at rest. Abn.  resp. []  SOB at rest.   2   Bilateral Breath Sounds (BBS) []  Clear [x]  Diminish-ed bases  []  Diminish-ed t/o, or rales   []  Sporadic, scattered wheezes or rhonchi []  Persistentwheezes and, or absent BBS 1   Cough []  Strong, effective, & non-prod. [x]  Effective & prod. Less than 25 ml (2 TBSP) over past 24 hrs []  Ineffective & non-prod to less than 25 ML over past 24 hrs []  Ineffective and, or greater than 25 ml sputum prod. past 24 hrs. []  Nonspon- taneous; Requires suctioning 1   Pulmonary History  (PULM HX) []  No smoking and no chronic

## 2024-04-25 NOTE — PROGRESS NOTES
Entered patient's room for hourly vital signs and late morning head to toe reassessment. Patient resting in the chair at this time. A&O x3, calm, and cooperative. Patient no longer lethargic. Patient alert and able to stay awake during conversation or questions. Patient denies of pain at this time. Vital signs and head to toe reassessment completed at this time, see flowsheets for more details. Lung sounds are more clear, compared to this mornings assessment. Patient denies no more needs at this time. Call light within reach. Chair alarm on. Bed/chair wheels locked. Bed in lowest position.

## 2024-04-25 NOTE — PROGRESS NOTES
Physical Therapy  Facility/Department: [facility] ICU  Daily Treatment Note  NAME: Michelle Ragland  : 1950  MRN: [id number]    Date of Service: 2024    Discharge Recommendations:  Continue to assess pending progress, Subacute/Skilled Nursing Facility, Home with Home health PT     Patient Diagnosis(es): The primary encounter diagnosis was Acute respiratory distress. Diagnoses of Congestive heart failure, unspecified HF chronicity, unspecified heart failure type (HCC) and Acute systolic CHF (congestive heart failure), NYHA class 3 (HCC) were also pertinent to this visit.    Assessment   Assessment: Pt. able to take 3 steps from bed to chair with Min/Mod A x1. Bed mobility: CGA/Min A. Transfers:Min A Seated exercises B LE x15.  Activity Tolerance: Patient tolerated treatment well     Plan    Physical Therapy Plan  General Plan: 2 times a day 7 days a week  Specific Instructions for Next Treatment: 1/x daily on weekends  Current Treatment Recommendations: Strengthening;ROM;Balance training;Functional mobility training;Endurance training;IADL training;ADL/Self-care training;Transfer training;Gait training;Stair training;Neuromuscular re-education;Manual;Return to work related activity;Home exercise program;Safety education & training;Patient/Caregiver education & training;Positioning;Therapeutic activities     Restrictions  Restrictions/Precautions  Restrictions/Precautions: General Precautions, Isolation, Fall Risk  Required Braces or Orthoses?: No     Subjective    Subjective  Subjective: Pt. in bed upon arrival, agreeable to work with therapy and up to chair at this time.  Pain: does not report when asked.  Orientation  Overall Orientation Status: Within Functional Limits     Objective   Bed Mobility Training  Bed Mobility Training: Yes  Overall Level of Assistance: Contact-guard assistance;Assist X1;Minimum assistance  Interventions: Verbal cues;Safety awareness training  Rolling: Contact-guard assistance;Assist

## 2024-04-25 NOTE — PROGRESS NOTES
Pt requests bipap to be removed at this time. Education provided regarding use of bipap. Pt placed on 5L nc humidified. Tolerating well. Bed alarm on and call light in reach.

## 2024-04-26 ENCOUNTER — APPOINTMENT (OUTPATIENT)
Dept: GENERAL RADIOLOGY | Age: 74
End: 2024-04-26
Payer: MEDICARE

## 2024-04-26 PROBLEM — I42.8 NON-ISCHEMIC CARDIOMYOPATHY (HCC): Status: ACTIVE | Noted: 2024-04-26

## 2024-04-26 PROBLEM — R06.03 ACUTE RESPIRATORY DISTRESS: Status: ACTIVE | Noted: 2024-04-26

## 2024-04-26 LAB
ANION GAP SERPL CALCULATED.3IONS-SCNC: 9 MMOL/L (ref 9–17)
BUN SERPL-MCNC: 34 MG/DL (ref 8–23)
BUN/CREAT SERPL: 23 (ref 9–20)
CALCIUM SERPL-MCNC: 10.6 MG/DL (ref 8.6–10.4)
CHLORIDE SERPL-SCNC: 98 MMOL/L (ref 98–107)
CO2 SERPL-SCNC: 35 MMOL/L (ref 20–31)
CREAT SERPL-MCNC: 1.5 MG/DL (ref 0.5–0.9)
GFR SERPL CREATININE-BSD FRML MDRD: 37 ML/MIN/1.73M2
GLUCOSE BLD-MCNC: 101 MG/DL (ref 74–100)
GLUCOSE BLD-MCNC: 129 MG/DL (ref 74–100)
GLUCOSE BLD-MCNC: 183 MG/DL (ref 74–100)
GLUCOSE BLD-MCNC: 191 MG/DL (ref 74–100)
GLUCOSE SERPL-MCNC: 120 MG/DL (ref 70–99)
MAGNESIUM SERPL-MCNC: 2.1 MG/DL (ref 1.6–2.6)
POTASSIUM SERPL-SCNC: 3.8 MMOL/L (ref 3.7–5.3)
SODIUM SERPL-SCNC: 142 MMOL/L (ref 135–144)

## 2024-04-26 PROCEDURE — 99232 SBSQ HOSP IP/OBS MODERATE 35: CPT | Performed by: PHYSICIAN ASSISTANT

## 2024-04-26 PROCEDURE — 80048 BASIC METABOLIC PNL TOTAL CA: CPT

## 2024-04-26 PROCEDURE — 94669 MECHANICAL CHEST WALL OSCILL: CPT

## 2024-04-26 PROCEDURE — 83735 ASSAY OF MAGNESIUM: CPT

## 2024-04-26 PROCEDURE — 6370000000 HC RX 637 (ALT 250 FOR IP): Performed by: INTERNAL MEDICINE

## 2024-04-26 PROCEDURE — 82947 ASSAY GLUCOSE BLOOD QUANT: CPT

## 2024-04-26 PROCEDURE — 1200000000 HC SEMI PRIVATE

## 2024-04-26 PROCEDURE — 6370000000 HC RX 637 (ALT 250 FOR IP): Performed by: NURSE PRACTITIONER

## 2024-04-26 PROCEDURE — 97530 THERAPEUTIC ACTIVITIES: CPT

## 2024-04-26 PROCEDURE — 94640 AIRWAY INHALATION TREATMENT: CPT

## 2024-04-26 PROCEDURE — 97535 SELF CARE MNGMENT TRAINING: CPT

## 2024-04-26 PROCEDURE — 97110 THERAPEUTIC EXERCISES: CPT

## 2024-04-26 PROCEDURE — 94761 N-INVAS EAR/PLS OXIMETRY MLT: CPT

## 2024-04-26 PROCEDURE — 2700000000 HC OXYGEN THERAPY PER DAY

## 2024-04-26 PROCEDURE — 94660 CPAP INITIATION&MGMT: CPT

## 2024-04-26 PROCEDURE — 36415 COLL VENOUS BLD VENIPUNCTURE: CPT

## 2024-04-26 PROCEDURE — 71046 X-RAY EXAM CHEST 2 VIEWS: CPT

## 2024-04-26 PROCEDURE — 51702 INSERT TEMP BLADDER CATH: CPT

## 2024-04-26 PROCEDURE — 94664 DEMO&/EVAL PT USE INHALER: CPT

## 2024-04-26 PROCEDURE — 2580000003 HC RX 258: Performed by: INTERNAL MEDICINE

## 2024-04-26 RX ORDER — BUMETANIDE 1 MG/1
1 TABLET ORAL DAILY
Status: DISCONTINUED | OUTPATIENT
Start: 2024-04-26 | End: 2024-04-27 | Stop reason: HOSPADM

## 2024-04-26 RX ORDER — BUMETANIDE 1 MG/1
2 TABLET ORAL DAILY
Status: CANCELLED | OUTPATIENT
Start: 2024-04-27

## 2024-04-26 RX ADMIN — SODIUM CHLORIDE, PRESERVATIVE FREE 10 ML: 5 INJECTION INTRAVENOUS at 08:04

## 2024-04-26 RX ADMIN — POTASSIUM CHLORIDE 20 MEQ: 750 TABLET, EXTENDED RELEASE ORAL at 08:03

## 2024-04-26 RX ADMIN — INSULIN GLARGINE 30 UNITS: 100 INJECTION, SOLUTION SUBCUTANEOUS at 20:43

## 2024-04-26 RX ADMIN — SODIUM CHLORIDE, PRESERVATIVE FREE 10 ML: 5 INJECTION INTRAVENOUS at 20:44

## 2024-04-26 RX ADMIN — GUAIFENESIN, DEXTROMETHORPHAN HBR 1 TABLET: 600; 30 TABLET ORAL at 20:43

## 2024-04-26 RX ADMIN — GABAPENTIN 400 MG: 400 CAPSULE ORAL at 08:03

## 2024-04-26 RX ADMIN — FAMOTIDINE 20 MG: 20 TABLET, FILM COATED ORAL at 08:03

## 2024-04-26 RX ADMIN — INSULIN LISPRO 8 UNITS: 100 INJECTION, SOLUTION INTRAVENOUS; SUBCUTANEOUS at 12:22

## 2024-04-26 RX ADMIN — GABAPENTIN 400 MG: 400 CAPSULE ORAL at 20:43

## 2024-04-26 RX ADMIN — IPRATROPIUM BROMIDE AND ALBUTEROL SULFATE 1 DOSE: 2.5; .5 SOLUTION RESPIRATORY (INHALATION) at 14:50

## 2024-04-26 RX ADMIN — SERTRALINE 100 MG: 100 TABLET, FILM COATED ORAL at 20:43

## 2024-04-26 RX ADMIN — APIXABAN 5 MG: 5 TABLET, FILM COATED ORAL at 20:43

## 2024-04-26 RX ADMIN — APIXABAN 5 MG: 5 TABLET, FILM COATED ORAL at 08:03

## 2024-04-26 RX ADMIN — IPRATROPIUM BROMIDE AND ALBUTEROL SULFATE 1 DOSE: 2.5; .5 SOLUTION RESPIRATORY (INHALATION) at 20:47

## 2024-04-26 RX ADMIN — GUAIFENESIN, DEXTROMETHORPHAN HBR 1 TABLET: 600; 30 TABLET ORAL at 08:04

## 2024-04-26 RX ADMIN — BUMETANIDE 1 MG: 1 TABLET ORAL at 08:04

## 2024-04-26 RX ADMIN — IPRATROPIUM BROMIDE AND ALBUTEROL SULFATE 1 DOSE: 2.5; .5 SOLUTION RESPIRATORY (INHALATION) at 10:38

## 2024-04-26 RX ADMIN — ACETAMINOPHEN 650 MG: 325 TABLET ORAL at 13:30

## 2024-04-26 RX ADMIN — ATORVASTATIN CALCIUM 80 MG: 40 TABLET, FILM COATED ORAL at 08:04

## 2024-04-26 RX ADMIN — DILTIAZEM HYDROCHLORIDE 240 MG: 240 CAPSULE, COATED, EXTENDED RELEASE ORAL at 08:03

## 2024-04-26 RX ADMIN — METOPROLOL SUCCINATE 200 MG: 100 TABLET, EXTENDED RELEASE ORAL at 08:04

## 2024-04-26 RX ADMIN — IPRATROPIUM BROMIDE AND ALBUTEROL SULFATE 1 DOSE: 2.5; .5 SOLUTION RESPIRATORY (INHALATION) at 05:51

## 2024-04-26 RX ADMIN — EMPAGLIFLOZIN 10 MG: 10 TABLET, FILM COATED ORAL at 08:03

## 2024-04-26 ASSESSMENT — PAIN DESCRIPTION - LOCATION: LOCATION: LEG

## 2024-04-26 ASSESSMENT — PAIN DESCRIPTION - FREQUENCY: FREQUENCY: CONTINUOUS

## 2024-04-26 ASSESSMENT — PAIN DESCRIPTION - PAIN TYPE: TYPE: ACUTE PAIN

## 2024-04-26 ASSESSMENT — PAIN SCALES - GENERAL: PAINLEVEL_OUTOF10: 4

## 2024-04-26 ASSESSMENT — PAIN - FUNCTIONAL ASSESSMENT: PAIN_FUNCTIONAL_ASSESSMENT: ACTIVITIES ARE NOT PREVENTED

## 2024-04-26 ASSESSMENT — PAIN DESCRIPTION - DESCRIPTORS: DESCRIPTORS: HEAVINESS;ACHING

## 2024-04-26 ASSESSMENT — PAIN DESCRIPTION - ORIENTATION: ORIENTATION: LOWER

## 2024-04-26 ASSESSMENT — PAIN DESCRIPTION - ONSET: ONSET: ON-GOING

## 2024-04-26 NOTE — PROGRESS NOTES
Patient: Michelle Ragland  : 1950  Date of Admission: 2024  Primary Care Physician: Hal Rider  Today's Date: 2023    REASON FOR CONSULTATION: Shortness of Breath    HPI:  Ms. Ragland is a 73 y.o. female who was admitted to the hospital for fatigue and generalized weakness.     Ms. Ragland has a history of history of abnormal echocardiogram which showed that her ejection fraction was reduced to about 40-45%. And a heart catheterization that was relatively unremarkable. Fortunately her repeat echocardiogram in 2018 and in 2019 showed her ejection fraction increased from 45-50% to 55% as well as showing only trivial mitral regurgitation.      She had an ECHO on 6/3/2019 that showed EF of 50-55%. LV wall thickness is mildly increased. LA is mildly dilated (29-33) with a left atrial volume index of 31 m1/m2. mild diastolic dysfunction. She came to the ER on 2019 for slurred speech. She was than taken to [facility] and found to have right sided weakness and was found to have a stroke at that time. She continued to have mild to moderate right sided weakness.      She had abnormal stress test on 2022 There is a moderate perfusion defect of moderate intensity in the lateral, inferolateral and inferior region(s), which is most consistent with an old myocardial infarction with luis-infarct ischemia.      Heart Cath done on 10/6/2022 Mild coronary artery disease without any significant focal stenosis. Normal left ventricular end diastolic pressure (LVEDP).  Continue standard risk factor modification as clinically indicated and consider alternative etiologies of the patients symptoms.      Echo done on 2022 Global left ventricular systolic is difficult to assess due to beat to beat variability due to atrial fibrillation but appears to be mildly reduced with an estimated ejection fraction of 45%. The left ventricular cavity size is within normal limits and the left ventricular wall  mortality related to the patient's current medical conditions are: Intermediate.        April 26, 2024

## 2024-04-26 NOTE — PROGRESS NOTES
Writer to bedside to complete morning assessment. Upon entry to room, pt in bed eyes closed, respirations even and unlabored while on 4L/min oxygen via nasal cannula. Vitals obtained and assessment completed, see flow sheet for details. Pt is A&Ox4.Pt denies any pain. Lung sounds have fine crackles with rhonchi present. Pt denies any SOB or chest pain. Bilateral lower extremities have trace, non-pitting edema. Pt updated on plan of care and whiteboard updated. Pt denies further needs from writer at this time. Call light in reach. Care ongoing.

## 2024-04-26 NOTE — CONSULTS
Patient: Michelle Ragland  : 1950  Date of Admission: 2024  Primary Care Physician: Hal Rider  Today's Date: 2023    REASON FOR CONSULTATION: Shortness of Breath    HPI:  Ms. Ragland is a 73 y.o. female who was admitted to the hospital for fatigue and generalized weakness.     Ms. Ragland has a history of history of abnormal echocardiogram which showed that her ejection fraction was reduced to about 40-45%. And a heart catheterization that was relatively unremarkable. Fortunately her repeat echocardiogram in 2018 and in 2019 showed her ejection fraction increased from 45-50% to 55% as well as showing only trivial mitral regurgitation.      She had an ECHO on 6/3/2019 that showed EF of 50-55%. LV wall thickness is mildly increased. LA is mildly dilated (29-33) with a left atrial volume index of 31 m1/m2. mild diastolic dysfunction. She came to the ER on 2019 for slurred speech. She was than taken to [facility] and found to have right sided weakness and was found to have a stroke at that time. She continued to have mild to moderate right sided weakness.      She had abnormal stress test on 2022 There is a moderate perfusion defect of moderate intensity in the lateral, inferolateral and inferior region(s), which is most consistent with an old myocardial infarction with luis-infarct ischemia.      Heart Cath done on 10/6/2022 Mild coronary artery disease without any significant focal stenosis. Normal left ventricular end diastolic pressure (LVEDP).  Continue standard risk factor modification as clinically indicated and consider alternative etiologies of the patients symptoms.      Echo done on 2022 Global left ventricular systolic is difficult to assess due to beat to beat variability due to atrial fibrillation but appears to be mildly reduced with an estimated ejection fraction of 45%. The left ventricular cavity size is within normal limits and the left ventricular wall

## 2024-04-26 NOTE — RT PROTOCOL NOTE
RESPIRATORY ASSESSMENT PROTOCOL                                                                                              Patient Name: Michelle Ragland Room#: 0314/0314-01 : 1950     Admitting diagnosis: Acute respiratory distress [R06.03]  Acute diastolic CHF (congestive heart failure), NYHA class 3 ([facility]) [I50.31]  Congestive heart failure, unspecified HF chronicity, unspecified heart failure type ([facility]) [I50.9]       Medical History:   Past Medical History:   Diagnosis Date    Adjustment disorder with depressed mood 06/15/2022    Arthritis     Atrial fibrillation, unspecified type ([facility]) 2021    Cerebral artery occlusion with cerebral infarction ([facility])     CHF (congestive heart failure) ([facility])     Chronic combined systolic and diastolic CHF, NYHA class 4 ([facility]) 2017    Chronic kidney disease 2021    Stage 3    Diabetes mellitus ([facility])     Dysarthria and anarthria 2021    Endothelial corneal dystrophy of both eyes 2022    Enterovirus infection 2024    H/O cardiac catheterization 2017    LMCA: Mild irregularites 10-20%. LAD: Mild irregularites 10-20%. LCx: Mild irregularites 10-20%. RCA: Mild irregularites 10-20%. LV function assessed as : Abnormal. EF of 40%.    H/O echocardiogram 2018    EF 55%. Mildly increased LV wall thickness. The left atrium appears moderately to severely dilated. Moderate to severe mitral regurg. Moderate pulmonary HTN with an estimated RV systolic pressure of 44mmHg. Moderate tricuspid regurg. Evidnece fo moderate diastolic dysfunction is seen.     Hearing loss 2022    History of echocardiogram 2019    EF 55%. LV wall thickness moderately increased. LA is mildly dilated w/ LA volume index of 30. trivial mitral regurg. Evidence of moderate (grade II) diastolic dysfunction seen.    History of echocardiogram 2019    EF of 50-55%. LV wall thickness is mildly increased. LA is mildly dilated (29-33) with a left atrial volume  pulmonary history []  Former smoker. Quit over 12 mos. ago []  Current smoker or quit w/ in 12 mos []  Pulm. History and, or 20 pk/yr smoking hx [x]  Admitted w/ acute pulm. dx and, or has been admitted w/ pulm. dx 2 or more times over past 12 mos 4   Surgical History this Admit  (SURG HX) [x]  No surgery []  General surgery []  Lower abdominal []  Thoracic or upper abdominal   []  Thoracic w/ pulm. disease 0   Chest X-Ray (CXR)/CT Scan []  Clear or not applicable []  Not available []  Atelectasis or pleural effusions [x]  Localized infiltrate or pulm. edema []  Con-solidated Infiltrates, bilateral, or in more than 1 lobe 3   TOTAL ACUITY: 11       CARE PLAN    If Acuity Level is 2, 3, or 4 in any of the following:    [] BILATERAL BREATH SOUNDS (BBS)     [x] PULMONARY HISTORY (PULM HX)  [] Respiratory Rate  (RR)    Goal: Improve respiratory functions in patients with airway disease and decrease WOB    [x] AEROSOL PROTOCOL    Total Acuity:   14-28  []  Secondary Assessment in 24 hrs Total Acuity:  9-13  [x]  Secondary Assessment in 24 hrs Total Acuity:  4-8  []  Secondary Assessment in 24 hrs Total Acuity:  0-3  []  Secondary Assessment in 48 hrs   HHN AEROSOL THERAPY with  [physician-ordered bronchodilator(s)] q 4 & Albuterol PRN q2 hrs.   Breath-Actuated Neb if BBS Acuity = 4, and pt. can use MP. Notify physician if condition deteriorates.  HHN AEROSOL THERAPY with  [physician-ordered bronchodilator(s)]  QID and Albuterol PRN q4 hrs.   Breath-Actuated Neb if BBS Acuity = 4, and pt. can use MP.  Notify physician if condition deteriorates. MDI THERAPY with  2 actuations of [physician-ordered bronchodilator(s)] via spacer TID Albuterol and PRN q4 hrs.   If unable to utilize MDI: HHN [physician-ordered bronchodilator(s)] TID and Albuterol PRN q4 hrs.   Notify physician if condition deteriorates. MDI THERAPY with  [physician-ordered bronchodilator(s)] via spacer TID PRN.   If unable to utilize MDI: HHN

## 2024-04-26 NOTE — PROGRESS NOTES
Physical Therapy  Facility/Department: [facility] MED SURG  Daily Treatment Note  NAME: Michelle Ragland  : 1950  MRN: [id number]    Date of Service: 2024    Discharge Recommendations:  Continue to assess pending progress, Subacute/Skilled Nursing Facility, Home with Home health PT        Patient Diagnosis(es): The primary encounter diagnosis was Acute respiratory distress. Diagnoses of Congestive heart failure, unspecified HF chronicity, unspecified heart failure type (HCC) and Acute systolic CHF (congestive heart failure), NYHA class 3 (HCC) were also pertinent to this visit.    Assessment   Assessment: Sit <--> stand to RW with Malcolm. Pre gait activities completed at RW with CGA-Malcolm x 2. Patient completed activities including weight shifting at RW, marching in place with poor+ tolerance. Seated B LE therex x 15, standing at RW toe raises x 10, marches x 5 B LE and hip abduction x 4 L LE. Patient then requested to sit down stating she was too tired to continue standing activities. Patient limited by fatigue. sp02 remained 96% and higher throughout tx on supplimental oxygen. Will continue to progress as tolerated.  Activity Tolerance: Patient limited by fatigue;Patient limited by endurance     Plan    Physical Therapy Plan  General Plan: 2 times a day 7 days a week (1x per day on weekends.)     Restrictions  Restrictions/Precautions  Restrictions/Precautions: General Precautions, Isolation, Fall Risk  Required Braces or Orthoses?: No     Subjective    Subjective  Subjective: Pt. in chair upon arrival, agreeable to therapy at this time  Pain: denies  Orientation  Overall Orientation Status: Within Functional Limits     Objective   Bed Mobility Training  Bed Mobility Training: No  Transfer Training  Transfer Training: Yes  Overall Level of Assistance: Minimum assistance;Assist X1;Additional time  Interventions: Verbal cues;Demonstration (Cues for hand placement, forward COG and COLT.)  Sit to Stand: Minimum  assistance;Assist X1;Additional time  Stand to Sit: Minimum assistance;Assist X1;Additional time  Gait  Gait Training: No (Pre gait activities completed at RW with CGA-Malcolm x 2. Patient completed activities including weight shifting at RW, marching in place with poor+ tolerance. Patient limited by fatigue.)     PT Exercises  Exercise Treatment: Seated B LE therex x 15, standing at RW toe raises x 10, marches x 5 B LE and hip abduction x 4 L LE. Patient then requested to sit down stating she was too tired to continue standing activities.     Safety Devices  Type of Devices: All fall risk precautions in place;Call light within reach;Chair alarm in place;Gait belt;Left in chair;Patient at risk for falls       Goals  Short Term Goals  Time Frame for Short Term Goals: 10 days  Short Term Goal 1: Patient will ambulate 5' with FWW, CGA, without LOB  Short Term Goal 2: Patient will perform transfers and bed mobility with SBA.  Short Term Goal 3: Patient will tolerate 20-30 minutes of therex/act to improve endurance for ADLs.  Patient Goals   Patient Goals : feel better    Education  Patient Education  Education Given To: Patient  Education Provided: Transfer Training;Fall Prevention Strategies;Home Exercise Program  Education Method: Verbal  Barriers to Learning: None  Education Outcome: Verbalized understanding;Continued education needed    Therapy Time   Individual Concurrent Group Co-treatment   Time In 0944         Time Out 1010         Minutes 26           Yarely Tadeo, PTA [id number]

## 2024-04-26 NOTE — PROGRESS NOTES
APRN - Progress Note    Patient - Michelle Ragland  Date of Admission -  2024  2:31 PM  Date of Evaluation -  2024  Hospital Day - 4      SUBJECTIVE:     The Michelle Ragland is a 73 y.o. female who is seen for follow up in the ICU.  Per nursing report and notes, overnight events include:  none .  She resting in bed alert and oriented. Does not feel well. Left chest pain.        ROS:   Constitutional: negative  for fevers, and negative for chills.  Respiratory: positive for shortness of breath, negative for cough, and negative for wheezing  Cardiovascular: negative for chest pain, and negative for palpitations  Gastrointestinal: negative for abdominal pain, negative for nausea,negative for vomiting, negative for diarrhea, and negative for constipation    All other systems were reviewed with the patient and are negative unless otherwise stated in HPI.      OBJECTIVE:     VITAL SIGNS:  Patient Vitals for the past 8 hrs:   BP Temp Temp src Pulse Resp SpO2 Weight   24 0715 106/72 97.7 °F (36.5 °C) Oral 78 14 93 % --   24 0530 -- -- -- -- -- -- 79.8 kg (176 lb)   24 0013 -- -- -- -- -- 94 % --   24 0001 -- -- -- -- -- 94 % --         Temp: 97.7 °F (36.5 °C)  Temp range:    Temp  Av.4 °F (36.3 °C)  Min: 97 °F (36.1 °C)  Max: 97.7 °F (36.5 °C)    BP: 106/72  BP Range:      Systolic (24hrs), Av , Min:87 , Max:138      Diastolic (24hrs), Av, Min:56, Max:88    Pulse: 78  Pulse Range:    Pulse  Av.9  Min: 78  Max: 89    Respirations: 14  Resp Range:   Resp  Av.8  Min: 14  Max: 26    SpO2: 93 %  supplemental O2  SpO2 range:   SpO2  Av %  Min: 88 %  Max: 100 %    Weight  Wt Readings from Last 3 Encounters:   24 79.8 kg (176 lb)   24 82.5 kg (181 lb 12.8 oz)   24 78.8 kg (173 lb 11 oz)     Body mass index is 32.18 kg/m².    24HR INTAKE/OUTPUT:      Intake/Output Summary (Last 24 hours) at 2024 0735  Last data filed at 2024 0719  Gross per 24

## 2024-04-26 NOTE — PROGRESS NOTES
Occupational Therapy  Facility/Department: [facility] MED SURG  Daily Treatment Note  NAME: Michelle Ragland  : 1950  MRN: [id number]    Date of Service: 2024    Discharge Recommendations:  Continue to assess pending progress, Long Term Care without OT, Long Term Care with OT         Patient Diagnosis(es): The primary encounter diagnosis was Acute respiratory distress. Diagnoses of Congestive heart failure, unspecified HF chronicity, unspecified heart failure type (HCC) and Acute systolic CHF (congestive heart failure), NYHA class 3 (HCC) were also pertinent to this visit.     Assessment    Activity Tolerance: Patient limited by fatigue;Patient limited by endurance  Discharge Recommendations: Continue to assess pending progress;Long Term Care without OT;Long Term Care with OT      Plan   Occupational Therapy Plan  Times Per Day: Once a day  Days Per Week: 7 Days  Current Treatment Recommendations: Strengthening;Balance training;Endurance training;Safety education & training;Patient/Caregiver education & training;Equipment evaluation, education, & procurement;Self-Care / ADL     Restrictions   Droplet, fall risk    Subjective   Subjective  Subjective: Pt seate din recliner, requested oral hygiene, then agreed to therex.  Pain: denied  Orientation  Overall Orientation Status: Within Functional Limits  Pain: denies           Objective    Vitals     ADL  Grooming: Setup;Stand by assistance  Grooming Skilled Clinical Factors: seated oral hygiene  Skin Care: Bath wipes;Chlorhexidine solution;Moisture barrier  OT Exercises  Exercise Treatment: BUE therex to increase strength/endurance for ease of fxl tasks. Red digiflex x 20 yellow flex bar bends x 20 and 1 # free weight x 15-20 reps x all planes while seated. Pt demo'd decreased stregnth/ROM in B shoulders and required MOD RBs for recovery for a total of ~ 15 min therex. Dynamic standing x 1.5 min DONTAE supported by FWW. Pt limited by endurance/strength.     Safety

## 2024-04-26 NOTE — PLAN OF CARE
Problem: Discharge Planning  Goal: Discharge to home or other facility with appropriate resources  Outcome: Progressing  Flowsheets (Taken 4/25/2024 1945)  Discharge to home or other facility with appropriate resources:   Identify barriers to discharge with patient and caregiver   Arrange for needed discharge resources and transportation as appropriate   Identify discharge learning needs (meds, wound care, etc)   Refer to discharge planning if patient needs post-hospital services based on physician order or complex needs related to functional status, cognitive ability or social support system     Problem: Safety - Adult  Goal: Free from fall injury  Outcome: Progressing  Flowsheets (Taken 4/23/2024 1930 by Jhoana Toledo, RN)  Free From Fall Injury: Instruct family/caregiver on patient safety     Problem: ABCDS Injury Assessment  Goal: Absence of physical injury  Outcome: Progressing  Flowsheets (Taken 4/26/2024 0426)  Absence of Physical Injury: Implement safety measures based on patient assessment     Problem: Pain  Goal: Verbalizes/displays adequate comfort level or baseline comfort level  Outcome: Progressing  Flowsheets (Taken 4/26/2024 0426)  Verbalizes/displays adequate comfort level or baseline comfort level:   Encourage patient to monitor pain and request assistance   Assess pain using appropriate pain scale   Administer analgesics based on type and severity of pain and evaluate response   Implement non-pharmacological measures as appropriate and evaluate response     Problem: Respiratory - Adult  Goal: Achieves optimal ventilation and oxygenation  Outcome: Progressing  Flowsheets (Taken 4/26/2024 0426)  Achieves optimal ventilation and oxygenation:   Assess for changes in respiratory status   Assess for changes in mentation and behavior   Position to facilitate oxygenation and minimize respiratory effort   Encourage broncho-pulmonary hygiene including cough, deep breathe, incentive spirometry    hours minimum:  Change oxygen saturation probe site  4.  Every 4-6 hours:  If on nasal continuous positive airway pressure, respiratory therapy assess nares and determine need for appliance change or resting period.  Outcome: Progressing     Problem: Nutrition Deficit:  Goal: Optimize nutritional status  Outcome: Progressing  Flowsheets (Taken 4/26/2024 0426)  Nutrient intake appropriate for improving, restoring, or maintaining nutritional needs: Monitor oral intake, labs, and treatment plans     Problem: Chronic Conditions and Co-morbidities  Goal: Patient's chronic conditions and co-morbidity symptoms are monitored and maintained or improved  Outcome: Progressing  Flowsheets (Taken 4/25/2024 1945)  Care Plan - Patient's Chronic Conditions and Co-Morbidity Symptoms are Monitored and Maintained or Improved:   Monitor and assess patient's chronic conditions and comorbid symptoms for stability, deterioration, or improvement   Collaborate with multidisciplinary team to address chronic and comorbid conditions and prevent exacerbation or deterioration   Update acute care plan with appropriate goals if chronic or comorbid symptoms are exacerbated and prevent overall improvement and discharge

## 2024-04-26 NOTE — PROGRESS NOTES
Physical Therapy  Facility/Department: [facility] MED SURG  Daily Treatment Note  NAME: Michelle Ragland  : 1950  MRN: [id number]    Date of Service: 2024    Discharge Recommendations:  Continue to assess pending progress, Subacute/Skilled Nursing Facility, Home with Home health PT        Patient Diagnosis(es): The primary encounter diagnosis was Acute respiratory distress. Diagnoses of Congestive heart failure, unspecified HF chronicity, unspecified heart failure type (HCC) and Acute systolic CHF (congestive heart failure), NYHA class 3 (HCC) were also pertinent to this visit.    Assessment   Assessment: Seated and reclined B LE therex x 10-15 each in all available planes of motion. Patient requiring AAROM as pt fatigued. Moderate rest breaks throughout d/t increased fatigue this PM. Patient requested to hold transfers stating she is too tired to complete at this time. Will continue to progress as tolerated.  Activity Tolerance: Patient limited by fatigue;Patient limited by endurance     Plan    Physical Therapy Plan  General Plan: 2 times a day 7 days a week (1x per day on weekends.)  Current Treatment Recommendations: Strengthening;ROM;Balance training;Functional mobility training;Endurance training;IADL training;ADL/Self-care training;Transfer training;Gait training;Stair training;Neuromuscular re-education;Manual;Return to work related activity;Home exercise program;Safety education & training;Patient/Caregiver education & training;Positioning;Therapeutic activities     Restrictions  Restrictions/Precautions  Restrictions/Precautions: General Precautions, Isolation, Fall Risk  Required Braces or Orthoses?: No     Subjective    Subjective  Subjective: Pt. in chair upon arrival, agreeable to therapy at this time with some encouragement (fatigued).  Pain: denies  Orientation  Overall Orientation Status: Within Functional Limits     Objective   Bed Mobility Training  Bed Mobility Training: No  Transfer

## 2024-04-27 VITALS
WEIGHT: 170 LBS | SYSTOLIC BLOOD PRESSURE: 110 MMHG | TEMPERATURE: 97.1 F | HEART RATE: 81 BPM | BODY MASS INDEX: 31.28 KG/M2 | HEIGHT: 62 IN | DIASTOLIC BLOOD PRESSURE: 76 MMHG | RESPIRATION RATE: 18 BRPM | OXYGEN SATURATION: 97 %

## 2024-04-27 LAB
ANION GAP SERPL CALCULATED.3IONS-SCNC: 7 MMOL/L (ref 9–17)
BUN SERPL-MCNC: 36 MG/DL (ref 8–23)
BUN/CREAT SERPL: 23 (ref 9–20)
CALCIUM SERPL-MCNC: 11 MG/DL (ref 8.6–10.4)
CHLORIDE SERPL-SCNC: 98 MMOL/L (ref 98–107)
CO2 SERPL-SCNC: 35 MMOL/L (ref 20–31)
CREAT SERPL-MCNC: 1.6 MG/DL (ref 0.5–0.9)
GFR SERPL CREATININE-BSD FRML MDRD: 34 ML/MIN/1.73M2
GLUCOSE BLD-MCNC: 113 MG/DL (ref 74–100)
GLUCOSE BLD-MCNC: 218 MG/DL (ref 74–100)
GLUCOSE SERPL-MCNC: 111 MG/DL (ref 70–99)
MAGNESIUM SERPL-MCNC: 2.4 MG/DL (ref 1.6–2.6)
MICROORGANISM SPEC CULT: NORMAL
MICROORGANISM SPEC CULT: NORMAL
POTASSIUM SERPL-SCNC: 4.6 MMOL/L (ref 3.7–5.3)
SERVICE CMNT-IMP: NORMAL
SERVICE CMNT-IMP: NORMAL
SODIUM SERPL-SCNC: 140 MMOL/L (ref 135–144)
SPECIMEN DESCRIPTION: NORMAL
SPECIMEN DESCRIPTION: NORMAL

## 2024-04-27 PROCEDURE — 6370000000 HC RX 637 (ALT 250 FOR IP): Performed by: INTERNAL MEDICINE

## 2024-04-27 PROCEDURE — 36415 COLL VENOUS BLD VENIPUNCTURE: CPT

## 2024-04-27 PROCEDURE — 82947 ASSAY GLUCOSE BLOOD QUANT: CPT

## 2024-04-27 PROCEDURE — 94761 N-INVAS EAR/PLS OXIMETRY MLT: CPT

## 2024-04-27 PROCEDURE — 97110 THERAPEUTIC EXERCISES: CPT

## 2024-04-27 PROCEDURE — 94640 AIRWAY INHALATION TREATMENT: CPT

## 2024-04-27 PROCEDURE — 83735 ASSAY OF MAGNESIUM: CPT

## 2024-04-27 PROCEDURE — 6370000000 HC RX 637 (ALT 250 FOR IP): Performed by: NURSE PRACTITIONER

## 2024-04-27 PROCEDURE — 94669 MECHANICAL CHEST WALL OSCILL: CPT

## 2024-04-27 PROCEDURE — 97116 GAIT TRAINING THERAPY: CPT

## 2024-04-27 PROCEDURE — 2700000000 HC OXYGEN THERAPY PER DAY

## 2024-04-27 PROCEDURE — 97535 SELF CARE MNGMENT TRAINING: CPT

## 2024-04-27 PROCEDURE — 2580000003 HC RX 258: Performed by: NURSE PRACTITIONER

## 2024-04-27 PROCEDURE — 80048 BASIC METABOLIC PNL TOTAL CA: CPT

## 2024-04-27 PROCEDURE — 94664 DEMO&/EVAL PT USE INHALER: CPT

## 2024-04-27 RX ORDER — METOPROLOL SUCCINATE 100 MG/1
100 TABLET, EXTENDED RELEASE ORAL 2 TIMES DAILY
DISCHARGE
Start: 2024-04-27

## 2024-04-27 RX ORDER — BUMETANIDE 2 MG/1
2 TABLET ORAL DAILY
DISCHARGE
Start: 2024-04-27

## 2024-04-27 RX ADMIN — DILTIAZEM HYDROCHLORIDE 240 MG: 240 CAPSULE, COATED, EXTENDED RELEASE ORAL at 09:14

## 2024-04-27 RX ADMIN — GABAPENTIN 400 MG: 400 CAPSULE ORAL at 09:14

## 2024-04-27 RX ADMIN — METOPROLOL SUCCINATE 200 MG: 100 TABLET, EXTENDED RELEASE ORAL at 09:14

## 2024-04-27 RX ADMIN — APIXABAN 5 MG: 5 TABLET, FILM COATED ORAL at 09:14

## 2024-04-27 RX ADMIN — IPRATROPIUM BROMIDE AND ALBUTEROL SULFATE 1 DOSE: 2.5; .5 SOLUTION RESPIRATORY (INHALATION) at 10:07

## 2024-04-27 RX ADMIN — BUMETANIDE 1 MG: 1 TABLET ORAL at 09:13

## 2024-04-27 RX ADMIN — ATORVASTATIN CALCIUM 80 MG: 40 TABLET, FILM COATED ORAL at 09:14

## 2024-04-27 RX ADMIN — INSULIN LISPRO 8 UNITS: 100 INJECTION, SOLUTION INTRAVENOUS; SUBCUTANEOUS at 11:47

## 2024-04-27 RX ADMIN — POTASSIUM CHLORIDE 20 MEQ: 750 TABLET, EXTENDED RELEASE ORAL at 09:14

## 2024-04-27 RX ADMIN — SODIUM CHLORIDE, PRESERVATIVE FREE 10 ML: 5 INJECTION INTRAVENOUS at 09:14

## 2024-04-27 RX ADMIN — GUAIFENESIN, DEXTROMETHORPHAN HBR 1 TABLET: 600; 30 TABLET ORAL at 09:13

## 2024-04-27 RX ADMIN — EMPAGLIFLOZIN 10 MG: 10 TABLET, FILM COATED ORAL at 09:13

## 2024-04-27 RX ADMIN — FAMOTIDINE 20 MG: 20 TABLET, FILM COATED ORAL at 09:13

## 2024-04-27 RX ADMIN — IPRATROPIUM BROMIDE AND ALBUTEROL SULFATE 1 DOSE: 2.5; .5 SOLUTION RESPIRATORY (INHALATION) at 06:33

## 2024-04-27 NOTE — PROGRESS NOTES
Attempted to call staff at [facility], staff stated would have nurse call writer back.

## 2024-04-27 NOTE — PLAN OF CARE
Problem: Discharge Planning  Goal: Discharge to home or other facility with appropriate resources  Outcome: Progressing  Flowsheets (Taken 4/26/2024 2101)  Discharge to home or other facility with appropriate resources:   Identify barriers to discharge with patient and caregiver   Arrange for needed discharge resources and transportation as appropriate   Identify discharge learning needs (meds, wound care, etc)     Problem: Safety - Adult  Goal: Free from fall injury  Outcome: Progressing  Flowsheets (Taken 4/26/2024 2101)  Free From Fall Injury: Instruct family/caregiver on patient safety     Problem: ABCDS Injury Assessment  Goal: Absence of physical injury  Outcome: Progressing  Flowsheets (Taken 4/26/2024 2101)  Absence of Physical Injury: Implement safety measures based on patient assessment     Problem: Pain  Goal: Verbalizes/displays adequate comfort level or baseline comfort level  Outcome: Progressing  Flowsheets (Taken 4/26/2024 2101)  Verbalizes/displays adequate comfort level or baseline comfort level:   Encourage patient to monitor pain and request assistance   Assess pain using appropriate pain scale   Implement non-pharmacological measures as appropriate and evaluate response   Administer analgesics based on type and severity of pain and evaluate response   Notify Licensed Independent Practitioner if interventions unsuccessful or patient reports new pain     Problem: Respiratory - Adult  Goal: Achieves optimal ventilation and oxygenation  Outcome: Progressing  Flowsheets (Taken 4/26/2024 2101)  Achieves optimal ventilation and oxygenation:   Assess for changes in respiratory status   Assess for changes in mentation and behavior   Position to facilitate oxygenation and minimize respiratory effort   Oxygen supplementation based on oxygen saturation or arterial blood gases   Respiratory therapy support as indicated   Assess and instruct to report shortness of breath or any respiratory difficulty    Chronic Conditions and Co-Morbidity Symptoms are Monitored and Maintained or Improved:   Monitor and assess patient's chronic conditions and comorbid symptoms for stability, deterioration, or improvement   Collaborate with multidisciplinary team to address chronic and comorbid conditions and prevent exacerbation or deterioration   Update acute care plan with appropriate goals if chronic or comorbid symptoms are exacerbated and prevent overall improvement and discharge      month(s)

## 2024-04-27 NOTE — PROGRESS NOTES
[facility] [place] after transferred to pt's nurse.

## 2024-04-27 NOTE — PROGRESS NOTES
Albert Salmeron M.D.  Internal Medicine Progress Note    Patient: Michelle Ragland  Date of Admission: 4/22/2024  2:31 PM  Date of Evaluation: 4/27/2024      SUBJECTIVE:    Michelle Ragland is a 73 y.o. female who was seen today for follow up of Acute on chronic combined systolic and diastolic CHF, NYHA class 4 (HCC).  She is feeling much better today.  She denies any chest pain, palpitations or SOB.  She denies fever or chills and has been afebrile.  She is tolerating diet without any nausea, vomiting or diarrhea.    ROS:   Constitutional: negative  for fevers, and negative for chills.  Respiratory: negative for shortness of breath, negative for cough, and negative for wheezing  Cardiovascular: negative for chest pain, and negative for palpitations  Gastrointestinal: negative for abdominal pain, negative for nausea,negative for vomiting, negative for diarrhea, and negative for constipation     All other systems were reviewed with the patient and are negative unless otherwise stated in HPI    -----------------------------------------------------------------  OBJECTIVE:  Vitals:   Temp: 97.1 °F (36.2 °C)  BP: 110/76  Respirations: 18  Pulse: 81  SpO2: 93 % on supplemental O2    Weight  Wt Readings from Last 3 Encounters:   04/27/24 77.1 kg (170 lb)   04/19/24 86.5 kg (190 lb 9.6 oz)   03/22/24 82.5 kg (181 lb 12.8 oz)     Body mass index is 31.09 kg/m².    24HR INTAKE/OUTPUT:      Intake/Output Summary (Last 24 hours) at 4/27/2024 0929  Last data filed at 4/27/2024 0214  Gross per 24 hour   Intake 770 ml   Output 625 ml   Net 145 ml       Exam:  GEN:    Awake, alert and oriented x 3.   EYES:  EOMI, pupils equal   NECK: Supple. No lymphadenopathy.  No carotid bruit  CVS:    regular rate and rhythm, 2/6 systolic murmur  PULM:  CTA, no wheezes, rales or rhonchi, no acute respiratory distress  ABD:    Bowels sounds normal.  Abdomen is soft.  No distention.  no tenderness to palpation.   EXT:   no edema bilaterally .  No  DNR-CCA   Disposition: Discharge plan is back to [facility] today        Albert Salmeron MD , M.D.  4/27/2024  9:45 AM

## 2024-04-27 NOTE — PLAN OF CARE
Problem: Discharge Planning  Goal: Discharge to home or other facility with appropriate resources  4/27/2024 1017 by Dodie Wilkerson  Outcome: Progressing     Problem: Safety - Adult  Goal: Free from fall injury  4/27/2024 1017 by Dodie Wilkerson  Outcome: Progressing

## 2024-04-27 NOTE — PROGRESS NOTES
RESPIRATORY ASSESSMENT PROTOCOL                                                                                              Patient Name: Michelle Ragland Room#: 0314/0314-01 : 1950     Admitting diagnosis: Acute respiratory distress [R06.03]  Acute diastolic CHF (congestive heart failure), NYHA class 3 ([facility]) [I50.31]  Congestive heart failure, unspecified HF chronicity, unspecified heart failure type ([facility]) [I50.9]       Medical History:   Past Medical History:   Diagnosis Date    Adjustment disorder with depressed mood 06/15/2022    Arthritis     Atrial fibrillation, unspecified type ([facility]) 2021    Cerebral artery occlusion with cerebral infarction ([facility])     CHF (congestive heart failure) ([facility])     Chronic combined systolic and diastolic CHF, NYHA class 4 ([facility]) 2017    Chronic kidney disease 2021    Stage 3    Diabetes mellitus ([facility])     Dysarthria and anarthria 2021    Endothelial corneal dystrophy of both eyes 2022    Enterovirus infection 2024    H/O cardiac catheterization 2017    LMCA: Mild irregularites 10-20%. LAD: Mild irregularites 10-20%. LCx: Mild irregularites 10-20%. RCA: Mild irregularites 10-20%. LV function assessed as : Abnormal. EF of 40%.    H/O echocardiogram 2018    EF 55%. Mildly increased LV wall thickness. The left atrium appears moderately to severely dilated. Moderate to severe mitral regurg. Moderate pulmonary HTN with an estimated RV systolic pressure of 44mmHg. Moderate tricuspid regurg. Evidnece fo moderate diastolic dysfunction is seen.     Hearing loss 2022    History of echocardiogram 2019    EF 55%. LV wall thickness moderately increased. LA is mildly dilated w/ LA volume index of 30. trivial mitral regurg. Evidence of moderate (grade II) diastolic dysfunction seen.    History of echocardiogram 2019    EF of 50-55%. LV wall thickness is mildly increased. LA is mildly dilated (29-33) with a left atrial volume  index of 31 m1/m2. mild diastolic dysfunction.    Hyperlipidemia     Hypertension     Major depressive disorder 09/28/2022    Prolonged QT interval 08/10/2023       PATIENT ASSESSMENT    LABORATORY DATA  Hematology:   Lab Results   Component Value Date/Time    WBC 5.4 04/25/2024 05:20 AM    RBC 4.18 04/25/2024 05:20 AM    HGB 10.0 04/25/2024 05:20 AM    HCT 33.8 04/25/2024 05:20 AM     04/25/2024 05:20 AM     Chemistry:    Lab Results   Component Value Date/Time    PHART 7.435 04/23/2024 09:34 AM    OJP3PZT 46.0 04/23/2024 09:34 AM    PO2ART 56.5 04/23/2024 09:34 AM    P3JIINFB 90.1 04/23/2024 09:34 AM    CYX8DRH 30.2 04/23/2024 09:34 AM    PBEA 5.1 04/23/2024 09:34 AM       VITALS  Pulse: 81   Respirations: 18  BP: 110/76  SpO2: 97 % O2 Device: Nasal cannula  Temp: 97.1 °F (36.2 °C)    SKIN COLOR  [x] Normal  [] Pale  [] Dusky  [] Cyanotic    RESPIRATORY PATTERN  [x] Normal  [] Dyspnea  [] Cheyne-Rodriguez  [] Kussmaul  [] Biots    AMBULATORY  [] Yes  [] No  [x] With Assistance    PEAK FLOW  Predicted:     Personal Best:            Patient Acuity 0 1 2 3 4 Score   Level of Consciousness (LOC) [x]  Alert & Oriented or Pt normal LOC []  Confused;follows directions []  Confused & uncooper-ative []  Obtunded []  Comatose 0   Respiratory Rate  (RR) []  Reg. rate & pattern. 12 - 20 bpm  []  Increased RR. Greater than 20 bpm   [x]  SOB w/ exertion or RR greater than 24 bpm []  Access- ory muscle use at rest. Abn.  resp. []  SOB at rest.   2   Bilateral Breath Sounds (BBS) []  Clear []  Diminish-ed bases  []  Diminish-ed t/o, or rales   [x]  Sporadic, scattered wheezes or rhonchi []  Persistentwheezes and, or absent BBS 3   Cough [x]  Strong, effective, & non-prod. []  Effective & prod. Less than 25 ml (2 TBSP) over past 24 hrs []  Ineffective & non-prod to less than 25 ML over past 24 hrs []  Ineffective and, or greater than 25 ml sputum prod. past 24 hrs. []  Nonspon- taneous; Requires suctioning 0   Pulmonary

## 2024-04-27 NOTE — PROGRESS NOTES
Occupational Therapy  Facility/Department: [facility] MED SURG  Daily Treatment Note  NAME: Michelle Ragland  : 1950  MRN: [id number]    Date of Service: 2024    Discharge Recommendations:  Continue to assess pending progress, Long Term Care without OT, Long Term Care with OT         Patient Diagnosis(es): The primary encounter diagnosis was Acute respiratory distress. Diagnoses of Congestive heart failure, unspecified HF chronicity, unspecified heart failure type (HCC) and Acute systolic CHF (congestive heart failure), NYHA class 3 (HCC) were also pertinent to this visit.     Assessment    Activity Tolerance: Patient limited by fatigue;Patient limited by endurance  Discharge Recommendations: Continue to assess pending progress;Long Term Care without OT;Long Term Care with OT      Plan   Occupational Therapy Plan  Times Per Day: Once a day  Days Per Week: 7 Days  Current Treatment Recommendations: Strengthening;Balance training;Endurance training;Safety education & training;Patient/Caregiver education & training;Equipment evaluation, education, & procurement;Self-Care / ADL     Restrictions  Restrictions/Precautions  Restrictions/Precautions: General Precautions;Isolation;Fall Risk    Subjective   Subjective  Subjective: Pt sitting EOB upon arrival. pt agreed to participate in therapy session.  Pain: Pt reported pain in RLE, however did not rate.          Objective    Vitals       ADL  Functional Mobility: Minimal assistance  Functional Mobility Skilled Clinical Factors: Min A to complete func mob from bed to chair with RW.  Additional Comments: Min A to complete sit to stand transfer from EOB.  OT Exercises  Exercise Treatment: Pt completed BUE ther ex while seated EOB x 5 planes x 15 reps x 1 set to increase UE strength and endurance in order to ease completion of ADL tasks. Pt required RBs as needed secondary to fatigue.     Safety Devices  Type of Devices: All fall risk precautions in place;Call light

## 2024-04-27 NOTE — PROGRESS NOTES
Physical Therapy  Facility/Department: [facility] MED SURG  Daily Treatment Note  NAME: Michelle Ragland  : 1950  MRN: [id number]    Date of Service: 2024    Discharge Recommendations:  Continue to assess pending progress, Subacute/Skilled Nursing Facility, Home with Home health PT      Patient Diagnosis(es): The primary encounter diagnosis was Acute respiratory distress. Diagnoses of Congestive heart failure, unspecified HF chronicity, unspecified heart failure type (HCC) and Acute systolic CHF (congestive heart failure), NYHA class 3 (HCC) were also pertinent to this visit.    Assessment   Assessment: Seated B LE exercises x20. Bed mobility: Min A. Transfers: Min A x2, additional time required. Gait traininft using rww with Min A x2.  Activity Tolerance: Patient limited by fatigue;Patient limited by endurance     Plan    Physical Therapy Plan  General Plan: 2 times a day 7 days a week  Specific Instructions for Next Treatment: 1/x daily on weekends  Current Treatment Recommendations: Strengthening;ROM;Balance training;Functional mobility training;Endurance training;IADL training;ADL/Self-care training;Transfer training;Gait training;Stair training;Neuromuscular re-education;Manual;Return to work related activity;Home exercise program;Safety education & training;Patient/Caregiver education & training;Positioning;Therapeutic activities     Restrictions  Restrictions/Precautions  Restrictions/Precautions: General Precautions, Isolation, Fall Risk  Required Braces or Orthoses?: No     Subjective    Subjective  Subjective: Pt resting in bed on arrival to her room, agreeable to therapy at this time.  Pain: Pt reports pain in R LE and states she hasn't had pain medication this morning, no rating given  Orientation  Overall Orientation Status: Within Functional Limits  Cognition  Overall Cognitive Status: WFL     Objective   Bed Mobility Training  Bed Mobility Training: Yes  Overall Level of Assistance:

## 2024-04-27 NOTE — DISCHARGE SUMMARY
Albert Salmeron M.D.  Internal Medicine Discharge Summary    Patient ID:  Mcihelle Ragland  [id number]  1950    Admission date: 4/22/2024    Discharge date: 4/27/2024     Admitting Physician: Hal Rider MD     Primary Care Physician: Hal Rider MD     Primary Discharge Diagnoses:   Principal Problem:    Acute on chronic combined systolic and diastolic CHF, NYHA class 4 ([facility])  Active Problems:    Acute respiratory distress    Non-ischemic cardiomyopathy (HCC)    Hypertension    Idiopathic cardiomyopathy (HCC)    Type 2 diabetes mellitus, without long-term current use of insulin (HCC)    PAF (paroxysmal atrial fibrillation) ([facility])    Secondary hypercoagulable state ([facility])    Enterovirus infection  Resolved Problems:    * No resolved hospital problems. *       Additional Diagnoses:       Diagnosis Date    Adjustment disorder with depressed mood 06/15/2022    Arthritis     Atrial fibrillation, unspecified type ([facility]) 11/20/2021    Cerebral artery occlusion with cerebral infarction ([facility])     CHF (congestive heart failure) ([facility])     Chronic combined systolic and diastolic CHF, NYHA class 4 ([facility]) 08/28/2017    Chronic kidney disease 11/20/2021    Stage 3    Diabetes mellitus ([facility])     Dysarthria and anarthria 11/20/2021    Endothelial corneal dystrophy of both eyes 04/21/2022    Enterovirus infection 04/23/2024    H/O cardiac catheterization 08/04/2017    LMCA: Mild irregularites 10-20%. LAD: Mild irregularites 10-20%. LCx: Mild irregularites 10-20%. RCA: Mild irregularites 10-20%. LV function assessed as : Abnormal. EF of 40%.    H/O echocardiogram 12/18/2018    EF 55%. Mildly increased LV wall thickness. The left atrium appears moderately to severely dilated. Moderate to severe mitral regurg. Moderate pulmonary HTN with an estimated RV systolic pressure of 44mmHg. Moderate tricuspid regurg. Evidnece fo moderate diastolic dysfunction is seen.     Hearing loss 09/26/2022    History of echocardiogram

## 2024-04-30 ENCOUNTER — COMMUNITY CARE MANAGEMENT (OUTPATIENT)
Facility: CLINIC | Age: 74
End: 2024-04-30

## 2024-06-24 ENCOUNTER — HOSPITAL ENCOUNTER (OUTPATIENT)
Age: 74
Setting detail: SPECIMEN
Discharge: HOME | End: 2024-06-24
Payer: MEDICARE

## 2024-06-24 LAB
BACTERIA URNS QL MICRO: ABNORMAL
BILIRUB UR QL STRIP: NEGATIVE
CLARITY UR: CLEAR
COLOR UR: YELLOW
EPI CELLS #/AREA URNS HPF: ABNORMAL /HPF (ref 0–25)
GLUCOSE UR STRIP-MCNC: ABNORMAL MG/DL
HGB UR QL STRIP.AUTO: NEGATIVE
KETONES UR STRIP-MCNC: NEGATIVE MG/DL
LEUKOCYTE ESTERASE UR QL STRIP: NEGATIVE
NITRITE UR QL STRIP: NEGATIVE
PH UR STRIP: 6 [PH] (ref 5–9)
PROT UR STRIP-MCNC: NEGATIVE MG/DL
RBC #/AREA URNS HPF: ABNORMAL /HPF (ref 0–2)
SP GR UR STRIP: 1.02 (ref 1.01–1.02)
UROBILINOGEN UR STRIP-ACNC: NORMAL EU/DL (ref 0–1)
WBC #/AREA URNS HPF: ABNORMAL /HPF (ref 0–5)

## 2024-06-24 PROCEDURE — 81001 URINALYSIS AUTO W/SCOPE: CPT

## 2024-09-03 ENCOUNTER — NURSE ONLY (OUTPATIENT)
Dept: CARDIOLOGY | Age: 74
End: 2024-09-03
Payer: MEDICARE

## 2024-09-03 DIAGNOSIS — Z95.810 ICD (IMPLANTABLE CARDIOVERTER-DEFIBRILLATOR) IN PLACE: Primary | ICD-10-CM

## 2024-09-03 DIAGNOSIS — I42.9 IDIOPATHIC CARDIOMYOPATHY (HCC): ICD-10-CM

## 2024-09-03 PROCEDURE — 93295 DEV INTERROG REMOTE 1/2/MLT: CPT | Performed by: FAMILY MEDICINE

## 2024-09-18 ENCOUNTER — HOSPITAL ENCOUNTER (OUTPATIENT)
Age: 74
Discharge: HOME | End: 2024-09-18
Payer: MEDICARE

## 2024-09-18 ENCOUNTER — OFFICE VISIT (OUTPATIENT)
Dept: CARDIOLOGY | Age: 74
End: 2024-09-18
Payer: MEDICARE

## 2024-09-18 ENCOUNTER — HOSPITAL ENCOUNTER (OUTPATIENT)
Dept: VASCULAR LAB | Age: 74
Discharge: HOME | End: 2024-09-20
Payer: MEDICARE

## 2024-09-18 VITALS
WEIGHT: 187 LBS | HEART RATE: 81 BPM | DIASTOLIC BLOOD PRESSURE: 82 MMHG | HEIGHT: 62 IN | RESPIRATION RATE: 18 BRPM | SYSTOLIC BLOOD PRESSURE: 121 MMHG | BODY MASS INDEX: 34.41 KG/M2 | OXYGEN SATURATION: 98 %

## 2024-09-18 DIAGNOSIS — I50.43 ACUTE ON CHRONIC COMBINED SYSTOLIC AND DIASTOLIC HF (HEART FAILURE), NYHA CLASS 4 (HCC): Primary | ICD-10-CM

## 2024-09-18 DIAGNOSIS — M79.89 LEG SWELLING: ICD-10-CM

## 2024-09-18 DIAGNOSIS — R07.9 CHEST PAIN, UNSPECIFIED TYPE: ICD-10-CM

## 2024-09-18 DIAGNOSIS — I48.20 CHRONIC A-FIB (HCC): ICD-10-CM

## 2024-09-18 DIAGNOSIS — I50.42 CHRONIC COMBINED SYSTOLIC AND DIASTOLIC HF (HEART FAILURE), NYHA CLASS 2 (HCC): ICD-10-CM

## 2024-09-18 DIAGNOSIS — G45.9 TIA (TRANSIENT ISCHEMIC ATTACK): ICD-10-CM

## 2024-09-18 DIAGNOSIS — I42.9 IDIOPATHIC CARDIOMYOPATHY (HCC): ICD-10-CM

## 2024-09-18 DIAGNOSIS — Z95.810 ICD (IMPLANTABLE CARDIOVERTER-DEFIBRILLATOR) IN PLACE: ICD-10-CM

## 2024-09-18 DIAGNOSIS — I48.0 PAF (PAROXYSMAL ATRIAL FIBRILLATION) (HCC): ICD-10-CM

## 2024-09-18 LAB
ANION GAP SERPL CALCULATED.3IONS-SCNC: 10 MMOL/L (ref 9–16)
BASOPHILS # BLD: 0.04 K/UL (ref 0–0.2)
BASOPHILS NFR BLD: 1 % (ref 0–2)
BNP SERPL-MCNC: 6290 PG/ML (ref 0–125)
BUN SERPL-MCNC: 29 MG/DL (ref 8–23)
BUN/CREAT SERPL: 21 (ref 9–20)
CALCIUM SERPL-MCNC: 10.7 MG/DL (ref 8.6–10.4)
CHLORIDE SERPL-SCNC: 103 MMOL/L (ref 98–107)
CO2 SERPL-SCNC: 27 MMOL/L (ref 20–31)
CREAT SERPL-MCNC: 1.4 MG/DL (ref 0.5–0.9)
ECHO BSA: 1.93 M2
EOSINOPHIL # BLD: 0.08 K/UL (ref 0–0.44)
EOSINOPHILS RELATIVE PERCENT: 1 % (ref 1–4)
ERYTHROCYTE [DISTWIDTH] IN BLOOD BY AUTOMATED COUNT: 18.7 % (ref 11.8–14.4)
GFR, ESTIMATED: 41 ML/MIN/1.73M2
GLUCOSE SERPL-MCNC: 114 MG/DL (ref 74–99)
HCT VFR BLD AUTO: 40.7 % (ref 36.3–47.1)
HGB BLD-MCNC: 11.8 G/DL (ref 11.9–15.1)
IMM GRANULOCYTES # BLD AUTO: <0.03 K/UL (ref 0–0.3)
IMM GRANULOCYTES NFR BLD: 0 %
LYMPHOCYTES NFR BLD: 0.87 K/UL (ref 1.1–3.7)
LYMPHOCYTES RELATIVE PERCENT: 15 % (ref 24–43)
MCH RBC QN AUTO: 23.7 PG (ref 25.2–33.5)
MCHC RBC AUTO-ENTMCNC: 29 G/DL (ref 28.4–34.8)
MCV RBC AUTO: 81.9 FL (ref 82.6–102.9)
MONOCYTES NFR BLD: 0.78 K/UL (ref 0.1–1.2)
MONOCYTES NFR BLD: 13 % (ref 3–12)
NEUTROPHILS NFR BLD: 70 % (ref 36–65)
NEUTS SEG NFR BLD: 4.18 K/UL (ref 1.5–8.1)
NRBC BLD-RTO: 0 PER 100 WBC
PLATELET # BLD AUTO: 192 K/UL (ref 138–453)
PMV BLD AUTO: 10.7 FL (ref 8.1–13.5)
POTASSIUM SERPL-SCNC: 4.8 MMOL/L (ref 3.7–5.3)
RBC # BLD AUTO: 4.97 M/UL (ref 3.95–5.11)
SODIUM SERPL-SCNC: 140 MMOL/L (ref 136–145)
WBC OTHER # BLD: 6 K/UL (ref 3.5–11.3)

## 2024-09-18 PROCEDURE — 1090F PRES/ABSN URINE INCON ASSESS: CPT | Performed by: FAMILY MEDICINE

## 2024-09-18 PROCEDURE — 1123F ACP DISCUSS/DSCN MKR DOCD: CPT | Performed by: FAMILY MEDICINE

## 2024-09-18 PROCEDURE — 3079F DIAST BP 80-89 MM HG: CPT | Performed by: FAMILY MEDICINE

## 2024-09-18 PROCEDURE — 80048 BASIC METABOLIC PNL TOTAL CA: CPT

## 2024-09-18 PROCEDURE — 1036F TOBACCO NON-USER: CPT | Performed by: FAMILY MEDICINE

## 2024-09-18 PROCEDURE — 3017F COLORECTAL CA SCREEN DOC REV: CPT | Performed by: FAMILY MEDICINE

## 2024-09-18 PROCEDURE — G8427 DOCREV CUR MEDS BY ELIG CLIN: HCPCS | Performed by: FAMILY MEDICINE

## 2024-09-18 PROCEDURE — G8400 PT W/DXA NO RESULTS DOC: HCPCS | Performed by: FAMILY MEDICINE

## 2024-09-18 PROCEDURE — 83880 ASSAY OF NATRIURETIC PEPTIDE: CPT

## 2024-09-18 PROCEDURE — 93971 EXTREMITY STUDY: CPT | Performed by: INTERNAL MEDICINE

## 2024-09-18 PROCEDURE — 36415 COLL VENOUS BLD VENIPUNCTURE: CPT

## 2024-09-18 PROCEDURE — 3074F SYST BP LT 130 MM HG: CPT | Performed by: FAMILY MEDICINE

## 2024-09-18 PROCEDURE — 85025 COMPLETE CBC W/AUTO DIFF WBC: CPT

## 2024-09-18 PROCEDURE — 93971 EXTREMITY STUDY: CPT

## 2024-09-18 PROCEDURE — G8417 CALC BMI ABV UP PARAM F/U: HCPCS | Performed by: FAMILY MEDICINE

## 2024-09-18 RX ORDER — BUMETANIDE 2 MG/1
TABLET ORAL
Qty: 30 TABLET | Refills: 5 | Status: SHIPPED | OUTPATIENT
Start: 2024-09-18

## 2024-09-19 ENCOUNTER — TELEPHONE (OUTPATIENT)
Dept: CARDIOLOGY | Age: 74
End: 2024-09-19

## 2024-10-22 ENCOUNTER — PHARMACY VISIT (OUTPATIENT)
Age: 74
End: 2024-10-22
Payer: MEDICARE

## 2024-10-22 VITALS
BODY MASS INDEX: 32.92 KG/M2 | WEIGHT: 180 LBS | DIASTOLIC BLOOD PRESSURE: 63 MMHG | OXYGEN SATURATION: 95 % | HEART RATE: 40 BPM | SYSTOLIC BLOOD PRESSURE: 117 MMHG

## 2024-10-22 DIAGNOSIS — I50.42 CHRONIC COMBINED SYSTOLIC AND DIASTOLIC CHF, NYHA CLASS 4 (HCC): Primary | ICD-10-CM

## 2024-10-22 PROCEDURE — 99203 OFFICE O/P NEW LOW 30 MIN: CPT

## 2024-10-22 RX ORDER — BUSPIRONE HYDROCHLORIDE 5 MG/1
2.5 TABLET ORAL 3 TIMES DAILY
COMMUNITY
Start: 2024-09-27

## 2024-10-22 RX ORDER — POTASSIUM CHLORIDE 1500 MG/1
20 TABLET, EXTENDED RELEASE ORAL DAILY
COMMUNITY
Start: 2024-09-22

## 2024-10-22 RX ORDER — DILTIAZEM HYDROCHLORIDE 240 MG/1
240 CAPSULE, COATED, EXTENDED RELEASE ORAL DAILY
COMMUNITY
Start: 2024-10-04

## 2024-10-22 RX ORDER — NYSTATIN 100000 [USP'U]/G
POWDER TOPICAL 2 TIMES DAILY
COMMUNITY
Start: 2024-10-18

## 2024-10-22 NOTE — PATIENT INSTRUCTIONS
HEART FAILURE:    With heart failure you must follow up with your Cardiologist, your PCP and other physicians as appropriate - especially 7 days after a hospitalization and then as directed.     Please call right away with any signs or symptoms of heart failure or other medical conditions that need attention or with any questions at all. Please call your Cardiologist or your PCP or the Outpatient Heart Failure Clinic at [phone].  We can help manage these symptoms and often prevent a visit to the Emergency Room or hospitalization.       * Know your dry weight (your weight without any fluid on board)    * Call any time you have questions about anything. Do not wait.    * It is very important to take your medications as prescribed, do not miss any doses.   Call for refills before you run out. Typically when you have one week left.   If you have trouble getting your medications for any reason, call us at [phone].    * Avoid NSAIDs (non steroidal anti inflammatory drugs) like Aleve (naproxen), Advil and Motrin (ibuprofen), Mobic (diclofenac), Celebrex (celecoxib) and aspirin. A daily aspirin is okay if recommended by your physician.      It is okay to take Tylenol (acetaminophen) unless your physician has told you otherwise.      * Limit sodium intake.   Typically this is no more than 2,000-2,400 milligrams (mg) per day.   You will need to add up the sodium content found on the nutrition label for the foods you eat each day.    * Weigh yourself every day. Weigh yourself before breakfast and after you go to the restroom.  Wear the same thing each time you weigh yourself.   Keep a log and bring it to each visit.   Call if you gain 3 or more pounds in 1-7 days - [phone]                                      * If you have hypertension (high blood pressure), you may want to check your blood pressure daily. Your blood pressure goal is less than 130/80 unless instructed differently by your physician. Keep a log

## 2024-10-22 NOTE — PROGRESS NOTES
regurgitation by prior echocardiogram I34.0    Morbid obesity E66.01    CKD (chronic kidney disease) stage 3, GFR 30-59 ml/min ([facility]) N18.30    Physical deconditioning R53.81    TIA (transient ischemic attack) G45.9    Old lacunar stroke without late effect Z86.73    Dysarthria R47.1    Stroke determined by clinical assessment ([facility]) I63.9    Aphasia R47.01    Type 2 diabetes mellitus, without long-term current use of insulin ([facility]) E11.9    Uncontrolled type 2 diabetes mellitus with hyperglycemia ([facility]) E11.65    Acute ischemic stroke ([facility]) I63.9    PAF (paroxysmal atrial fibrillation) ([facility]) I48.0    Encounter for current long-term use of anticoagulants Z79.01    Hemiplegia and hemiparesis following cerebral infarction affecting right dominant side ([facility]) I69.351    Atrial fibrillation with RVR ([facility]) I48.91    Unable to care for self Z78.9    Abnormal result of other cardiovascular function study R94.39    Chest pain due to CAD ([facility]) I25.119    Acute systolic CHF (congestive heart failure), NYHA class 3 ([facility]) I50.21    Acute UTI N39.0    Hyperglycemia due to type 2 diabetes mellitus ([facility]) E11.65    Mild malnutrition ([facility]) E44.1    Prolonged Q-T interval on ECG R94.31    Generalized weakness R53.1    Acute diastolic CHF (congestive heart failure), NYHA class 3 ([facility]) I50.31    Secondary hypercoagulable state ([facility]) D68.69    Enterovirus infection B34.1    Acute respiratory distress R06.03    Non-ischemic cardiomyopathy ([facility]) I42.8     Social History     Tobacco Use    Smoking status: Never    Smokeless tobacco: Never   Vaping Use    Vaping status: Never Used   Substance Use Topics    Alcohol use: Not Currently     Alcohol/week: 1.0 standard drink of alcohol     Types: 1 Glasses of wine per week    Drug use: No       Potassium (mmol/L)   Date Value   10/04/2024 4.9   09/18/2024 4.8   07/18/2024 4.8   04/27/2024 4.6   04/26/2024 3.8   04/25/2024 4.2     BUN (mg/dL)   Date Value   10/04/2024 25   09/18/2024 29 (H)

## 2024-12-17 ENCOUNTER — PHARMACY VISIT (OUTPATIENT)
Age: 74
End: 2024-12-17
Payer: MEDICARE

## 2024-12-17 VITALS
DIASTOLIC BLOOD PRESSURE: 70 MMHG | HEART RATE: 71 BPM | BODY MASS INDEX: 34.57 KG/M2 | WEIGHT: 189 LBS | SYSTOLIC BLOOD PRESSURE: 150 MMHG | OXYGEN SATURATION: 92 %

## 2024-12-17 DIAGNOSIS — I50.42 CHRONIC COMBINED SYSTOLIC AND DIASTOLIC CHF, NYHA CLASS 4 (HCC): Primary | ICD-10-CM

## 2024-12-17 PROCEDURE — 99213 OFFICE O/P EST LOW 20 MIN: CPT

## 2024-12-17 RX ORDER — GABAPENTIN 300 MG/1
300 CAPSULE ORAL 3 TIMES DAILY
COMMUNITY
Start: 2024-11-23

## 2024-12-17 RX ORDER — SACUBITRIL AND VALSARTAN 24; 26 MG/1; MG/1
1 TABLET, FILM COATED ORAL 2 TIMES DAILY
COMMUNITY

## 2024-12-17 NOTE — PROGRESS NOTES
[facility]  Medication Management  Pharmacist  Heart Failure    [address] [place], [place] [address]  Phone: [phone]  Fax: [phone]      NAME: Michelle Ragland  MEDICAL RECORD NUMBER:  [id number]  AGE: 74 y.o.   GENDER: female  : 1950  EPISODE DATE:  2024      Heart Failure Management referral by Dr. Joaquin         Today's Wt: 189 lb (up 9 lb)  Wt Readings from Last 6 Encounters:   24 85.7 kg (189 lb)   11/15/24 82.5 kg (181 lb 12.8 oz)   10/22/24 81.6 kg (180 lb)   10/18/24 82.8 kg (182 lb 9.6 oz)   24 81.7 kg (180 lb 3.2 oz)   24 84.8 kg (187 lb)    and   Ht Readings from Last 1 Encounters:   24 1.575 m (5' 2\")                 /70   HR:71  O2Sat: 92% (on O2)     Extremities and pitting edema 1+ pitting edema bilaterally up to knees  Skin cool, very dry with dime sized open sore on right leg.      Subjective   Ms. Ragland is a 74 y.o. female here for the Heart Failure Services.    They are here today for a comprehensive medication review including over the counter medications and herbal products, overall wellbeing assessment, transition of care and any needed adjustments with updates and recommendations communicated to the referring physician.      Patient Symptoms Patient is here for 2 month follow up. She lives at [facility] due to previous stroke 3-4 years ago and she is slow to respond and quiet. Patient is difficult to assess as it is hard for her to answer questions. Patient wears O2 all the time and states she is on 3L continuously.     Shortness of Breath:   []   None   []   Dyspnea on exertion   [x]   Dyspnea with normal activities  []   Dyspnea at rest  []   Dyspnea while sleeping    Patient Findings:   []  Missed doses  []  Diet changes  []  Sodium intake changes   []  Night time cough   []  Other   [x]  Early saiety, abd fullness [x]  Chest pain   [x]  Constipation   []  Night time bathroom trips  []  Alcohol intake changes

## 2024-12-17 NOTE — PATIENT INSTRUCTIONS
HEART FAILURE:    With heart failure you must follow up with your Cardiologist, your PCP and other physicians as appropriate - especially 7 days after a hospitalization and then as directed.     Please call right away with any signs or symptoms of heart failure or other medical conditions that need attention or with any questions at all. Please call your Cardiologist or your PCP or the Outpatient Heart Failure Clinic at [phone].  We can help manage these symptoms and often prevent a visit to the Emergency Room or hospitalization.       * Know your dry weight (your weight without any fluid on board)    * Call any time you have questions about anything. Do not wait.    * It is very important to take your medications as prescribed, do not miss any doses.   Call for refills before you run out. Typically when you have one week left.   If you have trouble getting your medications for any reason, call us at [phone].    * Avoid NSAIDs (non steroidal anti inflammatory drugs) like Aleve (naproxen), Advil and Motrin (ibuprofen), Mobic (diclofenac), Celebrex (celecoxib) and aspirin. A daily aspirin is okay if recommended by your physician.      It is okay to take Tylenol (acetaminophen) unless your physician has told you otherwise.    * Limit sodium intake.   Typically this is no more than 2,000-2,400 milligrams (mg) per day.   You will need to add up the sodium content found on the nutrition label for the foods you eat each day.    * Weigh yourself every day. Weigh yourself before breakfast and after you go to the restroom.  Wear the same thing each time you weigh yourself.   Keep a log and bring it to each visit.   Call if you gain 3 or more pounds in 1-7 days - [phone]                                      * If you have hypertension (high blood pressure), you may want to check your blood pressure daily. Your blood pressure goal is less than 130/80 unless instructed differently by your physician. Keep a log and

## 2024-12-20 PROCEDURE — 93295 DEV INTERROG REMOTE 1/2/MLT: CPT | Performed by: FAMILY MEDICINE

## 2024-12-27 ENCOUNTER — NURSE ONLY (OUTPATIENT)
Dept: CARDIOLOGY | Age: 74
End: 2024-12-27
Payer: MEDICARE

## 2024-12-27 DIAGNOSIS — I42.8 NON-ISCHEMIC CARDIOMYOPATHY (HCC): ICD-10-CM

## 2024-12-27 DIAGNOSIS — I42.8 NICM (NONISCHEMIC CARDIOMYOPATHY) (HCC): ICD-10-CM

## 2024-12-27 DIAGNOSIS — Z95.810 ICD (IMPLANTABLE CARDIOVERTER-DEFIBRILLATOR) IN PLACE: Primary | ICD-10-CM

## 2025-01-28 PROBLEM — E11.22 TYPE 2 DIABETES MELLITUS WITH STAGE 3B CHRONIC KIDNEY DISEASE, WITHOUT LONG-TERM CURRENT USE OF INSULIN (HCC): Status: ACTIVE | Noted: 2020-08-25

## 2025-01-28 PROBLEM — N18.32 TYPE 2 DIABETES MELLITUS WITH STAGE 3B CHRONIC KIDNEY DISEASE, WITHOUT LONG-TERM CURRENT USE OF INSULIN (HCC): Status: ACTIVE | Noted: 2020-08-25

## 2025-01-29 ENCOUNTER — PHARMACY VISIT (OUTPATIENT)
Age: 75
End: 2025-01-29
Payer: MEDICARE

## 2025-01-29 VITALS
DIASTOLIC BLOOD PRESSURE: 67 MMHG | HEART RATE: 70 BPM | BODY MASS INDEX: 33.84 KG/M2 | WEIGHT: 185 LBS | SYSTOLIC BLOOD PRESSURE: 128 MMHG

## 2025-01-29 DIAGNOSIS — I50.42 CHRONIC COMBINED SYSTOLIC AND DIASTOLIC CHF, NYHA CLASS 4 (HCC): Primary | ICD-10-CM

## 2025-01-29 PROCEDURE — 99212 OFFICE O/P EST SF 10 MIN: CPT | Performed by: COUNSELOR

## 2025-01-29 RX ORDER — POLYETHYLENE GLYCOL 3350 17 G/17G
17 POWDER, FOR SOLUTION ORAL DAILY PRN
COMMUNITY

## 2025-01-29 NOTE — PROGRESS NOTES
Take 1 tablet by mouth 2 times daily 180 tablet 3             Objective / Assessment     Patient Active Problem List   Diagnosis Code    Hypertension I10    Hyperlipidemia E78.5    Acute on chronic systolic CHF (congestive heart failure) ([facility]) I50.23    Hypertensive urgency I16.0    Idiopathic cardiomyopathy ([facility]) I42.9    Hypertensive emergency I16.1    Chronic combined systolic and diastolic CHF, NYHA class 4 ([facility]) I50.42    Acute on chronic combined systolic and diastolic CHF, NYHA class 4 ([facility]) I50.43    Hypoxia R09.02    Moderate mitral regurgitation by prior echocardiogram I34.0    Morbid obesity E66.01    CKD (chronic kidney disease) stage 3, GFR 30-59 ml/min ([facility]) N18.30    Physical deconditioning R53.81    TIA (transient ischemic attack) G45.9    Old lacunar stroke without late effect Z86.73    Dysarthria R47.1    Stroke determined by clinical assessment ([facility]) I63.9    Aphasia R47.01    Type 2 diabetes mellitus with stage 3b chronic kidney disease, without long-term current use of insulin ([facility]) E11.22, N18.32    Uncontrolled type 2 diabetes mellitus with hyperglycemia ([facility]) E11.65    Acute ischemic stroke ([facility]) I63.9    PAF (paroxysmal atrial fibrillation) ([facility]) I48.0    Encounter for current long-term use of anticoagulants Z79.01    Hemiplegia and hemiparesis following cerebral infarction affecting right dominant side ([facility]) I69.351    Atrial fibrillation with RVR ([facility]) I48.91    Unable to care for self Z78.9    Abnormal result of other cardiovascular function study R94.39    Chest pain due to CAD ([facility]) I25.119    Acute systolic CHF (congestive heart failure), NYHA class 3 ([facility]) I50.21    Acute UTI N39.0    Hyperglycemia due to type 2 diabetes mellitus ([facility]) E11.65    Mild malnutrition ([facility]) E44.1    Prolonged Q-T interval on ECG R94.31    Generalized weakness R53.1    Acute diastolic CHF (congestive heart failure), NYHA class 3 ([facility]) I50.31    Secondary hypercoagulable state ([facility]) D68.69    Enterovirus

## 2025-01-29 NOTE — PATIENT INSTRUCTIONS
HEART FAILURE:    With heart failure you must follow up with your Cardiologist, your PCP and other physicians as appropriate - especially 7 days after a hospitalization and then as directed.     Please call right away with any signs or symptoms of heart failure or other medical conditions that need attention or with any questions at all. Please call your Cardiologist or your PCP or the Outpatient Heart Failure Clinic at [phone].  We can help manage these symptoms and often prevent a visit to the Emergency Room or hospitalization.       * Know your dry weight (your weight without any fluid on board)    * Call any time you have questions about anything. Do not wait.    * It is very important to take your medications as prescribed, do not miss any doses.   Call for refills before you run out. Typically when you have one week left.   If you have trouble getting your medications for any reason, call us at [phone].    * Avoid NSAIDs (non steroidal anti inflammatory drugs) like Aleve (naproxen), Advil and Motrin (ibuprofen), Mobic (diclofenac), Celebrex (celecoxib) and aspirin. A daily aspirin is okay if recommended by your physician.      It is okay to take Tylenol (acetaminophen) unless your physician has told you otherwise.    * Limit fluid intake.    Typically this is no more than 1,500-2,000 milliliters (mL) per day.     This is a liter and a half to two liters.               This is equal to approximately 48-64 ounces (oz) per day    * Limit sodium intake.   Typically this is no more than 2,000-2,400 milligrams (mg) per day.   You will need to add up the sodium content found on the nutrition label for the foods you eat each day.    * Weigh yourself every day. Weigh yourself before breakfast and after you go to the restroom.  Wear the same thing each time you weigh yourself.   Keep a log and bring it to each visit.   Call if you gain 3 or more pounds in 1-7 days - [phone]                                 
impaired

## 2025-02-12 NOTE — H&P
Health Maintenance       Influenza Vaccine (1)  Overdue since 9/1/2024    COVID-19 Vaccine (3 - 2024-25 season)  Overdue since 9/1/2024           Following review of the above:  Patient is not proceeding with: COVID-19 and Influenza    Note: Refer to final orders and clinician documentation.        Review Flowsheet  More data exists         2/12/2025   PHQ 2/9 Score   Adult PHQ 2 Score 0   Adult PHQ 2 Interpretation No further screening needed   Little interest or pleasure in activity? Not at all   Feeling down, depressed or hopeless? Not at all      Details                    ICU Admission [facility] Medicine  History and Physical    Patient:  Thomas Minaya  MRN: [id number]    Chief Complaint: Shortness of breath    History Obtained From:  patient, electronic medical record    PCP: SUE Godwin CNP    History of Present Illness: The patient is a 73 y.o. female who presented to the ER yesterday complaining of Shortness of breath. Symptoms started Approximately 2 days ago but has been worsening since that time. She also complains of Leg swelling and myalgias. She denies fever or chills, any GI symptoms and any  symptoms. She has not had any any sick contacts. Patient denied chest pain or palpitations. In her evaluation patient was found to be in atrial fibrillation. Patient was unable to state if she had ever had atrial fibrillation prior to her evaluation but does believe she is currently on blood thinners. During her initial evaluation patient's initial heart rate was 137, patient was tachypneic at 30 and hypoxic at 85% on room air. Chest x-ray showed cardiomegaly with pulmonary edema and/or multifocal pneumonia. Creatinine was elevated at 101. BNP elevated at 3280. Troponin elevated at 24. Upon admission to the emergency room patient was placed on BiPAP however patient does not tolerate that. Upon my assessment today patient is very restless in the bed and anxious and complains of shortness of breath and myalgias. Patient is tachycardic at 101 and currently remains in atrial fibrillation. Patient denied chest pain. Past Medical History:        Diagnosis Date    Cerebral artery occlusion with [facility] infarction [facility]     CHF (congestive heart failure) (HCC)     Diabetes mellitus ([facility]     H/O cardiac catheterization 08/04/2017    LMCA: Mild irregularites 10-20%. LAD: Mild irregularites 10-20%. LCx: Mild irregularites 10-20%. RCA: Mild irregularites 10-20%. LV function assessed as : Abnormal. EF of 40%.  H/O echocardiogram 12/18/2018    EF 55%. Mildly increased LV wall thickness. The left atrium appears moderately to severely dilated. Moderate to severe mitral regurg. Moderate pulmonary HTN with an estimated RV systolic pressure of 62HNGD. Moderate tricuspid regurg. Evidnece fo moderate diastolic dysfunction is seen.  History of echocardiogram 01/17/2019    EF 55%. LV wall thickness moderately increased. LA is mildly dilated w/ LA volume index of 30. trivial mitral regurg. Evidence of moderate (grade II) diastolic dysfunction seen.  History of echocardiogram 06/03/2019    EF of 50-55%. LV wall thickness is mildly increased. LA is mildly dilated (29-33) with a left atrial volume index of 31 m1/m2. mild diastolic dysfunction.  Hyperlipidemia     Hypertension        Past Surgical History:        Procedure Laterality Date    CARDIAC CATHETERIZATION Left 08/04/2017    right radial, T Dr. Enid Kulkarni         Medications Prior to Admission:    Prior to Admission medications    Medication Sig Start Date End Date Taking? Authorizing Provider   losartan-hydroCHLOROthiazide [facility]) 100-25 MG per tablet Take 1 tablet by mouth daily 8/23/21  Yes Katih Conteh MD   atorvastatin (LIPITOR) 80 MG tablet TAKE ONE TABLET BY MOUTH ONCE NIGHTLY 7/21/21  Yes Kathi Conteh MD   apixaban (ELIQUIS) 5 MG TABS tablet Take 1 tablet by mouth 2 times daily 3/8/21  Yes Kathi Conteh MD   busPIRone (BUSPAR) 10 MG tablet Take 10 mg by mouth 2 times daily   Yes Historical Provider, MD   famotidine (PEPCID) 20 MG tablet Take 20 mg by mouth 2 times daily   Yes Historical Provider, MD   amLODIPine (NORVASC) 10 MG tablet Take 1 tablet by mouth every evening 10/21/20  Yes Kathi Conteh MD   metoprolol succinate (TOPROL XL) 50 MG extended release tablet Take 1 tablet by mouth daily 10/21/20  Yes Kathi Conteh MD   miconazole (MICOTIN) 2 % powder Apply topically 2 times daily.  9/18/20  Yes Florian Eagle MD   metFORMIN (GLUCOPHAGE) 500 MG tablet Take 500 mg by mouth 2 times daily (with meals)   Yes Historical Provider, MD   gabapentin (NEURONTIN) 100 MG capsule Take 1 capsule by mouth 3 times daily for 120 days. Patient taking differently: Take 300 mg by mouth 3 times daily. 20  Reji Covarrubias MD       Allergies:  Patient has no known allergies. Social History:   TOBACCO:   reports that she has never smoked. She has never used smokeless tobacco.  ETOH:   reports no history of alcohol use. Family History:       Problem Relation Age of Onset    Coronary Art Dis Father          from [facility] COPD Sister         O2 dep    Coronary Art Dis Brother         2 brothers  from [place]       Allergies:  Patient has no known allergies. Medications Prior to Admission:    Prior to Admission medications    Medication Sig Start Date End Date Taking? Authorizing Provider   losartan-hydroCHLOROthiazide [facility]) 100-25 MG per tablet Take 1 tablet by mouth daily 21  Yes Reymundo Hensley MD   atorvastatin (LIPITOR) 80 MG tablet TAKE ONE TABLET BY MOUTH ONCE NIGHTLY 21  Yes Reymundo Hensley MD   apixaban (ELIQUIS) 5 MG TABS tablet Take 1 tablet by mouth 2 times daily 3/8/21  Yes Reymundo Hensley MD   busPIRone (BUSPAR) 10 MG tablet Take 10 mg by mouth 2 times daily   Yes Historical Provider, MD   famotidine (PEPCID) 20 MG tablet Take 20 mg by mouth 2 times daily   Yes Historical Provider, MD   amLODIPine (NORVASC) 10 MG tablet Take 1 tablet by mouth every evening 10/21/20  Yes Reymundo Hensley MD   metoprolol succinate (TOPROL XL) 50 MG extended release tablet Take 1 tablet by mouth daily 10/21/20  Yes Reymundo Hensley MD   miconazole (MICOTIN) 2 % powder Apply topically 2 times daily. 20  Yes Reji Covarrubias MD   metFORMIN (GLUCOPHAGE) 500 MG tablet Take 500 mg by mouth 2 times daily (with meals)   Yes Historical Provider, MD   gabapentin (NEURONTIN) 100 MG capsule Take 1 capsule by mouth 3 times daily for 120 days.   Patient taking differently: Take 300 mg by mouth 3 times daily. 20  Albert Houston MD       Review of Systems:  Constitutional:negative  for fevers, and negative for chills. Eyes: negative for visual disturbance   ENT: negative for sore throat, negative nasal congestion, and negative for earache  Respiratory: positive for shortness of breath, negative for cough, and negative for wheezing  Cardiovascular: negative for chest pain, negative for palpitations, and negative for syncope  Gastrointestinal: negative for abdominal pain, negative for nausea,negative for vomiting, negative for diarrhea, negative for constipation, and negative for hematochezia or melena  Genitourinary: negative for dysuria, negative for urinary urgency, negative for urinary frequency, and negative for hematuria  Skin: negative for skin rash, and negative for skin lesions  Neurological: negative for unilateral weakness, numbness or tingling. Physical Exam:    Vitals:    Temp: 97.5 °F (36.4 °C)  BP: (!) 136/115  Resp: 20  Pulse: 80  SpO2: 91 % on supplemental O2  SpO2 range:   SpO2  Av %  Min: 86 %  Max: 98 %    Exam:  GEN:    Awake, alert and oriented x3. EYES:  EOMI, pupils equal   NECK: Supple. No lymphadenopathy. No carotid bruit  CVS:    irregularly irregular, no audible murmur  PULM:  Diminished fine rales bilateral bases, Pt exhibiting mild-moderate respiratory distress   ABD:    Bowels sounds normal.  Abdomen is soft. No distention. no tenderness to palpation. EXT:   2+ edema bilaterally . No calf tenderness. NEURO: Moves all extremities. Motor and sensory are grossly intact  SKIN:  No rashes.   No skin lesions.    -----------------------------------------------------------------  Diagnostic Data:     CBC:   Lab Results   Component Value Date    WBC 7.1 2021    RBC 3.87 (L) 2021    HGB 11.3 (L) 2021    HCT 35.7 (L) 2021    MCV 92.2 2021     2021      Lab Results   Component Value Date    SEGS 80 (H) 11/17/2021    NEUTROABS 5.66 11/17/2021    LYMPHOPCT 11 (L) 11/17/2021    LYMPHSABS 0.76 (L) 11/17/2021    MONOPCT 9 11/17/2021    EOSRELPCT 0 (L) 11/17/2021    BASOPCT 0 11/17/2021    IMMGRAN 0 11/17/2021     CMP:   Lab Results   Component Value Date    GLUCOSE 149 (H) 11/17/2021    BUN 24 (H) 11/17/2021    CREATININE 0.96 (H) 11/17/2021     11/17/2021    K 4.4 11/17/2021    CALCIUM 10.1 11/17/2021     11/17/2021    CO2 23 11/17/2021    PROT 6.5 11/17/2021    LABALBU 3.9 11/17/2021    BILITOT 0.86 11/17/2021    ALKPHOS 143 (H) 11/17/2021    ALT 25 11/17/2021    AST 14 11/17/2021     UA:   Lab Results   Component Value Date    COLORU YELLOW 06/21/2021    SPECGRAV 1.025 (H) 06/21/2021    WBCUA 0 TO 2 06/21/2021    RBCUA None 06/21/2021    EPITHUA 0 TO 2 06/21/2021    LEUKOCYTESUR NEGATIVE 06/21/2021    GLUCOSEU NEGATIVE 06/21/2021    KETUA NEGATIVE 06/21/2021    PROTEINU 1+ (A) 06/21/2021    HGBUR TRACE (A) 06/21/2021    CASTUA NOT REPORTED 06/21/2021    CRYSTUA NOT REPORTED 06/21/2021    BACTERIA NOT REPORTED 06/21/2021    YEAST NOT REPORTED 06/21/2021     Lactic Acid:   Lab Results   Component Value Date    LACTA 1.8 06/21/2021     D-Dimer:  No results found for: DDIMER  PT/INR:  Lab Results   Component Value Date    PROTIME 13.4 08/23/2020    INR 1.0 08/23/2020     Troponin:  No results for input(s): TROPONINI in the last 72 hours. ABGs:   Lab Results   Component Value Date    FIO2 NOT REPORTED 12/17/2018         EKG reviewed: my interpretation is:           Imaging Data:  XR CHEST PORTABLE   Final Result   Cardiomegaly with pulmonary edema and/or multifocal pneumonia. Recommend   follow-up to resolution.                  Assessment:    Principal Problem:    A-fib (HCC)  Active Problems:    Acute on chronic combined systolic and diastolic CHF, NYHA class 4 (HCC)    Hypoxia    Type 2 diabetes mellitus, without long-term current use of insulin [facility]    Unable to care for self  Resolved Problems: * No resolved hospital problems. *      Patient Active Problem List    Diagnosis Date Noted    Encounter for current long-term use of anticoagulants 11/06/2020    Stroke determined by clinical assessment ([facility] 07/20/2020    Physical deconditioning 06/12/2019    Severe mitral regurgitation by prior echocardiogram     Unable to care for self 11/17/2021    [facility]) 11/16/2021    Hemiplegia and hemiparesis following cerebral infarction affecting right dominant side ([facility] 04/08/2021    PAF (paroxysmal atrial fibrillation) ([facility] 09/25/2020    Acute ischemic stroke ([facility] 08/26/2020    Uncontrolled type 2 diabetes mellitus with hyperglycemia ([facility]     Type 2 diabetes mellitus, without long-term current use of insulin ([facility] 08/25/2020    Aphasia     Old lacunar stroke without late effect     Dysarthria     TIA (transient ischemic attack) 12/22/2019    Morbid obesity ([facility] 12/20/2018    CKD (chronic kidney disease) stage 3, GFR 30-59 ml/min ([facility] 12/20/2018    Hypoxia 12/18/2018    Acute on chronic combined systolic and diastolic CHF, NYHA class 4 ([facility] 12/17/2018    Chronic combined systolic and diastolic HF (heart failure), NYHA class 2 ([facility] 08/28/2017    Acute on chronic systolic CHF (congestive heart failure) ([facility] 08/01/2017    Hypertensive urgency 08/01/2017    Hypertensive emergency 08/01/2017    Hypertension     Hyperlipidemia     Idiopathic cardiomyopathy ([facility]        Plan:     · This patient requires ICU admission because of Acute on chronic combined chf class 4  · Estimated length of stay is 4 days  · Discussed patient's symptoms and data results including labs and imaging studies with the ER MD at time of admission  · Antibiotics: None   · Fluids: contraindicated due to symptomatic CHF and fluid overload   · Labs: monitor CBC with diff and CMP daily.    · Cultures: None   · Imaging: CXR   · Vasopressors: not indicated at this time   · Consults: Cardiology   · IV Lasix  · Continue losartan/HCTZ  · Atrial fibrillation with RVR  · Appreciate cardiology  · Cardizem drip continued  · Continue Eliquis  · Continue Toprol-XL  · Type 2 diabetes  · POCT before meals and at bedtime  · Low-dose sliding scale  · Hypoglycemia protocol  · Continue Glucophage  · Hypoxia  · Supplemental oxygen to maintain SPO2 greater than 92%  · BiPAP as needed  · DVT prophylaxis: already on chronic anticoagulation  · Peptic ulcer prophylaxis: Pepcid  · High risk medications: Diltiazem drip  · Social Service and Case Management consults for DC planning  · Dietician consult initiated  · Disposition:  Discharge plan is Pending      CORE MEASURES  DVT prophylaxis: already on chronic anticoagulation  Decubitus ulcer present on admission: No  CODE STATUS: FULL CODE  Nutrition Status: good   Physical therapy: Yes   Old Charts reviewed: Yes  EKG Reviewed:  Yes  Advance Directive Addressed: Yes    SUE Stratton CNP, SUE, NP-C  11/17/2021, 2:41 PM

## 2025-04-02 ENCOUNTER — CLINICAL SUPPORT (OUTPATIENT)
Dept: CARDIOLOGY | Age: 75
End: 2025-04-02
Payer: MEDICARE

## 2025-04-02 DIAGNOSIS — I50.42 CHRONIC COMBINED SYSTOLIC AND DIASTOLIC HF (HEART FAILURE), NYHA CLASS 2 (HCC): ICD-10-CM

## 2025-04-02 DIAGNOSIS — Z95.810 ICD (IMPLANTABLE CARDIOVERTER-DEFIBRILLATOR) IN PLACE: ICD-10-CM

## 2025-04-02 DIAGNOSIS — I42.8 NICM (NONISCHEMIC CARDIOMYOPATHY) (HCC): Primary | ICD-10-CM

## 2025-04-02 PROCEDURE — 93295 DEV INTERROG REMOTE 1/2/MLT: CPT | Performed by: FAMILY MEDICINE

## 2025-07-15 ENCOUNTER — CLINICAL SUPPORT (OUTPATIENT)
Dept: CARDIOLOGY | Age: 75
End: 2025-07-15
Payer: MEDICARE

## 2025-07-15 DIAGNOSIS — Z95.810 ICD (IMPLANTABLE CARDIOVERTER-DEFIBRILLATOR) IN PLACE: ICD-10-CM

## 2025-07-15 DIAGNOSIS — I42.8 NICM (NONISCHEMIC CARDIOMYOPATHY) (HCC): Primary | ICD-10-CM

## 2025-07-15 PROCEDURE — 93296 REM INTERROG EVL PM/IDS: CPT | Performed by: FAMILY MEDICINE

## 2025-07-15 PROCEDURE — 93297 REM INTERROG DEV EVAL ICPMS: CPT | Performed by: FAMILY MEDICINE

## 2025-07-30 ENCOUNTER — RESULTS FOLLOW-UP (OUTPATIENT)
Dept: CARDIOLOGY | Age: 75
End: 2025-07-30

## 2025-07-30 ENCOUNTER — PHARMACY VISIT (OUTPATIENT)
Age: 75
End: 2025-07-30
Payer: MEDICARE

## 2025-07-30 ENCOUNTER — HOSPITAL ENCOUNTER (OUTPATIENT)
Age: 75
Discharge: HOME | End: 2025-07-30
Payer: MEDICARE

## 2025-07-30 DIAGNOSIS — I50.42 CHRONIC COMBINED SYSTOLIC AND DIASTOLIC CHF, NYHA CLASS 4 (HCC): ICD-10-CM

## 2025-07-30 DIAGNOSIS — I50.42 CHRONIC COMBINED SYSTOLIC AND DIASTOLIC CHF, NYHA CLASS 4 (HCC): Primary | ICD-10-CM

## 2025-07-30 LAB
ANION GAP SERPL CALCULATED.3IONS-SCNC: 10 MMOL/L (ref 9–16)
BUN SERPL-MCNC: 37 MG/DL (ref 8–23)
BUN/CREAT SERPL: 31 (ref 9–20)
CALCIUM SERPL-MCNC: 9 MG/DL (ref 8.6–10.4)
CHLORIDE SERPL-SCNC: 108 MMOL/L (ref 98–107)
CO2 SERPL-SCNC: 22 MMOL/L (ref 20–31)
CREAT SERPL-MCNC: 1.2 MG/DL (ref 0.5–0.9)
GFR, ESTIMATED: 50 ML/MIN/1.73M2
GLUCOSE SERPL-MCNC: 116 MG/DL (ref 74–99)
POTASSIUM SERPL-SCNC: 4.5 MMOL/L (ref 3.7–5.3)
SODIUM SERPL-SCNC: 140 MMOL/L (ref 136–145)

## 2025-07-30 PROCEDURE — 80048 BASIC METABOLIC PNL TOTAL CA: CPT

## 2025-07-30 PROCEDURE — 99213 OFFICE O/P EST LOW 20 MIN: CPT

## 2025-07-30 PROCEDURE — 36415 COLL VENOUS BLD VENIPUNCTURE: CPT

## 2025-07-30 RX ORDER — NYSTATIN 100000 [USP'U]/G
POWDER TOPICAL 2 TIMES DAILY
COMMUNITY
Start: 2025-05-06

## 2025-07-30 NOTE — RESULT ENCOUNTER NOTE
Please let the patient know that her blood work was okay. We will discuss at her next visit. thanks!

## 2025-07-30 NOTE — PROGRESS NOTES
[facility]  Medication Management  Pharmacist  Heart Failure    [address] [place] [address] [address] [place], [place] [address]  Phone: [phone]  Fax: [phone]      NAME: Michelle Ragland  MEDICAL RECORD NUMBER:  [id number]  AGE: 74 y.o.   GENDER: female  : 1950  EPISODE DATE:  2025      Heart Failure Management referral by Dr. Joaquin         Today's Wt: 192 lb (up 7 lb from last visit 6 months ago)  Wt Readings from Last 6 Encounters:   25 88.2 kg (194 lb 6.4 oz)   25 85.4 kg (188 lb 3.2 oz)   25 85.3 kg (188 lb)   25 83.9 kg (185 lb)   24 85.7 kg (189 lb)   24 82.1 kg (181 lb 1.6 oz)    and   Ht Readings from Last 1 Encounters:   24 1.575 m (5' 2\")                 /68   HR: 74  O2Sat: 97% (On 3 L on O2)    Extremities and pitting edema 2+ pitting edema BLE up to knees R>L- NOT wearing compression socks  Skin warm dry open bleeding wound on right leg     Subjective   Ms. Ragland is a 74 y.o. female here for the Heart Failure Services.    They are here today for a comprehensive medication review including over the counter medications and herbal products, overall wellbeing assessment, transition of care and any needed adjustments with updates and recommendations communicated to the referring physician.      Patient Symptoms Patient is here today for 6 month follow up.  She lives at [facility].  Patient is accompanied by [facility] from facility.   Entresto  BID was added 24.  Patient was supposed to have BMP prior to this visit.   Ashley has been refusing to take her diuretic because she doesn't want to have to get up to go to the bathroom.    Weight is increased 7 pounds since last visit, 6 months ago.    Patient reports she got a lift chair/recliner for her room.  She is spending less time in her wheelchair and is able to elevate her legs during the day.  She is also able to transfer from chair to bed more easily.  Ashley got her leg stuck in the

## 2025-07-30 NOTE — PATIENT INSTRUCTIONS
HEART FAILURE:    With heart failure you must follow up with your Cardiologist, your PCP and other physicians as appropriate - especially 7 days after a hospitalization and then as directed.     Please call right away with any signs or symptoms of heart failure or other medical conditions that need attention or with any questions at all. Please call your Cardiologist or your PCP or the [facility] at [phone].  We can help manage these symptoms and often prevent a visit to the Emergency Room or hospitalization.       * Know your dry weight (your weight without any fluid on board)    * Call any time you have questions about anything. Do not wait.    * It is very important to take your medications as prescribed, do not miss any doses.   Call for refills before you run out. Typically when you have one week left.   If you have trouble getting your medications for any reason, call us at [phone].    * Avoid NSAIDs (non steroidal anti inflammatory drugs) like Aleve (naproxen), Advil and Motrin (ibuprofen), Mobic (diclofenac), Celebrex (celecoxib) and aspirin. A daily aspirin is okay if recommended by your physician.      It is okay to take Tylenol (acetaminophen) unless your physician has told you otherwise.    * Limit fluid intake.    Typically this is no more than 1,500-2,000 milliliters (mL) per day.     This is a liter and a half to two liters.               This is equal to approximately 48-64 ounces (oz) per day    * Limit sodium intake.   Typically this is no more than 2,000-2,400 milligrams (mg) per day.   You will need to add up the sodium content found on the nutrition label for the foods you eat each day.    * Weigh yourself every day. Weigh yourself before breakfast and after you go to the restroom.  Wear the same thing each time you weigh yourself.   Keep a log and bring it to each visit.   Call if you gain 3 or more pounds in 1-7 days - [phone]

## 2025-07-30 NOTE — TELEPHONE ENCOUNTER
----- Message from SUE Gillespie CNP sent at 7/30/2025  4:17 PM EDT -----  Please let the patient know that her blood work was okay. We will discuss at her next visit. thanks!